# Patient Record
Sex: MALE | Race: WHITE | Employment: OTHER
[De-identification: names, ages, dates, MRNs, and addresses within clinical notes are randomized per-mention and may not be internally consistent; named-entity substitution may affect disease eponyms.]

---

## 2017-02-13 PROBLEM — M86.9 OSTEOMYELITIS (HCC): Status: ACTIVE | Noted: 2017-02-13

## 2017-03-15 ENCOUNTER — TELEPHONE (OUTPATIENT)
Dept: CASE MANAGEMENT | Age: 77
End: 2017-03-15

## 2017-10-11 PROBLEM — M86.9 OSTEOMYELITIS (HCC): Status: RESOLVED | Noted: 2017-02-13 | Resolved: 2017-10-11

## 2017-10-12 PROBLEM — R91.1 PULMONARY NODULE: Status: ACTIVE | Noted: 2017-10-12

## 2018-01-19 PROBLEM — R07.9 CHEST PAIN: Status: ACTIVE | Noted: 2018-01-19

## 2018-01-20 PROBLEM — I21.4 NSTEMI, INITIAL EPISODE OF CARE (HCC): Status: ACTIVE | Noted: 2018-01-01

## 2018-01-21 LAB
LEFT VENTRICULAR EJECTION FRACTION MODE: NORMAL
LV EF: 60 %

## 2018-01-22 PROBLEM — R07.9 CHEST PAIN: Status: RESOLVED | Noted: 2018-01-19 | Resolved: 2018-01-22

## 2018-01-22 PROBLEM — E78.5 HYPERLIPIDEMIA LDL GOAL <100: Chronic | Status: ACTIVE | Noted: 2018-01-22

## 2018-01-22 PROBLEM — E66.9 OBESITY (BMI 30-39.9): Chronic | Status: ACTIVE | Noted: 2018-01-22

## 2018-01-22 PROBLEM — I21.4 NSTEMI, INITIAL EPISODE OF CARE (HCC): Status: RESOLVED | Noted: 2018-01-01 | Resolved: 2018-01-22

## 2018-01-22 PROBLEM — E03.9 ACQUIRED HYPOTHYROIDISM: Chronic | Status: ACTIVE | Noted: 2018-01-22

## 2018-01-22 PROBLEM — I25.10 CAD (CORONARY ARTERY DISEASE), NATIVE CORONARY ARTERY: Chronic | Status: ACTIVE | Noted: 2018-01-22

## 2018-04-22 ENCOUNTER — HOSPITAL ENCOUNTER (EMERGENCY)
Age: 78
Discharge: HOME | End: 2018-04-22
Attending: EMERGENCY MEDICINE
Payer: MEDICARE

## 2018-04-22 ENCOUNTER — APPOINTMENT (OUTPATIENT)
Dept: CT IMAGING | Age: 78
End: 2018-04-22
Payer: MEDICARE

## 2018-04-22 VITALS
TEMPERATURE: 98.4 F | HEIGHT: 68 IN | SYSTOLIC BLOOD PRESSURE: 161 MMHG | WEIGHT: 265 LBS | HEART RATE: 88 BPM | DIASTOLIC BLOOD PRESSURE: 80 MMHG | BODY MASS INDEX: 40.16 KG/M2 | RESPIRATION RATE: 16 BRPM | OXYGEN SATURATION: 95 %

## 2018-04-22 DIAGNOSIS — R11.2 NAUSEA VOMITING AND DIARRHEA: Primary | ICD-10-CM

## 2018-04-22 DIAGNOSIS — R19.7 NAUSEA VOMITING AND DIARRHEA: Primary | ICD-10-CM

## 2018-04-22 LAB
ALBUMIN SERPL-MCNC: 3.9 G/DL (ref 3.5–5.2)
ALP BLD-CCNC: 57 U/L (ref 40–129)
ALT SERPL-CCNC: 11 U/L (ref 0–40)
ANION GAP SERPL CALCULATED.3IONS-SCNC: 16 MMOL/L (ref 7–16)
ANISOCYTOSIS: ABNORMAL
AST SERPL-CCNC: 21 U/L (ref 0–39)
BACTERIA: NORMAL /HPF
BASOPHILS ABSOLUTE: 0 E9/L (ref 0–0.2)
BASOPHILS RELATIVE PERCENT: 0 % (ref 0–2)
BILIRUB SERPL-MCNC: 0.5 MG/DL (ref 0–1.2)
BILIRUBIN URINE: NEGATIVE
BLOOD, URINE: NEGATIVE
BUN BLDV-MCNC: 19 MG/DL (ref 8–23)
CALCIUM SERPL-MCNC: 9.9 MG/DL (ref 8.6–10.2)
CHLORIDE BLD-SCNC: 98 MMOL/L (ref 98–107)
CLARITY: CLEAR
CO2: 23 MMOL/L (ref 22–29)
COLOR: YELLOW
CREAT SERPL-MCNC: 1.3 MG/DL (ref 0.7–1.2)
EOSINOPHILS ABSOLUTE: 0.63 E9/L (ref 0.05–0.5)
EOSINOPHILS RELATIVE PERCENT: 7.7 % (ref 0–6)
GFR AFRICAN AMERICAN: >60
GFR NON-AFRICAN AMERICAN: 54 ML/MIN/1.73
GLUCOSE BLD-MCNC: 151 MG/DL (ref 74–109)
GLUCOSE URINE: NEGATIVE MG/DL
HCT VFR BLD CALC: 39.2 % (ref 37–54)
HEMOGLOBIN: 13.1 G/DL (ref 12.5–16.5)
KETONES, URINE: ABNORMAL MG/DL
LACTIC ACID: 1.8 MMOL/L (ref 0.5–2.2)
LEUKOCYTE ESTERASE, URINE: NEGATIVE
LIPASE: 16 U/L (ref 13–60)
LYMPHOCYTES ABSOLUTE: 0.49 E9/L (ref 1.5–4)
LYMPHOCYTES RELATIVE PERCENT: 6 % (ref 20–42)
MAGNESIUM: 1.9 MG/DL (ref 1.6–2.6)
MCH RBC QN AUTO: 31.6 PG (ref 26–35)
MCHC RBC AUTO-ENTMCNC: 33.4 % (ref 32–34.5)
MCV RBC AUTO: 94.5 FL (ref 80–99.9)
MONOCYTES ABSOLUTE: 0.33 E9/L (ref 0.1–0.95)
MONOCYTES RELATIVE PERCENT: 4.3 % (ref 2–12)
NEUTROPHILS ABSOLUTE: 6.72 E9/L (ref 1.8–7.3)
NEUTROPHILS RELATIVE PERCENT: 82 % (ref 43–80)
NITRITE, URINE: NEGATIVE
NUCLEATED RED BLOOD CELLS: 0 /100 WBC
PDW BLD-RTO: 14.7 FL (ref 11.5–15)
PH UA: 6.5 (ref 5–9)
PLATELET # BLD: 154 E9/L (ref 130–450)
PMV BLD AUTO: 10.6 FL (ref 7–12)
POTASSIUM SERPL-SCNC: 4.6 MMOL/L (ref 3.5–5)
PROTEIN UA: ABNORMAL MG/DL
RBC # BLD: 4.15 E12/L (ref 3.8–5.8)
RBC UA: NORMAL /HPF (ref 0–2)
SODIUM BLD-SCNC: 137 MMOL/L (ref 132–146)
SPECIFIC GRAVITY UA: 1.01 (ref 1–1.03)
TOTAL PROTEIN: 7.7 G/DL (ref 6.4–8.3)
UROBILINOGEN, URINE: 0.2 E.U./DL
WBC # BLD: 8.2 E9/L (ref 4.5–11.5)
WBC UA: NORMAL /HPF (ref 0–5)

## 2018-04-22 PROCEDURE — 99284 EMERGENCY DEPT VISIT MOD MDM: CPT

## 2018-04-22 PROCEDURE — 81001 URINALYSIS AUTO W/SCOPE: CPT

## 2018-04-22 PROCEDURE — 74176 CT ABD & PELVIS W/O CONTRAST: CPT

## 2018-04-22 PROCEDURE — 96374 THER/PROPH/DIAG INJ IV PUSH: CPT

## 2018-04-22 PROCEDURE — 6370000000 HC RX 637 (ALT 250 FOR IP): Performed by: EMERGENCY MEDICINE

## 2018-04-22 PROCEDURE — 83690 ASSAY OF LIPASE: CPT

## 2018-04-22 PROCEDURE — 6360000002 HC RX W HCPCS: Performed by: EMERGENCY MEDICINE

## 2018-04-22 PROCEDURE — 96376 TX/PRO/DX INJ SAME DRUG ADON: CPT

## 2018-04-22 PROCEDURE — 85025 COMPLETE CBC W/AUTO DIFF WBC: CPT

## 2018-04-22 PROCEDURE — 80053 COMPREHEN METABOLIC PANEL: CPT

## 2018-04-22 PROCEDURE — 83605 ASSAY OF LACTIC ACID: CPT

## 2018-04-22 PROCEDURE — 83735 ASSAY OF MAGNESIUM: CPT

## 2018-04-22 PROCEDURE — 96375 TX/PRO/DX INJ NEW DRUG ADDON: CPT

## 2018-04-22 RX ORDER — MORPHINE SULFATE 4 MG/ML
4 INJECTION, SOLUTION INTRAMUSCULAR; INTRAVENOUS ONCE
Status: COMPLETED | OUTPATIENT
Start: 2018-04-22 | End: 2018-04-22

## 2018-04-22 RX ORDER — ONDANSETRON 2 MG/ML
4 INJECTION INTRAMUSCULAR; INTRAVENOUS ONCE
Status: COMPLETED | OUTPATIENT
Start: 2018-04-22 | End: 2018-04-22

## 2018-04-22 RX ORDER — PROMETHAZINE HYDROCHLORIDE 25 MG/1
25 SUPPOSITORY RECTAL EVERY 4 HOURS PRN
Qty: 10 SUPPOSITORY | Refills: 0 | Status: SHIPPED | OUTPATIENT
Start: 2018-04-22 | End: 2018-04-29

## 2018-04-22 RX ORDER — HYDROCODONE BITARTRATE AND ACETAMINOPHEN 5; 325 MG/1; MG/1
1 TABLET ORAL ONCE
Status: COMPLETED | OUTPATIENT
Start: 2018-04-22 | End: 2018-04-22

## 2018-04-22 RX ORDER — GABAPENTIN 100 MG/1
400 CAPSULE ORAL 3 TIMES DAILY
Status: DISCONTINUED | OUTPATIENT
Start: 2018-04-22 | End: 2018-04-22

## 2018-04-22 RX ORDER — GABAPENTIN 100 MG/1
400 CAPSULE ORAL ONCE
Status: COMPLETED | OUTPATIENT
Start: 2018-04-22 | End: 2018-04-22

## 2018-04-22 RX ORDER — ONDANSETRON 4 MG/1
4 TABLET, ORALLY DISINTEGRATING ORAL EVERY 8 HOURS PRN
Qty: 20 TABLET | Refills: 0 | Status: ON HOLD | OUTPATIENT
Start: 2018-04-22 | End: 2018-05-13 | Stop reason: HOSPADM

## 2018-04-22 RX ADMIN — HYDROCODONE BITARTRATE AND ACETAMINOPHEN 1 TABLET: 5; 325 TABLET ORAL at 11:54

## 2018-04-22 RX ADMIN — GABAPENTIN 400 MG: 100 CAPSULE ORAL at 11:54

## 2018-04-22 RX ADMIN — MORPHINE SULFATE 4 MG: 4 INJECTION INTRAVENOUS at 12:50

## 2018-04-22 RX ADMIN — ONDANSETRON 4 MG: 2 INJECTION INTRAMUSCULAR; INTRAVENOUS at 10:14

## 2018-04-22 RX ADMIN — MORPHINE SULFATE 4 MG: 4 INJECTION INTRAVENOUS at 10:14

## 2018-04-22 ASSESSMENT — PAIN DESCRIPTION - DESCRIPTORS
DESCRIPTORS: ACHING;SHARP

## 2018-04-22 ASSESSMENT — PAIN SCALES - GENERAL
PAINLEVEL_OUTOF10: 8
PAINLEVEL_OUTOF10: 7
PAINLEVEL_OUTOF10: 9
PAINLEVEL_OUTOF10: 8
PAINLEVEL_OUTOF10: 5
PAINLEVEL_OUTOF10: 9

## 2018-04-22 ASSESSMENT — PAIN DESCRIPTION - PAIN TYPE
TYPE: CHRONIC PAIN

## 2018-04-22 ASSESSMENT — PAIN DESCRIPTION - LOCATION
LOCATION: BACK

## 2018-04-22 ASSESSMENT — PAIN DESCRIPTION - ORIENTATION
ORIENTATION: RIGHT;LEFT;LOWER
ORIENTATION: RIGHT;LEFT;LOWER
ORIENTATION: RIGHT;LEFT

## 2018-04-22 ASSESSMENT — PAIN DESCRIPTION - FREQUENCY
FREQUENCY: CONTINUOUS

## 2018-04-23 ENCOUNTER — HOSPITAL ENCOUNTER (EMERGENCY)
Age: 78
Discharge: HOME | End: 2018-04-23
Attending: EMERGENCY MEDICINE
Payer: MEDICARE

## 2018-04-23 VITALS
BODY MASS INDEX: 40.16 KG/M2 | HEIGHT: 68 IN | HEART RATE: 60 BPM | DIASTOLIC BLOOD PRESSURE: 65 MMHG | WEIGHT: 265 LBS | RESPIRATION RATE: 18 BRPM | TEMPERATURE: 98.9 F | OXYGEN SATURATION: 95 % | SYSTOLIC BLOOD PRESSURE: 147 MMHG

## 2018-04-23 DIAGNOSIS — R11.2 NON-INTRACTABLE VOMITING WITH NAUSEA, UNSPECIFIED VOMITING TYPE: ICD-10-CM

## 2018-04-23 DIAGNOSIS — K52.9 COLITIS: Primary | ICD-10-CM

## 2018-04-23 LAB
ALBUMIN SERPL-MCNC: 3.3 G/DL (ref 3.5–5.2)
ALP BLD-CCNC: 44 U/L (ref 40–129)
ALT SERPL-CCNC: 9 U/L (ref 0–40)
ANION GAP SERPL CALCULATED.3IONS-SCNC: 16 MMOL/L (ref 7–16)
AST SERPL-CCNC: 15 U/L (ref 0–39)
BILIRUB SERPL-MCNC: 0.4 MG/DL (ref 0–1.2)
BUN BLDV-MCNC: 24 MG/DL (ref 8–23)
CALCIUM SERPL-MCNC: 8.8 MG/DL (ref 8.6–10.2)
CHLORIDE BLD-SCNC: 95 MMOL/L (ref 98–107)
CO2: 24 MMOL/L (ref 22–29)
CREAT SERPL-MCNC: 1.6 MG/DL (ref 0.7–1.2)
GFR AFRICAN AMERICAN: 51
GFR NON-AFRICAN AMERICAN: 42 ML/MIN/1.73
GLUCOSE BLD-MCNC: 102 MG/DL (ref 74–109)
HCT VFR BLD CALC: 35 % (ref 37–54)
HEMOGLOBIN: 12 G/DL (ref 12.5–16.5)
LACTIC ACID: 2 MMOL/L (ref 0.5–2.2)
MCH RBC QN AUTO: 32.2 PG (ref 26–35)
MCHC RBC AUTO-ENTMCNC: 34.3 % (ref 32–34.5)
MCV RBC AUTO: 93.8 FL (ref 80–99.9)
PDW BLD-RTO: 14.8 FL (ref 11.5–15)
PLATELET # BLD: 118 E9/L (ref 130–450)
PMV BLD AUTO: 10.5 FL (ref 7–12)
POTASSIUM SERPL-SCNC: 3.9 MMOL/L (ref 3.5–5)
RBC # BLD: 3.73 E12/L (ref 3.8–5.8)
SODIUM BLD-SCNC: 135 MMOL/L (ref 132–146)
TOTAL PROTEIN: 6.5 G/DL (ref 6.4–8.3)
WBC # BLD: 8.7 E9/L (ref 4.5–11.5)

## 2018-04-23 PROCEDURE — 83605 ASSAY OF LACTIC ACID: CPT

## 2018-04-23 PROCEDURE — 80053 COMPREHEN METABOLIC PANEL: CPT

## 2018-04-23 PROCEDURE — 85027 COMPLETE CBC AUTOMATED: CPT

## 2018-04-23 PROCEDURE — 2580000003 HC RX 258: Performed by: NURSE PRACTITIONER

## 2018-04-23 PROCEDURE — 99284 EMERGENCY DEPT VISIT MOD MDM: CPT

## 2018-04-23 PROCEDURE — 6360000002 HC RX W HCPCS: Performed by: NURSE PRACTITIONER

## 2018-04-23 PROCEDURE — 6370000000 HC RX 637 (ALT 250 FOR IP): Performed by: NURSE PRACTITIONER

## 2018-04-23 PROCEDURE — 96374 THER/PROPH/DIAG INJ IV PUSH: CPT

## 2018-04-23 RX ORDER — LACTOBACILLUS RHAMNOSUS GG 10B CELL
1 CAPSULE ORAL DAILY
Qty: 21 CAPSULE | Refills: 0 | Status: ON HOLD | OUTPATIENT
Start: 2018-04-23 | End: 2018-05-13 | Stop reason: HOSPADM

## 2018-04-23 RX ORDER — SODIUM CHLORIDE 9 MG/ML
1000 INJECTION, SOLUTION INTRAVENOUS ONCE
Status: COMPLETED | OUTPATIENT
Start: 2018-04-23 | End: 2018-04-23

## 2018-04-23 RX ORDER — METRONIDAZOLE 500 MG/1
500 TABLET ORAL 4 TIMES DAILY
Qty: 56 TABLET | Refills: 0 | Status: SHIPPED | OUTPATIENT
Start: 2018-04-23 | End: 2018-05-07

## 2018-04-23 RX ORDER — LOPERAMIDE HYDROCHLORIDE 2 MG/1
2 CAPSULE ORAL ONCE
Status: COMPLETED | OUTPATIENT
Start: 2018-04-23 | End: 2018-04-23

## 2018-04-23 RX ORDER — ONDANSETRON 2 MG/ML
4 INJECTION INTRAMUSCULAR; INTRAVENOUS ONCE
Status: COMPLETED | OUTPATIENT
Start: 2018-04-23 | End: 2018-04-23

## 2018-04-23 RX ADMIN — LOPERAMIDE HYDROCHLORIDE 2 MG: 2 CAPSULE ORAL at 13:08

## 2018-04-23 RX ADMIN — ONDANSETRON 4 MG: 2 INJECTION INTRAMUSCULAR; INTRAVENOUS at 13:17

## 2018-04-23 RX ADMIN — SODIUM CHLORIDE 1000 ML: 9 INJECTION, SOLUTION INTRAVENOUS at 13:24

## 2018-04-23 ASSESSMENT — PAIN DESCRIPTION - LOCATION: LOCATION: BACK

## 2018-04-23 ASSESSMENT — PAIN DESCRIPTION - PAIN TYPE: TYPE: CHRONIC PAIN

## 2018-04-23 ASSESSMENT — PAIN SCALES - GENERAL: PAINLEVEL_OUTOF10: 3

## 2018-04-26 ENCOUNTER — NURSE ONLY (OUTPATIENT)
Dept: NON INVASIVE DIAGNOSTICS | Age: 78
End: 2018-04-26
Payer: MEDICARE

## 2018-04-26 DIAGNOSIS — Z95.0 PACEMAKER: Primary | Chronic | ICD-10-CM

## 2018-04-26 PROCEDURE — 93296 REM INTERROG EVL PM/IDS: CPT | Performed by: INTERNAL MEDICINE

## 2018-04-26 PROCEDURE — 93294 REM INTERROG EVL PM/LDLS PM: CPT | Performed by: INTERNAL MEDICINE

## 2018-05-12 ENCOUNTER — HOSPITAL ENCOUNTER (OUTPATIENT)
Age: 78
Setting detail: OBSERVATION
Discharge: HOME | End: 2018-05-13
Attending: EMERGENCY MEDICINE | Admitting: INTERNAL MEDICINE
Payer: MEDICARE

## 2018-05-12 ENCOUNTER — APPOINTMENT (OUTPATIENT)
Dept: GENERAL RADIOLOGY | Age: 78
End: 2018-05-12
Payer: MEDICARE

## 2018-05-12 DIAGNOSIS — R06.00 DYSPNEA, UNSPECIFIED TYPE: Primary | ICD-10-CM

## 2018-05-12 DIAGNOSIS — R77.8 ELEVATED TROPONIN: ICD-10-CM

## 2018-05-12 DIAGNOSIS — I50.32 DIASTOLIC CHF, CHRONIC (HCC): Chronic | ICD-10-CM

## 2018-05-12 DIAGNOSIS — I25.10 CORONARY ARTERY DISEASE INVOLVING NATIVE CORONARY ARTERY OF NATIVE HEART WITHOUT ANGINA PECTORIS: Chronic | ICD-10-CM

## 2018-05-12 LAB
ANION GAP SERPL CALCULATED.3IONS-SCNC: 11 MMOL/L (ref 7–16)
BASOPHILS ABSOLUTE: 0.03 E9/L (ref 0–0.2)
BASOPHILS RELATIVE PERCENT: 0.5 % (ref 0–2)
BUN BLDV-MCNC: 16 MG/DL (ref 8–23)
CALCIUM SERPL-MCNC: 8.8 MG/DL (ref 8.6–10.2)
CHLORIDE BLD-SCNC: 98 MMOL/L (ref 98–107)
CO2: 25 MMOL/L (ref 22–29)
CREAT SERPL-MCNC: 1 MG/DL (ref 0.7–1.2)
EOSINOPHILS ABSOLUTE: 0.62 E9/L (ref 0.05–0.5)
EOSINOPHILS RELATIVE PERCENT: 9.9 % (ref 0–6)
GFR AFRICAN AMERICAN: >60
GFR NON-AFRICAN AMERICAN: >60 ML/MIN/1.73
GLUCOSE BLD-MCNC: 258 MG/DL (ref 74–109)
HCT VFR BLD CALC: 31.1 % (ref 37–54)
HEMOGLOBIN: 10.3 G/DL (ref 12.5–16.5)
IMMATURE GRANULOCYTES #: 0.01 E9/L
IMMATURE GRANULOCYTES %: 0.2 % (ref 0–5)
LYMPHOCYTES ABSOLUTE: 1.27 E9/L (ref 1.5–4)
LYMPHOCYTES RELATIVE PERCENT: 20.2 % (ref 20–42)
MCH RBC QN AUTO: 31.5 PG (ref 26–35)
MCHC RBC AUTO-ENTMCNC: 33.1 % (ref 32–34.5)
MCV RBC AUTO: 95.1 FL (ref 80–99.9)
MONOCYTES ABSOLUTE: 0.5 E9/L (ref 0.1–0.95)
MONOCYTES RELATIVE PERCENT: 8 % (ref 2–12)
NEUTROPHILS ABSOLUTE: 3.85 E9/L (ref 1.8–7.3)
NEUTROPHILS RELATIVE PERCENT: 61.2 % (ref 43–80)
PDW BLD-RTO: 15 FL (ref 11.5–15)
PLATELET # BLD: 162 E9/L (ref 130–450)
PMV BLD AUTO: 9.7 FL (ref 7–12)
POTASSIUM SERPL-SCNC: 4 MMOL/L (ref 3.5–5)
RBC # BLD: 3.27 E12/L (ref 3.8–5.8)
SODIUM BLD-SCNC: 134 MMOL/L (ref 132–146)
TROPONIN: 0.06 NG/ML (ref 0–0.03)
WBC # BLD: 6.3 E9/L (ref 4.5–11.5)

## 2018-05-12 PROCEDURE — 6370000000 HC RX 637 (ALT 250 FOR IP): Performed by: EMERGENCY MEDICINE

## 2018-05-12 PROCEDURE — 85025 COMPLETE CBC W/AUTO DIFF WBC: CPT

## 2018-05-12 PROCEDURE — 84484 ASSAY OF TROPONIN QUANT: CPT

## 2018-05-12 PROCEDURE — 80048 BASIC METABOLIC PNL TOTAL CA: CPT

## 2018-05-12 PROCEDURE — 99285 EMERGENCY DEPT VISIT HI MDM: CPT

## 2018-05-12 PROCEDURE — 71045 X-RAY EXAM CHEST 1 VIEW: CPT

## 2018-05-12 PROCEDURE — 94640 AIRWAY INHALATION TREATMENT: CPT

## 2018-05-12 PROCEDURE — 96374 THER/PROPH/DIAG INJ IV PUSH: CPT

## 2018-05-12 PROCEDURE — 94664 DEMO&/EVAL PT USE INHALER: CPT

## 2018-05-12 PROCEDURE — 6360000002 HC RX W HCPCS: Performed by: EMERGENCY MEDICINE

## 2018-05-12 RX ORDER — METHYLPREDNISOLONE SODIUM SUCCINATE 125 MG/2ML
125 INJECTION, POWDER, LYOPHILIZED, FOR SOLUTION INTRAMUSCULAR; INTRAVENOUS ONCE
Status: COMPLETED | OUTPATIENT
Start: 2018-05-12 | End: 2018-05-12

## 2018-05-12 RX ORDER — IPRATROPIUM BROMIDE AND ALBUTEROL SULFATE 2.5; .5 MG/3ML; MG/3ML
1 SOLUTION RESPIRATORY (INHALATION)
Status: COMPLETED | OUTPATIENT
Start: 2018-05-12 | End: 2018-05-12

## 2018-05-12 RX ORDER — IPRATROPIUM BROMIDE AND ALBUTEROL SULFATE 2.5; .5 MG/3ML; MG/3ML
1 SOLUTION RESPIRATORY (INHALATION) EVERY 4 HOURS
Qty: 360 ML | Refills: 0 | Status: SHIPPED | OUTPATIENT
Start: 2018-05-12 | End: 2018-05-13 | Stop reason: HOSPADM

## 2018-05-12 RX ORDER — ASPIRIN 81 MG/1
162 TABLET, CHEWABLE ORAL ONCE
Status: COMPLETED | OUTPATIENT
Start: 2018-05-13 | End: 2018-05-13

## 2018-05-12 RX ADMIN — IPRATROPIUM BROMIDE AND ALBUTEROL SULFATE 1 AMPULE: .5; 3 SOLUTION RESPIRATORY (INHALATION) at 22:15

## 2018-05-12 RX ADMIN — METHYLPREDNISOLONE SODIUM SUCCINATE 125 MG: 125 INJECTION, POWDER, FOR SOLUTION INTRAMUSCULAR; INTRAVENOUS at 22:22

## 2018-05-12 RX ADMIN — IPRATROPIUM BROMIDE AND ALBUTEROL SULFATE 1 AMPULE: .5; 3 SOLUTION RESPIRATORY (INHALATION) at 22:25

## 2018-05-12 RX ADMIN — IPRATROPIUM BROMIDE AND ALBUTEROL SULFATE 1 AMPULE: .5; 3 SOLUTION RESPIRATORY (INHALATION) at 22:05

## 2018-05-12 ASSESSMENT — PAIN DESCRIPTION - LOCATION: LOCATION: CHEST

## 2018-05-12 ASSESSMENT — PAIN DESCRIPTION - PAIN TYPE: TYPE: ACUTE PAIN

## 2018-05-12 ASSESSMENT — PAIN DESCRIPTION - DESCRIPTORS: DESCRIPTORS: ACHING

## 2018-05-12 ASSESSMENT — PAIN SCALES - GENERAL: PAINLEVEL_OUTOF10: 7

## 2018-05-13 VITALS
RESPIRATION RATE: 18 BRPM | DIASTOLIC BLOOD PRESSURE: 70 MMHG | SYSTOLIC BLOOD PRESSURE: 169 MMHG | HEART RATE: 83 BPM | WEIGHT: 256.2 LBS | BODY MASS INDEX: 38.83 KG/M2 | HEIGHT: 68 IN | TEMPERATURE: 98.4 F | OXYGEN SATURATION: 96 %

## 2018-05-13 PROBLEM — I67.9 CEREBROVASCULAR DISEASE: Status: ACTIVE | Noted: 2018-05-13

## 2018-05-13 PROBLEM — R07.9 CHEST PAIN: Status: ACTIVE | Noted: 2018-05-13

## 2018-05-13 LAB
METER GLUCOSE: 302 MG/DL (ref 70–110)
PRO-BNP: 4350 PG/ML (ref 0–450)
TROPONIN: 0.05 NG/ML (ref 0–0.03)
TROPONIN: 0.08 NG/ML (ref 0–0.03)
TSH SERPL DL<=0.05 MIU/L-ACNC: 1.05 UIU/ML (ref 0.27–4.2)

## 2018-05-13 PROCEDURE — 6360000002 HC RX W HCPCS: Performed by: INTERNAL MEDICINE

## 2018-05-13 PROCEDURE — G0378 HOSPITAL OBSERVATION PER HR: HCPCS

## 2018-05-13 PROCEDURE — 99215 OFFICE O/P EST HI 40 MIN: CPT | Performed by: INTERNAL MEDICINE

## 2018-05-13 PROCEDURE — 36415 COLL VENOUS BLD VENIPUNCTURE: CPT

## 2018-05-13 PROCEDURE — 82962 GLUCOSE BLOOD TEST: CPT

## 2018-05-13 PROCEDURE — 83880 ASSAY OF NATRIURETIC PEPTIDE: CPT

## 2018-05-13 PROCEDURE — 84443 ASSAY THYROID STIM HORMONE: CPT

## 2018-05-13 PROCEDURE — 96372 THER/PROPH/DIAG INJ SC/IM: CPT

## 2018-05-13 PROCEDURE — 6370000000 HC RX 637 (ALT 250 FOR IP): Performed by: EMERGENCY MEDICINE

## 2018-05-13 PROCEDURE — 84484 ASSAY OF TROPONIN QUANT: CPT

## 2018-05-13 PROCEDURE — 6370000000 HC RX 637 (ALT 250 FOR IP): Performed by: INTERNAL MEDICINE

## 2018-05-13 PROCEDURE — 2580000003 HC RX 258: Performed by: INTERNAL MEDICINE

## 2018-05-13 RX ORDER — INSULIN GLARGINE 100 [IU]/ML
25 INJECTION, SOLUTION SUBCUTANEOUS 2 TIMES DAILY
Status: DISCONTINUED | OUTPATIENT
Start: 2018-05-13 | End: 2018-05-13 | Stop reason: HOSPADM

## 2018-05-13 RX ORDER — ISOSORBIDE MONONITRATE 60 MG/1
120 TABLET, EXTENDED RELEASE ORAL DAILY
Status: DISCONTINUED | OUTPATIENT
Start: 2018-05-14 | End: 2018-05-13 | Stop reason: HOSPADM

## 2018-05-13 RX ORDER — ALBUTEROL SULFATE 2.5 MG/3ML
2.5 SOLUTION RESPIRATORY (INHALATION) EVERY 4 HOURS PRN
Status: DISCONTINUED | OUTPATIENT
Start: 2018-05-13 | End: 2018-05-13 | Stop reason: HOSPADM

## 2018-05-13 RX ORDER — LOSARTAN POTASSIUM 50 MG/1
100 TABLET ORAL DAILY
Status: DISCONTINUED | OUTPATIENT
Start: 2018-05-13 | End: 2018-05-13 | Stop reason: HOSPADM

## 2018-05-13 RX ORDER — LOVASTATIN 20 MG/1
20 TABLET ORAL NIGHTLY
Status: DISCONTINUED | OUTPATIENT
Start: 2018-05-13 | End: 2018-05-13 | Stop reason: HOSPADM

## 2018-05-13 RX ORDER — ALLOPURINOL 300 MG/1
300 TABLET ORAL DAILY
Status: DISCONTINUED | OUTPATIENT
Start: 2018-05-13 | End: 2018-05-13 | Stop reason: HOSPADM

## 2018-05-13 RX ORDER — GABAPENTIN 400 MG/1
400 CAPSULE ORAL EVERY 6 HOURS
Status: DISCONTINUED | OUTPATIENT
Start: 2018-05-13 | End: 2018-05-13 | Stop reason: HOSPADM

## 2018-05-13 RX ORDER — ACETAMINOPHEN 325 MG/1
650 TABLET ORAL EVERY 4 HOURS PRN
Status: DISCONTINUED | OUTPATIENT
Start: 2018-05-13 | End: 2018-05-13 | Stop reason: HOSPADM

## 2018-05-13 RX ORDER — HYDROCODONE BITARTRATE AND ACETAMINOPHEN 10; 325 MG/1; MG/1
1 TABLET ORAL EVERY 6 HOURS PRN
Status: DISCONTINUED | OUTPATIENT
Start: 2018-05-13 | End: 2018-05-13 | Stop reason: HOSPADM

## 2018-05-13 RX ORDER — ASPIRIN 81 MG/1
81 TABLET ORAL DAILY
Status: DISCONTINUED | OUTPATIENT
Start: 2018-05-13 | End: 2018-05-13 | Stop reason: HOSPADM

## 2018-05-13 RX ORDER — SODIUM CHLORIDE 0.9 % (FLUSH) 0.9 %
10 SYRINGE (ML) INJECTION PRN
Status: DISCONTINUED | OUTPATIENT
Start: 2018-05-13 | End: 2018-05-13 | Stop reason: SDUPTHER

## 2018-05-13 RX ORDER — SODIUM CHLORIDE 0.9 % (FLUSH) 0.9 %
10 SYRINGE (ML) INJECTION PRN
Status: DISCONTINUED | OUTPATIENT
Start: 2018-05-13 | End: 2018-05-13 | Stop reason: HOSPADM

## 2018-05-13 RX ORDER — SODIUM CHLORIDE 0.9 % (FLUSH) 0.9 %
10 SYRINGE (ML) INJECTION EVERY 12 HOURS SCHEDULED
Status: DISCONTINUED | OUTPATIENT
Start: 2018-05-13 | End: 2018-05-13 | Stop reason: SDUPTHER

## 2018-05-13 RX ORDER — NICOTINE POLACRILEX 4 MG
15 LOZENGE BUCCAL PRN
Status: DISCONTINUED | OUTPATIENT
Start: 2018-05-13 | End: 2018-05-13 | Stop reason: HOSPADM

## 2018-05-13 RX ORDER — SODIUM CHLORIDE 0.9 % (FLUSH) 0.9 %
10 SYRINGE (ML) INJECTION EVERY 12 HOURS SCHEDULED
Status: DISCONTINUED | OUTPATIENT
Start: 2018-05-13 | End: 2018-05-13 | Stop reason: HOSPADM

## 2018-05-13 RX ORDER — DEXTROSE MONOHYDRATE 50 MG/ML
100 INJECTION, SOLUTION INTRAVENOUS PRN
Status: DISCONTINUED | OUTPATIENT
Start: 2018-05-13 | End: 2018-05-13 | Stop reason: HOSPADM

## 2018-05-13 RX ORDER — TORSEMIDE 20 MG/1
20 TABLET ORAL DAILY
Status: DISCONTINUED | OUTPATIENT
Start: 2018-05-13 | End: 2018-05-13 | Stop reason: HOSPADM

## 2018-05-13 RX ORDER — MORPHINE SULFATE 15 MG/1
15 TABLET, FILM COATED, EXTENDED RELEASE ORAL 2 TIMES DAILY
Status: DISCONTINUED | OUTPATIENT
Start: 2018-05-13 | End: 2018-05-13 | Stop reason: HOSPADM

## 2018-05-13 RX ORDER — DEXTROSE MONOHYDRATE 25 G/50ML
12.5 INJECTION, SOLUTION INTRAVENOUS PRN
Status: DISCONTINUED | OUTPATIENT
Start: 2018-05-13 | End: 2018-05-13 | Stop reason: HOSPADM

## 2018-05-13 RX ORDER — ONDANSETRON 2 MG/ML
4 INJECTION INTRAMUSCULAR; INTRAVENOUS EVERY 6 HOURS PRN
Status: DISCONTINUED | OUTPATIENT
Start: 2018-05-13 | End: 2018-05-13 | Stop reason: HOSPADM

## 2018-05-13 RX ADMIN — MORPHINE SULFATE 15 MG: 15 TABLET, FILM COATED, EXTENDED RELEASE ORAL at 18:05

## 2018-05-13 RX ADMIN — TORSEMIDE 20 MG: 20 TABLET ORAL at 08:46

## 2018-05-13 RX ADMIN — HYDROCODONE BITARTRATE AND ACETAMINOPHEN 1 TABLET: 10; 325 TABLET ORAL at 09:04

## 2018-05-13 RX ADMIN — INSULIN LISPRO 8 UNITS: 100 INJECTION, SOLUTION INTRAVENOUS; SUBCUTANEOUS at 15:32

## 2018-05-13 RX ADMIN — ENOXAPARIN SODIUM 40 MG: 40 INJECTION, SOLUTION INTRAVENOUS; SUBCUTANEOUS at 08:47

## 2018-05-13 RX ADMIN — ALLOPURINOL 300 MG: 300 TABLET ORAL at 09:21

## 2018-05-13 RX ADMIN — LOSARTAN POTASSIUM 100 MG: 50 TABLET, FILM COATED ORAL at 08:47

## 2018-05-13 RX ADMIN — HYDROCODONE BITARTRATE AND ACETAMINOPHEN 1 TABLET: 10; 325 TABLET ORAL at 14:47

## 2018-05-13 RX ADMIN — MORPHINE SULFATE 15 MG: 15 TABLET, FILM COATED, EXTENDED RELEASE ORAL at 08:47

## 2018-05-13 RX ADMIN — ASPIRIN 81 MG: 81 TABLET, COATED ORAL at 08:47

## 2018-05-13 RX ADMIN — GABAPENTIN 400 MG: 400 CAPSULE ORAL at 14:47

## 2018-05-13 RX ADMIN — Medication 10 ML: at 09:05

## 2018-05-13 RX ADMIN — ASPIRIN 81 MG 162 MG: 81 TABLET ORAL at 00:04

## 2018-05-13 RX ADMIN — GABAPENTIN 400 MG: 400 CAPSULE ORAL at 09:21

## 2018-05-13 RX ADMIN — INSULIN GLARGINE 25 UNITS: 100 INJECTION, SOLUTION SUBCUTANEOUS at 09:10

## 2018-05-13 RX ADMIN — Medication 200 MG: at 08:47

## 2018-05-13 RX ADMIN — NITROGLYCERIN 1 INCH: 20 OINTMENT TOPICAL at 00:04

## 2018-05-13 ASSESSMENT — PAIN SCALES - GENERAL
PAINLEVEL_OUTOF10: 9
PAINLEVEL_OUTOF10: 0
PAINLEVEL_OUTOF10: 8
PAINLEVEL_OUTOF10: 0
PAINLEVEL_OUTOF10: 9
PAINLEVEL_OUTOF10: 8

## 2018-05-13 ASSESSMENT — PAIN DESCRIPTION - LOCATION
LOCATION: BACK;HAND
LOCATION: BACK;LEG

## 2018-05-13 ASSESSMENT — PAIN DESCRIPTION - PAIN TYPE
TYPE: CHRONIC PAIN
TYPE: CHRONIC PAIN

## 2018-05-14 LAB
METER GLUCOSE: 305 MG/DL (ref 70–110)
METER GLUCOSE: 318 MG/DL (ref 70–110)

## 2018-05-16 LAB
EKG ATRIAL RATE: 81 BPM
EKG Q-T INTERVAL: 406 MS
EKG QRS DURATION: 96 MS
EKG QTC CALCULATION (BAZETT): 471 MS
EKG R AXIS: 20 DEGREES
EKG T AXIS: 99 DEGREES
EKG VENTRICULAR RATE: 81 BPM

## 2018-05-23 ENCOUNTER — OFFICE VISIT (OUTPATIENT)
Dept: CARDIOLOGY CLINIC | Age: 78
End: 2018-05-23
Payer: MEDICARE

## 2018-05-23 VITALS
RESPIRATION RATE: 22 BRPM | WEIGHT: 260 LBS | HEIGHT: 68 IN | SYSTOLIC BLOOD PRESSURE: 104 MMHG | DIASTOLIC BLOOD PRESSURE: 64 MMHG | HEART RATE: 86 BPM | BODY MASS INDEX: 39.4 KG/M2

## 2018-05-23 DIAGNOSIS — I10 ESSENTIAL HYPERTENSION: Chronic | ICD-10-CM

## 2018-05-23 DIAGNOSIS — I25.10 CORONARY ARTERY DISEASE INVOLVING NATIVE CORONARY ARTERY WITHOUT ANGINA PECTORIS, UNSPECIFIED WHETHER NATIVE OR TRANSPLANTED HEART: Primary | Chronic | ICD-10-CM

## 2018-05-23 DIAGNOSIS — Z95.1 S/P CABG (CORONARY ARTERY BYPASS GRAFT): ICD-10-CM

## 2018-05-23 DIAGNOSIS — Z95.2 S/P AVR: Chronic | ICD-10-CM

## 2018-05-23 PROCEDURE — 99213 OFFICE O/P EST LOW 20 MIN: CPT | Performed by: INTERNAL MEDICINE

## 2018-05-23 PROCEDURE — 93000 ELECTROCARDIOGRAM COMPLETE: CPT | Performed by: INTERNAL MEDICINE

## 2018-06-03 ENCOUNTER — HOSPITAL ENCOUNTER (INPATIENT)
Age: 78
LOS: 5 days | Discharge: NURSING, REHAB OR LONG-TERM CARE | DRG: 191 | End: 2018-06-08
Attending: EMERGENCY MEDICINE | Admitting: INTERNAL MEDICINE
Payer: MEDICARE

## 2018-06-03 ENCOUNTER — APPOINTMENT (OUTPATIENT)
Dept: GENERAL RADIOLOGY | Age: 78
DRG: 191 | End: 2018-06-03
Payer: MEDICARE

## 2018-06-03 DIAGNOSIS — J44.1 COPD EXACERBATION (HCC): ICD-10-CM

## 2018-06-03 DIAGNOSIS — J96.01 ACUTE RESPIRATORY FAILURE WITH HYPOXIA (HCC): Primary | ICD-10-CM

## 2018-06-03 PROBLEM — J96.00 ACUTE RESPIRATORY FAILURE (HCC): Status: ACTIVE | Noted: 2018-06-03

## 2018-06-03 LAB
ALBUMIN SERPL-MCNC: 3.8 G/DL (ref 3.5–5.2)
ALP BLD-CCNC: 85 U/L (ref 40–129)
ALT SERPL-CCNC: 17 U/L (ref 0–40)
ANION GAP SERPL CALCULATED.3IONS-SCNC: 17 MMOL/L (ref 7–16)
AST SERPL-CCNC: 22 U/L (ref 0–39)
B.E.: -1.2 MMOL/L (ref -3–3)
BASOPHILS ABSOLUTE: 0.06 E9/L (ref 0–0.2)
BASOPHILS RELATIVE PERCENT: 0.8 % (ref 0–2)
BILIRUB SERPL-MCNC: 0.3 MG/DL (ref 0–1.2)
BUN BLDV-MCNC: 19 MG/DL (ref 8–23)
CALCIUM SERPL-MCNC: 9.5 MG/DL (ref 8.6–10.2)
CHLORIDE BLD-SCNC: 99 MMOL/L (ref 98–107)
CO2: 23 MMOL/L (ref 22–29)
COHB: 0.6 % (ref 0–1.5)
CREAT SERPL-MCNC: 1.3 MG/DL (ref 0.7–1.2)
CRITICAL: NORMAL
D DIMER: 685 NG/ML DDU
DATE ANALYZED: NORMAL
DATE OF COLLECTION: NORMAL
EOSINOPHILS ABSOLUTE: 1.69 E9/L (ref 0.05–0.5)
EOSINOPHILS RELATIVE PERCENT: 21.8 % (ref 0–6)
FILM ARRAY ADENOVIRUS: NORMAL
FILM ARRAY BORDETELLA PERTUSSIS: NORMAL
FILM ARRAY CHLAMYDOPHILIA PNEUMONIAE: NORMAL
FILM ARRAY CORONAVIRUS 229E: NORMAL
FILM ARRAY CORONAVIRUS HKU1: NORMAL
FILM ARRAY CORONAVIRUS NL63: NORMAL
FILM ARRAY CORONAVIRUS OC43: NORMAL
FILM ARRAY INFLUENZA A VIRUS 09H1: NORMAL
FILM ARRAY INFLUENZA A VIRUS H1: NORMAL
FILM ARRAY INFLUENZA A VIRUS H3: NORMAL
FILM ARRAY INFLUENZA A VIRUS: NORMAL
FILM ARRAY INFLUENZA B: NORMAL
FILM ARRAY METAPNEUMOVIRUS: NORMAL
FILM ARRAY MYCOPLASMA PNEUMONIAE: NORMAL
FILM ARRAY PARAINFLUENZA VIRUS 1: NORMAL
FILM ARRAY PARAINFLUENZA VIRUS 2: NORMAL
FILM ARRAY PARAINFLUENZA VIRUS 3: NORMAL
FILM ARRAY PARAINFLUENZA VIRUS 4: NORMAL
FILM ARRAY RESPIRATORY SYNCITIAL VIRUS: NORMAL
FILM ARRAY RHINOVIRUS/ENTEROVIRUS: NORMAL
GFR AFRICAN AMERICAN: >60
GFR NON-AFRICAN AMERICAN: 54 ML/MIN/1.73
GLUCOSE BLD-MCNC: 203 MG/DL (ref 74–109)
HCO3: 23.4 MMOL/L (ref 22–26)
HCT VFR BLD CALC: 37.8 % (ref 37–54)
HEMOGLOBIN: 12.6 G/DL (ref 12.5–16.5)
HHB: 3.3 % (ref 0–5)
IMMATURE GRANULOCYTES #: 0.03 E9/L
IMMATURE GRANULOCYTES %: 0.4 % (ref 0–5)
LAB: NORMAL
LYMPHOCYTES ABSOLUTE: 2.33 E9/L (ref 1.5–4)
LYMPHOCYTES RELATIVE PERCENT: 30 % (ref 20–42)
Lab: 508
MCH RBC QN AUTO: 31.9 PG (ref 26–35)
MCHC RBC AUTO-ENTMCNC: 33.3 % (ref 32–34.5)
MCV RBC AUTO: 95.7 FL (ref 80–99.9)
METER GLUCOSE: 258 MG/DL (ref 70–110)
METER GLUCOSE: 315 MG/DL (ref 70–110)
METHB: 0.1 % (ref 0–1.5)
MODE: NORMAL
MONOCYTES ABSOLUTE: 0.57 E9/L (ref 0.1–0.95)
MONOCYTES RELATIVE PERCENT: 7.3 % (ref 2–12)
NEUTROPHILS ABSOLUTE: 3.08 E9/L (ref 1.8–7.3)
NEUTROPHILS RELATIVE PERCENT: 39.7 % (ref 43–80)
O2 CONTENT: 17 ML/DL
O2 SATURATION: 96.7 % (ref 92–98.5)
O2HB: 96 % (ref 94–97)
OPERATOR ID: NORMAL
PATIENT TEMP: 37 C
PCO2: 39 MMHG (ref 35–45)
PDW BLD-RTO: 15.6 FL (ref 11.5–15)
PH BLOOD GAS: 7.4 (ref 7.35–7.45)
PLATELET # BLD: 185 E9/L (ref 130–450)
PMV BLD AUTO: 9.2 FL (ref 7–12)
PO2: 92.8 MMHG (ref 60–100)
POTASSIUM SERPL-SCNC: 4.4 MMOL/L (ref 3.5–5)
PRO-BNP: 2218 PG/ML (ref 0–450)
PROCALCITONIN: 0.32 NG/ML (ref 0–0.08)
RBC # BLD: 3.95 E12/L (ref 3.8–5.8)
SODIUM BLD-SCNC: 139 MMOL/L (ref 132–146)
SOURCE, BLOOD GAS: NORMAL
THB: 12.5 G/DL (ref 11.5–16.5)
TIME ANALYZED: 510
TOTAL PROTEIN: 6.9 G/DL (ref 6.4–8.3)
TROPONIN: 0.04 NG/ML (ref 0–0.03)
WBC # BLD: 7.8 E9/L (ref 4.5–11.5)

## 2018-06-03 PROCEDURE — 94640 AIRWAY INHALATION TREATMENT: CPT

## 2018-06-03 PROCEDURE — 87798 DETECT AGENT NOS DNA AMP: CPT

## 2018-06-03 PROCEDURE — 2060000000 HC ICU INTERMEDIATE R&B

## 2018-06-03 PROCEDURE — 6370000000 HC RX 637 (ALT 250 FOR IP): Performed by: INTERNAL MEDICINE

## 2018-06-03 PROCEDURE — 2580000003 HC RX 258: Performed by: EMERGENCY MEDICINE

## 2018-06-03 PROCEDURE — 6360000002 HC RX W HCPCS: Performed by: EMERGENCY MEDICINE

## 2018-06-03 PROCEDURE — 80053 COMPREHEN METABOLIC PANEL: CPT

## 2018-06-03 PROCEDURE — 36415 COLL VENOUS BLD VENIPUNCTURE: CPT

## 2018-06-03 PROCEDURE — 99285 EMERGENCY DEPT VISIT HI MDM: CPT

## 2018-06-03 PROCEDURE — 83880 ASSAY OF NATRIURETIC PEPTIDE: CPT

## 2018-06-03 PROCEDURE — 96374 THER/PROPH/DIAG INJ IV PUSH: CPT

## 2018-06-03 PROCEDURE — 84484 ASSAY OF TROPONIN QUANT: CPT

## 2018-06-03 PROCEDURE — 87503 INFLUENZA DNA AMP PROB ADDL: CPT

## 2018-06-03 PROCEDURE — 85378 FIBRIN DEGRADE SEMIQUANT: CPT

## 2018-06-03 PROCEDURE — 82805 BLOOD GASES W/O2 SATURATION: CPT

## 2018-06-03 PROCEDURE — 87040 BLOOD CULTURE FOR BACTERIA: CPT

## 2018-06-03 PROCEDURE — 82962 GLUCOSE BLOOD TEST: CPT

## 2018-06-03 PROCEDURE — 87502 INFLUENZA DNA AMP PROBE: CPT

## 2018-06-03 PROCEDURE — 94760 N-INVAS EAR/PLS OXIMETRY 1: CPT

## 2018-06-03 PROCEDURE — 85025 COMPLETE CBC W/AUTO DIFF WBC: CPT

## 2018-06-03 PROCEDURE — 71045 X-RAY EXAM CHEST 1 VIEW: CPT

## 2018-06-03 PROCEDURE — 6360000002 HC RX W HCPCS: Performed by: INTERNAL MEDICINE

## 2018-06-03 PROCEDURE — 84145 PROCALCITONIN (PCT): CPT

## 2018-06-03 PROCEDURE — 2580000003 HC RX 258: Performed by: INTERNAL MEDICINE

## 2018-06-03 PROCEDURE — 87486 CHLMYD PNEUM DNA AMP PROBE: CPT

## 2018-06-03 PROCEDURE — 87581 M.PNEUMON DNA AMP PROBE: CPT

## 2018-06-03 PROCEDURE — 6370000000 HC RX 637 (ALT 250 FOR IP): Performed by: EMERGENCY MEDICINE

## 2018-06-03 RX ORDER — IPRATROPIUM BROMIDE AND ALBUTEROL SULFATE 2.5; .5 MG/3ML; MG/3ML
1 SOLUTION RESPIRATORY (INHALATION) EVERY 4 HOURS PRN
COMMUNITY
End: 2018-08-03 | Stop reason: ALTCHOICE

## 2018-06-03 RX ORDER — SODIUM CHLORIDE 0.9 % (FLUSH) 0.9 %
10 SYRINGE (ML) INJECTION PRN
Status: DISCONTINUED | OUTPATIENT
Start: 2018-06-03 | End: 2018-06-03 | Stop reason: SDUPTHER

## 2018-06-03 RX ORDER — HYDROCODONE BITARTRATE AND ACETAMINOPHEN 10; 325 MG/1; MG/1
1 TABLET ORAL EVERY 6 HOURS PRN
Status: DISCONTINUED | OUTPATIENT
Start: 2018-06-03 | End: 2018-06-08 | Stop reason: HOSPADM

## 2018-06-03 RX ORDER — ASPIRIN 81 MG/1
81 TABLET ORAL DAILY
Status: DISCONTINUED | OUTPATIENT
Start: 2018-06-03 | End: 2018-06-08 | Stop reason: HOSPADM

## 2018-06-03 RX ORDER — LOSARTAN POTASSIUM 50 MG/1
100 TABLET ORAL DAILY
Status: DISCONTINUED | OUTPATIENT
Start: 2018-06-03 | End: 2018-06-08 | Stop reason: HOSPADM

## 2018-06-03 RX ORDER — FORMOTEROL FUMARATE 20 UG/2ML
20 SOLUTION RESPIRATORY (INHALATION) 2 TIMES DAILY
Status: DISCONTINUED | OUTPATIENT
Start: 2018-06-03 | End: 2018-06-08 | Stop reason: HOSPADM

## 2018-06-03 RX ORDER — DEXTROSE MONOHYDRATE 50 MG/ML
100 INJECTION, SOLUTION INTRAVENOUS PRN
Status: DISCONTINUED | OUTPATIENT
Start: 2018-06-03 | End: 2018-06-08 | Stop reason: HOSPADM

## 2018-06-03 RX ORDER — DEXTROSE MONOHYDRATE 25 G/50ML
12.5 INJECTION, SOLUTION INTRAVENOUS PRN
Status: DISCONTINUED | OUTPATIENT
Start: 2018-06-03 | End: 2018-06-08 | Stop reason: HOSPADM

## 2018-06-03 RX ORDER — GABAPENTIN 400 MG/1
400 CAPSULE ORAL EVERY 6 HOURS
Status: DISCONTINUED | OUTPATIENT
Start: 2018-06-03 | End: 2018-06-08 | Stop reason: HOSPADM

## 2018-06-03 RX ORDER — SODIUM CHLORIDE 0.9 % (FLUSH) 0.9 %
10 SYRINGE (ML) INJECTION PRN
Status: DISCONTINUED | OUTPATIENT
Start: 2018-06-03 | End: 2018-06-08 | Stop reason: HOSPADM

## 2018-06-03 RX ORDER — SODIUM CHLORIDE 0.9 % (FLUSH) 0.9 %
10 SYRINGE (ML) INJECTION EVERY 12 HOURS SCHEDULED
Status: DISCONTINUED | OUTPATIENT
Start: 2018-06-03 | End: 2018-06-08 | Stop reason: HOSPADM

## 2018-06-03 RX ORDER — METHYLPREDNISOLONE SODIUM SUCCINATE 125 MG/2ML
125 INJECTION, POWDER, LYOPHILIZED, FOR SOLUTION INTRAMUSCULAR; INTRAVENOUS ONCE
Status: COMPLETED | OUTPATIENT
Start: 2018-06-03 | End: 2018-06-03

## 2018-06-03 RX ORDER — NICOTINE POLACRILEX 4 MG
15 LOZENGE BUCCAL PRN
Status: DISCONTINUED | OUTPATIENT
Start: 2018-06-03 | End: 2018-06-08 | Stop reason: HOSPADM

## 2018-06-03 RX ORDER — ISOSORBIDE MONONITRATE 60 MG/1
240 TABLET, EXTENDED RELEASE ORAL DAILY
Status: DISCONTINUED | OUTPATIENT
Start: 2018-06-03 | End: 2018-06-08 | Stop reason: HOSPADM

## 2018-06-03 RX ORDER — MORPHINE SULFATE 15 MG/1
15 TABLET, FILM COATED, EXTENDED RELEASE ORAL 2 TIMES DAILY
Status: DISCONTINUED | OUTPATIENT
Start: 2018-06-03 | End: 2018-06-08 | Stop reason: HOSPADM

## 2018-06-03 RX ORDER — TORSEMIDE 20 MG/1
20 TABLET ORAL DAILY
Status: DISCONTINUED | OUTPATIENT
Start: 2018-06-03 | End: 2018-06-08 | Stop reason: HOSPADM

## 2018-06-03 RX ORDER — IPRATROPIUM BROMIDE AND ALBUTEROL SULFATE 2.5; .5 MG/3ML; MG/3ML
1 SOLUTION RESPIRATORY (INHALATION)
Status: DISCONTINUED | OUTPATIENT
Start: 2018-06-03 | End: 2018-06-08 | Stop reason: HOSPADM

## 2018-06-03 RX ORDER — INSULIN GLARGINE 100 [IU]/ML
25 INJECTION, SOLUTION SUBCUTANEOUS 2 TIMES DAILY
Status: DISCONTINUED | OUTPATIENT
Start: 2018-06-03 | End: 2018-06-08 | Stop reason: HOSPADM

## 2018-06-03 RX ORDER — PREDNISONE 20 MG/1
40 TABLET ORAL DAILY
Status: COMPLETED | OUTPATIENT
Start: 2018-06-03 | End: 2018-06-07

## 2018-06-03 RX ORDER — IPRATROPIUM BROMIDE AND ALBUTEROL SULFATE 2.5; .5 MG/3ML; MG/3ML
1 SOLUTION RESPIRATORY (INHALATION) ONCE
Status: COMPLETED | OUTPATIENT
Start: 2018-06-03 | End: 2018-06-03

## 2018-06-03 RX ORDER — ALLOPURINOL 300 MG/1
300 TABLET ORAL DAILY
Status: DISCONTINUED | OUTPATIENT
Start: 2018-06-03 | End: 2018-06-08 | Stop reason: HOSPADM

## 2018-06-03 RX ORDER — ONDANSETRON 2 MG/ML
4 INJECTION INTRAMUSCULAR; INTRAVENOUS EVERY 6 HOURS PRN
Status: DISCONTINUED | OUTPATIENT
Start: 2018-06-03 | End: 2018-06-08 | Stop reason: HOSPADM

## 2018-06-03 RX ORDER — LOVASTATIN 20 MG/1
20 TABLET ORAL NIGHTLY
Status: DISCONTINUED | OUTPATIENT
Start: 2018-06-03 | End: 2018-06-08 | Stop reason: HOSPADM

## 2018-06-03 RX ORDER — ACETAMINOPHEN 325 MG/1
650 TABLET ORAL EVERY 4 HOURS PRN
Status: DISCONTINUED | OUTPATIENT
Start: 2018-06-03 | End: 2018-06-08 | Stop reason: HOSPADM

## 2018-06-03 RX ORDER — BUDESONIDE 0.5 MG/2ML
0.5 INHALANT ORAL 2 TIMES DAILY
Status: DISCONTINUED | OUTPATIENT
Start: 2018-06-03 | End: 2018-06-08 | Stop reason: HOSPADM

## 2018-06-03 RX ADMIN — IPRATROPIUM BROMIDE AND ALBUTEROL SULFATE 3 ML: .5; 3 SOLUTION RESPIRATORY (INHALATION) at 13:16

## 2018-06-03 RX ADMIN — TORSEMIDE 20 MG: 20 TABLET ORAL at 10:10

## 2018-06-03 RX ADMIN — Medication 200 MG: at 10:12

## 2018-06-03 RX ADMIN — LOSARTAN POTASSIUM 100 MG: 50 TABLET, FILM COATED ORAL at 10:11

## 2018-06-03 RX ADMIN — INSULIN GLARGINE 25 UNITS: 100 INJECTION, SOLUTION SUBCUTANEOUS at 21:27

## 2018-06-03 RX ADMIN — HYDROCODONE BITARTRATE AND ACETAMINOPHEN 1 TABLET: 10; 325 TABLET ORAL at 10:23

## 2018-06-03 RX ADMIN — ISOSORBIDE MONONITRATE 240 MG: 60 TABLET ORAL at 10:11

## 2018-06-03 RX ADMIN — IPRATROPIUM BROMIDE AND ALBUTEROL SULFATE 3 ML: .5; 3 SOLUTION RESPIRATORY (INHALATION) at 09:07

## 2018-06-03 RX ADMIN — IPRATROPIUM BROMIDE AND ALBUTEROL SULFATE 3 ML: .5; 3 SOLUTION RESPIRATORY (INHALATION) at 17:33

## 2018-06-03 RX ADMIN — GABAPENTIN 400 MG: 400 CAPSULE ORAL at 15:39

## 2018-06-03 RX ADMIN — BUDESONIDE 500 MCG: 0.5 SUSPENSION RESPIRATORY (INHALATION) at 20:45

## 2018-06-03 RX ADMIN — HYDROCODONE BITARTRATE AND ACETAMINOPHEN 1 TABLET: 10; 325 TABLET ORAL at 17:48

## 2018-06-03 RX ADMIN — Medication 10 ML: at 10:12

## 2018-06-03 RX ADMIN — ASPIRIN 81 MG: 81 TABLET, COATED ORAL at 10:13

## 2018-06-03 RX ADMIN — METHYLPREDNISOLONE SODIUM SUCCINATE 125 MG: 125 INJECTION, POWDER, FOR SOLUTION INTRAMUSCULAR; INTRAVENOUS at 05:00

## 2018-06-03 RX ADMIN — Medication 10 ML: at 16:34

## 2018-06-03 RX ADMIN — FORMOTEROL FUMARATE DIHYDRATE 20 MCG: 20 SOLUTION RESPIRATORY (INHALATION) at 20:45

## 2018-06-03 RX ADMIN — GABAPENTIN 400 MG: 400 CAPSULE ORAL at 10:11

## 2018-06-03 RX ADMIN — INSULIN GLARGINE 25 UNITS: 100 INJECTION, SOLUTION SUBCUTANEOUS at 10:15

## 2018-06-03 RX ADMIN — MORPHINE SULFATE 15 MG: 15 TABLET, FILM COATED, EXTENDED RELEASE ORAL at 10:11

## 2018-06-03 RX ADMIN — GABAPENTIN 400 MG: 400 CAPSULE ORAL at 21:26

## 2018-06-03 RX ADMIN — ALLOPURINOL 300 MG: 300 TABLET ORAL at 10:10

## 2018-06-03 RX ADMIN — IPRATROPIUM BROMIDE AND ALBUTEROL SULFATE 1 AMPULE: .5; 3 SOLUTION RESPIRATORY (INHALATION) at 04:48

## 2018-06-03 RX ADMIN — Medication 10 ML: at 21:27

## 2018-06-03 RX ADMIN — IPRATROPIUM BROMIDE AND ALBUTEROL SULFATE 3 ML: .5; 3 SOLUTION RESPIRATORY (INHALATION) at 20:45

## 2018-06-03 RX ADMIN — PREDNISONE 40 MG: 20 TABLET ORAL at 15:39

## 2018-06-03 RX ADMIN — MORPHINE SULFATE 15 MG: 15 TABLET, FILM COATED, EXTENDED RELEASE ORAL at 21:27

## 2018-06-03 RX ADMIN — ENOXAPARIN SODIUM 40 MG: 40 INJECTION, SOLUTION INTRAVENOUS; SUBCUTANEOUS at 10:12

## 2018-06-03 ASSESSMENT — ENCOUNTER SYMPTOMS
WHEEZING: 1
BACK PAIN: 0
ABDOMINAL PAIN: 0
NAUSEA: 0
SORE THROAT: 0
EYE DISCHARGE: 0
DIARRHEA: 0
SINUS PRESSURE: 0
COUGH: 0
SPUTUM PRODUCTION: 1
SHORTNESS OF BREATH: 1
EYE REDNESS: 0
EYE PAIN: 0
VOMITING: 0

## 2018-06-03 ASSESSMENT — PAIN DESCRIPTION - LOCATION
LOCATION: BACK
LOCATION: BACK

## 2018-06-03 ASSESSMENT — PAIN DESCRIPTION - PROGRESSION: CLINICAL_PROGRESSION: NOT CHANGED

## 2018-06-03 ASSESSMENT — PAIN DESCRIPTION - ORIENTATION
ORIENTATION: LOWER
ORIENTATION: LOWER

## 2018-06-03 ASSESSMENT — PAIN DESCRIPTION - DESCRIPTORS
DESCRIPTORS: ACHING;DISCOMFORT
DESCRIPTORS: ACHING;CONSTANT;DISCOMFORT

## 2018-06-03 ASSESSMENT — PAIN SCALES - GENERAL
PAINLEVEL_OUTOF10: 7
PAINLEVEL_OUTOF10: 0
PAINLEVEL_OUTOF10: 8
PAINLEVEL_OUTOF10: 8
PAINLEVEL_OUTOF10: 0

## 2018-06-03 ASSESSMENT — PAIN DESCRIPTION - FREQUENCY: FREQUENCY: CONTINUOUS

## 2018-06-03 ASSESSMENT — PAIN DESCRIPTION - PAIN TYPE
TYPE: CHRONIC PAIN
TYPE: CHRONIC PAIN

## 2018-06-03 ASSESSMENT — PAIN DESCRIPTION - ONSET: ONSET: ON-GOING

## 2018-06-04 LAB
METER GLUCOSE: 237 MG/DL (ref 70–110)
METER GLUCOSE: 314 MG/DL (ref 70–110)
METER GLUCOSE: 320 MG/DL (ref 70–110)

## 2018-06-04 PROCEDURE — 2580000003 HC RX 258: Performed by: EMERGENCY MEDICINE

## 2018-06-04 PROCEDURE — 6360000002 HC RX W HCPCS: Performed by: INTERNAL MEDICINE

## 2018-06-04 PROCEDURE — 6370000000 HC RX 637 (ALT 250 FOR IP): Performed by: INTERNAL MEDICINE

## 2018-06-04 PROCEDURE — 94640 AIRWAY INHALATION TREATMENT: CPT

## 2018-06-04 PROCEDURE — 2060000000 HC ICU INTERMEDIATE R&B

## 2018-06-04 PROCEDURE — 82962 GLUCOSE BLOOD TEST: CPT

## 2018-06-04 PROCEDURE — 2580000003 HC RX 258: Performed by: INTERNAL MEDICINE

## 2018-06-04 PROCEDURE — 2700000000 HC OXYGEN THERAPY PER DAY

## 2018-06-04 RX ADMIN — BUDESONIDE 500 MCG: 0.5 SUSPENSION RESPIRATORY (INHALATION) at 21:07

## 2018-06-04 RX ADMIN — IPRATROPIUM BROMIDE AND ALBUTEROL SULFATE 3 ML: .5; 3 SOLUTION RESPIRATORY (INHALATION) at 17:17

## 2018-06-04 RX ADMIN — Medication 10 ML: at 21:00

## 2018-06-04 RX ADMIN — ENOXAPARIN SODIUM 40 MG: 40 INJECTION, SOLUTION INTRAVENOUS; SUBCUTANEOUS at 09:03

## 2018-06-04 RX ADMIN — IPRATROPIUM BROMIDE AND ALBUTEROL SULFATE 3 ML: .5; 3 SOLUTION RESPIRATORY (INHALATION) at 01:21

## 2018-06-04 RX ADMIN — Medication 10 ML: at 09:06

## 2018-06-04 RX ADMIN — Medication 10 ML: at 20:58

## 2018-06-04 RX ADMIN — FORMOTEROL FUMARATE DIHYDRATE 20 MCG: 20 SOLUTION RESPIRATORY (INHALATION) at 08:51

## 2018-06-04 RX ADMIN — HYDROCODONE BITARTRATE AND ACETAMINOPHEN 1 TABLET: 10; 325 TABLET ORAL at 00:52

## 2018-06-04 RX ADMIN — Medication 200 MG: at 09:05

## 2018-06-04 RX ADMIN — GABAPENTIN 400 MG: 400 CAPSULE ORAL at 14:00

## 2018-06-04 RX ADMIN — ISOSORBIDE MONONITRATE 240 MG: 60 TABLET ORAL at 09:01

## 2018-06-04 RX ADMIN — INSULIN GLARGINE 25 UNITS: 100 INJECTION, SOLUTION SUBCUTANEOUS at 20:58

## 2018-06-04 RX ADMIN — GABAPENTIN 400 MG: 400 CAPSULE ORAL at 08:00

## 2018-06-04 RX ADMIN — LOVASTATIN 20 MG: 20 TABLET ORAL at 20:58

## 2018-06-04 RX ADMIN — HYDROCODONE BITARTRATE AND ACETAMINOPHEN 1 TABLET: 10; 325 TABLET ORAL at 16:41

## 2018-06-04 RX ADMIN — HYDROCODONE BITARTRATE AND ACETAMINOPHEN 1 TABLET: 10; 325 TABLET ORAL at 09:14

## 2018-06-04 RX ADMIN — MORPHINE SULFATE 15 MG: 15 TABLET, FILM COATED, EXTENDED RELEASE ORAL at 09:05

## 2018-06-04 RX ADMIN — INSULIN GLARGINE 25 UNITS: 100 INJECTION, SOLUTION SUBCUTANEOUS at 08:55

## 2018-06-04 RX ADMIN — ASPIRIN 81 MG: 81 TABLET, COATED ORAL at 09:05

## 2018-06-04 RX ADMIN — BUDESONIDE 500 MCG: 0.5 SUSPENSION RESPIRATORY (INHALATION) at 08:51

## 2018-06-04 RX ADMIN — PREDNISONE 40 MG: 20 TABLET ORAL at 09:03

## 2018-06-04 RX ADMIN — HYDROCODONE BITARTRATE AND ACETAMINOPHEN 1 TABLET: 10; 325 TABLET ORAL at 23:10

## 2018-06-04 RX ADMIN — ALLOPURINOL 300 MG: 300 TABLET ORAL at 09:04

## 2018-06-04 RX ADMIN — MORPHINE SULFATE 15 MG: 15 TABLET, FILM COATED, EXTENDED RELEASE ORAL at 20:58

## 2018-06-04 RX ADMIN — IPRATROPIUM BROMIDE AND ALBUTEROL SULFATE 3 ML: .5; 3 SOLUTION RESPIRATORY (INHALATION) at 12:56

## 2018-06-04 RX ADMIN — LOSARTAN POTASSIUM 100 MG: 50 TABLET, FILM COATED ORAL at 09:02

## 2018-06-04 RX ADMIN — GABAPENTIN 400 MG: 400 CAPSULE ORAL at 01:34

## 2018-06-04 RX ADMIN — TORSEMIDE 20 MG: 20 TABLET ORAL at 09:02

## 2018-06-04 RX ADMIN — FORMOTEROL FUMARATE DIHYDRATE 20 MCG: 20 SOLUTION RESPIRATORY (INHALATION) at 21:07

## 2018-06-04 RX ADMIN — LOVASTATIN 20 MG: 20 TABLET ORAL at 00:43

## 2018-06-04 RX ADMIN — GABAPENTIN 400 MG: 400 CAPSULE ORAL at 20:58

## 2018-06-04 ASSESSMENT — PAIN SCALES - GENERAL
PAINLEVEL_OUTOF10: 8
PAINLEVEL_OUTOF10: 5
PAINLEVEL_OUTOF10: 8
PAINLEVEL_OUTOF10: 8
PAINLEVEL_OUTOF10: 5
PAINLEVEL_OUTOF10: 8
PAINLEVEL_OUTOF10: 0
PAINLEVEL_OUTOF10: 8
PAINLEVEL_OUTOF10: 0
PAINLEVEL_OUTOF10: 3
PAINLEVEL_OUTOF10: 5
PAINLEVEL_OUTOF10: 4
PAINLEVEL_OUTOF10: 0

## 2018-06-04 ASSESSMENT — PAIN DESCRIPTION - DESCRIPTORS
DESCRIPTORS: ACHING;DISCOMFORT
DESCRIPTORS: ACHING;CONSTANT;DISCOMFORT;DULL
DESCRIPTORS: ACHING;CONSTANT;DISCOMFORT
DESCRIPTORS: ACHING;CONSTANT;DISCOMFORT;DULL
DESCRIPTORS: ACHING;DISCOMFORT
DESCRIPTORS: ACHING;CONSTANT;DISCOMFORT;DULL

## 2018-06-04 ASSESSMENT — PAIN DESCRIPTION - PAIN TYPE
TYPE: CHRONIC PAIN

## 2018-06-04 ASSESSMENT — PAIN DESCRIPTION - ORIENTATION
ORIENTATION: LOWER;MID
ORIENTATION: LOWER
ORIENTATION: LOWER
ORIENTATION: LOWER;MID
ORIENTATION: LOWER
ORIENTATION: LOWER;MID

## 2018-06-04 ASSESSMENT — PAIN DESCRIPTION - ONSET
ONSET: ON-GOING

## 2018-06-04 ASSESSMENT — PAIN DESCRIPTION - LOCATION
LOCATION: BACK

## 2018-06-04 ASSESSMENT — PAIN DESCRIPTION - FREQUENCY
FREQUENCY: CONTINUOUS

## 2018-06-04 ASSESSMENT — PAIN DESCRIPTION - PROGRESSION
CLINICAL_PROGRESSION: NOT CHANGED

## 2018-06-05 LAB
METER GLUCOSE: 176 MG/DL (ref 70–110)
METER GLUCOSE: 293 MG/DL (ref 70–110)

## 2018-06-05 PROCEDURE — 97165 OT EVAL LOW COMPLEX 30 MIN: CPT

## 2018-06-05 PROCEDURE — 94762 N-INVAS EAR/PLS OXIMTRY CONT: CPT

## 2018-06-05 PROCEDURE — 6370000000 HC RX 637 (ALT 250 FOR IP): Performed by: INTERNAL MEDICINE

## 2018-06-05 PROCEDURE — 94760 N-INVAS EAR/PLS OXIMETRY 1: CPT

## 2018-06-05 PROCEDURE — G8988 SELF CARE GOAL STATUS: HCPCS

## 2018-06-05 PROCEDURE — 6360000002 HC RX W HCPCS: Performed by: INTERNAL MEDICINE

## 2018-06-05 PROCEDURE — 2060000000 HC ICU INTERMEDIATE R&B

## 2018-06-05 PROCEDURE — 2580000003 HC RX 258: Performed by: INTERNAL MEDICINE

## 2018-06-05 PROCEDURE — 82962 GLUCOSE BLOOD TEST: CPT

## 2018-06-05 PROCEDURE — 94640 AIRWAY INHALATION TREATMENT: CPT

## 2018-06-05 PROCEDURE — 97161 PT EVAL LOW COMPLEX 20 MIN: CPT

## 2018-06-05 PROCEDURE — 2700000000 HC OXYGEN THERAPY PER DAY

## 2018-06-05 PROCEDURE — G8987 SELF CARE CURRENT STATUS: HCPCS

## 2018-06-05 PROCEDURE — 6370000000 HC RX 637 (ALT 250 FOR IP): Performed by: CLINICAL NURSE SPECIALIST

## 2018-06-05 RX ORDER — FLUTICASONE PROPIONATE 50 MCG
1 SPRAY, SUSPENSION (ML) NASAL DAILY
Status: DISCONTINUED | OUTPATIENT
Start: 2018-06-05 | End: 2018-06-08 | Stop reason: HOSPADM

## 2018-06-05 RX ORDER — BENZONATATE 100 MG/1
100 CAPSULE ORAL 3 TIMES DAILY PRN
Status: DISCONTINUED | OUTPATIENT
Start: 2018-06-05 | End: 2018-06-08 | Stop reason: HOSPADM

## 2018-06-05 RX ORDER — CETIRIZINE HYDROCHLORIDE 10 MG/1
10 TABLET ORAL DAILY
Status: DISCONTINUED | OUTPATIENT
Start: 2018-06-05 | End: 2018-06-08 | Stop reason: HOSPADM

## 2018-06-05 RX ADMIN — INSULIN GLARGINE 25 UNITS: 100 INJECTION, SOLUTION SUBCUTANEOUS at 20:31

## 2018-06-05 RX ADMIN — Medication 10 ML: at 20:40

## 2018-06-05 RX ADMIN — ISOSORBIDE MONONITRATE 240 MG: 60 TABLET ORAL at 09:03

## 2018-06-05 RX ADMIN — GABAPENTIN 400 MG: 400 CAPSULE ORAL at 13:09

## 2018-06-05 RX ADMIN — HYDROCODONE BITARTRATE AND ACETAMINOPHEN 1 TABLET: 10; 325 TABLET ORAL at 06:37

## 2018-06-05 RX ADMIN — CETIRIZINE HYDROCHLORIDE 10 MG: 10 TABLET, FILM COATED ORAL at 18:05

## 2018-06-05 RX ADMIN — FORMOTEROL FUMARATE DIHYDRATE 20 MCG: 20 SOLUTION RESPIRATORY (INHALATION) at 08:09

## 2018-06-05 RX ADMIN — PREDNISONE 40 MG: 20 TABLET ORAL at 09:05

## 2018-06-05 RX ADMIN — GABAPENTIN 400 MG: 400 CAPSULE ORAL at 20:37

## 2018-06-05 RX ADMIN — LOSARTAN POTASSIUM 100 MG: 50 TABLET, FILM COATED ORAL at 09:03

## 2018-06-05 RX ADMIN — HYDROCODONE BITARTRATE AND ACETAMINOPHEN 1 TABLET: 10; 325 TABLET ORAL at 13:07

## 2018-06-05 RX ADMIN — TORSEMIDE 20 MG: 20 TABLET ORAL at 09:03

## 2018-06-05 RX ADMIN — Medication 10 ML: at 09:05

## 2018-06-05 RX ADMIN — ENOXAPARIN SODIUM 40 MG: 40 INJECTION, SOLUTION INTRAVENOUS; SUBCUTANEOUS at 09:06

## 2018-06-05 RX ADMIN — BUDESONIDE 500 MCG: 0.5 SUSPENSION RESPIRATORY (INHALATION) at 20:47

## 2018-06-05 RX ADMIN — MORPHINE SULFATE 15 MG: 15 TABLET, FILM COATED, EXTENDED RELEASE ORAL at 20:33

## 2018-06-05 RX ADMIN — MORPHINE SULFATE 15 MG: 15 TABLET, FILM COATED, EXTENDED RELEASE ORAL at 09:04

## 2018-06-05 RX ADMIN — ASPIRIN 81 MG: 81 TABLET, COATED ORAL at 09:04

## 2018-06-05 RX ADMIN — Medication 200 MG: at 09:04

## 2018-06-05 RX ADMIN — GABAPENTIN 400 MG: 400 CAPSULE ORAL at 08:00

## 2018-06-05 RX ADMIN — IPRATROPIUM BROMIDE AND ALBUTEROL SULFATE 3 ML: .5; 3 SOLUTION RESPIRATORY (INHALATION) at 17:02

## 2018-06-05 RX ADMIN — ALLOPURINOL 300 MG: 300 TABLET ORAL at 09:03

## 2018-06-05 RX ADMIN — FLUTICASONE PROPIONATE 1 SPRAY: 50 SPRAY, METERED NASAL at 18:08

## 2018-06-05 RX ADMIN — HYDROCODONE BITARTRATE AND ACETAMINOPHEN 1 TABLET: 10; 325 TABLET ORAL at 20:37

## 2018-06-05 RX ADMIN — GABAPENTIN 400 MG: 400 CAPSULE ORAL at 02:05

## 2018-06-05 RX ADMIN — IPRATROPIUM BROMIDE AND ALBUTEROL SULFATE 3 ML: .5; 3 SOLUTION RESPIRATORY (INHALATION) at 12:19

## 2018-06-05 RX ADMIN — BUDESONIDE 500 MCG: 0.5 SUSPENSION RESPIRATORY (INHALATION) at 08:09

## 2018-06-05 RX ADMIN — LOVASTATIN 20 MG: 20 TABLET ORAL at 20:34

## 2018-06-05 RX ADMIN — FORMOTEROL FUMARATE DIHYDRATE 20 MCG: 20 SOLUTION RESPIRATORY (INHALATION) at 20:47

## 2018-06-05 RX ADMIN — INSULIN GLARGINE 25 UNITS: 100 INJECTION, SOLUTION SUBCUTANEOUS at 09:07

## 2018-06-05 ASSESSMENT — PAIN DESCRIPTION - LOCATION
LOCATION: BACK

## 2018-06-05 ASSESSMENT — PAIN DESCRIPTION - PAIN TYPE
TYPE: CHRONIC PAIN

## 2018-06-05 ASSESSMENT — PAIN DESCRIPTION - ORIENTATION
ORIENTATION: LOWER
ORIENTATION: LOWER
ORIENTATION: LOWER;MID
ORIENTATION: LOWER;MID
ORIENTATION: LOWER

## 2018-06-05 ASSESSMENT — PAIN DESCRIPTION - DESCRIPTORS
DESCRIPTORS: ACHING;DISCOMFORT
DESCRIPTORS: ACHING;DISCOMFORT;DULL
DESCRIPTORS: ACHING;CONSTANT;DISCOMFORT;DULL
DESCRIPTORS: ACHING;CONSTANT;DISCOMFORT;DULL;NAGGING

## 2018-06-05 ASSESSMENT — PAIN SCALES - GENERAL
PAINLEVEL_OUTOF10: 3
PAINLEVEL_OUTOF10: 7
PAINLEVEL_OUTOF10: 9
PAINLEVEL_OUTOF10: 8
PAINLEVEL_OUTOF10: 8
PAINLEVEL_OUTOF10: 9
PAINLEVEL_OUTOF10: 8
PAINLEVEL_OUTOF10: 4

## 2018-06-05 ASSESSMENT — PAIN DESCRIPTION - ONSET
ONSET: ON-GOING

## 2018-06-05 ASSESSMENT — PAIN DESCRIPTION - FREQUENCY
FREQUENCY: CONTINUOUS

## 2018-06-05 ASSESSMENT — PAIN DESCRIPTION - PROGRESSION
CLINICAL_PROGRESSION: NOT CHANGED
CLINICAL_PROGRESSION: NOT CHANGED

## 2018-06-06 LAB
METER GLUCOSE: 234 MG/DL (ref 70–110)
METER GLUCOSE: 295 MG/DL (ref 70–110)
METER GLUCOSE: 355 MG/DL (ref 70–110)
METER GLUCOSE: 79 MG/DL (ref 70–110)

## 2018-06-06 PROCEDURE — 6370000000 HC RX 637 (ALT 250 FOR IP): Performed by: INTERNAL MEDICINE

## 2018-06-06 PROCEDURE — 82962 GLUCOSE BLOOD TEST: CPT

## 2018-06-06 PROCEDURE — 6360000002 HC RX W HCPCS: Performed by: INTERNAL MEDICINE

## 2018-06-06 PROCEDURE — 2700000000 HC OXYGEN THERAPY PER DAY

## 2018-06-06 PROCEDURE — 2580000003 HC RX 258: Performed by: INTERNAL MEDICINE

## 2018-06-06 PROCEDURE — 94640 AIRWAY INHALATION TREATMENT: CPT

## 2018-06-06 PROCEDURE — 6370000000 HC RX 637 (ALT 250 FOR IP): Performed by: CLINICAL NURSE SPECIALIST

## 2018-06-06 PROCEDURE — 2060000000 HC ICU INTERMEDIATE R&B

## 2018-06-06 RX ADMIN — FORMOTEROL FUMARATE DIHYDRATE 20 MCG: 20 SOLUTION RESPIRATORY (INHALATION) at 21:03

## 2018-06-06 RX ADMIN — MORPHINE SULFATE 15 MG: 15 TABLET, FILM COATED, EXTENDED RELEASE ORAL at 09:01

## 2018-06-06 RX ADMIN — Medication 10 ML: at 09:04

## 2018-06-06 RX ADMIN — FORMOTEROL FUMARATE DIHYDRATE 20 MCG: 20 SOLUTION RESPIRATORY (INHALATION) at 08:30

## 2018-06-06 RX ADMIN — INSULIN GLARGINE 25 UNITS: 100 INJECTION, SOLUTION SUBCUTANEOUS at 09:05

## 2018-06-06 RX ADMIN — MORPHINE SULFATE 15 MG: 15 TABLET, FILM COATED, EXTENDED RELEASE ORAL at 20:19

## 2018-06-06 RX ADMIN — HYDROCODONE BITARTRATE AND ACETAMINOPHEN 1 TABLET: 10; 325 TABLET ORAL at 09:01

## 2018-06-06 RX ADMIN — ALLOPURINOL 300 MG: 300 TABLET ORAL at 09:02

## 2018-06-06 RX ADMIN — FLUTICASONE PROPIONATE 1 SPRAY: 50 SPRAY, METERED NASAL at 09:03

## 2018-06-06 RX ADMIN — PREDNISONE 40 MG: 20 TABLET ORAL at 09:02

## 2018-06-06 RX ADMIN — IPRATROPIUM BROMIDE AND ALBUTEROL SULFATE 3 ML: .5; 3 SOLUTION RESPIRATORY (INHALATION) at 12:10

## 2018-06-06 RX ADMIN — BUDESONIDE 500 MCG: 0.5 SUSPENSION RESPIRATORY (INHALATION) at 21:03

## 2018-06-06 RX ADMIN — HYDROCODONE BITARTRATE AND ACETAMINOPHEN 1 TABLET: 10; 325 TABLET ORAL at 15:33

## 2018-06-06 RX ADMIN — LOSARTAN POTASSIUM 100 MG: 50 TABLET, FILM COATED ORAL at 09:02

## 2018-06-06 RX ADMIN — INSULIN GLARGINE 25 UNITS: 100 INJECTION, SOLUTION SUBCUTANEOUS at 20:20

## 2018-06-06 RX ADMIN — Medication 10 ML: at 20:24

## 2018-06-06 RX ADMIN — ISOSORBIDE MONONITRATE 240 MG: 60 TABLET ORAL at 09:01

## 2018-06-06 RX ADMIN — LOVASTATIN 20 MG: 20 TABLET ORAL at 20:20

## 2018-06-06 RX ADMIN — GABAPENTIN 400 MG: 400 CAPSULE ORAL at 09:02

## 2018-06-06 RX ADMIN — ASPIRIN 81 MG: 81 TABLET, COATED ORAL at 09:01

## 2018-06-06 RX ADMIN — CETIRIZINE HYDROCHLORIDE 10 MG: 10 TABLET, FILM COATED ORAL at 09:01

## 2018-06-06 RX ADMIN — TORSEMIDE 20 MG: 20 TABLET ORAL at 09:02

## 2018-06-06 RX ADMIN — GABAPENTIN 400 MG: 400 CAPSULE ORAL at 14:00

## 2018-06-06 RX ADMIN — GABAPENTIN 400 MG: 400 CAPSULE ORAL at 20:19

## 2018-06-06 RX ADMIN — ENOXAPARIN SODIUM 40 MG: 40 INJECTION, SOLUTION INTRAVENOUS; SUBCUTANEOUS at 09:04

## 2018-06-06 RX ADMIN — Medication 200 MG: at 09:01

## 2018-06-06 RX ADMIN — IPRATROPIUM BROMIDE AND ALBUTEROL SULFATE 3 ML: .5; 3 SOLUTION RESPIRATORY (INHALATION) at 16:34

## 2018-06-06 RX ADMIN — BUDESONIDE 500 MCG: 0.5 SUSPENSION RESPIRATORY (INHALATION) at 08:30

## 2018-06-06 ASSESSMENT — PAIN DESCRIPTION - ORIENTATION
ORIENTATION: MID;LOWER
ORIENTATION: MID;LOWER

## 2018-06-06 ASSESSMENT — PAIN DESCRIPTION - LOCATION
LOCATION: BACK
LOCATION: BACK

## 2018-06-06 ASSESSMENT — PAIN DESCRIPTION - DESCRIPTORS
DESCRIPTORS: ACHING;DISCOMFORT
DESCRIPTORS: ACHING;DISCOMFORT

## 2018-06-06 ASSESSMENT — PAIN SCALES - GENERAL
PAINLEVEL_OUTOF10: 8
PAINLEVEL_OUTOF10: 0
PAINLEVEL_OUTOF10: 8
PAINLEVEL_OUTOF10: 0
PAINLEVEL_OUTOF10: 0
PAINLEVEL_OUTOF10: 8
PAINLEVEL_OUTOF10: 5

## 2018-06-06 ASSESSMENT — PAIN DESCRIPTION - PROGRESSION
CLINICAL_PROGRESSION: NOT CHANGED

## 2018-06-06 ASSESSMENT — PAIN DESCRIPTION - PAIN TYPE
TYPE: CHRONIC PAIN
TYPE: CHRONIC PAIN

## 2018-06-06 ASSESSMENT — PAIN DESCRIPTION - FREQUENCY
FREQUENCY: CONTINUOUS
FREQUENCY: CONTINUOUS

## 2018-06-06 ASSESSMENT — PAIN DESCRIPTION - ONSET
ONSET: ON-GOING
ONSET: ON-GOING

## 2018-06-07 LAB
METER GLUCOSE: 114 MG/DL (ref 70–110)
METER GLUCOSE: 137 MG/DL (ref 70–110)
METER GLUCOSE: 324 MG/DL (ref 70–110)

## 2018-06-07 PROCEDURE — 82962 GLUCOSE BLOOD TEST: CPT

## 2018-06-07 PROCEDURE — 94640 AIRWAY INHALATION TREATMENT: CPT

## 2018-06-07 PROCEDURE — 6360000002 HC RX W HCPCS: Performed by: INTERNAL MEDICINE

## 2018-06-07 PROCEDURE — 97530 THERAPEUTIC ACTIVITIES: CPT

## 2018-06-07 PROCEDURE — 6370000000 HC RX 637 (ALT 250 FOR IP): Performed by: INTERNAL MEDICINE

## 2018-06-07 PROCEDURE — 2580000003 HC RX 258: Performed by: EMERGENCY MEDICINE

## 2018-06-07 PROCEDURE — 1200000000 HC SEMI PRIVATE

## 2018-06-07 PROCEDURE — 6370000000 HC RX 637 (ALT 250 FOR IP): Performed by: CLINICAL NURSE SPECIALIST

## 2018-06-07 PROCEDURE — 2580000003 HC RX 258: Performed by: INTERNAL MEDICINE

## 2018-06-07 RX ADMIN — GABAPENTIN 400 MG: 400 CAPSULE ORAL at 08:39

## 2018-06-07 RX ADMIN — IPRATROPIUM BROMIDE AND ALBUTEROL SULFATE 3 ML: .5; 3 SOLUTION RESPIRATORY (INHALATION) at 12:45

## 2018-06-07 RX ADMIN — Medication 10 ML: at 08:40

## 2018-06-07 RX ADMIN — MORPHINE SULFATE 15 MG: 15 TABLET, FILM COATED, EXTENDED RELEASE ORAL at 08:39

## 2018-06-07 RX ADMIN — INSULIN GLARGINE 25 UNITS: 100 INJECTION, SOLUTION SUBCUTANEOUS at 08:42

## 2018-06-07 RX ADMIN — HYDROCODONE BITARTRATE AND ACETAMINOPHEN 1 TABLET: 10; 325 TABLET ORAL at 08:39

## 2018-06-07 RX ADMIN — LOVASTATIN 20 MG: 20 TABLET ORAL at 21:25

## 2018-06-07 RX ADMIN — LOSARTAN POTASSIUM 100 MG: 50 TABLET, FILM COATED ORAL at 08:39

## 2018-06-07 RX ADMIN — BUDESONIDE 500 MCG: 0.5 SUSPENSION RESPIRATORY (INHALATION) at 08:28

## 2018-06-07 RX ADMIN — Medication 10 ML: at 21:25

## 2018-06-07 RX ADMIN — GABAPENTIN 400 MG: 400 CAPSULE ORAL at 01:36

## 2018-06-07 RX ADMIN — MORPHINE SULFATE 15 MG: 15 TABLET, FILM COATED, EXTENDED RELEASE ORAL at 21:25

## 2018-06-07 RX ADMIN — BUDESONIDE 500 MCG: 0.5 SUSPENSION RESPIRATORY (INHALATION) at 19:48

## 2018-06-07 RX ADMIN — FLUTICASONE PROPIONATE 1 SPRAY: 50 SPRAY, METERED NASAL at 08:39

## 2018-06-07 RX ADMIN — GABAPENTIN 400 MG: 400 CAPSULE ORAL at 21:34

## 2018-06-07 RX ADMIN — HYDROCODONE BITARTRATE AND ACETAMINOPHEN 1 TABLET: 10; 325 TABLET ORAL at 00:06

## 2018-06-07 RX ADMIN — CETIRIZINE HYDROCHLORIDE 10 MG: 10 TABLET, FILM COATED ORAL at 08:39

## 2018-06-07 RX ADMIN — FORMOTEROL FUMARATE DIHYDRATE 20 MCG: 20 SOLUTION RESPIRATORY (INHALATION) at 19:48

## 2018-06-07 RX ADMIN — INSULIN GLARGINE 25 UNITS: 100 INJECTION, SOLUTION SUBCUTANEOUS at 21:25

## 2018-06-07 RX ADMIN — TORSEMIDE 20 MG: 20 TABLET ORAL at 08:40

## 2018-06-07 RX ADMIN — ISOSORBIDE MONONITRATE 240 MG: 60 TABLET ORAL at 08:39

## 2018-06-07 RX ADMIN — ASPIRIN 81 MG: 81 TABLET, COATED ORAL at 08:39

## 2018-06-07 RX ADMIN — PREDNISONE 40 MG: 20 TABLET ORAL at 08:39

## 2018-06-07 RX ADMIN — HYDROCODONE BITARTRATE AND ACETAMINOPHEN 1 TABLET: 10; 325 TABLET ORAL at 15:06

## 2018-06-07 RX ADMIN — ENOXAPARIN SODIUM 40 MG: 40 INJECTION, SOLUTION INTRAVENOUS; SUBCUTANEOUS at 08:39

## 2018-06-07 RX ADMIN — ALLOPURINOL 300 MG: 300 TABLET ORAL at 08:39

## 2018-06-07 RX ADMIN — GABAPENTIN 400 MG: 400 CAPSULE ORAL at 13:50

## 2018-06-07 RX ADMIN — Medication 200 MG: at 08:39

## 2018-06-07 RX ADMIN — IPRATROPIUM BROMIDE AND ALBUTEROL SULFATE 3 ML: .5; 3 SOLUTION RESPIRATORY (INHALATION) at 17:16

## 2018-06-07 RX ADMIN — FORMOTEROL FUMARATE DIHYDRATE 20 MCG: 20 SOLUTION RESPIRATORY (INHALATION) at 08:28

## 2018-06-07 ASSESSMENT — PAIN DESCRIPTION - DESCRIPTORS
DESCRIPTORS: ACHING;CONSTANT;DISCOMFORT
DESCRIPTORS: DISCOMFORT
DESCRIPTORS: ACHING;CONSTANT;DISCOMFORT
DESCRIPTORS: ACHING;DISCOMFORT

## 2018-06-07 ASSESSMENT — PAIN SCALES - GENERAL
PAINLEVEL_OUTOF10: 8
PAINLEVEL_OUTOF10: 0
PAINLEVEL_OUTOF10: 0
PAINLEVEL_OUTOF10: 10
PAINLEVEL_OUTOF10: 9
PAINLEVEL_OUTOF10: 8
PAINLEVEL_OUTOF10: 0
PAINLEVEL_OUTOF10: 8
PAINLEVEL_OUTOF10: 5

## 2018-06-07 ASSESSMENT — PAIN DESCRIPTION - FREQUENCY
FREQUENCY: CONTINUOUS
FREQUENCY: CONTINUOUS
FREQUENCY: INTERMITTENT

## 2018-06-07 ASSESSMENT — PAIN DESCRIPTION - ORIENTATION
ORIENTATION: LOWER

## 2018-06-07 ASSESSMENT — PAIN DESCRIPTION - ONSET
ONSET: ON-GOING

## 2018-06-07 ASSESSMENT — PAIN DESCRIPTION - PAIN TYPE
TYPE: CHRONIC PAIN

## 2018-06-07 ASSESSMENT — PAIN DESCRIPTION - LOCATION
LOCATION: BACK

## 2018-06-07 ASSESSMENT — PAIN DESCRIPTION - PROGRESSION
CLINICAL_PROGRESSION: NOT CHANGED

## 2018-06-08 VITALS
TEMPERATURE: 97.6 F | BODY MASS INDEX: 38.78 KG/M2 | WEIGHT: 255.9 LBS | SYSTOLIC BLOOD PRESSURE: 160 MMHG | HEIGHT: 68 IN | OXYGEN SATURATION: 97 % | DIASTOLIC BLOOD PRESSURE: 72 MMHG | HEART RATE: 60 BPM | RESPIRATION RATE: 16 BRPM

## 2018-06-08 LAB
BLOOD CULTURE, ROUTINE: NORMAL
CULTURE, BLOOD 2: NORMAL
METER GLUCOSE: 150 MG/DL (ref 70–110)
METER GLUCOSE: 178 MG/DL (ref 70–110)

## 2018-06-08 PROCEDURE — 6370000000 HC RX 637 (ALT 250 FOR IP): Performed by: INTERNAL MEDICINE

## 2018-06-08 PROCEDURE — 6370000000 HC RX 637 (ALT 250 FOR IP): Performed by: CLINICAL NURSE SPECIALIST

## 2018-06-08 PROCEDURE — 6360000002 HC RX W HCPCS: Performed by: INTERNAL MEDICINE

## 2018-06-08 PROCEDURE — 2580000003 HC RX 258: Performed by: INTERNAL MEDICINE

## 2018-06-08 PROCEDURE — 82962 GLUCOSE BLOOD TEST: CPT

## 2018-06-08 PROCEDURE — 94640 AIRWAY INHALATION TREATMENT: CPT

## 2018-06-08 RX ORDER — BENZONATATE 100 MG/1
100 CAPSULE ORAL 3 TIMES DAILY PRN
DISCHARGE
Start: 2018-06-08 | End: 2018-06-15

## 2018-06-08 RX ORDER — FORMOTEROL FUMARATE 20 UG/2ML
20 SOLUTION RESPIRATORY (INHALATION) 2 TIMES DAILY
DISCHARGE
Start: 2018-06-08 | End: 2018-08-03 | Stop reason: ALTCHOICE

## 2018-06-08 RX ORDER — BUDESONIDE 0.5 MG/2ML
0.5 INHALANT ORAL 2 TIMES DAILY
Qty: 60 AMPULE | Refills: 3 | DISCHARGE
Start: 2018-06-08 | End: 2019-02-05

## 2018-06-08 RX ORDER — CETIRIZINE HYDROCHLORIDE 10 MG/1
10 TABLET ORAL DAILY
DISCHARGE
Start: 2018-06-09 | End: 2019-02-05

## 2018-06-08 RX ADMIN — ISOSORBIDE MONONITRATE 240 MG: 60 TABLET ORAL at 08:57

## 2018-06-08 RX ADMIN — MORPHINE SULFATE 15 MG: 15 TABLET, FILM COATED, EXTENDED RELEASE ORAL at 08:56

## 2018-06-08 RX ADMIN — INSULIN GLARGINE 25 UNITS: 100 INJECTION, SOLUTION SUBCUTANEOUS at 08:59

## 2018-06-08 RX ADMIN — HYDROCODONE BITARTRATE AND ACETAMINOPHEN 1 TABLET: 10; 325 TABLET ORAL at 00:18

## 2018-06-08 RX ADMIN — ENOXAPARIN SODIUM 40 MG: 40 INJECTION, SOLUTION INTRAVENOUS; SUBCUTANEOUS at 08:58

## 2018-06-08 RX ADMIN — GABAPENTIN 400 MG: 400 CAPSULE ORAL at 15:02

## 2018-06-08 RX ADMIN — TORSEMIDE 20 MG: 20 TABLET ORAL at 08:57

## 2018-06-08 RX ADMIN — GABAPENTIN 400 MG: 400 CAPSULE ORAL at 08:57

## 2018-06-08 RX ADMIN — FLUTICASONE PROPIONATE 1 SPRAY: 50 SPRAY, METERED NASAL at 12:05

## 2018-06-08 RX ADMIN — HYDROCODONE BITARTRATE AND ACETAMINOPHEN 1 TABLET: 10; 325 TABLET ORAL at 15:02

## 2018-06-08 RX ADMIN — Medication 200 MG: at 08:56

## 2018-06-08 RX ADMIN — BUDESONIDE 500 MCG: 0.5 SUSPENSION RESPIRATORY (INHALATION) at 07:45

## 2018-06-08 RX ADMIN — HYDROCODONE BITARTRATE AND ACETAMINOPHEN 1 TABLET: 10; 325 TABLET ORAL at 09:06

## 2018-06-08 RX ADMIN — LOSARTAN POTASSIUM 100 MG: 50 TABLET, FILM COATED ORAL at 08:57

## 2018-06-08 RX ADMIN — IPRATROPIUM BROMIDE AND ALBUTEROL SULFATE 3 ML: .5; 3 SOLUTION RESPIRATORY (INHALATION) at 11:54

## 2018-06-08 RX ADMIN — FORMOTEROL FUMARATE DIHYDRATE 20 MCG: 20 SOLUTION RESPIRATORY (INHALATION) at 07:45

## 2018-06-08 RX ADMIN — CETIRIZINE HYDROCHLORIDE 10 MG: 10 TABLET, FILM COATED ORAL at 08:57

## 2018-06-08 RX ADMIN — ASPIRIN 81 MG: 81 TABLET, COATED ORAL at 08:57

## 2018-06-08 RX ADMIN — ALLOPURINOL 300 MG: 300 TABLET ORAL at 08:57

## 2018-06-08 RX ADMIN — GABAPENTIN 400 MG: 400 CAPSULE ORAL at 02:12

## 2018-06-08 RX ADMIN — Medication 10 ML: at 08:58

## 2018-06-08 ASSESSMENT — PAIN DESCRIPTION - DESCRIPTORS
DESCRIPTORS: ACHING;DISCOMFORT;SORE
DESCRIPTORS: DISCOMFORT;DULL;CONSTANT

## 2018-06-08 ASSESSMENT — PAIN DESCRIPTION - PROGRESSION
CLINICAL_PROGRESSION: NOT CHANGED
CLINICAL_PROGRESSION: NOT CHANGED

## 2018-06-08 ASSESSMENT — PAIN DESCRIPTION - ORIENTATION
ORIENTATION: LOWER
ORIENTATION: LOWER

## 2018-06-08 ASSESSMENT — PAIN DESCRIPTION - ONSET
ONSET: ON-GOING
ONSET: AWAKENED FROM SLEEP

## 2018-06-08 ASSESSMENT — PAIN DESCRIPTION - FREQUENCY
FREQUENCY: CONTINUOUS
FREQUENCY: INTERMITTENT

## 2018-06-08 ASSESSMENT — PAIN SCALES - GENERAL
PAINLEVEL_OUTOF10: 8
PAINLEVEL_OUTOF10: 5
PAINLEVEL_OUTOF10: 9
PAINLEVEL_OUTOF10: 0

## 2018-06-08 ASSESSMENT — PAIN DESCRIPTION - PAIN TYPE
TYPE: CHRONIC PAIN
TYPE: CHRONIC PAIN

## 2018-06-08 ASSESSMENT — PAIN DESCRIPTION - LOCATION
LOCATION: BACK
LOCATION: BACK

## 2018-06-15 LAB
EKG ATRIAL RATE: 97 BPM
EKG P AXIS: 39 DEGREES
EKG P-R INTERVAL: 244 MS
EKG Q-T INTERVAL: 360 MS
EKG QRS DURATION: 98 MS
EKG QTC CALCULATION (BAZETT): 457 MS
EKG R AXIS: 58 DEGREES
EKG T AXIS: 135 DEGREES
EKG VENTRICULAR RATE: 97 BPM

## 2018-07-31 ENCOUNTER — OFFICE VISIT (OUTPATIENT)
Dept: CARDIOLOGY CLINIC | Age: 78
End: 2018-07-31
Payer: MEDICARE

## 2018-07-31 VITALS
BODY MASS INDEX: 39.25 KG/M2 | HEART RATE: 71 BPM | WEIGHT: 259 LBS | DIASTOLIC BLOOD PRESSURE: 58 MMHG | HEIGHT: 68 IN | SYSTOLIC BLOOD PRESSURE: 126 MMHG | RESPIRATION RATE: 16 BRPM

## 2018-07-31 DIAGNOSIS — I25.10 CORONARY ARTERY DISEASE INVOLVING NATIVE CORONARY ARTERY WITHOUT ANGINA PECTORIS, UNSPECIFIED WHETHER NATIVE OR TRANSPLANTED HEART: Primary | Chronic | ICD-10-CM

## 2018-07-31 PROCEDURE — 93000 ELECTROCARDIOGRAM COMPLETE: CPT | Performed by: INTERNAL MEDICINE

## 2018-07-31 PROCEDURE — 99213 OFFICE O/P EST LOW 20 MIN: CPT | Performed by: CLINICAL NURSE SPECIALIST

## 2018-07-31 NOTE — PATIENT INSTRUCTIONS
side effects to FDA at 2-382-FDA-3250. What other drugs will affect spironolactone? Taking this medicine with other drugs that make you dizzy or lower your blood pressure can worsen these effects. Ask your doctor before taking spironolactone with a narcotic pain medicine, muscle relaxer, or medicine for anxiety or seizures. Other drugs may interact with spironolactone, including prescription and over-the-counter medicines, vitamins, and herbal products. Tell each of your health care providers about all medicines you use now and any medicine you start or stop using. Where can I get more information? Your pharmacist can provide more information about spironolactone. Remember, keep this and all other medicines out of the reach of children, never share your medicines with others, and use this medication only for the indication prescribed. Every effort has been made to ensure that the information provided by [address] is accurate, up-to-date, and complete, but no guarantee is made to that effect. Drug information contained herein may be time sensitive. [facility] information has been compiled for use by healthcare practitioners and consumers in the [place] and therefore [employer] does not warrant that uses outside of the [place] are appropriate, unless specifically indicated otherwise. [facility]'s drug information does not endorse drugs, diagnose patients or recommend therapy. [facility]'s drug information is an informational resource designed to assist licensed healthcare practitioners in caring for their patients and/or to serve consumers viewing this service as a supplement to, and not a substitute for, the expertise, skill, knowledge and judgment of healthcare practitioners. The absence of a warning for a given drug or drug combination in no way should be construed to indicate that the drug or drug combination is safe, effective or appropriate for any given patient.  [employer] does not assume any responsibility for any aspect of healthcare administered with the aid of information [facility] provides. The information contained herein is not intended to cover all possible uses, directions, precautions, warnings, drug interactions, allergic reactions, or adverse effects. If you have questions about the drugs you are taking, check with your doctor, nurse or pharmacist.  Copyright 5041-4544 [address]: 9.03. Revision date: 12/2/2015. Care instructions adapted under license by [facility] ([facility]). If you have questions about a medical condition or this instruction, always ask your healthcare professional. [place] [address] any warranty or liability for your use of this information.

## 2018-07-31 NOTE — PROGRESS NOTES
[address] FOLLOW-UP    Name: Jonathon Loaiza    Age: 69 y.o. Primary Care Physician: Arline Martell MD    Date of Service: 7/31/18     Chief Complaint:   Chief Complaint   Patient presents with    Coronary Artery Disease     post hosp. 6/3/18 d/t resp. failure        Interim History: Mr. Sherrie Elena presents in follow up for his coronary artery disease and valvular heart disease. He is also questioning whether he should be prescribed Aldactone, as he received a call from his insurance company suggesting he may benefit from it. Since his last office visit, he was hospitalized for acute respiratory failure and discharged to subacute rehab. He has now returned home and is to begin pulmonary rehab in a few weeks. He becomes short of breath with walking short distances and must take frequent rest breaks. He also complains of lightheadedness with position change, especially if he gets up too quickly. He denies orthopnea, but has frequent PND. He admits to being very sedentary at home. Review of Systems:   Cardiovascular: Denies chest discomfort or palpitations. He has chronic lower extremity edema, but does not feel this has been any worse recently. Respiratory: Positive for dyspnea, PND and productive cough. He has a history of sleep apnea, but does not use Cpap or Bipap. Consitutional: Denies fatigue, fevers or chills. No excessive weight gain/ loss. HEENMT: Denies headaches, blurred vision, hearing loss, mouth sores, bleeding gums or epistaxis   Musculoskeletal: Denies myalgias or arthralgias. Neurological: Denies syncope, numbness or tingling. Positive for dizziness as described above. He has numbness and burning pain in his feet and lower legs due to neuropathy. Integumentary: Denies rash, hives or pruritis   Gastrointestinal: Denies nausea, vomiting, abdominal pain, constipation/diarrhea, or dark/bloody stools.     Genitourinary: Denies

## 2018-08-03 ENCOUNTER — OFFICE VISIT (OUTPATIENT)
Dept: NON INVASIVE DIAGNOSTICS | Age: 78
End: 2018-08-03
Payer: MEDICARE

## 2018-08-03 VITALS
HEIGHT: 67 IN | DIASTOLIC BLOOD PRESSURE: 78 MMHG | HEART RATE: 64 BPM | BODY MASS INDEX: 41.12 KG/M2 | RESPIRATION RATE: 14 BRPM | SYSTOLIC BLOOD PRESSURE: 138 MMHG | WEIGHT: 262 LBS

## 2018-08-03 DIAGNOSIS — Z95.0 PACEMAKER: Primary | Chronic | ICD-10-CM

## 2018-08-03 PROCEDURE — 93280 PM DEVICE PROGR EVAL DUAL: CPT | Performed by: NURSE PRACTITIONER

## 2018-08-03 PROCEDURE — 99213 OFFICE O/P EST LOW 20 MIN: CPT | Performed by: NURSE PRACTITIONER

## 2018-08-03 NOTE — PROGRESS NOTES
Cardiac Electrophysiology Office Progress Note    Aaron Avalos  1940  Date of Service: 8/3/18  PCP: Jessica Faust MD   Cardiologist: Lucy Ruiz MD  Electrophysiologist: Dolores Weber DO    SUBJECTIVE: Aaron Avalos is a 70 yo male who presents to the office for the management of: sinus arrest with pauses up to 4.2 seconds, and pacemaker in situ. From a device and rhythm perspective he offers no complaints. He followed with Cardiology this past week with no changes. There was discussion regarding initiation of spironolactone which was deferred for now. He follows his device yearly and wishes to keep the same follow-up schedule. He denies near syncope, dizziness, angina, or syncope. His main complaint today is arthritic pain. He denies CP, SOB, orthopnea or PND. Patient Active Problem List    Diagnosis Date Noted    Sinus pause 11/03/2014     Priority: High     Overview Note:     A). 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 H/O TREVOR  C). Cardionet 9/19/14 - 10/18/14: SR, SB, sinus arrest with up to 4.2 second pause during both awake and sleeping hours.  Diastolic CHF, chronic ([address] 07/31/2012     Priority: Medium     Overview Note:     1. Echocardiogram(3/20/14): Summary  Normal left ventricle size and systolic function  EF 39%  Moderate to severe LVH  The left atrium is moderately dilated. Stress test(9/5/14): IMPRESSION:   1. Fixed lateral wall perfusion defect associated with hypokinesis   on the wall motion study. Finding suggests myocardial scarring. 2. No reversible perfusion defects and therefore no evidence of   left ventricular myocardium at risk for stress-induced ischemia.            Hypertension, essential, benign 07/31/2012     Priority: Medium    Diabetes mellitus ([address] 11/04/2014    Obesity 11/04/2014    PVD (peripheral vascular disease) with claudication ([address] 03/27/2014    Carotid bruit 03/27/2013    S/P carotid Sig Dispense Refill    fluticasone (FLONASE) 50 MCG/ACT nasal spray       acetaminophen (TYLENOL) 500 MG tablet Take 500 mg by mouth every 6 hours as needed for Pain      salmeterol (SEREVENT DISKUS) 50 MCG/DOSE diskus inhaler Inhale 1 puff into the lungs 2 times daily      albuterol (PROVENTIL) (2.5 MG/3ML) 0.083% nebulizer solution Take 3 mLs by nebulization every 6 hours as needed for Wheezing 120 mL 6    budesonide (PULMICORT) 0.5 MG/2ML nebulizer suspension Take 2 mLs by nebulization 2 times daily 60 ampule 3    cetirizine (ZYRTEC) 10 MG tablet Take 1 tablet by mouth daily      glucagon, rDNA, 1 MG injection Inject 1 mg into the muscle as needed for Low blood sugar (Blood glucose less than 70 mg/dL and patient NOT ALERT or NPO and does not have IV access.)      aspirin EC 81 MG EC tablet Take 1 tablet by mouth daily 30 tablet 3    isosorbide mononitrate (IMDUR) 120 MG extended release tablet Take 1 tablet by mouth daily (Patient taking differently: Take 240 mg by mouth daily ) 30 tablet 0    losartan (COZAAR) 100 MG tablet Take 100 mg by mouth daily      magnesium 200 MG TABS tablet Take 200 mg by mouth daily      morphine (THIERRY) 10 MG extended release capsule Take 10 mg by mouth 2 times daily. Timothy Clements vitamin B-12 (CYANOCOBALAMIN) 1000 MCG tablet Take 1,000 mcg by mouth daily      torsemide (DEMADEX) 20 MG tablet Take 20 mg by mouth daily      insulin glargine (LANTUS) 100 UNIT/ML injection vial Inject 25 Units into the skin 2 times daily       HYDROcodone-acetaminophen (NORCO)  MG per tablet Take 1 tablet by mouth every 6 hours as needed for Pain 15 tablet 0    allopurinol (ZYLOPRIM) 300 MG tablet Take 300 mg by mouth daily       vitamin D (ERGOCALCIFEROL) 83158 UNITS CAPS capsule Take 50,000 Units by mouth once a week Fridays      gabapentin (NEURONTIN) 800 MG tablet Take 400 mg by mouth every 6 hours .  lovastatin (MEVACOR) 20 MG tablet Take 20 mg by mouth nightly.        No current facility-administered medications for this visit. No Known Allergies    Review of Systems   Musculoskeletal:        Arthritic pain   All other systems reviewed and are negative. PHYSICAL EXAM:  Vitals:    08/03/18 1034   BP: 138/78   Pulse: 64   Resp: 14   Weight: 262 lb (118.8 kg)   Height: 5' 7\" (1.702 m)     Constitutional: Oriented to person, place, and time. Well-developed and cooperative. Head: Normocephalic and atraumatic. Eyes: Conjunctivae are normal.   Neck: No hepatojugular reflux and no JVD present. Cardiovascular: S1 normal, S2 normal and intact distal pulses. Normal rate and rhythm. PMI is not displaced. Pulmonary/Chest: Effort normal and breath sounds diminished, No respiratory distress. Abdominal: Soft. Normal appearance and bowel sounds are normal. No tenderness. Musculoskeletal: Normal range of motion of all extremities, no muscle weakness. Neurological: Alert and oriented to person, place, and time. Gait - uses cane. Skin: Skin is warm and dry. No bruising, no ecchymosis and no rash noted. Extremity: No clubbing or cyanosis. No edema. Psychiatric: Normal mood and affect. Thought content normal.   Pacemaker site: stable, well healed, on evidence of erosion    Device Interrogation/Reprogramming  Make/Model: [facility] 2240, DOI: 11/12/2014  Mode DDD 60/120 ppm  P wave: 3.7 mV  Impedance: 400 ohms   Threshold: 0.5 V @ 0.5 ms  RV R wave: 12 mV  Impedance: 460 ohms   Threshold: 0.75 V @ 0.5 ms  Pacing: A: 27%  RV: 27%   Battery Voltage/Longevity: 10 years    Arrhythmias: None. Overall device function is normal  All device programmable settings were evaluated per above and in the scanned document, along with iterative adjustments (capture thresholds) to assess and select the most appropriate final programming to provide for consistent delivery of the appropriate therapy and to verify function of the device. Impression:    1.  Sinus pause/arrest  - up to 4.2 secs

## 2018-11-02 ENCOUNTER — NURSE ONLY (OUTPATIENT)
Dept: NON INVASIVE DIAGNOSTICS | Age: 78
End: 2018-11-02
Payer: MEDICARE

## 2018-11-02 DIAGNOSIS — Z95.0 PACEMAKER: Primary | Chronic | ICD-10-CM

## 2018-11-02 PROCEDURE — 93294 REM INTERROG EVL PM/LDLS PM: CPT | Performed by: INTERNAL MEDICINE

## 2018-11-02 PROCEDURE — 93296 REM INTERROG EVL PM/IDS: CPT | Performed by: INTERNAL MEDICINE

## 2019-01-11 ENCOUNTER — HOSPITAL ENCOUNTER (OUTPATIENT)
Age: 79
Discharge: HOME | End: 2019-01-13
Payer: MEDICARE

## 2019-01-11 ENCOUNTER — HOSPITAL ENCOUNTER (OUTPATIENT)
Dept: GENERAL RADIOLOGY | Age: 79
Discharge: HOME | End: 2019-01-13
Payer: MEDICARE

## 2019-01-11 DIAGNOSIS — R06.02 SHORTNESS OF BREATH: ICD-10-CM

## 2019-01-11 PROCEDURE — 71046 X-RAY EXAM CHEST 2 VIEWS: CPT

## 2019-02-01 ENCOUNTER — NURSE ONLY (OUTPATIENT)
Dept: NON INVASIVE DIAGNOSTICS | Age: 79
End: 2019-02-01
Payer: MEDICARE

## 2019-02-01 DIAGNOSIS — Z95.0 PACEMAKER: Primary | Chronic | ICD-10-CM

## 2019-02-01 PROCEDURE — 93294 REM INTERROG EVL PM/LDLS PM: CPT | Performed by: INTERNAL MEDICINE

## 2019-02-01 PROCEDURE — 93296 REM INTERROG EVL PM/IDS: CPT | Performed by: INTERNAL MEDICINE

## 2019-02-26 ENCOUNTER — OFFICE VISIT (OUTPATIENT)
Dept: CARDIOLOGY CLINIC | Age: 79
End: 2019-02-26
Payer: MEDICARE

## 2019-02-26 VITALS
DIASTOLIC BLOOD PRESSURE: 62 MMHG | SYSTOLIC BLOOD PRESSURE: 110 MMHG | HEIGHT: 68 IN | BODY MASS INDEX: 41.22 KG/M2 | WEIGHT: 272 LBS | RESPIRATION RATE: 20 BRPM | HEART RATE: 63 BPM

## 2019-02-26 DIAGNOSIS — I10 ESSENTIAL HYPERTENSION: Chronic | ICD-10-CM

## 2019-02-26 DIAGNOSIS — Z95.1 S/P CABG (CORONARY ARTERY BYPASS GRAFT): ICD-10-CM

## 2019-02-26 DIAGNOSIS — I25.10 CORONARY ARTERY DISEASE INVOLVING NATIVE CORONARY ARTERY WITHOUT ANGINA PECTORIS, UNSPECIFIED WHETHER NATIVE OR TRANSPLANTED HEART: Primary | Chronic | ICD-10-CM

## 2019-02-26 DIAGNOSIS — Z95.2 S/P AVR: Chronic | ICD-10-CM

## 2019-02-26 PROCEDURE — 93000 ELECTROCARDIOGRAM COMPLETE: CPT | Performed by: INTERNAL MEDICINE

## 2019-02-26 PROCEDURE — 99213 OFFICE O/P EST LOW 20 MIN: CPT | Performed by: INTERNAL MEDICINE

## 2019-05-03 ENCOUNTER — TELEPHONE (OUTPATIENT)
Dept: NON INVASIVE DIAGNOSTICS | Age: 79
End: 2019-05-03

## 2019-05-03 ENCOUNTER — NURSE ONLY (OUTPATIENT)
Dept: NON INVASIVE DIAGNOSTICS | Age: 79
End: 2019-05-03
Payer: MEDICARE

## 2019-05-03 DIAGNOSIS — Z95.0 PACEMAKER: Primary | Chronic | ICD-10-CM

## 2019-05-03 DIAGNOSIS — Z95.2 S/P AVR: Chronic | ICD-10-CM

## 2019-05-03 PROCEDURE — 93296 REM INTERROG EVL PM/IDS: CPT | Performed by: INTERNAL MEDICINE

## 2019-05-03 PROCEDURE — 93294 REM INTERROG EVL PM/LDLS PM: CPT | Performed by: INTERNAL MEDICINE

## 2019-05-03 NOTE — TELEPHONE ENCOUNTER
Received call from patient asking if we received his remote monitor transmission from today. Told him we did and it was normal.  Informed of next wireless remote on 8-5-19. He voiced understanding and states he is feeling fine.     Kalie Rodriguez RN  [facility] [address]

## 2019-05-03 NOTE — TELEPHONE ENCOUNTER
Called and spoke TRICIA and box is blinking yellow. I transferred him to tech support.   Nicole Harris

## 2019-08-05 ENCOUNTER — NURSE ONLY (OUTPATIENT)
Dept: NON INVASIVE DIAGNOSTICS | Age: 79
End: 2019-08-05
Payer: MEDICARE

## 2019-08-05 DIAGNOSIS — Z95.0 PACEMAKER: Primary | ICD-10-CM

## 2019-08-05 PROCEDURE — 93296 REM INTERROG EVL PM/IDS: CPT | Performed by: INTERNAL MEDICINE

## 2019-08-05 PROCEDURE — 93294 REM INTERROG EVL PM/LDLS PM: CPT | Performed by: INTERNAL MEDICINE

## 2019-08-09 NOTE — PROGRESS NOTES
Conjunctivae are normal.   Neck: No hepatojugular reflux and no JVD present. Cardiovascular: S1 normal, S2 normal and intact distal pulses. Normal rate and rhythm. PMI is not displaced. Pulmonary/Chest: Effort normal and breath sounds diminished, No respiratory distress. Abdominal: Soft. Normal appearance and bowel sounds are normal. No tenderness. Musculoskeletal: Normal range of motion of all extremities, no muscle weakness. Neurological: Alert and oriented to person, place, and time. Gait - uses cane. Skin: Skin is warm and dry. No bruising, no ecchymosis and no rash noted. Extremity: No clubbing or cyanosis. No edema. Psychiatric: Normal mood and affect. Thought content normal.   Pacemaker site: stable, well healed, no evidence of erosion/infection    Device Interrogation/Reprogramming  Make/Model: [facility] 2240, DOI: 11/12/2014  Mode DDD 60/120 ppm  P wave: 2.5 mV  Impedance: 360 ohms   Threshold: 0.75 V @ 0.5 ms  RV R wave: 11.5 mV  Impedance: 400 ohms   Threshold: 0.75 V @ 0.5 ms  Pacing: A: 60%  RV: 72%    Battery Voltage/Longevity:  7.9 years    Arrhythmias: None  Reprogramming included: None. Overall device function is normal  All device programmable settings were evaluated per above and in the scanned document, along with iterative adjustments (capture thresholds) to assess and select the most appropriate final programming to provide for consistent delivery of the appropriate therapy and to verify function of the device. I have independently reviewed all of the ECGs and rhythm strips per above. I have personally reviewed the laboratory, cardiac diagnostic and radiographic testing as outlined above. I have reviewed previous records noted in [address]. Impression:    1. Sinus pause/arrest  - up to 4.2 secs during waking hours. - no syncope or dizziness. - s/p pacemaker implantation    2.  HFpEF  - at baseline.  - normal LVEF, moderate-severe LVH; EF 60%--1/2018  - normal stress test    3. HTN    4. HLD  Lab Results   Component Value Date    CHOL 149 01/20/2018    CHOL 169 09/05/2014    CHOL 157 03/18/2014     Lab Results   Component Value Date    TRIG 103 01/20/2018    TRIG 172 (H) 09/05/2014    TRIG 237 (H) 03/18/2014     Lab Results   Component Value Date    HDL 40 01/20/2018    HDL 38 09/05/2014    HDL 28 (A) 03/18/2014     Lab Results   Component Value Date    LDLCALC 88 01/20/2018    LDLCALC 97 09/05/2014    LDLCALC 82 03/18/2014     Lab Results   Component Value Date    LABVLDL 21 01/20/2018    LABVLDL 34 09/05/2014     No results found for: CHOLHDLRATIO  - Mevacor    5. Carotid artery disease  - s/p CEA    6. Hypothyroidism  Lab Results   Component Value Date    TSH 1.050 05/13/2018    P6NGUKQ 8.7 03/17/2014    T4FREE 1.43 09/04/2014     7. DM-Type II  Lab Results   Component Value Date    LABA1C 7.5 (H) 01/20/2018     No results found for: EAG    8. Obesity  Body mass index is 41.05 kg/m². 9. Pacemaker in situ  - s/p SJM  - normal function. Plan:    1. Q 3 month remote transmissions. 2. One year in-office follow-up. 3. No changes were made in his medications. I have spent a total of 15 minutes with the patient reviewing the above stated recommendations. A total of >50% of that time involved face-to-face time providing counseling and or coordination of care with the other providers    Sierra Zavala DO  [facility] Cardiac Electrophysiology  [address] [facility]     NOTE: This report was transcribed using voice recognition software. Every effort was made to ensure accuracy; however, inadvertent computerized transcription errors may be present.

## 2019-08-13 ENCOUNTER — OFFICE VISIT (OUTPATIENT)
Dept: NON INVASIVE DIAGNOSTICS | Age: 79
End: 2019-08-13
Payer: MEDICARE

## 2019-08-13 VITALS
RESPIRATION RATE: 20 BRPM | BODY MASS INDEX: 40.92 KG/M2 | HEART RATE: 73 BPM | WEIGHT: 270 LBS | DIASTOLIC BLOOD PRESSURE: 62 MMHG | SYSTOLIC BLOOD PRESSURE: 84 MMHG | HEIGHT: 68 IN

## 2019-08-13 DIAGNOSIS — Z95.0 PACEMAKER: Primary | ICD-10-CM

## 2019-08-13 PROCEDURE — 99213 OFFICE O/P EST LOW 20 MIN: CPT | Performed by: INTERNAL MEDICINE

## 2019-08-13 PROCEDURE — 93280 PM DEVICE PROGR EVAL DUAL: CPT | Performed by: INTERNAL MEDICINE

## 2019-08-13 RX ORDER — GABAPENTIN 400 MG/1
400 CAPSULE ORAL 4 TIMES DAILY
Status: ON HOLD | COMMUNITY
Start: 2019-05-19 | End: 2021-01-01 | Stop reason: HOSPADM

## 2019-08-13 RX ORDER — LEVOTHYROXINE SODIUM 0.03 MG/1
25 TABLET ORAL DAILY
Refills: 1 | COMMUNITY
Start: 2019-07-29

## 2019-10-04 PROBLEM — R06.02 SOB (SHORTNESS OF BREATH): Status: ACTIVE | Noted: 2017-02-11

## 2019-11-01 ENCOUNTER — APPOINTMENT (OUTPATIENT)
Dept: GENERAL RADIOLOGY | Age: 79
End: 2019-11-01
Payer: MEDICARE

## 2019-11-01 ENCOUNTER — HOSPITAL ENCOUNTER (OUTPATIENT)
Age: 79
Setting detail: OBSERVATION
Discharge: HOME | End: 2019-11-05
Attending: EMERGENCY MEDICINE | Admitting: INTERNAL MEDICINE
Payer: MEDICARE

## 2019-11-01 ENCOUNTER — APPOINTMENT (OUTPATIENT)
Dept: CT IMAGING | Age: 79
End: 2019-11-01
Payer: MEDICARE

## 2019-11-01 DIAGNOSIS — R94.4 ABNORMAL CREATININE CLEARANCE GLOMERULAR FILTRATION: ICD-10-CM

## 2019-11-01 DIAGNOSIS — N17.9 AKI (ACUTE KIDNEY INJURY) (HCC): Primary | ICD-10-CM

## 2019-11-01 LAB
ALBUMIN SERPL-MCNC: 3.9 G/DL (ref 3.5–5.2)
ALP BLD-CCNC: 68 U/L (ref 40–129)
ALT SERPL-CCNC: 10 U/L (ref 0–40)
ANION GAP SERPL CALCULATED.3IONS-SCNC: 16 MMOL/L (ref 7–16)
AST SERPL-CCNC: 14 U/L (ref 0–39)
BACTERIA: ABNORMAL /HPF
BASOPHILS ABSOLUTE: 0.04 E9/L (ref 0–0.2)
BASOPHILS RELATIVE PERCENT: 0.3 % (ref 0–2)
BILIRUB SERPL-MCNC: 0.7 MG/DL (ref 0–1.2)
BILIRUBIN URINE: NEGATIVE
BLOOD, URINE: ABNORMAL
BUN BLDV-MCNC: 38 MG/DL (ref 8–23)
CALCIUM SERPL-MCNC: 9.6 MG/DL (ref 8.6–10.2)
CHLORIDE BLD-SCNC: 96 MMOL/L (ref 98–107)
CLARITY: CLEAR
CO2: 23 MMOL/L (ref 22–29)
COLOR: YELLOW
CREAT SERPL-MCNC: 1.9 MG/DL (ref 0.7–1.2)
CREATININE URINE: 108 MG/DL (ref 40–278)
EKG ATRIAL RATE: 68 BPM
EKG P AXIS: 100 DEGREES
EKG P-R INTERVAL: 376 MS
EKG Q-T INTERVAL: 482 MS
EKG QRS DURATION: 154 MS
EKG QTC CALCULATION (BAZETT): 512 MS
EKG R AXIS: -17 DEGREES
EKG T AXIS: 140 DEGREES
EKG VENTRICULAR RATE: 68 BPM
EOSINOPHILS ABSOLUTE: 0.02 E9/L (ref 0.05–0.5)
EOSINOPHILS RELATIVE PERCENT: 0.1 % (ref 0–6)
GFR AFRICAN AMERICAN: 42
GFR NON-AFRICAN AMERICAN: 34 ML/MIN/1.73
GLUCOSE BLD-MCNC: 171 MG/DL (ref 74–99)
GLUCOSE URINE: NEGATIVE MG/DL
HCT VFR BLD CALC: 36.2 % (ref 37–54)
HEMOGLOBIN: 12.1 G/DL (ref 12.5–16.5)
IMMATURE GRANULOCYTES #: 0.09 E9/L
IMMATURE GRANULOCYTES %: 0.6 % (ref 0–5)
KETONES, URINE: ABNORMAL MG/DL
LACTIC ACID: 1.5 MMOL/L (ref 0.5–2.2)
LEUKOCYTE ESTERASE, URINE: NEGATIVE
LYMPHOCYTES ABSOLUTE: 1.11 E9/L (ref 1.5–4)
LYMPHOCYTES RELATIVE PERCENT: 8 % (ref 20–42)
MCH RBC QN AUTO: 31.8 PG (ref 26–35)
MCHC RBC AUTO-ENTMCNC: 33.4 % (ref 32–34.5)
MCV RBC AUTO: 95.3 FL (ref 80–99.9)
METER GLUCOSE: 150 MG/DL (ref 74–99)
METER GLUCOSE: 158 MG/DL (ref 74–99)
MONOCYTES ABSOLUTE: 0.58 E9/L (ref 0.1–0.95)
MONOCYTES RELATIVE PERCENT: 4.2 % (ref 2–12)
NEUTROPHILS ABSOLUTE: 12.05 E9/L (ref 1.8–7.3)
NEUTROPHILS RELATIVE PERCENT: 86.8 % (ref 43–80)
NITRITE, URINE: NEGATIVE
PDW BLD-RTO: 14.5 FL (ref 11.5–15)
PH UA: 6 (ref 5–9)
PLATELET # BLD: 169 E9/L (ref 130–450)
PMV BLD AUTO: 10.3 FL (ref 7–12)
POTASSIUM REFLEX MAGNESIUM: 4.2 MMOL/L (ref 3.5–5)
PROTEIN UA: 30 MG/DL
RBC # BLD: 3.8 E12/L (ref 3.8–5.8)
RBC UA: ABNORMAL /HPF (ref 0–2)
SODIUM BLD-SCNC: 135 MMOL/L (ref 132–146)
SODIUM URINE: 54 MMOL/L
SPECIFIC GRAVITY UA: 1.01 (ref 1–1.03)
TOTAL CK: 245 U/L (ref 20–200)
TOTAL PROTEIN: 6.9 G/DL (ref 6.4–8.3)
TROPONIN: 0.06 NG/ML (ref 0–0.03)
UREA NITROGEN, UR: 921 MG/DL (ref 800–1666)
UROBILINOGEN, URINE: 0.2 E.U./DL
WBC # BLD: 13.9 E9/L (ref 4.5–11.5)
WBC UA: ABNORMAL /HPF (ref 0–5)

## 2019-11-01 PROCEDURE — 93005 ELECTROCARDIOGRAM TRACING: CPT | Performed by: STUDENT IN AN ORGANIZED HEALTH CARE EDUCATION/TRAINING PROGRAM

## 2019-11-01 PROCEDURE — 82550 ASSAY OF CK (CPK): CPT

## 2019-11-01 PROCEDURE — G0378 HOSPITAL OBSERVATION PER HR: HCPCS

## 2019-11-01 PROCEDURE — 81001 URINALYSIS AUTO W/SCOPE: CPT

## 2019-11-01 PROCEDURE — 84540 ASSAY OF URINE/UREA-N: CPT

## 2019-11-01 PROCEDURE — 84484 ASSAY OF TROPONIN QUANT: CPT

## 2019-11-01 PROCEDURE — 6370000000 HC RX 637 (ALT 250 FOR IP): Performed by: STUDENT IN AN ORGANIZED HEALTH CARE EDUCATION/TRAINING PROGRAM

## 2019-11-01 PROCEDURE — 85025 COMPLETE CBC W/AUTO DIFF WBC: CPT

## 2019-11-01 PROCEDURE — 73502 X-RAY EXAM HIP UNI 2-3 VIEWS: CPT

## 2019-11-01 PROCEDURE — 83605 ASSAY OF LACTIC ACID: CPT

## 2019-11-01 PROCEDURE — 93010 ELECTROCARDIOGRAM REPORT: CPT | Performed by: INTERNAL MEDICINE

## 2019-11-01 PROCEDURE — 73030 X-RAY EXAM OF SHOULDER: CPT

## 2019-11-01 PROCEDURE — 84300 ASSAY OF URINE SODIUM: CPT

## 2019-11-01 PROCEDURE — 6360000002 HC RX W HCPCS: Performed by: INTERNAL MEDICINE

## 2019-11-01 PROCEDURE — 96365 THER/PROPH/DIAG IV INF INIT: CPT

## 2019-11-01 PROCEDURE — 36415 COLL VENOUS BLD VENIPUNCTURE: CPT

## 2019-11-01 PROCEDURE — 70450 CT HEAD/BRAIN W/O DYE: CPT

## 2019-11-01 PROCEDURE — 6370000000 HC RX 637 (ALT 250 FOR IP): Performed by: INTERNAL MEDICINE

## 2019-11-01 PROCEDURE — 87186 SC STD MICRODIL/AGAR DIL: CPT

## 2019-11-01 PROCEDURE — 87040 BLOOD CULTURE FOR BACTERIA: CPT

## 2019-11-01 PROCEDURE — 82962 GLUCOSE BLOOD TEST: CPT

## 2019-11-01 PROCEDURE — 99285 EMERGENCY DEPT VISIT HI MDM: CPT

## 2019-11-01 PROCEDURE — 71045 X-RAY EXAM CHEST 1 VIEW: CPT

## 2019-11-01 PROCEDURE — 72220 X-RAY EXAM SACRUM TAILBONE: CPT

## 2019-11-01 PROCEDURE — 1200000000 HC SEMI PRIVATE

## 2019-11-01 PROCEDURE — 96375 TX/PRO/DX INJ NEW DRUG ADDON: CPT

## 2019-11-01 PROCEDURE — 87088 URINE BACTERIA CULTURE: CPT

## 2019-11-01 PROCEDURE — 2580000003 HC RX 258: Performed by: STUDENT IN AN ORGANIZED HEALTH CARE EDUCATION/TRAINING PROGRAM

## 2019-11-01 PROCEDURE — 96372 THER/PROPH/DIAG INJ SC/IM: CPT

## 2019-11-01 PROCEDURE — 2580000003 HC RX 258

## 2019-11-01 PROCEDURE — 6360000002 HC RX W HCPCS: Performed by: STUDENT IN AN ORGANIZED HEALTH CARE EDUCATION/TRAINING PROGRAM

## 2019-11-01 PROCEDURE — 80053 COMPREHEN METABOLIC PANEL: CPT

## 2019-11-01 PROCEDURE — 87077 CULTURE AEROBIC IDENTIFY: CPT

## 2019-11-01 PROCEDURE — 82570 ASSAY OF URINE CREATININE: CPT

## 2019-11-01 PROCEDURE — 2580000003 HC RX 258: Performed by: INTERNAL MEDICINE

## 2019-11-01 PROCEDURE — 87149 DNA/RNA DIRECT PROBE: CPT

## 2019-11-01 RX ORDER — MORPHINE SULFATE 15 MG/1
15 TABLET, FILM COATED, EXTENDED RELEASE ORAL EVERY 12 HOURS SCHEDULED
Status: DISCONTINUED | OUTPATIENT
Start: 2019-11-01 | End: 2019-11-05 | Stop reason: HOSPADM

## 2019-11-01 RX ORDER — DEXTROSE MONOHYDRATE 50 MG/ML
100 INJECTION, SOLUTION INTRAVENOUS PRN
Status: DISCONTINUED | OUTPATIENT
Start: 2019-11-01 | End: 2019-11-05 | Stop reason: HOSPADM

## 2019-11-01 RX ORDER — MAGNESIUM SULFATE 1 G/100ML
1 INJECTION INTRAVENOUS ONCE
Status: COMPLETED | OUTPATIENT
Start: 2019-11-01 | End: 2019-11-01

## 2019-11-01 RX ORDER — AZELASTINE 1 MG/ML
1 SPRAY, METERED NASAL 2 TIMES DAILY
Status: DISCONTINUED | OUTPATIENT
Start: 2019-11-01 | End: 2019-11-01 | Stop reason: CLARIF

## 2019-11-01 RX ORDER — HEPARIN SODIUM 10000 [USP'U]/ML
5000 INJECTION, SOLUTION INTRAVENOUS; SUBCUTANEOUS EVERY 8 HOURS SCHEDULED
Status: DISCONTINUED | OUTPATIENT
Start: 2019-11-01 | End: 2019-11-05 | Stop reason: HOSPADM

## 2019-11-01 RX ORDER — ASPIRIN 81 MG/1
81 TABLET ORAL DAILY
Status: DISCONTINUED | OUTPATIENT
Start: 2019-11-01 | End: 2019-11-05 | Stop reason: HOSPADM

## 2019-11-01 RX ORDER — METOPROLOL SUCCINATE 50 MG/1
50 TABLET, EXTENDED RELEASE ORAL DAILY
Status: DISCONTINUED | OUTPATIENT
Start: 2019-11-01 | End: 2019-11-05 | Stop reason: HOSPADM

## 2019-11-01 RX ORDER — SODIUM CHLORIDE 9 MG/ML
1000 INJECTION, SOLUTION INTRAVENOUS CONTINUOUS
Status: DISCONTINUED | OUTPATIENT
Start: 2019-11-01 | End: 2019-11-03

## 2019-11-01 RX ORDER — ONDANSETRON 2 MG/ML
4 INJECTION INTRAMUSCULAR; INTRAVENOUS EVERY 6 HOURS PRN
Status: DISCONTINUED | OUTPATIENT
Start: 2019-11-01 | End: 2019-11-01

## 2019-11-01 RX ORDER — SODIUM CHLORIDE 0.9 % (FLUSH) 0.9 %
SYRINGE (ML) INJECTION
Status: COMPLETED
Start: 2019-11-01 | End: 2019-11-01

## 2019-11-01 RX ORDER — DEXTROSE MONOHYDRATE 25 G/50ML
12.5 INJECTION, SOLUTION INTRAVENOUS PRN
Status: DISCONTINUED | OUTPATIENT
Start: 2019-11-01 | End: 2019-11-05 | Stop reason: HOSPADM

## 2019-11-01 RX ORDER — ALLOPURINOL 300 MG/1
300 TABLET ORAL DAILY
Status: DISCONTINUED | OUTPATIENT
Start: 2019-11-01 | End: 2019-11-05 | Stop reason: HOSPADM

## 2019-11-01 RX ORDER — NICOTINE POLACRILEX 4 MG
15 LOZENGE BUCCAL PRN
Status: DISCONTINUED | OUTPATIENT
Start: 2019-11-01 | End: 2019-11-05 | Stop reason: HOSPADM

## 2019-11-01 RX ORDER — ISOSORBIDE MONONITRATE 60 MG/1
120 TABLET, EXTENDED RELEASE ORAL DAILY
Status: DISCONTINUED | OUTPATIENT
Start: 2019-11-01 | End: 2019-11-05 | Stop reason: HOSPADM

## 2019-11-01 RX ORDER — FLUTICASONE PROPIONATE 50 MCG
1 SPRAY, SUSPENSION (ML) NASAL DAILY
Status: DISCONTINUED | OUTPATIENT
Start: 2019-11-01 | End: 2019-11-05 | Stop reason: HOSPADM

## 2019-11-01 RX ORDER — SODIUM CHLORIDE 9 MG/ML
INJECTION, SOLUTION INTRAVENOUS CONTINUOUS
Status: DISCONTINUED | OUTPATIENT
Start: 2019-11-01 | End: 2019-11-03

## 2019-11-01 RX ORDER — ALBUTEROL SULFATE 2.5 MG/3ML
2.5 SOLUTION RESPIRATORY (INHALATION) EVERY 6 HOURS PRN
Status: DISCONTINUED | OUTPATIENT
Start: 2019-11-01 | End: 2019-11-05 | Stop reason: HOSPADM

## 2019-11-01 RX ORDER — MORPHINE SULFATE 4 MG/ML
4 INJECTION, SOLUTION INTRAMUSCULAR; INTRAVENOUS ONCE
Status: COMPLETED | OUTPATIENT
Start: 2019-11-01 | End: 2019-11-01

## 2019-11-01 RX ORDER — HYDROCODONE BITARTRATE AND ACETAMINOPHEN 10; 325 MG/1; MG/1
1 TABLET ORAL EVERY 6 HOURS PRN
Status: DISCONTINUED | OUTPATIENT
Start: 2019-11-01 | End: 2019-11-04

## 2019-11-01 RX ORDER — ATORVASTATIN CALCIUM 20 MG/1
20 TABLET, FILM COATED ORAL DAILY
Status: DISCONTINUED | OUTPATIENT
Start: 2019-11-01 | End: 2019-11-05 | Stop reason: HOSPADM

## 2019-11-01 RX ORDER — SODIUM CHLORIDE 0.9 % (FLUSH) 0.9 %
10 SYRINGE (ML) INJECTION PRN
Status: DISCONTINUED | OUTPATIENT
Start: 2019-11-01 | End: 2019-11-05 | Stop reason: HOSPADM

## 2019-11-01 RX ORDER — INSULIN GLARGINE 100 [IU]/ML
25 INJECTION, SOLUTION SUBCUTANEOUS 2 TIMES DAILY
Status: DISCONTINUED | OUTPATIENT
Start: 2019-11-01 | End: 2019-11-05 | Stop reason: HOSPADM

## 2019-11-01 RX ORDER — LEVOTHYROXINE SODIUM 0.03 MG/1
25 TABLET ORAL DAILY
Status: DISCONTINUED | OUTPATIENT
Start: 2019-11-01 | End: 2019-11-05 | Stop reason: HOSPADM

## 2019-11-01 RX ORDER — ATORVASTATIN CALCIUM 20 MG/1
20 TABLET, FILM COATED ORAL DAILY
Status: DISCONTINUED | OUTPATIENT
Start: 2019-11-01 | End: 2019-11-01

## 2019-11-01 RX ORDER — CLONIDINE HYDROCHLORIDE 0.1 MG/1
0.1 TABLET ORAL ONCE
Status: COMPLETED | OUTPATIENT
Start: 2019-11-01 | End: 2019-11-01

## 2019-11-01 RX ORDER — ACETAMINOPHEN 325 MG/1
650 TABLET ORAL EVERY 4 HOURS PRN
Status: DISCONTINUED | OUTPATIENT
Start: 2019-11-01 | End: 2019-11-04

## 2019-11-01 RX ORDER — SODIUM CHLORIDE 0.9 % (FLUSH) 0.9 %
10 SYRINGE (ML) INJECTION EVERY 12 HOURS SCHEDULED
Status: DISCONTINUED | OUTPATIENT
Start: 2019-11-01 | End: 2019-11-05 | Stop reason: HOSPADM

## 2019-11-01 RX ORDER — MORPHINE SULFATE 10 MG/1
10 CAPSULE, EXTENDED RELEASE ORAL 2 TIMES DAILY
Status: DISCONTINUED | OUTPATIENT
Start: 2019-11-01 | End: 2019-11-01 | Stop reason: ALTCHOICE

## 2019-11-01 RX ORDER — GABAPENTIN 400 MG/1
400 CAPSULE ORAL 3 TIMES DAILY
Status: DISCONTINUED | OUTPATIENT
Start: 2019-11-01 | End: 2019-11-04

## 2019-11-01 RX ADMIN — SODIUM CHLORIDE, PRESERVATIVE FREE: 5 INJECTION INTRAVENOUS at 07:15

## 2019-11-01 RX ADMIN — INSULIN GLARGINE 25 UNITS: 100 INJECTION, SOLUTION SUBCUTANEOUS at 21:20

## 2019-11-01 RX ADMIN — ALLOPURINOL 300 MG: 300 TABLET ORAL at 18:25

## 2019-11-01 RX ADMIN — GABAPENTIN 400 MG: 400 CAPSULE ORAL at 21:17

## 2019-11-01 RX ADMIN — METOPROLOL SUCCINATE 50 MG: 50 TABLET, EXTENDED RELEASE ORAL at 18:25

## 2019-11-01 RX ADMIN — MORPHINE SULFATE 15 MG: 15 TABLET, FILM COATED, EXTENDED RELEASE ORAL at 21:17

## 2019-11-01 RX ADMIN — ATORVASTATIN CALCIUM 20 MG: 20 TABLET, FILM COATED ORAL at 21:17

## 2019-11-01 RX ADMIN — MORPHINE SULFATE 4 MG: 4 INJECTION, SOLUTION INTRAMUSCULAR; INTRAVENOUS at 15:54

## 2019-11-01 RX ADMIN — ASPIRIN 81 MG: 81 TABLET, COATED ORAL at 18:25

## 2019-11-01 RX ADMIN — ISOSORBIDE MONONITRATE 120 MG: 60 TABLET, EXTENDED RELEASE ORAL at 18:25

## 2019-11-01 RX ADMIN — HEPARIN SODIUM 5000 UNITS: 10000 INJECTION INTRAVENOUS; SUBCUTANEOUS at 21:18

## 2019-11-01 RX ADMIN — CLONIDINE HYDROCHLORIDE 0.1 MG: 0.1 TABLET ORAL at 14:59

## 2019-11-01 RX ADMIN — INSULIN LISPRO 1 UNITS: 100 INJECTION, SOLUTION INTRAVENOUS; SUBCUTANEOUS at 18:26

## 2019-11-01 RX ADMIN — SODIUM CHLORIDE: 9 INJECTION, SOLUTION INTRAVENOUS at 17:29

## 2019-11-01 RX ADMIN — FLUTICASONE PROPIONATE 1 SPRAY: 50 SPRAY, METERED NASAL at 18:25

## 2019-11-01 RX ADMIN — INSULIN LISPRO 1 UNITS: 100 INJECTION, SOLUTION INTRAVENOUS; SUBCUTANEOUS at 21:20

## 2019-11-01 RX ADMIN — SODIUM CHLORIDE: 9 INJECTION, SOLUTION INTRAVENOUS at 20:06

## 2019-11-01 RX ADMIN — MAGNESIUM SULFATE HEPTAHYDRATE 1 G: 1 INJECTION, SOLUTION INTRAVENOUS at 13:01

## 2019-11-01 RX ADMIN — LEVOTHYROXINE SODIUM 25 MCG: 25 TABLET ORAL at 18:25

## 2019-11-01 RX ADMIN — SODIUM CHLORIDE 1000 ML: 9 INJECTION, SOLUTION INTRAVENOUS at 10:04

## 2019-11-01 SDOH — HEALTH STABILITY: MENTAL HEALTH: HOW OFTEN DO YOU HAVE A DRINK CONTAINING ALCOHOL?: NEVER

## 2019-11-01 ASSESSMENT — ENCOUNTER SYMPTOMS
RHINORRHEA: 0
WHEEZING: 0
VOMITING: 0
PHOTOPHOBIA: 0
BACK PAIN: 1
ABDOMINAL PAIN: 0
NAUSEA: 0
DIARRHEA: 0
SHORTNESS OF BREATH: 0

## 2019-11-01 ASSESSMENT — PAIN DESCRIPTION - PAIN TYPE
TYPE: ACUTE PAIN
TYPE: ACUTE PAIN

## 2019-11-01 ASSESSMENT — PAIN DESCRIPTION - ORIENTATION
ORIENTATION: RIGHT
ORIENTATION: RIGHT

## 2019-11-01 ASSESSMENT — PAIN DESCRIPTION - DESCRIPTORS: DESCRIPTORS: ACHING

## 2019-11-01 ASSESSMENT — PAIN SCALES - GENERAL
PAINLEVEL_OUTOF10: 10
PAINLEVEL_OUTOF10: 8

## 2019-11-01 ASSESSMENT — PAIN DESCRIPTION - LOCATION
LOCATION: SHOULDER
LOCATION: SHOULDER

## 2019-11-02 ENCOUNTER — APPOINTMENT (OUTPATIENT)
Dept: ULTRASOUND IMAGING | Age: 79
End: 2019-11-02
Payer: MEDICARE

## 2019-11-02 PROBLEM — E66.01 OBESITY, MORBID (HCC): Status: ACTIVE | Noted: 2018-01-22

## 2019-11-02 PROBLEM — G89.29 CHRONIC PAIN: Status: ACTIVE | Noted: 2019-11-02
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
## 2019-11-02 PROBLEM — F11.90 CHRONIC, CONTINUOUS USE OF OPIOIDS: Status: ACTIVE | Noted: 2019-11-02

## 2019-11-02 PROBLEM — E66.9 OBESITY (BMI 30-39.9): Status: ACTIVE | Noted: 2018-01-22

## 2019-11-02 LAB
ALBUMIN SERPL-MCNC: 3.2 G/DL (ref 3.5–5.2)
ANION GAP SERPL CALCULATED.3IONS-SCNC: 13 MMOL/L (ref 7–16)
BUN BLDV-MCNC: 25 MG/DL (ref 8–23)
CALCIUM SERPL-MCNC: 8.9 MG/DL (ref 8.6–10.2)
CHLORIDE BLD-SCNC: 101 MMOL/L (ref 98–107)
CO2: 21 MMOL/L (ref 22–29)
CREAT SERPL-MCNC: 1.2 MG/DL (ref 0.7–1.2)
GFR AFRICAN AMERICAN: >60
GFR NON-AFRICAN AMERICAN: 59 ML/MIN/1.73
GLUCOSE BLD-MCNC: 198 MG/DL (ref 74–99)
HBA1C MFR BLD: 7.3 % (ref 4–5.6)
METER GLUCOSE: 138 MG/DL (ref 74–99)
METER GLUCOSE: 141 MG/DL (ref 74–99)
METER GLUCOSE: 152 MG/DL (ref 74–99)
METER GLUCOSE: 205 MG/DL (ref 74–99)
PHOSPHORUS: 1.9 MG/DL (ref 2.5–4.5)
POTASSIUM SERPL-SCNC: 3.8 MMOL/L (ref 3.5–5)
SODIUM BLD-SCNC: 135 MMOL/L (ref 132–146)
URINE CULTURE, ROUTINE: NORMAL

## 2019-11-02 PROCEDURE — 94640 AIRWAY INHALATION TREATMENT: CPT

## 2019-11-02 PROCEDURE — 6370000000 HC RX 637 (ALT 250 FOR IP): Performed by: INTERNAL MEDICINE

## 2019-11-02 PROCEDURE — 1200000000 HC SEMI PRIVATE

## 2019-11-02 PROCEDURE — G0378 HOSPITAL OBSERVATION PER HR: HCPCS

## 2019-11-02 PROCEDURE — 96372 THER/PROPH/DIAG INJ SC/IM: CPT

## 2019-11-02 PROCEDURE — 82962 GLUCOSE BLOOD TEST: CPT

## 2019-11-02 PROCEDURE — 76770 US EXAM ABDO BACK WALL COMP: CPT

## 2019-11-02 PROCEDURE — 36415 COLL VENOUS BLD VENIPUNCTURE: CPT

## 2019-11-02 PROCEDURE — 6360000002 HC RX W HCPCS: Performed by: INTERNAL MEDICINE

## 2019-11-02 PROCEDURE — 6370000000 HC RX 637 (ALT 250 FOR IP): Performed by: STUDENT IN AN ORGANIZED HEALTH CARE EDUCATION/TRAINING PROGRAM

## 2019-11-02 PROCEDURE — 2580000003 HC RX 258: Performed by: INTERNAL MEDICINE

## 2019-11-02 PROCEDURE — 83036 HEMOGLOBIN GLYCOSYLATED A1C: CPT

## 2019-11-02 PROCEDURE — 80069 RENAL FUNCTION PANEL: CPT

## 2019-11-02 PROCEDURE — 87040 BLOOD CULTURE FOR BACTERIA: CPT

## 2019-11-02 RX ADMIN — HYDROCODONE BITARTRATE AND ACETAMINOPHEN 1 TABLET: 10; 325 TABLET ORAL at 17:04

## 2019-11-02 RX ADMIN — ASPIRIN 81 MG: 81 TABLET, COATED ORAL at 09:01

## 2019-11-02 RX ADMIN — FLUTICASONE PROPIONATE 1 SPRAY: 50 SPRAY, METERED NASAL at 06:06

## 2019-11-02 RX ADMIN — HYDROCODONE BITARTRATE AND ACETAMINOPHEN 1 TABLET: 10; 325 TABLET ORAL at 10:39

## 2019-11-02 RX ADMIN — MORPHINE SULFATE 15 MG: 15 TABLET, FILM COATED, EXTENDED RELEASE ORAL at 09:00

## 2019-11-02 RX ADMIN — MOMETASONE FUROATE AND FORMOTEROL FUMARATE DIHYDRATE 2 PUFF: 100; 5 AEROSOL RESPIRATORY (INHALATION) at 20:55

## 2019-11-02 RX ADMIN — HEPARIN SODIUM 5000 UNITS: 10000 INJECTION INTRAVENOUS; SUBCUTANEOUS at 14:40

## 2019-11-02 RX ADMIN — ALLOPURINOL 300 MG: 300 TABLET ORAL at 09:00

## 2019-11-02 RX ADMIN — GABAPENTIN 400 MG: 400 CAPSULE ORAL at 14:39

## 2019-11-02 RX ADMIN — GABAPENTIN 400 MG: 400 CAPSULE ORAL at 21:08

## 2019-11-02 RX ADMIN — GABAPENTIN 400 MG: 400 CAPSULE ORAL at 09:01

## 2019-11-02 RX ADMIN — MORPHINE SULFATE 15 MG: 15 TABLET, FILM COATED, EXTENDED RELEASE ORAL at 21:08

## 2019-11-02 RX ADMIN — LEVOTHYROXINE SODIUM 25 MCG: 25 TABLET ORAL at 09:00

## 2019-11-02 RX ADMIN — HEPARIN SODIUM 5000 UNITS: 10000 INJECTION INTRAVENOUS; SUBCUTANEOUS at 06:06

## 2019-11-02 RX ADMIN — INSULIN LISPRO 1 UNITS: 100 INJECTION, SOLUTION INTRAVENOUS; SUBCUTANEOUS at 12:27

## 2019-11-02 RX ADMIN — INSULIN LISPRO 1 UNITS: 100 INJECTION, SOLUTION INTRAVENOUS; SUBCUTANEOUS at 17:07

## 2019-11-02 RX ADMIN — Medication 10 ML: at 15:36

## 2019-11-02 RX ADMIN — SODIUM CHLORIDE: 9 INJECTION, SOLUTION INTRAVENOUS at 04:10

## 2019-11-02 RX ADMIN — HEPARIN SODIUM 5000 UNITS: 10000 INJECTION INTRAVENOUS; SUBCUTANEOUS at 22:06

## 2019-11-02 RX ADMIN — INSULIN GLARGINE 25 UNITS: 100 INJECTION, SOLUTION SUBCUTANEOUS at 21:05

## 2019-11-02 RX ADMIN — Medication 10 ML: at 21:10

## 2019-11-02 RX ADMIN — TIOTROPIUM BROMIDE 18 MCG: 18 CAPSULE ORAL; RESPIRATORY (INHALATION) at 09:09

## 2019-11-02 RX ADMIN — SODIUM CHLORIDE: 9 INJECTION, SOLUTION INTRAVENOUS at 17:13

## 2019-11-02 RX ADMIN — METOPROLOL SUCCINATE 50 MG: 50 TABLET, EXTENDED RELEASE ORAL at 09:00

## 2019-11-02 RX ADMIN — INSULIN LISPRO 2 UNITS: 100 INJECTION, SOLUTION INTRAVENOUS; SUBCUTANEOUS at 09:01

## 2019-11-02 RX ADMIN — ATORVASTATIN CALCIUM 20 MG: 20 TABLET, FILM COATED ORAL at 21:08

## 2019-11-02 RX ADMIN — INSULIN GLARGINE 25 UNITS: 100 INJECTION, SOLUTION SUBCUTANEOUS at 09:01

## 2019-11-02 RX ADMIN — MOMETASONE FUROATE AND FORMOTEROL FUMARATE DIHYDRATE 2 PUFF: 100; 5 AEROSOL RESPIRATORY (INHALATION) at 09:08

## 2019-11-02 RX ADMIN — ISOSORBIDE MONONITRATE 120 MG: 60 TABLET, EXTENDED RELEASE ORAL at 09:00

## 2019-11-02 RX ADMIN — HYDROCODONE BITARTRATE AND ACETAMINOPHEN 1 TABLET: 10; 325 TABLET ORAL at 01:48

## 2019-11-02 ASSESSMENT — PAIN DESCRIPTION - DESCRIPTORS
DESCRIPTORS: ACHING;DISCOMFORT

## 2019-11-02 ASSESSMENT — PAIN SCALES - GENERAL
PAINLEVEL_OUTOF10: 7
PAINLEVEL_OUTOF10: 5
PAINLEVEL_OUTOF10: 7
PAINLEVEL_OUTOF10: 8
PAINLEVEL_OUTOF10: 7
PAINLEVEL_OUTOF10: 5

## 2019-11-02 ASSESSMENT — PAIN DESCRIPTION - PROGRESSION
CLINICAL_PROGRESSION: NOT CHANGED

## 2019-11-02 ASSESSMENT — PAIN DESCRIPTION - FREQUENCY
FREQUENCY: CONTINUOUS

## 2019-11-02 ASSESSMENT — PAIN DESCRIPTION - LOCATION
LOCATION: GENERALIZED
LOCATION: BACK
LOCATION: HIP
LOCATION: GENERALIZED
LOCATION: BACK
LOCATION: BACK

## 2019-11-02 ASSESSMENT — PAIN DESCRIPTION - ONSET
ONSET: ON-GOING

## 2019-11-02 ASSESSMENT — PAIN - FUNCTIONAL ASSESSMENT
PAIN_FUNCTIONAL_ASSESSMENT: ACTIVITIES ARE NOT PREVENTED

## 2019-11-02 ASSESSMENT — PAIN DESCRIPTION - PAIN TYPE
TYPE: CHRONIC PAIN
TYPE: CHRONIC PAIN
TYPE: ACUTE PAIN
TYPE: CHRONIC PAIN

## 2019-11-03 LAB
ANION GAP SERPL CALCULATED.3IONS-SCNC: 11 MMOL/L (ref 7–16)
BUN BLDV-MCNC: 24 MG/DL (ref 8–23)
CALCIUM SERPL-MCNC: 8.4 MG/DL (ref 8.6–10.2)
CHLORIDE BLD-SCNC: 105 MMOL/L (ref 98–107)
CO2: 23 MMOL/L (ref 22–29)
CREAT SERPL-MCNC: 1.2 MG/DL (ref 0.7–1.2)
GFR AFRICAN AMERICAN: >60
GFR NON-AFRICAN AMERICAN: 59 ML/MIN/1.73
GLUCOSE BLD-MCNC: 109 MG/DL (ref 74–99)
HCT VFR BLD CALC: 31.5 % (ref 37–54)
HEMOGLOBIN: 10.6 G/DL (ref 12.5–16.5)
MCH RBC QN AUTO: 32.3 PG (ref 26–35)
MCHC RBC AUTO-ENTMCNC: 33.7 % (ref 32–34.5)
MCV RBC AUTO: 96 FL (ref 80–99.9)
METER GLUCOSE: 138 MG/DL (ref 74–99)
METER GLUCOSE: 139 MG/DL (ref 74–99)
METER GLUCOSE: 170 MG/DL (ref 74–99)
METER GLUCOSE: 181 MG/DL (ref 74–99)
PDW BLD-RTO: 14.4 FL (ref 11.5–15)
PLATELET # BLD: 136 E9/L (ref 130–450)
PMV BLD AUTO: 9 FL (ref 7–12)
POTASSIUM SERPL-SCNC: 3.5 MMOL/L (ref 3.5–5)
RBC # BLD: 3.28 E12/L (ref 3.8–5.8)
SODIUM BLD-SCNC: 139 MMOL/L (ref 132–146)
WBC # BLD: 5.2 E9/L (ref 4.5–11.5)

## 2019-11-03 PROCEDURE — 6360000002 HC RX W HCPCS: Performed by: INTERNAL MEDICINE

## 2019-11-03 PROCEDURE — 36415 COLL VENOUS BLD VENIPUNCTURE: CPT

## 2019-11-03 PROCEDURE — 1200000000 HC SEMI PRIVATE

## 2019-11-03 PROCEDURE — G0378 HOSPITAL OBSERVATION PER HR: HCPCS

## 2019-11-03 PROCEDURE — 85027 COMPLETE CBC AUTOMATED: CPT

## 2019-11-03 PROCEDURE — 82962 GLUCOSE BLOOD TEST: CPT

## 2019-11-03 PROCEDURE — 94640 AIRWAY INHALATION TREATMENT: CPT

## 2019-11-03 PROCEDURE — 96372 THER/PROPH/DIAG INJ SC/IM: CPT

## 2019-11-03 PROCEDURE — 6370000000 HC RX 637 (ALT 250 FOR IP): Performed by: INTERNAL MEDICINE

## 2019-11-03 PROCEDURE — 2580000003 HC RX 258: Performed by: INTERNAL MEDICINE

## 2019-11-03 PROCEDURE — 6370000000 HC RX 637 (ALT 250 FOR IP): Performed by: STUDENT IN AN ORGANIZED HEALTH CARE EDUCATION/TRAINING PROGRAM

## 2019-11-03 PROCEDURE — 80048 BASIC METABOLIC PNL TOTAL CA: CPT

## 2019-11-03 RX ADMIN — INSULIN LISPRO 1 UNITS: 100 INJECTION, SOLUTION INTRAVENOUS; SUBCUTANEOUS at 12:26

## 2019-11-03 RX ADMIN — ATORVASTATIN CALCIUM 20 MG: 20 TABLET, FILM COATED ORAL at 21:20

## 2019-11-03 RX ADMIN — MORPHINE SULFATE 15 MG: 15 TABLET, FILM COATED, EXTENDED RELEASE ORAL at 21:20

## 2019-11-03 RX ADMIN — INSULIN LISPRO 1 UNITS: 100 INJECTION, SOLUTION INTRAVENOUS; SUBCUTANEOUS at 17:33

## 2019-11-03 RX ADMIN — HEPARIN SODIUM 5000 UNITS: 10000 INJECTION INTRAVENOUS; SUBCUTANEOUS at 13:54

## 2019-11-03 RX ADMIN — HEPARIN SODIUM 5000 UNITS: 10000 INJECTION INTRAVENOUS; SUBCUTANEOUS at 22:20

## 2019-11-03 RX ADMIN — TIOTROPIUM BROMIDE 18 MCG: 18 CAPSULE ORAL; RESPIRATORY (INHALATION) at 08:32

## 2019-11-03 RX ADMIN — ALBUTEROL SULFATE 2.5 MG: 2.5 SOLUTION RESPIRATORY (INHALATION) at 04:15

## 2019-11-03 RX ADMIN — MORPHINE SULFATE 15 MG: 15 TABLET, FILM COATED, EXTENDED RELEASE ORAL at 08:10

## 2019-11-03 RX ADMIN — MOMETASONE FUROATE AND FORMOTEROL FUMARATE DIHYDRATE 2 PUFF: 100; 5 AEROSOL RESPIRATORY (INHALATION) at 20:27

## 2019-11-03 RX ADMIN — METOPROLOL SUCCINATE 50 MG: 50 TABLET, EXTENDED RELEASE ORAL at 08:10

## 2019-11-03 RX ADMIN — GABAPENTIN 400 MG: 400 CAPSULE ORAL at 13:54

## 2019-11-03 RX ADMIN — ASPIRIN 81 MG: 81 TABLET, COATED ORAL at 08:10

## 2019-11-03 RX ADMIN — ALLOPURINOL 300 MG: 300 TABLET ORAL at 08:10

## 2019-11-03 RX ADMIN — GABAPENTIN 400 MG: 400 CAPSULE ORAL at 08:09

## 2019-11-03 RX ADMIN — HEPARIN SODIUM 5000 UNITS: 10000 INJECTION INTRAVENOUS; SUBCUTANEOUS at 06:05

## 2019-11-03 RX ADMIN — FLUTICASONE PROPIONATE 1 SPRAY: 50 SPRAY, METERED NASAL at 08:10

## 2019-11-03 RX ADMIN — SODIUM CHLORIDE: 9 INJECTION, SOLUTION INTRAVENOUS at 04:09

## 2019-11-03 RX ADMIN — HYDROCODONE BITARTRATE AND ACETAMINOPHEN 1 TABLET: 10; 325 TABLET ORAL at 13:58

## 2019-11-03 RX ADMIN — ISOSORBIDE MONONITRATE 120 MG: 60 TABLET, EXTENDED RELEASE ORAL at 08:09

## 2019-11-03 RX ADMIN — HYDROCODONE BITARTRATE AND ACETAMINOPHEN 1 TABLET: 10; 325 TABLET ORAL at 06:14

## 2019-11-03 RX ADMIN — INSULIN GLARGINE 25 UNITS: 100 INJECTION, SOLUTION SUBCUTANEOUS at 21:17

## 2019-11-03 RX ADMIN — INSULIN GLARGINE 25 UNITS: 100 INJECTION, SOLUTION SUBCUTANEOUS at 08:11

## 2019-11-03 RX ADMIN — LEVOTHYROXINE SODIUM 25 MCG: 25 TABLET ORAL at 08:10

## 2019-11-03 RX ADMIN — GABAPENTIN 400 MG: 400 CAPSULE ORAL at 21:20

## 2019-11-03 RX ADMIN — HYDROCODONE BITARTRATE AND ACETAMINOPHEN 1 TABLET: 10; 325 TABLET ORAL at 22:27

## 2019-11-03 RX ADMIN — Medication 10 ML: at 22:22

## 2019-11-03 RX ADMIN — MOMETASONE FUROATE AND FORMOTEROL FUMARATE DIHYDRATE 2 PUFF: 100; 5 AEROSOL RESPIRATORY (INHALATION) at 08:31

## 2019-11-03 ASSESSMENT — PAIN DESCRIPTION - PAIN TYPE
TYPE: CHRONIC PAIN

## 2019-11-03 ASSESSMENT — PAIN SCALES - GENERAL
PAINLEVEL_OUTOF10: 8
PAINLEVEL_OUTOF10: 5

## 2019-11-03 ASSESSMENT — PAIN DESCRIPTION - PROGRESSION
CLINICAL_PROGRESSION: NOT CHANGED

## 2019-11-03 ASSESSMENT — PAIN DESCRIPTION - ONSET
ONSET: ON-GOING

## 2019-11-03 ASSESSMENT — PAIN DESCRIPTION - DESCRIPTORS
DESCRIPTORS: ACHING;DISCOMFORT

## 2019-11-03 ASSESSMENT — PAIN DESCRIPTION - ORIENTATION
ORIENTATION: RIGHT
ORIENTATION: RIGHT
ORIENTATION: LEFT

## 2019-11-03 ASSESSMENT — PAIN - FUNCTIONAL ASSESSMENT
PAIN_FUNCTIONAL_ASSESSMENT: ACTIVITIES ARE NOT PREVENTED

## 2019-11-03 ASSESSMENT — PAIN DESCRIPTION - FREQUENCY
FREQUENCY: CONTINUOUS

## 2019-11-03 ASSESSMENT — PAIN DESCRIPTION - LOCATION
LOCATION: SHOULDER
LOCATION: BACK

## 2019-11-04 ENCOUNTER — APPOINTMENT (OUTPATIENT)
Dept: ULTRASOUND IMAGING | Age: 79
End: 2019-11-04
Payer: MEDICARE

## 2019-11-04 LAB
ANION GAP SERPL CALCULATED.3IONS-SCNC: 11 MMOL/L (ref 7–16)
BUN BLDV-MCNC: 17 MG/DL (ref 8–23)
CALCIUM SERPL-MCNC: 8.9 MG/DL (ref 8.6–10.2)
CHLORIDE BLD-SCNC: 104 MMOL/L (ref 98–107)
CO2: 22 MMOL/L (ref 22–29)
CREAT SERPL-MCNC: 1.1 MG/DL (ref 0.7–1.2)
CULTURE, BLOOD 2: ABNORMAL
GFR AFRICAN AMERICAN: >60
GFR NON-AFRICAN AMERICAN: >60 ML/MIN/1.73
GLUCOSE BLD-MCNC: 131 MG/DL (ref 74–99)
METER GLUCOSE: 115 MG/DL (ref 74–99)
METER GLUCOSE: 120 MG/DL (ref 74–99)
METER GLUCOSE: 133 MG/DL (ref 74–99)
METER GLUCOSE: 140 MG/DL (ref 74–99)
ORGANISM: ABNORMAL
POTASSIUM SERPL-SCNC: 4.2 MMOL/L (ref 3.5–5)
SODIUM BLD-SCNC: 137 MMOL/L (ref 132–146)

## 2019-11-04 PROCEDURE — 6370000000 HC RX 637 (ALT 250 FOR IP): Performed by: STUDENT IN AN ORGANIZED HEALTH CARE EDUCATION/TRAINING PROGRAM

## 2019-11-04 PROCEDURE — G0378 HOSPITAL OBSERVATION PER HR: HCPCS

## 2019-11-04 PROCEDURE — 97165 OT EVAL LOW COMPLEX 30 MIN: CPT

## 2019-11-04 PROCEDURE — 36415 COLL VENOUS BLD VENIPUNCTURE: CPT

## 2019-11-04 PROCEDURE — 94640 AIRWAY INHALATION TREATMENT: CPT

## 2019-11-04 PROCEDURE — 6360000002 HC RX W HCPCS: Performed by: INTERNAL MEDICINE

## 2019-11-04 PROCEDURE — 96372 THER/PROPH/DIAG INJ SC/IM: CPT

## 2019-11-04 PROCEDURE — 97530 THERAPEUTIC ACTIVITIES: CPT

## 2019-11-04 PROCEDURE — 80048 BASIC METABOLIC PNL TOTAL CA: CPT

## 2019-11-04 PROCEDURE — 6370000000 HC RX 637 (ALT 250 FOR IP): Performed by: INTERNAL MEDICINE

## 2019-11-04 PROCEDURE — 82962 GLUCOSE BLOOD TEST: CPT

## 2019-11-04 PROCEDURE — 2580000003 HC RX 258: Performed by: INTERNAL MEDICINE

## 2019-11-04 PROCEDURE — 97161 PT EVAL LOW COMPLEX 20 MIN: CPT

## 2019-11-04 RX ORDER — ALBUTEROL SULFATE 90 UG/1
2 AEROSOL, METERED RESPIRATORY (INHALATION) EVERY 6 HOURS PRN
Status: DISCONTINUED | OUTPATIENT
Start: 2019-11-04 | End: 2019-11-04 | Stop reason: ALTCHOICE

## 2019-11-04 RX ORDER — GABAPENTIN 400 MG/1
400 CAPSULE ORAL 4 TIMES DAILY
Status: DISCONTINUED | OUTPATIENT
Start: 2019-11-04 | End: 2019-11-05 | Stop reason: HOSPADM

## 2019-11-04 RX ORDER — HYDROCODONE BITARTRATE AND ACETAMINOPHEN 10; 325 MG/1; MG/1
1 TABLET ORAL EVERY 4 HOURS PRN
Status: DISCONTINUED | OUTPATIENT
Start: 2019-11-04 | End: 2019-11-05 | Stop reason: HOSPADM

## 2019-11-04 RX ORDER — ACETAMINOPHEN 325 MG/1
650 TABLET ORAL EVERY 4 HOURS PRN
Status: DISCONTINUED | OUTPATIENT
Start: 2019-11-04 | End: 2019-11-05 | Stop reason: HOSPADM

## 2019-11-04 RX ADMIN — GABAPENTIN 400 MG: 400 CAPSULE ORAL at 14:48

## 2019-11-04 RX ADMIN — HYDROCODONE BITARTRATE AND ACETAMINOPHEN 1 TABLET: 10; 325 TABLET ORAL at 06:25

## 2019-11-04 RX ADMIN — MORPHINE SULFATE 15 MG: 15 TABLET, FILM COATED, EXTENDED RELEASE ORAL at 20:47

## 2019-11-04 RX ADMIN — HYDROCODONE BITARTRATE AND ACETAMINOPHEN 1 TABLET: 10; 325 TABLET ORAL at 14:48

## 2019-11-04 RX ADMIN — MOMETASONE FUROATE AND FORMOTEROL FUMARATE DIHYDRATE 2 PUFF: 100; 5 AEROSOL RESPIRATORY (INHALATION) at 09:24

## 2019-11-04 RX ADMIN — ATORVASTATIN CALCIUM 20 MG: 20 TABLET, FILM COATED ORAL at 20:47

## 2019-11-04 RX ADMIN — ALLOPURINOL 300 MG: 300 TABLET ORAL at 08:22

## 2019-11-04 RX ADMIN — HEPARIN SODIUM 5000 UNITS: 10000 INJECTION INTRAVENOUS; SUBCUTANEOUS at 14:43

## 2019-11-04 RX ADMIN — Medication 10 ML: at 20:47

## 2019-11-04 RX ADMIN — FLUTICASONE PROPIONATE 1 SPRAY: 50 SPRAY, METERED NASAL at 09:11

## 2019-11-04 RX ADMIN — LEVOTHYROXINE SODIUM 25 MCG: 25 TABLET ORAL at 08:23

## 2019-11-04 RX ADMIN — ASPIRIN 81 MG: 81 TABLET, COATED ORAL at 08:22

## 2019-11-04 RX ADMIN — INSULIN GLARGINE 25 UNITS: 100 INJECTION, SOLUTION SUBCUTANEOUS at 08:29

## 2019-11-04 RX ADMIN — Medication 10 ML: at 09:25

## 2019-11-04 RX ADMIN — METOPROLOL SUCCINATE 50 MG: 50 TABLET, EXTENDED RELEASE ORAL at 08:22

## 2019-11-04 RX ADMIN — GABAPENTIN 400 MG: 400 CAPSULE ORAL at 20:47

## 2019-11-04 RX ADMIN — HEPARIN SODIUM 5000 UNITS: 10000 INJECTION INTRAVENOUS; SUBCUTANEOUS at 22:52

## 2019-11-04 RX ADMIN — GABAPENTIN 400 MG: 400 CAPSULE ORAL at 08:22

## 2019-11-04 RX ADMIN — HEPARIN SODIUM 5000 UNITS: 10000 INJECTION INTRAVENOUS; SUBCUTANEOUS at 06:06

## 2019-11-04 RX ADMIN — MOMETASONE FUROATE AND FORMOTEROL FUMARATE DIHYDRATE 2 PUFF: 100; 5 AEROSOL RESPIRATORY (INHALATION) at 19:42

## 2019-11-04 RX ADMIN — GABAPENTIN 400 MG: 400 CAPSULE ORAL at 18:40

## 2019-11-04 RX ADMIN — MORPHINE SULFATE 15 MG: 15 TABLET, FILM COATED, EXTENDED RELEASE ORAL at 08:22

## 2019-11-04 RX ADMIN — INSULIN GLARGINE 25 UNITS: 100 INJECTION, SOLUTION SUBCUTANEOUS at 20:45

## 2019-11-04 RX ADMIN — ISOSORBIDE MONONITRATE 120 MG: 60 TABLET, EXTENDED RELEASE ORAL at 08:23

## 2019-11-04 RX ADMIN — TIOTROPIUM BROMIDE 18 MCG: 18 CAPSULE ORAL; RESPIRATORY (INHALATION) at 09:24

## 2019-11-04 RX ADMIN — ALBUTEROL SULFATE 2.5 MG: 2.5 SOLUTION RESPIRATORY (INHALATION) at 21:50

## 2019-11-04 ASSESSMENT — PAIN DESCRIPTION - ONSET
ONSET: ON-GOING

## 2019-11-04 ASSESSMENT — PAIN DESCRIPTION - PROGRESSION
CLINICAL_PROGRESSION: NOT CHANGED
CLINICAL_PROGRESSION: NOT CHANGED
CLINICAL_PROGRESSION: GRADUALLY WORSENING

## 2019-11-04 ASSESSMENT — PAIN DESCRIPTION - DESCRIPTORS
DESCRIPTORS: ACHING;DISCOMFORT

## 2019-11-04 ASSESSMENT — PAIN SCALES - GENERAL
PAINLEVEL_OUTOF10: 8
PAINLEVEL_OUTOF10: 8
PAINLEVEL_OUTOF10: 6
PAINLEVEL_OUTOF10: 8
PAINLEVEL_OUTOF10: 8

## 2019-11-04 ASSESSMENT — PAIN DESCRIPTION - PAIN TYPE
TYPE: CHRONIC PAIN

## 2019-11-04 ASSESSMENT — PAIN DESCRIPTION - FREQUENCY
FREQUENCY: CONTINUOUS

## 2019-11-04 ASSESSMENT — PAIN DESCRIPTION - LOCATION
LOCATION: BACK

## 2019-11-04 ASSESSMENT — PAIN - FUNCTIONAL ASSESSMENT
PAIN_FUNCTIONAL_ASSESSMENT: PREVENTS OR INTERFERES SOME ACTIVE ACTIVITIES AND ADLS
PAIN_FUNCTIONAL_ASSESSMENT: ACTIVITIES ARE NOT PREVENTED
PAIN_FUNCTIONAL_ASSESSMENT: ACTIVITIES ARE NOT PREVENTED

## 2019-11-05 ENCOUNTER — NURSE ONLY (OUTPATIENT)
Dept: NON INVASIVE DIAGNOSTICS | Age: 79
End: 2019-11-05
Payer: MEDICARE

## 2019-11-05 VITALS
HEART RATE: 62 BPM | DIASTOLIC BLOOD PRESSURE: 80 MMHG | HEIGHT: 68 IN | RESPIRATION RATE: 18 BRPM | TEMPERATURE: 98.2 F | WEIGHT: 287 LBS | OXYGEN SATURATION: 96 % | SYSTOLIC BLOOD PRESSURE: 142 MMHG | BODY MASS INDEX: 43.5 KG/M2

## 2019-11-05 DIAGNOSIS — Z95.0 PACEMAKER: Primary | ICD-10-CM

## 2019-11-05 LAB
METER GLUCOSE: 133 MG/DL (ref 74–99)
METER GLUCOSE: 145 MG/DL (ref 74–99)

## 2019-11-05 PROCEDURE — 93296 REM INTERROG EVL PM/IDS: CPT | Performed by: INTERNAL MEDICINE

## 2019-11-05 PROCEDURE — 93294 REM INTERROG EVL PM/LDLS PM: CPT | Performed by: INTERNAL MEDICINE

## 2019-11-05 PROCEDURE — 94640 AIRWAY INHALATION TREATMENT: CPT

## 2019-11-05 PROCEDURE — 6370000000 HC RX 637 (ALT 250 FOR IP): Performed by: INTERNAL MEDICINE

## 2019-11-05 PROCEDURE — 82962 GLUCOSE BLOOD TEST: CPT

## 2019-11-05 PROCEDURE — G0378 HOSPITAL OBSERVATION PER HR: HCPCS

## 2019-11-05 PROCEDURE — 2580000003 HC RX 258: Performed by: INTERNAL MEDICINE

## 2019-11-05 PROCEDURE — 6360000002 HC RX W HCPCS: Performed by: INTERNAL MEDICINE

## 2019-11-05 PROCEDURE — 96372 THER/PROPH/DIAG INJ SC/IM: CPT

## 2019-11-05 PROCEDURE — 6370000000 HC RX 637 (ALT 250 FOR IP): Performed by: STUDENT IN AN ORGANIZED HEALTH CARE EDUCATION/TRAINING PROGRAM

## 2019-11-05 RX ORDER — LOSARTAN POTASSIUM 50 MG/1
50 TABLET ORAL DAILY
Qty: 30 TABLET | Refills: 0 | Status: ON HOLD | OUTPATIENT
Start: 2019-11-05 | End: 2021-01-01 | Stop reason: HOSPADM

## 2019-11-05 RX ADMIN — Medication 10 ML: at 08:14

## 2019-11-05 RX ADMIN — INSULIN GLARGINE 25 UNITS: 100 INJECTION, SOLUTION SUBCUTANEOUS at 08:14

## 2019-11-05 RX ADMIN — LEVOTHYROXINE SODIUM 25 MCG: 25 TABLET ORAL at 08:12

## 2019-11-05 RX ADMIN — HEPARIN SODIUM 5000 UNITS: 10000 INJECTION INTRAVENOUS; SUBCUTANEOUS at 05:43

## 2019-11-05 RX ADMIN — HYDROCODONE BITARTRATE AND ACETAMINOPHEN 1 TABLET: 10; 325 TABLET ORAL at 11:33

## 2019-11-05 RX ADMIN — FLUTICASONE PROPIONATE 1 SPRAY: 50 SPRAY, METERED NASAL at 08:12

## 2019-11-05 RX ADMIN — GABAPENTIN 400 MG: 400 CAPSULE ORAL at 12:05

## 2019-11-05 RX ADMIN — METOPROLOL SUCCINATE 50 MG: 50 TABLET, EXTENDED RELEASE ORAL at 08:12

## 2019-11-05 RX ADMIN — HYDROCODONE BITARTRATE AND ACETAMINOPHEN 1 TABLET: 10; 325 TABLET ORAL at 02:32

## 2019-11-05 RX ADMIN — TIOTROPIUM BROMIDE 18 MCG: 18 CAPSULE ORAL; RESPIRATORY (INHALATION) at 09:33

## 2019-11-05 RX ADMIN — GABAPENTIN 400 MG: 400 CAPSULE ORAL at 08:12

## 2019-11-05 RX ADMIN — MOMETASONE FUROATE AND FORMOTEROL FUMARATE DIHYDRATE 2 PUFF: 100; 5 AEROSOL RESPIRATORY (INHALATION) at 09:33

## 2019-11-05 RX ADMIN — ASPIRIN 81 MG: 81 TABLET, COATED ORAL at 08:12

## 2019-11-05 RX ADMIN — MORPHINE SULFATE 15 MG: 15 TABLET, FILM COATED, EXTENDED RELEASE ORAL at 08:12

## 2019-11-05 RX ADMIN — ALLOPURINOL 300 MG: 300 TABLET ORAL at 08:12

## 2019-11-05 RX ADMIN — INSULIN LISPRO 1 UNITS: 100 INJECTION, SOLUTION INTRAVENOUS; SUBCUTANEOUS at 12:06

## 2019-11-05 RX ADMIN — HEPARIN SODIUM 5000 UNITS: 10000 INJECTION INTRAVENOUS; SUBCUTANEOUS at 13:48

## 2019-11-05 RX ADMIN — ISOSORBIDE MONONITRATE 120 MG: 60 TABLET, EXTENDED RELEASE ORAL at 08:12

## 2019-11-05 ASSESSMENT — PAIN DESCRIPTION - PROGRESSION
CLINICAL_PROGRESSION: GRADUALLY WORSENING
CLINICAL_PROGRESSION: NOT CHANGED
CLINICAL_PROGRESSION: NOT CHANGED

## 2019-11-05 ASSESSMENT — PAIN SCALES - GENERAL
PAINLEVEL_OUTOF10: 4
PAINLEVEL_OUTOF10: 0
PAINLEVEL_OUTOF10: 9
PAINLEVEL_OUTOF10: 7
PAINLEVEL_OUTOF10: 8
PAINLEVEL_OUTOF10: 9
PAINLEVEL_OUTOF10: 5

## 2019-11-05 ASSESSMENT — PAIN DESCRIPTION - PAIN TYPE
TYPE: CHRONIC PAIN

## 2019-11-05 ASSESSMENT — PAIN DESCRIPTION - DESCRIPTORS
DESCRIPTORS: ACHING;SORE;DISCOMFORT
DESCRIPTORS: ACHING;DISCOMFORT
DESCRIPTORS: ACHING;SORE

## 2019-11-05 ASSESSMENT — PAIN DESCRIPTION - ORIENTATION
ORIENTATION: RIGHT

## 2019-11-05 ASSESSMENT — PAIN DESCRIPTION - ONSET
ONSET: ON-GOING
ONSET: ON-GOING

## 2019-11-05 ASSESSMENT — PAIN DESCRIPTION - FREQUENCY
FREQUENCY: CONTINUOUS
FREQUENCY: CONTINUOUS

## 2019-11-05 ASSESSMENT — PAIN - FUNCTIONAL ASSESSMENT
PAIN_FUNCTIONAL_ASSESSMENT: PREVENTS OR INTERFERES SOME ACTIVE ACTIVITIES AND ADLS

## 2019-11-05 ASSESSMENT — PAIN DESCRIPTION - LOCATION
LOCATION: BACK;ARM

## 2019-11-06 LAB — BLOOD CULTURE, ROUTINE: NORMAL

## 2019-11-07 LAB — BLOOD CULTURE, ROUTINE: NORMAL
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
## 2020-01-01 ENCOUNTER — HOSPITAL ENCOUNTER (OUTPATIENT)
Age: 80
Discharge: HOME | End: 2020-12-31
Payer: MEDICARE

## 2020-01-01 ENCOUNTER — HOSPITAL ENCOUNTER (OUTPATIENT)
Age: 80
Discharge: HOME | End: 2020-12-12
Payer: MEDICARE

## 2020-01-01 ENCOUNTER — OFFICE VISIT (OUTPATIENT)
Dept: VASCULAR SURGERY | Age: 80
End: 2020-01-01
Payer: MEDICARE

## 2020-01-01 ENCOUNTER — HOSPITAL ENCOUNTER (OUTPATIENT)
Dept: ULTRASOUND IMAGING | Age: 80
Discharge: HOME | End: 2020-12-12
Payer: MEDICARE

## 2020-01-01 ENCOUNTER — PREP FOR PROCEDURE (OUTPATIENT)
Dept: VASCULAR SURGERY | Age: 80
End: 2020-01-01

## 2020-01-01 ENCOUNTER — TELEPHONE (OUTPATIENT)
Dept: VASCULAR SURGERY | Age: 80
End: 2020-01-01

## 2020-01-01 VITALS — WEIGHT: 259 LBS | BODY MASS INDEX: 39.25 KG/M2 | RESPIRATION RATE: 16 BRPM | HEIGHT: 68 IN

## 2020-01-01 DIAGNOSIS — U07.1 COVID-19: ICD-10-CM

## 2020-01-01 LAB
SARS-COV-2: NOT DETECTED
SOURCE: NORMAL

## 2020-01-01 PROCEDURE — 99204 OFFICE O/P NEW MOD 45 MIN: CPT | Performed by: SURGERY

## 2020-01-01 PROCEDURE — 93880 EXTRACRANIAL BILAT STUDY: CPT

## 2020-01-01 PROCEDURE — U0003 INFECTIOUS AGENT DETECTION BY NUCLEIC ACID (DNA OR RNA); SEVERE ACUTE RESPIRATORY SYNDROME CORONAVIRUS 2 (SARS-COV-2) (CORONAVIRUS DISEASE [COVID-19]), AMPLIFIED PROBE TECHNIQUE, MAKING USE OF HIGH THROUGHPUT TECHNOLOGIES AS DESCRIBED BY CMS-2020-01-R: HCPCS

## 2020-01-01 RX ORDER — PREDNISONE 20 MG/1
TABLET ORAL
COMMUNITY
Start: 2020-01-01 | End: 2021-01-01

## 2020-01-01 RX ORDER — LINACLOTIDE 145 UG/1
1 CAPSULE, GELATIN COATED ORAL
COMMUNITY
Start: 2020-10-02 | End: 2020-01-01 | Stop reason: ALTCHOICE

## 2020-01-01 RX ORDER — SODIUM CHLORIDE 0.9 % (FLUSH) 0.9 %
10 SYRINGE (ML) INJECTION PRN
Status: CANCELLED | OUTPATIENT
Start: 2020-01-01

## 2020-01-01 RX ORDER — SODIUM CHLORIDE 9 MG/ML
INJECTION, SOLUTION INTRAVENOUS CONTINUOUS
Status: CANCELLED | OUTPATIENT
Start: 2020-01-01

## 2020-01-01 RX ORDER — SODIUM CHLORIDE 0.9 % (FLUSH) 0.9 %
10 SYRINGE (ML) INJECTION EVERY 12 HOURS SCHEDULED
Status: CANCELLED | OUTPATIENT
Start: 2020-01-01

## 2020-02-04 ENCOUNTER — NURSE ONLY (OUTPATIENT)
Dept: NON INVASIVE DIAGNOSTICS | Age: 80
End: 2020-02-04
Payer: MEDICARE

## 2020-02-04 PROCEDURE — 93296 REM INTERROG EVL PM/IDS: CPT | Performed by: INTERNAL MEDICINE

## 2020-02-04 PROCEDURE — 93294 REM INTERROG EVL PM/LDLS PM: CPT | Performed by: INTERNAL MEDICINE

## 2020-02-04 NOTE — PROGRESS NOTES
See [employer] [place] report. Remote monitoring reviewed over a 90 day period.   End of 90 day monitoring period date of service 02/04/2020

## 2020-02-27 ENCOUNTER — HOSPITAL ENCOUNTER (OUTPATIENT)
Dept: ULTRASOUND IMAGING | Age: 80
Discharge: HOME | End: 2020-02-29
Payer: MEDICARE

## 2020-02-27 PROCEDURE — 93975 VASCULAR STUDY: CPT

## 2020-02-27 PROCEDURE — 76770 US EXAM ABDO BACK WALL COMP: CPT

## 2020-05-05 ENCOUNTER — NURSE ONLY (OUTPATIENT)
Dept: NON INVASIVE DIAGNOSTICS | Age: 80
End: 2020-05-05
Payer: MEDICARE

## 2020-05-05 PROCEDURE — 93294 REM INTERROG EVL PM/LDLS PM: CPT | Performed by: INTERNAL MEDICINE

## 2020-05-05 PROCEDURE — 93296 REM INTERROG EVL PM/IDS: CPT | Performed by: INTERNAL MEDICINE

## 2020-05-06 ENCOUNTER — TELEPHONE (OUTPATIENT)
Dept: NON INVASIVE DIAGNOSTICS | Age: 80
End: 2020-05-06

## 2020-05-06 NOTE — TELEPHONE ENCOUNTER
Spoke with patient let him know Eliquis and Xarelto 30 day $45.00 and 90 day $45.00. I explained that the mail order would be better because he would be able to get more pills for the same price. Also let him know Eliquis is a BID medication and Xarelto is QD. Patient would like the Xarelto at QD. He verbalized understanding, also let him know to stop aspirin. Sent scripts to pharmacy.

## 2020-07-30 ENCOUNTER — APPOINTMENT (OUTPATIENT)
Dept: GENERAL RADIOLOGY | Age: 80
End: 2020-07-30
Payer: MEDICARE

## 2020-07-30 ENCOUNTER — HOSPITAL ENCOUNTER (OUTPATIENT)
Age: 80
Setting detail: OBSERVATION
Discharge: HOME | End: 2020-07-31
Attending: EMERGENCY MEDICINE | Admitting: INTERNAL MEDICINE
Payer: MEDICARE

## 2020-07-30 LAB
ALBUMIN SERPL-MCNC: 3.6 G/DL (ref 3.5–5.2)
ALP BLD-CCNC: 58 U/L (ref 40–129)
ALT SERPL-CCNC: <5 U/L (ref 0–40)
AMPHETAMINE SCREEN, URINE: NOT DETECTED
ANION GAP SERPL CALCULATED.3IONS-SCNC: 13 MMOL/L (ref 7–16)
APTT: 26.2 SEC (ref 24.5–35.1)
AST SERPL-CCNC: 10 U/L (ref 0–39)
BACTERIA: ABNORMAL /HPF
BARBITURATE SCREEN URINE: NOT DETECTED
BASOPHILS ABSOLUTE: 0.05 E9/L (ref 0–0.2)
BASOPHILS RELATIVE PERCENT: 0.6 % (ref 0–2)
BENZODIAZEPINE SCREEN, URINE: NOT DETECTED
BILIRUB SERPL-MCNC: 0.5 MG/DL (ref 0–1.2)
BILIRUBIN URINE: NEGATIVE
BLOOD, URINE: NEGATIVE
BUN BLDV-MCNC: 20 MG/DL (ref 8–23)
CALCIUM SERPL-MCNC: 9.4 MG/DL (ref 8.6–10.2)
CANNABINOID SCREEN URINE: POSITIVE
CHLORIDE BLD-SCNC: 100 MMOL/L (ref 98–107)
CLARITY: CLEAR
CO2: 24 MMOL/L (ref 22–29)
COARSE CASTS, UA: ABNORMAL /LPF (ref 0–2)
COCAINE METABOLITE SCREEN URINE: NOT DETECTED
COLOR: YELLOW
CREAT SERPL-MCNC: 1.5 MG/DL (ref 0.7–1.2)
EKG ATRIAL RATE: 60 BPM
EKG Q-T INTERVAL: 528 MS
EKG QRS DURATION: 170 MS
EKG QTC CALCULATION (BAZETT): 528 MS
EKG R AXIS: -58 DEGREES
EKG T AXIS: 132 DEGREES
EKG VENTRICULAR RATE: 60 BPM
EOSINOPHILS ABSOLUTE: 0.09 E9/L (ref 0.05–0.5)
EOSINOPHILS RELATIVE PERCENT: 1.1 % (ref 0–6)
EPITHELIAL CELLS, UA: ABNORMAL /HPF
FENTANYL SCREEN, URINE: NOT DETECTED
GFR AFRICAN AMERICAN: 55
GFR NON-AFRICAN AMERICAN: 45 ML/MIN/1.73
GLUCOSE BLD-MCNC: 215 MG/DL (ref 74–99)
GLUCOSE URINE: NEGATIVE MG/DL
HBA1C MFR BLD: 6.8 % (ref 4–5.6)
HCT VFR BLD CALC: 37.9 % (ref 37–54)
HEMOGLOBIN: 12.5 G/DL (ref 12.5–16.5)
HYALINE CASTS: ABNORMAL /LPF (ref 0–2)
IMMATURE GRANULOCYTES #: 0.05 E9/L
IMMATURE GRANULOCYTES %: 0.6 % (ref 0–5)
INR BLD: 1.1
KETONES, URINE: NEGATIVE MG/DL
LEUKOCYTE ESTERASE, URINE: NEGATIVE
LYMPHOCYTES ABSOLUTE: 1.17 E9/L (ref 1.5–4)
LYMPHOCYTES RELATIVE PERCENT: 13.9 % (ref 20–42)
Lab: ABNORMAL
MCH RBC QN AUTO: 32.1 PG (ref 26–35)
MCHC RBC AUTO-ENTMCNC: 33 % (ref 32–34.5)
MCV RBC AUTO: 97.2 FL (ref 80–99.9)
METER GLUCOSE: 120 MG/DL (ref 74–99)
METER GLUCOSE: 139 MG/DL (ref 74–99)
METER GLUCOSE: 149 MG/DL (ref 74–99)
METHADONE SCREEN, URINE: NOT DETECTED
MONOCYTES ABSOLUTE: 0.36 E9/L (ref 0.1–0.95)
MONOCYTES RELATIVE PERCENT: 4.3 % (ref 2–12)
NEUTROPHILS ABSOLUTE: 6.69 E9/L (ref 1.8–7.3)
NEUTROPHILS RELATIVE PERCENT: 79.5 % (ref 43–80)
NITRITE, URINE: NEGATIVE
OPIATE SCREEN URINE: POSITIVE
OXYCODONE URINE: NOT DETECTED
PDW BLD-RTO: 14.3 FL (ref 11.5–15)
PH UA: 6 (ref 5–9)
PHENCYCLIDINE SCREEN URINE: NOT DETECTED
PLATELET # BLD: 145 E9/L (ref 130–450)
PMV BLD AUTO: 9.5 FL (ref 7–12)
POTASSIUM REFLEX MAGNESIUM: 4.1 MMOL/L (ref 3.5–5)
PRO-BNP: 3353 PG/ML (ref 0–450)
PROTEIN UA: NORMAL MG/DL
PROTHROMBIN TIME: 12.4 SEC (ref 9.3–12.4)
RBC # BLD: 3.9 E12/L (ref 3.8–5.8)
RBC UA: ABNORMAL /HPF (ref 0–2)
SODIUM BLD-SCNC: 137 MMOL/L (ref 132–146)
SPECIFIC GRAVITY UA: 1.01 (ref 1–1.03)
TOTAL PROTEIN: 6.6 G/DL (ref 6.4–8.3)
TROPONIN: 0.02 NG/ML (ref 0–0.03)
TROPONIN: 0.03 NG/ML (ref 0–0.03)
TROPONIN: 0.05 NG/ML (ref 0–0.03)
UROBILINOGEN, URINE: 0.2 E.U./DL
WBC # BLD: 8.4 E9/L (ref 4.5–11.5)
WBC UA: ABNORMAL /HPF (ref 0–5)

## 2020-07-30 PROCEDURE — 96374 THER/PROPH/DIAG INJ IV PUSH: CPT

## 2020-07-30 PROCEDURE — 85730 THROMBOPLASTIN TIME PARTIAL: CPT

## 2020-07-30 PROCEDURE — 83880 ASSAY OF NATRIURETIC PEPTIDE: CPT

## 2020-07-30 PROCEDURE — 94640 AIRWAY INHALATION TREATMENT: CPT

## 2020-07-30 PROCEDURE — 71046 X-RAY EXAM CHEST 2 VIEWS: CPT

## 2020-07-30 PROCEDURE — 85610 PROTHROMBIN TIME: CPT

## 2020-07-30 PROCEDURE — 94664 DEMO&/EVAL PT USE INHALER: CPT

## 2020-07-30 PROCEDURE — 2580000003 HC RX 258: Performed by: INTERNAL MEDICINE

## 2020-07-30 PROCEDURE — G0378 HOSPITAL OBSERVATION PER HR: HCPCS

## 2020-07-30 PROCEDURE — 6370000000 HC RX 637 (ALT 250 FOR IP): Performed by: INTERNAL MEDICINE

## 2020-07-30 PROCEDURE — 84484 ASSAY OF TROPONIN QUANT: CPT

## 2020-07-30 PROCEDURE — 99285 EMERGENCY DEPT VISIT HI MDM: CPT

## 2020-07-30 PROCEDURE — 80307 DRUG TEST PRSMV CHEM ANLYZR: CPT

## 2020-07-30 PROCEDURE — 36415 COLL VENOUS BLD VENIPUNCTURE: CPT

## 2020-07-30 PROCEDURE — 71045 X-RAY EXAM CHEST 1 VIEW: CPT

## 2020-07-30 PROCEDURE — 6370000000 HC RX 637 (ALT 250 FOR IP): Performed by: EMERGENCY MEDICINE

## 2020-07-30 PROCEDURE — 83036 HEMOGLOBIN GLYCOSYLATED A1C: CPT

## 2020-07-30 PROCEDURE — 6360000002 HC RX W HCPCS: Performed by: INTERNAL MEDICINE

## 2020-07-30 PROCEDURE — 82962 GLUCOSE BLOOD TEST: CPT

## 2020-07-30 PROCEDURE — 85025 COMPLETE CBC W/AUTO DIFF WBC: CPT

## 2020-07-30 PROCEDURE — 93005 ELECTROCARDIOGRAM TRACING: CPT | Performed by: EMERGENCY MEDICINE

## 2020-07-30 PROCEDURE — 81001 URINALYSIS AUTO W/SCOPE: CPT

## 2020-07-30 PROCEDURE — 80053 COMPREHEN METABOLIC PANEL: CPT

## 2020-07-30 RX ORDER — ACETAMINOPHEN 325 MG/1
650 TABLET ORAL EVERY 6 HOURS PRN
Status: DISCONTINUED | OUTPATIENT
Start: 2020-07-30 | End: 2020-07-31 | Stop reason: HOSPADM

## 2020-07-30 RX ORDER — DEXTROSE MONOHYDRATE 25 G/50ML
12.5 INJECTION, SOLUTION INTRAVENOUS PRN
Status: DISCONTINUED | OUTPATIENT
Start: 2020-07-30 | End: 2020-07-31 | Stop reason: HOSPADM

## 2020-07-30 RX ORDER — ALBUTEROL SULFATE 2.5 MG/3ML
2.5 SOLUTION RESPIRATORY (INHALATION) EVERY 6 HOURS PRN
Status: DISCONTINUED | OUTPATIENT
Start: 2020-07-30 | End: 2020-07-30 | Stop reason: SDUPTHER

## 2020-07-30 RX ORDER — MORPHINE SULFATE 10 MG/1
10 CAPSULE, EXTENDED RELEASE ORAL 2 TIMES DAILY
Status: DISCONTINUED | OUTPATIENT
Start: 2020-07-30 | End: 2020-07-31 | Stop reason: HOSPADM

## 2020-07-30 RX ORDER — SODIUM CHLORIDE 0.9 % (FLUSH) 0.9 %
10 SYRINGE (ML) INJECTION PRN
Status: DISCONTINUED | OUTPATIENT
Start: 2020-07-30 | End: 2020-07-31 | Stop reason: HOSPADM

## 2020-07-30 RX ORDER — HYDROCODONE BITARTRATE AND ACETAMINOPHEN 10; 325 MG/1; MG/1
1 TABLET ORAL EVERY 6 HOURS PRN
Status: DISCONTINUED | OUTPATIENT
Start: 2020-07-30 | End: 2020-07-31 | Stop reason: HOSPADM

## 2020-07-30 RX ORDER — METOPROLOL SUCCINATE 50 MG/1
50 TABLET, EXTENDED RELEASE ORAL DAILY
Status: DISCONTINUED | OUTPATIENT
Start: 2020-07-30 | End: 2020-07-31 | Stop reason: HOSPADM

## 2020-07-30 RX ORDER — ASPIRIN 81 MG/1
324 TABLET, CHEWABLE ORAL ONCE
Status: COMPLETED | OUTPATIENT
Start: 2020-07-30 | End: 2020-07-30

## 2020-07-30 RX ORDER — ALLOPURINOL 300 MG/1
300 TABLET ORAL DAILY
Status: DISCONTINUED | OUTPATIENT
Start: 2020-07-30 | End: 2020-07-31 | Stop reason: HOSPADM

## 2020-07-30 RX ORDER — INSULIN GLARGINE 100 [IU]/ML
15 INJECTION, SOLUTION SUBCUTANEOUS 2 TIMES DAILY
Status: DISCONTINUED | OUTPATIENT
Start: 2020-07-30 | End: 2020-07-31 | Stop reason: HOSPADM

## 2020-07-30 RX ORDER — ALBUTEROL SULFATE 2.5 MG/3ML
2.5 SOLUTION RESPIRATORY (INHALATION) EVERY 6 HOURS PRN
Status: DISCONTINUED | OUTPATIENT
Start: 2020-07-30 | End: 2020-07-31 | Stop reason: HOSPADM

## 2020-07-30 RX ORDER — ARFORMOTEROL TARTRATE 15 UG/2ML
15 SOLUTION RESPIRATORY (INHALATION) 2 TIMES DAILY
Status: DISCONTINUED | OUTPATIENT
Start: 2020-07-30 | End: 2020-07-31 | Stop reason: HOSPADM

## 2020-07-30 RX ORDER — GABAPENTIN 400 MG/1
400 CAPSULE ORAL DAILY
Status: DISCONTINUED | OUTPATIENT
Start: 2020-07-30 | End: 2020-07-30

## 2020-07-30 RX ORDER — SODIUM CHLORIDE 0.9 % (FLUSH) 0.9 %
10 SYRINGE (ML) INJECTION EVERY 12 HOURS SCHEDULED
Status: DISCONTINUED | OUTPATIENT
Start: 2020-07-30 | End: 2020-07-31 | Stop reason: HOSPADM

## 2020-07-30 RX ORDER — FUROSEMIDE 10 MG/ML
40 INJECTION INTRAMUSCULAR; INTRAVENOUS ONCE
Status: COMPLETED | OUTPATIENT
Start: 2020-07-30 | End: 2020-07-30

## 2020-07-30 RX ORDER — GABAPENTIN 400 MG/1
400 CAPSULE ORAL 4 TIMES DAILY
Status: DISCONTINUED | OUTPATIENT
Start: 2020-07-30 | End: 2020-07-31 | Stop reason: HOSPADM

## 2020-07-30 RX ORDER — BUDESONIDE 0.25 MG/2ML
0.25 INHALANT ORAL 2 TIMES DAILY
Status: DISCONTINUED | OUTPATIENT
Start: 2020-07-30 | End: 2020-07-31 | Stop reason: HOSPADM

## 2020-07-30 RX ORDER — ACETAMINOPHEN 650 MG/1
650 SUPPOSITORY RECTAL EVERY 6 HOURS PRN
Status: DISCONTINUED | OUTPATIENT
Start: 2020-07-30 | End: 2020-07-31 | Stop reason: HOSPADM

## 2020-07-30 RX ORDER — DEXTROSE MONOHYDRATE 50 MG/ML
100 INJECTION, SOLUTION INTRAVENOUS PRN
Status: DISCONTINUED | OUTPATIENT
Start: 2020-07-30 | End: 2020-07-31 | Stop reason: HOSPADM

## 2020-07-30 RX ORDER — NICOTINE POLACRILEX 4 MG
15 LOZENGE BUCCAL PRN
Status: DISCONTINUED | OUTPATIENT
Start: 2020-07-30 | End: 2020-07-31 | Stop reason: HOSPADM

## 2020-07-30 RX ORDER — POLYETHYLENE GLYCOL 3350 17 G/17G
17 POWDER, FOR SOLUTION ORAL DAILY PRN
Status: DISCONTINUED | OUTPATIENT
Start: 2020-07-30 | End: 2020-07-31 | Stop reason: HOSPADM

## 2020-07-30 RX ORDER — ISOSORBIDE MONONITRATE 60 MG/1
120 TABLET, EXTENDED RELEASE ORAL DAILY
Status: DISCONTINUED | OUTPATIENT
Start: 2020-07-30 | End: 2020-07-31 | Stop reason: HOSPADM

## 2020-07-30 RX ORDER — LEVOTHYROXINE SODIUM 0.03 MG/1
25 TABLET ORAL DAILY
Status: DISCONTINUED | OUTPATIENT
Start: 2020-07-30 | End: 2020-07-31 | Stop reason: HOSPADM

## 2020-07-30 RX ADMIN — BUDESONIDE 250 MCG: 0.25 SUSPENSION RESPIRATORY (INHALATION) at 20:21

## 2020-07-30 RX ADMIN — RIVAROXABAN 20 MG: 20 TABLET, FILM COATED ORAL at 17:55

## 2020-07-30 RX ADMIN — ISOSORBIDE MONONITRATE 120 MG: 60 TABLET, EXTENDED RELEASE ORAL at 13:59

## 2020-07-30 RX ADMIN — IPRATROPIUM BROMIDE 0.5 MG: 0.5 SOLUTION RESPIRATORY (INHALATION) at 16:07

## 2020-07-30 RX ADMIN — INSULIN GLARGINE 15 UNITS: 100 INJECTION, SOLUTION SUBCUTANEOUS at 21:47

## 2020-07-30 RX ADMIN — ARFORMOTEROL TARTRATE 15 MCG: 15 SOLUTION RESPIRATORY (INHALATION) at 20:21

## 2020-07-30 RX ADMIN — MORPHINE SULFATE 10 MG: 10 CAPSULE, EXTENDED RELEASE ORAL at 21:38

## 2020-07-30 RX ADMIN — ALLOPURINOL 300 MG: 300 TABLET ORAL at 13:59

## 2020-07-30 RX ADMIN — METOPROLOL SUCCINATE 50 MG: 50 TABLET, EXTENDED RELEASE ORAL at 13:59

## 2020-07-30 RX ADMIN — HYDROCODONE BITARTRATE AND ACETAMINOPHEN 1 TABLET: 10; 325 TABLET ORAL at 17:57

## 2020-07-30 RX ADMIN — FUROSEMIDE 40 MG: 10 INJECTION, SOLUTION INTRAMUSCULAR; INTRAVENOUS at 11:56

## 2020-07-30 RX ADMIN — GABAPENTIN 400 MG: 400 CAPSULE ORAL at 21:37

## 2020-07-30 RX ADMIN — GABAPENTIN 400 MG: 400 CAPSULE ORAL at 16:21

## 2020-07-30 RX ADMIN — IPRATROPIUM BROMIDE 0.5 MG: 0.5 SOLUTION RESPIRATORY (INHALATION) at 20:22

## 2020-07-30 RX ADMIN — SODIUM CHLORIDE, PRESERVATIVE FREE 10 ML: 5 INJECTION INTRAVENOUS at 21:39

## 2020-07-30 RX ADMIN — ASPIRIN 324 MG: 81 TABLET, CHEWABLE ORAL at 09:52

## 2020-07-30 ASSESSMENT — PAIN SCALES - GENERAL
PAINLEVEL_OUTOF10: 6
PAINLEVEL_OUTOF10: 0
PAINLEVEL_OUTOF10: 6

## 2020-07-30 NOTE — PROGRESS NOTES
Spoke with Dr. Be Beltran to clarified patient's home gabapentin schedule.  Orders were received to resume home schedule

## 2020-07-30 NOTE — ED PROVIDER NOTES
HPI:  7/30/20, Time: 9:13 AM EDT         Mayra Arriaza is a 67 y.o. male presenting to the ED for chest pain beginning today while he was taking his medical marijuana. When asked to describe the pain, he states \"I do not know. \"  He is unsure of any exacerbating or relieving factors. He states he has never had pain like this before. He states the pain is nonradiating. He did not take any medication for the pain. He also reports feeling fatigued. He has had no documented fevers. No cough, shortness of breath, abdominal pain, nausea, or vomiting. He did have an episode of diarrhea earlier today. He currently resides at home. Patient is a poor historian, so it was difficult to obtain a complete history. I reviewed his chart. Patient was seen by [facility] in 2018. He has also been seen by Dr. Rodriguez Orr. He has a history of CABG and bioprosthetic AVR in 2008. He also has a history of  CHF with reduced EF. He has a pacemaker in place. He is currently on Xarelto, and he states he has been compliant. Review of Systems:   Pertinent positives and negatives are stated within HPI, all other systems reviewed and are negative.          --------------------------------------------- PAST HISTORY ---------------------------------------------  Past Medical History:  has a past medical history of Arthritis, Blood circulation, collateral, CAD (coronary artery disease), Carotid bruit, CHF (congestive heart failure) ([facility] [address] Chronic kidney disease, CKD (chronic kidney disease) stage 3, GFR 30-59 ml/min ([facility] [address] Diabetes mellitus ([facility] [address] Dyspnea on exertion, History of blood transfusion, Hyperlipidemia, Hypertension, Movement disorder, NSTEMI, initial episode of care ([facility] [address] Obesity, PVD (peripheral vascular disease) with claudication ([facility] [address] S/P carotid endarterectomy, Sleep apnea, SOB (shortness of breath), Thyroid disease, and Unspecified cerebral artery occlusion with cerebral infarction.     Past Surgical History:  has a past surgical history that includes Cardiac surgery; vascular surgery; joint replacement; Diagnostic Cardiac Cath Lab Procedure (10/14/08); Diagnostic Cardiac Cath Lab Procedure (10/21/10); knee surgery; Cardiac valuve replacement; Coronary artery bypass graft; Tonsillectomy; Pacemaker insertion (11/12/2014); and other surgical history (Right, 02/14/2017). Social History:  reports that he quit smoking about 39 years ago. His smoking use included cigarettes. He started smoking about 66 years ago. He has a 50.00 pack-year smoking history. He has never used smokeless tobacco. He reports current drug use. Drug: Marijuana. He reports that he does not drink alcohol. Family History: family history includes Cancer in his sister; Heart Disease in his father and mother; Heart Failure in his father and mother; Heart Surgery in his father; High Blood Pressure in his sister. The patients home medications have been reviewed. Allergies: Patient has no known allergies.     -------------------------------------------------- RESULTS -------------------------------------------------  All laboratory and radiology results have been personally reviewed by myself   LABS:  Results for orders placed or performed during the hospital encounter of 07/30/20   CBC Auto Differential   Result Value Ref Range    WBC 8.4 4.5 - 11.5 E9/L    RBC 3.90 3.80 - 5.80 E12/L    Hemoglobin 12.5 12.5 - 16.5 g/dL    Hematocrit 37.9 37.0 - 54.0 %    MCV 97.2 80.0 - 99.9 fL    MCH 32.1 26.0 - 35.0 pg    MCHC 33.0 32.0 - 34.5 %    RDW 14.3 11.5 - 15.0 fL    Platelets 042 882 - 968 E9/L    MPV 9.5 7.0 - 12.0 fL    Neutrophils % 79.5 43.0 - 80.0 %    Immature Granulocytes % 0.6 0.0 - 5.0 %    Lymphocytes % 13.9 (L) 20.0 - 42.0 %    Monocytes % 4.3 2.0 - 12.0 %    Eosinophils % 1.1 0.0 - 6.0 %    Basophils % 0.6 0.0 - 2.0 %    Neutrophils Absolute 6.69 1.80 - 7.30 E9/L    Immature Granulocytes # 0.05 E9/L    Lymphocytes Absolute 1.17 (L) 1.50 - 4.00 bilaterally, no wheezes, rales, or rhonchi. Not in respiratory distress  Cardiovascular:  Regular rate and rhythm, no murmurs, gallops, or rubs. 2+ distal pulses  Abdomen: Soft, non tender, non distended,   Extremities: Moves all extremities x 4. Warm and well perfused. Pedal edema. No calf tenderness.   Skin: warm and dry without rash  Neurologic: GCS 15, no focal motor or sensory deficits   Psych: Normal Affect      ------------------------------ ED COURSE/MEDICAL DECISION MAKING----------------------  Medications   allopurinol (ZYLOPRIM) tablet 300 mg (300 mg Oral Given 7/30/20 1359)   HYDROcodone-acetaminophen (NORCO)  MG per tablet 1 tablet (has no administration in time range)   isosorbide mononitrate (IMDUR) extended release tablet 120 mg (120 mg Oral Given 7/30/20 1359)   levothyroxine (SYNTHROID) tablet 25 mcg (25 mcg Oral Not Given 7/30/20 1348)   metoprolol succinate (TOPROL XL) extended release tablet 50 mg (50 mg Oral Given 7/30/20 1359)   morphine (THIERRY) extended release capsule 10 mg (10 mg Oral Not Given 7/30/20 1357)   rivaroxaban (XARELTO) tablet 20 mg (has no administration in time range)   sodium chloride flush 0.9 % injection 10 mL (has no administration in time range)   sodium chloride flush 0.9 % injection 10 mL (has no administration in time range)   acetaminophen (TYLENOL) tablet 650 mg (has no administration in time range)     Or   acetaminophen (TYLENOL) suppository 650 mg (has no administration in time range)   polyethylene glycol (GLYCOLAX) packet 17 g (has no administration in time range)   glucose (GLUTOSE) 40 % oral gel 15 g (has no administration in time range)   dextrose 50 % IV solution (has no administration in time range)   glucagon (rDNA) injection 1 mg (has no administration in time range)   dextrose 5 % solution (has no administration in time range)   insulin glargine (LANTUS) injection vial 15 Units (15 Units Subcutaneous Not Given 7/30/20 1347)   insulin lispro (HUMALOG) injection vial 0-12 Units (0 Units Subcutaneous Not Given 7/30/20 1348)   insulin lispro (HUMALOG) injection vial 0-6 Units (has no administration in time range)   albuterol (PROVENTIL) nebulizer solution 2.5 mg (has no administration in time range)   Arformoterol Tartrate (BROVANA) nebulizer solution 15 mcg (0 mcg Nebulization Held 7/30/20 1410)     And   ipratropium (ATROVENT) 0.02 % nebulizer solution 0.5 mg (0.5 mg Nebulization Given 7/30/20 1607)     And   budesonide (PULMICORT) nebulizer suspension 250 mcg (250 mcg Nebulization Not Given 7/30/20 1410)   gabapentin (NEURONTIN) capsule 400 mg (has no administration in time range)   aspirin chewable tablet 324 mg (324 mg Oral Given 7/30/20 0952)   furosemide (LASIX) injection 40 mg (40 mg Intravenous Given 7/30/20 1156)       Medical Decision Making/ED COURSE:   Patient is a 67-year-old male with history of CAD, and STEMI, valvular heart disease status post bioprosthetic aVR and CABG in 2008, CHF, diabetes, hypertension, and COPD presenting with chest pain and generalized weakness. He was hemodynamically stable and afebrile in ED. He was saturating well on room air. He appeared chronically ill but was nontoxic on examination. He was placed on the cardiac monitor. He was given aspirin. Labs and chest x-ray were obtained. I reviewed and interpreted labs. Patient's troponin is mildly elevated at 0.05, however it appears similar to prior labs. His kidney function has mildly worsened. BNP is also mildly worse when compared to prior labs. Chest x-ray shows left pleural effusion. On reevaluation, patient's chest pain had resolved spontaneously. Patient has extensive cardiac history, so will be admitted for observation. ED Course as of Jul 30 1620   Thu Jul 30, 2020   0952 REPEAT EKG: This EKG is signed and interpreted by ED Physician.     Rate: 60  Rhythm: AV dual paced rhythm  Interpretation: AV dual paced rhythm  Comparison: stable as compared to patient's most recent EKG      [JA]   1127 I spoke with Dr. Nilda Curtis. He accepted the patient for admission. [JA]      ED Course User Index  [JA] Gonzalo Reyes MD           Counseling: The emergency provider has spoken with the patient and discussed todays results, in addition to providing specific details for the plan of care and counseling regarding the diagnosis and prognosis. Questions are answered at this time and they are agreeable with the plan.      --------------------------------- IMPRESSION AND DISPOSITION ---------------------------------    IMPRESSION  1. Chest pain, unspecified type    2. Pleural effusion on left        DISPOSITION  Disposition: Admit to telemetry  Patient condition is stable      NOTE: This report was transcribed using voice recognition software.  Every effort was made to ensure accuracy; however, inadvertent computerized transcription errors may be present       Gonzalo Reyes MD  07/30/20 [id number]

## 2020-07-30 NOTE — PROGRESS NOTES
Database complete. Medications reconciled. Care plans and education initiated. Uses cane mostly and wheelchair when out in community. Has pacemaker, known to Dr. Rufino Harvey / Rivera Hahn. Pulmonologist is Dr. Tata Treviño. Last fall 3-4 months ago.  Patient states he has not taken his Xarelto for atleast 2 weeks, but I am unsure of his reason(stated d/t not being able to take asa?)

## 2020-07-31 VITALS
BODY MASS INDEX: 38.65 KG/M2 | DIASTOLIC BLOOD PRESSURE: 80 MMHG | HEIGHT: 68 IN | HEART RATE: 67 BPM | RESPIRATION RATE: 18 BRPM | OXYGEN SATURATION: 95 % | TEMPERATURE: 98.6 F | WEIGHT: 255 LBS | SYSTOLIC BLOOD PRESSURE: 170 MMHG

## 2020-07-31 PROBLEM — R07.9 CHEST PAIN: Status: RESOLVED | Noted: 2018-05-13 | Resolved: 2020-07-31

## 2020-07-31 PROBLEM — J96.00 ACUTE RESPIRATORY FAILURE (HCC): Status: RESOLVED | Noted: 2018-06-03 | Resolved: 2020-07-31

## 2020-07-31 PROBLEM — Z86.73 HISTORY OF CVA (CEREBROVASCULAR ACCIDENT): Chronic | Status: ACTIVE | Noted: 2020-07-31

## 2020-07-31 PROBLEM — R06.02 SOB (SHORTNESS OF BREATH): Status: RESOLVED | Noted: 2017-02-11 | Resolved: 2020-07-31

## 2020-07-31 PROBLEM — I67.9 CEREBROVASCULAR DISEASE: Status: RESOLVED | Noted: 2018-05-13 | Resolved: 2020-07-31

## 2020-07-31 PROBLEM — R07.9 CHEST PAIN: Status: ACTIVE | Noted: 2020-07-31

## 2020-07-31 PROBLEM — J96.01 ACUTE RESPIRATORY FAILURE WITH HYPOXIA (HCC): Status: RESOLVED | Noted: 2018-06-03 | Resolved: 2020-07-31

## 2020-07-31 PROBLEM — E66.01 OBESITY, MORBID (HCC): Status: RESOLVED | Noted: 2018-01-22 | Resolved: 2020-07-31

## 2020-07-31 PROBLEM — E66.9 OBESITY (BMI 30-39.9): Chronic | Status: ACTIVE | Noted: 2020-07-31

## 2020-07-31 PROBLEM — Z95.1 S/P CABG (CORONARY ARTERY BYPASS GRAFT): Status: RESOLVED | Noted: 2018-05-23 | Resolved: 2020-07-31

## 2020-07-31 LAB
ALBUMIN SERPL-MCNC: 3.6 G/DL (ref 3.5–5.2)
ALP BLD-CCNC: 61 U/L (ref 40–129)
ALT SERPL-CCNC: 8 U/L (ref 0–40)
ANION GAP SERPL CALCULATED.3IONS-SCNC: 14 MMOL/L (ref 7–16)
AST SERPL-CCNC: 13 U/L (ref 0–39)
BASOPHILS ABSOLUTE: 0.05 E9/L (ref 0–0.2)
BASOPHILS RELATIVE PERCENT: 0.6 % (ref 0–2)
BILIRUB SERPL-MCNC: 0.4 MG/DL (ref 0–1.2)
BUN BLDV-MCNC: 24 MG/DL (ref 8–23)
CALCIUM SERPL-MCNC: 9.2 MG/DL (ref 8.6–10.2)
CHLORIDE BLD-SCNC: 96 MMOL/L (ref 98–107)
CO2: 23 MMOL/L (ref 22–29)
CREAT SERPL-MCNC: 1.5 MG/DL (ref 0.7–1.2)
EOSINOPHILS ABSOLUTE: 0.2 E9/L (ref 0.05–0.5)
EOSINOPHILS RELATIVE PERCENT: 2.4 % (ref 0–6)
GFR AFRICAN AMERICAN: 55
GFR NON-AFRICAN AMERICAN: 45 ML/MIN/1.73
GLUCOSE BLD-MCNC: 139 MG/DL (ref 74–99)
HCT VFR BLD CALC: 34.1 % (ref 37–54)
HEMOGLOBIN: 11.6 G/DL (ref 12.5–16.5)
IMMATURE GRANULOCYTES #: 0.04 E9/L
IMMATURE GRANULOCYTES %: 0.5 % (ref 0–5)
LV EF: 48 %
LVEF MODALITY: NORMAL
LYMPHOCYTES ABSOLUTE: 1.59 E9/L (ref 1.5–4)
LYMPHOCYTES RELATIVE PERCENT: 19 % (ref 20–42)
MCH RBC QN AUTO: 32.5 PG (ref 26–35)
MCHC RBC AUTO-ENTMCNC: 34 % (ref 32–34.5)
MCV RBC AUTO: 95.5 FL (ref 80–99.9)
METER GLUCOSE: 178 MG/DL (ref 74–99)
MONOCYTES ABSOLUTE: 0.54 E9/L (ref 0.1–0.95)
MONOCYTES RELATIVE PERCENT: 6.5 % (ref 2–12)
NEUTROPHILS ABSOLUTE: 5.94 E9/L (ref 1.8–7.3)
NEUTROPHILS RELATIVE PERCENT: 71 % (ref 43–80)
PDW BLD-RTO: 14.1 FL (ref 11.5–15)
PLATELET # BLD: 147 E9/L (ref 130–450)
PMV BLD AUTO: 10 FL (ref 7–12)
POTASSIUM REFLEX MAGNESIUM: 3.7 MMOL/L (ref 3.5–5)
RBC # BLD: 3.57 E12/L (ref 3.8–5.8)
REASON FOR REJECTION: NORMAL
REJECTED TEST: NORMAL
SODIUM BLD-SCNC: 133 MMOL/L (ref 132–146)
T4 FREE: 1.32 NG/DL (ref 0.93–1.7)
TOTAL PROTEIN: 6.7 G/DL (ref 6.4–8.3)
TSH SERPL DL<=0.05 MIU/L-ACNC: 1.43 UIU/ML (ref 0.27–4.2)
WBC # BLD: 8.4 E9/L (ref 4.5–11.5)

## 2020-07-31 PROCEDURE — 36415 COLL VENOUS BLD VENIPUNCTURE: CPT

## 2020-07-31 PROCEDURE — 84443 ASSAY THYROID STIM HORMONE: CPT

## 2020-07-31 PROCEDURE — 6370000000 HC RX 637 (ALT 250 FOR IP): Performed by: INTERNAL MEDICINE

## 2020-07-31 PROCEDURE — 94640 AIRWAY INHALATION TREATMENT: CPT

## 2020-07-31 PROCEDURE — 82962 GLUCOSE BLOOD TEST: CPT

## 2020-07-31 PROCEDURE — 93306 TTE W/DOPPLER COMPLETE: CPT

## 2020-07-31 PROCEDURE — 85025 COMPLETE CBC W/AUTO DIFF WBC: CPT

## 2020-07-31 PROCEDURE — G0378 HOSPITAL OBSERVATION PER HR: HCPCS

## 2020-07-31 PROCEDURE — APPSS60 APP SPLIT SHARED TIME 46-60 MINUTES: Performed by: NURSE PRACTITIONER

## 2020-07-31 PROCEDURE — 2580000003 HC RX 258: Performed by: INTERNAL MEDICINE

## 2020-07-31 PROCEDURE — 6360000002 HC RX W HCPCS: Performed by: INTERNAL MEDICINE

## 2020-07-31 PROCEDURE — 6370000000 HC RX 637 (ALT 250 FOR IP): Performed by: NURSE PRACTITIONER

## 2020-07-31 PROCEDURE — 84439 ASSAY OF FREE THYROXINE: CPT

## 2020-07-31 PROCEDURE — 99214 OFFICE O/P EST MOD 30 MIN: CPT | Performed by: STUDENT IN AN ORGANIZED HEALTH CARE EDUCATION/TRAINING PROGRAM

## 2020-07-31 PROCEDURE — 80053 COMPREHEN METABOLIC PANEL: CPT

## 2020-07-31 RX ORDER — ASPIRIN 81 MG/1
81 TABLET ORAL DAILY
Status: DISCONTINUED | OUTPATIENT
Start: 2020-07-31 | End: 2020-07-31 | Stop reason: HOSPADM

## 2020-07-31 RX ADMIN — ISOSORBIDE MONONITRATE 120 MG: 60 TABLET, EXTENDED RELEASE ORAL at 05:11

## 2020-07-31 RX ADMIN — METOPROLOL SUCCINATE 50 MG: 50 TABLET, EXTENDED RELEASE ORAL at 10:12

## 2020-07-31 RX ADMIN — GABAPENTIN 400 MG: 400 CAPSULE ORAL at 13:03

## 2020-07-31 RX ADMIN — BUDESONIDE 250 MCG: 0.25 SUSPENSION RESPIRATORY (INHALATION) at 08:23

## 2020-07-31 RX ADMIN — INSULIN GLARGINE 15 UNITS: 100 INJECTION, SOLUTION SUBCUTANEOUS at 08:29

## 2020-07-31 RX ADMIN — SODIUM CHLORIDE, PRESERVATIVE FREE 10 ML: 5 INJECTION INTRAVENOUS at 10:19

## 2020-07-31 RX ADMIN — LEVOTHYROXINE SODIUM 25 MCG: 25 TABLET ORAL at 05:11

## 2020-07-31 RX ADMIN — GABAPENTIN 400 MG: 400 CAPSULE ORAL at 10:12

## 2020-07-31 RX ADMIN — ARFORMOTEROL TARTRATE 15 MCG: 15 SOLUTION RESPIRATORY (INHALATION) at 08:22

## 2020-07-31 RX ADMIN — INSULIN LISPRO 2 UNITS: 100 INJECTION, SOLUTION INTRAVENOUS; SUBCUTANEOUS at 13:07

## 2020-07-31 RX ADMIN — MORPHINE SULFATE 10 MG: 10 CAPSULE, EXTENDED RELEASE ORAL at 10:16

## 2020-07-31 RX ADMIN — IPRATROPIUM BROMIDE 0.5 MG: 0.5 SOLUTION RESPIRATORY (INHALATION) at 08:22

## 2020-07-31 RX ADMIN — ALLOPURINOL 300 MG: 300 TABLET ORAL at 10:12

## 2020-07-31 RX ADMIN — IPRATROPIUM BROMIDE 0.5 MG: 0.5 SOLUTION RESPIRATORY (INHALATION) at 12:13

## 2020-07-31 RX ADMIN — HYDROCODONE BITARTRATE AND ACETAMINOPHEN 1 TABLET: 10; 325 TABLET ORAL at 02:27

## 2020-07-31 ASSESSMENT — PAIN DESCRIPTION - LOCATION: LOCATION: GENERALIZED

## 2020-07-31 ASSESSMENT — PAIN DESCRIPTION - PAIN TYPE
TYPE: CHRONIC PAIN
TYPE: CHRONIC PAIN

## 2020-07-31 ASSESSMENT — PAIN DESCRIPTION - ORIENTATION: ORIENTATION: RIGHT;LEFT

## 2020-07-31 ASSESSMENT — PAIN DESCRIPTION - FREQUENCY
FREQUENCY: CONTINUOUS
FREQUENCY: CONTINUOUS

## 2020-07-31 ASSESSMENT — PAIN DESCRIPTION - ONSET
ONSET: ON-GOING
ONSET: ON-GOING

## 2020-07-31 ASSESSMENT — PAIN SCALES - GENERAL
PAINLEVEL_OUTOF10: 4
PAINLEVEL_OUTOF10: 8
PAINLEVEL_OUTOF10: 0

## 2020-07-31 ASSESSMENT — PAIN DESCRIPTION - DESCRIPTORS
DESCRIPTORS: SHARP;SHOOTING
DESCRIPTORS: CONSTANT;DISCOMFORT;SHOOTING

## 2020-07-31 ASSESSMENT — PAIN - FUNCTIONAL ASSESSMENT
PAIN_FUNCTIONAL_ASSESSMENT: PREVENTS OR INTERFERES SOME ACTIVE ACTIVITIES AND ADLS
PAIN_FUNCTIONAL_ASSESSMENT: PREVENTS OR INTERFERES SOME ACTIVE ACTIVITIES AND ADLS

## 2020-07-31 ASSESSMENT — PAIN DESCRIPTION - PROGRESSION
CLINICAL_PROGRESSION: NOT CHANGED
CLINICAL_PROGRESSION: GRADUALLY WORSENING

## 2020-07-31 NOTE — PROGRESS NOTES
Spoke with Dr. Junaid Chun re: DISCHARGE. Per Dr. Junaid Chun the patient does not need a PT/OT eval and can be discharged once his Echo is done. Echo does not need to be resulted prior to discharge.

## 2020-07-31 NOTE — PROGRESS NOTES
Patient educated on discharge instructions for medications and outpatient follow-up appointments. He verbalized understanding. The patient called a family member using the room phone to notify of discharge and need for ride home. Patient states his brother will be picking him up. Patient instructed to wait seated in bed for hospital transport staff to take him downstairs via wheelchair. Patient verbalized understanding.

## 2020-07-31 NOTE — PROGRESS NOTES
[facility] Quality Flow/Interdisciplinary Rounds Progress Note        Quality Flow Rounds held on July 31, 2020    Disciplines Attending:  Bedside Nurse, ,  and Nursing Unit Leadership    Alfred Espinoza was admitted on 7/30/2020  8:52 AM    Anticipated Discharge Date:  Expected Discharge Date: 08/02/20    Disposition:    Devante Score:  Devante Scale Score: 19    Readmission Risk              Risk of Unplanned Readmission:        0           Discussed patient goal for the day, patient clinical progression, and barriers to discharge. The following Goal(s) of the Day/Commitment(s) have been identified:  cardiology consult, PT/OT [place]  July 31, 2020

## 2020-07-31 NOTE — CONSULTS
Inpatient Cardiology Consultation      Reason for Consult:  Chest Pain    Consulting Physician: Dr Nicole Manzanares    Requesting Physician:  Dr Royer Grubbs    Date of Consultation: 7/31/2020    HISTORY OF PRESENT ILLNESS: 66 yo  male known to Dr Enrico Burciaga and Dr Regi Lowry. 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 [facility] 7/30/2020 at 0830 for CP while taking his medical marijuana. Was moving his bowels and developed some CP lasting approximately 5 minutes non radiating. Was unable to get off toilet after moving bowels due to BLE weakness. Used his Life Alert and EMS transported him to hospital.   BP upon arrival 116/44 HR 60 paced afebrile. K+ 4.1-->3.7, Bun/cr 20/1.5-->24/1.5, p-BNP 3353, troponin 0.02-->0.05-->0.03, LFT WNL, UDS + marijuana/opiates, Hgb 12.5-->11.6, INR 1.1, UA negative. CXR read as increasing L pleural effusion. LLL infiltrate. Received 40 mg IV Lasix. Please note: past medical records were reviewed per electronic medical record (EMR) - see detailed reports under Past Medical/ Surgical History. Past Medical History:    1. Obesity. BMI: 38.7 on 7/30/2020  2. No known drug allergies. 3. Former smoker. 40 pack year smoking history. Quit in [address]    4. T2DM insulin requiring with neuropathy, nephropathy   5. Hypertension. 6. Hyperlipidemia. 7. COPD  8. 2003 syncope with R sdied weakness--> resolved  9. 2004 R CEA  10. 11/2008 CABG x 1. SVG to RCA and AVR with bioprosthetic [id number] in 2008. NS  11. Cardiac catheterization, 10/2010, mild diffuse disease in the left system, CT of the RCA. Patent SVG to RCA. EF 60%. 12. Lexiscan MPS 09/05/2014, fixed lateral wall perfusion defect consistent with scarring. No reversible defects. EF 45%. 13. Echocardiogram, 06/2016, EF 58-66%, stage 1 diastolic dysfunction, moderate left atrial enlargement, abnormal aortic valve function with mean gradient of 30 and a V max of 4.3 m/sec, moderate mitral stenosis (mean gradient 7.6 mmHg, RVSP 35). 14. SCOOTER, 6/2016, bioprosthetic aortic valve, peak velocity 2.7 with a mean gradient of 16, mean transmitral gradient of 3.5 mmHg, aortic valve gradient thought secondary to hyperdynamic ventricle. 15. SCOOTER, [facility], 3/2017, EF 60-65%, mild mitral stenosis, 1-2+ MR. Normal bioprosthetic AVR with preserved leaflet motion with mean transgastric gradient of 28 mmHg. Overall findings support normal AVR with no prosthetic aortic stenosis. 16. Echocardiogram, 08/2017, EF 60%. Stage 1 diastolic dysfunction, severely dilated left atrium, S/P AVR (peak velocity 2.8, mean gradient 13.7). 17. Sinus node dysfunction, S/P dual chamber pacemaker [facility]), 11/2014. 18. Carotid stenosis, 60% HILARIO and 70% left ICA stenosis. 19. Obstructive sleep apnea, noncompliant with CPAP therapy. 20. 2/2017 diabetic foot ulcer  21. [place]  22. Bilteral TKA's  23. Spinal stenosis s/p injections  24. Hospitalized, 10/2017, with acute on chronic diastolic heart failure. Pro BNP 2840. 25. Admitted, 1/19/2018, with acute on chronic diastolic heart failure. H&H 11.3/33.2, creatinine 1.3, troponin peaked at .14, pro BNP 1657. Diuresed and discharged. 26. Lexiscan MPS, 1/21/2018, fixed lateral defect with no ischemia. EF 36%. 27. Echocardiogram, 1/21/2018, Dr. Davide Limon, EF 60%. Hypokinesis of the basal inferior wall, reduced RV function, moderate to severe left atrial enlargement, mild MR, RVSP 43 mmHg, bioprosthetic AVR with a mean gradient of 12 mmHg. 28. CKD, stage 3. Baseline creatinine between 1.3 and 1.5.    29. Admission 05/12/2018 with chest pain and shortness of breath. EKG showed no acute changes. Troponin elevation not consistent with NSTEMI (0.05, 0.06, 0.08). Thought secondary to COPD exacerbation. No cardiac workup performed. 30. Admission 11/1/2019-11/5/2019 for fall from wheelchair. Dehydrated with HARVEY. Demadex stopped  31. 5/2020 PAFL noted on PPM interrogation placed on Xarelto 20 mg QD           Medications Prior to admit:  Prior to Admission medications    Medication Sig Start Date End Date Taking? Authorizing Provider   rivaroxaban (XARELTO) 20 MG TABS tablet Take 1 tablet by mouth Daily with supper  Patient taking differently: Take 20 mg by mouth Daily with supper Pt states he has not taken this for past 2 weeks. Reason unclear to me at this time as to why. 5/6/20  Yes Gamal Chapin,    albuterol (PROVENTIL) (2.5 MG/3ML) 0.083% nebulizer solution Take 3 mLs by nebulization every 6 hours as needed for Wheezing or Shortness of Breath DX: COPD J44.9 Please bill Medicare Part B 1/6/20  Yes Milton Renee MD   losartan (COZAAR) 50 MG tablet Take 1 tablet by mouth daily 11/5/19  Yes Katie Chakraborty DO   levothyroxine (SYNTHROID) 25 MCG tablet TAKE 1 TABLET BY MOUTH EVERY DAY 7/29/19  Yes Historical Provider, MD   gabapentin (NEURONTIN) 400 MG capsule Take 400 mg by mouth 4 times daily. 5/19/19  Yes Historical Provider, MD   metoprolol succinate (TOPROL XL) 50 MG extended release tablet Take 50 mg by mouth daily   Yes Historical Provider, MD   fluticasone (FLONASE) 50 MCG/ACT nasal spray 1 spray by Nasal route daily  5/3/18  Yes Historical Provider, MD   isosorbide mononitrate (IMDUR) 120 MG extended release tablet Take 1 tablet by mouth daily 1/23/18  Yes Andrés Salas MD   morphine (THIERRY) 10 MG extended release capsule Take 10 mg by mouth 2 times daily.  .   Yes Historical Provider, MD   insulin glargine (LANTUS) 100 UNIT/ML injection vial Inject 25 Units into the skin 2 times daily    Yes Historical Provider, MD   allopurinol (ZYLOPRIM) 300 MG tablet Take 300 mg by mouth daily  2/27/15  Yes Historical Provider, MD   lovastatin (MEVACOR) 20 MG tablet Take 20 mg by mouth nightly.  11/11/13  Yes Historical Provider, MD   azelastine (ASTELIN) 0.1 % nasal spray 1 SPRAY BY NASAL ROUTE 2 TIMES DAILY USE IN EACH NOSTRIL AS DIRECTED 5/19/20   Jesus Briscoe MD   HYDROcodone-acetaminophen [facility])  MG per tablet Take 1 tablet by mouth every 6 hours as needed for Pain 9/6/16   Alicia Whitaker MD       Current Medications:    Current Facility-Administered Medications: allopurinol (ZYLOPRIM) tablet 300 mg, 300 mg, Oral, Daily  HYDROcodone-acetaminophen (NORCO)  MG per tablet 1 tablet, 1 tablet, Oral, Q6H PRN  isosorbide mononitrate (IMDUR) extended release tablet 120 mg, 120 mg, Oral, Daily  levothyroxine (SYNTHROID) tablet 25 mcg, 25 mcg, Oral, Daily  metoprolol succinate (TOPROL XL) extended release tablet 50 mg, 50 mg, Oral, Daily  morphine (THIERRY) extended release capsule 10 mg, 10 mg, Oral, BID  rivaroxaban (XARELTO) tablet 20 mg, 20 mg, Oral, Dinner  sodium chloride flush 0.9 % injection 10 mL, 10 mL, Intravenous, 2 times per day  sodium chloride flush 0.9 % injection 10 mL, 10 mL, Intravenous, PRN  acetaminophen (TYLENOL) tablet 650 mg, 650 mg, Oral, Q6H PRN **OR** acetaminophen (TYLENOL) suppository 650 mg, 650 mg, Rectal, Q6H PRN  polyethylene glycol (GLYCOLAX) packet 17 g, 17 g, Oral, Daily PRN  glucose (GLUTOSE) 40 % oral gel 15 g, 15 g, Oral, PRN  dextrose 50 % IV solution, 12.5 g, Intravenous, PRN  glucagon (rDNA) injection 1 mg, 1 mg, Intramuscular, PRN  dextrose 5 % solution, 100 mL/hr, Intravenous, PRN  insulin glargine (LANTUS) injection vial 15 Units, 15 Units, Subcutaneous, BID  insulin lispro (HUMALOG) injection vial 0-12 Units, 0-12 Units, Subcutaneous, TID WC  insulin lispro (HUMALOG) injection vial 0-6 Units, 0-6 Units, Subcutaneous, Nightly  albuterol (PROVENTIL) nebulizer solution 2.5 mg, 2.5 mg, Nebulization, Q6H PRN  Arformoterol Tartrate (BROVANA) nebulizer solution 15 mcg, 15 mcg, Nebulization, BID **AND** ipratropium (ATROVENT) 0.02 % BMI 38.77 kg/m²   CONST:  Well developed, morbidly obese  male who appears of stated age. Awake, alert and cooperative. No apparent distress. HEENT:   Head- Normocephalic, atraumatic   Eyes- Conjunctivae pink, anicteric  Throat- Oral mucosa pink and moist  Neck-  No stridor, trachea midline, no jugular venous distention. + R carotid bruit. CHEST: Chest symmetrical and non-tender to palpation. No accessory muscle use or intercostal retractions. PPM in L chest   RESPIRATORY: Lung sounds - diminished in the bases, on RA   CARDIOVASCULAR:     Heart Inspection- shows no noted pulsations  Heart Palpation- no heaves or thrills; PMI is non-displaced   Heart Ausculation- Regular rate and rhythm, no murmur. PV: No lower extremity edema. No varicosities. Pedal pulses palpable, no clubbing or cyanosis. Venous stasis of BLE. ABDOMEN: Soft, obese, generalized tenderness to light palpation. Bowel sounds present. No palpable masses; no abdominal bruit  MS: Good muscle strength and tone. No atrophy or abnormal movements. : Deferred  SKIN: Pale, arm and dry no statis dermatitis or ulcers   NEURO / PSYCH: Oriented to person, place and time. Speech clear and appropriate. Follows all commands. Pleasant affect     DATA:    ECG as per Dr Johnson Pair interpretation  Tele strips: Paced     Diagnostic:    CXR 7/30/2020: Increasing L pleural effusion.  LLL infiltrate    Intake/Output Summary (Last 24 hours) at 7/31/2020 0928  Last data filed at 7/31/2020 0814  Gross per 24 hour   Intake --   Output 400 ml   Net -400 ml       Labs:   CBC:   Recent Labs     07/30/20  0935 07/31/20  0319   WBC 8.4 8.4   HGB 12.5 11.6*   HCT 37.9 34.1*    147     BMP:   Recent Labs     07/30/20  0935 07/31/20  0424    133   K 4.1 3.7   CO2 24 23   BUN 20 24*   CREATININE 1.5* 1.5*   LABGLOM 45 45   CALCIUM 9.4 9.2     HgA1c:   Lab Results   Component Value Date    LABA1C 6.8 (H) 07/30/2020     No results found for: EAG  proBNP: Recent Labs     07/30/20  0935   PROBNP 3,353*     PT/INR:   Recent Labs     07/30/20  0935   PROTIME 12.4   INR 1.1     APTT:  Recent Labs     07/30/20  0935   APTT 26.2     CARDIAC ENZYMES:  Recent Labs     07/30/20  0935 07/30/20  1333 07/30/20  1842   TROPONINI 0.05* 0.02 0.03     FASTING LIPID PANEL:  Lab Results   Component Value Date    CHOL 149 01/20/2018    HDL 40 01/20/2018    LDLCALC 88 01/20/2018    TRIG 103 01/20/2018     LIVER PROFILE:  Recent Labs     07/30/20  0935 07/31/20  0424   AST 10 13   ALT <5 8   LABALBU 3.6 3.6     ASSESSMENT:  1. CP atypical, troponin pattern not consistent with NSTEMI  2. Elevated p-BNP with no clinical s/s of CHF  3. BLE weakness after ingesting marijuana edible  4. Chronic back pain/spinal stenosis. On chronic morphine, recently (last week) started edible marijuana to help get off Morphine  5. Morbid obesity  6. Unsteady gait ambulates with cane. Sedentary  7. CAD s/p CABG x 2, non ischemic stress in 2018  8. SND s/p PPM in 2014  9. VHD s/p AVR  10. PAD s/p R CEA  11. 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 Check TTE  2. Resume ASA 81 mg QD  3. May be discharged pending results of TTE    Will discuss with Dr Nicole Manzanares    Electronically signed by Lolis Aldrich. BOZENA Cast on 7/31/2020 at 9:28 AM    ______________________________________________________________________  I independently interviewed and examined the patient. I have reviewed the above documentation completed by the LAURY. Please see my additional contributions to the HPI, physical exam, and assessment / medical decision making.     HPI, ROS, PMH, PSH, [address], [place], and medications independently reviewed (agree; see above documentation)    Review of Systems:  Cardiac: As per HPI  General: No fever, chills  Pulmonary: As per HPI  GI: No nausea, vomiting  Musculoskeletal: AGUILLON x 4, no focal motor deficits  Skin: Intact, no rashes  Neuro/Psych: No headache or seizures    Physical Exam:  BP (!) 170/80 Comment: serafin  Pulse 67   Temp 98.6 °F (37 °C) (Oral)   Resp 18   Ht 5' 8\" (1.727 m)   Wt 255 lb (115.7 kg)   SpO2 95%   BMI 38.77 kg/m²   Appearance: Awake, alert, no acute respiratory distress  Skin: Intact, no rash  Head: Normocephalic, atraumatic  Neck: Supple, no carotid bruits  Lungs: Clear to auscultation bilaterally. No wheezes, rales, or rhonchi. Cardiac: Regular rate and rhythm, +S1S2, no murmurs apparent  Abdomen: Soft, +bowel sounds  Extremities: Moves all extremities x 4, no lower extremity edema  Neurologic: No focal motor deficits apparent, normal mood and affect    Assessment/Plan:  81-year-old male with past medical history of CABG and bioprosthetic aortic valve replacement in 2008, heart failure with preserved ejection fraction, morbid obesity, spinal stenosis on chronic narcotics, dual-chamber pacemaker, paroxysmal atrial flutter on anticoagulation, chronic kidney disease with baseline creatinine of 1.3-1.5 who is admitted to the hospital after having an episode of presyncope associated with chest discomfort. Patient has poor functional capacity at baseline. He mentions that he started taking marijuana for his back pain. He has been having nausea and vomiting with taking marijuana. He had lightheadedness and presyncope after having a bowel movement and in the setting of nausea. After that, the patient felt chest discomfort that lasted for a few minutes and not associated with any cardiac symptoms. On presentation, patient had slight elevation of troponin of 0.05 and ECG showed no ischemic changes. Most likely the patient had vasovagal episode with hypotension and type II myocardial ischemic demand. Recommendations  Unlikely that the patient had ACS.   Given that he had history of aortic valve replacement, we will obtain a transthoracic echocardiogram.  Please add aspirin to his anticoagulation since the patient has history of coronary artery disease      Angela Rivero MD  [facility]) Cardiology

## 2020-07-31 NOTE — H&P
SURGERY      carotids       Medications Prior to Admission:    Medications Prior to Admission: rivaroxaban (XARELTO) 20 MG TABS tablet, Take 1 tablet by mouth Daily with supper (Patient taking differently: Take 20 mg by mouth Daily with supper Pt states he has not taken this for past 2 weeks. Reason unclear to me at this time as to why.)  albuterol (PROVENTIL) (2.5 MG/3ML) 0.083% nebulizer solution, Take 3 mLs by nebulization every 6 hours as needed for Wheezing or Shortness of Breath DX: COPD J44.9 Please bill Medicare Part B  losartan (COZAAR) 50 MG tablet, Take 1 tablet by mouth daily  levothyroxine (SYNTHROID) 25 MCG tablet, TAKE 1 TABLET BY MOUTH EVERY DAY  gabapentin (NEURONTIN) 400 MG capsule, Take 400 mg by mouth 4 times daily. metoprolol succinate (TOPROL XL) 50 MG extended release tablet, Take 50 mg by mouth daily  fluticasone (FLONASE) 50 MCG/ACT nasal spray, 1 spray by Nasal route daily   isosorbide mononitrate (IMDUR) 120 MG extended release tablet, Take 1 tablet by mouth daily  morphine (THIERRY) 10 MG extended release capsule, Take 10 mg by mouth 2 times daily. .  insulin glargine (LANTUS) 100 UNIT/ML injection vial, Inject 25 Units into the skin 2 times daily   allopurinol (ZYLOPRIM) 300 MG tablet, Take 300 mg by mouth daily   lovastatin (MEVACOR) 20 MG tablet, Take 20 mg by mouth nightly. azelastine (ASTELIN) 0.1 % nasal spray, 1 SPRAY BY NASAL ROUTE 2 TIMES DAILY USE IN EACH NOSTRIL AS DIRECTED  HYDROcodone-acetaminophen (NORCO)  MG per tablet, Take 1 tablet by mouth every 6 hours as needed for Pain    Allergies:    Patient has no known allergies. Social History:    reports that he quit smoking about 39 years ago. His smoking use included cigarettes. He started smoking about 66 years ago. He has a 50.00 pack-year smoking history. He has never used smokeless tobacco. He reports current drug use. Drug: Marijuana. He reports that he does not drink alcohol.     Family History:   family history includes Cancer in his sister; Heart Disease in his father and mother; Heart Failure in his father and mother; Heart Surgery in his father; High Blood Pressure in his sister. REVIEW OF SYSTEMS:  As above in the HPI, otherwise negative    PHYSICAL EXAM:    Vitals:  BP (!) 153/74   Pulse 66   Temp 98.6 °F (37 °C) (Oral)   Resp 18   Ht 5' 8\" (1.727 m)   Wt 255 lb (115.7 kg)   SpO2 95%   BMI 38.77 kg/m²     General:  Awake, alert, oriented X 3. Frail appearing. HEENT:  Normocephalic, atraumatic. Pupils equal, round, reactive to light. No scleral icterus. No conjunctival injection. Normal lips, teeth, and gums. No nasal discharge. Neck:  Supple  Heart:  RRR, no murmurs, gallops, rubs  Lungs:  CTA bilaterally, bilat symmetrical expansion, no wheeze, rales, or rhonchi  Abdomen:  Obese. Bowel sounds present, soft, nontender, no masses, no organomegaly, no peritoneal signs  Extremities:  No clubbing, cyanosis, or edema  Skin:  Warm and dry, no open lesions or rash  Neuro:  Cranial nerves 2-12 intact, no focal deficits. General physical deconditioning.     Breast: deferred  Rectal: deferred  Genitalia:  deferred    LABS:    CBC with Differential:    Lab Results   Component Value Date    WBC 8.4 07/31/2020    RBC 3.57 07/31/2020    HGB 11.6 07/31/2020    HCT 34.1 07/31/2020     07/31/2020    MCV 95.5 07/31/2020    MCH 32.5 07/31/2020    MCHC 34.0 07/31/2020    RDW 14.1 07/31/2020    NRBC 0.0 04/22/2018    SEGSPCT 76 03/16/2014    BANDSPCT 1 09/02/2016    LYMPHOPCT 19.0 07/31/2020    MONOPCT 6.5 07/31/2020    BASOPCT 0.6 07/31/2020    MONOSABS 0.54 07/31/2020    LYMPHSABS 1.59 07/31/2020    EOSABS 0.20 07/31/2020    BASOSABS 0.05 07/31/2020     CMP:    Lab Results   Component Value Date     07/31/2020    K 3.7 07/31/2020    CL 96 07/31/2020    CO2 23 07/31/2020    BUN 24 07/31/2020    CREATININE 1.5 07/31/2020    GFRAA 55 07/31/2020    LABGLOM 45 07/31/2020    GLUCOSE 139 07/31/2020 GLUCOSE 111 07/16/2011    PROT 6.7 07/31/2020    LABALBU 3.6 07/31/2020    CALCIUM 9.2 07/31/2020    BILITOT 0.4 07/31/2020    ALKPHOS 61 07/31/2020    AST 13 07/31/2020    ALT 8 07/31/2020     BMP:    Lab Results   Component Value Date     07/31/2020    K 3.7 07/31/2020    CL 96 07/31/2020    CO2 23 07/31/2020    BUN 24 07/31/2020    LABALBU 3.6 07/31/2020    CREATININE 1.5 07/31/2020    CALCIUM 9.2 07/31/2020    GFRAA 55 07/31/2020    LABGLOM 45 07/31/2020    GLUCOSE 139 07/31/2020    GLUCOSE 111 07/16/2011     Magnesium:    Lab Results   Component Value Date    MG 1.9 04/22/2018     Phosphorus:    Lab Results   Component Value Date    PHOS 1.9 11/02/2019     PT/INR:    Lab Results   Component Value Date    PROTIME 12.4 07/30/2020    PROTIME 11.3 06/21/2011    INR 1.1 07/30/2020     PTT:    Lab Results   Component Value Date    APTT 26.2 07/30/2020   [APTT}  Troponin:    Lab Results   Component Value Date    TROPONINI 0.03 07/30/2020     Last 3 Troponin:    Lab Results   Component Value Date    TROPONINI 0.03 07/30/2020    TROPONINI 0.02 07/30/2020    TROPONINI 0.05 07/30/2020     U/A:    Lab Results   Component Value Date    COLORU Yellow 07/30/2020    PROTEINU TRACE 07/30/2020    PHUR 6.0 07/30/2020    LABCAST RARE 10/11/2017    WBCUA NONE 07/30/2020    WBCUA NONE 07/12/2011    RBCUA NONE 07/30/2020    RBCUA 1-3 03/17/2014    BACTERIA FEW 07/30/2020    CLARITYU Clear 07/30/2020    SPECGRAV 1.010 07/30/2020    LEUKOCYTESUR Negative 07/30/2020    UROBILINOGEN 0.2 07/30/2020    BILIRUBINUR Negative 07/30/2020    BILIRUBINUR NEGATIVE 07/12/2011    BLOODU Negative 07/30/2020    GLUCOSEU Negative 07/30/2020    GLUCOSEU NEGATIVE 07/12/2011     HgBA1c:    Lab Results   Component Value Date    LABA1C 6.8 07/30/2020     FLP:    Lab Results   Component Value Date    TRIG 103 01/20/2018    HDL 40 01/20/2018    LDLCALC 88 01/20/2018    LABVLDL 21 01/20/2018     TSH:    Lab Results   Component Value Date    TSH 1.050 05/13/2018       ASSESSMENT:      Patient Active Problem List   Diagnosis    Essential hypertension    Pacemaker    S/P AVR    CKD (chronic kidney disease) stage 3, GFR 30-59 ml/min ([facility])    Pulmonary nodule    Diabetes mellitus type 2, uncontrolled ([facility] [address]    Hyperlipidemia LDL goal <100    Acquired hypothyroidism    CAD (coronary artery disease), native coronary artery    Chronic pain    History of CVA (cerebrovascular accident)    Chest pain    Obesity (BMI 30-39. 9)         PLAN:    Blood pressure ok, continue current medications  Stable. Stable. Renal function at baseline. Continue outpatient monitoring. Blood glucose ok, continue to adjust basal/bolus insulin therapy  Continue aggressive lipid therapy  Continue synthroid. Continue medical management of ASCAD. Continue Pt/Ot. Continue Pt/Ot and risk factor modifications. Rule out ACS. EKG and enzymes negative. Highly doubt cardiac in nature. Will ask cardiology input. Continue to encourage weight loss. Discharge after cardiac ischemic evaluation complete.     Mary Mckenzie MD  9:03 AM  7/31/2020

## 2020-08-01 ENCOUNTER — CARE COORDINATION (OUTPATIENT)
Dept: CASE MANAGEMENT | Age: 80
End: 2020-08-01

## 2020-08-01 NOTE — CARE COORDINATION
[facility] Transitions Follow Up Call    2020    Patient: Jhonathan Hrading  Patient : 1940   MRN: <[id number]>  Reason for Admission: Chest Pain  Discharge Date: 20 RARS: Readmission Risk Score: 15    Attempted to contact patient regarding COVID-19. Unable to LVM d/t no vm set up. Will attempt again.                 Follow Up  Future Appointments   Date Time Provider Jonathon Sierra   2020  9:20 AM SCHEDULE, REMOTE CHECK [facility]   2020  1:00 PM Kelly Zafar MD [address] Ambrose Puentes RN

## 2020-08-03 ENCOUNTER — CARE COORDINATION (OUTPATIENT)
Dept: CASE MANAGEMENT | Age: 80
End: 2020-08-03

## 2020-08-05 ENCOUNTER — NURSE ONLY (OUTPATIENT)
Dept: NON INVASIVE DIAGNOSTICS | Age: 80
End: 2020-08-05
Payer: MEDICARE

## 2020-08-05 PROCEDURE — 93294 REM INTERROG EVL PM/LDLS PM: CPT | Performed by: INTERNAL MEDICINE

## 2020-08-05 PROCEDURE — 93296 REM INTERROG EVL PM/IDS: CPT | Performed by: INTERNAL MEDICINE

## 2020-08-05 NOTE — PROGRESS NOTES
See [facility] [place] report. Remote monitoring reviewed over a 90 day period.   End of 90 day monitoring period date of service 08/05/2020

## 2020-08-07 ENCOUNTER — OFFICE VISIT (OUTPATIENT)
Dept: CARDIOLOGY CLINIC | Age: 80
End: 2020-08-07
Payer: MEDICARE

## 2020-08-07 VITALS
DIASTOLIC BLOOD PRESSURE: 80 MMHG | WEIGHT: 250 LBS | RESPIRATION RATE: 16 BRPM | BODY MASS INDEX: 37.89 KG/M2 | HEART RATE: 60 BPM | SYSTOLIC BLOOD PRESSURE: 122 MMHG | HEIGHT: 68 IN

## 2020-08-07 PROCEDURE — 99213 OFFICE O/P EST LOW 20 MIN: CPT | Performed by: STUDENT IN AN ORGANIZED HEALTH CARE EDUCATION/TRAINING PROGRAM

## 2020-08-07 PROCEDURE — 1111F DSCHRG MED/CURRENT MED MERGE: CPT | Performed by: STUDENT IN AN ORGANIZED HEALTH CARE EDUCATION/TRAINING PROGRAM

## 2020-08-07 PROCEDURE — 93000 ELECTROCARDIOGRAM COMPLETE: CPT | Performed by: STUDENT IN AN ORGANIZED HEALTH CARE EDUCATION/TRAINING PROGRAM

## 2020-08-07 RX ORDER — ASPIRIN 81 MG/1
81 TABLET ORAL DAILY
Qty: 90 TABLET | Refills: 1 | COMMUNITY
Start: 2020-08-07

## 2020-08-07 NOTE — PROGRESS NOTES
OUTPATIENT CARDIOLOGY FOLLOW-UP    Name: Alfred Espinoza    Age: 67 y.o. Primary Care Physician: Tucker Vo MD    Date of Service: 8/7/2020    Interim History: 76-year-old male with past medical history of CABGx 2 and bioprosthetic aortic valve replacement in 2008, heart failure with preserved ejection fraction, morbid obesity, spinal stenosis on chronic narcotics, dual-chamber pacemaker, paroxysmal atrial flutter on anticoagulation, chronic kidney disease with baseline creatinine of 1.3-1.5 who was recently admitted to the hospital after having a vasovagal episode with hypotension and type II myocardial ischemic demand troponin 0 0.05. For further evaluation and management. Transthoracic echocardiogram showed no significant valvular abnormalities with stable gradients across the bioprosthetic aortic valve. He is here for follow-up. Patient had no new symptoms. He has no chest pain. He continues to have low energy and uses a cane to walk inside the house. He does not leave the house. His brother is with him during clinical encounter and he takes care of buying groceries for him.        Review of Systems:   Cardiac: As per HPI  General: No fever, chills  Pulmonary: As per HPI  HEENT: No visual disturbances, difficult swallowing  GI: No nausea, vomiting  Endocrine: No thyroid disease or DM  Musculoskeletal: AGUILLON x 4, no focal motor deficits  Skin: Intact, no rashes  Neuro/Psych: No headache or seizures    Past Medical History:  Past Medical History:   Diagnosis Date    Arthritis     Blood circulation, collateral     CAD (coronary artery disease)     Carotid bruit 3/27/2013    CHF (congestive heart failure) (HCC)     Chronic kidney disease     CKD (chronic kidney disease) stage 3, GFR 30-59 ml/min ([facility] [address] 3/20/2016    Diabetes mellitus (HCC)     Dyspnea on exertion     History of blood transfusion     Hyperlipidemia     Hypertension     Movement disorder     NSTEMI, initial episode of care ([facility] 2018    Obesity     PVD (peripheral vascular disease) with claudication ([facility] 3/27/2014    S/P carotid endarterectomy 3/27/2013    Sleep apnea     SOB (shortness of breath)     Thyroid disease     Unspecified cerebral artery occlusion with cerebral infarction        Past Surgical History:  Past Surgical History:   Procedure Laterality Date    CARDIAC SURGERY      5 years ago    CARDIAC VALVE SURGERY      CORONARY ARTERY BYPASS GRAFT      DIAGNOSTIC CARDIAC CATH LAB PROCEDURE  10/14/08    DIAGNOSTIC CARDIAC CATH LAB PROCEDURE  10/21/10    JOINT REPLACEMENT      R knee replacement    KNEE SURGERY      OTHER SURGICAL HISTORY Right 2017    debridement,bone bx foot    PACEMAKER INSERTION  2014    D-PPM   ()    Dr. Yaw johnson       Family History:  Family History   Problem Relation Age of Onset    Heart Disease Mother     Heart Failure Mother     Heart Surgery Father     Heart Disease Father     Heart Failure Father     High Blood Pressure Sister     Cancer Sister        Social History:  Social History     Socioeconomic History    Marital status:      Spouse name: Not on file    Number of children: Not on file    Years of education: Not on file    Highest education level: Not on file   Occupational History    Occupation: retired- [employer]   Social Needs    Financial resource strain: Not on file    Food insecurity     Worry: Not on file     Inability: Not on file   [employer] needs     Medical: Not on file     Non-medical: Not on file   Tobacco Use    Smoking status: Former Smoker     Packs/day: 2.00     Years: 25.00     Pack years: 50.00     Types: Cigarettes     Start date: 1953     Last attempt to quit: 1981     Years since quittin.1    Smokeless tobacco: Never Used   Substance and Sexual Activity    Alcohol use: Never     Frequency: Never     Comment:      Drug use: Yes     Types: times daily. Tristanpete Rodríguez insulin glargine (LANTUS) 100 UNIT/ML injection vial Inject 25 Units into the skin 2 times daily       HYDROcodone-acetaminophen (NORCO)  MG per tablet Take 1 tablet by mouth every 6 hours as needed for Pain 15 tablet 0    allopurinol (ZYLOPRIM) 300 MG tablet Take 300 mg by mouth daily       lovastatin (MEVACOR) 20 MG tablet Take 20 mg by mouth nightly.  azelastine (ASTELIN) 0.1 % nasal spray 1 SPRAY BY NASAL ROUTE 2 TIMES DAILY USE IN EACH NOSTRIL AS DIRECTED (Patient not taking: Reported on 8/7/2020) 1 Bottle 3    fluticasone (FLONASE) 50 MCG/ACT nasal spray 1 spray by Nasal route daily        No current facility-administered medications for this visit. Physical Exam:  /80   Pulse 60   Resp 16   Ht 5' 8\" (1.727 m)   Wt 250 lb (113.4 kg)   BMI 38.01 kg/m²   Wt Readings from Last 3 Encounters:   08/07/20 250 lb (113.4 kg)   07/30/20 255 lb (115.7 kg)   05/11/20 260 lb (117.9 kg)     Appearance: Awake, alert and oriented x 3, no acute respiratory distress  Skin: Intact, no rash  Head: Normocephalic, atraumatic  Neck: Supple, no elevated JVP, no carotid bruits  Lungs: Clear to auscultation bilaterally. No wheezes, rales, or rhonchi.   Cardiac: Regular rate and rhythm, +S1S2, no murmurs apparent  Abdomen: Soft, nontender, +bowel sounds  Extremities: Moves all extremities x 4, no lower extremity edema  Neurologic: No focal motor deficits apparent, normal mood and affect, alert and oriented x 3  Peripheral Pulses: Intact posterior tibial pulses bilaterally    Laboratory Tests:  Lab Results   Component Value Date    CREATININE 1.5 (H) 07/31/2020    BUN 24 (H) 07/31/2020     07/31/2020    K 3.7 07/31/2020    CL 96 (L) 07/31/2020    CO2 23 07/31/2020     Lab Results   Component Value Date    MG 1.9 04/22/2018     Lab Results   Component Value Date    WBC 8.4 07/31/2020    HGB 11.6 (L) 07/31/2020    HCT 34.1 (L) 07/31/2020    MCV 95.5 07/31/2020     07/31/2020 Lab Results   Component Value Date    ALT 8 07/31/2020    AST 13 07/31/2020    ALKPHOS 61 07/31/2020    BILITOT 0.4 07/31/2020     Lab Results   Component Value Date    CKTOTAL 245 (H) 11/01/2019    CKMB 8.2 (H) 01/20/2018    TROPONINI 0.03 07/30/2020    TROPONINI 0.02 07/30/2020    TROPONINI 0.05 (H) 07/30/2020     Lab Results   Component Value Date    INR 1.1 07/30/2020    INR 1.1 10/11/2017    INR 1.1 09/02/2016    PROTIME 12.4 07/30/2020    PROTIME 12.3 10/11/2017    PROTIME 11.5 09/02/2016     Lab Results   Component Value Date    TSH 1.430 07/31/2020     Lab Results   Component Value Date    LABA1C 6.8 (H) 07/30/2020     No results found for: EAG  Lab Results   Component Value Date    CHOL 149 01/20/2018    CHOL 169 09/05/2014    CHOL 157 03/18/2014     Lab Results   Component Value Date    TRIG 103 01/20/2018    TRIG 172 (H) 09/05/2014    TRIG 237 (H) 03/18/2014     Lab Results   Component Value Date    HDL 40 01/20/2018    HDL 38 09/05/2014    HDL 28 (A) 03/18/2014     Lab Results   Component Value Date    LDLCALC 88 01/20/2018    LDLCALC 97 09/05/2014    LDLCALC 82 03/18/2014     Lab Results   Component Value Date    LABVLDL 21 01/20/2018    LABVLDL 34 09/05/2014     No results found for: CHOLHDLRATIO  No results for input(s): PROBNP in the last 72 hours. Cardiac Tests:  ECG: Atrial fibrillation with ventricular pacing. ASSESSMENT / PLAN:  7year-old male with past medical history of CABGx 2 and bioprosthetic aortic valve replacement in 2008, heart failure with preserved ejection fraction, morbid obesity, spinal stenosis on chronic narcotics, dual-chamber pacemaker, paroxysmal atrial flutter on anticoagulation, chronic kidney disease with baseline creatinine of 1.3-1.5 who was recently admitted for vasovagal syncope. He comes here for follow-up. Recommendations  Since 2018, his need for ventricular pacing has increased slowly from 25% to 95% on the last interrogation.   Also, his echocardiogram showed worsening ejection fraction from 60% to 45% with dyssynchrony between the lateral wall and the septal wall on the echocardiogram.  According to the brother, patient has been worsening over the last 2 years with decreasing energy. We will refer to electrophysiology for possible upgrade to biventricular pacer. I made this clear the importance of being on aspirin in addition to Xarelto due to history of coronary artery disease. The patient's current medication list, allergies, problem list and results of all previously ordered testing were reviewed at today's visit.     Sahra Jones MD

## 2020-08-12 ENCOUNTER — TELEPHONE (OUTPATIENT)
Dept: CARDIOLOGY CLINIC | Age: 80
End: 2020-08-12

## 2020-08-25 ENCOUNTER — TELEPHONE (OUTPATIENT)
Dept: NON INVASIVE DIAGNOSTICS | Age: 80
End: 2020-08-25

## 2020-08-25 NOTE — TELEPHONE ENCOUNTER
Spoke with patient offered an appointment with [facility] next week to discuss possible upgrade. Patient declined appointment stating he will wait for [facility] to return, let patient know [facility] is currently on medication leave and unsure when he will be returning. Patient verbalized understanding and states is willing to wait.

## 2020-08-25 NOTE — TELEPHONE ENCOUNTER
----- Message from Rock Sita MD sent at 8/17/2020  1:08 PM EDT -----  Depend on his clinical and patient's preference, can ask him to come in for office visit to further discussion. Thanks.  ----- Message -----  From: Valerie Pierce  Sent: 8/17/2020   9:19 AM EDT  To: Rock Sita MD    Please advise for possible upgrade of device.  Thanks

## 2020-09-09 ENCOUNTER — TELEPHONE (OUTPATIENT)
Dept: ADMINISTRATIVE | Age: 80
End: 2020-09-09

## 2020-09-09 NOTE — TELEPHONE ENCOUNTER
Pt was scheduled today with Dr. Bj Hsu but cancelled his appt d/t sores in his mouth and sore throat. He asked if we could call him back in a week or so to reschedule.

## 2020-10-19 ENCOUNTER — HOSPITAL ENCOUNTER (OUTPATIENT)
Age: 80
Setting detail: OBSERVATION
Discharge: HOME | End: 2020-10-20
Attending: EMERGENCY MEDICINE | Admitting: INTERNAL MEDICINE
Payer: MEDICARE

## 2020-10-19 PROBLEM — R04.0 EPISTAXIS: Status: ACTIVE | Noted: 2020-10-19

## 2020-10-19 PROBLEM — D64.9 ANEMIA: Status: ACTIVE | Noted: 2020-10-19

## 2020-10-19 LAB
ALBUMIN SERPL-MCNC: 3.8 G/DL (ref 3.5–5.2)
ALP BLD-CCNC: 67 U/L (ref 40–129)
ALT SERPL-CCNC: 7 U/L (ref 0–40)
ANION GAP SERPL CALCULATED.3IONS-SCNC: 13 MMOL/L (ref 7–16)
APTT: 39.3 SEC (ref 24.5–35.1)
AST SERPL-CCNC: 12 U/L (ref 0–39)
BASOPHILS ABSOLUTE: 0.05 E9/L (ref 0–0.2)
BASOPHILS RELATIVE PERCENT: 0.7 % (ref 0–2)
BILIRUB SERPL-MCNC: <0.2 MG/DL (ref 0–1.2)
BUN BLDV-MCNC: 37 MG/DL (ref 8–23)
CALCIUM SERPL-MCNC: 9.4 MG/DL (ref 8.6–10.2)
CHLORIDE BLD-SCNC: 100 MMOL/L (ref 98–107)
CO2: 25 MMOL/L (ref 22–29)
CREAT SERPL-MCNC: 1.9 MG/DL (ref 0.7–1.2)
EOSINOPHILS ABSOLUTE: 0.37 E9/L (ref 0.05–0.5)
EOSINOPHILS RELATIVE PERCENT: 5 % (ref 0–6)
GFR AFRICAN AMERICAN: 42
GFR NON-AFRICAN AMERICAN: 34 ML/MIN/1.73
GLUCOSE BLD-MCNC: 154 MG/DL (ref 74–99)
HCT VFR BLD CALC: 25.3 % (ref 37–54)
HEMOGLOBIN: 7.6 G/DL (ref 12.5–16.5)
IMMATURE GRANULOCYTES #: 0.02 E9/L
IMMATURE GRANULOCYTES %: 0.3 % (ref 0–5)
INR BLD: 1.8
LYMPHOCYTES ABSOLUTE: 1.55 E9/L (ref 1.5–4)
LYMPHOCYTES RELATIVE PERCENT: 21 % (ref 20–42)
MCH RBC QN AUTO: 31.1 PG (ref 26–35)
MCHC RBC AUTO-ENTMCNC: 30 % (ref 32–34.5)
MCV RBC AUTO: 103.7 FL (ref 80–99.9)
METER GLUCOSE: 146 MG/DL (ref 74–99)
MONOCYTES ABSOLUTE: 0.49 E9/L (ref 0.1–0.95)
MONOCYTES RELATIVE PERCENT: 6.6 % (ref 2–12)
NEUTROPHILS ABSOLUTE: 4.9 E9/L (ref 1.8–7.3)
NEUTROPHILS RELATIVE PERCENT: 66.4 % (ref 43–80)
PDW BLD-RTO: 15 FL (ref 11.5–15)
PLATELET # BLD: 185 E9/L (ref 130–450)
PMV BLD AUTO: 9.2 FL (ref 7–12)
POTASSIUM REFLEX MAGNESIUM: 4.6 MMOL/L (ref 3.5–5)
PROTHROMBIN TIME: 21.8 SEC (ref 9.3–12.4)
RBC # BLD: 2.44 E12/L (ref 3.8–5.8)
SODIUM BLD-SCNC: 138 MMOL/L (ref 132–146)
TOTAL PROTEIN: 6.7 G/DL (ref 6.4–8.3)
WBC # BLD: 7.4 E9/L (ref 4.5–11.5)

## 2020-10-19 PROCEDURE — 2060000000 HC ICU INTERMEDIATE R&B

## 2020-10-19 PROCEDURE — 99284 EMERGENCY DEPT VISIT MOD MDM: CPT

## 2020-10-19 PROCEDURE — 96374 THER/PROPH/DIAG INJ IV PUSH: CPT

## 2020-10-19 PROCEDURE — 80053 COMPREHEN METABOLIC PANEL: CPT

## 2020-10-19 PROCEDURE — 85025 COMPLETE CBC W/AUTO DIFF WBC: CPT

## 2020-10-19 PROCEDURE — G0378 HOSPITAL OBSERVATION PER HR: HCPCS

## 2020-10-19 PROCEDURE — 82962 GLUCOSE BLOOD TEST: CPT

## 2020-10-19 PROCEDURE — 85610 PROTHROMBIN TIME: CPT

## 2020-10-19 PROCEDURE — 6370000000 HC RX 637 (ALT 250 FOR IP): Performed by: PHYSICIAN ASSISTANT

## 2020-10-19 PROCEDURE — 6370000000 HC RX 637 (ALT 250 FOR IP): Performed by: EMERGENCY MEDICINE

## 2020-10-19 PROCEDURE — 6360000002 HC RX W HCPCS: Performed by: PHYSICIAN ASSISTANT

## 2020-10-19 PROCEDURE — 85730 THROMBOPLASTIN TIME PARTIAL: CPT

## 2020-10-19 RX ORDER — ALLOPURINOL 300 MG/1
300 TABLET ORAL DAILY
Status: DISCONTINUED | OUTPATIENT
Start: 2020-10-20 | End: 2020-10-20 | Stop reason: HOSPADM

## 2020-10-19 RX ORDER — ISOSORBIDE MONONITRATE 60 MG/1
120 TABLET, EXTENDED RELEASE ORAL DAILY
Status: DISCONTINUED | OUTPATIENT
Start: 2020-10-20 | End: 2020-10-20 | Stop reason: HOSPADM

## 2020-10-19 RX ORDER — SODIUM CHLORIDE 0.9 % (FLUSH) 0.9 %
10 SYRINGE (ML) INJECTION PRN
Status: DISCONTINUED | OUTPATIENT
Start: 2020-10-19 | End: 2020-10-20 | Stop reason: HOSPADM

## 2020-10-19 RX ORDER — ACETAMINOPHEN 325 MG/1
650 TABLET ORAL EVERY 6 HOURS PRN
Status: DISCONTINUED | OUTPATIENT
Start: 2020-10-19 | End: 2020-10-20 | Stop reason: HOSPADM

## 2020-10-19 RX ORDER — POTASSIUM CHLORIDE 20 MEQ/1
40 TABLET, EXTENDED RELEASE ORAL PRN
Status: DISCONTINUED | OUTPATIENT
Start: 2020-10-19 | End: 2020-10-20 | Stop reason: HOSPADM

## 2020-10-19 RX ORDER — ALBUTEROL SULFATE 2.5 MG/3ML
2.5 SOLUTION RESPIRATORY (INHALATION) EVERY 6 HOURS PRN
Status: DISCONTINUED | OUTPATIENT
Start: 2020-10-19 | End: 2020-10-20 | Stop reason: HOSPADM

## 2020-10-19 RX ORDER — HYDRALAZINE HYDROCHLORIDE 20 MG/ML
10 INJECTION INTRAMUSCULAR; INTRAVENOUS EVERY 6 HOURS PRN
Status: DISCONTINUED | OUTPATIENT
Start: 2020-10-19 | End: 2020-10-20 | Stop reason: HOSPADM

## 2020-10-19 RX ORDER — LOVASTATIN 20 MG/1
20 TABLET ORAL NIGHTLY
Status: DISCONTINUED | OUTPATIENT
Start: 2020-10-19 | End: 2020-10-19 | Stop reason: CLARIF

## 2020-10-19 RX ORDER — ASPIRIN 81 MG/1
81 TABLET ORAL DAILY
Status: DISCONTINUED | OUTPATIENT
Start: 2020-10-20 | End: 2020-10-20 | Stop reason: HOSPADM

## 2020-10-19 RX ORDER — DEXTROSE MONOHYDRATE 25 G/50ML
12.5 INJECTION, SOLUTION INTRAVENOUS PRN
Status: DISCONTINUED | OUTPATIENT
Start: 2020-10-19 | End: 2020-10-20 | Stop reason: HOSPADM

## 2020-10-19 RX ORDER — MORPHINE SULFATE 10 MG/1
10 CAPSULE, EXTENDED RELEASE ORAL 2 TIMES DAILY
Status: DISCONTINUED | OUTPATIENT
Start: 2020-10-19 | End: 2020-10-20 | Stop reason: HOSPADM

## 2020-10-19 RX ORDER — DEXTROSE MONOHYDRATE 50 MG/ML
100 INJECTION, SOLUTION INTRAVENOUS PRN
Status: DISCONTINUED | OUTPATIENT
Start: 2020-10-19 | End: 2020-10-20 | Stop reason: HOSPADM

## 2020-10-19 RX ORDER — SODIUM CHLORIDE 0.9 % (FLUSH) 0.9 %
10 SYRINGE (ML) INJECTION EVERY 12 HOURS SCHEDULED
Status: DISCONTINUED | OUTPATIENT
Start: 2020-10-19 | End: 2020-10-20 | Stop reason: HOSPADM

## 2020-10-19 RX ORDER — LOSARTAN POTASSIUM 50 MG/1
50 TABLET ORAL DAILY
Status: DISCONTINUED | OUTPATIENT
Start: 2020-10-20 | End: 2020-10-20 | Stop reason: HOSPADM

## 2020-10-19 RX ORDER — MORPHINE SULFATE 15 MG/1
15 TABLET, FILM COATED, EXTENDED RELEASE ORAL ONCE
Status: COMPLETED | OUTPATIENT
Start: 2020-10-19 | End: 2020-10-19

## 2020-10-19 RX ORDER — NICOTINE POLACRILEX 4 MG
15 LOZENGE BUCCAL PRN
Status: DISCONTINUED | OUTPATIENT
Start: 2020-10-19 | End: 2020-10-20 | Stop reason: HOSPADM

## 2020-10-19 RX ORDER — FLUTICASONE PROPIONATE 50 MCG
1 SPRAY, SUSPENSION (ML) NASAL DAILY
Status: DISCONTINUED | OUTPATIENT
Start: 2020-10-20 | End: 2020-10-20 | Stop reason: HOSPADM

## 2020-10-19 RX ORDER — METOPROLOL SUCCINATE 25 MG/1
50 TABLET, EXTENDED RELEASE ORAL DAILY
Status: DISCONTINUED | OUTPATIENT
Start: 2020-10-20 | End: 2020-10-20 | Stop reason: HOSPADM

## 2020-10-19 RX ORDER — AZELASTINE 1 MG/ML
1 SPRAY, METERED NASAL 2 TIMES DAILY
Status: DISCONTINUED | OUTPATIENT
Start: 2020-10-19 | End: 2020-10-19 | Stop reason: CLARIF

## 2020-10-19 RX ORDER — POTASSIUM CHLORIDE 7.45 MG/ML
10 INJECTION INTRAVENOUS PRN
Status: DISCONTINUED | OUTPATIENT
Start: 2020-10-19 | End: 2020-10-20 | Stop reason: HOSPADM

## 2020-10-19 RX ORDER — HYDROCODONE BITARTRATE AND ACETAMINOPHEN 10; 325 MG/1; MG/1
1 TABLET ORAL EVERY 6 HOURS PRN
Status: DISCONTINUED | OUTPATIENT
Start: 2020-10-19 | End: 2020-10-20 | Stop reason: HOSPADM

## 2020-10-19 RX ORDER — INSULIN GLARGINE 100 [IU]/ML
25 INJECTION, SOLUTION SUBCUTANEOUS 2 TIMES DAILY
Status: DISCONTINUED | OUTPATIENT
Start: 2020-10-19 | End: 2020-10-20 | Stop reason: HOSPADM

## 2020-10-19 RX ORDER — LEVOTHYROXINE SODIUM 0.03 MG/1
75 TABLET ORAL DAILY
Status: DISCONTINUED | OUTPATIENT
Start: 2020-10-20 | End: 2020-10-20 | Stop reason: HOSPADM

## 2020-10-19 RX ORDER — ACETAMINOPHEN 650 MG/1
650 SUPPOSITORY RECTAL EVERY 6 HOURS PRN
Status: DISCONTINUED | OUTPATIENT
Start: 2020-10-19 | End: 2020-10-20 | Stop reason: HOSPADM

## 2020-10-19 RX ORDER — ATORVASTATIN CALCIUM 10 MG/1
10 TABLET, FILM COATED ORAL NIGHTLY
Status: DISCONTINUED | OUTPATIENT
Start: 2020-10-19 | End: 2020-10-20 | Stop reason: HOSPADM

## 2020-10-19 RX ADMIN — ATORVASTATIN CALCIUM 10 MG: 10 TABLET, FILM COATED ORAL at 22:59

## 2020-10-19 RX ADMIN — INSULIN GLARGINE 25 UNITS: 100 INJECTION, SOLUTION SUBCUTANEOUS at 23:01

## 2020-10-19 RX ADMIN — INSULIN LISPRO 1 UNITS: 100 INJECTION, SOLUTION INTRAVENOUS; SUBCUTANEOUS at 23:01

## 2020-10-19 RX ADMIN — HYDRALAZINE HYDROCHLORIDE 10 MG: 20 INJECTION, SOLUTION INTRAMUSCULAR; INTRAVENOUS at 23:30

## 2020-10-19 RX ADMIN — GABAPENTIN 400 MG: 100 CAPSULE ORAL at 22:59

## 2020-10-19 RX ADMIN — MORPHINE SULFATE 15 MG: 15 TABLET, FILM COATED, EXTENDED RELEASE ORAL at 21:37

## 2020-10-19 ASSESSMENT — ENCOUNTER SYMPTOMS
COLOR CHANGE: 0
BLOOD IN STOOL: 0
COUGH: 0
TROUBLE SWALLOWING: 0
VOICE CHANGE: 0
VOMITING: 0
NAUSEA: 0
RHINORRHEA: 0
DIARRHEA: 0
SHORTNESS OF BREATH: 0
ABDOMINAL PAIN: 0

## 2020-10-19 ASSESSMENT — PAIN SCALES - GENERAL
PAINLEVEL_OUTOF10: 8
PAINLEVEL_OUTOF10: 0

## 2020-10-19 NOTE — ED PROVIDER NOTES
ED PROVIDER NOTE    Chief Complaint   Patient presents with    Epistaxis     uncontrolled out of right nare since 1000 this AM, on xarelto, denies injury       HPI:  10/19/20,   Time: 6:02 PM EDT       Berlin Alvarado is a 79 y.o. male presenting to the ED for epistaxis. Sudden onset at 1000 this morning, constant, moderate in severity. Since arriving in waiting room it stopped. Has been stopped spontaneously since about 1630. On xarelto. No recent trauma. Currently no bleeding and no postnasal drip. No dizziness, lightheadedness, shortness of breath, chest pain, syncope. No hematuria, hemoptysis, hematemesis. Currently asymptomatic. Chart review: hx of CAD, CVA, DM, HLD, HTN, AVR    Review of Systems:     Review of Systems   Constitutional: Negative for appetite change, chills and fever. HENT: Positive for nosebleeds. Negative for congestion, rhinorrhea, trouble swallowing and voice change. Eyes: Negative for visual disturbance. Respiratory: Negative for cough and shortness of breath. Cardiovascular: Negative for chest pain and leg swelling. Gastrointestinal: Negative for abdominal pain, blood in stool, diarrhea, nausea and vomiting. Genitourinary: Negative for decreased urine volume, difficulty urinating, dysuria, frequency, hematuria and urgency. Musculoskeletal: Negative for myalgias, neck pain and neck stiffness. Skin: Negative for color change.    Neurological: Negative for dizziness, syncope, weakness, light-headedness, numbness and headaches.         --------------------------------------------- PAST HISTORY ---------------------------------------------  Past Medical History:   Past Medical History:   Diagnosis Date    Arthritis     Blood circulation, collateral     CAD (coronary artery disease)     Carotid bruit 3/27/2013    CHF (congestive heart failure) (HCC)     Chronic kidney disease     CKD (chronic kidney disease) stage 3, GFR 30-59 ml/min 3/20/2016    Diabetes mellitus None    Stress: None   Relationships    Social connections     Talks on phone: None     Gets together: None     Attends Voodoo service: None     Active member of club or organization: None     Attends meetings of clubs or organizations: None     Relationship status: None    Intimate partner violence     Fear of current or ex partner: None     Emotionally abused: None     Physically abused: None     Forced sexual activity: None   Other Topics Concern    None   Social History Narrative    1 cup coffee daily       Family History:   Family History   Problem Relation Age of Onset    Heart Disease Mother     Heart Failure Mother     Heart Surgery Father     Heart Disease Father     Heart Failure Father     High Blood Pressure Sister     Cancer Sister        The patients home medications have been reviewed. Allergies:   No Known Allergies        ---------------------------------------------------PHYSICAL EXAM--------------------------------------    /64   Pulse 78   Temp 98.6 °F (37 °C)   Resp 16   Ht 5' 8\" (1.727 m)   Wt 260 lb (117.9 kg)   SpO2 98%   BMI 39.53 kg/m²     Physical Exam  Vitals signs and nursing note reviewed. Constitutional:       General: He is not in acute distress. Appearance: He is not toxic-appearing. HENT:      Nose:      Comments: Dried blood in R nare, no active bleeding. No postnasal drip     Mouth/Throat:      Mouth: Mucous membranes are moist.   Eyes:      General: No scleral icterus. Extraocular Movements: Extraocular movements intact. Pupils: Pupils are equal, round, and reactive to light. Neck:      Musculoskeletal: Normal range of motion and neck supple. No neck rigidity or muscular tenderness. Cardiovascular:      Rate and Rhythm: Normal rate and regular rhythm. Pulses: Normal pulses. Heart sounds: Normal heart sounds. No murmur. Pulmonary:      Effort: Pulmonary effort is normal. No respiratory distress.       Breath sounds: Normal breath sounds. No wheezing or rales. Abdominal:      General: There is no distension. Palpations: Abdomen is soft. Tenderness: There is no abdominal tenderness. There is no guarding or rebound. Genitourinary:     Rectum: Guaiac result negative (brown stool). Musculoskeletal: Normal range of motion. General: No swelling or tenderness. Comments: Radial, DP, and PT pulses intact bilaterally. Skin:     General: Skin is warm and dry. Neurological:      Mental Status: He is alert and oriented to person, place, and time. Comments: Moves all extremities with appropriate strength. Sensation grossly intact in all extremities. -------------------------------------------------- RESULTS -------------------------------------------------  I have personally reviewed all laboratory and imaging results for this patient. Results are listed below.      LABS:  Labs Reviewed   CBC WITH AUTO DIFFERENTIAL - Abnormal; Notable for the following components:       Result Value    RBC 2.44 (*)     Hemoglobin 7.6 (*)     Hematocrit 25.3 (*)     .7 (*)     MCHC 30.0 (*)     All other components within normal limits   COMPREHENSIVE METABOLIC PANEL W/ REFLEX TO MG FOR LOW K - Abnormal; Notable for the following components:    Glucose 154 (*)     BUN 37 (*)     CREATININE 1.9 (*)     All other components within normal limits   PROTIME-INR - Abnormal; Notable for the following components:    Protime 21.8 (*)     All other components within normal limits   APTT - Abnormal; Notable for the following components:    aPTT 39.3 (*)     All other components within normal limits   POCT GLUCOSE - Abnormal; Notable for the following components:    Meter Glucose 146 (*)     All other components within normal limits   BASIC METABOLIC PANEL W/ REFLEX TO MG FOR LOW K   CBC WITH AUTO DIFFERENTIAL   HEMOGLOBIN AND HEMATOCRIT, BLOOD   HEMOGLOBIN AND HEMATOCRIT, BLOOD   POCT GLUCOSE   POCT GLUCOSE ------------------------- NURSING NOTES AND VITALS REVIEWED ---------------------------   The nursing notes within the ED encounter and vital signs as below have been reviewed by myself. /64   Pulse 78   Temp 98.6 °F (37 °C)   Resp 16   Ht 5' 8\" (1.727 m)   Wt 260 lb (117.9 kg)   SpO2 98%   BMI 39.53 kg/m²   Oxygen Saturation Interpretation: Normal    The patients available past medical records and past encounters were reviewed.         ------------------------------ ED COURSE/MEDICAL DECISION MAKING----------------------  Medications   albuterol (PROVENTIL) nebulizer solution 2.5 mg (has no administration in time range)   allopurinol (ZYLOPRIM) tablet 300 mg (has no administration in time range)   aspirin EC tablet 81 mg (has no administration in time range)   fluticasone (FLONASE) 50 MCG/ACT nasal spray 1 spray (has no administration in time range)   gabapentin (NEURONTIN) capsule 400 mg (400 mg Oral Given 10/19/20 2259)   HYDROcodone-acetaminophen (NORCO)  MG per tablet 1 tablet (1 tablet Oral Given 10/20/20 0112)   insulin glargine (LANTUS) injection vial 25 Units (25 Units Subcutaneous Given 10/19/20 2301)   isosorbide mononitrate (IMDUR) extended release tablet 120 mg (has no administration in time range)   levothyroxine (SYNTHROID) tablet 75 mcg (has no administration in time range)   losartan (COZAAR) tablet 50 mg (has no administration in time range)   metoprolol succinate (TOPROL XL) extended release tablet 50 mg (has no administration in time range)   morphine (THIERRY) extended release capsule 10 mg (10 mg Oral Not Given 10/19/20 2231)   rivaroxaban (XARELTO) tablet 20 mg (has no administration in time range)   insulin lispro (HUMALOG) injection vial 0-6 Units (has no administration in time range)   insulin lispro (HUMALOG) injection vial 0-3 Units (1 Units Subcutaneous Given 10/19/20 230)   glucose (GLUTOSE) 40 % oral gel 15 g (has no administration in time range) value. Admitted to medicine for further management.       --------------------------------- IMPRESSION AND DISPOSITION ---------------------------------    IMPRESSION  1. Anemia, unspecified type    2. Epistaxis        DISPOSITION  Disposition: Admit to telemetry  Patient condition is stable    NOTE: This report was transcribed using voice recognition software.  Every effort was made to ensure accuracy; however, inadvertent computerized transcription errors may be present    Rosi Causey MD  Attending Emergency Physician          Rosi Causey MD  10/20/20 7256

## 2020-10-20 VITALS
SYSTOLIC BLOOD PRESSURE: 135 MMHG | OXYGEN SATURATION: 97 % | RESPIRATION RATE: 18 BRPM | DIASTOLIC BLOOD PRESSURE: 65 MMHG | WEIGHT: 260 LBS | HEART RATE: 60 BPM | TEMPERATURE: 98.3 F | BODY MASS INDEX: 39.4 KG/M2 | HEIGHT: 68 IN

## 2020-10-20 PROBLEM — R04.0 EPISTAXIS: Status: RESOLVED | Noted: 2020-10-19 | Resolved: 2020-10-20

## 2020-10-20 LAB
ANION GAP SERPL CALCULATED.3IONS-SCNC: 10 MMOL/L (ref 7–16)
BASOPHILS ABSOLUTE: 0.04 E9/L (ref 0–0.2)
BASOPHILS ABSOLUTE: 0.05 E9/L (ref 0–0.2)
BASOPHILS RELATIVE PERCENT: 0.5 % (ref 0–2)
BASOPHILS RELATIVE PERCENT: 0.7 % (ref 0–2)
BUN BLDV-MCNC: 33 MG/DL (ref 8–23)
CALCIUM SERPL-MCNC: 9.6 MG/DL (ref 8.6–10.2)
CHLORIDE BLD-SCNC: 102 MMOL/L (ref 98–107)
CO2: 27 MMOL/L (ref 22–29)
CREAT SERPL-MCNC: 1.7 MG/DL (ref 0.7–1.2)
EOSINOPHILS ABSOLUTE: 0.29 E9/L (ref 0.05–0.5)
EOSINOPHILS ABSOLUTE: 0.39 E9/L (ref 0.05–0.5)
EOSINOPHILS RELATIVE PERCENT: 3.7 % (ref 0–6)
EOSINOPHILS RELATIVE PERCENT: 5.5 % (ref 0–6)
GFR AFRICAN AMERICAN: 47
GFR NON-AFRICAN AMERICAN: 39 ML/MIN/1.73
GLUCOSE BLD-MCNC: 164 MG/DL (ref 74–99)
HCT VFR BLD CALC: 25 % (ref 37–54)
HCT VFR BLD CALC: 25.6 % (ref 37–54)
HEMOGLOBIN: 7.8 G/DL (ref 12.5–16.5)
HEMOGLOBIN: 7.8 G/DL (ref 12.5–16.5)
IMMATURE GRANULOCYTES #: 0.03 E9/L
IMMATURE GRANULOCYTES #: 0.05 E9/L
IMMATURE GRANULOCYTES %: 0.4 % (ref 0–5)
IMMATURE GRANULOCYTES %: 0.6 % (ref 0–5)
LYMPHOCYTES ABSOLUTE: 1.24 E9/L (ref 1.5–4)
LYMPHOCYTES ABSOLUTE: 1.45 E9/L (ref 1.5–4)
LYMPHOCYTES RELATIVE PERCENT: 17.6 % (ref 20–42)
LYMPHOCYTES RELATIVE PERCENT: 18.4 % (ref 20–42)
MCH RBC QN AUTO: 31.8 PG (ref 26–35)
MCH RBC QN AUTO: 32 PG (ref 26–35)
MCHC RBC AUTO-ENTMCNC: 30.5 % (ref 32–34.5)
MCHC RBC AUTO-ENTMCNC: 31.2 % (ref 32–34.5)
MCV RBC AUTO: 102.5 FL (ref 80–99.9)
MCV RBC AUTO: 104.5 FL (ref 80–99.9)
METER GLUCOSE: 138 MG/DL (ref 74–99)
METER GLUCOSE: 160 MG/DL (ref 74–99)
METER GLUCOSE: 164 MG/DL (ref 74–99)
MONOCYTES ABSOLUTE: 0.46 E9/L (ref 0.1–0.95)
MONOCYTES ABSOLUTE: 0.48 E9/L (ref 0.1–0.95)
MONOCYTES RELATIVE PERCENT: 5.9 % (ref 2–12)
MONOCYTES RELATIVE PERCENT: 6.8 % (ref 2–12)
NEUTROPHILS ABSOLUTE: 4.87 E9/L (ref 1.8–7.3)
NEUTROPHILS ABSOLUTE: 5.57 E9/L (ref 1.8–7.3)
NEUTROPHILS RELATIVE PERCENT: 69 % (ref 43–80)
NEUTROPHILS RELATIVE PERCENT: 70.9 % (ref 43–80)
PDW BLD-RTO: 14.9 FL (ref 11.5–15)
PDW BLD-RTO: 15 FL (ref 11.5–15)
PLATELET # BLD: 199 E9/L (ref 130–450)
PLATELET # BLD: 204 E9/L (ref 130–450)
PMV BLD AUTO: 9.7 FL (ref 7–12)
PMV BLD AUTO: 9.8 FL (ref 7–12)
POTASSIUM REFLEX MAGNESIUM: 4.3 MMOL/L (ref 3.5–5)
RBC # BLD: 2.44 E12/L (ref 3.8–5.8)
RBC # BLD: 2.45 E12/L (ref 3.8–5.8)
SODIUM BLD-SCNC: 139 MMOL/L (ref 132–146)
WBC # BLD: 7.1 E9/L (ref 4.5–11.5)
WBC # BLD: 7.9 E9/L (ref 4.5–11.5)

## 2020-10-20 PROCEDURE — 6360000002 HC RX W HCPCS: Performed by: PHYSICIAN ASSISTANT

## 2020-10-20 PROCEDURE — 82962 GLUCOSE BLOOD TEST: CPT

## 2020-10-20 PROCEDURE — 97165 OT EVAL LOW COMPLEX 30 MIN: CPT

## 2020-10-20 PROCEDURE — 80048 BASIC METABOLIC PNL TOTAL CA: CPT

## 2020-10-20 PROCEDURE — 85025 COMPLETE CBC W/AUTO DIFF WBC: CPT

## 2020-10-20 PROCEDURE — G0378 HOSPITAL OBSERVATION PER HR: HCPCS

## 2020-10-20 PROCEDURE — 36415 COLL VENOUS BLD VENIPUNCTURE: CPT

## 2020-10-20 PROCEDURE — 2580000003 HC RX 258: Performed by: PHYSICIAN ASSISTANT

## 2020-10-20 PROCEDURE — 6370000000 HC RX 637 (ALT 250 FOR IP): Performed by: PHYSICIAN ASSISTANT

## 2020-10-20 PROCEDURE — 96376 TX/PRO/DX INJ SAME DRUG ADON: CPT

## 2020-10-20 PROCEDURE — 97161 PT EVAL LOW COMPLEX 20 MIN: CPT

## 2020-10-20 RX ADMIN — ALLOPURINOL 300 MG: 300 TABLET ORAL at 09:36

## 2020-10-20 RX ADMIN — FLUTICASONE PROPIONATE 1 SPRAY: 50 SPRAY, METERED NASAL at 09:37

## 2020-10-20 RX ADMIN — HYDROCODONE BITARTRATE AND ACETAMINOPHEN 1 TABLET: 10; 325 TABLET ORAL at 09:36

## 2020-10-20 RX ADMIN — GABAPENTIN 400 MG: 100 CAPSULE ORAL at 09:36

## 2020-10-20 RX ADMIN — Medication 10 ML: at 01:13

## 2020-10-20 RX ADMIN — HYDRALAZINE HYDROCHLORIDE 10 MG: 20 INJECTION, SOLUTION INTRAMUSCULAR; INTRAVENOUS at 09:49

## 2020-10-20 RX ADMIN — INSULIN LISPRO 1 UNITS: 100 INJECTION, SOLUTION INTRAVENOUS; SUBCUTANEOUS at 17:20

## 2020-10-20 RX ADMIN — INSULIN LISPRO 1 UNITS: 100 INJECTION, SOLUTION INTRAVENOUS; SUBCUTANEOUS at 11:44

## 2020-10-20 RX ADMIN — LEVOTHYROXINE SODIUM 75 MCG: 0.03 TABLET ORAL at 05:19

## 2020-10-20 RX ADMIN — GABAPENTIN 400 MG: 100 CAPSULE ORAL at 13:56

## 2020-10-20 RX ADMIN — Medication 10 ML: at 09:37

## 2020-10-20 RX ADMIN — ASPIRIN 81 MG: 81 TABLET, COATED ORAL at 09:35

## 2020-10-20 RX ADMIN — GABAPENTIN 400 MG: 100 CAPSULE ORAL at 17:20

## 2020-10-20 RX ADMIN — LOSARTAN POTASSIUM 50 MG: 50 TABLET, FILM COATED ORAL at 09:36

## 2020-10-20 RX ADMIN — METOPROLOL SUCCINATE 50 MG: 25 TABLET, EXTENDED RELEASE ORAL at 09:35

## 2020-10-20 RX ADMIN — ACETAMINOPHEN 650 MG: 325 TABLET ORAL at 05:17

## 2020-10-20 RX ADMIN — ISOSORBIDE MONONITRATE 120 MG: 60 TABLET, EXTENDED RELEASE ORAL at 09:35

## 2020-10-20 RX ADMIN — HYDROCODONE BITARTRATE AND ACETAMINOPHEN 1 TABLET: 10; 325 TABLET ORAL at 01:12

## 2020-10-20 RX ADMIN — INSULIN GLARGINE 25 UNITS: 100 INJECTION, SOLUTION SUBCUTANEOUS at 09:37

## 2020-10-20 ASSESSMENT — PAIN DESCRIPTION - ORIENTATION
ORIENTATION: MID

## 2020-10-20 ASSESSMENT — PAIN DESCRIPTION - LOCATION
LOCATION: BACK

## 2020-10-20 ASSESSMENT — PAIN SCALES - GENERAL
PAINLEVEL_OUTOF10: 9
PAINLEVEL_OUTOF10: 9
PAINLEVEL_OUTOF10: 6
PAINLEVEL_OUTOF10: 9
PAINLEVEL_OUTOF10: 9

## 2020-10-20 ASSESSMENT — PAIN DESCRIPTION - PAIN TYPE
TYPE: CHRONIC PAIN

## 2020-10-20 ASSESSMENT — PAIN DESCRIPTION - FREQUENCY
FREQUENCY: CONTINUOUS

## 2020-10-20 ASSESSMENT — PAIN DESCRIPTION - DESCRIPTORS
DESCRIPTORS: ACHING
DESCRIPTORS: ACHING
DESCRIPTORS: DULL

## 2020-10-20 NOTE — PROGRESS NOTES
SBA   Independent   Ambulation    7 feet  X 1 with cane SBA    100 feet with AAD Independent   AM-PAC Raw Score  15/24          Pt is alert and Oriented x3     BLE ROM is WFL. BLE strength is grossly 4/5.     Balance: SBA, no LOB      Sensation: neuropathy B feet.     Endurance: decreased      ASSESSMENT     Pt displays functional ability as noted in the objective portion of this evaluation.       Comments/Treatment:  Limited mobility due to back pain      Pt was left sitting up in chair with call light in reach.      Rehab potential is Good for reaching above PT goals.        Pts/ family goals      1. None stated      Patient and or family understand(s) diagnosis, prognosis, and plan of care. - yes     PLAN     PT care will be provided in accordance with the objectives noted above. Alla Parks appropriate, clear delegation orders will be provided for nursing staff.  Exercises and functional mobility practice will be used as well as appropriate assistive devices or modalities to obtain goals.  Patient and family education will also be administered as needed.     Frequency of treatments will be 2-5x/week x 5 days.     Time in: 0812   Time WMD: 7169      Theodora Arizmendi   PT [id number]

## 2020-10-20 NOTE — PROGRESS NOTES
Pharmacy Note    Katerina Millard was ordered Astelin Nasal spray. Per the [address], this medication is non-formulary and not stocked by pharmacy for the reason indicated below. The medication can be reordered at discharge.      Medications in which risks outweigh benefits during hospitalization:         -  oral bisphosphonates         -  raloxifene (Evista)      Medications that lack necessity during an acute hospital stay:      -  nasal antihistamines        -  nasal ipratropium 0.03% and 0.06%        -  nasal miacalcin        -  acyclovir topical cream/ointment orders for herpes labialis (cold sores)    Thank Braden Ch [facility] 10/19/2020 10:13 PM

## 2020-10-20 NOTE — PROGRESS NOTES
Occupational Therapy  OCCUPATIONAL THERAPY INITIAL EVALUATION      Date:10/20/2020  Patient Name: Karl Solorzano  MRN: [id number]  : 1940  Room: [address] [place] [address]    Referring Provider: LILLIANA Humphries     Evaluating OT: Keri Viramontes OTR/L [id number]    AM-PAC Daily Activity Raw Score: 1824    Recommended Adaptive Equipment: TBD    Diagnosis: epistaxis. Pt presents to ED from home with nose bleed. Pertinent Medical History: CVA, HTN, CHF, DM, CAD, PVD, CKD   Precautions:  Falls     Home Living: Pt lives alone in a single story set up   Bathroom setup: tub/shower with extended tub bench, standard commode     Prior Level of Function: Mod I with ADLs, pd caregiver 3x/wk for assist with IADLs; completed functional mobility with ww PRN, SPC    Pain Level: pt reports chronic pain \"everywhere\", currently working with nursing to resolve pain medication issues per pt report    Cognition: A&O: 4/4. Pt is alert and oriented.  Highly distracted d/t pain medication issues, cues for safety and redirection to tasks throughout session   Problem solving:  fair   Judgement/safety:  fair     Functional Assessment:   Initial Eval Status  Date: 10/20/20 Treatment session:  Short Term Goals  Treatment frequency: 2-5x/wk PRN x1-3 wks     Feeding Independent     Grooming SBA  Standing sink level for hand hygiene  Independent   UB Dressing Set up  Independent   LB Dressing Mod A  Donning/doffing B socks  Mod I    Bathing Min A  Mod I   Toileting Min A  Use of grab bar for support in transfer and to maintain standing balance during leeanna care  Requires assist for brief management  Mod I   Bed Mobility  Supine <> sit: SBA     Functional Transfers STS: SBA  Mod I   Functional Mobility SBA with SPC  Household distance  Mod I during ADLs   Balance Sitting: fair plus    Standing: fair at [facility]     Activity Tolerance Fair   standing sully x6-7 min with fair plus balance during self care tasks             Treatment: Patient educated on techniques for completion of ADL, safe functional transfers and functional mobility. Patient required cues for follow through with proper hand/foot placement, pacing, safety, attention, sequencing and technique in bed mobility, functional transfers, functional mobility, toileting, grooming and LB dressing in preparation for maximum independence in all self care tasks. Pt requires safety cues throughout bathroom task for fall prevention with verbalized understanding. Hand Dominance: Right []  Left []   Strength ROM Additional Info:    RUE  Proximally: 3-/5  Distally: 3+/5 WFL elbow to digits. R shoulder flexion approx 90 degrees good  and FMC/dexterity noted during ADL tasks     LUE Proximally: 3-/5  Distally: 3+/5 WFL elbow to digits.  L shoulder flexion approx 90 degrees good  and FMC/dexterity noted during ADL tasks         Hearing: WFL   Vision: WFL   Sensation: pt reports chronic numbness B feet and occasional tingling L UE  Tone: WFL                             Long Term Goal (1-3 wks): Pt will maximize functional performance in all self care tasks/functional transfers with good follow through of all trained techniques for safe transition to next level of care    Eval Complexity: Low    Assessment of current deficits   Functional mobility [x]  ADLs [x] Strength [x]  Cognition []  Functional transfers  [x] IADLs [x] Safety Awareness [x]  Endurance [x]  Fine Motor Coordination [] Balance [x] Vision/perception [] Sensation []   Gross Motor Coordination [] ROM [] Delirium []                  Motor Control []    Plan of Care:   [x] ADL retraining/AE recommendations specific to weakness, fatigue, decreased trunk flexion, decreased activity tolerance, chronic pain  [x] Energy Conservation Techniques/Strategies      [] Neuromuscular Re-Education      [x] Functional Transfer Training         [x] Functional Mobility Training          [] Cognitive Re-Training         [] Splinting/Positioning Needs

## 2020-10-20 NOTE — H&P
[address] [facility]  Attending History and Physical      CHIEF COMPLAINT:  Epistaxis 5 hours      HISTORY OF PRESENT ILLNESS:      The patient is a 79 y.o. male patient of Dr. Demetrius Mera who presents with complaints of expistaxis starting at 10 AM.  Patient presented to the ED at 3 PM.  Epistaxis stopped suddenly at 430. Denies trauma. No postnasal drip. Overnight patient had a few blood clots from his nose though no marcelle bleeding. Patient put on Xarelto in May of this year.        Past Medical History:    Past Medical History:   Diagnosis Date    Arthritis     Blood circulation, collateral     CAD (coronary artery disease)     Carotid bruit 3/27/2013    [facility]congest[facility] failure[facility])     Chronic kidney disease     CKD (chronic kidney disease) stage 3, GFR 30-59 ml/min 3/20/2016    Diabetes mellitus (HCC)     Dyspnea on exertion     History of blood transfusion     Hyperlipidemia     Hypertension     Movement disorder     NSTEMI, initial episode of care ([facility] [address] 01/2018    Obesity     PVD (peripheral vascular disease) with claudication ([facility] 3/27/2014    S/P carotid endarterectomy 3/27/2013    Sleep apnea     SOB (shortness of breath)     Thyroid disease     Unspecified cerebral artery occlusion with cerebral infarction        Past Surgical History:    Past Surgical History:   Procedure Laterality Date    CARDIAC SURGERY      5 years ago    CARDIAC VALVE SURGERY      CORONARY ARTERY BYPASS GRAFT      DIAGNOSTIC CARDIAC CATH LAB PROCEDURE  10/14/08    DIAGNOSTIC CARDIAC CATH LAB PROCEDURE  10/21/10    JOINT REPLACEMENT      R knee replacement    KNEE SURGERY      OTHER SURGICAL HISTORY Right 02/14/2017    debridement,bone bx foot    PACEMAKER INSERTION  11/12/2014    D-PPM   ()    Dr. Walton Daily      carotids       Medications Prior to Admission:    Medications Prior to Admission: aspirin EC 81 MG EC tablet, Take 1 tablet by mouth and mother; Heart Surgery in his father; High Blood Pressure in his sister. REVIEW OF SYSTEMS:  As above in the HPI, otherwise negative    PHYSICAL EXAM:    Vitals:  BP (!) 198/96   Pulse 72   Temp 98.1 °F (36.7 °C)   Resp 16   Ht 5' 8\" (1.727 m)   Wt 260 lb (117.9 kg)   SpO2 98%   BMI 39.53 kg/m²     General:  Awake, alert, oriented X 3. Well developed, well nourished, well groomed. No apparent distress. HEENT:  Normocephalic, atraumatic. Pupils equal, round, reactive to light. No scleral icterus. No conjunctival injection. Normal lips, teeth, and gums. No nasal discharge. Neck:  Supple  Heart:  RRR, no murmurs, gallops, rubs  Lungs:  CTA bilaterally, bilat symmetrical expansion, no wheeze, rales, or rhonchi  Abdomen:   Bowel sounds present, soft, nontender, no masses, no organomegaly, no peritoneal signs  Extremities:  No clubbing, cyanosis, or edema  Skin:  Warm and dry, no open lesions or rash  Neuro:  Cranial nerves 2-12 intact, no focal deficits  Breast: deferred  Rectal: deferred  Genitalia:  deferred    LABS:    CBC:   Lab Results   Component Value Date    WBC 7.9 10/20/2020    RBC 2.45 10/20/2020    HGB 7.8 10/20/2020    HCT 25.6 10/20/2020    .5 10/20/2020    MCH 31.8 10/20/2020    MCHC 30.5 10/20/2020    RDW 15.0 10/20/2020     10/20/2020    MPV 9.7 10/20/2020     BMP:    Lab Results   Component Value Date     10/20/2020    K 4.3 10/20/2020     10/20/2020    CO2 27 10/20/2020    BUN 33 10/20/2020    LABALBU 3.8 10/19/2020    CREATININE 1.7 10/20/2020    CALCIUM 9.6 10/20/2020    GFRAA 47 10/20/2020    LABGLOM 39 10/20/2020    GLUCOSE 164 10/20/2020    GLUCOSE 111 07/16/2011       ASSESSMENT:      Patient Active Problem List   Diagnosis    Essential hypertension    Pacemaker    S/P AVR    CKD (chronic kidney disease) stage 3, GFR 30-59 ml/min    Pulmonary nodule    Diabetes mellitus type 2, uncontrolled ([address]    Hyperlipidemia LDL goal <100    Acquired hypothyroidism    CAD (coronary artery disease), native coronary artery    Chronic pain    History of CVA (cerebrovascular accident)    Chest pain    Obesity (BMI 30-39. 9)    Epistaxis    Anemia         PLAN:    1. Acute blood loss anemia   Secondary to epistaxis. Patient baseline hemoglobin 11.6 in July   Repeat hemoglobin stable   Recommend outpatient ENT follow up due to epistaxis and Hb trending down from 11.6 to 7.6 this admission (3 months). May need GI workup if ENT unremarkable prior to resuming NOAC. 2.  Paroxysmal a flutter from PPM interrogation 5/20   Hold anticoagulation. Continue beta-blocker  3. Hypertension-continue losartan  4. DM2-continue insulin sliding scale  5.   Hyperlipidemia-statin    Prosper Hernandez MD  10:09 AM  10/20/2020

## 2020-11-04 ENCOUNTER — NURSE ONLY (OUTPATIENT)
Dept: NON INVASIVE DIAGNOSTICS | Age: 80
End: 2020-11-04
Payer: MEDICARE

## 2020-11-04 PROCEDURE — 93296 REM INTERROG EVL PM/IDS: CPT | Performed by: INTERNAL MEDICINE

## 2020-11-04 PROCEDURE — 93294 REM INTERROG EVL PM/LDLS PM: CPT | Performed by: INTERNAL MEDICINE

## 2020-11-05 NOTE — PROGRESS NOTES
See [facility] report. Remote monitoring reviewed over a 90 day period.   End of 90 day monitoring period date of service 11/04/2020

## 2020-12-28 NOTE — TELEPHONE ENCOUNTER
Received referral from Dr. Robyn Prather for temporal artery biopsy. Spoke with patient, scheduled appt with Dr. Brody Amaro on 12-31-20 at 10:30 am. Also, spoke with his brother, Geovanna Torres. Gave him information regarding appt and also scheduled temporal artery biopsy on 1-5-21. He will take patient to have Covid test on Wednesday, 12-30-20 ([address]).

## 2020-12-30 NOTE — PROGRESS NOTES
[address] PRE-ADMISSION TESTING GENERAL INSTRUCTIONS- [facility]-phone number:[phone]    GENERAL INSTRUCTIONS  [x] Antibacterial Soap shower Night before and/or AM of Surgery  [] Ready Prep CHG wipe instruction sheet and wipes given. [x] Nothing by mouth after midnight, including gum, candy, mints, or water. [x] You may brush your teeth, gargle, but do NOT swallow water. []Hibiclens shower  the night before and the morning of surgery. Do not use             Hibiclens on your face or head. [x]No smoking, chewing tobacco, illegal drugs, or alcohol within 24 hours of your surgery. [x] Jewelry, valuables or body piercing's should not be brought to the hospital. All body and/or tongue piercing's must be removed prior to arriving to hospital.  ALL hair pins must be removed. [x] Do not wear makeup, lotions, powders, deodorant. Nail polish as directed by the nurse. [x] Arrange transportation with a responsible adult  to and from the hospital. If you do not have a responsible adult  to transport you, you will need to make arrangements with a medical transportation company (i.e. [employer]. A Uber/taxi/bus is not appropriate unless you are accompanied by a responsible adult ). Arrange for someone to be with you for the remainder of the day and for 24 hours after your procedure due to having had anesthesia. Who will be your  for transportation? Celsa Mahajan, brother   Who will be staying with you for 24 hrs after your procedure? Celsa Mahajan  [x] Bring insurance card and photo ID.  [] Transfusion Bracelet: Please bring with you to hospital, day of surgery  [] Bring urine specimen day of surgery. Any small container is acceptable. [] Use inhalers the morning of surgery and bring with you to hospital.  [] Bring copy of living will or healthcare power of  papers to be placed in your electronic record.   [] CPAP/BI-PAP: Please bring your machine if you are to spend the night in the hospital.     PARKING INSTRUCTIONS:   [x] Arrival Time: 8 am Park on [facility], enter the main entrance. One person may accompany you. Wear a mask. [x] To reach the [facility] from [facility], upon entering the hospital, take elevator B to the 3rd floor. Check in with the . A parking token will be provided if needed. EDUCATION INSTRUCTIONS:      [] Knee or hip replacement booklet & exercise pamphlets given. [] [address]ie [address] placed in chart. [] Pre-admission Testing educational folder given  [] Incentive Spirometry,coughing & deep breathing exercises reviewed. []Medication information sheet(s)   []Fluoroscopy-Xray used in surgery reviewed with patient. Educational pamphlet placed in chart. [x]Pain: Post-op pain is normal and to be expected. You will be asked to rate your pain from 0-10 (a zero is not acceptable-education is needed). Your post-op pain goal is:    [] Ask your nurse for your pain medication. [] Joint camp offered. [] Joint replacement booklets given. [x] Other: Wear loose comfortable clothing    MEDICATION INSTRUCTIONS:  [x]Bring a complete list of your medications, please write the last time you took the medicine, give this list to the nurse. [x] Take the following medications the morning of surgery with 1-2 ounces of water: isosorbide mononitrate, gabapentin, metoprolol succinate. Take if needed take hydrocodone-acetaminophen with sip of water. Take half dose of insulin glargine night before surgery. [] Stop herbal supplements and vitamins 5 days before your surgery. [x] DO NOT take any diabetic medicine the morning of surgery. Follow instructions for insulin the day before surgery. [x] If you are diabetic and your blood sugar is low or you feel symptomatic, you may drink 1-2 ounces of apple juice or take a glucose tablet.   The morning of your procedure, you may call the pre-op area if you have concerns about your blood sugar [phone]. [] Use your inhalers the morning of surgery. Bring your emergency inhaler with you day of surgery. [x] Follow physician instructions regarding any blood thinners you may be taking. WHAT TO EXPECT:  [x] The day of surgery you will be greeted and checked in by the [employer]. Please bring your photo ID and insurance card. A nurse will greet you in accordance to the time you are needed in the pre-op area to prepare you for surgery. Please do not be discouraged if you are not greeted in the order you arrive as there are many variables that are involved in patient preparation. Your patience is greatly appreciated as you wait for your nurse. Please bring in items such as: books, magazines, newspapers, electronics, or any other items  to occupy your time in the waiting area. [x]  Delays may occur with surgery and staff will make a sincere effort to keep you informed of delays. If any delays occur with your procedure, we apologize ahead of time for your inconvenience as we recognize the value of your time.

## 2020-12-31 PROBLEM — R70.0 ELEVATED SED RATE: Status: ACTIVE | Noted: 2020-01-01

## 2020-12-31 PROBLEM — H54.7 VISION DECREASED: Status: ACTIVE | Noted: 2020-01-01

## 2020-12-31 PROBLEM — R09.89 BILATERAL CAROTID BRUITS: Status: ACTIVE | Noted: 2020-01-01

## 2020-12-31 PROBLEM — I65.23 BILATERAL CAROTID ARTERY STENOSIS: Status: ACTIVE | Noted: 2020-01-01

## 2020-12-31 NOTE — PROGRESS NOTES
Chief Complaint:   Chief Complaint   Patient presents with    Consultation     new pt. TAB         HPI: Patient, came to the office in a wheelchair, with his brother Giselle Parks, for the evaluation of multiple vascular issues, including history of carotid artery disease post right carotid enterectomy, last ultrasound done in the office was 5 years ago, did not come back for follow-up, recent ultrasound revealed approximately 50 to 60% stenosis bilaterally    Patient also has some vision loss in the right eye, underwent evaluation by his ophthalmologist, sed rate was elevated to 96 mm/h, and patient was referred by his ophthalmologist for temporal artery biopsy    Patient came to the office in a wheelchair, has increasing difficulty walking due to combination of musculoskeletal issues including spinal stenosis, arthritis, knee joint problems after knee operations, denies symptoms of rest pain, can walk short distances slowly, also has diabetic neuropathy      Patient denies any focal lateralizing neurological symptoms like loss of speech, vision or loss of function of extremity    P    No Known Allergies    Current Outpatient Medications   Medication Sig Dispense Refill    predniSONE (DELTASONE) 20 MG tablet TAKE 2 TABLETS BY MOUTH EVERY DAY WITH FOOD FOR 5 DAYS      azelastine (ASTELIN) 0.1 % nasal spray PLACE 1 SPRAY IN EACH NOSTRIL TWICE A DAY AS DIRECTED 1 Bottle 1    aspirin EC 81 MG EC tablet Take 1 tablet by mouth daily 90 tablet 1    albuterol (PROVENTIL) (2.5 MG/3ML) 0.083% nebulizer solution Take 3 mLs by nebulization every 6 hours as needed for Wheezing or Shortness of Breath DX: COPD J44.9 Please bill Medicare Part B 120 mL 6    losartan (COZAAR) 50 MG tablet Take 1 tablet by mouth daily 30 tablet 0    levothyroxine (SYNTHROID) 25 MCG tablet TAKE 1 TABLET BY MOUTH EVERY DAY  1    gabapentin (NEURONTIN) 400 MG capsule Take 400 mg by mouth 4 times daily.  metoprolol succinate (TOPROL XL) 50 MG extended release tablet Take 50 mg by mouth daily      fluticasone (FLONASE) 50 MCG/ACT nasal spray 1 spray by Nasal route daily       isosorbide mononitrate (IMDUR) 120 MG extended release tablet Take 1 tablet by mouth daily 30 tablet 0    morphine (THIERRY) 10 MG extended release capsule Take 10 mg by mouth 2 times daily. Pedro Pablo Goss insulin glargine (LANTUS) 100 UNIT/ML injection vial Inject 25 Units into the skin 2 times daily       HYDROcodone-acetaminophen (NORCO)  MG per tablet Take 1 tablet by mouth every 6 hours as needed for Pain 15 tablet 0    allopurinol (ZYLOPRIM) 300 MG tablet Take 300 mg by mouth daily       lovastatin (MEVACOR) 20 MG tablet Take 20 mg by mouth nightly. No current facility-administered medications for this visit.         Past Medical History:   Diagnosis Date    Arthritis     Bilateral carotid artery stenosis 12/31/2020    Bilateral carotid bruits 12/31/2020    Blood circulation, collateral     CAD (coronary artery disease)     Carotid bruit 3/27/2013    CHF (congestive heart failure) (HCC)     Chronic kidney disease     CKD (chronic kidney disease) stage 3, GFR 30-59 ml/min 3/20/2016    Diabetes mellitus (HCC)     Dyspnea on exertion     History of blood transfusion     Hyperlipidemia     Hypertension     Movement disorder     [facility]    Obesity     PVD (peripheral vascular disease) with claudication [facility] 3/27/2014    S/P carotid endarterectomy 3/27/2013    Sleep apnea     SOB (shortness of breath)     Thyroid disease     Unspecified cerebral artery occlusion with cerebral infarction     Vision decreased 12/31/2020       Past Surgical History:   Procedure Laterality Date    CARDIAC SURGERY      5 years ago    CARDIAC VALVE SURGERY      CORONARY ARTERY BYPASS GRAFT      DIAGNOSTIC CARDIAC CATH LAB PROCEDURE  10/14/08  DIAGNOSTIC CARDIAC CATH LAB PROCEDURE  10/21/10    JOINT REPLACEMENT      R knee replacement    KNEE SURGERY      OTHER SURGICAL HISTORY Right 2017    debridement,bone bx foot    PACEMAKER INSERTION  2014    D-PPM   ()    Dr. Yosvany Palafox      carotids       Family History   Problem Relation Age of Onset    Heart Disease Mother     Heart Failure Mother     Heart Surgery Father     Heart Disease Father     Heart Failure Father     High Blood Pressure Sister     Cancer Sister        Social History     Socioeconomic History    Marital status:      Spouse name: Not on file    Number of children: Not on file    Years of education: Not on file    Highest education level: Not on file   Occupational History    Occupation: retired- [employer]   Social Needs    Financial resource strain: Not on file    Food insecurity     Worry: Not on file     Inability: Not on file   [employer] needs     Medical: Not on file     Non-medical: Not on file   Tobacco Use    Smoking status: Former Smoker     Packs/day: 2.00     Years: 25.00     Pack years: 50.00     Types: Cigarettes     Start date: 1953     Quit date: 1981     Years since quittin.5    Smokeless tobacco: Never Used   Substance and Sexual Activity    Alcohol use: Never     Frequency: Never     Comment:      Drug use: Not Currently     Types: Marijuana     Comment: eating THC edibles  states stopped 2020    Sexual activity: Never   Lifestyle    Physical activity     Days per week: Not on file     Minutes per session: Not on file    Stress: Not on file   Relationships    Social connections     Talks on phone: Not on file     Gets together: Not on file     Attends [employer] service: Not on file     Active member of club or organization: Not on file     Attends meetings of clubs or organizations: Not on file     Relationship status: Not on file    Intimate partner violence Fear of current or ex partner: Not on file     Emotionally abused: Not on file     Physically abused: Not on file     Forced sexual activity: Not on file   Other Topics Concern    Not on file   Social History Narrative    1 cup coffee daily       Review of Systems:  Skin:  No abnormal pigmentation or rash  Eyes:  No blurring, diplopia or vision loss  Ears/Nose/Throat:  No hearing loss or vertigo  Respiratory:  No cough, pleuritic chest pain, dyspnea, or wheezing. Cardiovascular: No angina, palpitations . Hypertension, hyperlipidemia, status post aortic valve replacement, coronary artery disease  Gastrointestinal:  No nausea or vomiting; no abdominal pain or rectal bleeding  Musculoskeletal:  No arthritis or weakness. Spinal stenosis, osteoarthritis of the knee joints  Neurologic:  No paralysis, paresis, paresthesia, seizures or headaches  Hematologic/Lymphatic/Immunologic:  No anemia, abnormal bleeding/bruising, fever, chills or night sweats. Endocrine:  No heat or cold intolerance. No polyphagia, polydipsia or polyuria. Diabetes mellitus diabetic neuropathy      Physical Exam:  General appearance:  Alert, awake, oriented x 3. No distress. Skin:  Warm and dry  Head:  Normocephalic. No masses, lesions or tenderness  Eyes:  Conjunctivae appear normal; PERRL  Ears:  External ears normal  Nose/Sinuses:  Septum midline, mucosa normal; no drainage  Oropharynx:  Clear, no exudate noted  Neck:  No jugular venous distention, lymphadenopathy or thyromegaly. Carotid bruit noted  Lungs:  Clear to ausculation bilaterally. No rhonchi, crackles, wheezes  Heart:  Regular rate and rhythm. No rub or murmur  Abdomen:  Soft, non-tender. No masses, organomegaly. Musculoskeletal : No joint effusions, tenderness swelling    Neuro: Speech is intact. Moving all extremities. No focal motor or sensory deficits      Extremities:  Both feet are warm to touch.  The color of both feet is normal.        Pulses Right  Left Brachial 3 3    Radial    3=normal   Femoral 2 2  2=diminished   Popliteal    1=barely palpable   Dorsalis pedis    0=absent   Posterior tibial 1 1  4=aneurysmal             Other pertinent information:1. The past medical records were reviewed. 2.  Carotid ultrasound was personally reviewed by me, approximately 60% stenosis bilaterally    3. Lab work reviewed, sed rate, 96 mm/h, CRP 2    4. The ophthalmology notes were reviewed      Assessment:    1. Elevated sed rate    2. Vision decreased    3. Bilateral carotid bruits    4. Bilateral carotid artery stenosis              Plan:       Discussed with the patient, options risks benefits ulcers explained to him and his brother Lila Son, telephone #[phone], all options, risks benefits and options explained, for now follow the carotid disease conservatively with instruction to call me if any symptoms, explained, follow-up evaluation in 6 months, the importance of regular follow-up was discussed    Because of marked elevated sed rate, patient recommended temporal artery biopsy     All the risks including bleeding, clotting, infection, artery, venous, nerve, cardiopulmonary complications etc, chances of major complications were explained to the patient who understands and consents for surgery          Patient was instructed to continue walking program and to call if any worsening of symptoms and to call if any focal lateralizing neurological symptoms like loss of speech, vision or loss of function of extremity. All the questions were answered. Indicated follow-up: Return if symptoms worsen or fail to improve.         CC to Dr. Zo Kelly

## 2020-12-31 NOTE — H&P
Chief Complaint:        Chief Complaint   Patient presents with    Consultation     new pt. TAB     HPI: Patient, came to the office in a wheelchair, with his brother Lora, for the evaluation of multiple vascular issues, including history of carotid artery disease post right carotid enterectomy, last ultrasound done in the office was 5 years ago, did not come back for follow-up, recent ultrasound revealed approximately 50 to 60% stenosis bilaterally   Patient also has some vision loss in the right eye, underwent evaluation by his ophthalmologist, sed rate was elevated to 96 mm/h, and patient was referred by his ophthalmologist for temporal artery biopsy   Patient came to the office in a wheelchair, has increasing difficulty walking due to combination of musculoskeletal issues including spinal stenosis, arthritis, knee joint problems after knee operations, denies symptoms of rest pain, can walk short distances slowly, also has diabetic neuropathy   Patient denies any focal lateralizing neurological symptoms like loss of speech, vision or loss of function of extremity   P   No Known Allergies   Current Facility-Administered Medications          Current Outpatient Medications   Medication Sig Dispense Refill    predniSONE (DELTASONE) 20 MG tablet TAKE 2 TABLETS BY MOUTH EVERY DAY WITH FOOD FOR 5 DAYS      azelastine (ASTELIN) 0.1 % nasal spray PLACE 1 SPRAY IN EACH NOSTRIL TWICE A DAY AS DIRECTED 1 Bottle 1    aspirin EC 81 MG EC tablet Take 1 tablet by mouth daily 90 tablet 1    albuterol (PROVENTIL) (2.5 MG/3ML) 0.083% nebulizer solution Take 3 mLs by nebulization every 6 hours as needed for Wheezing or Shortness of Breath DX: COPD J44.9 Please bill Medicare Part B 120 mL 6    losartan (COZAAR) 50 MG tablet Take 1 tablet by mouth daily 30 tablet 0    levothyroxine (SYNTHROID) 25 MCG tablet TAKE 1 TABLET BY MOUTH EVERY DAY  1    gabapentin (NEURONTIN) 400 MG capsule Take 400 mg by mouth 4 times daily.        metoprolol succinate (TOPROL XL) 50 MG extended release tablet Take 50 mg by mouth daily      fluticasone (FLONASE) 50 MCG/ACT nasal spray 1 spray by Nasal route daily       isosorbide mononitrate (IMDUR) 120 MG extended release tablet Take 1 tablet by mouth daily 30 tablet 0    morphine (THIERRY) 10 MG extended release capsule Take 10 mg by mouth 2 times daily. Renaye Deacon insulin glargine (LANTUS) 100 UNIT/ML injection vial Inject 25 Units into the skin 2 times daily       HYDROcodone-acetaminophen (NORCO)  MG per tablet Take 1 tablet by mouth every 6 hours as needed for Pain 15 tablet 0    allopurinol (ZYLOPRIM) 300 MG tablet Take 300 mg by mouth daily       lovastatin (MEVACOR) 20 MG tablet Take 20 mg by mouth nightly. No current facility-administered medications for this visit.        Past Medical History        Past Medical History:   Diagnosis Date    Arthritis     Bilateral carotid artery stenosis 12/31/2020    Bilateral carotid bruits 12/31/2020    Blood circulation, collateral     CAD (coronary artery disease)     Carotid bruit 3/27/2013    CHF (congestive heart failure) (HCC)     Chronic kidney disease     CKD (chronic kidney disease) stage 3, GFR 30-59 ml/min 3/20/2016    Diabetes mellitus (HCC)     Dyspnea on exertion     History of blood transfusion     Hyperlipidemia     Hypertension     Movement disorder     [facility]    Obesity     PVD (peripheral vascular disease) with claudication ([facility] 3/27/2014    S/P carotid endarterectomy 3/27/2013    Sleep apnea     SOB (shortness of breath)     Thyroid disease     Unspecified cerebral artery occlusion with cerebral infarction     Vision decreased 12/31/2020     Past Surgical History         Past Surgical History:   Procedure Laterality Date    CARDIAC SURGERY      5 years ago    CARDIAC VALVE SURGERY      CORONARY ARTERY BYPASS GRAFT      DIAGNOSTIC CARDIAC CATH LAB PROCEDURE  10/14/08  DIAGNOSTIC CARDIAC CATH LAB PROCEDURE  10/21/10    JOINT REPLACEMENT      R knee replacement    KNEE SURGERY      OTHER SURGICAL HISTORY Right 2017    debridement,bone bx foot    PACEMAKER INSERTION  2014    D-PPM ([facility]) Dr. Siddharth Tran      carotids     Family History         Family History   Problem Relation Age of Onset    Heart Disease Mother     Heart Failure Mother     Heart Surgery Father     Heart Disease Father     Heart Failure Father     High Blood Pressure Sister     Cancer Sister      Social History         Socioeconomic History    Marital status:      Spouse name: Not on file    Number of children: Not on file    Years of education: Not on file    Highest education level: Not on file   Occupational History    Occupation: retired- [employer]   Social Needs    Financial resource strain: Not on file    Food insecurity     Worry: Not on file     Inability: Not on file   [employer] needs     Medical: Not on file     Non-medical: Not on file   Tobacco Use    Smoking status: Former Smoker     Packs/day: 2.00     Years: 25.00     Pack years: 50.00     Types: Cigarettes     Start date: 1953     Quit date: 1981     Years since quittin.5    Smokeless tobacco: Never Used   Substance and Sexual Activity    Alcohol use: Never     Frequency: Never     Comment:     Drug use: Not Currently     Types: Marijuana     Comment: eating THC edibles states stopped 2020    Sexual activity: Never   Lifestyle    Physical activity     Days per week: Not on file     Minutes per session: Not on file    Stress: Not on file   Relationships    Social connections     Talks on phone: Not on file     Gets together: Not on file     Attends Jew service: Not on file     Active member of club or organization: Not on file     Attends meetings of clubs or organizations: Not on file     Relationship status: Not on file    Intimate partner violence     Fear of current or ex partner: Not on file     Emotionally abused: Not on file     Physically abused: Not on file     Forced sexual activity: Not on file   Other Topics Concern    Not on file   Social History Narrative    1 cup coffee daily   Review of Systems:   Skin: No abnormal pigmentation or rash   Eyes: No blurring, diplopia or vision loss   Ears/Nose/Throat: No hearing loss or vertigo   Respiratory: No cough, pleuritic chest pain, dyspnea, or wheezing. Cardiovascular: No angina, palpitations . Hypertension, hyperlipidemia, status post aortic valve replacement, coronary artery disease   Gastrointestinal: No nausea or vomiting; no abdominal pain or rectal bleeding   Musculoskeletal: No arthritis or weakness. Spinal stenosis, osteoarthritis of the knee joints   Neurologic: No paralysis, paresis, paresthesia, seizures or headaches   Hematologic/Lymphatic/Immunologic: No anemia, abnormal bleeding/bruising, fever, chills or night sweats. Endocrine: No heat or cold intolerance. No polyphagia, polydipsia or polyuria. Diabetes mellitus diabetic neuropathy   Physical Exam:   General appearance: Alert, awake, oriented x 3. No distress. Skin: Warm and dry   Head: Normocephalic. No masses, lesions or tenderness   Eyes: Conjunctivae appear normal; PERRL   Ears: External ears normal   Nose/Sinuses: Septum midline, mucosa normal; no drainage   Oropharynx: Clear, no exudate noted   Neck: No jugular venous distention, lymphadenopathy or thyromegaly. Carotid bruit noted   Lungs: Clear to ausculation bilaterally. No rhonchi, crackles, wheezes   Heart: Regular rate and rhythm. No rub or murmur   Abdomen: Soft, non-tender. No masses, organomegaly. Musculoskeletal : No joint effusions, tenderness swelling   Neuro: Speech is intact. Moving all extremities. No focal motor or sensory deficits   Extremities: Both feet are warm to touch.  The color of both feet is normal.   Pulses Right  Left    Brachial 3 3 Radial   3=normal   Femoral 2 2 2=diminished   Popliteal   1=barely palpable   Dorsalis pedis   0=absent   Posterior tibial 1 1 4=aneurysmal   Other pertinent information:1. The past medical records were reviewed. 2. Carotid ultrasound was personally reviewed by me, approximately 60% stenosis bilaterally   3. Lab work reviewed, sed rate, 96 mm/h, CRP 2   4. The ophthalmology notes were reviewed   Assessment:   1. Elevated sed rate    2. Vision decreased    3. Bilateral carotid bruits    4. Bilateral carotid artery stenosis      Plan:   Discussed with the patient, options risks benefits ulcers explained to him and his brother Nisreen Sommers, telephone #[phone], all options, risks benefits and options explained, for now follow the carotid disease conservatively with instruction to call me if any symptoms, explained, follow-up evaluation in 6 months, the importance of regular follow-up was discussed   Because of marked elevated sed rate, patient recommended temporal artery biopsy   All the risks including bleeding, clotting, infection, artery, venous, nerve, cardiopulmonary complications etc, chances of major complications were explained to the patient who understands and consents for surgery   Patient was instructed to continue walking program and to call if any worsening of symptoms and to call if any focal lateralizing neurological symptoms like loss of speech, vision or loss of function of extremity. All the questions were answered.        Addendum:    No changes noted in the history and physical examination the patient since my last last evaluation of the patient    Raymond Huddleston

## 2021-01-01 ENCOUNTER — APPOINTMENT (OUTPATIENT)
Dept: CT IMAGING | Age: 81
DRG: 271 | End: 2021-01-01
Attending: INTERNAL MEDICINE
Payer: MEDICARE

## 2021-01-01 ENCOUNTER — APPOINTMENT (OUTPATIENT)
Dept: NEUROLOGY | Age: 81
DRG: 871 | End: 2021-01-01
Payer: MEDICARE

## 2021-01-01 ENCOUNTER — HOSPITAL ENCOUNTER (INPATIENT)
Age: 81
LOS: 3 days | DRG: 871 | End: 2021-12-02
Attending: EMERGENCY MEDICINE | Admitting: INTERNAL MEDICINE
Payer: MEDICARE

## 2021-01-01 ENCOUNTER — TELEPHONE (OUTPATIENT)
Dept: NON INVASIVE DIAGNOSTICS | Age: 81
End: 2021-01-01

## 2021-01-01 ENCOUNTER — HOSPITAL ENCOUNTER (OUTPATIENT)
Dept: WOUND CARE | Age: 81
Discharge: HOME | End: 2021-11-22
Payer: MEDICARE

## 2021-01-01 ENCOUNTER — HOSPITAL ENCOUNTER (INPATIENT)
Dept: CARDIAC CATH/INVASIVE PROCEDURES | Age: 81
LOS: 2 days | Discharge: NURSING, REHAB OR LONG-TERM CARE | DRG: 271 | End: 2021-10-03
Attending: INTERNAL MEDICINE | Admitting: INTERNAL MEDICINE
Payer: MEDICARE

## 2021-01-01 ENCOUNTER — APPOINTMENT (OUTPATIENT)
Dept: GENERAL RADIOLOGY | Age: 81
DRG: 193 | End: 2021-01-01
Payer: MEDICARE

## 2021-01-01 ENCOUNTER — HOSPITAL ENCOUNTER (INPATIENT)
Age: 81
LOS: 3 days | Discharge: NURSING, REHAB OR LONG-TERM CARE | DRG: 193 | End: 2021-11-06
Attending: EMERGENCY MEDICINE | Admitting: INTERNAL MEDICINE
Payer: MEDICARE

## 2021-01-01 ENCOUNTER — APPOINTMENT (OUTPATIENT)
Dept: CT IMAGING | Age: 81
DRG: 291 | End: 2021-01-01
Payer: MEDICARE

## 2021-01-01 ENCOUNTER — APPOINTMENT (OUTPATIENT)
Dept: GENERAL RADIOLOGY | Age: 81
DRG: 871 | End: 2021-01-01
Payer: MEDICARE

## 2021-01-01 ENCOUNTER — HOSPITAL ENCOUNTER (OUTPATIENT)
Dept: OTHER | Age: 81
Setting detail: THERAPIES SERIES
Discharge: HOME | End: 2021-08-27
Payer: MEDICARE

## 2021-01-01 ENCOUNTER — HOSPITAL ENCOUNTER (OUTPATIENT)
Dept: OTHER | Age: 81
Setting detail: THERAPIES SERIES
Discharge: HOME | End: 2021-04-07
Payer: MEDICARE

## 2021-01-01 ENCOUNTER — TELEPHONE (OUTPATIENT)
Dept: VASCULAR SURGERY | Age: 81
End: 2021-01-01

## 2021-01-01 ENCOUNTER — NURSE ONLY (OUTPATIENT)
Dept: NON INVASIVE DIAGNOSTICS | Age: 81
End: 2021-01-01
Payer: MEDICARE

## 2021-01-01 ENCOUNTER — ANESTHESIA EVENT (OUTPATIENT)
Dept: OPERATING ROOM | Age: 81
End: 2021-01-01
Payer: MEDICARE

## 2021-01-01 ENCOUNTER — APPOINTMENT (OUTPATIENT)
Dept: INTERVENTIONAL RADIOLOGY/VASCULAR | Age: 81
DRG: 300 | End: 2021-01-01
Payer: MEDICARE

## 2021-01-01 ENCOUNTER — APPOINTMENT (OUTPATIENT)
Dept: GENERAL RADIOLOGY | Age: 81
DRG: 291 | End: 2021-01-01
Payer: MEDICARE

## 2021-01-01 ENCOUNTER — OFFICE VISIT (OUTPATIENT)
Dept: VASCULAR SURGERY | Age: 81
End: 2021-01-01

## 2021-01-01 ENCOUNTER — ANESTHESIA (OUTPATIENT)
Dept: ENDOSCOPY | Age: 81
DRG: 193 | End: 2021-01-01
Payer: MEDICARE

## 2021-01-01 ENCOUNTER — APPOINTMENT (OUTPATIENT)
Dept: GENERAL RADIOLOGY | Age: 81
DRG: 503 | End: 2021-01-01
Payer: MEDICARE

## 2021-01-01 ENCOUNTER — APPOINTMENT (OUTPATIENT)
Dept: CT IMAGING | Age: 81
DRG: 683 | End: 2021-01-01
Payer: MEDICARE

## 2021-01-01 ENCOUNTER — ANESTHESIA (OUTPATIENT)
Dept: OPERATING ROOM | Age: 81
End: 2021-01-01
Payer: MEDICARE

## 2021-01-01 ENCOUNTER — HOSPITAL ENCOUNTER (OUTPATIENT)
Dept: INTERVENTIONAL RADIOLOGY/VASCULAR | Age: 81
Discharge: HOME | End: 2021-05-08
Payer: MEDICARE

## 2021-01-01 ENCOUNTER — OFFICE VISIT (OUTPATIENT)
Dept: VASCULAR SURGERY | Age: 81
End: 2021-01-01
Payer: MEDICARE

## 2021-01-01 ENCOUNTER — ANESTHESIA (OUTPATIENT)
Dept: OPERATING ROOM | Age: 81
DRG: 271 | End: 2021-01-01
Payer: MEDICARE

## 2021-01-01 ENCOUNTER — APPOINTMENT (OUTPATIENT)
Dept: GENERAL RADIOLOGY | Age: 81
DRG: 683 | End: 2021-01-01
Payer: MEDICARE

## 2021-01-01 ENCOUNTER — HOSPITAL ENCOUNTER (OUTPATIENT)
Dept: OTHER | Age: 81
Setting detail: THERAPIES SERIES
Discharge: HOME | End: 2021-06-23
Payer: MEDICARE

## 2021-01-01 ENCOUNTER — APPOINTMENT (OUTPATIENT)
Dept: CT IMAGING | Age: 81
DRG: 503 | End: 2021-01-01
Payer: MEDICARE

## 2021-01-01 ENCOUNTER — ANESTHESIA EVENT (OUTPATIENT)
Dept: ENDOSCOPY | Age: 81
DRG: 193 | End: 2021-01-01
Payer: MEDICARE

## 2021-01-01 ENCOUNTER — APPOINTMENT (OUTPATIENT)
Dept: GENERAL RADIOLOGY | Age: 81
DRG: 300 | End: 2021-01-01
Payer: MEDICARE

## 2021-01-01 ENCOUNTER — ANESTHESIA EVENT (OUTPATIENT)
Dept: OPERATING ROOM | Age: 81
DRG: 271 | End: 2021-01-01
Payer: MEDICARE

## 2021-01-01 ENCOUNTER — HOSPITAL ENCOUNTER (INPATIENT)
Age: 81
LOS: 6 days | Discharge: OTHER INSTITUTION | DRG: 683 | End: 2021-01-25
Attending: EMERGENCY MEDICINE | Admitting: INTERNAL MEDICINE
Payer: MEDICARE

## 2021-01-01 ENCOUNTER — HOSPITAL ENCOUNTER (OUTPATIENT)
Dept: OTHER | Age: 81
Setting detail: THERAPIES SERIES
Discharge: HOME | End: 2021-04-27
Payer: MEDICARE

## 2021-01-01 ENCOUNTER — TELEPHONE (OUTPATIENT)
Dept: OTHER | Facility: CLINIC | Age: 81
End: 2021-01-01

## 2021-01-01 ENCOUNTER — HOSPITAL ENCOUNTER (OUTPATIENT)
Dept: WOUND CARE | Age: 81
Discharge: HOME | DRG: 271 | End: 2021-09-27
Payer: MEDICARE

## 2021-01-01 ENCOUNTER — APPOINTMENT (OUTPATIENT)
Dept: ULTRASOUND IMAGING | Age: 81
DRG: 503 | End: 2021-01-01
Payer: MEDICARE

## 2021-01-01 ENCOUNTER — APPOINTMENT (OUTPATIENT)
Dept: CT IMAGING | Age: 81
DRG: 193 | End: 2021-01-01
Payer: MEDICARE

## 2021-01-01 ENCOUNTER — HOSPITAL ENCOUNTER (INPATIENT)
Age: 81
LOS: 4 days | Discharge: NURSING, REHAB OR LONG-TERM CARE | DRG: 300 | End: 2021-09-27
Attending: EMERGENCY MEDICINE | Admitting: INTERNAL MEDICINE
Payer: MEDICARE

## 2021-01-01 ENCOUNTER — OFFICE VISIT (OUTPATIENT)
Dept: CARDIOLOGY CLINIC | Age: 81
End: 2021-01-01
Payer: MEDICARE

## 2021-01-01 ENCOUNTER — HOSPITAL ENCOUNTER (OUTPATIENT)
Dept: OTHER | Age: 81
Setting detail: THERAPIES SERIES
Discharge: HOME | End: 2021-03-24

## 2021-01-01 ENCOUNTER — TELEPHONE (OUTPATIENT)
Dept: OTHER | Age: 81
End: 2021-01-01

## 2021-01-01 ENCOUNTER — HOSPITAL ENCOUNTER (OUTPATIENT)
Dept: ULTRASOUND IMAGING | Age: 81
Discharge: HOME | End: 2021-07-29
Payer: MEDICARE

## 2021-01-01 ENCOUNTER — HOSPITAL ENCOUNTER (OUTPATIENT)
Dept: OTHER | Age: 81
Setting detail: THERAPIES SERIES
Discharge: HOME | End: 2021-05-25
Payer: MEDICARE

## 2021-01-01 ENCOUNTER — ANESTHESIA (OUTPATIENT)
Dept: OPERATING ROOM | Age: 81
DRG: 503 | End: 2021-01-01
Payer: MEDICARE

## 2021-01-01 ENCOUNTER — HOSPITAL ENCOUNTER (OUTPATIENT)
Dept: OTHER | Age: 81
Setting detail: THERAPIES SERIES
Discharge: HOME | End: 2021-07-20
Payer: MEDICARE

## 2021-01-01 ENCOUNTER — APPOINTMENT (OUTPATIENT)
Dept: ULTRASOUND IMAGING | Age: 81
DRG: 193 | End: 2021-01-01
Payer: MEDICARE

## 2021-01-01 ENCOUNTER — APPOINTMENT (OUTPATIENT)
Dept: NUCLEAR MEDICINE | Age: 81
DRG: 271 | End: 2021-01-01
Attending: INTERNAL MEDICINE
Payer: MEDICARE

## 2021-01-01 ENCOUNTER — APPOINTMENT (OUTPATIENT)
Dept: CT IMAGING | Age: 81
DRG: 871 | End: 2021-01-01
Payer: MEDICARE

## 2021-01-01 ENCOUNTER — HOSPITAL ENCOUNTER (OUTPATIENT)
Age: 81
Setting detail: OUTPATIENT SURGERY
Discharge: HOME | End: 2021-01-05
Attending: SURGERY | Admitting: SURGERY
Payer: MEDICARE

## 2021-01-01 ENCOUNTER — APPOINTMENT (OUTPATIENT)
Dept: GENERAL RADIOLOGY | Age: 81
DRG: 271 | End: 2021-01-01
Attending: INTERNAL MEDICINE
Payer: MEDICARE

## 2021-01-01 ENCOUNTER — ANESTHESIA EVENT (OUTPATIENT)
Dept: OPERATING ROOM | Age: 81
DRG: 503 | End: 2021-01-01
Payer: MEDICARE

## 2021-01-01 ENCOUNTER — APPOINTMENT (OUTPATIENT)
Dept: ULTRASOUND IMAGING | Age: 81
DRG: 300 | End: 2021-01-01
Payer: MEDICARE

## 2021-01-01 ENCOUNTER — HOSPITAL ENCOUNTER (OUTPATIENT)
Dept: OTHER | Age: 81
Setting detail: THERAPIES SERIES
Discharge: HOME | End: 2021-03-10

## 2021-01-01 ENCOUNTER — HOSPITAL ENCOUNTER (INPATIENT)
Age: 81
LOS: 12 days | Discharge: NURSING, REHAB OR LONG-TERM CARE | DRG: 503 | End: 2021-10-27
Attending: EMERGENCY MEDICINE | Admitting: INTERNAL MEDICINE
Payer: MEDICARE

## 2021-01-01 ENCOUNTER — HOSPITAL ENCOUNTER (INPATIENT)
Age: 81
LOS: 3 days | Discharge: NURSING, REHAB OR LONG-TERM CARE | DRG: 291 | End: 2021-08-22
Attending: EMERGENCY MEDICINE | Admitting: INTERNAL MEDICINE
Payer: MEDICARE

## 2021-01-01 VITALS
BODY MASS INDEX: 35.58 KG/M2 | HEIGHT: 67 IN | WEIGHT: 226.7 LBS | OXYGEN SATURATION: 95 % | DIASTOLIC BLOOD PRESSURE: 66 MMHG | TEMPERATURE: 98.5 F | HEART RATE: 93 BPM | RESPIRATION RATE: 16 BRPM | SYSTOLIC BLOOD PRESSURE: 140 MMHG

## 2021-01-01 VITALS
DIASTOLIC BLOOD PRESSURE: 78 MMHG | WEIGHT: 259 LBS | TEMPERATURE: 98.2 F | RESPIRATION RATE: 19 BRPM | BODY MASS INDEX: 39.25 KG/M2 | OXYGEN SATURATION: 94 % | HEART RATE: 67 BPM | SYSTOLIC BLOOD PRESSURE: 170 MMHG | HEIGHT: 68 IN

## 2021-01-01 VITALS
SYSTOLIC BLOOD PRESSURE: 157 MMHG | HEART RATE: 68 BPM | RESPIRATION RATE: 18 BRPM | WEIGHT: 249 LBS | DIASTOLIC BLOOD PRESSURE: 65 MMHG | BODY MASS INDEX: 37.86 KG/M2 | OXYGEN SATURATION: 94 %

## 2021-01-01 VITALS
BODY MASS INDEX: 38.47 KG/M2 | DIASTOLIC BLOOD PRESSURE: 67 MMHG | WEIGHT: 253 LBS | HEART RATE: 76 BPM | SYSTOLIC BLOOD PRESSURE: 152 MMHG | RESPIRATION RATE: 18 BRPM

## 2021-01-01 VITALS
OXYGEN SATURATION: 95 % | BODY MASS INDEX: 37.02 KG/M2 | WEIGHT: 244.3 LBS | HEART RATE: 60 BPM | SYSTOLIC BLOOD PRESSURE: 147 MMHG | RESPIRATION RATE: 18 BRPM | TEMPERATURE: 97.9 F | DIASTOLIC BLOOD PRESSURE: 67 MMHG | HEIGHT: 68 IN

## 2021-01-01 VITALS — OXYGEN SATURATION: 97 % | SYSTOLIC BLOOD PRESSURE: 157 MMHG | DIASTOLIC BLOOD PRESSURE: 73 MMHG

## 2021-01-01 VITALS
DIASTOLIC BLOOD PRESSURE: 60 MMHG | WEIGHT: 255 LBS | RESPIRATION RATE: 18 BRPM | BODY MASS INDEX: 38.77 KG/M2 | HEART RATE: 61 BPM | SYSTOLIC BLOOD PRESSURE: 130 MMHG

## 2021-01-01 VITALS
RESPIRATION RATE: 20 BRPM | HEART RATE: 60 BPM | HEIGHT: 68 IN | SYSTOLIC BLOOD PRESSURE: 178 MMHG | WEIGHT: 259 LBS | TEMPERATURE: 97.4 F | DIASTOLIC BLOOD PRESSURE: 85 MMHG | BODY MASS INDEX: 39.25 KG/M2 | OXYGEN SATURATION: 98 %

## 2021-01-01 VITALS
WEIGHT: 257 LBS | HEART RATE: 61 BPM | BODY MASS INDEX: 39.08 KG/M2 | DIASTOLIC BLOOD PRESSURE: 63 MMHG | RESPIRATION RATE: 18 BRPM | SYSTOLIC BLOOD PRESSURE: 142 MMHG

## 2021-01-01 VITALS
WEIGHT: 251 LBS | RESPIRATION RATE: 18 BRPM | HEART RATE: 74 BPM | SYSTOLIC BLOOD PRESSURE: 145 MMHG | BODY MASS INDEX: 38.16 KG/M2 | DIASTOLIC BLOOD PRESSURE: 66 MMHG

## 2021-01-01 VITALS — TEMPERATURE: 98.4 F | OXYGEN SATURATION: 98 %

## 2021-01-01 VITALS
RESPIRATION RATE: 16 BRPM | OXYGEN SATURATION: 94 % | SYSTOLIC BLOOD PRESSURE: 102 MMHG | DIASTOLIC BLOOD PRESSURE: 54 MMHG | HEART RATE: 81 BPM | WEIGHT: 251.2 LBS | BODY MASS INDEX: 38.07 KG/M2 | HEIGHT: 68 IN

## 2021-01-01 VITALS
RESPIRATION RATE: 18 BRPM | WEIGHT: 248 LBS | DIASTOLIC BLOOD PRESSURE: 64 MMHG | BODY MASS INDEX: 37.71 KG/M2 | HEART RATE: 60 BPM | SYSTOLIC BLOOD PRESSURE: 139 MMHG

## 2021-01-01 VITALS
WEIGHT: 257 LBS | DIASTOLIC BLOOD PRESSURE: 70 MMHG | BODY MASS INDEX: 39.08 KG/M2 | HEART RATE: 67 BPM | SYSTOLIC BLOOD PRESSURE: 156 MMHG | RESPIRATION RATE: 18 BRPM

## 2021-01-01 VITALS
HEART RATE: 69 BPM | OXYGEN SATURATION: 96 % | TEMPERATURE: 97.5 F | SYSTOLIC BLOOD PRESSURE: 193 MMHG | WEIGHT: 239 LBS | DIASTOLIC BLOOD PRESSURE: 90 MMHG | RESPIRATION RATE: 19 BRPM | HEIGHT: 68 IN | BODY MASS INDEX: 36.22 KG/M2

## 2021-01-01 VITALS — SYSTOLIC BLOOD PRESSURE: 158 MMHG | OXYGEN SATURATION: 94 % | DIASTOLIC BLOOD PRESSURE: 75 MMHG

## 2021-01-01 VITALS
OXYGEN SATURATION: 97 % | TEMPERATURE: 98.4 F | RESPIRATION RATE: 18 BRPM | HEART RATE: 93 BPM | SYSTOLIC BLOOD PRESSURE: 130 MMHG | DIASTOLIC BLOOD PRESSURE: 88 MMHG | BODY MASS INDEX: 36.54 KG/M2 | WEIGHT: 240.3 LBS

## 2021-01-01 VITALS
WEIGHT: 240 LBS | RESPIRATION RATE: 18 BRPM | HEART RATE: 90 BPM | HEIGHT: 68 IN | DIASTOLIC BLOOD PRESSURE: 70 MMHG | TEMPERATURE: 96.2 F | SYSTOLIC BLOOD PRESSURE: 118 MMHG | BODY MASS INDEX: 36.37 KG/M2

## 2021-01-01 VITALS — SYSTOLIC BLOOD PRESSURE: 103 MMHG | OXYGEN SATURATION: 97 % | DIASTOLIC BLOOD PRESSURE: 45 MMHG

## 2021-01-01 VITALS
HEART RATE: 66 BPM | DIASTOLIC BLOOD PRESSURE: 69 MMHG | BODY MASS INDEX: 39.68 KG/M2 | SYSTOLIC BLOOD PRESSURE: 155 MMHG | RESPIRATION RATE: 18 BRPM | WEIGHT: 261 LBS

## 2021-01-01 VITALS
DIASTOLIC BLOOD PRESSURE: 80 MMHG | BODY MASS INDEX: 39.17 KG/M2 | SYSTOLIC BLOOD PRESSURE: 130 MMHG | HEART RATE: 83 BPM | WEIGHT: 249.56 LBS | TEMPERATURE: 98 F | RESPIRATION RATE: 20 BRPM | OXYGEN SATURATION: 97 % | HEIGHT: 67 IN

## 2021-01-01 VITALS
OXYGEN SATURATION: 57 % | DIASTOLIC BLOOD PRESSURE: 53 MMHG | RESPIRATION RATE: 19 BRPM | SYSTOLIC BLOOD PRESSURE: 96 MMHG

## 2021-01-01 VITALS — WEIGHT: 259 LBS | BODY MASS INDEX: 39.25 KG/M2 | HEIGHT: 68 IN
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
## 2021-01-01 DIAGNOSIS — N18.9 ACUTE KIDNEY INJURY SUPERIMPOSED ON CKD (HCC): ICD-10-CM

## 2021-01-01 DIAGNOSIS — I73.9 PVD (PERIPHERAL VASCULAR DISEASE) WITH CLAUDICATION (HCC): Primary | ICD-10-CM

## 2021-01-01 DIAGNOSIS — R77.8 ELEVATED TROPONIN: ICD-10-CM

## 2021-01-01 DIAGNOSIS — L03.119 CELLULITIS OF LOWER EXTREMITY, UNSPECIFIED LATERALITY: Primary | ICD-10-CM

## 2021-01-01 DIAGNOSIS — I49.02 VENTRICULAR FIBRILLATION AND FLUTTER (HCC): ICD-10-CM

## 2021-01-01 DIAGNOSIS — N17.9 ACUTE RENAL FAILURE, UNSPECIFIED ACUTE RENAL FAILURE TYPE (HCC): ICD-10-CM

## 2021-01-01 DIAGNOSIS — N28.9 ACUTE RENAL INSUFFICIENCY: Primary | ICD-10-CM

## 2021-01-01 DIAGNOSIS — R62.7 ADULT FAILURE TO THRIVE: ICD-10-CM

## 2021-01-01 DIAGNOSIS — Z98.890 POST-OPERATIVE STATE: Primary | ICD-10-CM

## 2021-01-01 DIAGNOSIS — I50.9 CONGESTIVE HEART FAILURE WITH CARDIOMYOPATHY AND CARDIOMEGALY (HCC): ICD-10-CM

## 2021-01-01 DIAGNOSIS — U07.1 COVID-19: Primary | ICD-10-CM

## 2021-01-01 DIAGNOSIS — I65.23 BILATERAL CAROTID ARTERY STENOSIS: ICD-10-CM

## 2021-01-01 DIAGNOSIS — Z95.0 PACEMAKER: Primary | ICD-10-CM

## 2021-01-01 DIAGNOSIS — I73.9 PERIPHERAL ARTERY DISEASE (HCC): ICD-10-CM

## 2021-01-01 DIAGNOSIS — L97.509 ISCHEMIC FOOT ULCER DUE TO ATHEROSCLEROSIS OF NATIVE ARTERY OF LIMB (HCC): Primary | ICD-10-CM

## 2021-01-01 DIAGNOSIS — I73.9 PVD (PERIPHERAL VASCULAR DISEASE) WITH CLAUDICATION (HCC): ICD-10-CM

## 2021-01-01 DIAGNOSIS — T14.8XXA WOUND INFECTION: ICD-10-CM

## 2021-01-01 DIAGNOSIS — J96.21 ACUTE ON CHRONIC RESPIRATORY FAILURE WITH HYPOXIA (HCC): ICD-10-CM

## 2021-01-01 DIAGNOSIS — N17.9 AKI (ACUTE KIDNEY INJURY) (HCC): ICD-10-CM

## 2021-01-01 DIAGNOSIS — I50.9 ACUTE ON CHRONIC CONGESTIVE HEART FAILURE, UNSPECIFIED HEART FAILURE TYPE (HCC): Primary | ICD-10-CM

## 2021-01-01 DIAGNOSIS — R19.5 OCCULT BLOOD POSITIVE STOOL: ICD-10-CM

## 2021-01-01 DIAGNOSIS — L08.9 WOUND INFECTION: ICD-10-CM

## 2021-01-01 DIAGNOSIS — R57.9 SHOCK (HCC): Primary | ICD-10-CM

## 2021-01-01 DIAGNOSIS — N17.9 ACUTE KIDNEY INJURY SUPERIMPOSED ON CKD (HCC): ICD-10-CM

## 2021-01-01 DIAGNOSIS — Z01.818 PRE-OP TESTING: ICD-10-CM

## 2021-01-01 DIAGNOSIS — I51.7 CONGESTIVE HEART FAILURE WITH CARDIOMYOPATHY AND CARDIOMEGALY (HCC): ICD-10-CM

## 2021-01-01 DIAGNOSIS — I46.9 CARDIAC ARREST (HCC): ICD-10-CM

## 2021-01-01 DIAGNOSIS — I96 GANGRENE OF RIGHT FOOT (HCC): Primary | ICD-10-CM

## 2021-01-01 DIAGNOSIS — R41.0 ACUTE DELIRIUM: Primary | ICD-10-CM

## 2021-01-01 DIAGNOSIS — I49.01 VENTRICULAR FIBRILLATION AND FLUTTER (HCC): ICD-10-CM

## 2021-01-01 DIAGNOSIS — E87.20 LACTIC ACID ACIDOSIS: ICD-10-CM

## 2021-01-01 DIAGNOSIS — I70.25 ISCHEMIC FOOT ULCER DUE TO ATHEROSCLEROSIS OF NATIVE ARTERY OF LIMB (HCC): ICD-10-CM

## 2021-01-01 DIAGNOSIS — I65.23 BILATERAL CAROTID ARTERY STENOSIS: Primary | ICD-10-CM

## 2021-01-01 DIAGNOSIS — I70.25 ISCHEMIC FOOT ULCER DUE TO ATHEROSCLEROSIS OF NATIVE ARTERY OF LIMB (HCC): Primary | ICD-10-CM

## 2021-01-01 DIAGNOSIS — N18.32 CHRONIC RENAL FAILURE, STAGE 3B (HCC): Primary | ICD-10-CM

## 2021-01-01 DIAGNOSIS — I42.9 CONGESTIVE HEART FAILURE WITH CARDIOMYOPATHY AND CARDIOMEGALY (HCC): ICD-10-CM

## 2021-01-01 DIAGNOSIS — R09.89 BILATERAL CAROTID BRUITS: ICD-10-CM

## 2021-01-01 DIAGNOSIS — Z20.822 2019-NCOV NOT DETECTED: ICD-10-CM

## 2021-01-01 DIAGNOSIS — R06.03 RESPIRATORY DISTRESS: ICD-10-CM

## 2021-01-01 DIAGNOSIS — E11.49 OTHER DIABETIC NEUROLOGICAL COMPLICATION ASSOCIATED WITH TYPE 2 DIABETES MELLITUS (HCC): ICD-10-CM

## 2021-01-01 DIAGNOSIS — I50.20 HEART FAILURE WITH REDUCED EJECTION FRACTION (HCC): ICD-10-CM

## 2021-01-01 DIAGNOSIS — L97.509 ISCHEMIC FOOT ULCER DUE TO ATHEROSCLEROSIS OF NATIVE ARTERY OF LIMB (HCC): ICD-10-CM

## 2021-01-01 DIAGNOSIS — I25.810 CORONARY ARTERY DISEASE INVOLVING CORONARY BYPASS GRAFT OF NATIVE HEART WITHOUT ANGINA PECTORIS: Primary | ICD-10-CM

## 2021-01-01 DIAGNOSIS — M79.89 LEG SWELLING: ICD-10-CM

## 2021-01-01 DIAGNOSIS — I73.9 PERIPHERAL VASCULAR DISEASE (HCC): ICD-10-CM

## 2021-01-01 LAB
AADO2: 152.7 MMHG
AADO2: 172.4 MMHG
AADO2: 216.9 MMHG
AADO2: 282.9 MMHG
AADO2: 382.7 MMHG
ABO/RH: NORMAL
ACETAMINOPHEN LEVEL: <5 MCG/ML (ref 10–30)
ACINETOBACTER BAUMANNII BY PCR: NOT DETECTED
ADDENDUM ELECTROPHORESIS URINE RANDOM: NORMAL
ADENOVIRUS BY PCR: NOT DETECTED
ALBUMIN SERPL-MCNC: 1.6 G/DL (ref 3.5–5.2)
ALBUMIN SERPL-MCNC: 1.6 G/DL (ref 3.5–5.2)
ALBUMIN SERPL-MCNC: 1.8 G/DL (ref 3.5–5.2)
ALBUMIN SERPL-MCNC: 1.9 G/DL (ref 3.5–4.7)
ALBUMIN SERPL-MCNC: 1.9 G/DL (ref 3.5–5.2)
ALBUMIN SERPL-MCNC: 2.3 G/DL (ref 3.5–5.2)
ALBUMIN SERPL-MCNC: 2.5 G/DL (ref 3.5–5.2)
ALBUMIN SERPL-MCNC: 2.7 G/DL (ref 3.5–5.2)
ALBUMIN SERPL-MCNC: 2.8 G/DL (ref 3.5–5.2)
ALBUMIN SERPL-MCNC: 2.9 G/DL (ref 3.5–5.2)
ALBUMIN SERPL-MCNC: 3 G/DL (ref 3.5–5.2)
ALBUMIN SERPL-MCNC: 3.1 G/DL (ref 3.5–5.2)
ALBUMIN SERPL-MCNC: 3.2 G/DL (ref 3.5–5.2)
ALBUMIN SERPL-MCNC: 3.2 G/DL (ref 3.5–5.2)
ALBUMIN SERPL-MCNC: 3.3 G/DL (ref 3.5–5.2)
ALBUMIN SERPL-MCNC: 3.4 G/DL (ref 3.5–5.2)
ALBUMIN SERPL-MCNC: 3.8 G/DL (ref 3.5–5.2)
ALP BLD-CCNC: 101 U/L (ref 40–129)
ALP BLD-CCNC: 105 U/L (ref 40–129)
ALP BLD-CCNC: 108 U/L (ref 40–129)
ALP BLD-CCNC: 108 U/L (ref 40–129)
ALP BLD-CCNC: 113 U/L (ref 40–129)
ALP BLD-CCNC: 116 U/L (ref 40–129)
ALP BLD-CCNC: 118 U/L (ref 40–129)
ALP BLD-CCNC: 119 U/L (ref 40–129)
ALP BLD-CCNC: 128 U/L (ref 40–129)
ALP BLD-CCNC: 130 U/L (ref 40–129)
ALP BLD-CCNC: 140 U/L (ref 40–129)
ALP BLD-CCNC: 217 U/L (ref 40–129)
ALP BLD-CCNC: 52 U/L (ref 40–129)
ALP BLD-CCNC: 53 U/L (ref 40–129)
ALP BLD-CCNC: 56 U/L (ref 40–129)
ALP BLD-CCNC: 57 U/L (ref 40–129)
ALP BLD-CCNC: 57 U/L (ref 40–129)
ALP BLD-CCNC: 58 U/L (ref 40–129)
ALP BLD-CCNC: 68 U/L (ref 40–129)
ALP BLD-CCNC: 69 U/L (ref 40–129)
ALP BLD-CCNC: 70 U/L (ref 40–129)
ALP BLD-CCNC: 88 U/L (ref 40–129)
ALP BLD-CCNC: 97 U/L (ref 40–129)
ALPHA-1-GLOBULIN: 0.5 G/DL (ref 0.2–0.4)
ALPHA-2-GLOBULIN: 1 G/FL (ref 0.5–1)
ALT SERPL-CCNC: 11 U/L (ref 0–40)
ALT SERPL-CCNC: 13 U/L (ref 0–40)
ALT SERPL-CCNC: 13 U/L (ref 0–40)
ALT SERPL-CCNC: 136 U/L (ref 0–40)
ALT SERPL-CCNC: 14 U/L (ref 0–40)
ALT SERPL-CCNC: 15 U/L (ref 0–40)
ALT SERPL-CCNC: 18 U/L (ref 0–40)
ALT SERPL-CCNC: 19 U/L (ref 0–40)
ALT SERPL-CCNC: 192 U/L (ref 0–40)
ALT SERPL-CCNC: 206 U/L (ref 0–40)
ALT SERPL-CCNC: 21 U/L (ref 0–40)
ALT SERPL-CCNC: 25 U/L (ref 0–40)
ALT SERPL-CCNC: 26 U/L (ref 0–40)
ALT SERPL-CCNC: 30 U/L (ref 0–40)
ALT SERPL-CCNC: 48 U/L (ref 0–40)
ALT SERPL-CCNC: 6 U/L (ref 0–40)
ALT SERPL-CCNC: 7 U/L (ref 0–40)
ALT SERPL-CCNC: 7 U/L (ref 0–40)
ALT SERPL-CCNC: 8 U/L (ref 0–40)
ALT SERPL-CCNC: 9 U/L (ref 0–40)
AMMONIA: 14 UMOL/L (ref 16–60)
AMMONIA: 44 UMOL/L (ref 16–60)
AMORPHOUS: ABNORMAL
AMPHETAMINE SCREEN, URINE: NOT DETECTED
ANAEROBIC CULTURE: NORMAL
ANAEROBIC CULTURE: NORMAL
ANION GAP SERPL CALCULATED.3IONS-SCNC: 10 MMOL/L (ref 7–16)
ANION GAP SERPL CALCULATED.3IONS-SCNC: 11 MMOL/L (ref 7–16)
ANION GAP SERPL CALCULATED.3IONS-SCNC: 12 MMOL/L (ref 7–16)
ANION GAP SERPL CALCULATED.3IONS-SCNC: 13 MMOL/L (ref 7–16)
ANION GAP SERPL CALCULATED.3IONS-SCNC: 14 MMOL/L (ref 7–16)
ANION GAP SERPL CALCULATED.3IONS-SCNC: 15 MMOL/L (ref 7–16)
ANION GAP SERPL CALCULATED.3IONS-SCNC: 17 MMOL/L (ref 7–16)
ANION GAP SERPL CALCULATED.3IONS-SCNC: 18 MMOL/L (ref 7–16)
ANION GAP SERPL CALCULATED.3IONS-SCNC: 20 MMOL/L (ref 7–16)
ANION GAP SERPL CALCULATED.3IONS-SCNC: 23 MMOL/L (ref 7–16)
ANION GAP SERPL CALCULATED.3IONS-SCNC: 23 MMOL/L (ref 7–16)
ANION GAP SERPL CALCULATED.3IONS-SCNC: 25 MMOL/L (ref 7–16)
ANION GAP SERPL CALCULATED.3IONS-SCNC: 26 MMOL/L (ref 7–16)
ANION GAP SERPL CALCULATED.3IONS-SCNC: 29 MMOL/L (ref 7–16)
ANION GAP SERPL CALCULATED.3IONS-SCNC: 4 MMOL/L (ref 7–16)
ANION GAP SERPL CALCULATED.3IONS-SCNC: 5 MMOL/L (ref 7–16)
ANION GAP SERPL CALCULATED.3IONS-SCNC: 7 MMOL/L (ref 7–16)
ANION GAP SERPL CALCULATED.3IONS-SCNC: 7 MMOL/L (ref 7–16)
ANION GAP SERPL CALCULATED.3IONS-SCNC: 8 MMOL/L (ref 7–16)
ANION GAP SERPL CALCULATED.3IONS-SCNC: 9 MMOL/L (ref 7–16)
ANION GAP: 10 MMOL/L (ref 7–16)
ANION GAP: 9 MMOL/L (ref 7–16)
ANISOCYTOSIS: ABNORMAL
ANTIBODY SCREEN: NORMAL
APPEARANCE FLUID: CLEAR
APPEARANCE FLUID: CLEAR
APTT: 25.5 SEC (ref 24.5–35.1)
APTT: 40.1 SEC (ref 24.5–35.1)
APTT: 43.2 SEC (ref 24.5–35.1)
AST SERPL-CCNC: 13 U/L (ref 0–39)
AST SERPL-CCNC: 14 U/L (ref 0–39)
AST SERPL-CCNC: 15 U/L (ref 0–39)
AST SERPL-CCNC: 16 U/L (ref 0–39)
AST SERPL-CCNC: 19 U/L (ref 0–39)
AST SERPL-CCNC: 20 U/L (ref 0–39)
AST SERPL-CCNC: 22 U/L (ref 0–39)
AST SERPL-CCNC: 24 U/L (ref 0–39)
AST SERPL-CCNC: 36 U/L (ref 0–39)
AST SERPL-CCNC: 41 U/L (ref 0–39)
AST SERPL-CCNC: 46 U/L (ref 0–39)
AST SERPL-CCNC: 47 U/L (ref 0–39)
AST SERPL-CCNC: 53 U/L (ref 0–39)
AST SERPL-CCNC: 548 U/L (ref 0–39)
AST SERPL-CCNC: 555 U/L (ref 0–39)
AST SERPL-CCNC: 62 U/L (ref 0–39)
AST SERPL-CCNC: 64 U/L (ref 0–39)
AST SERPL-CCNC: 655 U/L (ref 0–39)
AST SERPL-CCNC: 66 U/L (ref 0–39)
AST SERPL-CCNC: 70 U/L (ref 0–39)
B.E.: -0.5 MMOL/L (ref -3–3)
B.E.: -1.3 MMOL/L (ref -3–0)
B.E.: -10.6 MMOL/L (ref -3–3)
B.E.: -18 MMOL/L (ref -3–3)
B.E.: -4.1 MMOL/L (ref -3–0)
B.E.: -5.7 MMOL/L (ref -3–3)
B.E.: -7.1 MMOL/L (ref -3–3)
B.E.: 2.3 MMOL/L (ref -3–3)
B.E.: 3.8 MMOL/L (ref -3–3)
BACTERIA: ABNORMAL /HPF
BACTERIA: ABNORMAL /HPF
BACTERIA: NORMAL /HPF
BACTERIA: NORMAL /HPF
BARBITURATE SCREEN URINE: NOT DETECTED
BASO FLUID: 0 %
BASO FLUID: 0 %
BASOPHILS ABSOLUTE: 0 E9/L (ref 0–0.2)
BASOPHILS ABSOLUTE: 0.01 E9/L (ref 0–0.2)
BASOPHILS ABSOLUTE: 0.02 E9/L (ref 0–0.2)
BASOPHILS ABSOLUTE: 0.02 E9/L (ref 0–0.2)
BASOPHILS ABSOLUTE: 0.03 E9/L (ref 0–0.2)
BASOPHILS ABSOLUTE: 0.04 E9/L (ref 0–0.2)
BASOPHILS ABSOLUTE: 0.05 E9/L (ref 0–0.2)
BASOPHILS ABSOLUTE: 0.06 E9/L (ref 0–0.2)
BASOPHILS ABSOLUTE: 0.06 E9/L (ref 0–0.2)
BASOPHILS RELATIVE PERCENT: 0.1 % (ref 0–2)
BASOPHILS RELATIVE PERCENT: 0.1 % (ref 0–2)
BASOPHILS RELATIVE PERCENT: 0.2 % (ref 0–2)
BASOPHILS RELATIVE PERCENT: 0.2 % (ref 0–2)
BASOPHILS RELATIVE PERCENT: 0.3 % (ref 0–2)
BASOPHILS RELATIVE PERCENT: 0.4 % (ref 0–2)
BASOPHILS RELATIVE PERCENT: 0.4 % (ref 0–2)
BASOPHILS RELATIVE PERCENT: 0.5 % (ref 0–2)
BASOPHILS RELATIVE PERCENT: 0.6 % (ref 0–2)
BASOPHILS RELATIVE PERCENT: 0.8 % (ref 0–2)
BENZODIAZEPINE SCREEN, URINE: NOT DETECTED
BETA GLOBULIN: 0.9 G/DL (ref 0.8–1.3)
BETA-HYDROXYBUTYRATE: 0.46 MMOL/L (ref 0.02–0.27)
BILIRUB SERPL-MCNC: 0.3 MG/DL (ref 0–1.2)
BILIRUB SERPL-MCNC: 0.4 MG/DL (ref 0–1.2)
BILIRUB SERPL-MCNC: 0.5 MG/DL (ref 0–1.2)
BILIRUB SERPL-MCNC: 0.6 MG/DL (ref 0–1.2)
BILIRUB SERPL-MCNC: <0.2 MG/DL (ref 0–1.2)
BILIRUBIN DIRECT: 0.3 MG/DL (ref 0–0.3)
BILIRUBIN DIRECT: 0.4 MG/DL (ref 0–0.3)
BILIRUBIN DIRECT: 0.4 MG/DL (ref 0–0.3)
BILIRUBIN DIRECT: <0.2 MG/DL (ref 0–0.3)
BILIRUBIN URINE: ABNORMAL
BILIRUBIN URINE: NEGATIVE
BILIRUBIN, INDIRECT: 0.2 MG/DL (ref 0–1)
BILIRUBIN, INDIRECT: ABNORMAL MG/DL (ref 0–1)
BLOOD BANK DISPENSE STATUS: NORMAL
BLOOD BANK PRODUCT CODE: NORMAL
BLOOD CULTURE, ROUTINE: NORMAL
BLOOD, URINE: ABNORMAL
BLOOD, URINE: NEGATIVE
BLOOD, URINE: NEGATIVE
BORDETELLA PARAPERTUSSIS BY PCR: NOT DETECTED
BORDETELLA PERTUSSIS BY PCR: NOT DETECTED
BOTTLE TYPE: ABNORMAL
BPU ID: NORMAL
BUN BLDV-MCNC: 106 MG/DL (ref 6–23)
BUN BLDV-MCNC: 112 MG/DL (ref 6–23)
BUN BLDV-MCNC: 113 MG/DL (ref 6–23)
BUN BLDV-MCNC: 113 MG/DL (ref 6–23)
BUN BLDV-MCNC: 114 MG/DL (ref 6–23)
BUN BLDV-MCNC: 115 MG/DL (ref 6–23)
BUN BLDV-MCNC: 117 MG/DL (ref 6–23)
BUN BLDV-MCNC: 117 MG/DL (ref 6–23)
BUN BLDV-MCNC: 118 MG/DL (ref 6–23)
BUN BLDV-MCNC: 119 MG/DL (ref 6–23)
BUN BLDV-MCNC: 120 MG/DL (ref 6–23)
BUN BLDV-MCNC: 128 MG/DL (ref 6–23)
BUN BLDV-MCNC: 17 MG/DL (ref 8–23)
BUN BLDV-MCNC: 18 MG/DL (ref 8–23)
BUN BLDV-MCNC: 19 MG/DL (ref 8–23)
BUN BLDV-MCNC: 20 MG/DL (ref 8–23)
BUN BLDV-MCNC: 20 MG/DL (ref 8–23)
BUN BLDV-MCNC: 21 MG/DL (ref 6–23)
BUN BLDV-MCNC: 21 MG/DL (ref 8–23)
BUN BLDV-MCNC: 22 MG/DL (ref 6–23)
BUN BLDV-MCNC: 26 MG/DL (ref 8–23)
BUN BLDV-MCNC: 29 MG/DL (ref 6–23)
BUN BLDV-MCNC: 30 MG/DL (ref 6–23)
BUN BLDV-MCNC: 30 MG/DL (ref 8–23)
BUN BLDV-MCNC: 31 MG/DL (ref 6–23)
BUN BLDV-MCNC: 33 MG/DL (ref 6–23)
BUN BLDV-MCNC: 33 MG/DL (ref 6–23)
BUN BLDV-MCNC: 34 MG/DL (ref 6–23)
BUN BLDV-MCNC: 34 MG/DL (ref 8–23)
BUN BLDV-MCNC: 37 MG/DL (ref 6–23)
BUN BLDV-MCNC: 38 MG/DL (ref 6–23)
BUN BLDV-MCNC: 39 MG/DL (ref 8–23)
BUN BLDV-MCNC: 40 MG/DL (ref 8–23)
BUN BLDV-MCNC: 42 MG/DL (ref 6–23)
BUN BLDV-MCNC: 43 MG/DL (ref 6–23)
BUN BLDV-MCNC: 48 MG/DL (ref 6–23)
BUN BLDV-MCNC: 50 MG/DL (ref 6–23)
BUN BLDV-MCNC: 50 MG/DL (ref 6–23)
BUN BLDV-MCNC: 51 MG/DL (ref 6–23)
BUN BLDV-MCNC: 52 MG/DL (ref 6–23)
BUN BLDV-MCNC: 53 MG/DL (ref 6–23)
BUN BLDV-MCNC: 54 MG/DL (ref 6–23)
BUN BLDV-MCNC: 55 MG/DL (ref 6–23)
BUN BLDV-MCNC: 55 MG/DL (ref 6–23)
BUN BLDV-MCNC: 57 MG/DL (ref 6–23)
BUN BLDV-MCNC: 57 MG/DL (ref 6–23)
BUN BLDV-MCNC: 60 MG/DL (ref 6–23)
BUN BLDV-MCNC: 62 MG/DL (ref 6–23)
BUN BLDV-MCNC: 70 MG/DL (ref 6–23)
BUN BLDV-MCNC: 71 MG/DL (ref 6–23)
BUN BLDV-MCNC: 71 MG/DL (ref 6–23)
BUN BLDV-MCNC: 77 MG/DL (ref 6–23)
BUN BLDV-MCNC: 81 MG/DL (ref 6–23)
BUN BLDV-MCNC: 82 MG/DL (ref 6–23)
BURR CELLS: ABNORMAL
C-REACTIVE PROTEIN: 1.9 MG/DL (ref 0–0.4)
C-REACTIVE PROTEIN: 16 MG/DL (ref 0–0.4)
C-REACTIVE PROTEIN: 17.1 MG/DL (ref 0–0.4)
C-REACTIVE PROTEIN: 2 MG/DL (ref 0–0.4)
C-REACTIVE PROTEIN: 2.1 MG/DL (ref 0–0.4)
C-REACTIVE PROTEIN: 21.4 MG/DL (ref 0–0.4)
C-REACTIVE PROTEIN: 3.6 MG/DL (ref 0–0.4)
C-REACTIVE PROTEIN: 5.4 MG/DL (ref 0–0.4)
CALCIUM IONIZED: 1.16 MMOL/L (ref 1.15–1.33)
CALCIUM IONIZED: 1.3 MMOL/L (ref 1.15–1.33)
CALCIUM IONIZED: 1.38 MMOL/L (ref 1.15–1.33)
CALCIUM IONIZED: 1.41 MMOL/L (ref 1.15–1.33)
CALCIUM IONIZED: 1.54 MMOL/L (ref 1.15–1.33)
CALCIUM SERPL-MCNC: 10 MG/DL (ref 8.6–10.2)
CALCIUM SERPL-MCNC: 10.1 MG/DL (ref 8.6–10.2)
CALCIUM SERPL-MCNC: 10.1 MG/DL (ref 8.6–10.2)
CALCIUM SERPL-MCNC: 10.3 MG/DL (ref 8.6–10.2)
CALCIUM SERPL-MCNC: 10.5 MG/DL (ref 8.6–10.2)
CALCIUM SERPL-MCNC: 10.6 MG/DL (ref 8.6–10.2)
CALCIUM SERPL-MCNC: 11.4 MG/DL (ref 8.6–10.2)
CALCIUM SERPL-MCNC: 11.5 MG/DL (ref 8.6–10.2)
CALCIUM SERPL-MCNC: 8 MG/DL (ref 8.6–10.2)
CALCIUM SERPL-MCNC: 8.1 MG/DL (ref 8.6–10.2)
CALCIUM SERPL-MCNC: 8.1 MG/DL (ref 8.6–10.2)
CALCIUM SERPL-MCNC: 8.3 MG/DL (ref 8.6–10.2)
CALCIUM SERPL-MCNC: 8.4 MG/DL (ref 8.6–10.2)
CALCIUM SERPL-MCNC: 8.5 MG/DL (ref 8.6–10.2)
CALCIUM SERPL-MCNC: 8.6 MG/DL (ref 8.6–10.2)
CALCIUM SERPL-MCNC: 8.7 MG/DL (ref 8.6–10.2)
CALCIUM SERPL-MCNC: 8.8 MG/DL (ref 8.6–10.2)
CALCIUM SERPL-MCNC: 8.8 MG/DL (ref 8.6–10.2)
CALCIUM SERPL-MCNC: 8.9 MG/DL (ref 8.6–10.2)
CALCIUM SERPL-MCNC: 9 MG/DL (ref 8.6–10.2)
CALCIUM SERPL-MCNC: 9.1 MG/DL (ref 8.6–10.2)
CALCIUM SERPL-MCNC: 9.2 MG/DL (ref 8.6–10.2)
CALCIUM SERPL-MCNC: 9.3 MG/DL (ref 8.6–10.2)
CALCIUM SERPL-MCNC: 9.4 MG/DL (ref 8.6–10.2)
CALCIUM SERPL-MCNC: 9.5 MG/DL (ref 8.6–10.2)
CALCIUM SERPL-MCNC: 9.6 MG/DL (ref 8.6–10.2)
CALCIUM SERPL-MCNC: 9.7 MG/DL (ref 8.6–10.2)
CALCIUM SERPL-MCNC: 9.7 MG/DL (ref 8.6–10.2)
CALCIUM SERPL-MCNC: 9.8 MG/DL (ref 8.6–10.2)
CANDIDA ALBICANS BY PCR: NOT DETECTED
CANDIDA GLABRATA BY PCR: NOT DETECTED
CANDIDA KRUSEI BY PCR: NOT DETECTED
CANDIDA PARAPSILOSIS BY PCR: NOT DETECTED
CANDIDA TROPICALIS BY PCR: NOT DETECTED
CANNABINOID SCREEN URINE: NOT DETECTED
CARDIOPULMONARY BYPASS: NO
CARDIOPULMONARY BYPASS: NO
CELL COUNT FLUID TYPE: NORMAL
CELL COUNT FLUID TYPE: NORMAL
CHLAMYDOPHILIA PNEUMONIAE BY PCR: NOT DETECTED
CHLORIDE BLD-SCNC: 100 MMOL/L (ref 98–107)
CHLORIDE BLD-SCNC: 101 MMOL/L (ref 98–107)
CHLORIDE BLD-SCNC: 102 MMOL/L (ref 98–107)
CHLORIDE BLD-SCNC: 103 MMOL/L (ref 98–107)
CHLORIDE BLD-SCNC: 104 MMOL/L (ref 98–107)
CHLORIDE BLD-SCNC: 105 MMOL/L (ref 98–107)
CHLORIDE BLD-SCNC: 106 MMOL/L (ref 98–107)
CHLORIDE BLD-SCNC: 106 MMOL/L (ref 98–107)
CHLORIDE BLD-SCNC: 107 MMOL/L (ref 98–107)
CHLORIDE BLD-SCNC: 95 MMOL/L (ref 98–107)
CHLORIDE BLD-SCNC: 95 MMOL/L (ref 98–107)
CHLORIDE BLD-SCNC: 96 MMOL/L (ref 98–107)
CHLORIDE BLD-SCNC: 97 MMOL/L (ref 98–107)
CHLORIDE BLD-SCNC: 98 MMOL/L (ref 98–107)
CHLORIDE BLD-SCNC: 99 MMOL/L (ref 98–107)
CHOLESTEROL, TOTAL: 100 MG/DL (ref 0–199)
CHOLESTEROL, TOTAL: 112 MG/DL (ref 0–199)
CHOLESTEROL, TOTAL: 99 MG/DL (ref 0–199)
CHP ED QC CHECK: YES
CLARITY: ABNORMAL
CLARITY: CLEAR
CO2: 15 MMOL/L (ref 22–29)
CO2: 15 MMOL/L (ref 22–29)
CO2: 17 MMOL/L (ref 22–29)
CO2: 18 MMOL/L (ref 22–29)
CO2: 18 MMOL/L (ref 22–29)
CO2: 19 MMOL/L (ref 22–29)
CO2: 19 MMOL/L (ref 22–29)
CO2: 20 MMOL/L (ref 22–29)
CO2: 20 MMOL/L (ref 22–29)
CO2: 21 MMOL/L (ref 22–29)
CO2: 22 MMOL/L (ref 22–29)
CO2: 23 MMOL/L (ref 22–29)
CO2: 24 MMOL/L (ref 22–29)
CO2: 25 MMOL/L (ref 22–29)
CO2: 26 MMOL/L (ref 22–29)
CO2: 27 MMOL/L (ref 22–29)
CO2: 28 MMOL/L (ref 22–29)
CO2: 29 MMOL/L (ref 22–29)
CO2: 30 MMOL/L (ref 22–29)
CO2: 31 MMOL/L (ref 22–29)
CO2: 31 MMOL/L (ref 22–29)
CO2: 32 MMOL/L (ref 22–29)
COCAINE METABOLITE SCREEN URINE: NOT DETECTED
COHB: 0 % (ref 0–1.5)
COHB: 0.2 % (ref 0–1.5)
COHB: 0.3 % (ref 0–1.5)
COHB: 0.7 % (ref 0–1.5)
COHB: 0.8 % (ref 0–1.5)
COHB: 1.3 % (ref 0–1.5)
COHB: 1.4 % (ref 0–1.5)
COLOR FLUID: COLORLESS
COLOR FLUID: COLORLESS
COLOR: YELLOW
CORONAVIRUS 229E BY PCR: NOT DETECTED
CORONAVIRUS HKU1 BY PCR: NOT DETECTED
CORONAVIRUS NL63 BY PCR: NOT DETECTED
CORONAVIRUS OC43 BY PCR: NOT DETECTED
CORTISOL TOTAL: 52.69 MCG/DL (ref 2.68–18.4)
CREAT SERPL-MCNC: 1.1 MG/DL (ref 0.7–1.2)
CREAT SERPL-MCNC: 1.2 MG/DL (ref 0.7–1.2)
CREAT SERPL-MCNC: 1.2 MG/DL (ref 0.7–1.2)
CREAT SERPL-MCNC: 1.3 MG/DL (ref 0.7–1.2)
CREAT SERPL-MCNC: 1.4 MG/DL (ref 0.7–1.2)
CREAT SERPL-MCNC: 1.5 MG/DL (ref 0.7–1.2)
CREAT SERPL-MCNC: 1.5 MG/DL (ref 0.7–1.2)
CREAT SERPL-MCNC: 1.6 MG/DL (ref 0.7–1.2)
CREAT SERPL-MCNC: 1.7 MG/DL (ref 0.7–1.2)
CREAT SERPL-MCNC: 1.8 MG/DL (ref 0.7–1.2)
CREAT SERPL-MCNC: 1.8 MG/DL (ref 0.7–1.2)
CREAT SERPL-MCNC: 1.9 MG/DL (ref 0.7–1.2)
CREAT SERPL-MCNC: 2 MG/DL (ref 0.7–1.2)
CREAT SERPL-MCNC: 2.1 MG/DL (ref 0.7–1.2)
CREAT SERPL-MCNC: 2.2 MG/DL (ref 0.7–1.2)
CREAT SERPL-MCNC: 2.4 MG/DL (ref 0.7–1.2)
CREAT SERPL-MCNC: 2.5 MG/DL (ref 0.7–1.2)
CREAT SERPL-MCNC: 2.5 MG/DL (ref 0.7–1.2)
CREAT SERPL-MCNC: 2.6 MG/DL (ref 0.7–1.2)
CREAT SERPL-MCNC: 3 MG/DL (ref 0.7–1.2)
CREAT SERPL-MCNC: 3 MG/DL (ref 0.7–1.2)
CREAT SERPL-MCNC: 3.1 MG/DL (ref 0.7–1.2)
CREAT SERPL-MCNC: 3.2 MG/DL (ref 0.7–1.2)
CREAT SERPL-MCNC: 3.3 MG/DL (ref 0.7–1.2)
CREAT SERPL-MCNC: 3.4 MG/DL (ref 0.7–1.2)
CREAT SERPL-MCNC: 3.4 MG/DL (ref 0.7–1.2)
CREAT SERPL-MCNC: 3.5 MG/DL (ref 0.7–1.2)
CREAT SERPL-MCNC: 3.5 MG/DL (ref 0.7–1.2)
CREAT SERPL-MCNC: 3.6 MG/DL (ref 0.7–1.2)
CREAT SERPL-MCNC: 3.6 MG/DL (ref 0.7–1.2)
CREAT SERPL-MCNC: <0.1 MG/DL (ref 0.7–1.2)
CREATININE URINE: 109 MG/DL (ref 40–278)
CREATININE URINE: 91 MG/DL (ref 40–278)
CRITICAL: ABNORMAL
CULTURE SURGICAL: ABNORMAL
CULTURE, BLOOD 2: ABNORMAL
CULTURE, BLOOD 2: NORMAL
CULTURE, RESPIRATORY: ABNORMAL
CULTURE, RESPIRATORY: NORMAL
CULTURE, RESPIRATORY: NORMAL
D DIMER: 1441 NG/ML DDU
DATE ANALYZED: ABNORMAL
DATE OF COLLECTION: ABNORMAL
DESCRIPTION BLOOD BANK: NORMAL
DEVICE: ABNORMAL
DEVICE: ABNORMAL
EKG ATRIAL RATE: 0 BPM
EKG ATRIAL RATE: 141 BPM
EKG ATRIAL RATE: 47 BPM
EKG ATRIAL RATE: 70 BPM
EKG ATRIAL RATE: 73 BPM
EKG ATRIAL RATE: 77 BPM
EKG ATRIAL RATE: 93 BPM
EKG P AXIS: -89 DEGREES
EKG P AXIS: 56 DEGREES
EKG P AXIS: 84 DEGREES
EKG P-R INTERVAL: 184 MS
EKG P-R INTERVAL: 310 MS
EKG Q-T INTERVAL: 126 MS
EKG Q-T INTERVAL: 306 MS
EKG Q-T INTERVAL: 444 MS
EKG Q-T INTERVAL: 472 MS
EKG Q-T INTERVAL: 490 MS
EKG Q-T INTERVAL: 490 MS
EKG Q-T INTERVAL: 526 MS
EKG QRS DURATION: 114 MS
EKG QRS DURATION: 130 MS
EKG QRS DURATION: 160 MS
EKG QRS DURATION: 164 MS
EKG QRS DURATION: 168 MS
EKG QRS DURATION: 182 MS
EKG QRS DURATION: 190 MS
EKG QTC CALCULATION (BAZETT): 170 MS
EKG QTC CALCULATION (BAZETT): 439 MS
EKG QTC CALCULATION (BAZETT): 501 MS
EKG QTC CALCULATION (BAZETT): 527 MS
EKG QTC CALCULATION (BAZETT): 536 MS
EKG QTC CALCULATION (BAZETT): 552 MS
EKG QTC CALCULATION (BAZETT): 568 MS
EKG R AXIS: -127 DEGREES
EKG R AXIS: -133 DEGREES
EKG R AXIS: -15 DEGREES
EKG R AXIS: -8 DEGREES
EKG R AXIS: 117 DEGREES
EKG R AXIS: 67 DEGREES
EKG R AXIS: 82 DEGREES
EKG T AXIS: -122 DEGREES
EKG T AXIS: 0 DEGREES
EKG T AXIS: 100 DEGREES
EKG T AXIS: 122 DEGREES
EKG T AXIS: 22 DEGREES
EKG T AXIS: 39 DEGREES
EKG T AXIS: 72 DEGREES
EKG VENTRICULAR RATE: 110 BPM
EKG VENTRICULAR RATE: 124 BPM
EKG VENTRICULAR RATE: 63 BPM
EKG VENTRICULAR RATE: 70 BPM
EKG VENTRICULAR RATE: 72 BPM
EKG VENTRICULAR RATE: 75 BPM
EKG VENTRICULAR RATE: 93 BPM
ELECTROPHORESIS: ABNORMAL
ENTEROBACTER CLOACAE COMPLEX BY PCR: NOT DETECTED
ENTEROBACTERALES BY PCR: NOT DETECTED
ENTEROCOCCUS BY PCR: NOT DETECTED
EOSINOPHIL FLUID: 0 %
EOSINOPHIL FLUID: 0 %
EOSINOPHILS ABSOLUTE: 0 E9/L (ref 0.05–0.5)
EOSINOPHILS ABSOLUTE: 0.04 E9/L (ref 0.05–0.5)
EOSINOPHILS ABSOLUTE: 0.06 E9/L (ref 0.05–0.5)
EOSINOPHILS ABSOLUTE: 0.15 E9/L (ref 0.05–0.5)
EOSINOPHILS ABSOLUTE: 0.17 E9/L (ref 0.05–0.5)
EOSINOPHILS ABSOLUTE: 0.19 E9/L (ref 0.05–0.5)
EOSINOPHILS ABSOLUTE: 0.24 E9/L (ref 0.05–0.5)
EOSINOPHILS ABSOLUTE: 0.25 E9/L (ref 0.05–0.5)
EOSINOPHILS ABSOLUTE: 0.26 E9/L (ref 0.05–0.5)
EOSINOPHILS ABSOLUTE: 0.29 E9/L (ref 0.05–0.5)
EOSINOPHILS ABSOLUTE: 0.3 E9/L (ref 0.05–0.5)
EOSINOPHILS ABSOLUTE: 0.33 E9/L (ref 0.05–0.5)
EOSINOPHILS ABSOLUTE: 0.34 E9/L (ref 0.05–0.5)
EOSINOPHILS ABSOLUTE: 0.34 E9/L (ref 0.05–0.5)
EOSINOPHILS ABSOLUTE: 0.36 E9/L (ref 0.05–0.5)
EOSINOPHILS ABSOLUTE: 0.38 E9/L (ref 0.05–0.5)
EOSINOPHILS ABSOLUTE: 0.58 E9/L (ref 0.05–0.5)
EOSINOPHILS RELATIVE PERCENT: 0 % (ref 0–6)
EOSINOPHILS RELATIVE PERCENT: 0.2 % (ref 0–6)
EOSINOPHILS RELATIVE PERCENT: 0.4 % (ref 0–6)
EOSINOPHILS RELATIVE PERCENT: 0.6 % (ref 0–6)
EOSINOPHILS RELATIVE PERCENT: 1.3 % (ref 0–6)
EOSINOPHILS RELATIVE PERCENT: 1.5 % (ref 0–6)
EOSINOPHILS RELATIVE PERCENT: 1.6 % (ref 0–6)
EOSINOPHILS RELATIVE PERCENT: 1.9 % (ref 0–6)
EOSINOPHILS RELATIVE PERCENT: 2.2 % (ref 0–6)
EOSINOPHILS RELATIVE PERCENT: 2.5 % (ref 0–6)
EOSINOPHILS RELATIVE PERCENT: 2.6 % (ref 0–6)
EOSINOPHILS RELATIVE PERCENT: 2.7 % (ref 0–6)
EOSINOPHILS RELATIVE PERCENT: 3 % (ref 0–6)
EOSINOPHILS RELATIVE PERCENT: 3.1 % (ref 0–6)
EOSINOPHILS RELATIVE PERCENT: 3.4 % (ref 0–6)
EOSINOPHILS RELATIVE PERCENT: 4.5 % (ref 0–6)
EOSINOPHILS RELATIVE PERCENT: 4.6 % (ref 0–6)
EOSINOPHILS RELATIVE PERCENT: 5.4 % (ref 0–6)
EOSINOPHILS RELATIVE PERCENT: 5.8 % (ref 0–6)
EOSINOPHILS RELATIVE PERCENT: 6.8 % (ref 0–6)
EOSINOPHILS RELATIVE PERCENT: 6.9 % (ref 0–6)
EPITHELIAL CELLS, UA: NORMAL /HPF
ESCHERICHIA COLI BY PCR: NOT DETECTED
ETHANOL: <10 MG/DL (ref 0–0.08)
FENTANYL SCREEN, URINE: NOT DETECTED
FERRITIN: 4697 NG/ML
FERRITIN: 534 NG/ML
FERRITIN: 68 NG/ML
FIO2: 100 %
FIO2: 40 %
FIO2: 40 %
FIO2: 50 %
FIO2: 60 %
FOLATE: 11.5 NG/ML (ref 4.8–24.2)
FOLATE: 16.1 NG/ML (ref 4.8–24.2)
FOLATE: 4.6 NG/ML (ref 4.8–24.2)
FOLATE: >20 NG/ML (ref 4.8–24.2)
FUNGUS (MYCOLOGY) CULTURE: NORMAL
FUNGUS (MYCOLOGY) CULTURE: NORMAL
FUNGUS STAIN: NORMAL
FUNGUS STAIN: NORMAL
GAMMA GLOBULIN: 1.3 G/DL (ref 0.7–1.6)
GFR AFRICAN AMERICAN: 20
GFR AFRICAN AMERICAN: 21
GFR AFRICAN AMERICAN: 21
GFR AFRICAN AMERICAN: 22
GFR AFRICAN AMERICAN: 23
GFR AFRICAN AMERICAN: 24
GFR AFRICAN AMERICAN: 29
GFR AFRICAN AMERICAN: 30
GFR AFRICAN AMERICAN: 30
GFR AFRICAN AMERICAN: 32
GFR AFRICAN AMERICAN: 35
GFR AFRICAN AMERICAN: 37
GFR AFRICAN AMERICAN: 39
GFR AFRICAN AMERICAN: 41
GFR AFRICAN AMERICAN: 44
GFR AFRICAN AMERICAN: 44
GFR AFRICAN AMERICAN: 47
GFR AFRICAN AMERICAN: 51
GFR AFRICAN AMERICAN: 54
GFR AFRICAN AMERICAN: 55
GFR AFRICAN AMERICAN: 59
GFR AFRICAN AMERICAN: >60
GFR NON-AFRICAN AMERICAN: 16 ML/MIN/1.73
GFR NON-AFRICAN AMERICAN: 16 ML/MIN/1.73
GFR NON-AFRICAN AMERICAN: 17 ML/MIN/1.73
GFR NON-AFRICAN AMERICAN: 17 ML/MIN/1.73
GFR NON-AFRICAN AMERICAN: 18 ML/MIN/1.73
GFR NON-AFRICAN AMERICAN: 19 ML/MIN/1.73
GFR NON-AFRICAN AMERICAN: 20 ML/MIN/1.73
GFR NON-AFRICAN AMERICAN: 20 ML/MIN/1.73
GFR NON-AFRICAN AMERICAN: 24 ML/MIN/1.73
GFR NON-AFRICAN AMERICAN: 25 ML/MIN/1.73
GFR NON-AFRICAN AMERICAN: 25 ML/MIN/1.73
GFR NON-AFRICAN AMERICAN: 26 ML/MIN/1.73
GFR NON-AFRICAN AMERICAN: 29 ML/MIN/1.73
GFR NON-AFRICAN AMERICAN: 31 ML/MIN/1.73
GFR NON-AFRICAN AMERICAN: 32 ML/MIN/1.73
GFR NON-AFRICAN AMERICAN: 34 ML/MIN/1.73
GFR NON-AFRICAN AMERICAN: 37 ML/MIN/1.73
GFR NON-AFRICAN AMERICAN: 37 ML/MIN/1.73
GFR NON-AFRICAN AMERICAN: 39 ML/MIN/1.73
GFR NON-AFRICAN AMERICAN: 42 ML/MIN/1.73
GFR NON-AFRICAN AMERICAN: 45 ML/MIN/1.73
GFR NON-AFRICAN AMERICAN: 45 ML/MIN/1.73
GFR NON-AFRICAN AMERICAN: 49 ML/MIN/1.73
GFR NON-AFRICAN AMERICAN: 53 ML/MIN/1.73
GFR NON-AFRICAN AMERICAN: 58 ML/MIN/1.73
GFR NON-AFRICAN AMERICAN: 58 ML/MIN/1.73
GFR NON-AFRICAN AMERICAN: >60 ML/MIN/1.73
GFR NON-AFRICAN AMERICAN: >60 ML/MIN/1.73
GFR, ESTIMATED: 45 ML/MIN/1.73
GFR, ESTIMATED: 45 ML/MIN/1.73
GLUCOSE BLD-MCNC: 100 MG/DL (ref 74–99)
GLUCOSE BLD-MCNC: 104 MG/DL (ref 74–99)
GLUCOSE BLD-MCNC: 107 MG/DL (ref 74–99)
GLUCOSE BLD-MCNC: 111 MG/DL (ref 74–99)
GLUCOSE BLD-MCNC: 115 MG/DL (ref 74–99)
GLUCOSE BLD-MCNC: 116 MG/DL (ref 74–99)
GLUCOSE BLD-MCNC: 120 MG/DL (ref 74–99)
GLUCOSE BLD-MCNC: 121 MG/DL (ref 74–99)
GLUCOSE BLD-MCNC: 123 MG/DL (ref 74–99)
GLUCOSE BLD-MCNC: 130 MG/DL (ref 74–99)
GLUCOSE BLD-MCNC: 132 MG/DL (ref 74–99)
GLUCOSE BLD-MCNC: 133 MG/DL (ref 74–99)
GLUCOSE BLD-MCNC: 134 MG/DL (ref 74–99)
GLUCOSE BLD-MCNC: 135 MG/DL (ref 74–99)
GLUCOSE BLD-MCNC: 135 MG/DL (ref 74–99)
GLUCOSE BLD-MCNC: 136 MG/DL (ref 74–99)
GLUCOSE BLD-MCNC: 137 MG/DL (ref 74–99)
GLUCOSE BLD-MCNC: 137 MG/DL (ref 74–99)
GLUCOSE BLD-MCNC: 139 MG/DL (ref 74–99)
GLUCOSE BLD-MCNC: 141 MG/DL (ref 74–99)
GLUCOSE BLD-MCNC: 141 MG/DL (ref 74–99)
GLUCOSE BLD-MCNC: 143 MG/DL (ref 74–99)
GLUCOSE BLD-MCNC: 144 MG/DL (ref 74–99)
GLUCOSE BLD-MCNC: 144 MG/DL (ref 74–99)
GLUCOSE BLD-MCNC: 145 MG/DL (ref 74–99)
GLUCOSE BLD-MCNC: 146 MG/DL (ref 74–99)
GLUCOSE BLD-MCNC: 148 MG/DL
GLUCOSE BLD-MCNC: 151 MG/DL (ref 74–99)
GLUCOSE BLD-MCNC: 151 MG/DL (ref 74–99)
GLUCOSE BLD-MCNC: 153 MG/DL (ref 74–99)
GLUCOSE BLD-MCNC: 153 MG/DL (ref 74–99)
GLUCOSE BLD-MCNC: 155 MG/DL (ref 74–99)
GLUCOSE BLD-MCNC: 156 MG/DL (ref 74–99)
GLUCOSE BLD-MCNC: 157 MG/DL (ref 74–99)
GLUCOSE BLD-MCNC: 161 MG/DL (ref 74–99)
GLUCOSE BLD-MCNC: 164 MG/DL (ref 74–99)
GLUCOSE BLD-MCNC: 166 MG/DL (ref 74–99)
GLUCOSE BLD-MCNC: 166 MG/DL (ref 74–99)
GLUCOSE BLD-MCNC: 168 MG/DL (ref 74–99)
GLUCOSE BLD-MCNC: 168 MG/DL (ref 74–99)
GLUCOSE BLD-MCNC: 169 MG/DL (ref 74–99)
GLUCOSE BLD-MCNC: 170 MG/DL (ref 74–99)
GLUCOSE BLD-MCNC: 171 MG/DL (ref 74–99)
GLUCOSE BLD-MCNC: 173 MG/DL (ref 74–99)
GLUCOSE BLD-MCNC: 177 MG/DL (ref 74–99)
GLUCOSE BLD-MCNC: 182 MG/DL (ref 74–99)
GLUCOSE BLD-MCNC: 184 MG/DL (ref 74–99)
GLUCOSE BLD-MCNC: 187 MG/DL (ref 74–99)
GLUCOSE BLD-MCNC: 194 MG/DL (ref 74–99)
GLUCOSE BLD-MCNC: 196 MG/DL (ref 74–99)
GLUCOSE BLD-MCNC: 201 MG/DL (ref 74–99)
GLUCOSE BLD-MCNC: 210 MG/DL (ref 74–99)
GLUCOSE BLD-MCNC: 217 MG/DL (ref 74–99)
GLUCOSE BLD-MCNC: 234 MG/DL (ref 74–99)
GLUCOSE BLD-MCNC: 422 MG/DL (ref 74–99)
GLUCOSE BLD-MCNC: 438 MG/DL (ref 74–99)
GLUCOSE BLD-MCNC: 62 MG/DL (ref 74–99)
GLUCOSE BLD-MCNC: 78 MG/DL (ref 74–99)
GLUCOSE BLD-MCNC: 79 MG/DL (ref 74–99)
GLUCOSE BLD-MCNC: 86 MG/DL (ref 74–99)
GLUCOSE URINE: NEGATIVE MG/DL
GRAM STAIN ORDERABLE: NORMAL
HAEMOPHILUS INFLUENZAE BY PCR: NOT DETECTED
HAV IGM SER IA-ACNC: NORMAL
HBA1C MFR BLD: 5.5 % (ref 4–5.6)
HBA1C MFR BLD: 6.5 % (ref 4–5.6)
HBA1C MFR BLD: 7.1 % (ref 4–5.6)
HBA1C MFR BLD: 7.2 % (ref 4–5.6)
HCO3 ARTERIAL: 21.8 MMOL/L (ref 22–26)
HCO3 ARTERIAL: 23.2 MMOL/L (ref 22–26)
HCO3: 15.8 MMOL/L (ref 22–26)
HCO3: 18.5 MMOL/L (ref 22–26)
HCO3: 18.7 MMOL/L (ref 22–26)
HCO3: 23.4 MMOL/L (ref 22–26)
HCO3: 26.8 MMOL/L (ref 22–26)
HCO3: 29 MMOL/L (ref 22–26)
HCO3: 9.8 MMOL/L (ref 22–26)
HCT (EST): 24 % (ref 37–54)
HCT (EST): 27 % (ref 37–54)
HCT VFR BLD CALC: 16.4 % (ref 37–54)
HCT VFR BLD CALC: 18.8 % (ref 37–54)
HCT VFR BLD CALC: 20.1 % (ref 37–54)
HCT VFR BLD CALC: 20.9 % (ref 37–54)
HCT VFR BLD CALC: 21 % (ref 37–54)
HCT VFR BLD CALC: 21 % (ref 37–54)
HCT VFR BLD CALC: 21.3 % (ref 37–54)
HCT VFR BLD CALC: 21.4 % (ref 37–54)
HCT VFR BLD CALC: 21.5 % (ref 37–54)
HCT VFR BLD CALC: 21.8 % (ref 37–54)
HCT VFR BLD CALC: 22 % (ref 37–54)
HCT VFR BLD CALC: 22 % (ref 37–54)
HCT VFR BLD CALC: 22.3 % (ref 37–54)
HCT VFR BLD CALC: 22.4 % (ref 37–54)
HCT VFR BLD CALC: 22.4 % (ref 37–54)
HCT VFR BLD CALC: 22.6 % (ref 37–54)
HCT VFR BLD CALC: 22.7 % (ref 37–54)
HCT VFR BLD CALC: 22.7 % (ref 37–54)
HCT VFR BLD CALC: 22.9 % (ref 37–54)
HCT VFR BLD CALC: 22.9 % (ref 37–54)
HCT VFR BLD CALC: 23 % (ref 37–54)
HCT VFR BLD CALC: 23 % (ref 37–54)
HCT VFR BLD CALC: 23.4 % (ref 37–54)
HCT VFR BLD CALC: 23.8 % (ref 37–54)
HCT VFR BLD CALC: 23.9 % (ref 37–54)
HCT VFR BLD CALC: 24 % (ref 37–54)
HCT VFR BLD CALC: 24.4 % (ref 37–54)
HCT VFR BLD CALC: 24.4 % (ref 37–54)
HCT VFR BLD CALC: 24.8 % (ref 37–54)
HCT VFR BLD CALC: 25 % (ref 37–54)
HCT VFR BLD CALC: 25.5 % (ref 37–54)
HCT VFR BLD CALC: 25.6 % (ref 37–54)
HCT VFR BLD CALC: 26 % (ref 37–54)
HCT VFR BLD CALC: 26.5 % (ref 37–54)
HCT VFR BLD CALC: 27.5 % (ref 37–54)
HCT VFR BLD CALC: 27.6 % (ref 37–54)
HCT VFR BLD CALC: 28.4 % (ref 37–54)
HCT VFR BLD CALC: 28.5 % (ref 37–54)
HCT VFR BLD CALC: 28.9 % (ref 37–54)
HCT VFR BLD CALC: 29.4 % (ref 37–54)
HCT VFR BLD CALC: 30.2 % (ref 37–54)
HCT VFR BLD CALC: 30.3 % (ref 37–54)
HCT VFR BLD CALC: 30.6 % (ref 37–54)
HCT VFR BLD CALC: 30.7 % (ref 37–54)
HCT VFR BLD CALC: 31.1 % (ref 37–54)
HCT VFR BLD CALC: 31.8 % (ref 37–54)
HCT VFR BLD CALC: 32.7 % (ref 37–54)
HCT VFR BLD CALC: 33.1 % (ref 37–54)
HCT VFR BLD CALC: 33.4 % (ref 37–54)
HCT VFR BLD CALC: 34.1 % (ref 37–54)
HCT VFR BLD CALC: 36.2 % (ref 37–54)
HDLC SERPL-MCNC: 30 MG/DL
HDLC SERPL-MCNC: 33 MG/DL
HDLC SERPL-MCNC: 38 MG/DL
HEMOGLOBIN: 10.2 G/DL (ref 12.5–16.5)
HEMOGLOBIN: 10.3 G/DL (ref 12.5–16.5)
HEMOGLOBIN: 10.8 G/DL (ref 12.5–16.5)
HEMOGLOBIN: 11 G/DL (ref 12.5–16.5)
HEMOGLOBIN: 11 G/DL (ref 12.5–16.5)
HEMOGLOBIN: 11.4 G/DL (ref 12.5–16.5)
HEMOGLOBIN: 11.9 G/DL (ref 12.5–16.5)
HEMOGLOBIN: 5.2 G/DL (ref 12.5–16.5)
HEMOGLOBIN: 6.1 G/DL (ref 12.5–16.5)
HEMOGLOBIN: 6.6 G/DL (ref 12.5–16.5)
HEMOGLOBIN: 6.8 G/DL (ref 12.5–16.5)
HEMOGLOBIN: 6.9 G/DL (ref 12.5–16.5)
HEMOGLOBIN: 7 G/DL (ref 12.5–16.5)
HEMOGLOBIN: 7 G/DL (ref 12.5–16.5)
HEMOGLOBIN: 7.1 G/DL (ref 12.5–16.5)
HEMOGLOBIN: 7.2 G/DL (ref 12.5–16.5)
HEMOGLOBIN: 7.2 G/DL (ref 12.5–16.5)
HEMOGLOBIN: 7.3 G/DL (ref 12.5–16.5)
HEMOGLOBIN: 7.4 G/DL (ref 12.5–16.5)
HEMOGLOBIN: 7.4 G/DL (ref 12.5–16.5)
HEMOGLOBIN: 7.5 G/DL (ref 12.5–16.5)
HEMOGLOBIN: 7.6 G/DL (ref 12.5–16.5)
HEMOGLOBIN: 7.6 G/DL (ref 12.5–16.5)
HEMOGLOBIN: 7.7 G/DL (ref 12.5–16.5)
HEMOGLOBIN: 7.8 G/DL (ref 12.5–16.5)
HEMOGLOBIN: 7.8 G/DL (ref 12.5–16.5)
HEMOGLOBIN: 7.9 G/DL (ref 12.5–16.5)
HEMOGLOBIN: 8 G/DL (ref 12.5–16.5)
HEMOGLOBIN: 8.1 G/DL (ref 12.5–16.5)
HEMOGLOBIN: 8.1 G/DL (ref 12.5–16.5)
HEMOGLOBIN: 8.2 G/DL (ref 12.5–16.5)
HEMOGLOBIN: 8.2 G/DL (ref 12.5–16.5)
HEMOGLOBIN: 8.4 G/DL (ref 12.5–16.5)
HEMOGLOBIN: 8.7 G/DL (ref 12.5–16.5)
HEMOGLOBIN: 8.8 G/DL (ref 12.5–16.5)
HEMOGLOBIN: 8.8 G/DL (ref 12.5–16.5)
HEMOGLOBIN: 9.1 G/DL (ref 12.5–16.5)
HEMOGLOBIN: 9.2 G/DL (ref 12.5–16.5)
HEMOGLOBIN: 9.8 G/DL (ref 12.5–16.5)
HEMOGLOBIN: 9.8 G/DL (ref 12.5–16.5)
HEMOGLOBIN: 9.9 G/DL (ref 12.5–16.5)
HEMOGLOBIN: 9.9 G/DL (ref 12.5–16.5)
HEPATITIS B CORE IGM ANTIBODY: NORMAL
HEPATITIS B SURFACE ANTIGEN INTERPRETATION: NORMAL
HEPATITIS C ANTIBODY INTERPRETATION: NORMAL
HGB, (EST): 8.2 G/DL (ref 12.5–15.5)
HGB, (EST): 9.1 G/DL (ref 12.5–15.5)
HHB: 0.5 % (ref 0–5)
HHB: 11 % (ref 0–5)
HHB: 15.2 % (ref 0–5)
HHB: 3.2 % (ref 0–5)
HHB: 4.2 % (ref 0–5)
HHB: 4.7 % (ref 0–5)
HHB: 4.7 % (ref 0–5)
HUMAN METAPNEUMOVIRUS BY PCR: NOT DETECTED
HUMAN RHINOVIRUS/ENTEROVIRUS BY PCR: NOT DETECTED
HYALINE CASTS: NORMAL /LPF (ref 0–2)
HYPOCHROMIA: ABNORMAL
HYPOCHROMIA: ABNORMAL
IMMATURE GRANULOCYTES #: 0.01 E9/L
IMMATURE GRANULOCYTES #: 0.02 E9/L
IMMATURE GRANULOCYTES #: 0.03 E9/L
IMMATURE GRANULOCYTES #: 0.04 E9/L
IMMATURE GRANULOCYTES #: 0.05 E9/L
IMMATURE GRANULOCYTES #: 0.06 E9/L
IMMATURE GRANULOCYTES #: 0.07 E9/L
IMMATURE GRANULOCYTES #: 0.07 E9/L
IMMATURE GRANULOCYTES #: 0.08 E9/L
IMMATURE GRANULOCYTES #: 0.08 E9/L
IMMATURE GRANULOCYTES #: 0.09 E9/L
IMMATURE GRANULOCYTES #: 0.09 E9/L
IMMATURE GRANULOCYTES #: 0.1 E9/L
IMMATURE GRANULOCYTES #: 0.1 E9/L
IMMATURE GRANULOCYTES #: 0.11 E9/L
IMMATURE GRANULOCYTES #: 0.13 E9/L
IMMATURE GRANULOCYTES %: 0.2 % (ref 0–5)
IMMATURE GRANULOCYTES %: 0.3 % (ref 0–5)
IMMATURE GRANULOCYTES %: 0.4 % (ref 0–5)
IMMATURE GRANULOCYTES %: 0.4 % (ref 0–5)
IMMATURE GRANULOCYTES %: 0.5 % (ref 0–5)
IMMATURE GRANULOCYTES %: 0.6 % (ref 0–5)
IMMATURE GRANULOCYTES %: 0.7 % (ref 0–5)
IMMATURE GRANULOCYTES %: 0.7 % (ref 0–5)
IMMATURE GRANULOCYTES %: 0.8 % (ref 0–5)
IMMATURE GRANULOCYTES %: 1 % (ref 0–5)
IMMATURE GRANULOCYTES %: 1.1 % (ref 0–5)
IMMATURE RETIC FRACT: 25.4 % (ref 2.3–13.4)
IMMUNOFIXATION URINE: NORMAL
INFLUENZA A BY PCR: NOT DETECTED
INFLUENZA B BY PCR: NOT DETECTED
INR BLD: 1
INR BLD: 1.4
INR BLD: 1.6
INR BLD: 2.1
INR BLD: ABNORMAL
IRON SATURATION: 16 % (ref 20–55)
IRON SATURATION: 18 % (ref 20–55)
IRON SATURATION: 22 % (ref 20–55)
IRON SATURATION: 36 % (ref 20–55)
IRON: 30 MCG/DL (ref 59–158)
IRON: 31 MCG/DL (ref 59–158)
IRON: 34 MCG/DL (ref 59–158)
IRON: 45 MCG/DL (ref 59–158)
KETONES, URINE: ABNORMAL MG/DL
KETONES, URINE: ABNORMAL MG/DL
KETONES, URINE: NEGATIVE MG/DL
KLEBSIELLA OXYTOCA BY PCR: NOT DETECTED
KLEBSIELLA PNEUMONIAE GROUP BY PCR: NOT DETECTED
L. PNEUMOPHILA SEROGP 1 UR AG: NORMAL
L. PNEUMOPHILA SEROGP 1 UR AG: NORMAL
LAB: ABNORMAL
LACTIC ACID, SEPSIS: 0.9 MMOL/L (ref 0.5–1.9)
LACTIC ACID, SEPSIS: 1.5 MMOL/L (ref 0.5–1.9)
LACTIC ACID, SEPSIS: 13.1 MMOL/L (ref 0.5–1.9)
LACTIC ACID, SEPSIS: 2.1 MMOL/L (ref 0.5–1.9)
LACTIC ACID, SEPSIS: 2.4 MMOL/L (ref 0.5–1.9)
LACTIC ACID, SEPSIS: 9.9 MMOL/L (ref 0.5–1.9)
LACTIC ACID: 0.9 MMOL/L (ref 0.5–2.2)
LACTIC ACID: 1 MMOL/L (ref 0.5–2.2)
LACTIC ACID: 1 MMOL/L (ref 0.5–2.2)
LACTIC ACID: 12.4 MMOL/L (ref 0.5–2.2)
LACTIC ACID: 12.9 MMOL/L (ref 0.5–2.2)
LACTIC ACID: 13.2 MMOL/L (ref 0.5–2.2)
LACTIC ACID: 3.7 MMOL/L (ref 0.5–2.2)
LACTIC ACID: 3.8 MMOL/L (ref 0.5–2.2)
LACTIC ACID: 4.1 MMOL/L (ref 0.5–2.2)
LACTIC ACID: 5.4 MMOL/L (ref 0.5–2.2)
LACTIC ACID: 5.7 MMOL/L (ref 0.5–2.2)
LACTIC ACID: 9 MMOL/L (ref 0.5–2.2)
LDL CHOLESTEROL CALCULATED: 49 MG/DL (ref 0–99)
LDL CHOLESTEROL CALCULATED: 52 MG/DL (ref 0–99)
LDL CHOLESTEROL CALCULATED: 57 MG/DL (ref 0–99)
LEUKOCYTE ESTERASE, URINE: ABNORMAL
LEUKOCYTE ESTERASE, URINE: ABNORMAL
LEUKOCYTE ESTERASE, URINE: NEGATIVE
LIPASE: 13 U/L (ref 13–60)
LIPASE: 14 U/L (ref 13–60)
LISTERIA MONOCYTOGENES BY PCR: NOT DETECTED
LV EF: 49 %
LVEF MODALITY: NORMAL
LYMPHOCYTES ABSOLUTE: 0.36 E9/L (ref 1.5–4)
LYMPHOCYTES ABSOLUTE: 0.54 E9/L (ref 1.5–4)
LYMPHOCYTES ABSOLUTE: 0.67 E9/L (ref 1.5–4)
LYMPHOCYTES ABSOLUTE: 0.74 E9/L (ref 1.5–4)
LYMPHOCYTES ABSOLUTE: 0.82 E9/L (ref 1.5–4)
LYMPHOCYTES ABSOLUTE: 0.86 E9/L (ref 1.5–4)
LYMPHOCYTES ABSOLUTE: 0.87 E9/L (ref 1.5–4)
LYMPHOCYTES ABSOLUTE: 0.87 E9/L (ref 1.5–4)
LYMPHOCYTES ABSOLUTE: 0.91 E9/L (ref 1.5–4)
LYMPHOCYTES ABSOLUTE: 0.92 E9/L (ref 1.5–4)
LYMPHOCYTES ABSOLUTE: 0.96 E9/L (ref 1.5–4)
LYMPHOCYTES ABSOLUTE: 0.98 E9/L (ref 1.5–4)
LYMPHOCYTES ABSOLUTE: 1.01 E9/L (ref 1.5–4)
LYMPHOCYTES ABSOLUTE: 1.03 E9/L (ref 1.5–4)
LYMPHOCYTES ABSOLUTE: 1.07 E9/L (ref 1.5–4)
LYMPHOCYTES ABSOLUTE: 1.07 E9/L (ref 1.5–4)
LYMPHOCYTES ABSOLUTE: 1.12 E9/L (ref 1.5–4)
LYMPHOCYTES ABSOLUTE: 1.14 E9/L (ref 1.5–4)
LYMPHOCYTES ABSOLUTE: 1.17 E9/L (ref 1.5–4)
LYMPHOCYTES ABSOLUTE: 1.17 E9/L (ref 1.5–4)
LYMPHOCYTES ABSOLUTE: 1.26 E9/L (ref 1.5–4)
LYMPHOCYTES ABSOLUTE: 1.28 E9/L (ref 1.5–4)
LYMPHOCYTES ABSOLUTE: 1.29 E9/L (ref 1.5–4)
LYMPHOCYTES ABSOLUTE: 1.3 E9/L (ref 1.5–4)
LYMPHOCYTES ABSOLUTE: 1.36 E9/L (ref 1.5–4)
LYMPHOCYTES RELATIVE PERCENT: 10.5 % (ref 20–42)
LYMPHOCYTES RELATIVE PERCENT: 10.8 % (ref 20–42)
LYMPHOCYTES RELATIVE PERCENT: 11.1 % (ref 20–42)
LYMPHOCYTES RELATIVE PERCENT: 11.5 % (ref 20–42)
LYMPHOCYTES RELATIVE PERCENT: 11.6 % (ref 20–42)
LYMPHOCYTES RELATIVE PERCENT: 13.7 % (ref 20–42)
LYMPHOCYTES RELATIVE PERCENT: 14 % (ref 20–42)
LYMPHOCYTES RELATIVE PERCENT: 15.5 % (ref 20–42)
LYMPHOCYTES RELATIVE PERCENT: 18.6 % (ref 20–42)
LYMPHOCYTES RELATIVE PERCENT: 18.8 % (ref 20–42)
LYMPHOCYTES RELATIVE PERCENT: 2.6 % (ref 20–42)
LYMPHOCYTES RELATIVE PERCENT: 2.6 % (ref 20–42)
LYMPHOCYTES RELATIVE PERCENT: 22.5 % (ref 20–42)
LYMPHOCYTES RELATIVE PERCENT: 22.6 % (ref 20–42)
LYMPHOCYTES RELATIVE PERCENT: 25.8 % (ref 20–42)
LYMPHOCYTES RELATIVE PERCENT: 26.3 % (ref 20–42)
LYMPHOCYTES RELATIVE PERCENT: 4 % (ref 20–42)
LYMPHOCYTES RELATIVE PERCENT: 5.2 % (ref 20–42)
LYMPHOCYTES RELATIVE PERCENT: 5.7 % (ref 20–42)
LYMPHOCYTES RELATIVE PERCENT: 7.3 % (ref 20–42)
LYMPHOCYTES RELATIVE PERCENT: 7.9 % (ref 20–42)
LYMPHOCYTES RELATIVE PERCENT: 8.2 % (ref 20–42)
LYMPHOCYTES RELATIVE PERCENT: 8.4 % (ref 20–42)
LYMPHOCYTES RELATIVE PERCENT: 9.7 % (ref 20–42)
LYMPHOCYTES RELATIVE PERCENT: 9.9 % (ref 20–42)
LYMPHOCYTES, BODY FLUID: 0 %
LYMPHOCYTES, BODY FLUID: 0 %
Lab: ABNORMAL
MAGNESIUM: 1.5 MG/DL (ref 1.6–2.6)
MAGNESIUM: 1.6 MG/DL (ref 1.6–2.6)
MAGNESIUM: 1.6 MG/DL (ref 1.6–2.6)
MAGNESIUM: 1.7 MG/DL (ref 1.6–2.6)
MAGNESIUM: 1.8 MG/DL (ref 1.6–2.6)
MAGNESIUM: 1.8 MG/DL (ref 1.6–2.6)
MAGNESIUM: 1.9 MG/DL (ref 1.6–2.6)
MAGNESIUM: 2 MG/DL (ref 1.6–2.6)
MAGNESIUM: 2.1 MG/DL (ref 1.6–2.6)
MAGNESIUM: 2.1 MG/DL (ref 1.6–2.6)
MAGNESIUM: 2.2 MG/DL (ref 1.6–2.6)
MAGNESIUM: 2.5 MG/DL (ref 1.6–2.6)
MCH RBC QN AUTO: 27.2 PG (ref 26–35)
MCH RBC QN AUTO: 27.4 PG (ref 26–35)
MCH RBC QN AUTO: 27.6 PG (ref 26–35)
MCH RBC QN AUTO: 27.7 PG (ref 26–35)
MCH RBC QN AUTO: 28.1 PG (ref 26–35)
MCH RBC QN AUTO: 28.3 PG (ref 26–35)
MCH RBC QN AUTO: 28.4 PG (ref 26–35)
MCH RBC QN AUTO: 28.5 PG (ref 26–35)
MCH RBC QN AUTO: 29.3 PG (ref 26–35)
MCH RBC QN AUTO: 30.1 PG (ref 26–35)
MCH RBC QN AUTO: 30.3 PG (ref 26–35)
MCH RBC QN AUTO: 30.3 PG (ref 26–35)
MCH RBC QN AUTO: 30.4 PG (ref 26–35)
MCH RBC QN AUTO: 30.4 PG (ref 26–35)
MCH RBC QN AUTO: 30.5 PG (ref 26–35)
MCH RBC QN AUTO: 30.7 PG (ref 26–35)
MCH RBC QN AUTO: 30.9 PG (ref 26–35)
MCH RBC QN AUTO: 30.9 PG (ref 26–35)
MCH RBC QN AUTO: 31 PG (ref 26–35)
MCH RBC QN AUTO: 31 PG (ref 26–35)
MCH RBC QN AUTO: 31.1 PG (ref 26–35)
MCH RBC QN AUTO: 31.2 PG (ref 26–35)
MCH RBC QN AUTO: 31.2 PG (ref 26–35)
MCH RBC QN AUTO: 31.3 PG (ref 26–35)
MCH RBC QN AUTO: 31.3 PG (ref 26–35)
MCH RBC QN AUTO: 31.4 PG (ref 26–35)
MCH RBC QN AUTO: 31.4 PG (ref 26–35)
MCH RBC QN AUTO: 31.5 PG (ref 26–35)
MCH RBC QN AUTO: 31.6 PG (ref 26–35)
MCH RBC QN AUTO: 31.8 PG (ref 26–35)
MCHC RBC AUTO-ENTMCNC: 29.8 % (ref 32–34.5)
MCHC RBC AUTO-ENTMCNC: 30.1 % (ref 32–34.5)
MCHC RBC AUTO-ENTMCNC: 30.4 % (ref 32–34.5)
MCHC RBC AUTO-ENTMCNC: 30.9 % (ref 32–34.5)
MCHC RBC AUTO-ENTMCNC: 31.1 % (ref 32–34.5)
MCHC RBC AUTO-ENTMCNC: 31.3 % (ref 32–34.5)
MCHC RBC AUTO-ENTMCNC: 31.3 % (ref 32–34.5)
MCHC RBC AUTO-ENTMCNC: 31.5 % (ref 32–34.5)
MCHC RBC AUTO-ENTMCNC: 31.5 % (ref 32–34.5)
MCHC RBC AUTO-ENTMCNC: 31.6 % (ref 32–34.5)
MCHC RBC AUTO-ENTMCNC: 31.7 % (ref 32–34.5)
MCHC RBC AUTO-ENTMCNC: 31.7 % (ref 32–34.5)
MCHC RBC AUTO-ENTMCNC: 31.8 % (ref 32–34.5)
MCHC RBC AUTO-ENTMCNC: 31.8 % (ref 32–34.5)
MCHC RBC AUTO-ENTMCNC: 31.9 % (ref 32–34.5)
MCHC RBC AUTO-ENTMCNC: 32.2 % (ref 32–34.5)
MCHC RBC AUTO-ENTMCNC: 32.3 % (ref 32–34.5)
MCHC RBC AUTO-ENTMCNC: 32.3 % (ref 32–34.5)
MCHC RBC AUTO-ENTMCNC: 32.4 % (ref 32–34.5)
MCHC RBC AUTO-ENTMCNC: 32.6 % (ref 32–34.5)
MCHC RBC AUTO-ENTMCNC: 32.7 % (ref 32–34.5)
MCHC RBC AUTO-ENTMCNC: 32.8 % (ref 32–34.5)
MCHC RBC AUTO-ENTMCNC: 32.9 % (ref 32–34.5)
MCHC RBC AUTO-ENTMCNC: 33.1 % (ref 32–34.5)
MCHC RBC AUTO-ENTMCNC: 33.3 % (ref 32–34.5)
MCHC RBC AUTO-ENTMCNC: 33.4 % (ref 32–34.5)
MCHC RBC AUTO-ENTMCNC: 33.6 % (ref 32–34.5)
MCHC RBC AUTO-ENTMCNC: 33.6 % (ref 32–34.5)
MCHC RBC AUTO-ENTMCNC: 33.9 % (ref 32–34.5)
MCHC RBC AUTO-ENTMCNC: 34 % (ref 32–34.5)
MCV RBC AUTO: 88.6 FL (ref 80–99.9)
MCV RBC AUTO: 89.6 FL (ref 80–99.9)
MCV RBC AUTO: 89.8 FL (ref 80–99.9)
MCV RBC AUTO: 89.8 FL (ref 80–99.9)
MCV RBC AUTO: 90.1 FL (ref 80–99.9)
MCV RBC AUTO: 90.8 FL (ref 80–99.9)
MCV RBC AUTO: 90.9 FL (ref 80–99.9)
MCV RBC AUTO: 91.1 FL (ref 80–99.9)
MCV RBC AUTO: 91.1 FL (ref 80–99.9)
MCV RBC AUTO: 91.4 FL (ref 80–99.9)
MCV RBC AUTO: 92 FL (ref 80–99.9)
MCV RBC AUTO: 92.4 FL (ref 80–99.9)
MCV RBC AUTO: 93 FL (ref 80–99.9)
MCV RBC AUTO: 93.4 FL (ref 80–99.9)
MCV RBC AUTO: 93.7 FL (ref 80–99.9)
MCV RBC AUTO: 93.7 FL (ref 80–99.9)
MCV RBC AUTO: 94 FL (ref 80–99.9)
MCV RBC AUTO: 94.1 FL (ref 80–99.9)
MCV RBC AUTO: 94.2 FL (ref 80–99.9)
MCV RBC AUTO: 94.3 FL (ref 80–99.9)
MCV RBC AUTO: 94.4 FL (ref 80–99.9)
MCV RBC AUTO: 94.6 FL (ref 80–99.9)
MCV RBC AUTO: 94.9 FL (ref 80–99.9)
MCV RBC AUTO: 95 FL (ref 80–99.9)
MCV RBC AUTO: 95.3 FL (ref 80–99.9)
MCV RBC AUTO: 95.9 FL (ref 80–99.9)
MCV RBC AUTO: 96.1 FL (ref 80–99.9)
MCV RBC AUTO: 96.5 FL (ref 80–99.9)
MCV RBC AUTO: 96.8 FL (ref 80–99.9)
MCV RBC AUTO: 96.8 FL (ref 80–99.9)
MCV RBC AUTO: 97 FL (ref 80–99.9)
MCV RBC AUTO: 97 FL (ref 80–99.9)
MCV RBC AUTO: 97.5 FL (ref 80–99.9)
MCV RBC AUTO: 97.8 FL (ref 80–99.9)
MCV RBC AUTO: 98.1 FL (ref 80–99.9)
MCV RBC AUTO: 99.4 FL (ref 80–99.9)
METAMYELOCYTES RELATIVE PERCENT: 0.9 % (ref 0–1)
METER GLUCOSE: 103 MG/DL (ref 74–99)
METER GLUCOSE: 104 MG/DL (ref 74–99)
METER GLUCOSE: 105 MG/DL (ref 74–99)
METER GLUCOSE: 107 MG/DL (ref 74–99)
METER GLUCOSE: 109 MG/DL (ref 74–99)
METER GLUCOSE: 110 MG/DL (ref 74–99)
METER GLUCOSE: 111 MG/DL (ref 74–99)
METER GLUCOSE: 112 MG/DL (ref 74–99)
METER GLUCOSE: 112 MG/DL (ref 74–99)
METER GLUCOSE: 114 MG/DL (ref 74–99)
METER GLUCOSE: 114 MG/DL (ref 74–99)
METER GLUCOSE: 115 MG/DL (ref 74–99)
METER GLUCOSE: 115 MG/DL (ref 74–99)
METER GLUCOSE: 116 MG/DL (ref 74–99)
METER GLUCOSE: 117 MG/DL (ref 74–99)
METER GLUCOSE: 117 MG/DL (ref 74–99)
METER GLUCOSE: 121 MG/DL (ref 74–99)
METER GLUCOSE: 121 MG/DL (ref 74–99)
METER GLUCOSE: 123 MG/DL (ref 74–99)
METER GLUCOSE: 126 MG/DL (ref 74–99)
METER GLUCOSE: 127 MG/DL (ref 74–99)
METER GLUCOSE: 127 MG/DL (ref 74–99)
METER GLUCOSE: 129 MG/DL (ref 74–99)
METER GLUCOSE: 130 MG/DL (ref 74–99)
METER GLUCOSE: 131 MG/DL (ref 74–99)
METER GLUCOSE: 132 MG/DL (ref 74–99)
METER GLUCOSE: 132 MG/DL (ref 74–99)
METER GLUCOSE: 133 MG/DL (ref 74–99)
METER GLUCOSE: 134 MG/DL (ref 74–99)
METER GLUCOSE: 135 MG/DL (ref 74–99)
METER GLUCOSE: 136 MG/DL (ref 74–99)
METER GLUCOSE: 137 MG/DL (ref 74–99)
METER GLUCOSE: 138 MG/DL (ref 74–99)
METER GLUCOSE: 139 MG/DL (ref 74–99)
METER GLUCOSE: 139 MG/DL (ref 74–99)
METER GLUCOSE: 140 MG/DL (ref 74–99)
METER GLUCOSE: 141 MG/DL (ref 74–99)
METER GLUCOSE: 142 MG/DL (ref 74–99)
METER GLUCOSE: 142 MG/DL (ref 74–99)
METER GLUCOSE: 143 MG/DL (ref 74–99)
METER GLUCOSE: 144 MG/DL (ref 74–99)
METER GLUCOSE: 144 MG/DL (ref 74–99)
METER GLUCOSE: 145 MG/DL (ref 74–99)
METER GLUCOSE: 145 MG/DL (ref 74–99)
METER GLUCOSE: 146 MG/DL (ref 74–99)
METER GLUCOSE: 148 MG/DL (ref 74–99)
METER GLUCOSE: 149 MG/DL (ref 74–99)
METER GLUCOSE: 150 MG/DL (ref 74–99)
METER GLUCOSE: 152 MG/DL (ref 74–99)
METER GLUCOSE: 154 MG/DL (ref 74–99)
METER GLUCOSE: 154 MG/DL (ref 74–99)
METER GLUCOSE: 155 MG/DL (ref 74–99)
METER GLUCOSE: 156 MG/DL (ref 74–99)
METER GLUCOSE: 156 MG/DL (ref 74–99)
METER GLUCOSE: 157 MG/DL (ref 74–99)
METER GLUCOSE: 158 MG/DL (ref 74–99)
METER GLUCOSE: 159 MG/DL (ref 74–99)
METER GLUCOSE: 160 MG/DL (ref 74–99)
METER GLUCOSE: 161 MG/DL (ref 74–99)
METER GLUCOSE: 162 MG/DL (ref 74–99)
METER GLUCOSE: 163 MG/DL (ref 74–99)
METER GLUCOSE: 164 MG/DL (ref 74–99)
METER GLUCOSE: 164 MG/DL (ref 74–99)
METER GLUCOSE: 165 MG/DL (ref 74–99)
METER GLUCOSE: 166 MG/DL (ref 74–99)
METER GLUCOSE: 167 MG/DL (ref 74–99)
METER GLUCOSE: 168 MG/DL (ref 74–99)
METER GLUCOSE: 168 MG/DL (ref 74–99)
METER GLUCOSE: 169 MG/DL (ref 74–99)
METER GLUCOSE: 170 MG/DL (ref 74–99)
METER GLUCOSE: 170 MG/DL (ref 74–99)
METER GLUCOSE: 172 MG/DL (ref 74–99)
METER GLUCOSE: 173 MG/DL (ref 74–99)
METER GLUCOSE: 174 MG/DL (ref 74–99)
METER GLUCOSE: 176 MG/DL (ref 74–99)
METER GLUCOSE: 177 MG/DL (ref 74–99)
METER GLUCOSE: 178 MG/DL (ref 74–99)
METER GLUCOSE: 179 MG/DL (ref 74–99)
METER GLUCOSE: 180 MG/DL (ref 74–99)
METER GLUCOSE: 181 MG/DL (ref 74–99)
METER GLUCOSE: 181 MG/DL (ref 74–99)
METER GLUCOSE: 182 MG/DL (ref 74–99)
METER GLUCOSE: 182 MG/DL (ref 74–99)
METER GLUCOSE: 183 MG/DL (ref 74–99)
METER GLUCOSE: 184 MG/DL (ref 74–99)
METER GLUCOSE: 186 MG/DL (ref 74–99)
METER GLUCOSE: 187 MG/DL (ref 74–99)
METER GLUCOSE: 188 MG/DL (ref 74–99)
METER GLUCOSE: 189 MG/DL (ref 74–99)
METER GLUCOSE: 196 MG/DL (ref 74–99)
METER GLUCOSE: 196 MG/DL (ref 74–99)
METER GLUCOSE: 197 MG/DL (ref 74–99)
METER GLUCOSE: 197 MG/DL (ref 74–99)
METER GLUCOSE: 198 MG/DL (ref 74–99)
METER GLUCOSE: 199 MG/DL (ref 74–99)
METER GLUCOSE: 203 MG/DL (ref 74–99)
METER GLUCOSE: 207 MG/DL (ref 74–99)
METER GLUCOSE: 208 MG/DL (ref 74–99)
METER GLUCOSE: 210 MG/DL (ref 74–99)
METER GLUCOSE: 210 MG/DL (ref 74–99)
METER GLUCOSE: 211 MG/DL (ref 74–99)
METER GLUCOSE: 214 MG/DL (ref 74–99)
METER GLUCOSE: 216 MG/DL (ref 74–99)
METER GLUCOSE: 225 MG/DL (ref 74–99)
METER GLUCOSE: 227 MG/DL (ref 74–99)
METER GLUCOSE: 231 MG/DL (ref 74–99)
METER GLUCOSE: 232 MG/DL (ref 74–99)
METER GLUCOSE: 234 MG/DL (ref 74–99)
METER GLUCOSE: 244 MG/DL (ref 74–99)
METER GLUCOSE: 245 MG/DL (ref 74–99)
METER GLUCOSE: 247 MG/DL (ref 74–99)
METER GLUCOSE: 250 MG/DL (ref 74–99)
METER GLUCOSE: 260 MG/DL (ref 74–99)
METER GLUCOSE: 291 MG/DL (ref 74–99)
METER GLUCOSE: 315 MG/DL (ref 74–99)
METER GLUCOSE: 352 MG/DL (ref 74–99)
METER GLUCOSE: 377 MG/DL (ref 74–99)
METER GLUCOSE: 380 MG/DL (ref 74–99)
METER GLUCOSE: 409 MG/DL (ref 74–99)
METER GLUCOSE: 425 MG/DL (ref 74–99)
METER GLUCOSE: 55 MG/DL (ref 74–99)
METER GLUCOSE: 57 MG/DL (ref 74–99)
METER GLUCOSE: 66 MG/DL (ref 74–99)
METER GLUCOSE: 69 MG/DL (ref 74–99)
METER GLUCOSE: 77 MG/DL (ref 74–99)
METER GLUCOSE: 82 MG/DL (ref 74–99)
METER GLUCOSE: 84 MG/DL (ref 74–99)
METER GLUCOSE: 92 MG/DL (ref 74–99)
METER GLUCOSE: 93 MG/DL (ref 74–99)
METER GLUCOSE: 96 MG/DL (ref 74–99)
METER GLUCOSE: 98 MG/DL (ref 74–99)
METER GLUCOSE: 99 MG/DL (ref 74–99)
METER GLUCOSE: 99 MG/DL (ref 74–99)
METHADONE SCREEN, URINE: NOT DETECTED
METHB: 0.2 % (ref 0–1.5)
METHB: 0.2 % (ref 0–1.5)
METHB: 0.3 % (ref 0–1.5)
METHB: 0.4 % (ref 0–1.5)
METHB: 0.5 % (ref 0–1.5)
METHB: 0.5 % (ref 0–1.5)
METHB: 0.6 % (ref 0–1.5)
METHICILLIN RESISTANCE MECA/C  BY PCR: DETECTED
METHICILLIN RESISTANCE MECA/C  BY PCR: DETECTED
METHICILLIN RESISTANCE MECA/C  BY PCR: NOT DETECTED
MICROALBUMIN UR-MCNC: 549.5 MG/L
MICROALBUMIN/CREAT UR-RTO: 504.1 (ref 0–30)
MODE: ABNORMAL
MODE: ABNORMAL
MODE: AC
MONOCYTE, FLUID: 0 %
MONOCYTE, FLUID: 0 %
MONOCYTES ABSOLUTE: 0.12 E9/L (ref 0.1–0.95)
MONOCYTES ABSOLUTE: 0.22 E9/L (ref 0.1–0.95)
MONOCYTES ABSOLUTE: 0.29 E9/L (ref 0.1–0.95)
MONOCYTES ABSOLUTE: 0.34 E9/L (ref 0.1–0.95)
MONOCYTES ABSOLUTE: 0.35 E9/L (ref 0.1–0.95)
MONOCYTES ABSOLUTE: 0.39 E9/L (ref 0.1–0.95)
MONOCYTES ABSOLUTE: 0.41 E9/L (ref 0.1–0.95)
MONOCYTES ABSOLUTE: 0.44 E9/L (ref 0.1–0.95)
MONOCYTES ABSOLUTE: 0.47 E9/L (ref 0.1–0.95)
MONOCYTES ABSOLUTE: 0.48 E9/L (ref 0.1–0.95)
MONOCYTES ABSOLUTE: 0.49 E9/L (ref 0.1–0.95)
MONOCYTES ABSOLUTE: 0.5 E9/L (ref 0.1–0.95)
MONOCYTES ABSOLUTE: 0.52 E9/L (ref 0.1–0.95)
MONOCYTES ABSOLUTE: 0.53 E9/L (ref 0.1–0.95)
MONOCYTES ABSOLUTE: 0.53 E9/L (ref 0.1–0.95)
MONOCYTES ABSOLUTE: 0.61 E9/L (ref 0.1–0.95)
MONOCYTES ABSOLUTE: 0.61 E9/L (ref 0.1–0.95)
MONOCYTES ABSOLUTE: 0.7 E9/L (ref 0.1–0.95)
MONOCYTES ABSOLUTE: 0.74 E9/L (ref 0.1–0.95)
MONOCYTES ABSOLUTE: 0.76 E9/L (ref 0.1–0.95)
MONOCYTES ABSOLUTE: 0.76 E9/L (ref 0.1–0.95)
MONOCYTES ABSOLUTE: 0.78 E9/L (ref 0.1–0.95)
MONOCYTES ABSOLUTE: 0.83 E9/L (ref 0.1–0.95)
MONOCYTES ABSOLUTE: 0.85 E9/L (ref 0.1–0.95)
MONOCYTES ABSOLUTE: 0.9 E9/L (ref 0.1–0.95)
MONOCYTES RELATIVE PERCENT: 0.9 % (ref 2–12)
MONOCYTES RELATIVE PERCENT: 0.9 % (ref 2–12)
MONOCYTES RELATIVE PERCENT: 1.7 % (ref 2–12)
MONOCYTES RELATIVE PERCENT: 10.1 % (ref 2–12)
MONOCYTES RELATIVE PERCENT: 12.1 % (ref 2–12)
MONOCYTES RELATIVE PERCENT: 3.9 % (ref 2–12)
MONOCYTES RELATIVE PERCENT: 4 % (ref 2–12)
MONOCYTES RELATIVE PERCENT: 4.2 % (ref 2–12)
MONOCYTES RELATIVE PERCENT: 4.6 % (ref 2–12)
MONOCYTES RELATIVE PERCENT: 5 % (ref 2–12)
MONOCYTES RELATIVE PERCENT: 5.1 % (ref 2–12)
MONOCYTES RELATIVE PERCENT: 5.6 % (ref 2–12)
MONOCYTES RELATIVE PERCENT: 5.6 % (ref 2–12)
MONOCYTES RELATIVE PERCENT: 5.9 % (ref 2–12)
MONOCYTES RELATIVE PERCENT: 5.9 % (ref 2–12)
MONOCYTES RELATIVE PERCENT: 6.5 % (ref 2–12)
MONOCYTES RELATIVE PERCENT: 7 % (ref 2–12)
MONOCYTES RELATIVE PERCENT: 7 % (ref 2–12)
MONOCYTES RELATIVE PERCENT: 8 % (ref 2–12)
MONOCYTES RELATIVE PERCENT: 8.1 % (ref 2–12)
MONOCYTES RELATIVE PERCENT: 8.2 % (ref 2–12)
MONOCYTES RELATIVE PERCENT: 8.5 % (ref 2–12)
MONOCYTES RELATIVE PERCENT: 8.6 % (ref 2–12)
MONOCYTES RELATIVE PERCENT: 8.9 % (ref 2–12)
MONOCYTES RELATIVE PERCENT: 9.3 % (ref 2–12)
MRSA CULTURE ONLY: NORMAL
MYCOPLASMA PNEUMONIAE BY PCR: NOT DETECTED
NEISSERIA MENINGITIDIS BY PCR: NOT DETECTED
NEUTROPHIL, FLUID: 0 %
NEUTROPHIL, FLUID: 0 %
NEUTROPHILS ABSOLUTE: 10.04 E9/L (ref 1.8–7.3)
NEUTROPHILS ABSOLUTE: 10.15 E9/L (ref 1.8–7.3)
NEUTROPHILS ABSOLUTE: 10.18 E9/L (ref 1.8–7.3)
NEUTROPHILS ABSOLUTE: 11.54 E9/L (ref 1.8–7.3)
NEUTROPHILS ABSOLUTE: 11.74 E9/L (ref 1.8–7.3)
NEUTROPHILS ABSOLUTE: 16.74 E9/L (ref 1.8–7.3)
NEUTROPHILS ABSOLUTE: 2.73 E9/L (ref 1.8–7.3)
NEUTROPHILS ABSOLUTE: 2.76 E9/L (ref 1.8–7.3)
NEUTROPHILS ABSOLUTE: 2.85 E9/L (ref 1.8–7.3)
NEUTROPHILS ABSOLUTE: 28.13 E9/L (ref 1.8–7.3)
NEUTROPHILS ABSOLUTE: 3.57 E9/L (ref 1.8–7.3)
NEUTROPHILS ABSOLUTE: 3.64 E9/L (ref 1.8–7.3)
NEUTROPHILS ABSOLUTE: 4.03 E9/L (ref 1.8–7.3)
NEUTROPHILS ABSOLUTE: 5.66 E9/L (ref 1.8–7.3)
NEUTROPHILS ABSOLUTE: 6.2 E9/L (ref 1.8–7.3)
NEUTROPHILS ABSOLUTE: 6.56 E9/L (ref 1.8–7.3)
NEUTROPHILS ABSOLUTE: 6.85 E9/L (ref 1.8–7.3)
NEUTROPHILS ABSOLUTE: 6.98 E9/L (ref 1.8–7.3)
NEUTROPHILS ABSOLUTE: 7.12 E9/L (ref 1.8–7.3)
NEUTROPHILS ABSOLUTE: 7.62 E9/L (ref 1.8–7.3)
NEUTROPHILS ABSOLUTE: 7.83 E9/L (ref 1.8–7.3)
NEUTROPHILS ABSOLUTE: 7.86 E9/L (ref 1.8–7.3)
NEUTROPHILS ABSOLUTE: 8.43 E9/L (ref 1.8–7.3)
NEUTROPHILS ABSOLUTE: 8.79 E9/L (ref 1.8–7.3)
NEUTROPHILS ABSOLUTE: 9.86 E9/L (ref 1.8–7.3)
NEUTROPHILS RELATIVE PERCENT: 54.9 % (ref 43–80)
NEUTROPHILS RELATIVE PERCENT: 56 % (ref 43–80)
NEUTROPHILS RELATIVE PERCENT: 62.5 % (ref 43–80)
NEUTROPHILS RELATIVE PERCENT: 65.1 % (ref 43–80)
NEUTROPHILS RELATIVE PERCENT: 65.3 % (ref 43–80)
NEUTROPHILS RELATIVE PERCENT: 70.2 % (ref 43–80)
NEUTROPHILS RELATIVE PERCENT: 72.8 % (ref 43–80)
NEUTROPHILS RELATIVE PERCENT: 74.5 % (ref 43–80)
NEUTROPHILS RELATIVE PERCENT: 75.2 % (ref 43–80)
NEUTROPHILS RELATIVE PERCENT: 76.6 % (ref 43–80)
NEUTROPHILS RELATIVE PERCENT: 76.8 % (ref 43–80)
NEUTROPHILS RELATIVE PERCENT: 78.1 % (ref 43–80)
NEUTROPHILS RELATIVE PERCENT: 78.2 % (ref 43–80)
NEUTROPHILS RELATIVE PERCENT: 78.9 % (ref 43–80)
NEUTROPHILS RELATIVE PERCENT: 79.9 % (ref 43–80)
NEUTROPHILS RELATIVE PERCENT: 81.2 % (ref 43–80)
NEUTROPHILS RELATIVE PERCENT: 83.3 % (ref 43–80)
NEUTROPHILS RELATIVE PERCENT: 84.1 % (ref 43–80)
NEUTROPHILS RELATIVE PERCENT: 84.9 % (ref 43–80)
NEUTROPHILS RELATIVE PERCENT: 86.2 % (ref 43–80)
NEUTROPHILS RELATIVE PERCENT: 86.3 % (ref 43–80)
NEUTROPHILS RELATIVE PERCENT: 90.7 % (ref 43–80)
NEUTROPHILS RELATIVE PERCENT: 93 % (ref 43–80)
NEUTROPHILS RELATIVE PERCENT: 95.7 % (ref 43–80)
NEUTROPHILS RELATIVE PERCENT: 96.5 % (ref 43–80)
NITRITE, URINE: NEGATIVE
NUCLEATED CELLS FLUID: <3 /UL
NUCLEATED CELLS FLUID: <3 /UL
NUCLEATED RED BLOOD CELLS: 0.9 /100 WBC
NUCLEATED RED BLOOD CELLS: 0.9 /100 WBC
O2 CONTENT: 11 ML/DL
O2 CONTENT: 12.3 ML/DL
O2 CONTENT: 12.6 ML/DL
O2 CONTENT: 13 ML/DL
O2 CONTENT: 13.2 ML/DL
O2 CONTENT: 13.3 ML/DL
O2 CONTENT: 7.9 ML/DL
O2 SATURATION: 84.5 % (ref 92–98.5)
O2 SATURATION: 89 % (ref 92–98.5)
O2 SATURATION: 95.2 % (ref 92–98.5)
O2 SATURATION: 95.3 % (ref 92–98.5)
O2 SATURATION: 95.8 % (ref 92–98.5)
O2 SATURATION: 96.8 % (ref 92–98.5)
O2 SATURATION: 99.1 % (ref 92–98.5)
O2 SATURATION: 99.2 % (ref 92–98.5)
O2 SATURATION: 99.5 % (ref 92–98.5)
O2HB: 82.9 % (ref 94–97)
O2HB: 88.8 % (ref 94–97)
O2HB: 94.1 % (ref 94–97)
O2HB: 94.5 % (ref 94–97)
O2HB: 94.8 % (ref 94–97)
O2HB: 95.3 % (ref 94–97)
O2HB: 98.7 % (ref 94–97)
OCCULT BLOOD SCREENING: NORMAL
OPERATOR ID: 2962
OPERATOR ID: 421
OPERATOR ID: 421
OPERATOR ID: 7490
OPERATOR ID: ABNORMAL
OPIATE SCREEN URINE: POSITIVE
ORDER NUMBER: ABNORMAL
ORGANISM: ABNORMAL
OVALOCYTES: ABNORMAL
OXYCODONE URINE: NOT DETECTED
PARAINFLUENZA VIRUS 1 BY PCR: NOT DETECTED
PARAINFLUENZA VIRUS 2 BY PCR: NOT DETECTED
PARAINFLUENZA VIRUS 3 BY PCR: NOT DETECTED
PARAINFLUENZA VIRUS 4 BY PCR: NOT DETECTED
PARATHYROID HORMONE INTACT: 30 PG/ML (ref 15–65)
PATIENT TEMP: 37 C
PCO2 ARTERIAL: 36.9 MMHG (ref 35–45)
PCO2 ARTERIAL: 42.9 MMHG (ref 35–45)
PCO2: 31.2 MMHG (ref 35–45)
PCO2: 31.9 MMHG (ref 35–45)
PCO2: 35.4 MMHG (ref 35–45)
PCO2: 37.1 MMHG (ref 35–45)
PCO2: 38.9 MMHG (ref 35–45)
PCO2: 41.3 MMHG (ref 35–45)
PCO2: 47.4 MMHG (ref 35–45)
PDW BLD-RTO: 14.8 FL (ref 11.5–15)
PDW BLD-RTO: 14.9 FL (ref 11.5–15)
PDW BLD-RTO: 15 FL (ref 11.5–15)
PDW BLD-RTO: 15.2 FL (ref 11.5–15)
PDW BLD-RTO: 15.2 FL (ref 11.5–15)
PDW BLD-RTO: 15.4 FL (ref 11.5–15)
PDW BLD-RTO: 15.4 FL (ref 11.5–15)
PDW BLD-RTO: 15.5 FL (ref 11.5–15)
PDW BLD-RTO: 15.7 FL (ref 11.5–15)
PDW BLD-RTO: 16 FL (ref 11.5–15)
PDW BLD-RTO: 16.1 FL (ref 11.5–15)
PDW BLD-RTO: 16.1 FL (ref 11.5–15)
PDW BLD-RTO: 16.2 FL (ref 11.5–15)
PDW BLD-RTO: 16.2 FL (ref 11.5–15)
PDW BLD-RTO: 16.4 FL (ref 11.5–15)
PDW BLD-RTO: 16.5 FL (ref 11.5–15)
PDW BLD-RTO: 16.6 FL (ref 11.5–15)
PDW BLD-RTO: 16.6 FL (ref 11.5–15)
PDW BLD-RTO: 16.8 FL (ref 11.5–15)
PDW BLD-RTO: 16.9 FL (ref 11.5–15)
PDW BLD-RTO: 16.9 FL (ref 11.5–15)
PDW BLD-RTO: 17 FL (ref 11.5–15)
PDW BLD-RTO: 17.2 FL (ref 11.5–15)
PDW BLD-RTO: 17.4 FL (ref 11.5–15)
PDW BLD-RTO: 18.2 FL (ref 11.5–15)
PDW BLD-RTO: 18.3 FL (ref 11.5–15)
PDW BLD-RTO: 18.4 FL (ref 11.5–15)
PDW BLD-RTO: 18.4 FL (ref 11.5–15)
PDW BLD-RTO: 18.5 FL (ref 11.5–15)
PDW BLD-RTO: 18.6 FL (ref 11.5–15)
PDW BLD-RTO: 18.8 FL (ref 11.5–15)
PEEP/CPAP: 8 CMH2O
PFO2: 1.45 MMHG/%
PFO2: 1.61 MMHG/%
PFO2: 1.67 MMHG/%
PFO2: 2.05 MMHG/%
PFO2: 2.68 MMHG/%
PH BLOOD GAS: 7.12 (ref 7.35–7.45)
PH BLOOD GAS: 7.25 (ref 7.35–7.45)
PH BLOOD GAS: 7.3 (ref 7.35–7.45)
PH BLOOD GAS: 7.32 (ref 7.35–7.45)
PH BLOOD GAS: 7.38 (ref 7.35–7.45)
PH BLOOD GAS: 7.41 (ref 7.35–7.45)
PH BLOOD GAS: 7.41 (ref 7.35–7.45)
PH BLOOD GAS: 7.43 (ref 7.35–7.45)
PH BLOOD GAS: 7.44 (ref 7.35–7.45)
PH UA: 5 (ref 5–9)
PH UA: 5.5 (ref 5–9)
PH UA: 5.5 (ref 5–9)
PH UA: 6 (ref 5–9)
PH UA: 7.5 (ref 5–9)
PHENCYCLIDINE SCREEN URINE: NOT DETECTED
PHOSPHORUS: 1.8 MG/DL (ref 2.5–4.5)
PHOSPHORUS: 1.8 MG/DL (ref 2.5–4.5)
PHOSPHORUS: 2.4 MG/DL (ref 2.5–4.5)
PHOSPHORUS: 2.4 MG/DL (ref 2.5–4.5)
PHOSPHORUS: 2.5 MG/DL (ref 2.5–4.5)
PHOSPHORUS: 2.6 MG/DL (ref 2.5–4.5)
PHOSPHORUS: 2.7 MG/DL (ref 2.5–4.5)
PHOSPHORUS: 2.8 MG/DL (ref 2.5–4.5)
PHOSPHORUS: 2.9 MG/DL (ref 2.5–4.5)
PHOSPHORUS: 3.1 MG/DL (ref 2.5–4.5)
PHOSPHORUS: 3.2 MG/DL (ref 2.5–4.5)
PHOSPHORUS: 3.2 MG/DL (ref 2.5–4.5)
PHOSPHORUS: 3.3 MG/DL (ref 2.5–4.5)
PHOSPHORUS: 3.5 MG/DL (ref 2.5–4.5)
PHOSPHORUS: 3.5 MG/DL (ref 2.5–4.5)
PHOSPHORUS: 3.7 MG/DL (ref 2.5–4.5)
PHOSPHORUS: 3.9 MG/DL (ref 2.5–4.5)
PHOSPHORUS: 5.3 MG/DL (ref 2.5–4.5)
PHOSPHORUS: 5.5 MG/DL (ref 2.5–4.5)
PHOSPHORUS: 6.4 MG/DL (ref 2.5–4.5)
PLATELET # BLD: 128 E9/L (ref 130–450)
PLATELET # BLD: 131 E9/L (ref 130–450)
PLATELET # BLD: 149 E9/L (ref 130–450)
PLATELET # BLD: 160 E9/L (ref 130–450)
PLATELET # BLD: 175 E9/L (ref 130–450)
PLATELET # BLD: 177 E9/L (ref 130–450)
PLATELET # BLD: 180 E9/L (ref 130–450)
PLATELET # BLD: 181 E9/L (ref 130–450)
PLATELET # BLD: 184 E9/L (ref 130–450)
PLATELET # BLD: 194 E9/L (ref 130–450)
PLATELET # BLD: 201 E9/L (ref 130–450)
PLATELET # BLD: 210 E9/L (ref 130–450)
PLATELET # BLD: 210 E9/L (ref 130–450)
PLATELET # BLD: 214 E9/L (ref 130–450)
PLATELET # BLD: 216 E9/L (ref 130–450)
PLATELET # BLD: 218 E9/L (ref 130–450)
PLATELET # BLD: 219 E9/L (ref 130–450)
PLATELET # BLD: 221 E9/L (ref 130–450)
PLATELET # BLD: 224 E9/L (ref 130–450)
PLATELET # BLD: 225 E9/L (ref 130–450)
PLATELET # BLD: 228 E9/L (ref 130–450)
PLATELET # BLD: 233 E9/L (ref 130–450)
PLATELET # BLD: 241 E9/L (ref 130–450)
PLATELET # BLD: 245 E9/L (ref 130–450)
PLATELET # BLD: 247 E9/L (ref 130–450)
PLATELET # BLD: 250 E9/L (ref 130–450)
PLATELET # BLD: 253 E9/L (ref 130–450)
PLATELET # BLD: 258 E9/L (ref 130–450)
PLATELET # BLD: 259 E9/L (ref 130–450)
PLATELET # BLD: 269 E9/L (ref 130–450)
PLATELET # BLD: 269 E9/L (ref 130–450)
PLATELET # BLD: 271 E9/L (ref 130–450)
PLATELET # BLD: 276 E9/L (ref 130–450)
PLATELET # BLD: 277 E9/L (ref 130–450)
PLATELET # BLD: 278 E9/L (ref 130–450)
PLATELET # BLD: 291 E9/L (ref 130–450)
PLATELET # BLD: 293 E9/L (ref 130–450)
PLATELET # BLD: 394 E9/L (ref 130–450)
PMV BLD AUTO: 10 FL (ref 7–12)
PMV BLD AUTO: 10.1 FL (ref 7–12)
PMV BLD AUTO: 10.2 FL (ref 7–12)
PMV BLD AUTO: 10.3 FL (ref 7–12)
PMV BLD AUTO: 10.4 FL (ref 7–12)
PMV BLD AUTO: 10.5 FL (ref 7–12)
PMV BLD AUTO: 11.7 FL (ref 7–12)
PMV BLD AUTO: 8.8 FL (ref 7–12)
PMV BLD AUTO: 8.9 FL (ref 7–12)
PMV BLD AUTO: 9.1 FL (ref 7–12)
PMV BLD AUTO: 9.2 FL (ref 7–12)
PMV BLD AUTO: 9.4 FL (ref 7–12)
PMV BLD AUTO: 9.5 FL (ref 7–12)
PMV BLD AUTO: 9.6 FL (ref 7–12)
PMV BLD AUTO: 9.6 FL (ref 7–12)
PMV BLD AUTO: 9.7 FL (ref 7–12)
PMV BLD AUTO: 9.8 FL (ref 7–12)
PMV BLD AUTO: 9.8 FL (ref 7–12)
PMV BLD AUTO: 9.9 FL (ref 7–12)
PO2 ARTERIAL: 135.7 MMHG (ref 60–80)
PO2 ARTERIAL: 157.4 MMHG (ref 60–80)
PO2: 268.2 MMHG (ref 75–100)
PO2: 62.7 MMHG (ref 75–100)
PO2: 66.9 MMHG (ref 75–100)
PO2: 80.6 MMHG (ref 75–100)
PO2: 81.8 MMHG (ref 75–100)
PO2: 87.1 MMHG (ref 75–100)
PO2: 92.2 MMHG (ref 75–100)
POC ACT LR: 140 SECONDS
POC ACT LR: 149 SECONDS
POC ACT LR: 240 SECONDS
POC ACT LR: 289 SECONDS
POC ACT LR: 296 SECONDS
POC ACT LR: 319 SECONDS
POC BUN: 33 MG/DL (ref 8–23)
POC BUN: 35 MG/DL (ref 8–23)
POC CHLORIDE: 101 MMOL/L (ref 100–108)
POC CHLORIDE: 104 MMOL/L (ref 100–108)
POC CO2: 22.6 MMOL/L (ref 22–29)
POC CO2: 23.7 MMOL/L (ref 22–29)
POC CREATININE: 1.5 MG/DL (ref 0.7–1.2)
POC CREATININE: 1.5 MG/DL (ref 0.7–1.2)
POC IONIZED CALCIUM: 1.1
POC IONIZED CALCIUM: 1.1
POC LACTIC ACID: 0.5
POC LACTIC ACID: 0.7
POC SODIUM: 135 MMOL/L (ref 132–146)
POC SODIUM: 136 MMOL/L (ref 132–146)
POIKILOCYTES: ABNORMAL
POLYCHROMASIA: ABNORMAL
POTASSIUM REFLEX MAGNESIUM: 3.7 MMOL/L (ref 3.5–5)
POTASSIUM REFLEX MAGNESIUM: 3.9 MMOL/L (ref 3.5–5)
POTASSIUM REFLEX MAGNESIUM: 4.3 MMOL/L (ref 3.5–5)
POTASSIUM REFLEX MAGNESIUM: 4.3 MMOL/L (ref 3.5–5)
POTASSIUM REFLEX MAGNESIUM: 4.5 MMOL/L (ref 3.5–5)
POTASSIUM REFLEX MAGNESIUM: 4.5 MMOL/L (ref 3.5–5)
POTASSIUM REFLEX MAGNESIUM: 4.7 MMOL/L (ref 3.5–5)
POTASSIUM REFLEX MAGNESIUM: 4.8 MMOL/L (ref 3.5–5)
POTASSIUM REFLEX MAGNESIUM: 4.9 MMOL/L (ref 3.5–5)
POTASSIUM REFLEX MAGNESIUM: 5.2 MMOL/L (ref 3.5–5)
POTASSIUM REFLEX MAGNESIUM: 5.5 MMOL/L (ref 3.5–5)
POTASSIUM SERPL-SCNC: 3.2 MMOL/L (ref 3.5–5)
POTASSIUM SERPL-SCNC: 3.2 MMOL/L (ref 3.5–5.5)
POTASSIUM SERPL-SCNC: 3.4 MMOL/L (ref 3.5–5)
POTASSIUM SERPL-SCNC: 3.5 MMOL/L (ref 3.5–5)
POTASSIUM SERPL-SCNC: 3.5 MMOL/L (ref 3.5–5.5)
POTASSIUM SERPL-SCNC: 3.6 MMOL/L (ref 3.5–5)
POTASSIUM SERPL-SCNC: 3.8 MMOL/L (ref 3.5–5)
POTASSIUM SERPL-SCNC: 3.8 MMOL/L (ref 3.5–5)
POTASSIUM SERPL-SCNC: 3.9 MMOL/L (ref 3.5–5)
POTASSIUM SERPL-SCNC: 4 MMOL/L (ref 3.5–5)
POTASSIUM SERPL-SCNC: 4.1 MMOL/L (ref 3.5–5)
POTASSIUM SERPL-SCNC: 4.1 MMOL/L (ref 3.5–5)
POTASSIUM SERPL-SCNC: 4.2 MMOL/L (ref 3.5–5)
POTASSIUM SERPL-SCNC: 4.3 MMOL/L (ref 3.5–5)
POTASSIUM SERPL-SCNC: 4.4 MMOL/L (ref 3.5–5)
POTASSIUM SERPL-SCNC: 4.5 MMOL/L (ref 3.5–5)
POTASSIUM SERPL-SCNC: 4.5 MMOL/L (ref 3.5–5)
POTASSIUM SERPL-SCNC: 4.6 MMOL/L (ref 3.5–5)
POTASSIUM SERPL-SCNC: 4.6 MMOL/L (ref 3.5–5)
POTASSIUM SERPL-SCNC: 4.7 MMOL/L (ref 3.5–5)
POTASSIUM SERPL-SCNC: 4.7 MMOL/L (ref 3.5–5)
POTASSIUM SERPL-SCNC: 4.8 MMOL/L (ref 3.5–5)
POTASSIUM SERPL-SCNC: 4.8 MMOL/L (ref 3.5–5)
POTASSIUM SERPL-SCNC: 4.9 MMOL/L (ref 3.5–5)
POTASSIUM SERPL-SCNC: 5.1 MMOL/L (ref 3.5–5)
POTASSIUM SERPL-SCNC: 5.2 MMOL/L (ref 3.5–5)
POTASSIUM SERPL-SCNC: 5.3 MMOL/L (ref 3.5–5)
POTASSIUM SERPL-SCNC: 5.3 MMOL/L (ref 3.5–5)
POTASSIUM SERPL-SCNC: 5.4 MMOL/L (ref 3.5–5)
POTASSIUM SERPL-SCNC: 5.4 MMOL/L (ref 3.5–5)
POTASSIUM SERPL-SCNC: 5.5 MMOL/L (ref 3.5–5)
POTASSIUM SERPL-SCNC: 5.7 MMOL/L (ref 3.5–5)
POTASSIUM SERPL-SCNC: 5.7 MMOL/L (ref 3.5–5)
POTASSIUM SERPL-SCNC: 6.6 MMOL/L (ref 3.5–5)
PREALBUMIN: 8 MG/DL (ref 20–40)
PRO-BNP: 1037 PG/ML (ref 0–450)
PRO-BNP: 1405 PG/ML (ref 0–450)
PRO-BNP: 1628 PG/ML (ref 0–450)
PRO-BNP: 2070 PG/ML (ref 0–450)
PRO-BNP: 2364 PG/ML (ref 0–450)
PRO-BNP: 2438 PG/ML (ref 0–450)
PRO-BNP: 2952 PG/ML (ref 0–450)
PRO-BNP: 3357 PG/ML (ref 0–450)
PRO-BNP: 3534 PG/ML (ref 0–450)
PRO-BNP: 3571 PG/ML (ref 0–450)
PRO-BNP: 3999 PG/ML (ref 0–450)
PRO-BNP: 4105 PG/ML (ref 0–450)
PRO-BNP: 8214 PG/ML (ref 0–450)
PRO-BNP: ABNORMAL PG/ML (ref 0–450)
PROCALCITONIN: 0.21 NG/ML (ref 0–0.08)
PROCALCITONIN: 0.36 NG/ML (ref 0–0.08)
PROCALCITONIN: 0.45 NG/ML (ref 0–0.08)
PROCALCITONIN: 0.46 NG/ML (ref 0–0.08)
PROCALCITONIN: 0.55 NG/ML (ref 0–0.08)
PROCALCITONIN: 33.49 NG/ML (ref 0–0.08)
PROCALCITONIN: 9.7 NG/ML (ref 0–0.08)
PROTEIN UA: 100 MG/DL
PROTEIN UA: 100 MG/DL
PROTEIN UA: ABNORMAL MG/DL
PROTEIN UA: NEGATIVE MG/DL
PROTEIN UA: NORMAL MG/DL
PROTEUS SPECIES BY PCR: NOT DETECTED
PROTHROMBIN TIME: 11.7 SEC (ref 9.3–12.4)
PROTHROMBIN TIME: 15.9 SEC (ref 9.3–12.4)
PROTHROMBIN TIME: 17.2 SEC (ref 9.3–12.4)
PROTHROMBIN TIME: 23 SEC (ref 9.3–12.4)
PROTHROMBIN TIME: ABNORMAL SEC (ref 9.3–12.4)
PSEUDOMONAS AERUGINOSA BY PCR: NOT DETECTED
RBC # BLD: 1.65 E12/L (ref 3.8–5.8)
RBC # BLD: 2.19 E12/L (ref 3.8–5.8)
RBC # BLD: 2.21 E12/L (ref 3.8–5.8)
RBC # BLD: 2.25 E12/L (ref 3.8–5.8)
RBC # BLD: 2.26 E12/L (ref 3.8–5.8)
RBC # BLD: 2.31 E12/L (ref 3.8–5.8)
RBC # BLD: 2.35 E12/L (ref 3.8–5.8)
RBC # BLD: 2.37 E12/L (ref 3.8–5.8)
RBC # BLD: 2.38 E12/L (ref 3.8–5.8)
RBC # BLD: 2.39 E12/L (ref 3.8–5.8)
RBC # BLD: 2.43 E12/L (ref 3.8–5.8)
RBC # BLD: 2.49 E12/L (ref 3.8–5.8)
RBC # BLD: 2.49 E12/L (ref 3.8–5.8)
RBC # BLD: 2.5 E12/L (ref 3.8–5.8)
RBC # BLD: 2.54 E12/L (ref 3.8–5.8)
RBC # BLD: 2.55 E12/L (ref 3.8–5.8)
RBC # BLD: 2.56 E12/L (ref 3.8–5.8)
RBC # BLD: 2.64 E12/L (ref 3.8–5.8)
RBC # BLD: 2.81 E12/L (ref 3.8–5.8)
RBC # BLD: 2.83 E12/L (ref 3.8–5.8)
RBC # BLD: 2.84 E12/L (ref 3.8–5.8)
RBC # BLD: 2.86 E12/L (ref 3.8–5.8)
RBC # BLD: 2.99 E12/L (ref 3.8–5.8)
RBC # BLD: 3.01 E12/L (ref 3.8–5.8)
RBC # BLD: 3.03 E12/L (ref 3.8–5.8)
RBC # BLD: 3.13 E12/L (ref 3.8–5.8)
RBC # BLD: 3.13 E12/L (ref 3.8–5.8)
RBC # BLD: 3.18 E12/L (ref 3.8–5.8)
RBC # BLD: 3.18 E12/L (ref 3.8–5.8)
RBC # BLD: 3.22 E12/L (ref 3.8–5.8)
RBC # BLD: 3.25 E12/L (ref 3.8–5.8)
RBC # BLD: 3.45 E12/L (ref 3.8–5.8)
RBC # BLD: 3.49 E12/L (ref 3.8–5.8)
RBC # BLD: 3.5 E12/L (ref 3.8–5.8)
RBC # BLD: 3.51 E12/L (ref 3.8–5.8)
RBC # BLD: 3.55 E12/L (ref 3.8–5.8)
RBC # BLD: 3.64 E12/L (ref 3.8–5.8)
RBC # BLD: 3.74 E12/L (ref 3.8–5.8)
RBC FLUID: <2000 /UL
RBC FLUID: <2000 /UL
RBC UA: ABNORMAL /HPF (ref 0–2)
RBC UA: ABNORMAL /HPF (ref 0–2)
RBC UA: NORMAL /HPF (ref 0–2)
RBC UA: NORMAL /HPF (ref 0–2)
REASON FOR REJECTION: NORMAL
REASON FOR REJECTION: NORMAL
REJECTED TEST: NORMAL
REJECTED TEST: NORMAL
RESPIRATORY SYNCYTIAL VIRUS BY PCR: NOT DETECTED
RETIC HGB EQUIVALENT: 34.1 PG (ref 28.2–36.6)
RETICULOCYTE ABSOLUTE COUNT: 0.06 E12/L
RETICULOCYTE COUNT PCT: 2 % (ref 0.4–1.9)
RI(T): 1.43
RI(T): 1.87
RI(T): 2.58
RI(T): 2.69
RI(T): 3.25
RR MECHANICAL: 18 B/MIN
SALICYLATE, SERUM: <0.3 MG/DL (ref 0–30)
SARS-COV-2, NAAT: NOT DETECTED
SARS-COV-2, PCR: NOT DETECTED
SEDIMENTATION RATE, ERYTHROCYTE: 115 MM/HR (ref 0–15)
SEDIMENTATION RATE, ERYTHROCYTE: 122 MM/HR (ref 0–15)
SEDIMENTATION RATE, ERYTHROCYTE: 128 MM/HR (ref 0–15)
SEDIMENTATION RATE, ERYTHROCYTE: 65 MM/HR (ref 0–15)
SEDIMENTATION RATE, ERYTHROCYTE: 66 MM/HR (ref 0–15)
SEDIMENTATION RATE, ERYTHROCYTE: 68 MM/HR (ref 0–15)
SEDIMENTATION RATE, ERYTHROCYTE: 70 MM/HR (ref 0–15)
SEDIMENTATION RATE, ERYTHROCYTE: 72 MM/HR (ref 0–15)
SEDIMENTATION RATE, ERYTHROCYTE: 72 MM/HR (ref 0–15)
SEDIMENTATION RATE, ERYTHROCYTE: >150 MM/HR (ref 0–15)
SERRATIA MARCESCENS BY PCR: NOT DETECTED
SMEAR, RESPIRATORY: ABNORMAL
SMEAR, RESPIRATORY: ABNORMAL
SMEAR, RESPIRATORY: NORMAL
SMEAR, RESPIRATORY: NORMAL
SODIUM BLD-SCNC: 131 MMOL/L (ref 132–146)
SODIUM BLD-SCNC: 132 MMOL/L (ref 132–146)
SODIUM BLD-SCNC: 133 MMOL/L (ref 132–146)
SODIUM BLD-SCNC: 133 MMOL/L (ref 132–146)
SODIUM BLD-SCNC: 134 MMOL/L (ref 132–146)
SODIUM BLD-SCNC: 135 MMOL/L (ref 132–146)
SODIUM BLD-SCNC: 136 MMOL/L (ref 132–146)
SODIUM BLD-SCNC: 137 MMOL/L (ref 132–146)
SODIUM BLD-SCNC: 138 MMOL/L (ref 132–146)
SODIUM BLD-SCNC: 139 MMOL/L (ref 132–146)
SODIUM BLD-SCNC: 140 MMOL/L (ref 132–146)
SODIUM BLD-SCNC: 141 MMOL/L (ref 132–146)
SODIUM BLD-SCNC: 143 MMOL/L (ref 132–146)
SODIUM BLD-SCNC: 144 MMOL/L (ref 132–146)
SODIUM BLD-SCNC: 145 MMOL/L (ref 132–146)
SODIUM BLD-SCNC: 148 MMOL/L (ref 132–146)
SODIUM URINE: 79 MMOL/L
SOURCE OF BLOOD CULTURE: ABNORMAL
SOURCE, BLOOD GAS: ABNORMAL
SPECIFIC GRAVITY UA: 1.01 (ref 1–1.03)
SPECIFIC GRAVITY UA: 1.02 (ref 1–1.03)
SPECIFIC GRAVITY UA: >=1.03 (ref 1–1.03)
STAPHYLOCOCCUS AUREUS BY PCR: DETECTED
STAPHYLOCOCCUS AUREUS BY PCR: NOT DETECTED
STAPHYLOCOCCUS AUREUS BY PCR: NOT DETECTED
STAPHYLOCOCCUS SPECIES BY PCR: DETECTED
STREP PNEUMONIAE ANTIGEN, URINE: NORMAL
STREP PNEUMONIAE ANTIGEN, URINE: NORMAL
STREPTOCOCCUS AGALACTIAE BY PCR: NOT DETECTED
STREPTOCOCCUS PNEUMONIAE BY PCR: NOT DETECTED
STREPTOCOCCUS PYOGENES  BY PCR: NOT DETECTED
STREPTOCOCCUS SPECIES BY PCR: NOT DETECTED
T4 FREE: 1.24 NG/DL (ref 0.93–1.7)
T4 TOTAL: 6 MCG/DL (ref 4.5–11.7)
TARGET CELLS: ABNORMAL
TARGET CELLS: ABNORMAL
THB: 10.6 G/DL (ref 11.5–16.5)
THB: 6.7 G/DL (ref 11.5–16.5)
THB: 8.1 G/DL (ref 11.5–16.5)
THB: 9 G/DL (ref 11.5–16.5)
THB: 9.2 G/DL (ref 11.5–16.5)
THB: 9.4 G/DL (ref 11.5–16.5)
THB: 9.7 G/DL (ref 11.5–16.5)
TIME ANALYZED: 15
TIME ANALYZED: 1526
TIME ANALYZED: 1612
TIME ANALYZED: 2008
TIME ANALYZED: 413
TIME ANALYZED: 426
TIME ANALYZED: 458
TOTAL CK: 96 U/L (ref 20–200)
TOTAL IRON BINDING CAPACITY: 157 MCG/DL (ref 250–450)
TOTAL IRON BINDING CAPACITY: 187 MCG/DL (ref 250–450)
TOTAL IRON BINDING CAPACITY: 245 MCG/DL (ref 250–450)
TOTAL IRON BINDING CAPACITY: 85 MCG/DL (ref 250–450)
TOTAL PROTEIN: 5.3 G/DL (ref 6.4–8.3)
TOTAL PROTEIN: 5.4 G/DL (ref 6.4–8.3)
TOTAL PROTEIN: 5.6 G/DL (ref 6.4–8.3)
TOTAL PROTEIN: 5.7 G/DL (ref 6.4–8.3)
TOTAL PROTEIN: 5.9 G/DL (ref 6.4–8.3)
TOTAL PROTEIN: 5.9 G/DL (ref 6.4–8.3)
TOTAL PROTEIN: 6 G/DL (ref 6.4–8.3)
TOTAL PROTEIN: 6.1 G/DL (ref 6.4–8.3)
TOTAL PROTEIN: 6.2 G/DL (ref 6.4–8.3)
TOTAL PROTEIN: 6.4 G/DL (ref 6.4–8.3)
TOTAL PROTEIN: 6.4 G/DL (ref 6.4–8.3)
TOTAL PROTEIN: 6.5 G/DL (ref 6.4–8.3)
TOTAL PROTEIN: 6.6 G/DL (ref 6.4–8.3)
TOTAL PROTEIN: 7.1 G/DL (ref 6.4–8.3)
TOTAL PROTEIN: 7.2 G/DL (ref 6.4–8.3)
TOTAL PROTEIN: 7.2 G/DL (ref 6.4–8.3)
TOTAL PROTEIN: 7.6 G/DL (ref 6.4–8.3)
TOTAL PROTEIN: 8.1 G/DL (ref 6.4–8.3)
TOTAL PROTEIN: 8.3 G/DL (ref 6.4–8.3)
TRICYCLIC ANTIDEPRESSANTS SCREEN SERUM: NEGATIVE NG/ML
TRIGL SERPL-MCNC: 84 MG/DL (ref 0–149)
TRIGL SERPL-MCNC: 87 MG/DL (ref 0–149)
TRIGL SERPL-MCNC: 89 MG/DL (ref 0–149)
TROPONIN, HIGH SENSITIVITY: 114 NG/L (ref 0–11)
TROPONIN, HIGH SENSITIVITY: 166 NG/L (ref 0–11)
TROPONIN, HIGH SENSITIVITY: 172 NG/L (ref 0–11)
TROPONIN, HIGH SENSITIVITY: 177 NG/L (ref 0–11)
TROPONIN, HIGH SENSITIVITY: 182 NG/L (ref 0–11)
TROPONIN, HIGH SENSITIVITY: 221 NG/L (ref 0–11)
TROPONIN, HIGH SENSITIVITY: 251 NG/L (ref 0–11)
TROPONIN, HIGH SENSITIVITY: 274 NG/L (ref 0–11)
TROPONIN, HIGH SENSITIVITY: 369 NG/L (ref 0–11)
TROPONIN, HIGH SENSITIVITY: 414 NG/L (ref 0–11)
TROPONIN, HIGH SENSITIVITY: 499 NG/L (ref 0–11)
TROPONIN, HIGH SENSITIVITY: 84 NG/L (ref 0–11)
TROPONIN: 0.05 NG/ML (ref 0–0.03)
TROPONIN: 0.06 NG/ML (ref 0–0.03)
TROPONIN: 0.09 NG/ML (ref 0–0.03)
TSH SERPL DL<=0.05 MIU/L-ACNC: 1.34 UIU/ML (ref 0.27–4.2)
TSH SERPL DL<=0.05 MIU/L-ACNC: 4.27 UIU/ML (ref 0.27–4.2)
URIC ACID, SERUM: 3.5 MG/DL (ref 3.4–7)
URINE CULTURE, ROUTINE: ABNORMAL
URINE CULTURE, ROUTINE: NORMAL
UROBILINOGEN, URINE: 0.2 E.U./DL
VANCOMYCIN RANDOM: 10 MCG/ML (ref 5–40)
VANCOMYCIN RANDOM: 14 MCG/ML (ref 5–40)
VANCOMYCIN RANDOM: 18.1 MCG/ML (ref 5–40)
VANCOMYCIN RANDOM: 23.6 MCG/ML (ref 5–40)
VANCOMYCIN TROUGH: 16.6 MCG/ML (ref 5–16)
VITAMIN B-12: 1146 PG/ML (ref 211–946)
VITAMIN B-12: 333 PG/ML (ref 211–946)
VITAMIN B-12: >2000 PG/ML (ref 211–946)
VITAMIN B-12: >2000 PG/ML (ref 211–946)
VITAMIN D 25-HYDROXY: 27 NG/ML (ref 30–100)
VLDLC SERPL CALC-MCNC: 17 MG/DL
VLDLC SERPL CALC-MCNC: 17 MG/DL
VLDLC SERPL CALC-MCNC: 18 MG/DL
VT MECHANICAL: 450 ML
WBC # BLD: 10 E9/L (ref 4.5–11.5)
WBC # BLD: 10.1 E9/L (ref 4.5–11.5)
WBC # BLD: 10.2 E9/L (ref 4.5–11.5)
WBC # BLD: 10.8 E9/L (ref 4.5–11.5)
WBC # BLD: 10.9 E9/L (ref 4.5–11.5)
WBC # BLD: 11.2 E9/L (ref 4.5–11.5)
WBC # BLD: 11.7 E9/L (ref 4.5–11.5)
WBC # BLD: 11.8 E9/L (ref 4.5–11.5)
WBC # BLD: 11.9 E9/L (ref 4.5–11.5)
WBC # BLD: 12.1 E9/L (ref 4.5–11.5)
WBC # BLD: 12.2 E9/L (ref 4.5–11.5)
WBC # BLD: 13.6 E9/L (ref 4.5–11.5)
WBC # BLD: 14 E9/L (ref 4.5–11.5)
WBC # BLD: 15 E9/L (ref 4.5–11.5)
WBC # BLD: 18.5 E9/L (ref 4.5–11.5)
WBC # BLD: 21.5 E9/L (ref 4.5–11.5)
WBC # BLD: 23.4 E9/L (ref 4.5–11.5)
WBC # BLD: 29 E9/L (ref 4.5–11.5)
WBC # BLD: 4.4 E9/L (ref 4.5–11.5)
WBC # BLD: 4.9 E9/L (ref 4.5–11.5)
WBC # BLD: 5 E9/L (ref 4.5–11.5)
WBC # BLD: 5.6 E9/L (ref 4.5–11.5)
WBC # BLD: 5.7 E9/L (ref 4.5–11.5)
WBC # BLD: 5.8 E9/L (ref 4.5–11.5)
WBC # BLD: 7.4 E9/L (ref 4.5–11.5)
WBC # BLD: 8.5 E9/L (ref 4.5–11.5)
WBC # BLD: 8.7 E9/L (ref 4.5–11.5)
WBC # BLD: 8.8 E9/L (ref 4.5–11.5)
WBC # BLD: 8.8 E9/L (ref 4.5–11.5)
WBC # BLD: 9 E9/L (ref 4.5–11.5)
WBC # BLD: 9.3 E9/L (ref 4.5–11.5)
WBC # BLD: 9.4 E9/L (ref 4.5–11.5)
WBC # BLD: 9.6 E9/L (ref 4.5–11.5)
WBC UA: >20 /HPF (ref 0–5)
WBC UA: ABNORMAL /HPF (ref 0–5)
WBC UA: NORMAL /HPF (ref 0–5)
WBC UA: NORMAL /HPF (ref 0–5)

## 2021-01-01 PROCEDURE — 96366 THER/PROPH/DIAG IV INF ADDON: CPT

## 2021-01-01 PROCEDURE — 85651 RBC SED RATE NONAUTOMATED: CPT

## 2021-01-01 PROCEDURE — 83880 ASSAY OF NATRIURETIC PEPTIDE: CPT

## 2021-01-01 PROCEDURE — 2580000003 HC RX 258: Performed by: NURSE PRACTITIONER

## 2021-01-01 PROCEDURE — 6370000000 HC RX 637 (ALT 250 FOR IP): Performed by: NURSE PRACTITIONER

## 2021-01-01 PROCEDURE — 6360000002 HC RX W HCPCS: Performed by: NURSE PRACTITIONER

## 2021-01-01 PROCEDURE — 93010 ELECTROCARDIOGRAM REPORT: CPT | Performed by: INTERNAL MEDICINE

## 2021-01-01 PROCEDURE — 94640 AIRWAY INHALATION TREATMENT: CPT

## 2021-01-01 PROCEDURE — 85730 THROMBOPLASTIN TIME PARTIAL: CPT

## 2021-01-01 PROCEDURE — 87205 SMEAR GRAM STAIN: CPT

## 2021-01-01 PROCEDURE — 85025 COMPLETE CBC W/AUTO DIFF WBC: CPT

## 2021-01-01 PROCEDURE — 99222 1ST HOSP IP/OBS MODERATE 55: CPT | Performed by: INTERNAL MEDICINE

## 2021-01-01 PROCEDURE — 2500000003 HC RX 250 WO HCPCS: Performed by: SURGERY

## 2021-01-01 PROCEDURE — 6370000000 HC RX 637 (ALT 250 FOR IP): Performed by: PHYSICIAN ASSISTANT

## 2021-01-01 PROCEDURE — 80048 BASIC METABOLIC PNL TOTAL CA: CPT

## 2021-01-01 PROCEDURE — 84550 ASSAY OF BLOOD/URIC ACID: CPT

## 2021-01-01 PROCEDURE — 81001 URINALYSIS AUTO W/SCOPE: CPT

## 2021-01-01 PROCEDURE — 2580000003 HC RX 258: Performed by: INTERNAL MEDICINE

## 2021-01-01 PROCEDURE — 99223 1ST HOSP IP/OBS HIGH 75: CPT | Performed by: SURGERY

## 2021-01-01 PROCEDURE — 87040 BLOOD CULTURE FOR BACTERIA: CPT

## 2021-01-01 PROCEDURE — 31500 INSERT EMERGENCY AIRWAY: CPT

## 2021-01-01 PROCEDURE — 84145 PROCALCITONIN (PCT): CPT

## 2021-01-01 PROCEDURE — 99223 1ST HOSP IP/OBS HIGH 75: CPT | Performed by: INTERNAL MEDICINE

## 2021-01-01 PROCEDURE — 87150 DNA/RNA AMPLIFIED PROBE: CPT

## 2021-01-01 PROCEDURE — 83605 ASSAY OF LACTIC ACID: CPT

## 2021-01-01 PROCEDURE — 1200000000 HC SEMI PRIVATE

## 2021-01-01 PROCEDURE — 3609010800 HC BRONCHOSCOPY ALVEOLAR LAVAGE: Performed by: INTERNAL MEDICINE

## 2021-01-01 PROCEDURE — 82962 GLUCOSE BLOOD TEST: CPT

## 2021-01-01 PROCEDURE — 87186 SC STD MICRODIL/AGAR DIL: CPT

## 2021-01-01 PROCEDURE — 83540 ASSAY OF IRON: CPT

## 2021-01-01 PROCEDURE — 6360000002 HC RX W HCPCS: Performed by: SPECIALIST

## 2021-01-01 PROCEDURE — 6360000002 HC RX W HCPCS

## 2021-01-01 PROCEDURE — 2709999900 HC NON-CHARGEABLE SUPPLY

## 2021-01-01 PROCEDURE — 6370000000 HC RX 637 (ALT 250 FOR IP): Performed by: STUDENT IN AN ORGANIZED HEALTH CARE EDUCATION/TRAINING PROGRAM

## 2021-01-01 PROCEDURE — 82140 ASSAY OF AMMONIA: CPT

## 2021-01-01 PROCEDURE — 99222 1ST HOSP IP/OBS MODERATE 55: CPT | Performed by: NURSE PRACTITIONER

## 2021-01-01 PROCEDURE — 02HV33Z INSERTION OF INFUSION DEVICE INTO SUPERIOR VENA CAVA, PERCUTANEOUS APPROACH: ICD-10-PCS | Performed by: STUDENT IN AN ORGANIZED HEALTH CARE EDUCATION/TRAINING PROGRAM

## 2021-01-01 PROCEDURE — 2580000003 HC RX 258: Performed by: NURSE ANESTHETIST, CERTIFIED REGISTERED

## 2021-01-01 PROCEDURE — 74018 RADEX ABDOMEN 1 VIEW: CPT

## 2021-01-01 PROCEDURE — 85610 PROTHROMBIN TIME: CPT

## 2021-01-01 PROCEDURE — 6360000002 HC RX W HCPCS: Performed by: INTERNAL MEDICINE

## 2021-01-01 PROCEDURE — 2500000003 HC RX 250 WO HCPCS

## 2021-01-01 PROCEDURE — 6360000002 HC RX W HCPCS: Performed by: PHYSICIAN ASSISTANT

## 2021-01-01 PROCEDURE — 3700000000 HC ANESTHESIA ATTENDED CARE: Performed by: SURGERY

## 2021-01-01 PROCEDURE — 2580000003 HC RX 258: Performed by: STUDENT IN AN ORGANIZED HEALTH CARE EDUCATION/TRAINING PROGRAM

## 2021-01-01 PROCEDURE — C1751 CATH, INF, PER/CENT/MIDLINE: HCPCS

## 2021-01-01 PROCEDURE — 99222 1ST HOSP IP/OBS MODERATE 55: CPT | Performed by: SURGERY

## 2021-01-01 PROCEDURE — 2500000003 HC RX 250 WO HCPCS: Performed by: INTERNAL MEDICINE

## 2021-01-01 PROCEDURE — 85027 COMPLETE CBC AUTOMATED: CPT

## 2021-01-01 PROCEDURE — 36415 COLL VENOUS BLD VENIPUNCTURE: CPT

## 2021-01-01 PROCEDURE — C9113 INJ PANTOPRAZOLE SODIUM, VIA: HCPCS | Performed by: NURSE PRACTITIONER

## 2021-01-01 PROCEDURE — 6360000002 HC RX W HCPCS: Performed by: STUDENT IN AN ORGANIZED HEALTH CARE EDUCATION/TRAINING PROGRAM

## 2021-01-01 PROCEDURE — 83735 ASSAY OF MAGNESIUM: CPT

## 2021-01-01 PROCEDURE — 86334 IMMUNOFIX E-PHORESIS SERUM: CPT

## 2021-01-01 PROCEDURE — 80202 ASSAY OF VANCOMYCIN: CPT

## 2021-01-01 PROCEDURE — 87070 CULTURE OTHR SPECIMN AEROBIC: CPT

## 2021-01-01 PROCEDURE — P9047 ALBUMIN (HUMAN), 25%, 50ML: HCPCS | Performed by: NURSE PRACTITIONER

## 2021-01-01 PROCEDURE — 0Y6M0ZC DETACHMENT AT RIGHT FOOT, PARTIAL 3RD RAY, OPEN APPROACH: ICD-10-PCS | Performed by: PODIATRIST

## 2021-01-01 PROCEDURE — 99214 OFFICE O/P EST MOD 30 MIN: CPT | Performed by: NURSE PRACTITIONER

## 2021-01-01 PROCEDURE — 6370000000 HC RX 637 (ALT 250 FOR IP): Performed by: INTERNAL MEDICINE

## 2021-01-01 PROCEDURE — 2500000003 HC RX 250 WO HCPCS: Performed by: EMERGENCY MEDICINE

## 2021-01-01 PROCEDURE — 7100000001 HC PACU RECOVERY - ADDTL 15 MIN: Performed by: PODIATRIST

## 2021-01-01 PROCEDURE — G0378 HOSPITAL OBSERVATION PER HR: HCPCS

## 2021-01-01 PROCEDURE — 86140 C-REACTIVE PROTEIN: CPT

## 2021-01-01 PROCEDURE — 88305 TISSUE EXAM BY PATHOLOGIST: CPT

## 2021-01-01 PROCEDURE — 80053 COMPREHEN METABOLIC PANEL: CPT

## 2021-01-01 PROCEDURE — 86900 BLOOD TYPING SEROLOGIC ABO: CPT

## 2021-01-01 PROCEDURE — 99214 OFFICE O/P EST MOD 30 MIN: CPT | Performed by: INTERNAL MEDICINE

## 2021-01-01 PROCEDURE — 85347 COAGULATION TIME ACTIVATED: CPT

## 2021-01-01 PROCEDURE — 7100000000 HC PACU RECOVERY - FIRST 15 MIN: Performed by: SURGERY

## 2021-01-01 PROCEDURE — 80076 HEPATIC FUNCTION PANEL: CPT

## 2021-01-01 PROCEDURE — 84443 ASSAY THYROID STIM HORMONE: CPT

## 2021-01-01 PROCEDURE — 2060000000 HC ICU INTERMEDIATE R&B

## 2021-01-01 PROCEDURE — 6360000002 HC RX W HCPCS: Performed by: FAMILY MEDICINE

## 2021-01-01 PROCEDURE — 93017 CV STRESS TEST TRACING ONLY: CPT

## 2021-01-01 PROCEDURE — 2709999900 HC NON-CHARGEABLE SUPPLY: Performed by: PODIATRIST

## 2021-01-01 PROCEDURE — P9016 RBC LEUKOCYTES REDUCED: HCPCS

## 2021-01-01 PROCEDURE — 85014 HEMATOCRIT: CPT

## 2021-01-01 PROCEDURE — 2580000003 HC RX 258: Performed by: SPECIALIST

## 2021-01-01 PROCEDURE — 73620 X-RAY EXAM OF FOOT: CPT

## 2021-01-01 PROCEDURE — 99223 1ST HOSP IP/OBS HIGH 75: CPT | Performed by: PSYCHIATRY & NEUROLOGY

## 2021-01-01 PROCEDURE — 99204 OFFICE O/P NEW MOD 45 MIN: CPT

## 2021-01-01 PROCEDURE — 04CH0ZZ EXTIRPATION OF MATTER FROM RIGHT EXTERNAL ILIAC ARTERY, OPEN APPROACH: ICD-10-PCS | Performed by: SURGERY

## 2021-01-01 PROCEDURE — 86850 RBC ANTIBODY SCREEN: CPT

## 2021-01-01 PROCEDURE — 7100000010 HC PHASE II RECOVERY - FIRST 15 MIN: Performed by: SURGERY

## 2021-01-01 PROCEDURE — 96367 TX/PROPH/DG ADDL SEQ IV INF: CPT

## 2021-01-01 PROCEDURE — 2580000003 HC RX 258: Performed by: EMERGENCY MEDICINE

## 2021-01-01 PROCEDURE — 2700000000 HC OXYGEN THERAPY PER DAY

## 2021-01-01 PROCEDURE — 2580000003 HC RX 258: Performed by: SURGERY

## 2021-01-01 PROCEDURE — C1781 MESH (IMPLANTABLE): HCPCS | Performed by: SURGERY

## 2021-01-01 PROCEDURE — 02HV33Z INSERTION OF INFUSION DEVICE INTO SUPERIOR VENA CAVA, PERCUTANEOUS APPROACH: ICD-10-PCS | Performed by: INTERNAL MEDICINE

## 2021-01-01 PROCEDURE — APPSS60 APP SPLIT SHARED TIME 46-60 MINUTES: Performed by: NURSE PRACTITIONER

## 2021-01-01 PROCEDURE — 2580000003 HC RX 258: Performed by: PHYSICIAN ASSISTANT

## 2021-01-01 PROCEDURE — 93005 ELECTROCARDIOGRAM TRACING: CPT | Performed by: EMERGENCY MEDICINE

## 2021-01-01 PROCEDURE — 99232 SBSQ HOSP IP/OBS MODERATE 35: CPT | Performed by: PHYSICIAN ASSISTANT

## 2021-01-01 PROCEDURE — 82728 ASSAY OF FERRITIN: CPT

## 2021-01-01 PROCEDURE — 0B9J8ZX DRAINAGE OF LEFT LOWER LUNG LOBE, VIA NATURAL OR ARTIFICIAL OPENING ENDOSCOPIC, DIAGNOSTIC: ICD-10-PCS | Performed by: INTERNAL MEDICINE

## 2021-01-01 PROCEDURE — 6360000002 HC RX W HCPCS: Performed by: NURSE ANESTHETIST, CERTIFIED REGISTERED

## 2021-01-01 PROCEDURE — 85378 FIBRIN DEGRADE SEMIQUANT: CPT

## 2021-01-01 PROCEDURE — 84484 ASSAY OF TROPONIN QUANT: CPT

## 2021-01-01 PROCEDURE — 94760 N-INVAS EAR/PLS OXIMETRY 1: CPT

## 2021-01-01 PROCEDURE — 94664 DEMO&/EVAL PT USE INHALER: CPT

## 2021-01-01 PROCEDURE — P9612 CATHETERIZE FOR URINE SPEC: HCPCS

## 2021-01-01 PROCEDURE — 11042 DBRDMT SUBQ TIS 1ST 20SQCM/<: CPT

## 2021-01-01 PROCEDURE — 99024 POSTOP FOLLOW-UP VISIT: CPT | Performed by: SURGERY

## 2021-01-01 PROCEDURE — 2709999900 HC NON-CHARGEABLE SUPPLY: Performed by: INTERNAL MEDICINE

## 2021-01-01 PROCEDURE — 99214 OFFICE O/P EST MOD 30 MIN: CPT

## 2021-01-01 PROCEDURE — 99285 EMERGENCY DEPT VISIT HI MDM: CPT

## 2021-01-01 PROCEDURE — 0Y6S0Z0 DETACHMENT AT LEFT 2ND TOE, COMPLETE, OPEN APPROACH: ICD-10-PCS | Performed by: PODIATRIST

## 2021-01-01 PROCEDURE — 94003 VENT MGMT INPAT SUBQ DAY: CPT

## 2021-01-01 PROCEDURE — 75625 CONTRAST EXAM ABDOMINL AORTA: CPT | Performed by: SURGERY

## 2021-01-01 PROCEDURE — 2000000000 HC ICU R&B

## 2021-01-01 PROCEDURE — 84166 PROTEIN E-PHORESIS/URINE/CSF: CPT

## 2021-01-01 PROCEDURE — 51798 US URINE CAPACITY MEASURE: CPT

## 2021-01-01 PROCEDURE — 80179 DRUG ASSAY SALICYLATE: CPT

## 2021-01-01 PROCEDURE — 3600000002 HC SURGERY LEVEL 2 BASE: Performed by: PODIATRIST

## 2021-01-01 PROCEDURE — 84100 ASSAY OF PHOSPHORUS: CPT

## 2021-01-01 PROCEDURE — 3700000001 HC ADD 15 MINUTES (ANESTHESIA): Performed by: SURGERY

## 2021-01-01 PROCEDURE — 80061 LIPID PANEL: CPT

## 2021-01-01 PROCEDURE — 94002 VENT MGMT INPAT INIT DAY: CPT

## 2021-01-01 PROCEDURE — 86901 BLOOD TYPING SEROLOGIC RH(D): CPT

## 2021-01-01 PROCEDURE — 2580000003 HC RX 258

## 2021-01-01 PROCEDURE — 93970 EXTREMITY STUDY: CPT

## 2021-01-01 PROCEDURE — 85018 HEMOGLOBIN: CPT

## 2021-01-01 PROCEDURE — 96375 TX/PRO/DX INJ NEW DRUG ADDON: CPT

## 2021-01-01 PROCEDURE — 70450 CT HEAD/BRAIN W/O DYE: CPT

## 2021-01-01 PROCEDURE — 2780000010 HC IMPLANT OTHER: Performed by: SURGERY

## 2021-01-01 PROCEDURE — 36592 COLLECT BLOOD FROM PICC: CPT

## 2021-01-01 PROCEDURE — 0Y6M0ZD DETACHMENT AT RIGHT FOOT, PARTIAL 4TH RAY, OPEN APPROACH: ICD-10-PCS | Performed by: PODIATRIST

## 2021-01-01 PROCEDURE — 96365 THER/PROPH/DIAG IV INF INIT: CPT

## 2021-01-01 PROCEDURE — 84439 ASSAY OF FREE THYROXINE: CPT

## 2021-01-01 PROCEDURE — 89051 BODY FLUID CELL COUNT: CPT

## 2021-01-01 PROCEDURE — 3430000000 HC RX DIAGNOSTIC RADIOPHARMACEUTICAL: Performed by: RADIOLOGY

## 2021-01-01 PROCEDURE — 0QBL0ZZ EXCISION OF RIGHT TARSAL, OPEN APPROACH: ICD-10-PCS | Performed by: PODIATRIST

## 2021-01-01 PROCEDURE — 36430 TRANSFUSION BLD/BLD COMPNT: CPT

## 2021-01-01 PROCEDURE — 6360000002 HC RX W HCPCS: Performed by: SURGERY

## 2021-01-01 PROCEDURE — 96376 TX/PRO/DX INJ SAME DRUG ADON: CPT

## 2021-01-01 PROCEDURE — APPSS180 APP SPLIT SHARED TIME > 60 MINUTES: Performed by: NURSE PRACTITIONER

## 2021-01-01 PROCEDURE — 82607 VITAMIN B-12: CPT

## 2021-01-01 PROCEDURE — P9040 RBC LEUKOREDUCED IRRADIATED: HCPCS

## 2021-01-01 PROCEDURE — C1757 CATH, THROMBECTOMY/EMBOLECT: HCPCS | Performed by: SURGERY

## 2021-01-01 PROCEDURE — 73630 X-RAY EXAM OF FOOT: CPT

## 2021-01-01 PROCEDURE — 2580000003 HC RX 258: Performed by: RADIOLOGY

## 2021-01-01 PROCEDURE — APPSS45 APP SPLIT SHARED TIME 31-45 MINUTES: Performed by: NURSE PRACTITIONER

## 2021-01-01 PROCEDURE — 93005 ELECTROCARDIOGRAM TRACING: CPT | Performed by: PODIATRIST

## 2021-01-01 PROCEDURE — 86923 COMPATIBILITY TEST ELECTRIC: CPT

## 2021-01-01 PROCEDURE — 97165 OT EVAL LOW COMPLEX 30 MIN: CPT

## 2021-01-01 PROCEDURE — 6360000002 HC RX W HCPCS: Performed by: PODIATRIST

## 2021-01-01 PROCEDURE — 36620 INSERTION CATHETER ARTERY: CPT

## 2021-01-01 PROCEDURE — 83036 HEMOGLOBIN GLYCOSYLATED A1C: CPT

## 2021-01-01 PROCEDURE — A9500 TC99M SESTAMIBI: HCPCS | Performed by: RADIOLOGY

## 2021-01-01 PROCEDURE — 97161 PT EVAL LOW COMPLEX 20 MIN: CPT

## 2021-01-01 PROCEDURE — 96374 THER/PROPH/DIAG INJ IV PUSH: CPT

## 2021-01-01 PROCEDURE — 37609 LIGATION/BX TEMPORAL ARTERY: CPT | Performed by: SURGERY

## 2021-01-01 PROCEDURE — 87015 SPECIMEN INFECT AGNT CONCNTJ: CPT

## 2021-01-01 PROCEDURE — 87635 SARS-COV-2 COVID-19 AMP PRB: CPT

## 2021-01-01 PROCEDURE — 83690 ASSAY OF LIPASE: CPT

## 2021-01-01 PROCEDURE — 82805 BLOOD GASES W/O2 SATURATION: CPT

## 2021-01-01 PROCEDURE — 93000 ELECTROCARDIOGRAM COMPLETE: CPT | Performed by: INTERNAL MEDICINE

## 2021-01-01 PROCEDURE — 87077 CULTURE AEROBIC IDENTIFY: CPT

## 2021-01-01 PROCEDURE — 2500000003 HC RX 250 WO HCPCS: Performed by: NURSE PRACTITIONER

## 2021-01-01 PROCEDURE — 99233 SBSQ HOSP IP/OBS HIGH 50: CPT | Performed by: INTERNAL MEDICINE

## 2021-01-01 PROCEDURE — 96372 THER/PROPH/DIAG INJ SC/IM: CPT

## 2021-01-01 PROCEDURE — 72125 CT NECK SPINE W/O DYE: CPT

## 2021-01-01 PROCEDURE — 97530 THERAPEUTIC ACTIVITIES: CPT

## 2021-01-01 PROCEDURE — 7100000000 HC PACU RECOVERY - FIRST 15 MIN: Performed by: PODIATRIST

## 2021-01-01 PROCEDURE — 93296 REM INTERROG EVL PM/IDS: CPT | Performed by: INTERNAL MEDICINE

## 2021-01-01 PROCEDURE — 82746 ASSAY OF FOLIC ACID SERUM: CPT

## 2021-01-01 PROCEDURE — 93005 ELECTROCARDIOGRAM TRACING: CPT | Performed by: STUDENT IN AN ORGANIZED HEALTH CARE EDUCATION/TRAINING PROGRAM

## 2021-01-01 PROCEDURE — 82570 ASSAY OF URINE CREATININE: CPT

## 2021-01-01 PROCEDURE — 6360000002 HC RX W HCPCS: Performed by: EMERGENCY MEDICINE

## 2021-01-01 PROCEDURE — 2580000003 HC RX 258: Performed by: GENERAL PRACTICE

## 2021-01-01 PROCEDURE — 93018 CV STRESS TEST I&R ONLY: CPT | Performed by: INTERNAL MEDICINE

## 2021-01-01 PROCEDURE — 6370000000 HC RX 637 (ALT 250 FOR IP): Performed by: FAMILY MEDICINE

## 2021-01-01 PROCEDURE — 74176 CT ABD & PELVIS W/O CONTRAST: CPT

## 2021-01-01 PROCEDURE — 51702 INSERT TEMP BLADDER CATH: CPT

## 2021-01-01 PROCEDURE — 87081 CULTURE SCREEN ONLY: CPT

## 2021-01-01 PROCEDURE — 82330 ASSAY OF CALCIUM: CPT

## 2021-01-01 PROCEDURE — 11045 DBRDMT SUBQ TISS EACH ADDL: CPT

## 2021-01-01 PROCEDURE — 3700000000 HC ANESTHESIA ATTENDED CARE: Performed by: INTERNAL MEDICINE

## 2021-01-01 PROCEDURE — 82010 KETONE BODYS QUAN: CPT

## 2021-01-01 PROCEDURE — 71045 X-RAY EXAM CHEST 1 VIEW: CPT

## 2021-01-01 PROCEDURE — 76770 US EXAM ABDO BACK WALL COMP: CPT

## 2021-01-01 PROCEDURE — 36591 DRAW BLOOD OFF VENOUS DEVICE: CPT

## 2021-01-01 PROCEDURE — 99221 1ST HOSP IP/OBS SF/LOW 40: CPT | Performed by: NURSE PRACTITIONER

## 2021-01-01 PROCEDURE — 37799 UNLISTED PX VASCULAR SURGERY: CPT

## 2021-01-01 PROCEDURE — 0B9F8ZX DRAINAGE OF RIGHT LOWER LUNG LOBE, VIA NATURAL OR ARTIFICIAL OPENING ENDOSCOPIC, DIAGNOSTIC: ICD-10-PCS | Performed by: INTERNAL MEDICINE

## 2021-01-01 PROCEDURE — 83550 IRON BINDING TEST: CPT

## 2021-01-01 PROCEDURE — 82044 UR ALBUMIN SEMIQUANTITATIVE: CPT

## 2021-01-01 PROCEDURE — 36246 INS CATH ABD/L-EXT ART 2ND: CPT | Performed by: SURGERY

## 2021-01-01 PROCEDURE — 3600000005 HC SURGERY LEVEL 5 BASE: Performed by: SURGERY

## 2021-01-01 PROCEDURE — 2580000003 HC RX 258: Performed by: FAMILY MEDICINE

## 2021-01-01 PROCEDURE — 87206 SMEAR FLUORESCENT/ACID STAI: CPT

## 2021-01-01 PROCEDURE — 2709999900 HC NON-CHARGEABLE SUPPLY: Performed by: SURGERY

## 2021-01-01 PROCEDURE — 88311 DECALCIFY TISSUE: CPT

## 2021-01-01 PROCEDURE — 5A1945Z RESPIRATORY VENTILATION, 24-96 CONSECUTIVE HOURS: ICD-10-PCS | Performed by: STUDENT IN AN ORGANIZED HEALTH CARE EDUCATION/TRAINING PROGRAM

## 2021-01-01 PROCEDURE — B4101ZZ FLUOROSCOPY OF ABDOMINAL AORTA USING LOW OSMOLAR CONTRAST: ICD-10-PCS | Performed by: SURGERY

## 2021-01-01 PROCEDURE — C1769 GUIDE WIRE: HCPCS

## 2021-01-01 PROCEDURE — 2500000003 HC RX 250 WO HCPCS: Performed by: STUDENT IN AN ORGANIZED HEALTH CARE EDUCATION/TRAINING PROGRAM

## 2021-01-01 PROCEDURE — 80307 DRUG TEST PRSMV CHEM ANLYZR: CPT

## 2021-01-01 PROCEDURE — G0379 DIRECT REFER HOSPITAL OBSERV: HCPCS

## 2021-01-01 PROCEDURE — 95822 EEG COMA OR SLEEP ONLY: CPT

## 2021-01-01 PROCEDURE — P9041 ALBUMIN (HUMAN),5%, 50ML: HCPCS

## 2021-01-01 PROCEDURE — 36569 INSJ PICC 5 YR+ W/O IMAGING: CPT

## 2021-01-01 PROCEDURE — 82803 BLOOD GASES ANY COMBINATION: CPT

## 2021-01-01 PROCEDURE — 99283 EMERGENCY DEPT VISIT LOW MDM: CPT

## 2021-01-01 PROCEDURE — 88304 TISSUE EXAM BY PATHOLOGIST: CPT

## 2021-01-01 PROCEDURE — 6360000002 HC RX W HCPCS: Performed by: ANESTHESIOLOGY

## 2021-01-01 PROCEDURE — 82533 TOTAL CORTISOL: CPT

## 2021-01-01 PROCEDURE — 75716 ARTERY X-RAYS ARMS/LEGS: CPT | Performed by: SURGERY

## 2021-01-01 PROCEDURE — 3600000012 HC SURGERY LEVEL 2 ADDTL 15MIN: Performed by: SURGERY

## 2021-01-01 PROCEDURE — 87116 MYCOBACTERIA CULTURE: CPT

## 2021-01-01 PROCEDURE — 6360000004 HC RX CONTRAST MEDICATION: Performed by: RADIOLOGY

## 2021-01-01 PROCEDURE — 84300 ASSAY OF URINE SODIUM: CPT

## 2021-01-01 PROCEDURE — 99233 SBSQ HOSP IP/OBS HIGH 50: CPT | Performed by: PHYSICIAN ASSISTANT

## 2021-01-01 PROCEDURE — 87449 NOS EACH ORGANISM AG IA: CPT

## 2021-01-01 PROCEDURE — 0Y6M0ZF DETACHMENT AT RIGHT FOOT, PARTIAL 5TH RAY, OPEN APPROACH: ICD-10-PCS | Performed by: PODIATRIST

## 2021-01-01 PROCEDURE — 92950 HEART/LUNG RESUSCITATION CPR: CPT

## 2021-01-01 PROCEDURE — 2500000003 HC RX 250 WO HCPCS: Performed by: FAMILY MEDICINE

## 2021-01-01 PROCEDURE — 2500000003 HC RX 250 WO HCPCS: Performed by: NURSE ANESTHETIST, CERTIFIED REGISTERED

## 2021-01-01 PROCEDURE — 3600000002 HC SURGERY LEVEL 2 BASE: Performed by: SURGERY

## 2021-01-01 PROCEDURE — 7100000011 HC PHASE II RECOVERY - ADDTL 15 MIN: Performed by: SURGERY

## 2021-01-01 PROCEDURE — 93880 EXTRACRANIAL BILAT STUDY: CPT

## 2021-01-01 PROCEDURE — 99214 OFFICE O/P EST MOD 30 MIN: CPT | Performed by: SURGERY

## 2021-01-01 PROCEDURE — 76937 US GUIDE VASCULAR ACCESS: CPT

## 2021-01-01 PROCEDURE — 85045 AUTOMATED RETICULOCYTE COUNT: CPT

## 2021-01-01 PROCEDURE — 99239 HOSP IP/OBS DSCHRG MGMT >30: CPT | Performed by: INTERNAL MEDICINE

## 2021-01-01 PROCEDURE — 04CK0ZZ EXTIRPATION OF MATTER FROM RIGHT FEMORAL ARTERY, OPEN APPROACH: ICD-10-PCS | Performed by: SURGERY

## 2021-01-01 PROCEDURE — 80143 DRUG ASSAY ACETAMINOPHEN: CPT

## 2021-01-01 PROCEDURE — 71250 CT THORAX DX C-: CPT

## 2021-01-01 PROCEDURE — XW033N5 INTRODUCTION OF MEROPENEM-VABORBACTAM ANTI-INFECTIVE INTO PERIPHERAL VEIN, PERCUTANEOUS APPROACH, NEW TECHNOLOGY GROUP 5: ICD-10-PCS | Performed by: INTERNAL MEDICINE

## 2021-01-01 PROCEDURE — 72131 CT LUMBAR SPINE W/O DYE: CPT

## 2021-01-01 PROCEDURE — U0002 COVID-19 LAB TEST NON-CDC: HCPCS

## 2021-01-01 PROCEDURE — 84134 ASSAY OF PREALBUMIN: CPT

## 2021-01-01 PROCEDURE — 7100000001 HC PACU RECOVERY - ADDTL 15 MIN: Performed by: SURGERY

## 2021-01-01 PROCEDURE — 0Y6M0ZB DETACHMENT AT RIGHT FOOT, PARTIAL 2ND RAY, OPEN APPROACH: ICD-10-PCS | Performed by: PODIATRIST

## 2021-01-01 PROCEDURE — XW03396 INTRODUCTION OF CEFTOLOZANE/TAZOBACTAM ANTI-INFECTIVE INTO PERIPHERAL VEIN, PERCUTANEOUS APPROACH, NEW TECHNOLOGY GROUP 6: ICD-10-PCS | Performed by: INTERNAL MEDICINE

## 2021-01-01 PROCEDURE — 3700000001 HC ADD 15 MINUTES (ANESTHESIA): Performed by: PODIATRIST

## 2021-01-01 PROCEDURE — 2500000003 HC RX 250 WO HCPCS: Performed by: PODIATRIST

## 2021-01-01 PROCEDURE — C9113 INJ PANTOPRAZOLE SODIUM, VIA: HCPCS | Performed by: EMERGENCY MEDICINE

## 2021-01-01 PROCEDURE — 6370000000 HC RX 637 (ALT 250 FOR IP): Performed by: SURGERY

## 2021-01-01 PROCEDURE — C1894 INTRO/SHEATH, NON-LASER: HCPCS

## 2021-01-01 PROCEDURE — 82077 ASSAY SPEC XCP UR&BREATH IA: CPT

## 2021-01-01 PROCEDURE — 93294 REM INTERROG EVL PM/LDLS PM: CPT | Performed by: INTERNAL MEDICINE

## 2021-01-01 PROCEDURE — 3700000000 HC ANESTHESIA ATTENDED CARE: Performed by: PODIATRIST

## 2021-01-01 PROCEDURE — 83970 ASSAY OF PARATHORMONE: CPT

## 2021-01-01 PROCEDURE — 3600000012 HC SURGERY LEVEL 2 ADDTL 15MIN: Performed by: PODIATRIST

## 2021-01-01 PROCEDURE — 82306 VITAMIN D 25 HYDROXY: CPT

## 2021-01-01 PROCEDURE — 3600000015 HC SURGERY LEVEL 5 ADDTL 15MIN: Performed by: SURGERY

## 2021-01-01 PROCEDURE — 35355 RECHANNELING OF ARTERY: CPT | Performed by: SURGERY

## 2021-01-01 PROCEDURE — 82550 ASSAY OF CK (CPK): CPT

## 2021-01-01 PROCEDURE — 36556 INSERT NON-TUNNEL CV CATH: CPT

## 2021-01-01 PROCEDURE — 93922 UPR/L XTREMITY ART 2 LEVELS: CPT

## 2021-01-01 PROCEDURE — 99231 SBSQ HOSP IP/OBS SF/LOW 25: CPT | Performed by: NURSE PRACTITIONER

## 2021-01-01 PROCEDURE — 0Y6M0Z9 DETACHMENT AT RIGHT FOOT, PARTIAL 1ST RAY, OPEN APPROACH: ICD-10-PCS | Performed by: PODIATRIST

## 2021-01-01 PROCEDURE — 87106 FUNGI IDENTIFICATION YEAST: CPT

## 2021-01-01 PROCEDURE — 87088 URINE BACTERIA CULTURE: CPT

## 2021-01-01 PROCEDURE — 87075 CULTR BACTERIA EXCEPT BLOOD: CPT

## 2021-01-01 PROCEDURE — 7100000011 HC PHASE II RECOVERY - ADDTL 15 MIN: Performed by: INTERNAL MEDICINE

## 2021-01-01 PROCEDURE — 96368 THER/DIAG CONCURRENT INF: CPT

## 2021-01-01 PROCEDURE — 7100000010 HC PHASE II RECOVERY - FIRST 15 MIN: Performed by: INTERNAL MEDICINE

## 2021-01-01 PROCEDURE — 70498 CT ANGIOGRAPHY NECK: CPT

## 2021-01-01 PROCEDURE — B41D1ZZ FLUOROSCOPY OF AORTA AND BILATERAL LOWER EXTREMITY ARTERIES USING LOW OSMOLAR CONTRAST: ICD-10-PCS | Performed by: SURGERY

## 2021-01-01 PROCEDURE — 80074 ACUTE HEPATITIS PANEL: CPT

## 2021-01-01 PROCEDURE — 99284 EMERGENCY DEPT VISIT MOD MDM: CPT

## 2021-01-01 PROCEDURE — 81003 URINALYSIS AUTO W/O SCOPE: CPT

## 2021-01-01 PROCEDURE — 0202U NFCT DS 22 TRGT SARS-COV-2: CPT

## 2021-01-01 PROCEDURE — 78452 HT MUSCLE IMAGE SPECT MULT: CPT

## 2021-01-01 PROCEDURE — 93005 ELECTROCARDIOGRAM TRACING: CPT | Performed by: GENERAL PRACTICE

## 2021-01-01 PROCEDURE — 0BH18EZ INSERTION OF ENDOTRACHEAL AIRWAY INTO TRACHEA, VIA NATURAL OR ARTIFICIAL OPENING ENDOSCOPIC: ICD-10-PCS | Performed by: STUDENT IN AN ORGANIZED HEALTH CARE EDUCATION/TRAINING PROGRAM

## 2021-01-01 PROCEDURE — 93016 CV STRESS TEST SUPVJ ONLY: CPT | Performed by: INTERNAL MEDICINE

## 2021-01-01 PROCEDURE — 84165 PROTEIN E-PHORESIS SERUM: CPT

## 2021-01-01 PROCEDURE — 95822 EEG COMA OR SLEEP ONLY: CPT | Performed by: PSYCHIATRY & NEUROLOGY

## 2021-01-01 PROCEDURE — 87102 FUNGUS ISOLATION CULTURE: CPT

## 2021-01-01 PROCEDURE — 93005 ELECTROCARDIOGRAM TRACING: CPT | Performed by: INTERNAL MEDICINE

## 2021-01-01 PROCEDURE — 99291 CRITICAL CARE FIRST HOUR: CPT

## 2021-01-01 PROCEDURE — APPSS60 APP SPLIT SHARED TIME 46-60 MINUTES: Performed by: PHYSICIAN ASSISTANT

## 2021-01-01 PROCEDURE — 84436 ASSAY OF TOTAL THYROXINE: CPT

## 2021-01-01 PROCEDURE — 3700000001 HC ADD 15 MINUTES (ANESTHESIA): Performed by: INTERNAL MEDICINE

## 2021-01-01 PROCEDURE — G0328 FECAL BLOOD SCRN IMMUNOASSAY: HCPCS

## 2021-01-01 DEVICE — PATCH BIOLOGIC SURG 10CM WX16CM L .55MM THICKNESSXENOSURE: Type: IMPLANTABLE DEVICE | Site: GROIN | Status: FUNCTIONAL

## 2021-01-01 RX ORDER — IPRATROPIUM BROMIDE AND ALBUTEROL SULFATE 2.5; .5 MG/3ML; MG/3ML
1 SOLUTION RESPIRATORY (INHALATION) EVERY 4 HOURS PRN
Status: DISCONTINUED | OUTPATIENT
Start: 2021-01-01 | End: 2021-12-02 | Stop reason: HOSPADM

## 2021-01-01 RX ORDER — LEVOTHYROXINE SODIUM 0.03 MG/1
25 TABLET ORAL DAILY
Status: DISCONTINUED | OUTPATIENT
Start: 2021-01-01 | End: 2021-01-01 | Stop reason: HOSPADM

## 2021-01-01 RX ORDER — LIDOCAINE HYDROCHLORIDE 20 MG/ML
INJECTION, SOLUTION INFILTRATION; PERINEURAL PRN
Status: DISCONTINUED | OUTPATIENT
Start: 2021-01-01 | End: 2021-01-01 | Stop reason: ALTCHOICE

## 2021-01-01 RX ORDER — SODIUM CHLORIDE 0.9 % (FLUSH) 0.9 %
5-40 SYRINGE (ML) INJECTION PRN
Status: DISCONTINUED | OUTPATIENT
Start: 2021-01-01 | End: 2021-01-01 | Stop reason: HOSPADM

## 2021-01-01 RX ORDER — CIPROFLOXACIN 500 MG/1
500 TABLET, FILM COATED ORAL EVERY 12 HOURS SCHEDULED
Status: DISCONTINUED | OUTPATIENT
Start: 2021-01-01 | End: 2021-01-01

## 2021-01-01 RX ORDER — ACETAMINOPHEN 650 MG/1
650 SUPPOSITORY RECTAL EVERY 6 HOURS PRN
Status: DISCONTINUED | OUTPATIENT
Start: 2021-01-01 | End: 2021-01-01 | Stop reason: HOSPADM

## 2021-01-01 RX ORDER — METOLAZONE 2.5 MG/1
2.5 TABLET ORAL
Status: DISCONTINUED | OUTPATIENT
Start: 2021-01-01 | End: 2021-01-01 | Stop reason: HOSPADM

## 2021-01-01 RX ORDER — PROMETHAZINE HYDROCHLORIDE 25 MG/ML
6.25 INJECTION, SOLUTION INTRAMUSCULAR; INTRAVENOUS
Status: DISCONTINUED | OUTPATIENT
Start: 2021-01-01 | End: 2021-01-01

## 2021-01-01 RX ORDER — PANTOPRAZOLE SODIUM 40 MG/1
40 TABLET, DELAYED RELEASE ORAL
Status: DISCONTINUED | OUTPATIENT
Start: 2021-01-01 | End: 2021-01-01 | Stop reason: HOSPADM

## 2021-01-01 RX ORDER — TRAMADOL HYDROCHLORIDE 50 MG/1
TABLET ORAL EVERY 6 HOURS PRN
COMMUNITY
End: 2021-01-01

## 2021-01-01 RX ORDER — LIDOCAINE HYDROCHLORIDE 10 MG/ML
5 INJECTION, SOLUTION EPIDURAL; INFILTRATION; INTRACAUDAL; PERINEURAL ONCE
Status: DISCONTINUED | OUTPATIENT
Start: 2021-01-01 | End: 2021-01-01 | Stop reason: HOSPADM

## 2021-01-01 RX ORDER — MORPHINE SULFATE 10 MG/1
10 CAPSULE, EXTENDED RELEASE ORAL 2 TIMES DAILY
Status: DISCONTINUED | OUTPATIENT
Start: 2021-01-01 | End: 2021-01-01 | Stop reason: HOSPADM

## 2021-01-01 RX ORDER — SODIUM CHLORIDE 9 MG/ML
10 INJECTION INTRAVENOUS DAILY
Status: DISCONTINUED | OUTPATIENT
Start: 2021-01-01 | End: 2021-01-01 | Stop reason: SDUPTHER

## 2021-01-01 RX ORDER — HYDROXYZINE HYDROCHLORIDE 50 MG/ML
50 INJECTION, SOLUTION INTRAMUSCULAR ONCE
Status: COMPLETED | OUTPATIENT
Start: 2021-01-01 | End: 2021-01-01

## 2021-01-01 RX ORDER — METOPROLOL SUCCINATE 50 MG/1
50 TABLET, EXTENDED RELEASE ORAL DAILY
Status: DISCONTINUED | OUTPATIENT
Start: 2021-01-01 | End: 2021-01-01 | Stop reason: HOSPADM

## 2021-01-01 RX ORDER — PROTAMINE SULFATE 10 MG/ML
INJECTION, SOLUTION INTRAVENOUS PRN
Status: DISCONTINUED | OUTPATIENT
Start: 2021-01-01 | End: 2021-01-01 | Stop reason: SDUPTHER

## 2021-01-01 RX ORDER — ASPIRIN 81 MG/1
81 TABLET ORAL DAILY
Status: DISCONTINUED | OUTPATIENT
Start: 2021-01-01 | End: 2021-01-01 | Stop reason: HOSPADM

## 2021-01-01 RX ORDER — PROPOFOL 10 MG/ML
INJECTION, EMULSION INTRAVENOUS CONTINUOUS PRN
Status: DISCONTINUED | OUTPATIENT
Start: 2021-01-01 | End: 2021-01-01 | Stop reason: SDUPTHER

## 2021-01-01 RX ORDER — MORPHINE SULFATE 10 MG/1
10 CAPSULE, EXTENDED RELEASE ORAL 2 TIMES DAILY
Status: ON HOLD | COMMUNITY
End: 2021-01-01 | Stop reason: SDUPTHER

## 2021-01-01 RX ORDER — SODIUM CHLORIDE 0.9 % (FLUSH) 0.9 %
5-40 SYRINGE (ML) INJECTION EVERY 12 HOURS SCHEDULED
Status: DISCONTINUED | OUTPATIENT
Start: 2021-01-01 | End: 2021-01-01 | Stop reason: HOSPADM

## 2021-01-01 RX ORDER — LIDOCAINE HYDROCHLORIDE 20 MG/ML
INJECTION, SOLUTION INFILTRATION; PERINEURAL PRN
Status: DISCONTINUED | OUTPATIENT
Start: 2021-01-01 | End: 2021-01-01 | Stop reason: SDUPTHER

## 2021-01-01 RX ORDER — LEVOTHYROXINE SODIUM 0.03 MG/1
25 TABLET ORAL DAILY
Status: CANCELLED | OUTPATIENT
Start: 2021-01-01

## 2021-01-01 RX ORDER — LIDOCAINE HYDROCHLORIDE 20 MG/ML
SOLUTION OROPHARYNGEAL PRN
Status: DISCONTINUED | OUTPATIENT
Start: 2021-01-01 | End: 2021-01-01 | Stop reason: ALTCHOICE

## 2021-01-01 RX ORDER — GABAPENTIN 400 MG/1
400 CAPSULE ORAL 4 TIMES DAILY
Status: DISCONTINUED | OUTPATIENT
Start: 2021-01-01 | End: 2021-01-01 | Stop reason: HOSPADM

## 2021-01-01 RX ORDER — POLYETHYLENE GLYCOL 3350 17 G/17G
17 POWDER, FOR SOLUTION ORAL DAILY PRN
Qty: 527 G | Refills: 1 | DISCHARGE
Start: 2021-01-01 | End: 2021-12-06

## 2021-01-01 RX ORDER — FENTANYL CITRATE 50 UG/ML
INJECTION, SOLUTION INTRAMUSCULAR; INTRAVENOUS PRN
Status: DISCONTINUED | OUTPATIENT
Start: 2021-01-01 | End: 2021-01-01 | Stop reason: SDUPTHER

## 2021-01-01 RX ORDER — ALBUTEROL SULFATE 2.5 MG/3ML
2.5 SOLUTION RESPIRATORY (INHALATION) EVERY 6 HOURS PRN
Status: DISCONTINUED | OUTPATIENT
Start: 2021-01-01 | End: 2021-01-01 | Stop reason: HOSPADM

## 2021-01-01 RX ORDER — HYDROCODONE BITARTRATE AND ACETAMINOPHEN 10; 325 MG/1; MG/1
1 TABLET ORAL EVERY 6 HOURS PRN
Qty: 10 TABLET | Refills: 0 | Status: SHIPPED | OUTPATIENT
Start: 2021-01-01 | End: 2021-01-01

## 2021-01-01 RX ORDER — ACETAMINOPHEN 325 MG/1
650 TABLET ORAL EVERY 6 HOURS PRN
Status: DISCONTINUED | OUTPATIENT
Start: 2021-01-01 | End: 2021-01-01 | Stop reason: HOSPADM

## 2021-01-01 RX ORDER — FERROUS SULFATE 325(65) MG
325 TABLET ORAL 2 TIMES DAILY WITH MEALS
Qty: 30 TABLET | Refills: 3 | DISCHARGE
Start: 2021-01-01

## 2021-01-01 RX ORDER — MAGNESIUM SULFATE IN WATER 40 MG/ML
2000 INJECTION, SOLUTION INTRAVENOUS ONCE
Status: COMPLETED | OUTPATIENT
Start: 2021-01-01 | End: 2021-01-01

## 2021-01-01 RX ORDER — GABAPENTIN 100 MG/1
200 CAPSULE ORAL 3 TIMES DAILY
Status: DISCONTINUED | OUTPATIENT
Start: 2021-01-01 | End: 2021-01-01 | Stop reason: HOSPADM

## 2021-01-01 RX ORDER — SODIUM CHLORIDE 0.9 % (FLUSH) 0.9 %
10 SYRINGE (ML) INJECTION PRN
Status: DISCONTINUED | OUTPATIENT
Start: 2021-01-01 | End: 2021-01-01 | Stop reason: HOSPADM

## 2021-01-01 RX ORDER — MORPHINE SULFATE 15 MG/1
15 TABLET, FILM COATED, EXTENDED RELEASE ORAL EVERY 12 HOURS SCHEDULED
Status: DISCONTINUED | OUTPATIENT
Start: 2021-01-01 | End: 2021-01-01 | Stop reason: HOSPADM

## 2021-01-01 RX ORDER — HYDROCODONE BITARTRATE AND ACETAMINOPHEN 10; 325 MG/1; MG/1
1 TABLET ORAL EVERY 6 HOURS PRN
Status: DISCONTINUED | OUTPATIENT
Start: 2021-01-01 | End: 2021-01-01 | Stop reason: HOSPADM

## 2021-01-01 RX ORDER — LACTULOSE 10 G/15ML
20 SOLUTION ORAL 3 TIMES DAILY
Status: CANCELLED | OUTPATIENT
Start: 2021-01-01

## 2021-01-01 RX ORDER — MAGNESIUM 200 MG
200 TABLET ORAL DAILY
COMMUNITY

## 2021-01-01 RX ORDER — ONDANSETRON 2 MG/ML
4 INJECTION INTRAMUSCULAR; INTRAVENOUS EVERY 6 HOURS PRN
Status: DISCONTINUED | OUTPATIENT
Start: 2021-01-01 | End: 2021-01-01

## 2021-01-01 RX ORDER — SODIUM CHLORIDE 9 MG/ML
INJECTION, SOLUTION INTRAVENOUS CONTINUOUS
Status: DISCONTINUED | OUTPATIENT
Start: 2021-01-01 | End: 2021-01-01 | Stop reason: HOSPADM

## 2021-01-01 RX ORDER — ALLOPURINOL 300 MG/1
300 TABLET ORAL DAILY
Status: DISCONTINUED | OUTPATIENT
Start: 2021-01-01 | End: 2021-01-01 | Stop reason: HOSPADM

## 2021-01-01 RX ORDER — LOPERAMIDE HYDROCHLORIDE 2 MG/1
2 CAPSULE ORAL 4 TIMES DAILY PRN
Status: DISCONTINUED | OUTPATIENT
Start: 2021-01-01 | End: 2021-01-01 | Stop reason: HOSPADM

## 2021-01-01 RX ORDER — LIDOCAINE HYDROCHLORIDE 20 MG/ML
INJECTION, SOLUTION INTRAVENOUS DAILY PRN
Status: COMPLETED | OUTPATIENT
Start: 2021-01-01 | End: 2021-01-01

## 2021-01-01 RX ORDER — LABETALOL HYDROCHLORIDE 5 MG/ML
5 INJECTION, SOLUTION INTRAVENOUS EVERY 10 MIN PRN
Status: DISCONTINUED | OUTPATIENT
Start: 2021-01-01 | End: 2021-01-01 | Stop reason: HOSPADM

## 2021-01-01 RX ORDER — SODIUM CHLORIDE 0.9 % (FLUSH) 0.9 %
10 SYRINGE (ML) INJECTION PRN
Status: DISCONTINUED | OUTPATIENT
Start: 2021-01-01 | End: 2021-01-01 | Stop reason: SDUPTHER

## 2021-01-01 RX ORDER — INSULIN GLARGINE 100 [IU]/ML
22 INJECTION, SOLUTION SUBCUTANEOUS 2 TIMES DAILY
Status: DISCONTINUED | OUTPATIENT
Start: 2021-01-01 | End: 2021-01-01 | Stop reason: HOSPADM

## 2021-01-01 RX ORDER — SODIUM CHLORIDE, SODIUM LACTATE, POTASSIUM CHLORIDE, CALCIUM CHLORIDE 600; 310; 30; 20 MG/100ML; MG/100ML; MG/100ML; MG/100ML
INJECTION, SOLUTION INTRAVENOUS CONTINUOUS
Status: DISCONTINUED | OUTPATIENT
Start: 2021-01-01 | End: 2021-01-01

## 2021-01-01 RX ORDER — ASPIRIN 81 MG/1
81 TABLET ORAL DAILY
Status: CANCELLED | OUTPATIENT
Start: 2021-01-01

## 2021-01-01 RX ORDER — 0.9 % SODIUM CHLORIDE 0.9 %
500 INTRAVENOUS SOLUTION INTRAVENOUS ONCE
Status: COMPLETED | OUTPATIENT
Start: 2021-01-01 | End: 2021-01-01

## 2021-01-01 RX ORDER — ATORVASTATIN CALCIUM 10 MG/1
10 TABLET, FILM COATED ORAL DAILY
Status: DISCONTINUED | OUTPATIENT
Start: 2021-01-01 | End: 2021-01-01 | Stop reason: HOSPADM

## 2021-01-01 RX ORDER — LACTULOSE 10 G/15ML
20 SOLUTION ORAL 3 TIMES DAILY
Status: DISCONTINUED | OUTPATIENT
Start: 2021-01-01 | End: 2021-01-01 | Stop reason: HOSPADM

## 2021-01-01 RX ORDER — ONDANSETRON 2 MG/ML
4 INJECTION INTRAMUSCULAR; INTRAVENOUS EVERY 6 HOURS PRN
Status: CANCELLED | OUTPATIENT
Start: 2021-01-01

## 2021-01-01 RX ORDER — MORPHINE SULFATE 10 MG/1
10 CAPSULE, EXTENDED RELEASE ORAL 2 TIMES DAILY
Qty: 6 CAPSULE | Refills: 0 | Status: SHIPPED | OUTPATIENT
Start: 2021-01-01 | End: 2021-01-01

## 2021-01-01 RX ORDER — HYDROCODONE BITARTRATE AND ACETAMINOPHEN 10; 325 MG/15ML; MG/15ML
5 SOLUTION ORAL EVERY 6 HOURS PRN
COMMUNITY
End: 2021-01-01 | Stop reason: ALTCHOICE

## 2021-01-01 RX ORDER — LIDOCAINE HYDROCHLORIDE ANHYDROUS AND DEXTROSE MONOHYDRATE .4; 5 G/100ML; G/100ML
1 INJECTION, SOLUTION INTRAVENOUS CONTINUOUS
Status: DISCONTINUED | OUTPATIENT
Start: 2021-01-01 | End: 2021-01-01

## 2021-01-01 RX ORDER — PANTOPRAZOLE SODIUM 40 MG/1
40 TABLET, DELAYED RELEASE ORAL
Qty: 30 TABLET | Refills: 3 | Status: SHIPPED | OUTPATIENT
Start: 2021-01-01

## 2021-01-01 RX ORDER — MORPHINE SULFATE 2 MG/ML
2 INJECTION, SOLUTION INTRAMUSCULAR; INTRAVENOUS EVERY 4 HOURS PRN
Status: DISCONTINUED | OUTPATIENT
Start: 2021-01-01 | End: 2021-01-01

## 2021-01-01 RX ORDER — FERROUS SULFATE 325(65) MG
325 TABLET ORAL 2 TIMES DAILY WITH MEALS
Status: CANCELLED | OUTPATIENT
Start: 2021-01-01

## 2021-01-01 RX ORDER — LABETALOL HYDROCHLORIDE 5 MG/ML
10 INJECTION, SOLUTION INTRAVENOUS EVERY 4 HOURS PRN
Status: DISCONTINUED | OUTPATIENT
Start: 2021-01-01 | End: 2021-01-01 | Stop reason: HOSPADM

## 2021-01-01 RX ORDER — MEPERIDINE HYDROCHLORIDE 25 MG/ML
12.5 INJECTION INTRAMUSCULAR; INTRAVENOUS; SUBCUTANEOUS EVERY 5 MIN PRN
Status: DISCONTINUED | OUTPATIENT
Start: 2021-01-01 | End: 2021-01-01 | Stop reason: HOSPADM

## 2021-01-01 RX ORDER — GABAPENTIN 100 MG/1
200 CAPSULE ORAL 3 TIMES DAILY
Status: CANCELLED | OUTPATIENT
Start: 2021-01-01

## 2021-01-01 RX ORDER — SODIUM CHLORIDE, SODIUM LACTATE, POTASSIUM CHLORIDE, AND CALCIUM CHLORIDE .6; .31; .03; .02 G/100ML; G/100ML; G/100ML; G/100ML
30 INJECTION, SOLUTION INTRAVENOUS ONCE
Status: COMPLETED | OUTPATIENT
Start: 2021-01-01 | End: 2021-01-01

## 2021-01-01 RX ORDER — ARFORMOTEROL TARTRATE 15 UG/2ML
15 SOLUTION RESPIRATORY (INHALATION) 2 TIMES DAILY
Status: DISCONTINUED | OUTPATIENT
Start: 2021-01-01 | End: 2021-01-01 | Stop reason: HOSPADM

## 2021-01-01 RX ORDER — KETAMINE HYDROCHLORIDE 10 MG/ML
INJECTION, SOLUTION INTRAMUSCULAR; INTRAVENOUS
Status: COMPLETED
Start: 2021-01-01 | End: 2021-01-01

## 2021-01-01 RX ORDER — SODIUM CHLORIDE 9 MG/ML
10 INJECTION INTRAVENOUS 2 TIMES DAILY
Status: DISCONTINUED | OUTPATIENT
Start: 2021-01-01 | End: 2021-12-02 | Stop reason: HOSPADM

## 2021-01-01 RX ORDER — SODIUM CHLORIDE 0.9 % (FLUSH) 0.9 %
10 SYRINGE (ML) INJECTION EVERY 12 HOURS SCHEDULED
Status: DISCONTINUED | OUTPATIENT
Start: 2021-01-01 | End: 2021-01-01 | Stop reason: HOSPADM

## 2021-01-01 RX ORDER — CHOLECALCIFEROL (VITAMIN D3) 50 MCG
2000 TABLET ORAL DAILY
Status: DISCONTINUED | OUTPATIENT
Start: 2021-01-01 | End: 2021-01-01 | Stop reason: HOSPADM

## 2021-01-01 RX ORDER — ISOSORBIDE MONONITRATE 60 MG/1
120 TABLET, EXTENDED RELEASE ORAL DAILY
Status: DISCONTINUED | OUTPATIENT
Start: 2021-01-01 | End: 2021-01-01 | Stop reason: HOSPADM

## 2021-01-01 RX ORDER — POLYETHYLENE GLYCOL 3350 17 G/17G
17 POWDER, FOR SOLUTION ORAL DAILY PRN
Status: DISCONTINUED | OUTPATIENT
Start: 2021-01-01 | End: 2021-01-01 | Stop reason: HOSPADM

## 2021-01-01 RX ORDER — FENTANYL CITRATE 50 UG/ML
50 INJECTION, SOLUTION INTRAMUSCULAR; INTRAVENOUS EVERY 5 MIN PRN
Status: DISCONTINUED | OUTPATIENT
Start: 2021-01-01 | End: 2021-01-01

## 2021-01-01 RX ORDER — BUDESONIDE 0.25 MG/2ML
0.25 INHALANT ORAL 2 TIMES DAILY
Status: DISCONTINUED | OUTPATIENT
Start: 2021-01-01 | End: 2021-01-01 | Stop reason: HOSPADM

## 2021-01-01 RX ORDER — DEXTROSE MONOHYDRATE 25 G/50ML
50 INJECTION, SOLUTION INTRAVENOUS ONCE
Status: COMPLETED | OUTPATIENT
Start: 2021-01-01 | End: 2021-01-01

## 2021-01-01 RX ORDER — DEXTROSE, SODIUM CHLORIDE, AND POTASSIUM CHLORIDE 5; .45; .15 G/100ML; G/100ML; G/100ML
INJECTION INTRAVENOUS CONTINUOUS PRN
Status: DISCONTINUED | OUTPATIENT
Start: 2021-01-01 | End: 2021-01-01

## 2021-01-01 RX ORDER — HYDROCODONE BITARTRATE AND ACETAMINOPHEN 10; 325 MG/1; MG/1
1 TABLET ORAL EVERY 6 HOURS PRN
Status: ON HOLD | COMMUNITY
End: 2021-01-01 | Stop reason: SDUPTHER

## 2021-01-01 RX ORDER — NICOTINE POLACRILEX 4 MG
15 LOZENGE BUCCAL PRN
Status: DISCONTINUED | OUTPATIENT
Start: 2021-01-01 | End: 2021-01-01 | Stop reason: HOSPADM

## 2021-01-01 RX ORDER — DEXTROSE MONOHYDRATE 50 MG/ML
100 INJECTION, SOLUTION INTRAVENOUS PRN
Status: DISCONTINUED | OUTPATIENT
Start: 2021-01-01 | End: 2021-01-01 | Stop reason: HOSPADM

## 2021-01-01 RX ORDER — FENTANYL CITRATE 50 UG/ML
25 INJECTION, SOLUTION INTRAMUSCULAR; INTRAVENOUS
Status: DISCONTINUED | OUTPATIENT
Start: 2021-01-01 | End: 2021-12-02 | Stop reason: HOSPADM

## 2021-01-01 RX ORDER — DEXTROSE MONOHYDRATE 25 G/50ML
12.5 INJECTION, SOLUTION INTRAVENOUS PRN
Status: DISCONTINUED | OUTPATIENT
Start: 2021-01-01 | End: 2021-01-01 | Stop reason: HOSPADM

## 2021-01-01 RX ORDER — ACETYLCYSTEINE 100 MG/ML
4 SOLUTION ORAL; RESPIRATORY (INHALATION) EVERY 6 HOURS
Status: DISCONTINUED | OUTPATIENT
Start: 2021-01-01 | End: 2021-01-01 | Stop reason: HOSPADM

## 2021-01-01 RX ORDER — PANTOPRAZOLE SODIUM 40 MG/10ML
40 INJECTION, POWDER, LYOPHILIZED, FOR SOLUTION INTRAVENOUS DAILY
Status: DISCONTINUED | OUTPATIENT
Start: 2021-01-01 | End: 2021-01-01 | Stop reason: SDUPTHER

## 2021-01-01 RX ORDER — MORPHINE SULFATE 10 MG/1
10 CAPSULE, EXTENDED RELEASE ORAL 2 TIMES DAILY
Qty: 10 CAPSULE | Refills: 0 | Status: SHIPPED | OUTPATIENT
Start: 2021-01-01 | End: 2021-01-01

## 2021-01-01 RX ORDER — SODIUM CHLORIDE 9 MG/ML
INJECTION, SOLUTION INTRAVENOUS CONTINUOUS PRN
Status: DISCONTINUED | OUTPATIENT
Start: 2021-01-01 | End: 2021-01-01 | Stop reason: SDUPTHER

## 2021-01-01 RX ORDER — ALLOPURINOL 300 MG/1
300 TABLET ORAL DAILY
Status: CANCELLED | OUTPATIENT
Start: 2021-01-01

## 2021-01-01 RX ORDER — POTASSIUM CHLORIDE 20 MEQ/1
40 TABLET, EXTENDED RELEASE ORAL DAILY
Qty: 60 TABLET | Refills: 3 | DISCHARGE
Start: 2021-01-01

## 2021-01-01 RX ORDER — DULOXETIN HYDROCHLORIDE 60 MG/1
60 CAPSULE, DELAYED RELEASE ORAL DAILY
Status: DISCONTINUED | OUTPATIENT
Start: 2021-01-01 | End: 2021-01-01 | Stop reason: HOSPADM

## 2021-01-01 RX ORDER — 0.9 % SODIUM CHLORIDE 0.9 %
500 INTRAVENOUS SOLUTION INTRAVENOUS ONCE
Status: DISCONTINUED | OUTPATIENT
Start: 2021-01-01 | End: 2021-12-02 | Stop reason: HOSPADM

## 2021-01-01 RX ORDER — LABETALOL HYDROCHLORIDE 5 MG/ML
INJECTION, SOLUTION INTRAVENOUS PRN
Status: DISCONTINUED | OUTPATIENT
Start: 2021-01-01 | End: 2021-01-01 | Stop reason: SDUPTHER

## 2021-01-01 RX ORDER — DEXTROSE AND SODIUM CHLORIDE 5; .45 G/100ML; G/100ML
INJECTION, SOLUTION INTRAVENOUS CONTINUOUS PRN
Status: DISCONTINUED | OUTPATIENT
Start: 2021-01-01 | End: 2021-12-02 | Stop reason: HOSPADM

## 2021-01-01 RX ORDER — HYDRALAZINE HYDROCHLORIDE 10 MG/1
10 TABLET, FILM COATED ORAL EVERY 8 HOURS SCHEDULED
Status: DISCONTINUED | OUTPATIENT
Start: 2021-01-01 | End: 2021-01-01 | Stop reason: HOSPADM

## 2021-01-01 RX ORDER — HYDRALAZINE HYDROCHLORIDE 50 MG/1
50 TABLET, FILM COATED ORAL EVERY 8 HOURS SCHEDULED
Status: DISCONTINUED | OUTPATIENT
Start: 2021-01-01 | End: 2021-01-01 | Stop reason: HOSPADM

## 2021-01-01 RX ORDER — SODIUM CHLORIDE 450 MG/100ML
INJECTION, SOLUTION INTRAVENOUS CONTINUOUS
Status: DISCONTINUED | OUTPATIENT
Start: 2021-01-01 | End: 2021-01-01

## 2021-01-01 RX ORDER — BUMETANIDE 1 MG/1
1 TABLET ORAL DAILY
Status: DISCONTINUED | OUTPATIENT
Start: 2021-01-01 | End: 2021-01-01

## 2021-01-01 RX ORDER — EPINEPHRINE 0.1 MG/ML
SYRINGE (ML) INJECTION DAILY PRN
Status: COMPLETED | OUTPATIENT
Start: 2021-01-01 | End: 2021-01-01

## 2021-01-01 RX ORDER — SODIUM CHLORIDE 0.9 % (FLUSH) 0.9 %
10 SYRINGE (ML) INJECTION 2 TIMES DAILY
Status: DISCONTINUED | OUTPATIENT
Start: 2021-01-01 | End: 2021-01-01 | Stop reason: HOSPADM

## 2021-01-01 RX ORDER — GLYCOPYRROLATE 1 MG/5 ML
SYRINGE (ML) INTRAVENOUS PRN
Status: DISCONTINUED | OUTPATIENT
Start: 2021-01-01 | End: 2021-01-01 | Stop reason: SDUPTHER

## 2021-01-01 RX ORDER — SODIUM CHLORIDE 9 MG/ML
INJECTION, SOLUTION INTRAVENOUS CONTINUOUS
Status: DISCONTINUED | OUTPATIENT
Start: 2021-01-01 | End: 2021-01-01

## 2021-01-01 RX ORDER — FERROUS SULFATE 325(65) MG
325 TABLET ORAL 2 TIMES DAILY WITH MEALS
Status: DISCONTINUED | OUTPATIENT
Start: 2021-01-01 | End: 2021-01-01 | Stop reason: HOSPADM

## 2021-01-01 RX ORDER — LINEZOLID 600 MG/1
600 TABLET, FILM COATED ORAL EVERY 12 HOURS SCHEDULED
Status: DISCONTINUED | OUTPATIENT
Start: 2021-01-01 | End: 2021-01-01

## 2021-01-01 RX ORDER — AMIODARONE HYDROCHLORIDE 50 MG/ML
INJECTION, SOLUTION INTRAVENOUS DAILY PRN
Status: COMPLETED | OUTPATIENT
Start: 2021-01-01 | End: 2021-01-01

## 2021-01-01 RX ORDER — ATORVASTATIN CALCIUM 10 MG/1
10 TABLET, FILM COATED ORAL DAILY
Qty: 30 TABLET | Refills: 3 | DISCHARGE
Start: 2021-01-01

## 2021-01-01 RX ORDER — PROMETHAZINE HYDROCHLORIDE 25 MG/ML
25 INJECTION, SOLUTION INTRAMUSCULAR; INTRAVENOUS PRN
Status: DISCONTINUED | OUTPATIENT
Start: 2021-01-01 | End: 2021-01-01 | Stop reason: HOSPADM

## 2021-01-01 RX ORDER — BUMETANIDE 1 MG/1
1 TABLET ORAL DAILY
Status: ON HOLD | COMMUNITY
End: 2021-01-01 | Stop reason: HOSPADM

## 2021-01-01 RX ORDER — ISOSORBIDE MONONITRATE 30 MG/1
120 TABLET, EXTENDED RELEASE ORAL DAILY
Status: DISCONTINUED | OUTPATIENT
Start: 2021-01-01 | End: 2021-01-01 | Stop reason: HOSPADM

## 2021-01-01 RX ORDER — FENTANYL CITRATE 50 UG/ML
100 INJECTION, SOLUTION INTRAMUSCULAR; INTRAVENOUS
Status: DISCONTINUED | OUTPATIENT
Start: 2021-01-01 | End: 2021-01-01

## 2021-01-01 RX ORDER — NICOTINE POLACRILEX 4 MG
15 LOZENGE BUCCAL PRN
Status: DISCONTINUED | OUTPATIENT
Start: 2021-01-01 | End: 2021-12-02 | Stop reason: HOSPADM

## 2021-01-01 RX ORDER — MAGNESIUM SULFATE 1 G/100ML
1000 INJECTION INTRAVENOUS PRN
Status: DISCONTINUED | OUTPATIENT
Start: 2021-01-01 | End: 2021-12-02 | Stop reason: HOSPADM

## 2021-01-01 RX ORDER — SODIUM CHLORIDE 9 MG/ML
INJECTION, SOLUTION INTRAVENOUS PRN
Status: DISCONTINUED | OUTPATIENT
Start: 2021-01-01 | End: 2021-01-01 | Stop reason: HOSPADM

## 2021-01-01 RX ORDER — HYDROCODONE BITARTRATE AND ACETAMINOPHEN 7.5; 325 MG/1; MG/1
1 TABLET ORAL EVERY 6 HOURS PRN
Status: DISCONTINUED | OUTPATIENT
Start: 2021-01-01 | End: 2021-01-01 | Stop reason: HOSPADM

## 2021-01-01 RX ORDER — POTASSIUM CHLORIDE 20 MEQ/1
40 TABLET, EXTENDED RELEASE ORAL DAILY
Status: DISCONTINUED | OUTPATIENT
Start: 2021-01-01 | End: 2021-01-01

## 2021-01-01 RX ORDER — MORPHINE SULFATE 2 MG/ML
2 INJECTION, SOLUTION INTRAMUSCULAR; INTRAVENOUS
Status: DISCONTINUED | OUTPATIENT
Start: 2021-01-01 | End: 2021-01-01 | Stop reason: HOSPADM

## 2021-01-01 RX ORDER — ACETAMINOPHEN 650 MG/1
650 SUPPOSITORY RECTAL EVERY 6 HOURS PRN
Status: DISCONTINUED | OUTPATIENT
Start: 2021-01-01 | End: 2021-12-02 | Stop reason: HOSPADM

## 2021-01-01 RX ORDER — SUCRALFATE 1 G/1
1 TABLET ORAL 4 TIMES DAILY
Qty: 120 TABLET | Refills: 3 | Status: SHIPPED | OUTPATIENT
Start: 2021-01-01 | End: 2021-01-01 | Stop reason: ALTCHOICE

## 2021-01-01 RX ORDER — BUMETANIDE 0.25 MG/ML
2 INJECTION, SOLUTION INTRAMUSCULAR; INTRAVENOUS ONCE
Status: COMPLETED | OUTPATIENT
Start: 2021-01-01 | End: 2021-01-01

## 2021-01-01 RX ORDER — ANALGESIC BALM 1.74; 4.06 G/29G; G/29G
OINTMENT TOPICAL PRN
Status: DISCONTINUED | OUTPATIENT
Start: 2021-01-01 | End: 2021-01-01 | Stop reason: HOSPADM

## 2021-01-01 RX ORDER — SODIUM CHLORIDE 0.9 % (FLUSH) 0.9 %
10 SYRINGE (ML) INJECTION PRN
Status: DISCONTINUED | OUTPATIENT
Start: 2021-01-01 | End: 2021-01-01

## 2021-01-01 RX ORDER — HEPARIN SODIUM 10000 [USP'U]/ML
5000 INJECTION, SOLUTION INTRAVENOUS; SUBCUTANEOUS EVERY 12 HOURS
DISCHARGE
Start: 2021-01-01

## 2021-01-01 RX ORDER — ONDANSETRON 4 MG/1
4 TABLET, ORALLY DISINTEGRATING ORAL EVERY 8 HOURS PRN
Status: DISCONTINUED | OUTPATIENT
Start: 2021-01-01 | End: 2021-01-01

## 2021-01-01 RX ORDER — HYDRALAZINE HYDROCHLORIDE 20 MG/ML
10 INJECTION INTRAMUSCULAR; INTRAVENOUS EVERY 6 HOURS PRN
Status: DISCONTINUED | OUTPATIENT
Start: 2021-01-01 | End: 2021-01-01 | Stop reason: HOSPADM

## 2021-01-01 RX ORDER — ARFORMOTEROL TARTRATE 15 UG/2ML
15 SOLUTION RESPIRATORY (INHALATION) 2 TIMES DAILY
Status: CANCELLED | OUTPATIENT
Start: 2021-01-01

## 2021-01-01 RX ORDER — HEPARIN SODIUM 10000 [USP'U]/ML
5000 INJECTION, SOLUTION INTRAVENOUS; SUBCUTANEOUS EVERY 8 HOURS SCHEDULED
Status: DISCONTINUED | OUTPATIENT
Start: 2021-01-01 | End: 2021-01-01 | Stop reason: HOSPADM

## 2021-01-01 RX ORDER — HEPARIN SODIUM (PORCINE) LOCK FLUSH IV SOLN 100 UNIT/ML 100 UNIT/ML
3 SOLUTION INTRAVENOUS PRN
Status: DISCONTINUED | OUTPATIENT
Start: 2021-01-01 | End: 2021-01-01 | Stop reason: HOSPADM

## 2021-01-01 RX ORDER — IBUPROFEN 400 MG/1
400 TABLET ORAL ONCE
Status: COMPLETED | OUTPATIENT
Start: 2021-01-01 | End: 2021-01-01

## 2021-01-01 RX ORDER — LACTULOSE 10 G/15ML
20 SOLUTION ORAL
Status: DISCONTINUED | OUTPATIENT
Start: 2021-01-01 | End: 2021-01-01 | Stop reason: HOSPADM

## 2021-01-01 RX ORDER — POTASSIUM CHLORIDE 20 MEQ/1
40 TABLET, EXTENDED RELEASE ORAL DAILY
Status: ON HOLD | COMMUNITY
End: 2021-01-01 | Stop reason: HOSPADM

## 2021-01-01 RX ORDER — SODIUM CHLORIDE 9 MG/ML
25 INJECTION, SOLUTION INTRAVENOUS PRN
Status: DISCONTINUED | OUTPATIENT
Start: 2021-01-01 | End: 2021-01-01 | Stop reason: SDUPTHER

## 2021-01-01 RX ORDER — SODIUM CHLORIDE 0.9 % (FLUSH) 0.9 %
5-40 SYRINGE (ML) INJECTION PRN
Status: DISCONTINUED | OUTPATIENT
Start: 2021-01-01 | End: 2021-12-02 | Stop reason: HOSPADM

## 2021-01-01 RX ORDER — HEPARIN SODIUM 1000 [USP'U]/ML
INJECTION, SOLUTION INTRAVENOUS; SUBCUTANEOUS PRN
Status: DISCONTINUED | OUTPATIENT
Start: 2021-01-01 | End: 2021-01-01 | Stop reason: SDUPTHER

## 2021-01-01 RX ORDER — PANTOPRAZOLE SODIUM 40 MG/10ML
40 INJECTION, POWDER, LYOPHILIZED, FOR SOLUTION INTRAVENOUS 2 TIMES DAILY
Status: DISCONTINUED | OUTPATIENT
Start: 2021-01-01 | End: 2021-12-02 | Stop reason: HOSPADM

## 2021-01-01 RX ORDER — KETAMINE HYDROCHLORIDE 10 MG/ML
200 INJECTION, SOLUTION INTRAMUSCULAR; INTRAVENOUS ONCE
Status: COMPLETED | OUTPATIENT
Start: 2021-01-01 | End: 2021-01-01

## 2021-01-01 RX ORDER — ACETAMINOPHEN 325 MG/1
650 TABLET ORAL EVERY 6 HOURS PRN
Status: DISCONTINUED | OUTPATIENT
Start: 2021-01-01 | End: 2021-12-02 | Stop reason: HOSPADM

## 2021-01-01 RX ORDER — FUROSEMIDE 10 MG/ML
INJECTION INTRAMUSCULAR; INTRAVENOUS
Status: COMPLETED
Start: 2021-01-01 | End: 2021-01-01

## 2021-01-01 RX ORDER — HEPARIN SODIUM 10000 [USP'U]/ML
5000 INJECTION, SOLUTION INTRAVENOUS; SUBCUTANEOUS EVERY 12 HOURS
Status: DISCONTINUED | OUTPATIENT
Start: 2021-01-01 | End: 2021-01-01 | Stop reason: HOSPADM

## 2021-01-01 RX ORDER — CYANOCOBALAMIN 1000 UG/ML
1000 INJECTION INTRAMUSCULAR; SUBCUTANEOUS ONCE
COMMUNITY
End: 2021-01-01 | Stop reason: ALTCHOICE

## 2021-01-01 RX ORDER — FENTANYL CITRATE 50 UG/ML
INJECTION, SOLUTION INTRAMUSCULAR; INTRAVENOUS
Status: COMPLETED
Start: 2021-01-01 | End: 2021-01-01

## 2021-01-01 RX ORDER — SODIUM CHLORIDE 0.9 % (FLUSH) 0.9 %
10 SYRINGE (ML) INJECTION 2 TIMES DAILY
Status: DISCONTINUED | OUTPATIENT
Start: 2021-01-01 | End: 2021-01-01

## 2021-01-01 RX ORDER — MIRTAZAPINE 7.5 MG/1
7.5 TABLET, FILM COATED ORAL NIGHTLY
COMMUNITY

## 2021-01-01 RX ORDER — ACETYLCYSTEINE 100 MG/ML
4 SOLUTION ORAL; RESPIRATORY (INHALATION) EVERY 6 HOURS
DISCHARGE
Start: 2021-01-01

## 2021-01-01 RX ORDER — ONDANSETRON 4 MG/1
4 TABLET, ORALLY DISINTEGRATING ORAL EVERY 8 HOURS PRN
Status: DISCONTINUED | OUTPATIENT
Start: 2021-01-01 | End: 2021-01-01 | Stop reason: HOSPADM

## 2021-01-01 RX ORDER — POTASSIUM CHLORIDE 20 MEQ/1
40 TABLET, EXTENDED RELEASE ORAL ONCE
Status: DISCONTINUED | OUTPATIENT
Start: 2021-01-01 | End: 2021-01-01

## 2021-01-01 RX ORDER — MIDAZOLAM HYDROCHLORIDE 2 MG/2ML
2 INJECTION, SOLUTION INTRAMUSCULAR; INTRAVENOUS
Status: DISCONTINUED | OUTPATIENT
Start: 2021-01-01 | End: 2021-12-02 | Stop reason: HOSPADM

## 2021-01-01 RX ORDER — GABAPENTIN 100 MG/1
200 CAPSULE ORAL 4 TIMES DAILY
Status: DISCONTINUED | OUTPATIENT
Start: 2021-01-01 | End: 2021-01-01 | Stop reason: HOSPADM

## 2021-01-01 RX ORDER — BUMETANIDE 1 MG/1
1 TABLET ORAL DAILY
Status: DISCONTINUED | OUTPATIENT
Start: 2021-01-01 | End: 2021-01-01 | Stop reason: HOSPADM

## 2021-01-01 RX ORDER — SODIUM CHLORIDE 9 MG/ML
25 INJECTION, SOLUTION INTRAVENOUS PRN
Status: DISCONTINUED | OUTPATIENT
Start: 2021-01-01 | End: 2021-12-02 | Stop reason: HOSPADM

## 2021-01-01 RX ORDER — ACETAMINOPHEN 500 MG
1000 TABLET ORAL EVERY 6 HOURS
Status: DISCONTINUED | OUTPATIENT
Start: 2021-01-01 | End: 2021-01-01 | Stop reason: HOSPADM

## 2021-01-01 RX ORDER — SODIUM CHLORIDE 0.9 % (FLUSH) 0.9 %
5-40 SYRINGE (ML) INJECTION EVERY 12 HOURS SCHEDULED
Status: DISCONTINUED | OUTPATIENT
Start: 2021-01-01 | End: 2021-12-02 | Stop reason: HOSPADM

## 2021-01-01 RX ORDER — DEXAMETHASONE SODIUM PHOSPHATE 10 MG/ML
INJECTION INTRAMUSCULAR; INTRAVENOUS PRN
Status: DISCONTINUED | OUTPATIENT
Start: 2021-01-01 | End: 2021-01-01 | Stop reason: SDUPTHER

## 2021-01-01 RX ORDER — MAGNESIUM SULFATE 1 G/100ML
1000 INJECTION INTRAVENOUS ONCE
Status: COMPLETED | OUTPATIENT
Start: 2021-01-01 | End: 2021-01-01

## 2021-01-01 RX ORDER — SODIUM CHLORIDE 0.9 % (FLUSH) 0.9 %
10 SYRINGE (ML) INJECTION EVERY 12 HOURS SCHEDULED
Status: DISCONTINUED | OUTPATIENT
Start: 2021-01-01 | End: 2021-01-01 | Stop reason: SDUPTHER

## 2021-01-01 RX ORDER — ONDANSETRON 4 MG/1
4 TABLET, ORALLY DISINTEGRATING ORAL EVERY 8 HOURS PRN
Status: DISCONTINUED | OUTPATIENT
Start: 2021-01-01 | End: 2021-12-02 | Stop reason: HOSPADM

## 2021-01-01 RX ORDER — INSULIN GLARGINE 100 [IU]/ML
10 INJECTION, SOLUTION SUBCUTANEOUS DAILY
Status: DISCONTINUED | OUTPATIENT
Start: 2021-01-01 | End: 2021-12-02 | Stop reason: HOSPADM

## 2021-01-01 RX ORDER — MIRTAZAPINE 15 MG/1
7.5 TABLET, FILM COATED ORAL NIGHTLY
Status: DISCONTINUED | OUTPATIENT
Start: 2021-01-01 | End: 2021-01-01 | Stop reason: HOSPADM

## 2021-01-01 RX ORDER — BUDESONIDE 0.25 MG/2ML
250 INHALANT ORAL 2 TIMES DAILY
Status: DISCONTINUED | OUTPATIENT
Start: 2021-01-01 | End: 2021-01-01 | Stop reason: HOSPADM

## 2021-01-01 RX ORDER — HYDROXYZINE PAMOATE 25 MG/1
25 CAPSULE ORAL 4 TIMES DAILY
Status: DISCONTINUED | OUTPATIENT
Start: 2021-01-01 | End: 2021-01-01 | Stop reason: HOSPADM

## 2021-01-01 RX ORDER — GABAPENTIN 100 MG/1
200 CAPSULE ORAL 4 TIMES DAILY
Qty: 90 CAPSULE | Refills: 0 | DISCHARGE
Start: 2021-01-01 | End: 2021-12-06

## 2021-01-01 RX ORDER — HYDROCODONE BITARTRATE AND ACETAMINOPHEN 10; 325 MG/1; MG/1
1 TABLET ORAL EVERY 6 HOURS PRN
Qty: 9 TABLET | Refills: 0 | Status: SHIPPED | OUTPATIENT
Start: 2021-01-01 | End: 2021-01-01

## 2021-01-01 RX ORDER — 0.9 % SODIUM CHLORIDE 0.9 %
1000 INTRAVENOUS SOLUTION INTRAVENOUS ONCE
Status: COMPLETED | OUTPATIENT
Start: 2021-01-01 | End: 2021-01-01

## 2021-01-01 RX ORDER — CALCIUM CARBONATE/VITAMIN D3 500-10/5ML
200 LIQUID (ML) ORAL DAILY
COMMUNITY
End: 2021-01-01 | Stop reason: ALTCHOICE

## 2021-01-01 RX ORDER — BUMETANIDE 1 MG/1
1 TABLET ORAL 2 TIMES DAILY
Qty: 30 TABLET | Refills: 3 | DISCHARGE
Start: 2021-01-01

## 2021-01-01 RX ORDER — DEXTROSE MONOHYDRATE 25 G/50ML
25 INJECTION, SOLUTION INTRAVENOUS ONCE
Status: COMPLETED | OUTPATIENT
Start: 2021-01-01 | End: 2021-01-01

## 2021-01-01 RX ORDER — PHENYLEPHRINE HCL IN 0.9% NACL 1 MG/10 ML
SYRINGE (ML) INTRAVENOUS PRN
Status: DISCONTINUED | OUTPATIENT
Start: 2021-01-01 | End: 2021-01-01 | Stop reason: SDUPTHER

## 2021-01-01 RX ORDER — HYDRALAZINE HYDROCHLORIDE 50 MG/1
50 TABLET, FILM COATED ORAL EVERY 8 HOURS SCHEDULED
Status: CANCELLED | OUTPATIENT
Start: 2021-01-01

## 2021-01-01 RX ORDER — ALBUTEROL SULFATE 2.5 MG/3ML
2.5 SOLUTION RESPIRATORY (INHALATION) EVERY 6 HOURS PRN
Status: CANCELLED | OUTPATIENT
Start: 2021-01-01

## 2021-01-01 RX ORDER — MORPHINE SULFATE 2 MG/ML
2 INJECTION, SOLUTION INTRAMUSCULAR; INTRAVENOUS EVERY 4 HOURS PRN
Status: DISCONTINUED | OUTPATIENT
Start: 2021-01-01 | End: 2021-01-01 | Stop reason: HOSPADM

## 2021-01-01 RX ORDER — CALCIUM CHLORIDE 100 MG/ML
INJECTION INTRAVENOUS; INTRAVENTRICULAR DAILY PRN
Status: COMPLETED | OUTPATIENT
Start: 2021-01-01 | End: 2021-01-01

## 2021-01-01 RX ORDER — ALBUMIN, HUMAN INJ 5% 5 %
SOLUTION INTRAVENOUS PRN
Status: DISCONTINUED | OUTPATIENT
Start: 2021-01-01 | End: 2021-01-01 | Stop reason: SDUPTHER

## 2021-01-01 RX ORDER — FLUTICASONE PROPIONATE 50 MCG
1 SPRAY, SUSPENSION (ML) NASAL DAILY
Status: DISCONTINUED | OUTPATIENT
Start: 2021-01-01 | End: 2021-01-01 | Stop reason: HOSPADM

## 2021-01-01 RX ORDER — BUMETANIDE 1 MG/1
1 TABLET ORAL 2 TIMES DAILY
Status: DISCONTINUED | OUTPATIENT
Start: 2021-01-01 | End: 2021-01-01 | Stop reason: HOSPADM

## 2021-01-01 RX ORDER — EPINEPHRINE 1 MG/ML
INJECTION, SOLUTION, CONCENTRATE INTRAVENOUS DAILY PRN
Status: COMPLETED | OUTPATIENT
Start: 2021-01-01 | End: 2021-01-01

## 2021-01-01 RX ORDER — ALBUMIN (HUMAN) 12.5 G/50ML
25 SOLUTION INTRAVENOUS ONCE
Status: COMPLETED | OUTPATIENT
Start: 2021-01-01 | End: 2021-01-01

## 2021-01-01 RX ORDER — AZELASTINE 1 MG/ML
1 SPRAY, METERED NASAL 2 TIMES DAILY
Status: DISCONTINUED | OUTPATIENT
Start: 2021-01-01 | End: 2021-01-01

## 2021-01-01 RX ORDER — MEPERIDINE HYDROCHLORIDE 25 MG/ML
12.5 INJECTION INTRAMUSCULAR; INTRAVENOUS; SUBCUTANEOUS EVERY 5 MIN PRN
Status: DISCONTINUED | OUTPATIENT
Start: 2021-01-01 | End: 2021-01-01

## 2021-01-01 RX ORDER — CHLORHEXIDINE GLUCONATE 0.12 MG/ML
15 RINSE ORAL 2 TIMES DAILY
Status: DISCONTINUED | OUTPATIENT
Start: 2021-01-01 | End: 2021-12-02 | Stop reason: HOSPADM

## 2021-01-01 RX ORDER — DULOXETIN HYDROCHLORIDE 60 MG/1
60 CAPSULE, DELAYED RELEASE ORAL DAILY
Status: CANCELLED | OUTPATIENT
Start: 2021-01-01

## 2021-01-01 RX ORDER — ALBUTEROL SULFATE 2.5 MG/3ML
2.5 SOLUTION RESPIRATORY (INHALATION) EVERY 6 HOURS PRN
COMMUNITY

## 2021-01-01 RX ORDER — BUMETANIDE 0.25 MG/ML
1 INJECTION, SOLUTION INTRAMUSCULAR; INTRAVENOUS ONCE
Status: COMPLETED | OUTPATIENT
Start: 2021-01-01 | End: 2021-01-01

## 2021-01-01 RX ORDER — OXYCODONE HYDROCHLORIDE AND ACETAMINOPHEN 5; 325 MG/1; MG/1
1 TABLET ORAL
Status: DISCONTINUED | OUTPATIENT
Start: 2021-01-01 | End: 2021-01-01

## 2021-01-01 RX ORDER — BUMETANIDE 0.25 MG/ML
2 INJECTION, SOLUTION INTRAMUSCULAR; INTRAVENOUS ONCE
Status: DISCONTINUED | OUTPATIENT
Start: 2021-01-01 | End: 2021-01-01

## 2021-01-01 RX ORDER — HYDROXYZINE HYDROCHLORIDE 25 MG/1
25 TABLET, FILM COATED ORAL 4 TIMES DAILY
COMMUNITY

## 2021-01-01 RX ORDER — ONDANSETRON 2 MG/ML
4 INJECTION INTRAMUSCULAR; INTRAVENOUS EVERY 6 HOURS PRN
Status: DISCONTINUED | OUTPATIENT
Start: 2021-01-01 | End: 2021-01-01 | Stop reason: HOSPADM

## 2021-01-01 RX ORDER — INSULIN GLARGINE 100 [IU]/ML
22 INJECTION, SOLUTION SUBCUTANEOUS 2 TIMES DAILY
Qty: 1 VIAL | Refills: 3 | Status: SHIPPED | OUTPATIENT
Start: 2021-01-01 | End: 2021-01-01 | Stop reason: ALTCHOICE

## 2021-01-01 RX ORDER — ROCURONIUM BROMIDE 10 MG/ML
INJECTION, SOLUTION INTRAVENOUS PRN
Status: DISCONTINUED | OUTPATIENT
Start: 2021-01-01 | End: 2021-01-01 | Stop reason: SDUPTHER

## 2021-01-01 RX ORDER — DEXTROSE MONOHYDRATE 25 G/50ML
12.5 INJECTION, SOLUTION INTRAVENOUS PRN
Status: DISCONTINUED | OUTPATIENT
Start: 2021-01-01 | End: 2021-01-01 | Stop reason: SDUPTHER

## 2021-01-01 RX ORDER — POTASSIUM CHLORIDE 20 MEQ/1
40 TABLET, EXTENDED RELEASE ORAL DAILY
Status: DISCONTINUED | OUTPATIENT
Start: 2021-01-01 | End: 2021-01-01 | Stop reason: HOSPADM

## 2021-01-01 RX ORDER — HYDROCODONE BITARTRATE AND ACETAMINOPHEN 10; 325 MG/1; MG/1
1 TABLET ORAL EVERY 6 HOURS PRN
Status: DISCONTINUED | OUTPATIENT
Start: 2021-01-01 | End: 2021-01-01

## 2021-01-01 RX ORDER — PHENYLEPHRINE HYDROCHLORIDE 10 MG/ML
INJECTION INTRAVENOUS PRN
Status: DISCONTINUED | OUTPATIENT
Start: 2021-01-01 | End: 2021-01-01 | Stop reason: SDUPTHER

## 2021-01-01 RX ORDER — METOPROLOL SUCCINATE 25 MG/1
50 TABLET, EXTENDED RELEASE ORAL DAILY
Status: DISCONTINUED | OUTPATIENT
Start: 2021-01-01 | End: 2021-01-01 | Stop reason: HOSPADM

## 2021-01-01 RX ORDER — SODIUM CHLORIDE 9 MG/ML
25 INJECTION, SOLUTION INTRAVENOUS PRN
Status: DISCONTINUED | OUTPATIENT
Start: 2021-01-01 | End: 2021-01-01 | Stop reason: HOSPADM

## 2021-01-01 RX ORDER — MIDODRINE HYDROCHLORIDE 10 MG/1
10 TABLET ORAL
Status: DISCONTINUED | OUTPATIENT
Start: 2021-01-01 | End: 2021-12-02 | Stop reason: HOSPADM

## 2021-01-01 RX ORDER — LIDOCAINE HYDROCHLORIDE 40 MG/ML
SOLUTION TOPICAL ONCE
Status: DISCONTINUED | OUTPATIENT
Start: 2021-01-01 | End: 2021-01-01 | Stop reason: HOSPADM

## 2021-01-01 RX ORDER — FENTANYL CITRATE 50 UG/ML
25 INJECTION, SOLUTION INTRAMUSCULAR; INTRAVENOUS ONCE
Status: COMPLETED | OUTPATIENT
Start: 2021-01-01 | End: 2021-01-01

## 2021-01-01 RX ORDER — NEOSTIGMINE METHYLSULFATE 1 MG/ML
INJECTION, SOLUTION INTRAVENOUS PRN
Status: DISCONTINUED | OUTPATIENT
Start: 2021-01-01 | End: 2021-01-01 | Stop reason: SDUPTHER

## 2021-01-01 RX ORDER — DIPHENHYDRAMINE HCL 25 MG
25 CAPSULE ORAL DAILY PRN
COMMUNITY

## 2021-01-01 RX ORDER — HYDRALAZINE HYDROCHLORIDE 25 MG/1
25 TABLET, FILM COATED ORAL EVERY 8 HOURS SCHEDULED
Status: DISCONTINUED | OUTPATIENT
Start: 2021-01-01 | End: 2021-01-01

## 2021-01-01 RX ORDER — MORPHINE SULFATE 10 MG/1
10 CAPSULE, EXTENDED RELEASE ORAL 2 TIMES DAILY
COMMUNITY

## 2021-01-01 RX ORDER — SODIUM CHLORIDE 9 MG/ML
INJECTION, SOLUTION INTRAVENOUS CONTINUOUS
Status: DISCONTINUED | OUTPATIENT
Start: 2021-01-01 | End: 2021-12-02 | Stop reason: HOSPADM

## 2021-01-01 RX ORDER — LACTULOSE 20 G/30ML
20 SOLUTION ORAL
COMMUNITY

## 2021-01-01 RX ORDER — HYDROCODONE BITARTRATE AND ACETAMINOPHEN 10; 325 MG/1; MG/1
1 TABLET ORAL EVERY 6 HOURS PRN
Qty: 12 TABLET | Refills: 0 | Status: SHIPPED | OUTPATIENT
Start: 2021-01-01 | End: 2021-01-01

## 2021-01-01 RX ORDER — INSULIN LISPRO 100 [IU]/ML
0-6 INJECTION, SOLUTION INTRAVENOUS; SUBCUTANEOUS
COMMUNITY

## 2021-01-01 RX ORDER — POTASSIUM CHLORIDE 20 MEQ/1
20 TABLET, EXTENDED RELEASE ORAL ONCE
Status: COMPLETED | OUTPATIENT
Start: 2021-01-01 | End: 2021-01-01

## 2021-01-01 RX ORDER — HYDROCODONE BITARTRATE AND ACETAMINOPHEN 7.5; 325 MG/1; MG/1
1 TABLET ORAL EVERY 6 HOURS PRN
Status: DISCONTINUED | OUTPATIENT
Start: 2021-01-01 | End: 2021-01-01

## 2021-01-01 RX ORDER — DEXTROSE MONOHYDRATE 25 G/50ML
12.5 INJECTION, SOLUTION INTRAVENOUS PRN
Status: DISCONTINUED | OUTPATIENT
Start: 2021-01-01 | End: 2021-12-02 | Stop reason: HOSPADM

## 2021-01-01 RX ORDER — BUMETANIDE 0.25 MG/ML
1 INJECTION, SOLUTION INTRAMUSCULAR; INTRAVENOUS DAILY
Status: DISCONTINUED | OUTPATIENT
Start: 2021-01-01 | End: 2021-01-01

## 2021-01-01 RX ORDER — LIDOCAINE HYDROCHLORIDE 20 MG/ML
INJECTION, SOLUTION INTRAVENOUS PRN
Status: DISCONTINUED | OUTPATIENT
Start: 2021-01-01 | End: 2021-01-01 | Stop reason: SDUPTHER

## 2021-01-01 RX ORDER — BUDESONIDE 0.5 MG/2ML
0.5 INHALANT ORAL 2 TIMES DAILY
Status: DISCONTINUED | OUTPATIENT
Start: 2021-01-01 | End: 2021-12-02 | Stop reason: HOSPADM

## 2021-01-01 RX ORDER — ALBUMIN (HUMAN) 12.5 G/50ML
25 SOLUTION INTRAVENOUS ONCE
Status: DISCONTINUED | OUTPATIENT
Start: 2021-01-01 | End: 2021-12-02 | Stop reason: HOSPADM

## 2021-01-01 RX ORDER — KETAMINE HYDROCHLORIDE 100 MG/ML
INJECTION, SOLUTION INTRAMUSCULAR; INTRAVENOUS
Status: DISCONTINUED
Start: 2021-01-01 | End: 2021-01-01 | Stop reason: WASHOUT

## 2021-01-01 RX ORDER — DULOXETIN HYDROCHLORIDE 30 MG/1
30 CAPSULE, DELAYED RELEASE ORAL DAILY
Status: DISCONTINUED | OUTPATIENT
Start: 2021-01-01 | End: 2021-01-01

## 2021-01-01 RX ORDER — MORPHINE SULFATE 10 MG/1
10 CAPSULE, EXTENDED RELEASE ORAL 2 TIMES DAILY
Status: DISCONTINUED | OUTPATIENT
Start: 2021-01-01 | End: 2021-01-01

## 2021-01-01 RX ORDER — IPRATROPIUM BROMIDE AND ALBUTEROL SULFATE 2.5; .5 MG/3ML; MG/3ML
1 SOLUTION RESPIRATORY (INHALATION)
Status: DISCONTINUED | OUTPATIENT
Start: 2021-01-01 | End: 2021-12-02 | Stop reason: HOSPADM

## 2021-01-01 RX ORDER — FUROSEMIDE 10 MG/ML
20 INJECTION INTRAMUSCULAR; INTRAVENOUS ONCE
Status: DISCONTINUED | OUTPATIENT
Start: 2021-01-01 | End: 2021-01-01 | Stop reason: HOSPADM

## 2021-01-01 RX ORDER — ETOMIDATE 2 MG/ML
INJECTION INTRAVENOUS PRN
Status: DISCONTINUED | OUTPATIENT
Start: 2021-01-01 | End: 2021-01-01 | Stop reason: SDUPTHER

## 2021-01-01 RX ORDER — ACETAMINOPHEN 325 MG/1
650 TABLET ORAL EVERY 4 HOURS PRN
Status: DISCONTINUED | OUTPATIENT
Start: 2021-01-01 | End: 2021-01-01

## 2021-01-01 RX ORDER — SODIUM CHLORIDE 9 MG/ML
INJECTION, SOLUTION INTRAVENOUS EVERY 12 HOURS
Status: DISCONTINUED | OUTPATIENT
Start: 2021-01-01 | End: 2021-01-01 | Stop reason: HOSPADM

## 2021-01-01 RX ORDER — LOVASTATIN 20 MG/1
20 TABLET ORAL NIGHTLY
Status: DISCONTINUED | OUTPATIENT
Start: 2021-01-01 | End: 2021-01-01

## 2021-01-01 RX ORDER — ATORVASTATIN CALCIUM 10 MG/1
10 TABLET, FILM COATED ORAL NIGHTLY
Status: DISCONTINUED | OUTPATIENT
Start: 2021-01-01 | End: 2021-01-01 | Stop reason: HOSPADM

## 2021-01-01 RX ORDER — FENTANYL CITRATE 50 UG/ML
25 INJECTION, SOLUTION INTRAMUSCULAR; INTRAVENOUS EVERY 5 MIN PRN
Status: DISCONTINUED | OUTPATIENT
Start: 2021-01-01 | End: 2021-01-01

## 2021-01-01 RX ORDER — SUCRALFATE 1 G/1
1 TABLET ORAL 4 TIMES DAILY
Status: DISCONTINUED | OUTPATIENT
Start: 2021-01-01 | End: 2021-01-01 | Stop reason: HOSPADM

## 2021-01-01 RX ORDER — HYDRALAZINE HYDROCHLORIDE 50 MG/1
50 TABLET, FILM COATED ORAL EVERY 8 HOURS SCHEDULED
Qty: 90 TABLET | Refills: 3 | Status: SHIPPED | OUTPATIENT
Start: 2021-01-01

## 2021-01-01 RX ORDER — ONDANSETRON 2 MG/ML
4 INJECTION INTRAMUSCULAR; INTRAVENOUS
Status: DISCONTINUED | OUTPATIENT
Start: 2021-01-01 | End: 2021-01-01

## 2021-01-01 RX ORDER — HYDROCODONE BITARTRATE AND ACETAMINOPHEN 10; 325 MG/1; MG/1
1 TABLET ORAL EVERY 6 HOURS PRN
COMMUNITY

## 2021-01-01 RX ORDER — SODIUM BICARBONATE 650 MG/1
1300 TABLET ORAL 2 TIMES DAILY
Status: DISCONTINUED | OUTPATIENT
Start: 2021-01-01 | End: 2021-01-01 | Stop reason: HOSPADM

## 2021-01-01 RX ORDER — SUCRALFATE 1 G/1
1 TABLET ORAL EVERY 6 HOURS SCHEDULED
Status: DISCONTINUED | OUTPATIENT
Start: 2021-01-01 | End: 2021-01-01 | Stop reason: HOSPADM

## 2021-01-01 RX ORDER — SODIUM CHLORIDE, SODIUM LACTATE, POTASSIUM CHLORIDE, CALCIUM CHLORIDE 600; 310; 30; 20 MG/100ML; MG/100ML; MG/100ML; MG/100ML
INJECTION, SOLUTION INTRAVENOUS CONTINUOUS
Status: DISCONTINUED | OUTPATIENT
Start: 2021-01-01 | End: 2021-01-01 | Stop reason: HOSPADM

## 2021-01-01 RX ORDER — METOLAZONE 2.5 MG/1
2.5 TABLET ORAL
COMMUNITY

## 2021-01-01 RX ORDER — SODIUM POLYSTYRENE SULFONATE 15 G/60ML
15 SUSPENSION ORAL; RECTAL ONCE
Status: DISCONTINUED | OUTPATIENT
Start: 2021-01-01 | End: 2021-01-01

## 2021-01-01 RX ORDER — BUPIVACAINE HYDROCHLORIDE 5 MG/ML
INJECTION, SOLUTION EPIDURAL; INTRACAUDAL PRN
Status: DISCONTINUED | OUTPATIENT
Start: 2021-01-01 | End: 2021-01-01 | Stop reason: ALTCHOICE

## 2021-01-01 RX ORDER — SODIUM CHLORIDE, SODIUM LACTATE, POTASSIUM CHLORIDE, AND CALCIUM CHLORIDE .6; .31; .03; .02 G/100ML; G/100ML; G/100ML; G/100ML
1000 INJECTION, SOLUTION INTRAVENOUS ONCE
Status: COMPLETED | OUTPATIENT
Start: 2021-01-01 | End: 2021-01-01

## 2021-01-01 RX ORDER — MORPHINE SULFATE 4 MG/ML
4 INJECTION, SOLUTION INTRAMUSCULAR; INTRAVENOUS ONCE
Status: COMPLETED | OUTPATIENT
Start: 2021-01-01 | End: 2021-01-01

## 2021-01-01 RX ORDER — INSULIN GLARGINE 100 [IU]/ML
20 INJECTION, SOLUTION SUBCUTANEOUS 2 TIMES DAILY
Status: DISCONTINUED | OUTPATIENT
Start: 2021-01-01 | End: 2021-01-01 | Stop reason: HOSPADM

## 2021-01-01 RX ORDER — BUMETANIDE 0.25 MG/ML
1 INJECTION, SOLUTION INTRAMUSCULAR; INTRAVENOUS 2 TIMES DAILY
Status: DISCONTINUED | OUTPATIENT
Start: 2021-01-01 | End: 2021-01-01

## 2021-01-01 RX ORDER — CEFAZOLIN SODIUM 1 G/3ML
INJECTION, POWDER, FOR SOLUTION INTRAMUSCULAR; INTRAVENOUS PRN
Status: DISCONTINUED | OUTPATIENT
Start: 2021-01-01 | End: 2021-01-01 | Stop reason: SDUPTHER

## 2021-01-01 RX ORDER — BUDESONIDE 0.25 MG/2ML
250 INHALANT ORAL 2 TIMES DAILY
Status: CANCELLED | OUTPATIENT
Start: 2021-01-01

## 2021-01-01 RX ORDER — SUCRALFATE 1 G/1
1 TABLET ORAL 4 TIMES DAILY
COMMUNITY

## 2021-01-01 RX ORDER — POTASSIUM CHLORIDE 7.45 MG/ML
10 INJECTION INTRAVENOUS PRN
Status: DISCONTINUED | OUTPATIENT
Start: 2021-01-01 | End: 2021-12-02 | Stop reason: HOSPADM

## 2021-01-01 RX ORDER — LORAZEPAM 2 MG/ML
1 INJECTION INTRAMUSCULAR
Status: COMPLETED | OUTPATIENT
Start: 2021-01-01 | End: 2021-01-01

## 2021-01-01 RX ORDER — MORPHINE SULFATE 2 MG/ML
1 INJECTION, SOLUTION INTRAMUSCULAR; INTRAVENOUS
Status: DISCONTINUED | OUTPATIENT
Start: 2021-01-01 | End: 2021-01-01 | Stop reason: HOSPADM

## 2021-01-01 RX ORDER — ISOSORBIDE MONONITRATE 60 MG/1
120 TABLET, EXTENDED RELEASE ORAL DAILY
Status: CANCELLED | OUTPATIENT
Start: 2021-01-01

## 2021-01-01 RX ORDER — MORPHINE SULFATE 2 MG/ML
2 INJECTION, SOLUTION INTRAMUSCULAR; INTRAVENOUS ONCE
Status: COMPLETED | OUTPATIENT
Start: 2021-01-01 | End: 2021-01-01

## 2021-01-01 RX ORDER — ONDANSETRON 4 MG/1
4 TABLET, ORALLY DISINTEGRATING ORAL EVERY 8 HOURS PRN
Status: CANCELLED | OUTPATIENT
Start: 2021-01-01

## 2021-01-01 RX ORDER — SODIUM CHLORIDE 0.9 % (FLUSH) 0.9 %
5-40 SYRINGE (ML) INJECTION EVERY 12 HOURS SCHEDULED
Status: DISCONTINUED | OUTPATIENT
Start: 2021-01-01 | End: 2021-01-01 | Stop reason: SDUPTHER

## 2021-01-01 RX ORDER — ALBUTEROL SULFATE 2.5 MG/3ML
2.5 SOLUTION RESPIRATORY (INHALATION)
Status: DISCONTINUED | OUTPATIENT
Start: 2021-01-01 | End: 2021-01-01 | Stop reason: HOSPADM

## 2021-01-01 RX ORDER — ROCURONIUM BROMIDE 10 MG/ML
INJECTION, SOLUTION INTRAVENOUS
Status: COMPLETED
Start: 2021-01-01 | End: 2021-01-01

## 2021-01-01 RX ORDER — ROCURONIUM BROMIDE 10 MG/ML
100 INJECTION, SOLUTION INTRAVENOUS ONCE
Status: COMPLETED | OUTPATIENT
Start: 2021-01-01 | End: 2021-01-01

## 2021-01-01 RX ORDER — DULOXETIN HYDROCHLORIDE 60 MG/1
60 CAPSULE, DELAYED RELEASE ORAL DAILY
Qty: 30 CAPSULE | Refills: 3 | DISCHARGE
Start: 2021-01-01

## 2021-01-01 RX ORDER — SODIUM CHLORIDE 9 MG/ML
INJECTION, SOLUTION INTRAVENOUS PRN
Status: DISCONTINUED | OUTPATIENT
Start: 2021-01-01 | End: 2021-12-02 | Stop reason: HOSPADM

## 2021-01-01 RX ORDER — SODIUM CHLORIDE 0.9 % (FLUSH) 0.9 %
5-40 SYRINGE (ML) INJECTION PRN
Status: DISCONTINUED | OUTPATIENT
Start: 2021-01-01 | End: 2021-01-01 | Stop reason: SDUPTHER

## 2021-01-01 RX ORDER — LIDOCAINE HYDROCHLORIDE AND EPINEPHRINE 10; 10 MG/ML; UG/ML
INJECTION, SOLUTION INFILTRATION; PERINEURAL PRN
Status: DISCONTINUED | OUTPATIENT
Start: 2021-01-01 | End: 2021-01-01 | Stop reason: ALTCHOICE

## 2021-01-01 RX ORDER — DIPHENHYDRAMINE HCL 25 MG
25 TABLET ORAL DAILY PRN
Status: DISCONTINUED | OUTPATIENT
Start: 2021-01-01 | End: 2021-01-01 | Stop reason: HOSPADM

## 2021-01-01 RX ORDER — ONDANSETRON 2 MG/ML
4 INJECTION INTRAMUSCULAR; INTRAVENOUS EVERY 6 HOURS PRN
Status: DISCONTINUED | OUTPATIENT
Start: 2021-01-01 | End: 2021-12-02 | Stop reason: HOSPADM

## 2021-01-01 RX ORDER — DEXTROSE MONOHYDRATE 50 MG/ML
100 INJECTION, SOLUTION INTRAVENOUS PRN
Status: DISCONTINUED | OUTPATIENT
Start: 2021-01-01 | End: 2021-12-02 | Stop reason: HOSPADM

## 2021-01-01 RX ORDER — DIPHENHYDRAMINE HYDROCHLORIDE 50 MG/ML
12.5 INJECTION INTRAMUSCULAR; INTRAVENOUS
Status: DISCONTINUED | OUTPATIENT
Start: 2021-01-01 | End: 2021-01-01

## 2021-01-01 RX ORDER — HEPARIN SODIUM (PORCINE) LOCK FLUSH IV SOLN 100 UNIT/ML 100 UNIT/ML
3 SOLUTION INTRAVENOUS EVERY 12 HOURS SCHEDULED
Status: DISCONTINUED | OUTPATIENT
Start: 2021-01-01 | End: 2021-01-01 | Stop reason: HOSPADM

## 2021-01-01 RX ORDER — PROPOFOL 10 MG/ML
INJECTION, EMULSION INTRAVENOUS PRN
Status: DISCONTINUED | OUTPATIENT
Start: 2021-01-01 | End: 2021-01-01 | Stop reason: SDUPTHER

## 2021-01-01 RX ORDER — METOPROLOL SUCCINATE 50 MG/1
50 TABLET, EXTENDED RELEASE ORAL DAILY
Status: CANCELLED | OUTPATIENT
Start: 2021-01-01

## 2021-01-01 RX ORDER — FENTANYL CITRATE 50 UG/ML
100 INJECTION, SOLUTION INTRAMUSCULAR; INTRAVENOUS ONCE
Status: COMPLETED | OUTPATIENT
Start: 2021-01-01 | End: 2021-01-01

## 2021-01-01 RX ORDER — 0.9 % SODIUM CHLORIDE 0.9 %
30 INTRAVENOUS SOLUTION INTRAVENOUS ONCE
Status: COMPLETED | OUTPATIENT
Start: 2021-01-01 | End: 2021-01-01

## 2021-01-01 RX ORDER — HALOPERIDOL 5 MG/ML
2 INJECTION INTRAMUSCULAR EVERY 6 HOURS PRN
Status: DISCONTINUED | OUTPATIENT
Start: 2021-01-01 | End: 2021-01-01 | Stop reason: HOSPADM

## 2021-01-01 RX ORDER — ONDANSETRON 2 MG/ML
INJECTION INTRAMUSCULAR; INTRAVENOUS PRN
Status: DISCONTINUED | OUTPATIENT
Start: 2021-01-01 | End: 2021-01-01 | Stop reason: SDUPTHER

## 2021-01-01 RX ADMIN — SODIUM CHLORIDE, PRESERVATIVE FREE 10 ML: 5 INJECTION INTRAVENOUS at 12:28

## 2021-01-01 RX ADMIN — ENOXAPARIN SODIUM 40 MG: 100 INJECTION SUBCUTANEOUS at 08:09

## 2021-01-01 RX ADMIN — GABAPENTIN 400 MG: 400 CAPSULE ORAL at 17:15

## 2021-01-01 RX ADMIN — MORPHINE SULFATE 10 MG: 10 CAPSULE, EXTENDED RELEASE ORAL at 10:03

## 2021-01-01 RX ADMIN — HYDRALAZINE HYDROCHLORIDE 50 MG: 50 TABLET, FILM COATED ORAL at 06:33

## 2021-01-01 RX ADMIN — MIRTAZAPINE 7.5 MG: 15 TABLET, FILM COATED ORAL at 23:32

## 2021-01-01 RX ADMIN — METOPROLOL SUCCINATE 50 MG: 50 TABLET, EXTENDED RELEASE ORAL at 07:55

## 2021-01-01 RX ADMIN — ARFORMOTEROL TARTRATE 15 MCG: 15 SOLUTION RESPIRATORY (INHALATION) at 21:32

## 2021-01-01 RX ADMIN — IPRATROPIUM BROMIDE 0.5 MG: 0.5 SOLUTION RESPIRATORY (INHALATION) at 16:06

## 2021-01-01 RX ADMIN — SUCRALFATE 1 G: 1 TABLET ORAL at 06:40

## 2021-01-01 RX ADMIN — ISOSORBIDE MONONITRATE 120 MG: 60 TABLET, EXTENDED RELEASE ORAL at 09:08

## 2021-01-01 RX ADMIN — BUMETANIDE 1 MG: 0.25 INJECTION, SOLUTION INTRAMUSCULAR; INTRAVENOUS at 19:08

## 2021-01-01 RX ADMIN — MEROPENEM 1000 MG: 1 INJECTION, POWDER, FOR SOLUTION INTRAVENOUS at 03:20

## 2021-01-01 RX ADMIN — HYDROXYZINE PAMOATE 25 MG: 25 CAPSULE ORAL at 00:53

## 2021-01-01 RX ADMIN — FENTANYL CITRATE 50 MCG: 50 INJECTION, SOLUTION INTRAMUSCULAR; INTRAVENOUS at 09:50

## 2021-01-01 RX ADMIN — BUDESONIDE 250 MCG: 0.25 SUSPENSION RESPIRATORY (INHALATION) at 09:13

## 2021-01-01 RX ADMIN — IPRATROPIUM BROMIDE 0.5 MG: 0.5 SOLUTION RESPIRATORY (INHALATION) at 11:09

## 2021-01-01 RX ADMIN — CHLORHEXIDINE GLUCONATE 15 ML: 1.2 RINSE ORAL at 20:38

## 2021-01-01 RX ADMIN — ATORVASTATIN CALCIUM 10 MG: 10 TABLET, FILM COATED ORAL at 20:42

## 2021-01-01 RX ADMIN — INSULIN GLARGINE 20 UNITS: 100 INJECTION, SOLUTION SUBCUTANEOUS at 09:49

## 2021-01-01 RX ADMIN — FERROUS SULFATE TAB 325 MG (65 MG ELEMENTAL FE) 325 MG: 325 (65 FE) TAB at 08:19

## 2021-01-01 RX ADMIN — INSULIN GLARGINE 20 UNITS: 100 INJECTION, SOLUTION SUBCUTANEOUS at 10:04

## 2021-01-01 RX ADMIN — GABAPENTIN 200 MG: 100 CAPSULE ORAL at 16:17

## 2021-01-01 RX ADMIN — BUDESONIDE 250 MCG: 0.25 SUSPENSION RESPIRATORY (INHALATION) at 09:42

## 2021-01-01 RX ADMIN — SODIUM CHLORIDE, PRESERVATIVE FREE 300 UNITS: 5 INJECTION INTRAVENOUS at 23:40

## 2021-01-01 RX ADMIN — ISOSORBIDE MONONITRATE 120 MG: 30 TABLET ORAL at 09:44

## 2021-01-01 RX ADMIN — FERROUS SULFATE TAB 325 MG (65 MG ELEMENTAL FE) 325 MG: 325 (65 FE) TAB at 11:04

## 2021-01-01 RX ADMIN — SUCRALFATE 1 G: 1 TABLET ORAL at 11:49

## 2021-01-01 RX ADMIN — DULOXETINE HYDROCHLORIDE 60 MG: 60 CAPSULE, DELAYED RELEASE ORAL at 12:43

## 2021-01-01 RX ADMIN — Medication 2000 MG: at 01:00

## 2021-01-01 RX ADMIN — LEVOTHYROXINE SODIUM 25 MCG: 25 TABLET ORAL at 06:33

## 2021-01-01 RX ADMIN — PROPOFOL 75 MCG/KG/MIN: 10 INJECTION, EMULSION INTRAVENOUS at 20:43

## 2021-01-01 RX ADMIN — DULOXETINE HYDROCHLORIDE 30 MG: 30 CAPSULE, DELAYED RELEASE ORAL at 07:46

## 2021-01-01 RX ADMIN — HYDRALAZINE HYDROCHLORIDE 25 MG: 25 TABLET, FILM COATED ORAL at 15:55

## 2021-01-01 RX ADMIN — GABAPENTIN 400 MG: 400 CAPSULE ORAL at 13:55

## 2021-01-01 RX ADMIN — IPRATROPIUM BROMIDE AND ALBUTEROL SULFATE 1 AMPULE: .5; 2.5 SOLUTION RESPIRATORY (INHALATION) at 09:25

## 2021-01-01 RX ADMIN — INSULIN LISPRO 6 UNITS: 100 INJECTION, SOLUTION INTRAVENOUS; SUBCUTANEOUS at 03:18

## 2021-01-01 RX ADMIN — GABAPENTIN 200 MG: 100 CAPSULE ORAL at 07:55

## 2021-01-01 RX ADMIN — IPRATROPIUM BROMIDE AND ALBUTEROL SULFATE 1 AMPULE: .5; 2.5 SOLUTION RESPIRATORY (INHALATION) at 16:21

## 2021-01-01 RX ADMIN — FENTANYL CITRATE 100 MCG: 50 INJECTION, SOLUTION INTRAMUSCULAR; INTRAVENOUS at 09:12

## 2021-01-01 RX ADMIN — GABAPENTIN 200 MG: 100 CAPSULE ORAL at 09:56

## 2021-01-01 RX ADMIN — METOPROLOL SUCCINATE 50 MG: 50 TABLET, EXTENDED RELEASE ORAL at 09:09

## 2021-01-01 RX ADMIN — LACTULOSE 20 G: 20 SOLUTION ORAL at 21:32

## 2021-01-01 RX ADMIN — INSULIN HUMAN 10 UNITS: 100 INJECTION, SOLUTION PARENTERAL at 12:16

## 2021-01-01 RX ADMIN — VANCOMYCIN HYDROCHLORIDE 1250 MG: 10 INJECTION, POWDER, LYOPHILIZED, FOR SOLUTION INTRAVENOUS at 12:24

## 2021-01-01 RX ADMIN — FLUTICASONE PROPIONATE 1 SPRAY: 50 SPRAY, METERED NASAL at 08:54

## 2021-01-01 RX ADMIN — BUDESONIDE 250 MCG: 0.25 SUSPENSION RESPIRATORY (INHALATION) at 20:37

## 2021-01-01 RX ADMIN — INSULIN LISPRO 1 UNITS: 100 INJECTION, SOLUTION INTRAVENOUS; SUBCUTANEOUS at 16:47

## 2021-01-01 RX ADMIN — HYDROCODONE BITARTRATE AND ACETAMINOPHEN 1 TABLET: 7.5; 325 TABLET ORAL at 14:24

## 2021-01-01 RX ADMIN — CIPROFLOXACIN 500 MG: 500 TABLET, FILM COATED ORAL at 19:49

## 2021-01-01 RX ADMIN — DEXTROSE MONOHYDRATE 12.5 G: 25 INJECTION, SOLUTION INTRAVENOUS at 06:31

## 2021-01-01 RX ADMIN — SODIUM CHLORIDE, PRESERVATIVE FREE 300 UNITS: 5 INJECTION INTRAVENOUS at 20:35

## 2021-01-01 RX ADMIN — Medication 2000 MG: at 09:10

## 2021-01-01 RX ADMIN — ARFORMOTEROL TARTRATE 15 MCG: 15 SOLUTION RESPIRATORY (INHALATION) at 19:53

## 2021-01-01 RX ADMIN — GABAPENTIN 400 MG: 400 CAPSULE ORAL at 13:37

## 2021-01-01 RX ADMIN — FERROUS SULFATE TAB 325 MG (65 MG ELEMENTAL FE) 325 MG: 325 (65 FE) TAB at 17:36

## 2021-01-01 RX ADMIN — MORPHINE SULFATE 10 MG: 10 CAPSULE, EXTENDED RELEASE ORAL at 09:49

## 2021-01-01 RX ADMIN — ISOSORBIDE MONONITRATE 120 MG: 60 TABLET ORAL at 08:11

## 2021-01-01 RX ADMIN — LEVOTHYROXINE SODIUM 25 MCG: 0.03 TABLET ORAL at 08:09

## 2021-01-01 RX ADMIN — BUDESONIDE 500 MCG: 0.5 SUSPENSION RESPIRATORY (INHALATION) at 20:50

## 2021-01-01 RX ADMIN — IPRATROPIUM BROMIDE 0.5 MG: 0.5 SOLUTION RESPIRATORY (INHALATION) at 13:48

## 2021-01-01 RX ADMIN — GABAPENTIN 400 MG: 400 CAPSULE ORAL at 23:28

## 2021-01-01 RX ADMIN — FERROUS SULFATE TAB 325 MG (65 MG ELEMENTAL FE) 325 MG: 325 (65 FE) TAB at 08:56

## 2021-01-01 RX ADMIN — SODIUM CHLORIDE 10.9 UNITS/HR: 9 INJECTION, SOLUTION INTRAVENOUS at 05:11

## 2021-01-01 RX ADMIN — GABAPENTIN 400 MG: 400 CAPSULE ORAL at 21:09

## 2021-01-01 RX ADMIN — PANTOPRAZOLE SODIUM 40 MG: 40 TABLET, DELAYED RELEASE ORAL at 07:00

## 2021-01-01 RX ADMIN — INSULIN LISPRO 4 UNITS: 100 INJECTION, SOLUTION INTRAVENOUS; SUBCUTANEOUS at 17:22

## 2021-01-01 RX ADMIN — ACETAMINOPHEN 650 MG: 325 TABLET ORAL at 18:09

## 2021-01-01 RX ADMIN — VANCOMYCIN HYDROCHLORIDE 1500 MG: 10 INJECTION, POWDER, LYOPHILIZED, FOR SOLUTION INTRAVENOUS at 13:55

## 2021-01-01 RX ADMIN — LINEZOLID 600 MG: 600 TABLET, FILM COATED ORAL at 23:36

## 2021-01-01 RX ADMIN — ALLOPURINOL 300 MG: 300 TABLET ORAL at 09:57

## 2021-01-01 RX ADMIN — GABAPENTIN 200 MG: 100 CAPSULE ORAL at 15:55

## 2021-01-01 RX ADMIN — LEVOTHYROXINE SODIUM 25 MCG: 0.03 TABLET ORAL at 07:00

## 2021-01-01 RX ADMIN — BUMETANIDE 2 MG: 0.25 INJECTION INTRAMUSCULAR; INTRAVENOUS at 10:17

## 2021-01-01 RX ADMIN — ALBUTEROL SULFATE 2.5 MG: 2.5 SOLUTION RESPIRATORY (INHALATION) at 19:53

## 2021-01-01 RX ADMIN — MIRTAZAPINE 7.5 MG: 15 TABLET, FILM COATED ORAL at 00:53

## 2021-01-01 RX ADMIN — FERROUS SULFATE TAB 325 MG (65 MG ELEMENTAL FE) 325 MG: 325 (65 FE) TAB at 09:16

## 2021-01-01 RX ADMIN — BUDESONIDE 250 MCG: 0.25 SUSPENSION RESPIRATORY (INHALATION) at 20:30

## 2021-01-01 RX ADMIN — GABAPENTIN 400 MG: 400 CAPSULE ORAL at 10:03

## 2021-01-01 RX ADMIN — SODIUM CHLORIDE, PRESERVATIVE FREE 10 ML: 5 INJECTION INTRAVENOUS at 09:55

## 2021-01-01 RX ADMIN — FERROUS SULFATE TAB 325 MG (65 MG ELEMENTAL FE) 325 MG: 325 (65 FE) TAB at 18:05

## 2021-01-01 RX ADMIN — MEROPENEM 1000 MG: 1 INJECTION, POWDER, FOR SOLUTION INTRAVENOUS at 15:52

## 2021-01-01 RX ADMIN — PETROLATUM: 420 OINTMENT TOPICAL at 11:10

## 2021-01-01 RX ADMIN — POTASSIUM CHLORIDE 40 MEQ: 1500 TABLET, EXTENDED RELEASE ORAL at 08:06

## 2021-01-01 RX ADMIN — ALLOPURINOL 300 MG: 300 TABLET ORAL at 09:16

## 2021-01-01 RX ADMIN — HYDROCODONE BITARTRATE AND ACETAMINOPHEN 1 TABLET: 7.5; 325 TABLET ORAL at 08:12

## 2021-01-01 RX ADMIN — Medication 10 ML: at 11:04

## 2021-01-01 RX ADMIN — ASPIRIN 81 MG: 81 TABLET, COATED ORAL at 09:39

## 2021-01-01 RX ADMIN — ASPIRIN 81 MG: 81 TABLET, COATED ORAL at 08:07

## 2021-01-01 RX ADMIN — DEXTROSE 15 G: 15 GEL ORAL at 06:21

## 2021-01-01 RX ADMIN — GABAPENTIN 400 MG: 400 CAPSULE ORAL at 17:07

## 2021-01-01 RX ADMIN — INSULIN LISPRO 2 UNITS: 100 INJECTION, SOLUTION INTRAVENOUS; SUBCUTANEOUS at 12:50

## 2021-01-01 RX ADMIN — HYDROCODONE BITARTRATE AND ACETAMINOPHEN 1 TABLET: 10; 325 TABLET ORAL at 00:27

## 2021-01-01 RX ADMIN — HYDROMORPHONE HYDROCHLORIDE 1 MG: 1 INJECTION, SOLUTION INTRAMUSCULAR; INTRAVENOUS; SUBCUTANEOUS at 12:29

## 2021-01-01 RX ADMIN — IPRATROPIUM BROMIDE 0.5 MG: 0.5 SOLUTION RESPIRATORY (INHALATION) at 16:37

## 2021-01-01 RX ADMIN — HEPARIN SODIUM 5000 UNITS: 10000 INJECTION, SOLUTION INTRAVENOUS; SUBCUTANEOUS at 16:46

## 2021-01-01 RX ADMIN — Medication 5 ML: at 08:21

## 2021-01-01 RX ADMIN — GABAPENTIN 400 MG: 400 CAPSULE ORAL at 11:05

## 2021-01-01 RX ADMIN — GABAPENTIN 400 MG: 400 CAPSULE ORAL at 09:15

## 2021-01-01 RX ADMIN — SODIUM CHLORIDE, PRESERVATIVE FREE 10 ML: 5 INJECTION INTRAVENOUS at 20:34

## 2021-01-01 RX ADMIN — VASOPRESSIN 0.03 UNITS/MIN: 20 INJECTION INTRAVENOUS at 19:40

## 2021-01-01 RX ADMIN — SUCRALFATE 1 G: 1 TABLET ORAL at 12:42

## 2021-01-01 RX ADMIN — SUCRALFATE 1 G: 1 TABLET ORAL at 23:11

## 2021-01-01 RX ADMIN — FERROUS SULFATE TAB 325 MG (65 MG ELEMENTAL FE) 325 MG: 325 (65 FE) TAB at 09:54

## 2021-01-01 RX ADMIN — PETROLATUM: 420 OINTMENT TOPICAL at 14:55

## 2021-01-01 RX ADMIN — LINEZOLID 600 MG: 600 TABLET, FILM COATED ORAL at 14:24

## 2021-01-01 RX ADMIN — SODIUM CHLORIDE, PRESERVATIVE FREE 10 ML: 5 INJECTION INTRAVENOUS at 21:09

## 2021-01-01 RX ADMIN — HYDROCODONE BITARTRATE AND ACETAMINOPHEN 1 TABLET: 10; 325 TABLET ORAL at 09:20

## 2021-01-01 RX ADMIN — HYDROCODONE BITARTRATE AND ACETAMINOPHEN 1 TABLET: 7.5; 325 TABLET ORAL at 21:21

## 2021-01-01 RX ADMIN — ENOXAPARIN SODIUM 40 MG: 100 INJECTION SUBCUTANEOUS at 20:25

## 2021-01-01 RX ADMIN — ACETAMINOPHEN 650 MG: 650 SUPPOSITORY RECTAL at 05:34

## 2021-01-01 RX ADMIN — HYDROXYZINE PAMOATE 25 MG: 25 CAPSULE ORAL at 20:12

## 2021-01-01 RX ADMIN — GABAPENTIN 200 MG: 100 CAPSULE ORAL at 12:42

## 2021-01-01 RX ADMIN — IPRATROPIUM BROMIDE 0.5 MG: 0.5 SOLUTION RESPIRATORY (INHALATION) at 20:57

## 2021-01-01 RX ADMIN — SODIUM CHLORIDE, PRESERVATIVE FREE 10 ML: 5 INJECTION INTRAVENOUS at 09:21

## 2021-01-01 RX ADMIN — SUCRALFATE 1 G: 1 TABLET ORAL at 05:46

## 2021-01-01 RX ADMIN — HYDRALAZINE HYDROCHLORIDE 50 MG: 50 TABLET, FILM COATED ORAL at 05:24

## 2021-01-01 RX ADMIN — MEROPENEM 1000 MG: 1 INJECTION, POWDER, FOR SOLUTION INTRAVENOUS at 17:21

## 2021-01-01 RX ADMIN — PIPERACILLIN AND TAZOBACTAM 3375 MG: 3; .375 INJECTION, POWDER, LYOPHILIZED, FOR SOLUTION INTRAVENOUS at 06:40

## 2021-01-01 RX ADMIN — HYDROXYZINE PAMOATE 25 MG: 25 CAPSULE ORAL at 21:13

## 2021-01-01 RX ADMIN — ALLOPURINOL 300 MG: 300 TABLET ORAL at 08:19

## 2021-01-01 RX ADMIN — PANTOPRAZOLE SODIUM 40 MG: 40 TABLET, DELAYED RELEASE ORAL at 17:47

## 2021-01-01 RX ADMIN — ATORVASTATIN CALCIUM 10 MG: 10 TABLET, FILM COATED ORAL at 08:07

## 2021-01-01 RX ADMIN — HYDRALAZINE HYDROCHLORIDE 50 MG: 50 TABLET, FILM COATED ORAL at 20:13

## 2021-01-01 RX ADMIN — LEVOTHYROXINE SODIUM 25 MCG: 0.03 TABLET ORAL at 09:57

## 2021-01-01 RX ADMIN — Medication 10 ML: at 21:32

## 2021-01-01 RX ADMIN — HALOPERIDOL LACTATE 2 MG: 5 INJECTION, SOLUTION INTRAMUSCULAR at 20:36

## 2021-01-01 RX ADMIN — DULOXETINE HYDROCHLORIDE 30 MG: 30 CAPSULE, DELAYED RELEASE ORAL at 09:39

## 2021-01-01 RX ADMIN — HYDROCODONE BITARTRATE AND ACETAMINOPHEN 1 TABLET: 7.5; 325 TABLET ORAL at 09:54

## 2021-01-01 RX ADMIN — INSULIN LISPRO 1 UNITS: 100 INJECTION, SOLUTION INTRAVENOUS; SUBCUTANEOUS at 11:28

## 2021-01-01 RX ADMIN — EPINEPHRINE 1 MG: 1 INJECTION, SOLUTION INTRAMUSCULAR; SUBCUTANEOUS at 16:54

## 2021-01-01 RX ADMIN — ACETAMINOPHEN 650 MG: 325 TABLET ORAL at 04:46

## 2021-01-01 RX ADMIN — HEPARIN SODIUM 5000 UNITS: 10000 INJECTION, SOLUTION INTRAVENOUS; SUBCUTANEOUS at 15:06

## 2021-01-01 RX ADMIN — SUCRALFATE 1 G: 1 TABLET ORAL at 21:21

## 2021-01-01 RX ADMIN — SODIUM CHLORIDE, PRESERVATIVE FREE 300 UNITS: 5 INJECTION INTRAVENOUS at 11:03

## 2021-01-01 RX ADMIN — BUDESONIDE 250 MCG: 0.25 SUSPENSION RESPIRATORY (INHALATION) at 10:32

## 2021-01-01 RX ADMIN — HYDRALAZINE HYDROCHLORIDE 50 MG: 50 TABLET, FILM COATED ORAL at 06:30

## 2021-01-01 RX ADMIN — INSULIN LISPRO 1 UNITS: 100 INJECTION, SOLUTION INTRAVENOUS; SUBCUTANEOUS at 21:21

## 2021-01-01 RX ADMIN — HYDROCODONE BITARTRATE AND ACETAMINOPHEN 5 ML: 7.5; 325 SOLUTION ORAL at 11:53

## 2021-01-01 RX ADMIN — IPRATROPIUM BROMIDE 0.5 MG: 0.5 SOLUTION RESPIRATORY (INHALATION) at 16:54

## 2021-01-01 RX ADMIN — FERROUS SULFATE TAB 325 MG (65 MG ELEMENTAL FE) 325 MG: 325 (65 FE) TAB at 16:22

## 2021-01-01 RX ADMIN — POTASSIUM CHLORIDE, DEXTROSE MONOHYDRATE AND SODIUM CHLORIDE: 150; 5; 450 INJECTION, SOLUTION INTRAVENOUS at 17:26

## 2021-01-01 RX ADMIN — CALCIUM CHLORIDE 1 G: 100 INJECTION, SOLUTION INTRAVENOUS; INTRAVENTRICULAR at 17:05

## 2021-01-01 RX ADMIN — EPOETIN ALFA-EPBX 3000 UNITS: 3000 INJECTION, SOLUTION INTRAVENOUS; SUBCUTANEOUS at 09:20

## 2021-01-01 RX ADMIN — GABAPENTIN 400 MG: 400 CAPSULE ORAL at 08:57

## 2021-01-01 RX ADMIN — HYDROCODONE BITARTRATE AND ACETAMINOPHEN 1 TABLET: 7.5; 325 TABLET ORAL at 21:51

## 2021-01-01 RX ADMIN — DAPTOMYCIN 550 MG: 500 INJECTION, POWDER, LYOPHILIZED, FOR SOLUTION INTRAVENOUS at 15:06

## 2021-01-01 RX ADMIN — HYDROXYZINE PAMOATE 25 MG: 25 CAPSULE ORAL at 17:07

## 2021-01-01 RX ADMIN — LABETALOL HYDROCHLORIDE 2.5 MG: 5 INJECTION INTRAVENOUS at 10:38

## 2021-01-01 RX ADMIN — PROTAMINE SULFATE 50 MG: 10 INJECTION, SOLUTION INTRAVENOUS at 11:20

## 2021-01-01 RX ADMIN — BUDESONIDE 250 MCG: 0.25 SUSPENSION RESPIRATORY (INHALATION) at 09:48

## 2021-01-01 RX ADMIN — GABAPENTIN 200 MG: 100 CAPSULE ORAL at 07:38

## 2021-01-01 RX ADMIN — Medication 200 MG: at 08:11

## 2021-01-01 RX ADMIN — CHLORHEXIDINE GLUCONATE 15 ML: 1.2 RINSE ORAL at 09:38

## 2021-01-01 RX ADMIN — PIPERACILLIN AND TAZOBACTAM 3375 MG: 3; .375 INJECTION, POWDER, LYOPHILIZED, FOR SOLUTION INTRAVENOUS at 14:55

## 2021-01-01 RX ADMIN — ACETAMINOPHEN 650 MG: 325 TABLET ORAL at 17:41

## 2021-01-01 RX ADMIN — ROCURONIUM BROMIDE 100 MG: 10 SOLUTION INTRAVENOUS at 16:00

## 2021-01-01 RX ADMIN — Medication 10 ML: at 08:19

## 2021-01-01 RX ADMIN — ASPIRIN 81 MG: 81 TABLET, COATED ORAL at 08:41

## 2021-01-01 RX ADMIN — FUROSEMIDE 20 MG: 10 INJECTION, SOLUTION INTRAVENOUS at 15:11

## 2021-01-01 RX ADMIN — ALLOPURINOL 300 MG: 300 TABLET ORAL at 08:25

## 2021-01-01 RX ADMIN — INSULIN LISPRO 1 UNITS: 100 INJECTION, SOLUTION INTRAVENOUS; SUBCUTANEOUS at 11:17

## 2021-01-01 RX ADMIN — ATORVASTATIN CALCIUM 10 MG: 10 TABLET, FILM COATED ORAL at 08:25

## 2021-01-01 RX ADMIN — Medication 50 MCG: at 11:17

## 2021-01-01 RX ADMIN — BUDESONIDE 250 MCG: 0.25 SUSPENSION RESPIRATORY (INHALATION) at 07:55

## 2021-01-01 RX ADMIN — GABAPENTIN 400 MG: 400 CAPSULE ORAL at 16:21

## 2021-01-01 RX ADMIN — SODIUM CHLORIDE, PRESERVATIVE FREE 10 ML: 5 INJECTION INTRAVENOUS at 08:12

## 2021-01-01 RX ADMIN — HYDROXYZINE PAMOATE 25 MG: 25 CAPSULE ORAL at 17:36

## 2021-01-01 RX ADMIN — IPRATROPIUM BROMIDE 0.5 MG: 0.5 SOLUTION RESPIRATORY (INHALATION) at 17:23

## 2021-01-01 RX ADMIN — BUDESONIDE 250 MCG: 0.25 SUSPENSION RESPIRATORY (INHALATION) at 19:40

## 2021-01-01 RX ADMIN — BUDESONIDE 250 MCG: 0.25 SUSPENSION RESPIRATORY (INHALATION) at 09:47

## 2021-01-01 RX ADMIN — HYDRALAZINE HYDROCHLORIDE 50 MG: 50 TABLET, FILM COATED ORAL at 22:05

## 2021-01-01 RX ADMIN — BUDESONIDE 500 MCG: 0.5 SUSPENSION RESPIRATORY (INHALATION) at 09:50

## 2021-01-01 RX ADMIN — IPRATROPIUM BROMIDE 0.5 MG: 0.5 SOLUTION RESPIRATORY (INHALATION) at 12:35

## 2021-01-01 RX ADMIN — ISOSORBIDE MONONITRATE 120 MG: 30 TABLET ORAL at 08:09

## 2021-01-01 RX ADMIN — IPRATROPIUM BROMIDE 0.5 MG: 0.5 SOLUTION RESPIRATORY (INHALATION) at 19:17

## 2021-01-01 RX ADMIN — ARFORMOTEROL TARTRATE 15 MCG: 15 SOLUTION RESPIRATORY (INHALATION) at 09:47

## 2021-01-01 RX ADMIN — INSULIN GLARGINE 20 UNITS: 100 INJECTION, SOLUTION SUBCUTANEOUS at 08:16

## 2021-01-01 RX ADMIN — ARFORMOTEROL TARTRATE 15 MCG: 15 SOLUTION RESPIRATORY (INHALATION) at 21:53

## 2021-01-01 RX ADMIN — FERROUS SULFATE TAB 325 MG (65 MG ELEMENTAL FE) 325 MG: 325 (65 FE) TAB at 16:17

## 2021-01-01 RX ADMIN — SODIUM BICARBONATE 1300 MG: 650 TABLET ORAL at 08:56

## 2021-01-01 RX ADMIN — DIPHENHYDRAMINE HYDROCHLORIDE 25 MG: 25 TABLET ORAL at 13:08

## 2021-01-01 RX ADMIN — ISOSORBIDE MONONITRATE 120 MG: 60 TABLET, EXTENDED RELEASE ORAL at 09:39

## 2021-01-01 RX ADMIN — MAGNESIUM SULFATE 1000 MG: 1 INJECTION INTRAVENOUS at 02:14

## 2021-01-01 RX ADMIN — ARFORMOTEROL TARTRATE 15 MCG: 15 SOLUTION RESPIRATORY (INHALATION) at 13:17

## 2021-01-01 RX ADMIN — ATORVASTATIN CALCIUM 10 MG: 10 TABLET, FILM COATED ORAL at 09:54

## 2021-01-01 RX ADMIN — MORPHINE SULFATE 15 MG: 15 TABLET, FILM COATED, EXTENDED RELEASE ORAL at 09:50

## 2021-01-01 RX ADMIN — BUDESONIDE 250 MCG: 0.25 SUSPENSION RESPIRATORY (INHALATION) at 21:01

## 2021-01-01 RX ADMIN — SODIUM CHLORIDE, PRESERVATIVE FREE 10 ML: 5 INJECTION INTRAVENOUS at 10:06

## 2021-01-01 RX ADMIN — HYDROXYZINE PAMOATE 25 MG: 25 CAPSULE ORAL at 08:27

## 2021-01-01 RX ADMIN — INSULIN LISPRO 2 UNITS: 100 INJECTION, SOLUTION INTRAVENOUS; SUBCUTANEOUS at 17:28

## 2021-01-01 RX ADMIN — LACTULOSE 20 G: 20 SOLUTION ORAL at 21:36

## 2021-01-01 RX ADMIN — ARFORMOTEROL TARTRATE 15 MCG: 15 SOLUTION RESPIRATORY (INHALATION) at 20:37

## 2021-01-01 RX ADMIN — ARFORMOTEROL TARTRATE 15 MCG: 15 SOLUTION RESPIRATORY (INHALATION) at 08:02

## 2021-01-01 RX ADMIN — ATORVASTATIN CALCIUM 10 MG: 10 TABLET, FILM COATED ORAL at 10:25

## 2021-01-01 RX ADMIN — HYDRALAZINE HYDROCHLORIDE 50 MG: 50 TABLET, FILM COATED ORAL at 06:45

## 2021-01-01 RX ADMIN — GABAPENTIN 200 MG: 100 CAPSULE ORAL at 23:28

## 2021-01-01 RX ADMIN — ALLOPURINOL 300 MG: 300 TABLET ORAL at 10:03

## 2021-01-01 RX ADMIN — IPRATROPIUM BROMIDE 0.5 MG: 0.5 SOLUTION RESPIRATORY (INHALATION) at 09:09

## 2021-01-01 RX ADMIN — BUDESONIDE 250 MCG: 0.25 SUSPENSION RESPIRATORY (INHALATION) at 09:53

## 2021-01-01 RX ADMIN — GABAPENTIN 400 MG: 400 CAPSULE ORAL at 17:22

## 2021-01-01 RX ADMIN — Medication 5 ML: at 07:55

## 2021-01-01 RX ADMIN — PETROLATUM: 420 OINTMENT TOPICAL at 20:37

## 2021-01-01 RX ADMIN — ACETYLCYSTEINE 400 MG: 100 SOLUTION ORAL; RESPIRATORY (INHALATION) at 13:04

## 2021-01-01 RX ADMIN — SUCRALFATE 1 G: 1 TABLET ORAL at 22:56

## 2021-01-01 RX ADMIN — IPRATROPIUM BROMIDE 0.5 MG: 0.5 SOLUTION RESPIRATORY (INHALATION) at 21:50

## 2021-01-01 RX ADMIN — HYDROCODONE BITARTRATE AND ACETAMINOPHEN 1 TABLET: 7.5; 325 TABLET ORAL at 02:44

## 2021-01-01 RX ADMIN — HYDROMORPHONE HYDROCHLORIDE 1 MG: 1 INJECTION, SOLUTION INTRAMUSCULAR; INTRAVENOUS; SUBCUTANEOUS at 00:52

## 2021-01-01 RX ADMIN — ALLOPURINOL 300 MG: 300 TABLET ORAL at 08:39

## 2021-01-01 RX ADMIN — MIRTAZAPINE 7.5 MG: 15 TABLET, FILM COATED ORAL at 23:49

## 2021-01-01 RX ADMIN — FLUTICASONE PROPIONATE 1 SPRAY: 50 SPRAY, METERED NASAL at 09:40

## 2021-01-01 RX ADMIN — BUDESONIDE 250 MCG: 0.25 SUSPENSION RESPIRATORY (INHALATION) at 07:56

## 2021-01-01 RX ADMIN — ALBUTEROL SULFATE 2.5 MG: 2.5 SOLUTION RESPIRATORY (INHALATION) at 12:55

## 2021-01-01 RX ADMIN — PANTOPRAZOLE SODIUM 40 MG: 40 TABLET, DELAYED RELEASE ORAL at 16:47

## 2021-01-01 RX ADMIN — ARFORMOTEROL TARTRATE 15 MCG: 15 SOLUTION RESPIRATORY (INHALATION) at 12:57

## 2021-01-01 RX ADMIN — ATORVASTATIN CALCIUM 10 MG: 10 TABLET, FILM COATED ORAL at 21:09

## 2021-01-01 RX ADMIN — Medication 2000 MG: at 16:54

## 2021-01-01 RX ADMIN — PANTOPRAZOLE SODIUM 40 MG: 40 INJECTION, POWDER, FOR SOLUTION INTRAVENOUS at 09:20

## 2021-01-01 RX ADMIN — BUMETANIDE 2 MG: 0.25 INJECTION INTRAMUSCULAR; INTRAVENOUS at 09:57

## 2021-01-01 RX ADMIN — DULOXETINE HYDROCHLORIDE 60 MG: 60 CAPSULE, DELAYED RELEASE ORAL at 08:39

## 2021-01-01 RX ADMIN — METOPROLOL SUCCINATE 50 MG: 25 TABLET, EXTENDED RELEASE ORAL at 08:07

## 2021-01-01 RX ADMIN — ACETYLCYSTEINE 400 MG: 100 SOLUTION ORAL; RESPIRATORY (INHALATION) at 13:10

## 2021-01-01 RX ADMIN — DULOXETINE HYDROCHLORIDE 60 MG: 60 CAPSULE, DELAYED RELEASE ORAL at 08:19

## 2021-01-01 RX ADMIN — ISOSORBIDE MONONITRATE 120 MG: 30 TABLET ORAL at 08:39

## 2021-01-01 RX ADMIN — HYDROCORTISONE SODIUM SUCCINATE 100 MG: 100 INJECTION, POWDER, FOR SOLUTION INTRAMUSCULAR; INTRAVENOUS at 14:18

## 2021-01-01 RX ADMIN — INSULIN LISPRO 4 UNITS: 100 INJECTION, SOLUTION INTRAVENOUS; SUBCUTANEOUS at 08:54

## 2021-01-01 RX ADMIN — ARFORMOTEROL TARTRATE 15 MCG: 15 SOLUTION RESPIRATORY (INHALATION) at 09:12

## 2021-01-01 RX ADMIN — MORPHINE SULFATE 2 MG: 2 INJECTION, SOLUTION INTRAMUSCULAR; INTRAVENOUS at 19:41

## 2021-01-01 RX ADMIN — BUDESONIDE 250 MCG: 0.25 SUSPENSION RESPIRATORY (INHALATION) at 19:53

## 2021-01-01 RX ADMIN — IPRATROPIUM BROMIDE 0.5 MG: 0.5 SOLUTION RESPIRATORY (INHALATION) at 16:07

## 2021-01-01 RX ADMIN — GABAPENTIN 200 MG: 100 CAPSULE ORAL at 15:53

## 2021-01-01 RX ADMIN — HYDRALAZINE HYDROCHLORIDE 50 MG: 50 TABLET, FILM COATED ORAL at 14:11

## 2021-01-01 RX ADMIN — HYDRALAZINE HYDROCHLORIDE 50 MG: 50 TABLET, FILM COATED ORAL at 14:44

## 2021-01-01 RX ADMIN — GABAPENTIN 400 MG: 400 CAPSULE ORAL at 20:27

## 2021-01-01 RX ADMIN — ASPIRIN 81 MG: 81 TABLET, COATED ORAL at 12:43

## 2021-01-01 RX ADMIN — HYDROXYZINE HYDROCHLORIDE 50 MG: 50 INJECTION, SOLUTION INTRAMUSCULAR at 05:33

## 2021-01-01 RX ADMIN — Medication 10 ML: at 10:24

## 2021-01-01 RX ADMIN — GABAPENTIN 400 MG: 400 CAPSULE ORAL at 09:08

## 2021-01-01 RX ADMIN — PETROLATUM: 420 OINTMENT TOPICAL at 23:43

## 2021-01-01 RX ADMIN — LEVOTHYROXINE SODIUM 25 MCG: 0.03 TABLET ORAL at 08:39

## 2021-01-01 RX ADMIN — METOPROLOL SUCCINATE 50 MG: 50 TABLET, EXTENDED RELEASE ORAL at 09:54

## 2021-01-01 RX ADMIN — POTASSIUM CHLORIDE 10 MEQ: 7.46 INJECTION, SOLUTION INTRAVENOUS at 09:38

## 2021-01-01 RX ADMIN — ISOSORBIDE MONONITRATE 120 MG: 30 TABLET ORAL at 09:57

## 2021-01-01 RX ADMIN — ROCURONIUM BROMIDE 50 MG: 10 INJECTION, SOLUTION INTRAVENOUS at 09:12

## 2021-01-01 RX ADMIN — INSULIN LISPRO 1 UNITS: 100 INJECTION, SOLUTION INTRAVENOUS; SUBCUTANEOUS at 20:05

## 2021-01-01 RX ADMIN — GABAPENTIN 200 MG: 100 CAPSULE ORAL at 20:24

## 2021-01-01 RX ADMIN — SODIUM CHLORIDE, PRESERVATIVE FREE 10 ML: 5 INJECTION INTRAVENOUS at 08:09

## 2021-01-01 RX ADMIN — SODIUM BICARBONATE 1300 MG: 650 TABLET ORAL at 20:27

## 2021-01-01 RX ADMIN — HYDROXYZINE PAMOATE 25 MG: 25 CAPSULE ORAL at 08:51

## 2021-01-01 RX ADMIN — GABAPENTIN 200 MG: 100 CAPSULE ORAL at 16:42

## 2021-01-01 RX ADMIN — GABAPENTIN 400 MG: 400 CAPSULE ORAL at 09:23

## 2021-01-01 RX ADMIN — MORPHINE SULFATE 10 MG: 10 CAPSULE, EXTENDED RELEASE ORAL at 20:59

## 2021-01-01 RX ADMIN — IPRATROPIUM BROMIDE 0.5 MG: 0.5 SOLUTION RESPIRATORY (INHALATION) at 07:55

## 2021-01-01 RX ADMIN — DEXTROSE MONOHYDRATE 25 G: 25 INJECTION, SOLUTION INTRAVENOUS at 19:21

## 2021-01-01 RX ADMIN — ASPIRIN 81 MG: 81 TABLET, COATED ORAL at 08:09

## 2021-01-01 RX ADMIN — ALLOPURINOL 300 MG: 300 TABLET ORAL at 09:18

## 2021-01-01 RX ADMIN — IPRATROPIUM BROMIDE 0.5 MG: 0.5 SOLUTION RESPIRATORY (INHALATION) at 13:18

## 2021-01-01 RX ADMIN — ARFORMOTEROL TARTRATE 15 MCG: 15 SOLUTION RESPIRATORY (INHALATION) at 09:48

## 2021-01-01 RX ADMIN — SODIUM BICARBONATE 50 MEQ: 84 INJECTION, SOLUTION INTRAVENOUS at 17:04

## 2021-01-01 RX ADMIN — SILVER SULFADIAZINE: 10 CREAM TOPICAL at 23:44

## 2021-01-01 RX ADMIN — LEVOTHYROXINE SODIUM 25 MCG: 25 TABLET ORAL at 05:55

## 2021-01-01 RX ADMIN — HYDRALAZINE HYDROCHLORIDE 50 MG: 50 TABLET, FILM COATED ORAL at 23:28

## 2021-01-01 RX ADMIN — ISOSORBIDE MONONITRATE 120 MG: 60 TABLET, EXTENDED RELEASE ORAL at 07:54

## 2021-01-01 RX ADMIN — MEROPENEM 1000 MG: 1 INJECTION, POWDER, FOR SOLUTION INTRAVENOUS at 05:57

## 2021-01-01 RX ADMIN — HYDRALAZINE HYDROCHLORIDE 50 MG: 50 TABLET, FILM COATED ORAL at 14:30

## 2021-01-01 RX ADMIN — GABAPENTIN 200 MG: 100 CAPSULE ORAL at 09:07

## 2021-01-01 RX ADMIN — PANTOPRAZOLE SODIUM 40 MG: 40 TABLET, DELAYED RELEASE ORAL at 16:43

## 2021-01-01 RX ADMIN — IPRATROPIUM BROMIDE 0.5 MG: 0.5 SOLUTION RESPIRATORY (INHALATION) at 08:01

## 2021-01-01 RX ADMIN — ATORVASTATIN CALCIUM 10 MG: 10 TABLET, FILM COATED ORAL at 09:08

## 2021-01-01 RX ADMIN — BUMETANIDE 1 MG: 1 TABLET ORAL at 07:02

## 2021-01-01 RX ADMIN — ARFORMOTEROL TARTRATE 15 MCG: 15 SOLUTION RESPIRATORY (INHALATION) at 07:55

## 2021-01-01 RX ADMIN — HYDRALAZINE HYDROCHLORIDE 50 MG: 50 TABLET, FILM COATED ORAL at 16:13

## 2021-01-01 RX ADMIN — LACTULOSE 20 G: 20 SOLUTION ORAL at 10:26

## 2021-01-01 RX ADMIN — SODIUM CHLORIDE, PRESERVATIVE FREE 10 ML: 5 INJECTION INTRAVENOUS at 09:39

## 2021-01-01 RX ADMIN — INSULIN LISPRO 1 UNITS: 100 INJECTION, SOLUTION INTRAVENOUS; SUBCUTANEOUS at 00:53

## 2021-01-01 RX ADMIN — BUMETANIDE 1 MG: 0.25 INJECTION, SOLUTION INTRAMUSCULAR; INTRAVENOUS at 09:25

## 2021-01-01 RX ADMIN — MEROPENEM 1000 MG: 1 INJECTION, POWDER, FOR SOLUTION INTRAVENOUS at 15:07

## 2021-01-01 RX ADMIN — FLUTICASONE PROPIONATE 1 SPRAY: 50 SPRAY, METERED NASAL at 07:47

## 2021-01-01 RX ADMIN — ALLOPURINOL 300 MG: 300 TABLET ORAL at 10:24

## 2021-01-01 RX ADMIN — ASPIRIN 81 MG: 81 TABLET, COATED ORAL at 08:39

## 2021-01-01 RX ADMIN — LACTULOSE 20 G: 20 SOLUTION ORAL at 23:10

## 2021-01-01 RX ADMIN — ALLOPURINOL 300 MG: 300 TABLET ORAL at 08:18

## 2021-01-01 RX ADMIN — ENOXAPARIN SODIUM 40 MG: 40 INJECTION SUBCUTANEOUS at 08:55

## 2021-01-01 RX ADMIN — CHLORHEXIDINE GLUCONATE 15 ML: 1.2 RINSE ORAL at 09:20

## 2021-01-01 RX ADMIN — ENOXAPARIN SODIUM 40 MG: 40 INJECTION SUBCUTANEOUS at 12:00

## 2021-01-01 RX ADMIN — ASPIRIN 81 MG: 81 TABLET, COATED ORAL at 09:07

## 2021-01-01 RX ADMIN — HYDRALAZINE HYDROCHLORIDE 50 MG: 50 TABLET, FILM COATED ORAL at 14:03

## 2021-01-01 RX ADMIN — HYDROCODONE BITARTRATE AND ACETAMINOPHEN 1 TABLET: 10; 325 TABLET ORAL at 13:07

## 2021-01-01 RX ADMIN — IPRATROPIUM BROMIDE 0.5 MG: 0.5 SOLUTION RESPIRATORY (INHALATION) at 21:32

## 2021-01-01 RX ADMIN — LIDOCAINE HYDROCHLORIDE 40 MG: 20 INJECTION, SOLUTION INFILTRATION; PERINEURAL at 13:42

## 2021-01-01 RX ADMIN — HYDROCODONE BITARTRATE AND ACETAMINOPHEN 1 TABLET: 10; 325 TABLET ORAL at 16:38

## 2021-01-01 RX ADMIN — DULOXETINE HYDROCHLORIDE 60 MG: 60 CAPSULE, DELAYED RELEASE ORAL at 07:54

## 2021-01-01 RX ADMIN — SODIUM BICARBONATE 1300 MG: 650 TABLET ORAL at 08:12

## 2021-01-01 RX ADMIN — GABAPENTIN 400 MG: 400 CAPSULE ORAL at 08:09

## 2021-01-01 RX ADMIN — LIDOCAINE HYDROCHLORIDE 1 MG/MIN: 4 INJECTION, SOLUTION INTRAVENOUS at 17:20

## 2021-01-01 RX ADMIN — BUDESONIDE 250 MCG: 0.25 SUSPENSION RESPIRATORY (INHALATION) at 08:55

## 2021-01-01 RX ADMIN — GABAPENTIN 400 MG: 400 CAPSULE ORAL at 13:00

## 2021-01-01 RX ADMIN — ALLOPURINOL 300 MG: 300 TABLET ORAL at 08:10

## 2021-01-01 RX ADMIN — PANTOPRAZOLE SODIUM 40 MG: 40 TABLET, DELAYED RELEASE ORAL at 06:40

## 2021-01-01 RX ADMIN — LEVOTHYROXINE SODIUM 25 MCG: 0.03 TABLET ORAL at 06:37

## 2021-01-01 RX ADMIN — HYDROCODONE BITARTRATE AND ACETAMINOPHEN 1 TABLET: 7.5; 325 TABLET ORAL at 15:53

## 2021-01-01 RX ADMIN — HYDRALAZINE HYDROCHLORIDE 10 MG: 10 TABLET, FILM COATED ORAL at 09:24

## 2021-01-01 RX ADMIN — METOPROLOL SUCCINATE 50 MG: 50 TABLET, EXTENDED RELEASE ORAL at 07:38

## 2021-01-01 RX ADMIN — FERROUS SULFATE TAB 325 MG (65 MG ELEMENTAL FE) 325 MG: 325 (65 FE) TAB at 08:18

## 2021-01-01 RX ADMIN — MORPHINE SULFATE 15 MG: 15 TABLET, FILM COATED, EXTENDED RELEASE ORAL at 10:19

## 2021-01-01 RX ADMIN — MORPHINE SULFATE 4 MG: 4 INJECTION, SOLUTION INTRAMUSCULAR; INTRAVENOUS at 01:13

## 2021-01-01 RX ADMIN — HYDROXYZINE PAMOATE 25 MG: 25 CAPSULE ORAL at 13:08

## 2021-01-01 RX ADMIN — HYDROCODONE BITARTRATE AND ACETAMINOPHEN 1 TABLET: 10; 325 TABLET ORAL at 14:03

## 2021-01-01 RX ADMIN — INSULIN HUMAN 10 UNITS: 100 INJECTION, SOLUTION PARENTERAL at 19:20

## 2021-01-01 RX ADMIN — METOPROLOL SUCCINATE 50 MG: 25 TABLET, EXTENDED RELEASE ORAL at 08:18

## 2021-01-01 RX ADMIN — SODIUM CHLORIDE, PRESERVATIVE FREE 10 ML: 5 INJECTION INTRAVENOUS at 20:00

## 2021-01-01 RX ADMIN — METOPROLOL SUCCINATE 50 MG: 50 TABLET, EXTENDED RELEASE ORAL at 08:26

## 2021-01-01 RX ADMIN — GABAPENTIN 400 MG: 400 CAPSULE ORAL at 13:09

## 2021-01-01 RX ADMIN — ISOSORBIDE MONONITRATE 120 MG: 60 TABLET ORAL at 08:10

## 2021-01-01 RX ADMIN — DULOXETINE HYDROCHLORIDE 60 MG: 60 CAPSULE, DELAYED RELEASE ORAL at 09:44

## 2021-01-01 RX ADMIN — SODIUM CHLORIDE, PRESERVATIVE FREE 10 ML: 5 INJECTION INTRAVENOUS at 09:20

## 2021-01-01 RX ADMIN — ARFORMOTEROL TARTRATE 15 MCG: 15 SOLUTION RESPIRATORY (INHALATION) at 20:50

## 2021-01-01 RX ADMIN — IPRATROPIUM BROMIDE 0.5 MG: 0.5 SOLUTION RESPIRATORY (INHALATION) at 12:49

## 2021-01-01 RX ADMIN — BUDESONIDE 250 MCG: 0.25 SUSPENSION RESPIRATORY (INHALATION) at 09:23

## 2021-01-01 RX ADMIN — ISOSORBIDE MONONITRATE 120 MG: 60 TABLET ORAL at 10:00

## 2021-01-01 RX ADMIN — GABAPENTIN 200 MG: 100 CAPSULE ORAL at 08:19

## 2021-01-01 RX ADMIN — IPRATROPIUM BROMIDE 0.5 MG: 0.5 SOLUTION RESPIRATORY (INHALATION) at 12:53

## 2021-01-01 RX ADMIN — GABAPENTIN 400 MG: 400 CAPSULE ORAL at 16:38

## 2021-01-01 RX ADMIN — KETAMINE HYDROCHLORIDE 200 MG: 10 INJECTION, SOLUTION INTRAMUSCULAR; INTRAVENOUS at 16:00

## 2021-01-01 RX ADMIN — ALLOPURINOL 300 MG: 300 TABLET ORAL at 09:44

## 2021-01-01 RX ADMIN — SODIUM CHLORIDE, PRESERVATIVE FREE 10 ML: 5 INJECTION INTRAVENOUS at 09:43

## 2021-01-01 RX ADMIN — HEPARIN SODIUM 5000 UNITS: 10000 INJECTION, SOLUTION INTRAVENOUS; SUBCUTANEOUS at 21:17

## 2021-01-01 RX ADMIN — HYDROCODONE BITARTRATE AND ACETAMINOPHEN 5 ML: 7.5; 325 SOLUTION ORAL at 23:19

## 2021-01-01 RX ADMIN — LEVOTHYROXINE SODIUM 25 MCG: 0.03 TABLET ORAL at 11:09

## 2021-01-01 RX ADMIN — PHENYLEPHRINE HYDROCHLORIDE 100 MCG: 10 INJECTION, SOLUTION INTRAVENOUS at 09:34

## 2021-01-01 RX ADMIN — DEXTROSE MONOHYDRATE 50 ML: 25 INJECTION, SOLUTION INTRAVENOUS at 12:16

## 2021-01-01 RX ADMIN — VANCOMYCIN HYDROCHLORIDE 1000 MG: 1 INJECTION, POWDER, LYOPHILIZED, FOR SOLUTION INTRAVENOUS at 01:03

## 2021-01-01 RX ADMIN — FERROUS SULFATE TAB 325 MG (65 MG ELEMENTAL FE) 325 MG: 325 (65 FE) TAB at 16:38

## 2021-01-01 RX ADMIN — MEROPENEM 1000 MG: 1 INJECTION, POWDER, FOR SOLUTION INTRAVENOUS at 18:23

## 2021-01-01 RX ADMIN — ENOXAPARIN SODIUM 40 MG: 40 INJECTION SUBCUTANEOUS at 07:47

## 2021-01-01 RX ADMIN — Medication 200 MG: at 10:28

## 2021-01-01 RX ADMIN — ENOXAPARIN SODIUM 40 MG: 40 INJECTION SUBCUTANEOUS at 08:19

## 2021-01-01 RX ADMIN — IPRATROPIUM BROMIDE 0.5 MG: 0.5 SOLUTION RESPIRATORY (INHALATION) at 15:49

## 2021-01-01 RX ADMIN — BUDESONIDE 250 MCG: 0.25 SUSPENSION RESPIRATORY (INHALATION) at 13:17

## 2021-01-01 RX ADMIN — PANTOPRAZOLE SODIUM 40 MG: 40 TABLET, DELAYED RELEASE ORAL at 17:36

## 2021-01-01 RX ADMIN — HYDROXYZINE PAMOATE 25 MG: 25 CAPSULE ORAL at 19:49

## 2021-01-01 RX ADMIN — SODIUM CHLORIDE, POTASSIUM CHLORIDE, SODIUM LACTATE AND CALCIUM CHLORIDE: 600; 310; 30; 20 INJECTION, SOLUTION INTRAVENOUS at 08:57

## 2021-01-01 RX ADMIN — INSULIN LISPRO 1 UNITS: 100 INJECTION, SOLUTION INTRAVENOUS; SUBCUTANEOUS at 11:47

## 2021-01-01 RX ADMIN — HYDROXYZINE PAMOATE 25 MG: 25 CAPSULE ORAL at 13:09

## 2021-01-01 RX ADMIN — HYDRALAZINE HYDROCHLORIDE 50 MG: 50 TABLET, FILM COATED ORAL at 14:56

## 2021-01-01 RX ADMIN — Medication 200 MG: at 09:08

## 2021-01-01 RX ADMIN — PANTOPRAZOLE SODIUM 40 MG: 40 TABLET, DELAYED RELEASE ORAL at 06:38

## 2021-01-01 RX ADMIN — HYDROXYZINE PAMOATE 25 MG: 25 CAPSULE ORAL at 12:47

## 2021-01-01 RX ADMIN — ACETAMINOPHEN 1000 MG: 500 TABLET ORAL at 12:28

## 2021-01-01 RX ADMIN — HYDRALAZINE HYDROCHLORIDE 50 MG: 50 TABLET, FILM COATED ORAL at 15:06

## 2021-01-01 RX ADMIN — INSULIN LISPRO 4 UNITS: 100 INJECTION, SOLUTION INTRAVENOUS; SUBCUTANEOUS at 17:08

## 2021-01-01 RX ADMIN — FERROUS SULFATE TAB 325 MG (65 MG ELEMENTAL FE) 325 MG: 325 (65 FE) TAB at 08:12

## 2021-01-01 RX ADMIN — SUCRALFATE 1 G: 1 TABLET ORAL at 23:43

## 2021-01-01 RX ADMIN — SODIUM CHLORIDE, PRESERVATIVE FREE 10 ML: 5 INJECTION INTRAVENOUS at 07:47

## 2021-01-01 RX ADMIN — FERROUS SULFATE TAB 325 MG (65 MG ELEMENTAL FE) 325 MG: 325 (65 FE) TAB at 16:43

## 2021-01-01 RX ADMIN — MEROPENEM 1000 MG: 1 INJECTION, POWDER, FOR SOLUTION INTRAVENOUS at 14:51

## 2021-01-01 RX ADMIN — GABAPENTIN 400 MG: 400 CAPSULE ORAL at 20:36

## 2021-01-01 RX ADMIN — FENTANYL CITRATE 50 MCG: 50 INJECTION, SOLUTION INTRAMUSCULAR; INTRAVENOUS at 21:00

## 2021-01-01 RX ADMIN — DEXAMETHASONE SODIUM PHOSPHATE 10 MG: 10 INJECTION INTRAMUSCULAR; INTRAVENOUS at 09:17

## 2021-01-01 RX ADMIN — HYDROXYZINE PAMOATE 25 MG: 25 CAPSULE ORAL at 10:28

## 2021-01-01 RX ADMIN — FLUTICASONE PROPIONATE 1 SPRAY: 50 SPRAY, METERED NASAL at 09:18

## 2021-01-01 RX ADMIN — GABAPENTIN 400 MG: 400 CAPSULE ORAL at 12:08

## 2021-01-01 RX ADMIN — INSULIN LISPRO 1 UNITS: 100 INJECTION, SOLUTION INTRAVENOUS; SUBCUTANEOUS at 12:05

## 2021-01-01 RX ADMIN — ARFORMOTEROL TARTRATE 15 MCG: 15 SOLUTION RESPIRATORY (INHALATION) at 09:03

## 2021-01-01 RX ADMIN — LEVOTHYROXINE SODIUM 25 MCG: 25 TABLET ORAL at 12:42

## 2021-01-01 RX ADMIN — INSULIN GLARGINE 20 UNITS: 100 INJECTION, SOLUTION SUBCUTANEOUS at 21:56

## 2021-01-01 RX ADMIN — IPRATROPIUM BROMIDE 0.5 MG: 0.5 SOLUTION RESPIRATORY (INHALATION) at 09:42

## 2021-01-01 RX ADMIN — ACETAMINOPHEN 650 MG: 325 TABLET ORAL at 00:19

## 2021-01-01 RX ADMIN — HYDROCODONE BITARTRATE AND ACETAMINOPHEN 5 ML: 7.5; 325 SOLUTION ORAL at 06:30

## 2021-01-01 RX ADMIN — IPRATROPIUM BROMIDE 0.5 MG: 0.5 SOLUTION RESPIRATORY (INHALATION) at 20:50

## 2021-01-01 RX ADMIN — ALLOPURINOL 300 MG: 300 TABLET ORAL at 08:09

## 2021-01-01 RX ADMIN — BUMETANIDE 1 MG: 1 TABLET ORAL at 16:42

## 2021-01-01 RX ADMIN — ALLOPURINOL 300 MG: 300 TABLET ORAL at 08:57

## 2021-01-01 RX ADMIN — GABAPENTIN 200 MG: 100 CAPSULE ORAL at 21:21

## 2021-01-01 RX ADMIN — INSULIN LISPRO 1 UNITS: 100 INJECTION, SOLUTION INTRAVENOUS; SUBCUTANEOUS at 21:08

## 2021-01-01 RX ADMIN — FERROUS SULFATE TAB 325 MG (65 MG ELEMENTAL FE) 325 MG: 325 (65 FE) TAB at 08:34

## 2021-01-01 RX ADMIN — ISOSORBIDE MONONITRATE 120 MG: 30 TABLET ORAL at 07:38

## 2021-01-01 RX ADMIN — ISOSORBIDE MONONITRATE 120 MG: 60 TABLET, EXTENDED RELEASE ORAL at 12:43

## 2021-01-01 RX ADMIN — SODIUM CHLORIDE: 9 INJECTION, SOLUTION INTRAVENOUS at 12:16

## 2021-01-01 RX ADMIN — Medication 18 MCG/MIN: at 16:16

## 2021-01-01 RX ADMIN — HEPARIN SODIUM 2000 UNITS: 1000 INJECTION INTRAVENOUS; SUBCUTANEOUS at 10:17

## 2021-01-01 RX ADMIN — SODIUM CHLORIDE: 9 INJECTION, SOLUTION INTRAVENOUS at 12:34

## 2021-01-01 RX ADMIN — IPRATROPIUM BROMIDE 0.5 MG: 0.5 SOLUTION RESPIRATORY (INHALATION) at 15:26

## 2021-01-01 RX ADMIN — BUDESONIDE 250 MCG: 0.25 SUSPENSION RESPIRATORY (INHALATION) at 11:29

## 2021-01-01 RX ADMIN — POTASSIUM & SODIUM PHOSPHATES POWDER PACK 280-160-250 MG 250 MG: 280-160-250 PACK at 17:14

## 2021-01-01 RX ADMIN — BUDESONIDE 250 MCG: 0.25 SUSPENSION RESPIRATORY (INHALATION) at 21:32

## 2021-01-01 RX ADMIN — BUMETANIDE 2 MG: 0.25 INJECTION INTRAMUSCULAR; INTRAVENOUS at 10:00

## 2021-01-01 RX ADMIN — MIDODRINE HYDROCHLORIDE 10 MG: 10 TABLET ORAL at 12:00

## 2021-01-01 RX ADMIN — ATORVASTATIN CALCIUM 10 MG: 10 TABLET, FILM COATED ORAL at 08:51

## 2021-01-01 RX ADMIN — IPRATROPIUM BROMIDE 0.5 MG: 0.5 SOLUTION RESPIRATORY (INHALATION) at 16:05

## 2021-01-01 RX ADMIN — INSULIN GLARGINE 20 UNITS: 100 INJECTION, SOLUTION SUBCUTANEOUS at 21:15

## 2021-01-01 RX ADMIN — SODIUM CHLORIDE: 9 INJECTION, SOLUTION INTRAVENOUS at 08:41

## 2021-01-01 RX ADMIN — PETROLATUM: 420 OINTMENT TOPICAL at 08:59

## 2021-01-01 RX ADMIN — CEFEPIME HYDROCHLORIDE 2000 MG: 2 INJECTION, POWDER, FOR SOLUTION INTRAVENOUS at 13:54

## 2021-01-01 RX ADMIN — SODIUM BICARBONATE 1300 MG: 650 TABLET ORAL at 08:51

## 2021-01-01 RX ADMIN — ALLOPURINOL 300 MG: 300 TABLET ORAL at 09:07

## 2021-01-01 RX ADMIN — IPRATROPIUM BROMIDE 0.5 MG: 0.5 SOLUTION RESPIRATORY (INHALATION) at 10:35

## 2021-01-01 RX ADMIN — ALLOPURINOL 300 MG: 300 TABLET ORAL at 10:16

## 2021-01-01 RX ADMIN — GABAPENTIN 400 MG: 400 CAPSULE ORAL at 20:12

## 2021-01-01 RX ADMIN — BUDESONIDE 250 MCG: 0.25 SUSPENSION RESPIRATORY (INHALATION) at 20:48

## 2021-01-01 RX ADMIN — HYDROCODONE BITARTRATE AND ACETAMINOPHEN 1 TABLET: 10; 325 TABLET ORAL at 06:27

## 2021-01-01 RX ADMIN — Medication 200 MG: at 08:57

## 2021-01-01 RX ADMIN — ENOXAPARIN SODIUM 40 MG: 100 INJECTION SUBCUTANEOUS at 10:18

## 2021-01-01 RX ADMIN — HYDRALAZINE HYDROCHLORIDE 50 MG: 50 TABLET, FILM COATED ORAL at 00:54

## 2021-01-01 RX ADMIN — HYDRALAZINE HYDROCHLORIDE 50 MG: 50 TABLET, FILM COATED ORAL at 06:39

## 2021-01-01 RX ADMIN — GABAPENTIN 400 MG: 400 CAPSULE ORAL at 15:59

## 2021-01-01 RX ADMIN — ARFORMOTEROL TARTRATE 15 MCG: 15 SOLUTION RESPIRATORY (INHALATION) at 19:40

## 2021-01-01 RX ADMIN — SODIUM CHLORIDE: 9 INJECTION, SOLUTION INTRAVENOUS at 00:51

## 2021-01-01 RX ADMIN — ATORVASTATIN CALCIUM 10 MG: 10 TABLET, FILM COATED ORAL at 08:17

## 2021-01-01 RX ADMIN — ALLOPURINOL 300 MG: 300 TABLET ORAL at 12:43

## 2021-01-01 RX ADMIN — Medication 10 ML: at 22:11

## 2021-01-01 RX ADMIN — INSULIN GLARGINE 20 UNITS: 100 INJECTION, SOLUTION SUBCUTANEOUS at 07:50

## 2021-01-01 RX ADMIN — FERROUS SULFATE TAB 325 MG (65 MG ELEMENTAL FE) 325 MG: 325 (65 FE) TAB at 09:39

## 2021-01-01 RX ADMIN — HYDRALAZINE HYDROCHLORIDE 50 MG: 50 TABLET, FILM COATED ORAL at 07:03

## 2021-01-01 RX ADMIN — CALCIUM GLUCONATE 1000 MG: 98 INJECTION, SOLUTION INTRAVENOUS at 19:23

## 2021-01-01 RX ADMIN — GABAPENTIN 400 MG: 400 CAPSULE ORAL at 23:49

## 2021-01-01 RX ADMIN — ASPIRIN 81 MG: 81 TABLET, COATED ORAL at 07:55

## 2021-01-01 RX ADMIN — PETROLATUM: 420 OINTMENT TOPICAL at 23:31

## 2021-01-01 RX ADMIN — ACETAMINOPHEN 650 MG: 325 TABLET ORAL at 14:51

## 2021-01-01 RX ADMIN — ACETAMINOPHEN 650 MG: 325 TABLET ORAL at 11:41

## 2021-01-01 RX ADMIN — IPRATROPIUM BROMIDE 0.5 MG: 0.5 SOLUTION RESPIRATORY (INHALATION) at 16:39

## 2021-01-01 RX ADMIN — BUDESONIDE 250 MCG: 0.25 SUSPENSION RESPIRATORY (INHALATION) at 20:44

## 2021-01-01 RX ADMIN — ATORVASTATIN CALCIUM 10 MG: 10 TABLET, FILM COATED ORAL at 09:28

## 2021-01-01 RX ADMIN — SODIUM BICARBONATE 1300 MG: 650 TABLET ORAL at 21:13

## 2021-01-01 RX ADMIN — GABAPENTIN 200 MG: 100 CAPSULE ORAL at 16:13

## 2021-01-01 RX ADMIN — LACTULOSE 20 G: 20 SOLUTION ORAL at 09:58

## 2021-01-01 RX ADMIN — ISOSORBIDE MONONITRATE 120 MG: 60 TABLET, EXTENDED RELEASE ORAL at 08:41

## 2021-01-01 RX ADMIN — ISOSORBIDE MONONITRATE 120 MG: 60 TABLET ORAL at 11:09

## 2021-01-01 RX ADMIN — HYDRALAZINE HYDROCHLORIDE 50 MG: 50 TABLET, FILM COATED ORAL at 06:04

## 2021-01-01 RX ADMIN — PANTOPRAZOLE SODIUM 40 MG: 40 TABLET, DELAYED RELEASE ORAL at 15:06

## 2021-01-01 RX ADMIN — MORPHINE SULFATE 10 MG: 10 CAPSULE, EXTENDED RELEASE ORAL at 09:17

## 2021-01-01 RX ADMIN — PHENYLEPHRINE HYDROCHLORIDE 1 SPRAY: 1 SPRAY NASAL at 06:33

## 2021-01-01 RX ADMIN — INSULIN LISPRO 1 UNITS: 100 INJECTION, SOLUTION INTRAVENOUS; SUBCUTANEOUS at 20:40

## 2021-01-01 RX ADMIN — PETROLATUM: 420 OINTMENT TOPICAL at 10:14

## 2021-01-01 RX ADMIN — METOPROLOL SUCCINATE 50 MG: 50 TABLET, EXTENDED RELEASE ORAL at 12:43

## 2021-01-01 RX ADMIN — PANTOPRAZOLE SODIUM 40 MG: 40 INJECTION, POWDER, FOR SOLUTION INTRAVENOUS at 20:38

## 2021-01-01 RX ADMIN — ALBUTEROL SULFATE 2.5 MG: 2.5 SOLUTION RESPIRATORY (INHALATION) at 13:03

## 2021-01-01 RX ADMIN — LEVOTHYROXINE SODIUM 25 MCG: 25 TABLET ORAL at 10:16

## 2021-01-01 RX ADMIN — BUDESONIDE 250 MCG: 0.25 SUSPENSION RESPIRATORY (INHALATION) at 20:47

## 2021-01-01 RX ADMIN — BUDESONIDE 250 MCG: 0.25 SUSPENSION RESPIRATORY (INHALATION) at 21:50

## 2021-01-01 RX ADMIN — LACTULOSE 20 G: 20 SOLUTION ORAL at 08:24

## 2021-01-01 RX ADMIN — SODIUM ZIRCONIUM CYCLOSILICATE 10 G: 10 POWDER, FOR SUSPENSION ORAL at 18:04

## 2021-01-01 RX ADMIN — IPRATROPIUM BROMIDE 0.5 MG: 0.5 SOLUTION RESPIRATORY (INHALATION) at 12:42

## 2021-01-01 RX ADMIN — BUMETANIDE 1 MG: 1 TABLET ORAL at 08:10

## 2021-01-01 RX ADMIN — SODIUM CHLORIDE: 9 INJECTION, SOLUTION INTRAVENOUS at 13:29

## 2021-01-01 RX ADMIN — GABAPENTIN 400 MG: 400 CAPSULE ORAL at 16:48

## 2021-01-01 RX ADMIN — GABAPENTIN 400 MG: 400 CAPSULE ORAL at 01:13

## 2021-01-01 RX ADMIN — FERROUS SULFATE TAB 325 MG (65 MG ELEMENTAL FE) 325 MG: 325 (65 FE) TAB at 17:21

## 2021-01-01 RX ADMIN — MORPHINE SULFATE 2 MG: 2 INJECTION, SOLUTION INTRAMUSCULAR; INTRAVENOUS at 01:00

## 2021-01-01 RX ADMIN — BUDESONIDE 250 MCG: 0.25 SUSPENSION RESPIRATORY (INHALATION) at 19:42

## 2021-01-01 RX ADMIN — DULOXETINE HYDROCHLORIDE 60 MG: 60 CAPSULE, DELAYED RELEASE ORAL at 10:25

## 2021-01-01 RX ADMIN — INSULIN LISPRO 4 UNITS: 100 INJECTION, SOLUTION INTRAVENOUS; SUBCUTANEOUS at 12:30

## 2021-01-01 RX ADMIN — ALLOPURINOL 300 MG: 300 TABLET ORAL at 08:42

## 2021-01-01 RX ADMIN — GABAPENTIN 400 MG: 400 CAPSULE ORAL at 12:58

## 2021-01-01 RX ADMIN — FERROUS SULFATE TAB 325 MG (65 MG ELEMENTAL FE) 325 MG: 325 (65 FE) TAB at 12:45

## 2021-01-01 RX ADMIN — LEVOTHYROXINE SODIUM 25 MCG: 0.03 TABLET ORAL at 06:39

## 2021-01-01 RX ADMIN — HYDRALAZINE HYDROCHLORIDE 50 MG: 50 TABLET, FILM COATED ORAL at 23:40

## 2021-01-01 RX ADMIN — SODIUM CHLORIDE, PRESERVATIVE FREE 300 UNITS: 5 INJECTION INTRAVENOUS at 22:02

## 2021-01-01 RX ADMIN — INSULIN LISPRO 1 UNITS: 100 INJECTION, SOLUTION INTRAVENOUS; SUBCUTANEOUS at 20:28

## 2021-01-01 RX ADMIN — ISOSORBIDE MONONITRATE 120 MG: 60 TABLET, EXTENDED RELEASE ORAL at 09:24

## 2021-01-01 RX ADMIN — Medication 8 MCG/MIN: at 18:38

## 2021-01-01 RX ADMIN — GABAPENTIN 400 MG: 400 CAPSULE ORAL at 19:49

## 2021-01-01 RX ADMIN — Medication 10 ML: at 08:09

## 2021-01-01 RX ADMIN — HYDRALAZINE HYDROCHLORIDE 50 MG: 50 TABLET, FILM COATED ORAL at 06:37

## 2021-01-01 RX ADMIN — MIRTAZAPINE 7.5 MG: 15 TABLET, FILM COATED ORAL at 22:01

## 2021-01-01 RX ADMIN — IPRATROPIUM BROMIDE 0.5 MG: 0.5 SOLUTION RESPIRATORY (INHALATION) at 20:30

## 2021-01-01 RX ADMIN — HYDROCODONE BITARTRATE AND ACETAMINOPHEN 1 TABLET: 7.5; 325 TABLET ORAL at 06:39

## 2021-01-01 RX ADMIN — VANCOMYCIN HYDROCHLORIDE 1250 MG: 10 INJECTION, POWDER, LYOPHILIZED, FOR SOLUTION INTRAVENOUS at 12:07

## 2021-01-01 RX ADMIN — HYDROMORPHONE HYDROCHLORIDE 0.5 MG: 1 INJECTION, SOLUTION INTRAMUSCULAR; INTRAVENOUS; SUBCUTANEOUS at 12:48

## 2021-01-01 RX ADMIN — POTASSIUM CHLORIDE 40 MEQ: 1500 TABLET, EXTENDED RELEASE ORAL at 08:17

## 2021-01-01 RX ADMIN — Medication 2000 MG: at 17:34

## 2021-01-01 RX ADMIN — ENOXAPARIN SODIUM 40 MG: 40 INJECTION SUBCUTANEOUS at 08:08

## 2021-01-01 RX ADMIN — GABAPENTIN 200 MG: 100 CAPSULE ORAL at 09:44

## 2021-01-01 RX ADMIN — ASPIRIN 81 MG: 81 TABLET, COATED ORAL at 09:08

## 2021-01-01 RX ADMIN — GABAPENTIN 400 MG: 400 CAPSULE ORAL at 18:23

## 2021-01-01 RX ADMIN — SODIUM CHLORIDE, PRESERVATIVE FREE 300 UNITS: 5 INJECTION INTRAVENOUS at 10:30

## 2021-01-01 RX ADMIN — MORPHINE SULFATE 15 MG: 15 TABLET, FILM COATED, EXTENDED RELEASE ORAL at 23:09

## 2021-01-01 RX ADMIN — MIDODRINE HYDROCHLORIDE 10 MG: 10 TABLET ORAL at 17:37

## 2021-01-01 RX ADMIN — GABAPENTIN 400 MG: 400 CAPSULE ORAL at 21:17

## 2021-01-01 RX ADMIN — SODIUM CHLORIDE 8 MG/HR: 9 INJECTION, SOLUTION INTRAVENOUS at 17:55

## 2021-01-01 RX ADMIN — GABAPENTIN 400 MG: 400 CAPSULE ORAL at 09:18

## 2021-01-01 RX ADMIN — GABAPENTIN 400 MG: 400 CAPSULE ORAL at 20:34

## 2021-01-01 RX ADMIN — ACETYLCYSTEINE 400 MG: 100 SOLUTION ORAL; RESPIRATORY (INHALATION) at 19:53

## 2021-01-01 RX ADMIN — GABAPENTIN 400 MG: 400 CAPSULE ORAL at 08:12

## 2021-01-01 RX ADMIN — FENTANYL CITRATE 25 MCG: 50 INJECTION, SOLUTION INTRAMUSCULAR; INTRAVENOUS at 11:00

## 2021-01-01 RX ADMIN — IPRATROPIUM BROMIDE 0.5 MG: 0.5 SOLUTION RESPIRATORY (INHALATION) at 07:49

## 2021-01-01 RX ADMIN — FERROUS SULFATE TAB 325 MG (65 MG ELEMENTAL FE) 325 MG: 325 (65 FE) TAB at 07:46

## 2021-01-01 RX ADMIN — PETROLATUM: 420 OINTMENT TOPICAL at 23:52

## 2021-01-01 RX ADMIN — HYDROXYZINE PAMOATE 25 MG: 25 CAPSULE ORAL at 17:41

## 2021-01-01 RX ADMIN — CIPROFLOXACIN 500 MG: 500 TABLET, FILM COATED ORAL at 23:30

## 2021-01-01 RX ADMIN — PANTOPRAZOLE SODIUM 40 MG: 40 TABLET, DELAYED RELEASE ORAL at 06:33

## 2021-01-01 RX ADMIN — GABAPENTIN 400 MG: 400 CAPSULE ORAL at 15:53

## 2021-01-01 RX ADMIN — HYDRALAZINE HYDROCHLORIDE 50 MG: 50 TABLET, FILM COATED ORAL at 13:37

## 2021-01-01 RX ADMIN — BUDESONIDE 250 MCG: 0.25 SUSPENSION RESPIRATORY (INHALATION) at 10:36

## 2021-01-01 RX ADMIN — BUDESONIDE 250 MCG: 0.25 SUSPENSION RESPIRATORY (INHALATION) at 09:10

## 2021-01-01 RX ADMIN — HYDROXYZINE PAMOATE 25 MG: 25 CAPSULE ORAL at 13:54

## 2021-01-01 RX ADMIN — ARFORMOTEROL TARTRATE 15 MCG: 15 SOLUTION RESPIRATORY (INHALATION) at 08:01

## 2021-01-01 RX ADMIN — LABETALOL HYDROCHLORIDE 10 MG: 5 INJECTION INTRAVENOUS at 21:21

## 2021-01-01 RX ADMIN — HEPARIN SODIUM 5000 UNITS: 10000 INJECTION, SOLUTION INTRAVENOUS; SUBCUTANEOUS at 15:59

## 2021-01-01 RX ADMIN — METOPROLOL SUCCINATE 50 MG: 50 TABLET, EXTENDED RELEASE ORAL at 08:09

## 2021-01-01 RX ADMIN — IPRATROPIUM BROMIDE AND ALBUTEROL SULFATE 1 AMPULE: .5; 2.5 SOLUTION RESPIRATORY (INHALATION) at 20:50

## 2021-01-01 RX ADMIN — INSULIN LISPRO 1 UNITS: 100 INJECTION, SOLUTION INTRAVENOUS; SUBCUTANEOUS at 09:02

## 2021-01-01 RX ADMIN — GABAPENTIN 400 MG: 400 CAPSULE ORAL at 22:01

## 2021-01-01 RX ADMIN — CIPROFLOXACIN 500 MG: 500 TABLET, FILM COATED ORAL at 08:27

## 2021-01-01 RX ADMIN — HYDRALAZINE HYDROCHLORIDE 50 MG: 50 TABLET, FILM COATED ORAL at 09:54

## 2021-01-01 RX ADMIN — BUMETANIDE 1 MG: 1 TABLET ORAL at 08:25

## 2021-01-01 RX ADMIN — IPRATROPIUM BROMIDE 0.5 MG: 0.5 SOLUTION RESPIRATORY (INHALATION) at 09:47

## 2021-01-01 RX ADMIN — LEVOTHYROXINE SODIUM 25 MCG: 0.03 TABLET ORAL at 06:45

## 2021-01-01 RX ADMIN — ARFORMOTEROL TARTRATE 15 MCG: 15 SOLUTION RESPIRATORY (INHALATION) at 19:42

## 2021-01-01 RX ADMIN — HYDRALAZINE HYDROCHLORIDE 50 MG: 50 TABLET, FILM COATED ORAL at 15:54

## 2021-01-01 RX ADMIN — SODIUM BICARBONATE: 84 INJECTION, SOLUTION INTRAVENOUS at 17:17

## 2021-01-01 RX ADMIN — FERROUS SULFATE TAB 325 MG (65 MG ELEMENTAL FE) 325 MG: 325 (65 FE) TAB at 17:13

## 2021-01-01 RX ADMIN — SODIUM CHLORIDE, PRESERVATIVE FREE 10 ML: 5 INJECTION INTRAVENOUS at 21:19

## 2021-01-01 RX ADMIN — HEPARIN SODIUM 5000 UNITS: 10000 INJECTION, SOLUTION INTRAVENOUS; SUBCUTANEOUS at 06:20

## 2021-01-01 RX ADMIN — ISOSORBIDE MONONITRATE 120 MG: 60 TABLET, EXTENDED RELEASE ORAL at 10:04

## 2021-01-01 RX ADMIN — Medication 10 ML: at 10:01

## 2021-01-01 RX ADMIN — INSULIN LISPRO 1 UNITS: 100 INJECTION, SOLUTION INTRAVENOUS; SUBCUTANEOUS at 21:42

## 2021-01-01 RX ADMIN — PETROLATUM: 420 OINTMENT TOPICAL at 20:36

## 2021-01-01 RX ADMIN — ISOSORBIDE MONONITRATE 120 MG: 60 TABLET, EXTENDED RELEASE ORAL at 09:17

## 2021-01-01 RX ADMIN — SUCRALFATE 1 G: 1 TABLET ORAL at 20:36

## 2021-01-01 RX ADMIN — GABAPENTIN 400 MG: 400 CAPSULE ORAL at 20:56

## 2021-01-01 RX ADMIN — ARFORMOTEROL TARTRATE 15 MCG: 15 SOLUTION RESPIRATORY (INHALATION) at 08:23

## 2021-01-01 RX ADMIN — LEVOTHYROXINE SODIUM 25 MCG: 25 TABLET ORAL at 06:23

## 2021-01-01 RX ADMIN — BUDESONIDE 250 MCG: 0.25 SUSPENSION RESPIRATORY (INHALATION) at 20:57

## 2021-01-01 RX ADMIN — LEVOTHYROXINE SODIUM 25 MCG: 25 TABLET ORAL at 06:32

## 2021-01-01 RX ADMIN — IPRATROPIUM BROMIDE 0.5 MG: 0.5 SOLUTION RESPIRATORY (INHALATION) at 09:52

## 2021-01-01 RX ADMIN — LACTULOSE 20 G: 20 SOLUTION ORAL at 21:17

## 2021-01-01 RX ADMIN — ISOSORBIDE MONONITRATE 120 MG: 60 TABLET ORAL at 09:16

## 2021-01-01 RX ADMIN — PETROLATUM: 420 OINTMENT TOPICAL at 21:21

## 2021-01-01 RX ADMIN — HYDRALAZINE HYDROCHLORIDE 50 MG: 50 TABLET, FILM COATED ORAL at 00:05

## 2021-01-01 RX ADMIN — ACETAMINOPHEN 650 MG: 325 TABLET ORAL at 17:47

## 2021-01-01 RX ADMIN — IPRATROPIUM BROMIDE 0.5 MG: 0.5 SOLUTION RESPIRATORY (INHALATION) at 10:32

## 2021-01-01 RX ADMIN — INSULIN LISPRO 1 UNITS: 100 INJECTION, SOLUTION INTRAVENOUS; SUBCUTANEOUS at 23:35

## 2021-01-01 RX ADMIN — Medication 2000 MG: at 22:57

## 2021-01-01 RX ADMIN — GABAPENTIN 400 MG: 400 CAPSULE ORAL at 13:39

## 2021-01-01 RX ADMIN — BUDESONIDE 250 MCG: 0.25 SUSPENSION RESPIRATORY (INHALATION) at 21:45

## 2021-01-01 RX ADMIN — BUDESONIDE 250 MCG: 0.25 SUSPENSION RESPIRATORY (INHALATION) at 21:58

## 2021-01-01 RX ADMIN — IPRATROPIUM BROMIDE 0.5 MG: 0.5 SOLUTION RESPIRATORY (INHALATION) at 17:01

## 2021-01-01 RX ADMIN — ATORVASTATIN CALCIUM 10 MG: 10 TABLET, FILM COATED ORAL at 08:57

## 2021-01-01 RX ADMIN — VANCOMYCIN HYDROCHLORIDE 1250 MG: 10 INJECTION, POWDER, LYOPHILIZED, FOR SOLUTION INTRAVENOUS at 12:12

## 2021-01-01 RX ADMIN — MORPHINE SULFATE 2 MG: 2 INJECTION, SOLUTION INTRAMUSCULAR; INTRAVENOUS at 21:59

## 2021-01-01 RX ADMIN — IPRATROPIUM BROMIDE 0.5 MG: 0.5 SOLUTION RESPIRATORY (INHALATION) at 21:58

## 2021-01-01 RX ADMIN — PETROLATUM: 420 OINTMENT TOPICAL at 09:15

## 2021-01-01 RX ADMIN — SODIUM CHLORIDE 500 ML: 9 INJECTION, SOLUTION INTRAVENOUS at 01:48

## 2021-01-01 RX ADMIN — HYDROXYZINE PAMOATE 25 MG: 25 CAPSULE ORAL at 17:21

## 2021-01-01 RX ADMIN — SODIUM BICARBONATE 1300 MG: 650 TABLET ORAL at 20:12

## 2021-01-01 RX ADMIN — ALBUTEROL SULFATE 2.5 MG: 2.5 SOLUTION RESPIRATORY (INHALATION) at 09:47

## 2021-01-01 RX ADMIN — GABAPENTIN 400 MG: 400 CAPSULE ORAL at 21:14

## 2021-01-01 RX ADMIN — LACTULOSE 20 G: 20 SOLUTION ORAL at 10:15

## 2021-01-01 RX ADMIN — HYDROXYZINE PAMOATE 25 MG: 25 CAPSULE ORAL at 17:13

## 2021-01-01 RX ADMIN — GABAPENTIN 200 MG: 100 CAPSULE ORAL at 08:17

## 2021-01-01 RX ADMIN — SODIUM CHLORIDE, PRESERVATIVE FREE 300 UNITS: 5 INJECTION INTRAVENOUS at 08:58

## 2021-01-01 RX ADMIN — IPRATROPIUM BROMIDE 0.5 MG: 0.5 SOLUTION RESPIRATORY (INHALATION) at 08:24

## 2021-01-01 RX ADMIN — MEROPENEM 1000 MG: 1 INJECTION, POWDER, FOR SOLUTION INTRAVENOUS at 03:31

## 2021-01-01 RX ADMIN — IPRATROPIUM BROMIDE 0.5 MG: 0.5 SOLUTION RESPIRATORY (INHALATION) at 12:46

## 2021-01-01 RX ADMIN — SUCRALFATE 1 G: 1 TABLET ORAL at 06:38

## 2021-01-01 RX ADMIN — ALLOPURINOL 300 MG: 300 TABLET ORAL at 09:54

## 2021-01-01 RX ADMIN — HYDRALAZINE HYDROCHLORIDE 50 MG: 50 TABLET, FILM COATED ORAL at 21:14

## 2021-01-01 RX ADMIN — HYDROCODONE BITARTRATE AND ACETAMINOPHEN 1 TABLET: 10; 325 TABLET ORAL at 08:09

## 2021-01-01 RX ADMIN — Medication 10 ML: at 08:29

## 2021-01-01 RX ADMIN — INSULIN GLARGINE 20 UNITS: 100 INJECTION, SOLUTION SUBCUTANEOUS at 20:56

## 2021-01-01 RX ADMIN — GABAPENTIN 400 MG: 400 CAPSULE ORAL at 15:39

## 2021-01-01 RX ADMIN — SODIUM BICARBONATE 1300 MG: 650 TABLET ORAL at 20:35

## 2021-01-01 RX ADMIN — Medication 200 MG: at 09:25

## 2021-01-01 RX ADMIN — MEROPENEM 1000 MG: 1 INJECTION, POWDER, FOR SOLUTION INTRAVENOUS at 17:14

## 2021-01-01 RX ADMIN — INSULIN LISPRO 1 UNITS: 100 INJECTION, SOLUTION INTRAVENOUS; SUBCUTANEOUS at 17:14

## 2021-01-01 RX ADMIN — SODIUM CHLORIDE, PRESERVATIVE FREE 10 ML: 5 INJECTION INTRAVENOUS at 09:19

## 2021-01-01 RX ADMIN — INSULIN LISPRO 2 UNITS: 100 INJECTION, SOLUTION INTRAVENOUS; SUBCUTANEOUS at 11:53

## 2021-01-01 RX ADMIN — INSULIN GLARGINE 20 UNITS: 100 INJECTION, SOLUTION SUBCUTANEOUS at 20:35

## 2021-01-01 RX ADMIN — BUDESONIDE 250 MCG: 0.25 SUSPENSION RESPIRATORY (INHALATION) at 12:57

## 2021-01-01 RX ADMIN — LEVOTHYROXINE SODIUM 25 MCG: 25 TABLET ORAL at 07:55

## 2021-01-01 RX ADMIN — ALLOPURINOL 300 MG: 300 TABLET ORAL at 07:38

## 2021-01-01 RX ADMIN — SODIUM PHOSPHATE, MONOBASIC, MONOHYDRATE 15 MMOL: 276; 142 INJECTION, SOLUTION INTRAVENOUS at 17:26

## 2021-01-01 RX ADMIN — SODIUM BICARBONATE 50 MEQ: 84 INJECTION, SOLUTION INTRAVENOUS at 16:54

## 2021-01-01 RX ADMIN — MEROPENEM 1000 MG: 1 INJECTION, POWDER, FOR SOLUTION INTRAVENOUS at 14:04

## 2021-01-01 RX ADMIN — ENOXAPARIN SODIUM 40 MG: 40 INJECTION SUBCUTANEOUS at 09:18

## 2021-01-01 RX ADMIN — ATORVASTATIN CALCIUM 10 MG: 10 TABLET, FILM COATED ORAL at 20:34

## 2021-01-01 RX ADMIN — DULOXETINE HYDROCHLORIDE 60 MG: 60 CAPSULE, DELAYED RELEASE ORAL at 09:22

## 2021-01-01 RX ADMIN — FENTANYL CITRATE 100 MCG: 50 INJECTION, SOLUTION INTRAMUSCULAR; INTRAVENOUS at 16:01

## 2021-01-01 RX ADMIN — INSULIN LISPRO 1 UNITS: 100 INJECTION, SOLUTION INTRAVENOUS; SUBCUTANEOUS at 22:11

## 2021-01-01 RX ADMIN — FERROUS SULFATE TAB 325 MG (65 MG ELEMENTAL FE) 325 MG: 325 (65 FE) TAB at 17:41

## 2021-01-01 RX ADMIN — GABAPENTIN 400 MG: 400 CAPSULE ORAL at 17:41

## 2021-01-01 RX ADMIN — IPRATROPIUM BROMIDE 0.5 MG: 0.5 SOLUTION RESPIRATORY (INHALATION) at 14:34

## 2021-01-01 RX ADMIN — METOPROLOL SUCCINATE 50 MG: 25 TABLET, EXTENDED RELEASE ORAL at 09:39

## 2021-01-01 RX ADMIN — METOPROLOL SUCCINATE 50 MG: 50 TABLET, EXTENDED RELEASE ORAL at 09:24

## 2021-01-01 RX ADMIN — HYDROCODONE BITARTRATE AND ACETAMINOPHEN 1 TABLET: 7.5; 325 TABLET ORAL at 18:04

## 2021-01-01 RX ADMIN — DULOXETINE HYDROCHLORIDE 60 MG: 60 CAPSULE, DELAYED RELEASE ORAL at 08:25

## 2021-01-01 RX ADMIN — LEVOTHYROXINE SODIUM 25 MCG: 0.03 TABLET ORAL at 05:57

## 2021-01-01 RX ADMIN — METOPROLOL SUCCINATE 50 MG: 50 TABLET, EXTENDED RELEASE ORAL at 08:08

## 2021-01-01 RX ADMIN — ARFORMOTEROL TARTRATE 15 MCG: 15 SOLUTION RESPIRATORY (INHALATION) at 07:48

## 2021-01-01 RX ADMIN — SUCRALFATE 1 G: 1 TABLET ORAL at 17:36

## 2021-01-01 RX ADMIN — DULOXETINE HYDROCHLORIDE 60 MG: 60 CAPSULE, DELAYED RELEASE ORAL at 10:16

## 2021-01-01 RX ADMIN — DULOXETINE HYDROCHLORIDE 60 MG: 60 CAPSULE, DELAYED RELEASE ORAL at 08:57

## 2021-01-01 RX ADMIN — IPRATROPIUM BROMIDE 0.5 MG: 0.5 SOLUTION RESPIRATORY (INHALATION) at 21:13

## 2021-01-01 RX ADMIN — METOPROLOL SUCCINATE 50 MG: 50 TABLET, EXTENDED RELEASE ORAL at 08:12

## 2021-01-01 RX ADMIN — BUMETANIDE 1 MG: 1 TABLET ORAL at 08:09

## 2021-01-01 RX ADMIN — Medication 0.6 MG: at 11:48

## 2021-01-01 RX ADMIN — LEVOTHYROXINE SODIUM 25 MCG: 25 TABLET ORAL at 05:48

## 2021-01-01 RX ADMIN — HEPARIN SODIUM 5000 UNITS: 10000 INJECTION, SOLUTION INTRAVENOUS; SUBCUTANEOUS at 03:20

## 2021-01-01 RX ADMIN — ISOSORBIDE MONONITRATE 120 MG: 60 TABLET, EXTENDED RELEASE ORAL at 09:07

## 2021-01-01 RX ADMIN — HYDROMORPHONE HYDROCHLORIDE 1 MG: 1 INJECTION, SOLUTION INTRAMUSCULAR; INTRAVENOUS; SUBCUTANEOUS at 00:03

## 2021-01-01 RX ADMIN — HYDRALAZINE HYDROCHLORIDE 10 MG: 10 TABLET, FILM COATED ORAL at 23:44

## 2021-01-01 RX ADMIN — HYDROCODONE BITARTRATE AND ACETAMINOPHEN 1 TABLET: 10; 325 TABLET ORAL at 06:05

## 2021-01-01 RX ADMIN — GABAPENTIN 400 MG: 400 CAPSULE ORAL at 12:27

## 2021-01-01 RX ADMIN — ISOSORBIDE MONONITRATE 120 MG: 60 TABLET ORAL at 10:24

## 2021-01-01 RX ADMIN — HYDRALAZINE HYDROCHLORIDE 50 MG: 50 TABLET, FILM COATED ORAL at 05:26

## 2021-01-01 RX ADMIN — PANTOPRAZOLE SODIUM 40 MG: 40 TABLET, DELAYED RELEASE ORAL at 18:04

## 2021-01-01 RX ADMIN — HYDROCODONE BITARTRATE AND ACETAMINOPHEN 1 TABLET: 10; 325 TABLET ORAL at 12:12

## 2021-01-01 RX ADMIN — MIRTAZAPINE 7.5 MG: 15 TABLET, FILM COATED ORAL at 20:35

## 2021-01-01 RX ADMIN — DULOXETINE HYDROCHLORIDE 60 MG: 60 CAPSULE, DELAYED RELEASE ORAL at 09:08

## 2021-01-01 RX ADMIN — GABAPENTIN 200 MG: 100 CAPSULE ORAL at 14:10

## 2021-01-01 RX ADMIN — IPRATROPIUM BROMIDE 0.5 MG: 0.5 SOLUTION RESPIRATORY (INHALATION) at 18:04

## 2021-01-01 RX ADMIN — PIPERACILLIN AND TAZOBACTAM 3375 MG: 3; .375 INJECTION, POWDER, LYOPHILIZED, FOR SOLUTION INTRAVENOUS at 00:52

## 2021-01-01 RX ADMIN — Medication 10 ML: at 21:08

## 2021-01-01 RX ADMIN — ASPIRIN 81 MG: 81 TABLET, COATED ORAL at 07:46

## 2021-01-01 RX ADMIN — SODIUM BICARBONATE 1300 MG: 650 TABLET ORAL at 11:08

## 2021-01-01 RX ADMIN — INSULIN LISPRO 1 UNITS: 100 INJECTION, SOLUTION INTRAVENOUS; SUBCUTANEOUS at 06:35

## 2021-01-01 RX ADMIN — LEVOTHYROXINE SODIUM 25 MCG: 0.03 TABLET ORAL at 06:40

## 2021-01-01 RX ADMIN — PROPOFOL 250 MG: 10 INJECTION, EMULSION INTRAVENOUS at 13:42

## 2021-01-01 RX ADMIN — PROPOFOL 75 MCG/KG/MIN: 10 INJECTION, EMULSION INTRAVENOUS at 12:44

## 2021-01-01 RX ADMIN — IPRATROPIUM BROMIDE 0.5 MG: 0.5 SOLUTION RESPIRATORY (INHALATION) at 19:18

## 2021-01-01 RX ADMIN — DULOXETINE HYDROCHLORIDE 60 MG: 60 CAPSULE, DELAYED RELEASE ORAL at 10:28

## 2021-01-01 RX ADMIN — INSULIN LISPRO 2 UNITS: 100 INJECTION, SOLUTION INTRAVENOUS; SUBCUTANEOUS at 08:29

## 2021-01-01 RX ADMIN — ATORVASTATIN CALCIUM 10 MG: 10 TABLET, FILM COATED ORAL at 21:55

## 2021-01-01 RX ADMIN — MIRTAZAPINE 7.5 MG: 15 TABLET, FILM COATED ORAL at 22:55

## 2021-01-01 RX ADMIN — INSULIN LISPRO 1 UNITS: 100 INJECTION, SOLUTION INTRAVENOUS; SUBCUTANEOUS at 08:58

## 2021-01-01 RX ADMIN — POTASSIUM CHLORIDE, DEXTROSE MONOHYDRATE AND SODIUM CHLORIDE: 150; 5; 450 INJECTION, SOLUTION INTRAVENOUS at 00:00

## 2021-01-01 RX ADMIN — ARFORMOTEROL TARTRATE 15 MCG: 15 SOLUTION RESPIRATORY (INHALATION) at 20:05

## 2021-01-01 RX ADMIN — SODIUM CHLORIDE: 9 INJECTION, SOLUTION INTRAVENOUS at 14:35

## 2021-01-01 RX ADMIN — MORPHINE SULFATE 10 MG: 10 CAPSULE, EXTENDED RELEASE ORAL at 20:43

## 2021-01-01 RX ADMIN — IPRATROPIUM BROMIDE 0.5 MG: 0.5 SOLUTION RESPIRATORY (INHALATION) at 09:23

## 2021-01-01 RX ADMIN — ACETAMINOPHEN 1000 MG: 500 TABLET ORAL at 01:54

## 2021-01-01 RX ADMIN — SILVER SULFADIAZINE: 10 CREAM TOPICAL at 07:01

## 2021-01-01 RX ADMIN — HEPARIN SODIUM 5000 UNITS: 10000 INJECTION, SOLUTION INTRAVENOUS; SUBCUTANEOUS at 14:51

## 2021-01-01 RX ADMIN — LACTULOSE 20 G: 20 SOLUTION ORAL at 13:00

## 2021-01-01 RX ADMIN — IPRATROPIUM BROMIDE 0.5 MG: 0.5 SOLUTION RESPIRATORY (INHALATION) at 17:50

## 2021-01-01 RX ADMIN — IPRATROPIUM BROMIDE 0.5 MG: 0.5 SOLUTION RESPIRATORY (INHALATION) at 19:40

## 2021-01-01 RX ADMIN — ARFORMOTEROL TARTRATE 15 MCG: 15 SOLUTION RESPIRATORY (INHALATION) at 20:30

## 2021-01-01 RX ADMIN — ACETAMINOPHEN 650 MG: 325 TABLET ORAL at 01:15

## 2021-01-01 RX ADMIN — GABAPENTIN 400 MG: 400 CAPSULE ORAL at 20:11

## 2021-01-01 RX ADMIN — GABAPENTIN 400 MG: 400 CAPSULE ORAL at 23:31

## 2021-01-01 RX ADMIN — FLUTICASONE PROPIONATE 1 SPRAY: 50 SPRAY, METERED NASAL at 21:55

## 2021-01-01 RX ADMIN — IPRATROPIUM BROMIDE 0.5 MG: 0.5 SOLUTION RESPIRATORY (INHALATION) at 12:17

## 2021-01-01 RX ADMIN — ATORVASTATIN CALCIUM 10 MG: 10 TABLET, FILM COATED ORAL at 11:07

## 2021-01-01 RX ADMIN — HYDROCODONE BITARTRATE AND ACETAMINOPHEN 1 TABLET: 10; 325 TABLET ORAL at 08:36

## 2021-01-01 RX ADMIN — DULOXETINE HYDROCHLORIDE 30 MG: 30 CAPSULE, DELAYED RELEASE ORAL at 14:56

## 2021-01-01 RX ADMIN — LACTULOSE 20 G: 20 SOLUTION ORAL at 00:53

## 2021-01-01 RX ADMIN — ACETAMINOPHEN 650 MG: 325 TABLET ORAL at 20:40

## 2021-01-01 RX ADMIN — LEVOTHYROXINE SODIUM 25 MCG: 25 TABLET ORAL at 08:19

## 2021-01-01 RX ADMIN — CEFEPIME HYDROCHLORIDE 1000 MG: 1 INJECTION, POWDER, FOR SOLUTION INTRAMUSCULAR; INTRAVENOUS at 15:20

## 2021-01-01 RX ADMIN — Medication 200 MG: at 09:54

## 2021-01-01 RX ADMIN — ROCURONIUM BROMIDE 100 MG: 10 INJECTION, SOLUTION INTRAVENOUS at 16:00

## 2021-01-01 RX ADMIN — LACTULOSE 20 G: 20 SOLUTION ORAL at 20:05

## 2021-01-01 RX ADMIN — SODIUM CHLORIDE, PRESERVATIVE FREE 10 ML: 5 INJECTION INTRAVENOUS at 21:58

## 2021-01-01 RX ADMIN — GABAPENTIN 400 MG: 400 CAPSULE ORAL at 10:26

## 2021-01-01 RX ADMIN — HEPARIN SODIUM 5000 UNITS: 10000 INJECTION, SOLUTION INTRAVENOUS; SUBCUTANEOUS at 06:32

## 2021-01-01 RX ADMIN — MENTHOL AND METHYL SALICYLATE: 7.6; 29 OINTMENT TOPICAL at 09:23

## 2021-01-01 RX ADMIN — ENOXAPARIN SODIUM 40 MG: 40 INJECTION SUBCUTANEOUS at 09:43

## 2021-01-01 RX ADMIN — ENOXAPARIN SODIUM 40 MG: 100 INJECTION SUBCUTANEOUS at 20:39

## 2021-01-01 RX ADMIN — Medication 200 MG: at 08:17

## 2021-01-01 RX ADMIN — ENOXAPARIN SODIUM 30 MG: 30 INJECTION SUBCUTANEOUS at 10:05

## 2021-01-01 RX ADMIN — HYDROCODONE BITARTRATE AND ACETAMINOPHEN 1 TABLET: 10; 325 TABLET ORAL at 06:49

## 2021-01-01 RX ADMIN — GABAPENTIN 400 MG: 400 CAPSULE ORAL at 14:24

## 2021-01-01 RX ADMIN — ALBUTEROL SULFATE 2.5 MG: 2.5 SOLUTION RESPIRATORY (INHALATION) at 21:30

## 2021-01-01 RX ADMIN — LACTULOSE 20 G: 20 SOLUTION ORAL at 08:27

## 2021-01-01 RX ADMIN — DULOXETINE HYDROCHLORIDE 60 MG: 60 CAPSULE, DELAYED RELEASE ORAL at 08:12

## 2021-01-01 RX ADMIN — ALLOPURINOL 300 MG: 300 TABLET ORAL at 10:28

## 2021-01-01 RX ADMIN — FERROUS SULFATE TAB 325 MG (65 MG ELEMENTAL FE) 325 MG: 325 (65 FE) TAB at 07:38

## 2021-01-01 RX ADMIN — BUDESONIDE 250 MCG: 0.25 SUSPENSION RESPIRATORY (INHALATION) at 20:05

## 2021-01-01 RX ADMIN — IPRATROPIUM BROMIDE 0.5 MG: 0.5 SOLUTION RESPIRATORY (INHALATION) at 13:17

## 2021-01-01 RX ADMIN — IPRATROPIUM BROMIDE 0.5 MG: 0.5 SOLUTION RESPIRATORY (INHALATION) at 21:29

## 2021-01-01 RX ADMIN — FERROUS SULFATE TAB 325 MG (65 MG ELEMENTAL FE) 325 MG: 325 (65 FE) TAB at 17:22

## 2021-01-01 RX ADMIN — DIPHENHYDRAMINE HYDROCHLORIDE 25 MG: 25 TABLET ORAL at 20:12

## 2021-01-01 RX ADMIN — HYDRALAZINE HYDROCHLORIDE 50 MG: 50 TABLET, FILM COATED ORAL at 22:56

## 2021-01-01 RX ADMIN — ISOSORBIDE MONONITRATE 120 MG: 60 TABLET, EXTENDED RELEASE ORAL at 10:16

## 2021-01-01 RX ADMIN — BUDESONIDE 250 MCG: 0.25 SUSPENSION RESPIRATORY (INHALATION) at 19:17

## 2021-01-01 RX ADMIN — IPRATROPIUM BROMIDE 0.5 MG: 0.5 SOLUTION RESPIRATORY (INHALATION) at 16:36

## 2021-01-01 RX ADMIN — HYDRALAZINE HYDROCHLORIDE 50 MG: 50 TABLET, FILM COATED ORAL at 14:24

## 2021-01-01 RX ADMIN — FERROUS SULFATE TAB 325 MG (65 MG ELEMENTAL FE) 325 MG: 325 (65 FE) TAB at 17:15

## 2021-01-01 RX ADMIN — SODIUM BICARBONATE: 84 INJECTION, SOLUTION INTRAVENOUS at 23:06

## 2021-01-01 RX ADMIN — ETOMIDATE 20 MG: 2 INJECTION, SOLUTION INTRAVENOUS at 09:12

## 2021-01-01 RX ADMIN — SODIUM CHLORIDE: 9 INJECTION, SOLUTION INTRAVENOUS at 08:30

## 2021-01-01 RX ADMIN — MORPHINE SULFATE 2 MG: 2 INJECTION, SOLUTION INTRAMUSCULAR; INTRAVENOUS at 19:52

## 2021-01-01 RX ADMIN — AMIODARONE HYDROCHLORIDE 300 MG: 50 INJECTION, SOLUTION INTRAVENOUS at 17:01

## 2021-01-01 RX ADMIN — DULOXETINE HYDROCHLORIDE 60 MG: 60 CAPSULE, DELAYED RELEASE ORAL at 08:07

## 2021-01-01 RX ADMIN — INSULIN LISPRO 1 UNITS: 100 INJECTION, SOLUTION INTRAVENOUS; SUBCUTANEOUS at 17:11

## 2021-01-01 RX ADMIN — SODIUM CHLORIDE, PRESERVATIVE FREE 10 ML: 5 INJECTION INTRAVENOUS at 09:25

## 2021-01-01 RX ADMIN — LEVOTHYROXINE SODIUM 25 MCG: 0.03 TABLET ORAL at 10:02

## 2021-01-01 RX ADMIN — ARFORMOTEROL TARTRATE 15 MCG: 15 SOLUTION RESPIRATORY (INHALATION) at 09:10

## 2021-01-01 RX ADMIN — GABAPENTIN 400 MG: 400 CAPSULE ORAL at 08:51

## 2021-01-01 RX ADMIN — SODIUM BICARBONATE 1300 MG: 650 TABLET ORAL at 09:16

## 2021-01-01 RX ADMIN — ARFORMOTEROL TARTRATE 15 MCG: 15 SOLUTION RESPIRATORY (INHALATION) at 09:23

## 2021-01-01 RX ADMIN — HYDROCODONE BITARTRATE AND ACETAMINOPHEN 1 TABLET: 10; 325 TABLET ORAL at 03:51

## 2021-01-01 RX ADMIN — INSULIN LISPRO 1 UNITS: 100 INJECTION, SOLUTION INTRAVENOUS; SUBCUTANEOUS at 11:53

## 2021-01-01 RX ADMIN — METOPROLOL SUCCINATE 50 MG: 50 TABLET, EXTENDED RELEASE ORAL at 09:57

## 2021-01-01 RX ADMIN — GABAPENTIN 400 MG: 400 CAPSULE ORAL at 09:54

## 2021-01-01 RX ADMIN — IPRATROPIUM BROMIDE 0.5 MG: 0.5 SOLUTION RESPIRATORY (INHALATION) at 21:53

## 2021-01-01 RX ADMIN — INSULIN LISPRO 2 UNITS: 100 INJECTION, SOLUTION INTRAVENOUS; SUBCUTANEOUS at 17:41

## 2021-01-01 RX ADMIN — GABAPENTIN 400 MG: 400 CAPSULE ORAL at 12:24

## 2021-01-01 RX ADMIN — BUDESONIDE 250 MCG: 0.25 SUSPENSION RESPIRATORY (INHALATION) at 07:49

## 2021-01-01 RX ADMIN — DIPHENHYDRAMINE HYDROCHLORIDE 25 MG: 25 TABLET ORAL at 20:27

## 2021-01-01 RX ADMIN — INSULIN LISPRO 4 UNITS: 100 INJECTION, SOLUTION INTRAVENOUS; SUBCUTANEOUS at 17:14

## 2021-01-01 RX ADMIN — DULOXETINE HYDROCHLORIDE 60 MG: 60 CAPSULE, DELAYED RELEASE ORAL at 08:10

## 2021-01-01 RX ADMIN — SODIUM CHLORIDE: 9 INJECTION, SOLUTION INTRAVENOUS at 20:29

## 2021-01-01 RX ADMIN — MEPERIDINE HYDROCHLORIDE 12.5 MG: 25 INJECTION, SOLUTION INTRAMUSCULAR; INTRAVENOUS; SUBCUTANEOUS at 14:15

## 2021-01-01 RX ADMIN — ARFORMOTEROL TARTRATE 15 MCG: 15 SOLUTION RESPIRATORY (INHALATION) at 21:05

## 2021-01-01 RX ADMIN — SODIUM CHLORIDE, PRESERVATIVE FREE 10 ML: 5 INJECTION INTRAVENOUS at 20:39

## 2021-01-01 RX ADMIN — SODIUM CHLORIDE: 9 INJECTION, SOLUTION INTRAVENOUS at 08:31

## 2021-01-01 RX ADMIN — SODIUM CHLORIDE, PRESERVATIVE FREE 300 UNITS: 5 INJECTION INTRAVENOUS at 20:28

## 2021-01-01 RX ADMIN — SODIUM CHLORIDE, PRESERVATIVE FREE 10 ML: 5 INJECTION INTRAVENOUS at 21:00

## 2021-01-01 RX ADMIN — METOPROLOL SUCCINATE 50 MG: 50 TABLET, EXTENDED RELEASE ORAL at 11:04

## 2021-01-01 RX ADMIN — Medication 200 MG: at 08:51

## 2021-01-01 RX ADMIN — Medication 200 MG: at 11:08

## 2021-01-01 RX ADMIN — FENTANYL CITRATE 100 MCG: 50 INJECTION INTRAMUSCULAR; INTRAVENOUS at 16:01

## 2021-01-01 RX ADMIN — HYDROXYZINE PAMOATE 25 MG: 25 CAPSULE ORAL at 10:00

## 2021-01-01 RX ADMIN — SODIUM BICARBONATE 50 MEQ: 84 INJECTION, SOLUTION INTRAVENOUS at 17:02

## 2021-01-01 RX ADMIN — ARFORMOTEROL TARTRATE 15 MCG: 15 SOLUTION RESPIRATORY (INHALATION) at 21:50

## 2021-01-01 RX ADMIN — ALBUTEROL SULFATE 2.5 MG: 2.5 SOLUTION RESPIRATORY (INHALATION) at 17:07

## 2021-01-01 RX ADMIN — HYDROXYZINE PAMOATE 25 MG: 25 CAPSULE ORAL at 14:24

## 2021-01-01 RX ADMIN — HYDRALAZINE HYDROCHLORIDE 50 MG: 50 TABLET, FILM COATED ORAL at 20:25

## 2021-01-01 RX ADMIN — HYDROXYZINE PAMOATE 25 MG: 25 CAPSULE ORAL at 16:21

## 2021-01-01 RX ADMIN — FERROUS SULFATE TAB 325 MG (65 MG ELEMENTAL FE) 325 MG: 325 (65 FE) TAB at 18:22

## 2021-01-01 RX ADMIN — SODIUM CHLORIDE 125 MG: 900 INJECTION INTRAVENOUS at 15:54

## 2021-01-01 RX ADMIN — Medication 10 ML: at 09:59

## 2021-01-01 RX ADMIN — PETROLATUM: 420 OINTMENT TOPICAL at 08:55

## 2021-01-01 RX ADMIN — ATORVASTATIN CALCIUM 10 MG: 10 TABLET, FILM COATED ORAL at 10:28

## 2021-01-01 RX ADMIN — LIDOCAINE HYDROCHLORIDE 20 MG: 20 INJECTION, SOLUTION INTRAVENOUS at 10:32

## 2021-01-01 RX ADMIN — METOPROLOL SUCCINATE 50 MG: 50 TABLET, EXTENDED RELEASE ORAL at 08:10

## 2021-01-01 RX ADMIN — FERROUS SULFATE TAB 325 MG (65 MG ELEMENTAL FE) 325 MG: 325 (65 FE) TAB at 09:09

## 2021-01-01 RX ADMIN — IPRATROPIUM BROMIDE 0.5 MG: 0.5 SOLUTION RESPIRATORY (INHALATION) at 20:05

## 2021-01-01 RX ADMIN — ARFORMOTEROL TARTRATE 15 MCG: 15 SOLUTION RESPIRATORY (INHALATION) at 08:55

## 2021-01-01 RX ADMIN — CIPROFLOXACIN 500 MG: 500 TABLET, FILM COATED ORAL at 10:28

## 2021-01-01 RX ADMIN — SODIUM CHLORIDE 1000 ML: 9 INJECTION, SOLUTION INTRAVENOUS at 14:17

## 2021-01-01 RX ADMIN — HYDROXYZINE PAMOATE 25 MG: 25 CAPSULE ORAL at 18:23

## 2021-01-01 RX ADMIN — INSULIN LISPRO 4 UNITS: 100 INJECTION, SOLUTION INTRAVENOUS; SUBCUTANEOUS at 17:50

## 2021-01-01 RX ADMIN — FERROUS SULFATE TAB 325 MG (65 MG ELEMENTAL FE) 325 MG: 325 (65 FE) TAB at 08:51

## 2021-01-01 RX ADMIN — ENOXAPARIN SODIUM 40 MG: 40 INJECTION SUBCUTANEOUS at 10:15

## 2021-01-01 RX ADMIN — SUCRALFATE 1 G: 1 TABLET ORAL at 17:41

## 2021-01-01 RX ADMIN — SUCRALFATE 1 G: 1 TABLET ORAL at 12:48

## 2021-01-01 RX ADMIN — ARFORMOTEROL TARTRATE 15 MCG: 15 SOLUTION RESPIRATORY (INHALATION) at 21:56

## 2021-01-01 RX ADMIN — SUCRALFATE 1 G: 1 TABLET ORAL at 23:34

## 2021-01-01 RX ADMIN — METOPROLOL SUCCINATE 50 MG: 50 TABLET, EXTENDED RELEASE ORAL at 08:34

## 2021-01-01 RX ADMIN — FENTANYL CITRATE 50 MCG: 50 INJECTION, SOLUTION INTRAMUSCULAR; INTRAVENOUS at 20:43

## 2021-01-01 RX ADMIN — Medication 35 MILLICURIE: at 13:08

## 2021-01-01 RX ADMIN — GABAPENTIN 400 MG: 400 CAPSULE ORAL at 07:46

## 2021-01-01 RX ADMIN — MEROPENEM 1000 MG: 1 INJECTION, POWDER, FOR SOLUTION INTRAVENOUS at 18:04

## 2021-01-01 RX ADMIN — SUCRALFATE 1 G: 1 TABLET ORAL at 16:17

## 2021-01-01 RX ADMIN — SODIUM CHLORIDE: 9 INJECTION, SOLUTION INTRAVENOUS at 04:01

## 2021-01-01 RX ADMIN — HYDROXYZINE PAMOATE 25 MG: 25 CAPSULE ORAL at 23:29

## 2021-01-01 RX ADMIN — IPRATROPIUM BROMIDE 0.5 MG: 0.5 SOLUTION RESPIRATORY (INHALATION) at 12:51

## 2021-01-01 RX ADMIN — INSULIN LISPRO 2 UNITS: 100 INJECTION, SOLUTION INTRAVENOUS; SUBCUTANEOUS at 12:58

## 2021-01-01 RX ADMIN — INSULIN GLARGINE 22 UNITS: 100 INJECTION, SOLUTION SUBCUTANEOUS at 21:17

## 2021-01-01 RX ADMIN — DEXTROSE AND SODIUM CHLORIDE: 5; 450 INJECTION, SOLUTION INTRAVENOUS at 05:52

## 2021-01-01 RX ADMIN — DIPHENHYDRAMINE HYDROCHLORIDE 25 MG: 25 TABLET ORAL at 20:35

## 2021-01-01 RX ADMIN — HYDRALAZINE HYDROCHLORIDE 25 MG: 25 TABLET, FILM COATED ORAL at 21:32

## 2021-01-01 RX ADMIN — LACTULOSE 20 G: 20 SOLUTION ORAL at 08:51

## 2021-01-01 RX ADMIN — GABAPENTIN 400 MG: 400 CAPSULE ORAL at 08:07

## 2021-01-01 RX ADMIN — IPRATROPIUM BROMIDE 0.5 MG: 0.5 SOLUTION RESPIRATORY (INHALATION) at 11:51

## 2021-01-01 RX ADMIN — HYDROXYZINE PAMOATE 25 MG: 25 CAPSULE ORAL at 09:15

## 2021-01-01 RX ADMIN — INSULIN LISPRO 1 UNITS: 100 INJECTION, SOLUTION INTRAVENOUS; SUBCUTANEOUS at 17:34

## 2021-01-01 RX ADMIN — HYDRALAZINE HYDROCHLORIDE 50 MG: 50 TABLET, FILM COATED ORAL at 22:12

## 2021-01-01 RX ADMIN — ASPIRIN 81 MG: 81 TABLET, COATED ORAL at 07:38

## 2021-01-01 RX ADMIN — HYDROCODONE BITARTRATE AND ACETAMINOPHEN 1 TABLET: 7.5; 325 TABLET ORAL at 08:34

## 2021-01-01 RX ADMIN — METOPROLOL SUCCINATE 50 MG: 25 TABLET, EXTENDED RELEASE ORAL at 10:02

## 2021-01-01 RX ADMIN — VANCOMYCIN HYDROCHLORIDE 1500 MG: 10 INJECTION, POWDER, LYOPHILIZED, FOR SOLUTION INTRAVENOUS at 12:01

## 2021-01-01 RX ADMIN — IPRATROPIUM BROMIDE 0.5 MG: 0.5 SOLUTION RESPIRATORY (INHALATION) at 09:13

## 2021-01-01 RX ADMIN — MORPHINE SULFATE 10 MG: 10 CAPSULE, EXTENDED RELEASE ORAL at 10:05

## 2021-01-01 RX ADMIN — INSULIN LISPRO 1 UNITS: 100 INJECTION, SOLUTION INTRAVENOUS; SUBCUTANEOUS at 12:59

## 2021-01-01 RX ADMIN — ALLOPURINOL 300 MG: 300 TABLET ORAL at 11:07

## 2021-01-01 RX ADMIN — INSULIN LISPRO 2 UNITS: 100 INJECTION, SOLUTION INTRAVENOUS; SUBCUTANEOUS at 08:19

## 2021-01-01 RX ADMIN — BUDESONIDE 250 MCG: 0.25 SUSPENSION RESPIRATORY (INHALATION) at 21:29

## 2021-01-01 RX ADMIN — HYDRALAZINE HYDROCHLORIDE 50 MG: 50 TABLET, FILM COATED ORAL at 14:51

## 2021-01-01 RX ADMIN — SUCRALFATE 1 G: 1 TABLET ORAL at 23:28

## 2021-01-01 RX ADMIN — ISOSORBIDE MONONITRATE 120 MG: 60 TABLET, EXTENDED RELEASE ORAL at 08:19

## 2021-01-01 RX ADMIN — MIRTAZAPINE 7.5 MG: 15 TABLET, FILM COATED ORAL at 23:42

## 2021-01-01 RX ADMIN — INSULIN LISPRO 1 UNITS: 100 INJECTION, SOLUTION INTRAVENOUS; SUBCUTANEOUS at 20:35

## 2021-01-01 RX ADMIN — LEVOTHYROXINE SODIUM 25 MCG: 25 TABLET ORAL at 06:20

## 2021-01-01 RX ADMIN — INSULIN LISPRO 1 UNITS: 100 INJECTION, SOLUTION INTRAVENOUS; SUBCUTANEOUS at 06:28

## 2021-01-01 RX ADMIN — FERROUS SULFATE TAB 325 MG (65 MG ELEMENTAL FE) 325 MG: 325 (65 FE) TAB at 10:24

## 2021-01-01 RX ADMIN — HYDRALAZINE HYDROCHLORIDE 50 MG: 50 TABLET, FILM COATED ORAL at 05:57

## 2021-01-01 RX ADMIN — METOPROLOL SUCCINATE 50 MG: 50 TABLET, EXTENDED RELEASE ORAL at 08:57

## 2021-01-01 RX ADMIN — METOLAZONE 2.5 MG: 2.5 TABLET ORAL at 07:02

## 2021-01-01 RX ADMIN — GABAPENTIN 200 MG: 100 CAPSULE ORAL at 22:12

## 2021-01-01 RX ADMIN — BUDESONIDE 250 MCG: 0.25 SUSPENSION RESPIRATORY (INHALATION) at 09:03

## 2021-01-01 RX ADMIN — HYDROCODONE BITARTRATE AND ACETAMINOPHEN 1 TABLET: 10; 325 TABLET ORAL at 05:48

## 2021-01-01 RX ADMIN — HYDROXYZINE PAMOATE 25 MG: 25 CAPSULE ORAL at 14:02

## 2021-01-01 RX ADMIN — GABAPENTIN 400 MG: 400 CAPSULE ORAL at 17:13

## 2021-01-01 RX ADMIN — DIPHENHYDRAMINE HYDROCHLORIDE 25 MG: 25 TABLET ORAL at 23:41

## 2021-01-01 RX ADMIN — ASPIRIN 81 MG: 81 TABLET, COATED ORAL at 08:19

## 2021-01-01 RX ADMIN — ISOSORBIDE MONONITRATE 120 MG: 60 TABLET ORAL at 08:56

## 2021-01-01 RX ADMIN — ISOSORBIDE MONONITRATE 120 MG: 60 TABLET ORAL at 09:54

## 2021-01-01 RX ADMIN — MIRTAZAPINE 7.5 MG: 15 TABLET, FILM COATED ORAL at 20:27

## 2021-01-01 RX ADMIN — SODIUM BICARBONATE 1300 MG: 650 TABLET ORAL at 23:52

## 2021-01-01 RX ADMIN — MORPHINE SULFATE 2 MG: 2 INJECTION, SOLUTION INTRAMUSCULAR; INTRAVENOUS at 11:48

## 2021-01-01 RX ADMIN — MIRTAZAPINE 7.5 MG: 15 TABLET, FILM COATED ORAL at 23:29

## 2021-01-01 RX ADMIN — HYDRALAZINE HYDROCHLORIDE 50 MG: 50 TABLET, FILM COATED ORAL at 21:48

## 2021-01-01 RX ADMIN — ARFORMOTEROL TARTRATE 15 MCG: 15 SOLUTION RESPIRATORY (INHALATION) at 10:36

## 2021-01-01 RX ADMIN — Medication 10 ML: at 23:42

## 2021-01-01 RX ADMIN — ALLOPURINOL 300 MG: 300 TABLET ORAL at 08:06

## 2021-01-01 RX ADMIN — PANTOPRAZOLE SODIUM 40 MG: 40 INJECTION, POWDER, FOR SOLUTION INTRAVENOUS at 09:38

## 2021-01-01 RX ADMIN — ALBUMIN (HUMAN) 25 G: 12.5 INJECTION, SOLUTION INTRAVENOUS at 09:37

## 2021-01-01 RX ADMIN — ISOSORBIDE MONONITRATE 120 MG: 60 TABLET, EXTENDED RELEASE ORAL at 09:18

## 2021-01-01 RX ADMIN — Medication 200 MG: at 10:25

## 2021-01-01 RX ADMIN — INSULIN GLARGINE 22 UNITS: 100 INJECTION, SOLUTION SUBCUTANEOUS at 09:21

## 2021-01-01 RX ADMIN — DEXTROSE MONOHYDRATE 12.5 G: 25 INJECTION, SOLUTION INTRAVENOUS at 20:33

## 2021-01-01 RX ADMIN — METOPROLOL SUCCINATE 50 MG: 50 TABLET, EXTENDED RELEASE ORAL at 09:16

## 2021-01-01 RX ADMIN — LEVOTHYROXINE SODIUM 25 MCG: 0.03 TABLET ORAL at 07:38

## 2021-01-01 RX ADMIN — BUDESONIDE 250 MCG: 0.25 SUSPENSION RESPIRATORY (INHALATION) at 08:02

## 2021-01-01 RX ADMIN — ATORVASTATIN CALCIUM 10 MG: 10 TABLET, FILM COATED ORAL at 09:07

## 2021-01-01 RX ADMIN — SUCRALFATE 1 G: 1 TABLET ORAL at 23:10

## 2021-01-01 RX ADMIN — ARFORMOTEROL TARTRATE 15 MCG: 15 SOLUTION RESPIRATORY (INHALATION) at 09:07

## 2021-01-01 RX ADMIN — INSULIN LISPRO 4 UNITS: 100 INJECTION, SOLUTION INTRAVENOUS; SUBCUTANEOUS at 11:50

## 2021-01-01 RX ADMIN — HYDROCODONE BITARTRATE AND ACETAMINOPHEN 1 TABLET: 10; 325 TABLET ORAL at 13:00

## 2021-01-01 RX ADMIN — GABAPENTIN 400 MG: 400 CAPSULE ORAL at 17:48

## 2021-01-01 RX ADMIN — INSULIN LISPRO 1 UNITS: 100 INJECTION, SOLUTION INTRAVENOUS; SUBCUTANEOUS at 11:21

## 2021-01-01 RX ADMIN — ARFORMOTEROL TARTRATE 15 MCG: 15 SOLUTION RESPIRATORY (INHALATION) at 10:32

## 2021-01-01 RX ADMIN — ACETYLCYSTEINE 400 MG: 100 SOLUTION ORAL; RESPIRATORY (INHALATION) at 02:15

## 2021-01-01 RX ADMIN — HYDROCODONE BITARTRATE AND ACETAMINOPHEN 1 TABLET: 10; 325 TABLET ORAL at 18:04

## 2021-01-01 RX ADMIN — Medication 1 MG: at 16:56

## 2021-01-01 RX ADMIN — HYDRALAZINE HYDROCHLORIDE 50 MG: 50 TABLET, FILM COATED ORAL at 21:21

## 2021-01-01 RX ADMIN — GABAPENTIN 200 MG: 100 CAPSULE ORAL at 10:15

## 2021-01-01 RX ADMIN — HYDRALAZINE HYDROCHLORIDE 50 MG: 50 TABLET, FILM COATED ORAL at 15:55

## 2021-01-01 RX ADMIN — FERROUS SULFATE TAB 325 MG (65 MG ELEMENTAL FE) 325 MG: 325 (65 FE) TAB at 08:26

## 2021-01-01 RX ADMIN — FERROUS SULFATE TAB 325 MG (65 MG ELEMENTAL FE) 325 MG: 325 (65 FE) TAB at 09:07

## 2021-01-01 RX ADMIN — SUCRALFATE 1 G: 1 TABLET ORAL at 08:18

## 2021-01-01 RX ADMIN — SUCRALFATE 1 G: 1 TABLET ORAL at 12:58

## 2021-01-01 RX ADMIN — FENTANYL CITRATE 25 MCG: 50 INJECTION, SOLUTION INTRAMUSCULAR; INTRAVENOUS at 13:00

## 2021-01-01 RX ADMIN — SUCRALFATE 1 G: 1 TABLET ORAL at 13:08

## 2021-01-01 RX ADMIN — INSULIN HUMAN 10 UNITS: 100 INJECTION, SOLUTION PARENTERAL at 08:53

## 2021-01-01 RX ADMIN — ALBUTEROL SULFATE 2.5 MG: 2.5 SOLUTION RESPIRATORY (INHALATION) at 21:45

## 2021-01-01 RX ADMIN — ALLOPURINOL 300 MG: 300 TABLET ORAL at 09:24

## 2021-01-01 RX ADMIN — LINEZOLID 600 MG: 600 TABLET, FILM COATED ORAL at 10:28

## 2021-01-01 RX ADMIN — LACTULOSE 20 G: 20 SOLUTION ORAL at 16:13

## 2021-01-01 RX ADMIN — POTASSIUM CHLORIDE 40 MEQ: 1500 TABLET, EXTENDED RELEASE ORAL at 09:08

## 2021-01-01 RX ADMIN — GABAPENTIN 200 MG: 100 CAPSULE ORAL at 13:00

## 2021-01-01 RX ADMIN — FERROUS SULFATE TAB 325 MG (65 MG ELEMENTAL FE) 325 MG: 325 (65 FE) TAB at 07:55

## 2021-01-01 RX ADMIN — HYDROCODONE BITARTRATE AND ACETAMINOPHEN 1 TABLET: 10; 325 TABLET ORAL at 00:19

## 2021-01-01 RX ADMIN — CEFAZOLIN 2000 MG: 1 INJECTION, POWDER, FOR SOLUTION INTRAMUSCULAR; INTRAVENOUS at 09:10

## 2021-01-01 RX ADMIN — LEVOTHYROXINE SODIUM 25 MCG: 0.03 TABLET ORAL at 05:26

## 2021-01-01 RX ADMIN — GABAPENTIN 200 MG: 100 CAPSULE ORAL at 14:55

## 2021-01-01 RX ADMIN — SODIUM CHLORIDE, PRESERVATIVE FREE 10 ML: 5 INJECTION INTRAVENOUS at 20:42

## 2021-01-01 RX ADMIN — ASPIRIN 81 MG: 81 TABLET, COATED ORAL at 09:18

## 2021-01-01 RX ADMIN — HYDRALAZINE HYDROCHLORIDE 50 MG: 50 TABLET, FILM COATED ORAL at 23:09

## 2021-01-01 RX ADMIN — HYDRALAZINE HYDROCHLORIDE 50 MG: 50 TABLET, FILM COATED ORAL at 13:43

## 2021-01-01 RX ADMIN — IPRATROPIUM BROMIDE 0.5 MG: 0.5 SOLUTION RESPIRATORY (INHALATION) at 19:45

## 2021-01-01 RX ADMIN — MEROPENEM 1000 MG: 1 INJECTION, POWDER, FOR SOLUTION INTRAVENOUS at 17:36

## 2021-01-01 RX ADMIN — Medication 200 MG: at 08:25

## 2021-01-01 RX ADMIN — SODIUM CHLORIDE, PRESERVATIVE FREE 300 UNITS: 5 INJECTION INTRAVENOUS at 00:02

## 2021-01-01 RX ADMIN — HYDROXYZINE PAMOATE 25 MG: 25 CAPSULE ORAL at 12:58

## 2021-01-01 RX ADMIN — METOPROLOL SUCCINATE 50 MG: 50 TABLET, EXTENDED RELEASE ORAL at 08:51

## 2021-01-01 RX ADMIN — Medication 11 MILLICURIE: at 11:28

## 2021-01-01 RX ADMIN — IPRATROPIUM BROMIDE 0.5 MG: 0.5 SOLUTION RESPIRATORY (INHALATION) at 09:03

## 2021-01-01 RX ADMIN — SODIUM CHLORIDE: 9 INJECTION, SOLUTION INTRAVENOUS at 11:30

## 2021-01-01 RX ADMIN — DULOXETINE HYDROCHLORIDE 60 MG: 60 CAPSULE, DELAYED RELEASE ORAL at 09:56

## 2021-01-01 RX ADMIN — HYDROCODONE BITARTRATE AND ACETAMINOPHEN 5 ML: 7.5; 325 SOLUTION ORAL at 12:44

## 2021-01-01 RX ADMIN — DULOXETINE HYDROCHLORIDE 60 MG: 60 CAPSULE, DELAYED RELEASE ORAL at 08:51

## 2021-01-01 RX ADMIN — Medication 10 ML: at 12:46

## 2021-01-01 RX ADMIN — ASPIRIN 81 MG: 81 TABLET, COATED ORAL at 09:57

## 2021-01-01 RX ADMIN — DULOXETINE HYDROCHLORIDE 30 MG: 30 CAPSULE, DELAYED RELEASE ORAL at 09:17

## 2021-01-01 RX ADMIN — Medication 200 MG: at 09:15

## 2021-01-01 RX ADMIN — ARFORMOTEROL TARTRATE 15 MCG: 15 SOLUTION RESPIRATORY (INHALATION) at 09:53

## 2021-01-01 RX ADMIN — DAPTOMYCIN 550 MG: 500 INJECTION, POWDER, LYOPHILIZED, FOR SOLUTION INTRAVENOUS at 14:15

## 2021-01-01 RX ADMIN — ALLOPURINOL 300 MG: 300 TABLET ORAL at 08:12

## 2021-01-01 RX ADMIN — ARFORMOTEROL TARTRATE 15 MCG: 15 SOLUTION RESPIRATORY (INHALATION) at 20:47

## 2021-01-01 RX ADMIN — SODIUM CHLORIDE, PRESERVATIVE FREE 10 ML: 5 INJECTION INTRAVENOUS at 09:56

## 2021-01-01 RX ADMIN — GABAPENTIN 400 MG: 400 CAPSULE ORAL at 08:42

## 2021-01-01 RX ADMIN — SODIUM BICARBONATE 1300 MG: 650 TABLET ORAL at 23:31

## 2021-01-01 RX ADMIN — HYDRALAZINE HYDROCHLORIDE 50 MG: 50 TABLET, FILM COATED ORAL at 06:07

## 2021-01-01 RX ADMIN — CALCIUM GLUCONATE 1000 MG: 98 INJECTION, SOLUTION INTRAVENOUS at 08:52

## 2021-01-01 RX ADMIN — SODIUM CHLORIDE: 4.5 INJECTION, SOLUTION INTRAVENOUS at 05:11

## 2021-01-01 RX ADMIN — METOLAZONE 2.5 MG: 2.5 TABLET ORAL at 00:53

## 2021-01-01 RX ADMIN — SODIUM CHLORIDE 18.45 UNITS/HR: 9 INJECTION, SOLUTION INTRAVENOUS at 09:37

## 2021-01-01 RX ADMIN — MORPHINE SULFATE 15 MG: 15 TABLET, FILM COATED, EXTENDED RELEASE ORAL at 20:25

## 2021-01-01 RX ADMIN — HYDROCODONE BITARTRATE AND ACETAMINOPHEN 1 TABLET: 7.5; 325 TABLET ORAL at 23:24

## 2021-01-01 RX ADMIN — BUDESONIDE 250 MCG: 0.25 SUSPENSION RESPIRATORY (INHALATION) at 21:57

## 2021-01-01 RX ADMIN — CIPROFLOXACIN 500 MG: 500 TABLET, FILM COATED ORAL at 14:24

## 2021-01-01 RX ADMIN — ACETYLCYSTEINE 400 MG: 100 SOLUTION ORAL; RESPIRATORY (INHALATION) at 21:45

## 2021-01-01 RX ADMIN — PETROLATUM: 420 OINTMENT TOPICAL at 09:58

## 2021-01-01 RX ADMIN — ALBUTEROL SULFATE 2.5 MG: 2.5 SOLUTION RESPIRATORY (INHALATION) at 17:35

## 2021-01-01 RX ADMIN — FERROUS SULFATE TAB 325 MG (65 MG ELEMENTAL FE) 325 MG: 325 (65 FE) TAB at 17:11

## 2021-01-01 RX ADMIN — INSULIN HUMAN 10 UNITS: 100 INJECTION, SOLUTION PARENTERAL at 06:32

## 2021-01-01 RX ADMIN — HYDRALAZINE HYDROCHLORIDE 50 MG: 50 TABLET, FILM COATED ORAL at 15:10

## 2021-01-01 RX ADMIN — FERROUS SULFATE TAB 325 MG (65 MG ELEMENTAL FE) 325 MG: 325 (65 FE) TAB at 17:14

## 2021-01-01 RX ADMIN — ARFORMOTEROL TARTRATE 15 MCG: 15 SOLUTION RESPIRATORY (INHALATION) at 20:57

## 2021-01-01 RX ADMIN — METOPROLOL SUCCINATE 50 MG: 25 TABLET, EXTENDED RELEASE ORAL at 09:07

## 2021-01-01 RX ADMIN — GABAPENTIN 400 MG: 400 CAPSULE ORAL at 17:36

## 2021-01-01 RX ADMIN — SUCRALFATE 1 G: 1 TABLET ORAL at 17:47

## 2021-01-01 RX ADMIN — SODIUM CHLORIDE, PRESERVATIVE FREE 300 UNITS: 5 INJECTION INTRAVENOUS at 08:19

## 2021-01-01 RX ADMIN — ACETAMINOPHEN 650 MG: 325 TABLET ORAL at 17:21

## 2021-01-01 RX ADMIN — MORPHINE SULFATE 10 MG: 10 CAPSULE, EXTENDED RELEASE ORAL at 09:19

## 2021-01-01 RX ADMIN — GABAPENTIN 400 MG: 400 CAPSULE ORAL at 20:42

## 2021-01-01 RX ADMIN — BUDESONIDE 250 MCG: 0.25 SUSPENSION RESPIRATORY (INHALATION) at 21:13

## 2021-01-01 RX ADMIN — Medication 2000 UNITS: at 15:55

## 2021-01-01 RX ADMIN — INSULIN GLARGINE 20 UNITS: 100 INJECTION, SOLUTION SUBCUTANEOUS at 09:20

## 2021-01-01 RX ADMIN — HYDRALAZINE HYDROCHLORIDE 50 MG: 50 TABLET, FILM COATED ORAL at 21:22

## 2021-01-01 RX ADMIN — INSULIN GLARGINE 22 UNITS: 100 INJECTION, SOLUTION SUBCUTANEOUS at 09:28

## 2021-01-01 RX ADMIN — SODIUM CHLORIDE 25 MG: 9 INJECTION, SOLUTION INTRAVENOUS at 14:58

## 2021-01-01 RX ADMIN — INSULIN GLARGINE 20 UNITS: 100 INJECTION, SOLUTION SUBCUTANEOUS at 20:41

## 2021-01-01 RX ADMIN — MEROPENEM 1000 MG: 1 INJECTION, POWDER, FOR SOLUTION INTRAVENOUS at 16:46

## 2021-01-01 RX ADMIN — IPRATROPIUM BROMIDE 0.5 MG: 0.5 SOLUTION RESPIRATORY (INHALATION) at 12:32

## 2021-01-01 RX ADMIN — FERROUS SULFATE TAB 325 MG (65 MG ELEMENTAL FE) 325 MG: 325 (65 FE) TAB at 20:10

## 2021-01-01 RX ADMIN — PETROLATUM: 420 OINTMENT TOPICAL at 15:09

## 2021-01-01 RX ADMIN — HYDRALAZINE HYDROCHLORIDE 50 MG: 50 TABLET, FILM COATED ORAL at 21:51

## 2021-01-01 RX ADMIN — INSULIN GLARGINE 22 UNITS: 100 INJECTION, SOLUTION SUBCUTANEOUS at 08:16

## 2021-01-01 RX ADMIN — DULOXETINE HYDROCHLORIDE 60 MG: 60 CAPSULE, DELAYED RELEASE ORAL at 09:54

## 2021-01-01 RX ADMIN — ALLOPURINOL 300 MG: 300 TABLET ORAL at 10:04

## 2021-01-01 RX ADMIN — GABAPENTIN 400 MG: 400 CAPSULE ORAL at 18:00

## 2021-01-01 RX ADMIN — GABAPENTIN 400 MG: 400 CAPSULE ORAL at 08:26

## 2021-01-01 RX ADMIN — PETROLATUM: 420 OINTMENT TOPICAL at 21:00

## 2021-01-01 RX ADMIN — SODIUM BICARBONATE 1300 MG: 650 TABLET ORAL at 09:53

## 2021-01-01 RX ADMIN — INSULIN LISPRO 1 UNITS: 100 INJECTION, SOLUTION INTRAVENOUS; SUBCUTANEOUS at 12:29

## 2021-01-01 RX ADMIN — VANCOMYCIN HYDROCHLORIDE 2000 MG: 10 INJECTION, POWDER, LYOPHILIZED, FOR SOLUTION INTRAVENOUS at 23:05

## 2021-01-01 RX ADMIN — IPRATROPIUM BROMIDE AND ALBUTEROL SULFATE 1 AMPULE: .5; 2.5 SOLUTION RESPIRATORY (INHALATION) at 16:16

## 2021-01-01 RX ADMIN — ATORVASTATIN CALCIUM 10 MG: 10 TABLET, FILM COATED ORAL at 09:15

## 2021-01-01 RX ADMIN — DAPTOMYCIN 550 MG: 500 INJECTION, POWDER, LYOPHILIZED, FOR SOLUTION INTRAVENOUS at 13:07

## 2021-01-01 RX ADMIN — SODIUM CHLORIDE, PRESERVATIVE FREE 300 UNITS: 5 INJECTION INTRAVENOUS at 20:13

## 2021-01-01 RX ADMIN — GABAPENTIN 200 MG: 100 CAPSULE ORAL at 22:11

## 2021-01-01 RX ADMIN — INSULIN LISPRO 1 UNITS: 100 INJECTION, SOLUTION INTRAVENOUS; SUBCUTANEOUS at 06:07

## 2021-01-01 RX ADMIN — SODIUM CHLORIDE 500 ML: 9 INJECTION, SOLUTION INTRAVENOUS at 05:16

## 2021-01-01 RX ADMIN — IPRATROPIUM BROMIDE 0.5 MG: 0.5 SOLUTION RESPIRATORY (INHALATION) at 21:05

## 2021-01-01 RX ADMIN — SUCRALFATE 1 G: 1 TABLET ORAL at 09:07

## 2021-01-01 RX ADMIN — METOPROLOL SUCCINATE 50 MG: 25 TABLET, EXTENDED RELEASE ORAL at 09:18

## 2021-01-01 RX ADMIN — ARFORMOTEROL TARTRATE 15 MCG: 15 SOLUTION RESPIRATORY (INHALATION) at 20:44

## 2021-01-01 RX ADMIN — ALBUMIN (HUMAN) 25 G: 0.25 INJECTION, SOLUTION INTRAVENOUS at 14:52

## 2021-01-01 RX ADMIN — ISOSORBIDE MONONITRATE 120 MG: 60 TABLET ORAL at 08:51

## 2021-01-01 RX ADMIN — INSULIN LISPRO 2 UNITS: 100 INJECTION, SOLUTION INTRAVENOUS; SUBCUTANEOUS at 08:15

## 2021-01-01 RX ADMIN — HYDRALAZINE HYDROCHLORIDE 10 MG: 10 TABLET, FILM COATED ORAL at 17:36

## 2021-01-01 RX ADMIN — SODIUM CHLORIDE 500 ML: 9 INJECTION, SOLUTION INTRAVENOUS at 19:11

## 2021-01-01 RX ADMIN — DULOXETINE HYDROCHLORIDE 60 MG: 60 CAPSULE, DELAYED RELEASE ORAL at 09:15

## 2021-01-01 RX ADMIN — BUMETANIDE 1 MG: 0.25 INJECTION, SOLUTION INTRAMUSCULAR; INTRAVENOUS at 09:09

## 2021-01-01 RX ADMIN — METOPROLOL SUCCINATE 50 MG: 50 TABLET, EXTENDED RELEASE ORAL at 10:26

## 2021-01-01 RX ADMIN — INSULIN LISPRO 2 UNITS: 100 INJECTION, SOLUTION INTRAVENOUS; SUBCUTANEOUS at 12:09

## 2021-01-01 RX ADMIN — INSULIN LISPRO 1 UNITS: 100 INJECTION, SOLUTION INTRAVENOUS; SUBCUTANEOUS at 22:50

## 2021-01-01 RX ADMIN — INSULIN LISPRO 2 UNITS: 100 INJECTION, SOLUTION INTRAVENOUS; SUBCUTANEOUS at 08:52

## 2021-01-01 RX ADMIN — DAPTOMYCIN 700 MG: 500 INJECTION, POWDER, LYOPHILIZED, FOR SOLUTION INTRAVENOUS at 11:49

## 2021-01-01 RX ADMIN — FENTANYL CITRATE 25 MCG: 0.05 INJECTION, SOLUTION INTRAMUSCULAR; INTRAVENOUS at 03:49

## 2021-01-01 RX ADMIN — GABAPENTIN 200 MG: 100 CAPSULE ORAL at 16:46

## 2021-01-01 RX ADMIN — HYDROXYZINE PAMOATE 25 MG: 25 CAPSULE ORAL at 20:35

## 2021-01-01 RX ADMIN — LIDOCAINE HYDROCHLORIDE 60 MG: 20 INJECTION, SOLUTION INTRAVENOUS at 09:12

## 2021-01-01 RX ADMIN — HYDRALAZINE HYDROCHLORIDE 10 MG: 10 TABLET, FILM COATED ORAL at 01:12

## 2021-01-01 RX ADMIN — ACETAMINOPHEN 650 MG: 325 TABLET ORAL at 10:25

## 2021-01-01 RX ADMIN — BUDESONIDE 250 MCG: 0.25 SUSPENSION RESPIRATORY (INHALATION) at 21:54

## 2021-01-01 RX ADMIN — ASPIRIN 81 MG: 81 TABLET, COATED ORAL at 10:16

## 2021-01-01 RX ADMIN — BUMETANIDE 2 MG: 0.25 INJECTION INTRAMUSCULAR; INTRAVENOUS at 10:24

## 2021-01-01 RX ADMIN — BUDESONIDE 250 MCG: 0.25 SUSPENSION RESPIRATORY (INHALATION) at 09:07

## 2021-01-01 RX ADMIN — SUCRALFATE 1 G: 1 TABLET ORAL at 16:46

## 2021-01-01 RX ADMIN — IPRATROPIUM BROMIDE 0.5 MG: 0.5 SOLUTION RESPIRATORY (INHALATION) at 17:20

## 2021-01-01 RX ADMIN — PANTOPRAZOLE SODIUM 40 MG: 40 TABLET, DELAYED RELEASE ORAL at 20:36

## 2021-01-01 RX ADMIN — MAGNESIUM SULFATE HEPTAHYDRATE 2000 MG: 40 INJECTION, SOLUTION INTRAVENOUS at 03:16

## 2021-01-01 RX ADMIN — IPRATROPIUM BROMIDE 0.5 MG: 0.5 SOLUTION RESPIRATORY (INHALATION) at 16:10

## 2021-01-01 RX ADMIN — HYDROCODONE BITARTRATE AND ACETAMINOPHEN 1 TABLET: 7.5; 325 TABLET ORAL at 11:15

## 2021-01-01 RX ADMIN — MIRTAZAPINE 7.5 MG: 15 TABLET, FILM COATED ORAL at 23:44

## 2021-01-01 RX ADMIN — SODIUM CHLORIDE, POTASSIUM CHLORIDE, SODIUM LACTATE AND CALCIUM CHLORIDE 1000 ML: 600; 310; 30; 20 INJECTION, SOLUTION INTRAVENOUS at 09:57

## 2021-01-01 RX ADMIN — GABAPENTIN 200 MG: 100 CAPSULE ORAL at 21:32

## 2021-01-01 RX ADMIN — SODIUM CHLORIDE: 9 INJECTION, SOLUTION INTRAVENOUS at 08:55

## 2021-01-01 RX ADMIN — Medication 3 MG: at 11:48

## 2021-01-01 RX ADMIN — LEVOTHYROXINE SODIUM 25 MCG: 0.03 TABLET ORAL at 05:46

## 2021-01-01 RX ADMIN — SODIUM CHLORIDE, PRESERVATIVE FREE 10 ML: 5 INJECTION INTRAVENOUS at 10:01

## 2021-01-01 RX ADMIN — GABAPENTIN 200 MG: 100 CAPSULE ORAL at 14:36

## 2021-01-01 RX ADMIN — GABAPENTIN 200 MG: 100 CAPSULE ORAL at 23:09

## 2021-01-01 RX ADMIN — POTASSIUM CHLORIDE, DEXTROSE MONOHYDRATE AND SODIUM CHLORIDE: 150; 5; 450 INJECTION, SOLUTION INTRAVENOUS at 10:32

## 2021-01-01 RX ADMIN — IPRATROPIUM BROMIDE 0.5 MG: 0.5 SOLUTION RESPIRATORY (INHALATION) at 20:44

## 2021-01-01 RX ADMIN — GABAPENTIN 400 MG: 400 CAPSULE ORAL at 08:34

## 2021-01-01 RX ADMIN — HYDRALAZINE HYDROCHLORIDE 50 MG: 50 TABLET, FILM COATED ORAL at 13:08

## 2021-01-01 RX ADMIN — PANTOPRAZOLE SODIUM 40 MG: 40 TABLET, DELAYED RELEASE ORAL at 05:46

## 2021-01-01 RX ADMIN — SODIUM CHLORIDE: 9 INJECTION, SOLUTION INTRAVENOUS at 15:02

## 2021-01-01 RX ADMIN — SODIUM CHLORIDE: 9 INJECTION, SOLUTION INTRAVENOUS at 05:25

## 2021-01-01 RX ADMIN — DEXTROSE MONOHYDRATE 25 G: 25 INJECTION, SOLUTION INTRAVENOUS at 08:52

## 2021-01-01 RX ADMIN — METOPROLOL SUCCINATE 50 MG: 25 TABLET, EXTENDED RELEASE ORAL at 12:14

## 2021-01-01 RX ADMIN — BUDESONIDE 250 MCG: 0.25 SUSPENSION RESPIRATORY (INHALATION) at 08:23

## 2021-01-01 RX ADMIN — MORPHINE SULFATE 2 MG: 2 INJECTION, SOLUTION INTRAMUSCULAR; INTRAVENOUS at 11:44

## 2021-01-01 RX ADMIN — SODIUM CHLORIDE, PRESERVATIVE FREE 10 ML: 5 INJECTION INTRAVENOUS at 20:25

## 2021-01-01 RX ADMIN — BUMETANIDE 2 MG: 0.25 INJECTION INTRAMUSCULAR; INTRAVENOUS at 09:34

## 2021-01-01 RX ADMIN — LABETALOL HYDROCHLORIDE 10 MG: 5 INJECTION INTRAVENOUS at 08:09

## 2021-01-01 RX ADMIN — ACETAMINOPHEN 1000 MG: 500 TABLET ORAL at 14:09

## 2021-01-01 RX ADMIN — HYDROXYZINE PAMOATE 25 MG: 25 CAPSULE ORAL at 23:40

## 2021-01-01 RX ADMIN — HEPARIN SODIUM 5000 UNITS: 10000 INJECTION, SOLUTION INTRAVENOUS; SUBCUTANEOUS at 03:00

## 2021-01-01 RX ADMIN — HYDROCODONE BITARTRATE AND ACETAMINOPHEN 1 TABLET: 10; 325 TABLET ORAL at 00:06

## 2021-01-01 RX ADMIN — ACETAMINOPHEN 650 MG: 325 TABLET ORAL at 19:52

## 2021-01-01 RX ADMIN — INSULIN LISPRO 1 UNITS: 100 INJECTION, SOLUTION INTRAVENOUS; SUBCUTANEOUS at 20:37

## 2021-01-01 RX ADMIN — ENOXAPARIN SODIUM 40 MG: 40 INJECTION SUBCUTANEOUS at 07:54

## 2021-01-01 RX ADMIN — FERROUS SULFATE TAB 325 MG (65 MG ELEMENTAL FE) 325 MG: 325 (65 FE) TAB at 15:53

## 2021-01-01 RX ADMIN — INSULIN LISPRO 1 UNITS: 100 INJECTION, SOLUTION INTRAVENOUS; SUBCUTANEOUS at 12:08

## 2021-01-01 RX ADMIN — LEVOTHYROXINE SODIUM 25 MCG: 25 TABLET ORAL at 07:02

## 2021-01-01 RX ADMIN — BUDESONIDE 250 MCG: 0.25 SUSPENSION RESPIRATORY (INHALATION) at 21:05

## 2021-01-01 RX ADMIN — Medication 10 ML: at 09:23

## 2021-01-01 RX ADMIN — PETROLATUM: 420 OINTMENT TOPICAL at 10:31

## 2021-01-01 RX ADMIN — IPRATROPIUM BROMIDE AND ALBUTEROL SULFATE 1 AMPULE: .5; 2.5 SOLUTION RESPIRATORY (INHALATION) at 12:49

## 2021-01-01 RX ADMIN — PANTOPRAZOLE SODIUM 40 MG: 40 INJECTION, POWDER, FOR SOLUTION INTRAVENOUS at 23:07

## 2021-01-01 RX ADMIN — SODIUM CHLORIDE, PRESERVATIVE FREE 10 ML: 5 INJECTION INTRAVENOUS at 23:52

## 2021-01-01 RX ADMIN — INSULIN LISPRO 2 UNITS: 100 INJECTION, SOLUTION INTRAVENOUS; SUBCUTANEOUS at 18:00

## 2021-01-01 RX ADMIN — GABAPENTIN 200 MG: 100 CAPSULE ORAL at 14:19

## 2021-01-01 RX ADMIN — ALLOPURINOL 300 MG: 300 TABLET ORAL at 09:17

## 2021-01-01 RX ADMIN — IOPAMIDOL 60 ML: 755 INJECTION, SOLUTION INTRAVENOUS at 14:03

## 2021-01-01 RX ADMIN — LEVOTHYROXINE SODIUM 25 MCG: 0.03 TABLET ORAL at 06:10

## 2021-01-01 RX ADMIN — SODIUM CHLORIDE: 9 INJECTION, SOLUTION INTRAVENOUS at 09:06

## 2021-01-01 RX ADMIN — SODIUM CHLORIDE, PRESERVATIVE FREE 10 ML: 5 INJECTION INTRAVENOUS at 23:10

## 2021-01-01 RX ADMIN — ASPIRIN 81 MG: 81 TABLET, COATED ORAL at 09:44

## 2021-01-01 RX ADMIN — CEFEPIME HYDROCHLORIDE 1000 MG: 1 INJECTION, POWDER, FOR SOLUTION INTRAMUSCULAR; INTRAVENOUS at 14:52

## 2021-01-01 RX ADMIN — HYDROCODONE BITARTRATE AND ACETAMINOPHEN 5 ML: 7.5; 325 SOLUTION ORAL at 10:17

## 2021-01-01 RX ADMIN — LINEZOLID 600 MG: 600 TABLET, FILM COATED ORAL at 19:49

## 2021-01-01 RX ADMIN — INSULIN LISPRO 1 UNITS: 100 INJECTION, SOLUTION INTRAVENOUS; SUBCUTANEOUS at 06:38

## 2021-01-01 RX ADMIN — INSULIN LISPRO 4 UNITS: 100 INJECTION, SOLUTION INTRAVENOUS; SUBCUTANEOUS at 11:44

## 2021-01-01 RX ADMIN — IPRATROPIUM BROMIDE 0.5 MG: 0.5 SOLUTION RESPIRATORY (INHALATION) at 16:42

## 2021-01-01 RX ADMIN — HYDROXYZINE PAMOATE 25 MG: 25 CAPSULE ORAL at 12:57

## 2021-01-01 RX ADMIN — REGADENOSON 0.4 MG: 0.08 INJECTION, SOLUTION INTRAVENOUS at 12:31

## 2021-01-01 RX ADMIN — DULOXETINE HYDROCHLORIDE 60 MG: 60 CAPSULE, DELAYED RELEASE ORAL at 11:08

## 2021-01-01 RX ADMIN — FERROUS SULFATE TAB 325 MG (65 MG ELEMENTAL FE) 325 MG: 325 (65 FE) TAB at 17:43

## 2021-01-01 RX ADMIN — GABAPENTIN 400 MG: 400 CAPSULE ORAL at 21:56

## 2021-01-01 RX ADMIN — ALLOPURINOL 300 MG: 300 TABLET ORAL at 08:51

## 2021-01-01 RX ADMIN — METOPROLOL SUCCINATE 50 MG: 25 TABLET, EXTENDED RELEASE ORAL at 08:41

## 2021-01-01 RX ADMIN — IPRATROPIUM BROMIDE 0.5 MG: 0.5 SOLUTION RESPIRATORY (INHALATION) at 21:02

## 2021-01-01 RX ADMIN — Medication 10 ML: at 00:51

## 2021-01-01 RX ADMIN — HYDROCODONE BITARTRATE AND ACETAMINOPHEN 1 TABLET: 10; 325 TABLET ORAL at 06:16

## 2021-01-01 RX ADMIN — DULOXETINE HYDROCHLORIDE 60 MG: 60 CAPSULE, DELAYED RELEASE ORAL at 08:09

## 2021-01-01 RX ADMIN — Medication 5 MCG/MIN: at 16:10

## 2021-01-01 RX ADMIN — ARFORMOTEROL TARTRATE 15 MCG: 15 SOLUTION RESPIRATORY (INHALATION) at 21:14

## 2021-01-01 RX ADMIN — ASPIRIN 81 MG: 81 TABLET, COATED ORAL at 08:18

## 2021-01-01 RX ADMIN — FERROUS SULFATE TAB 325 MG (65 MG ELEMENTAL FE) 325 MG: 325 (65 FE) TAB at 11:15

## 2021-01-01 RX ADMIN — HEPARIN SODIUM 5000 UNITS: 10000 INJECTION, SOLUTION INTRAVENOUS; SUBCUTANEOUS at 23:43

## 2021-01-01 RX ADMIN — HYDROMORPHONE HYDROCHLORIDE 1 MG: 1 INJECTION, SOLUTION INTRAMUSCULAR; INTRAVENOUS; SUBCUTANEOUS at 05:00

## 2021-01-01 RX ADMIN — HYDRALAZINE HYDROCHLORIDE 50 MG: 50 TABLET, FILM COATED ORAL at 15:52

## 2021-01-01 RX ADMIN — POTASSIUM CHLORIDE 10 MEQ: 7.46 INJECTION, SOLUTION INTRAVENOUS at 08:35

## 2021-01-01 RX ADMIN — ENOXAPARIN SODIUM 40 MG: 40 INJECTION SUBCUTANEOUS at 09:40

## 2021-01-01 RX ADMIN — MORPHINE SULFATE 10 MG: 10 CAPSULE, EXTENDED RELEASE ORAL at 21:56

## 2021-01-01 RX ADMIN — ATORVASTATIN CALCIUM 10 MG: 10 TABLET, FILM COATED ORAL at 08:19

## 2021-01-01 RX ADMIN — MORPHINE SULFATE 10 MG: 10 CAPSULE, EXTENDED RELEASE ORAL at 21:12

## 2021-01-01 RX ADMIN — ISOSORBIDE MONONITRATE 120 MG: 30 TABLET ORAL at 10:02

## 2021-01-01 RX ADMIN — GABAPENTIN 400 MG: 400 CAPSULE ORAL at 13:07

## 2021-01-01 RX ADMIN — INSULIN LISPRO 2 UNITS: 100 INJECTION, SOLUTION INTRAVENOUS; SUBCUTANEOUS at 17:43

## 2021-01-01 RX ADMIN — FERROUS SULFATE TAB 325 MG (65 MG ELEMENTAL FE) 325 MG: 325 (65 FE) TAB at 17:52

## 2021-01-01 RX ADMIN — Medication 11 UNITS: at 05:25

## 2021-01-01 RX ADMIN — SODIUM CHLORIDE: 9 INJECTION, SOLUTION INTRAVENOUS at 18:39

## 2021-01-01 RX ADMIN — SODIUM CHLORIDE 100 MG: 9 INJECTION, SOLUTION INTRAVENOUS at 17:15

## 2021-01-01 RX ADMIN — CHLORHEXIDINE GLUCONATE 15 ML: 1.2 RINSE ORAL at 23:06

## 2021-01-01 RX ADMIN — SUCRALFATE 1 G: 1 TABLET ORAL at 18:04

## 2021-01-01 RX ADMIN — METOPROLOL SUCCINATE 50 MG: 50 TABLET, EXTENDED RELEASE ORAL at 10:15

## 2021-01-01 RX ADMIN — Medication 10 ML: at 08:57

## 2021-01-01 RX ADMIN — LIDOCAINE HYDROCHLORIDE 100 MG: 20 INJECTION, SOLUTION INTRAVENOUS at 16:55

## 2021-01-01 RX ADMIN — FENTANYL CITRATE 50 MCG: 50 INJECTION INTRAMUSCULAR; INTRAVENOUS at 21:50

## 2021-01-01 RX ADMIN — SODIUM CHLORIDE: 9 INJECTION, SOLUTION INTRAVENOUS at 09:52

## 2021-01-01 RX ADMIN — FERROUS SULFATE TAB 325 MG (65 MG ELEMENTAL FE) 325 MG: 325 (65 FE) TAB at 09:44

## 2021-01-01 RX ADMIN — ALLOPURINOL 300 MG: 300 TABLET ORAL at 09:39

## 2021-01-01 RX ADMIN — HEPARIN SODIUM 5000 UNITS: 10000 INJECTION, SOLUTION INTRAVENOUS; SUBCUTANEOUS at 06:43

## 2021-01-01 RX ADMIN — INSULIN LISPRO 1 UNITS: 100 INJECTION, SOLUTION INTRAVENOUS; SUBCUTANEOUS at 20:19

## 2021-01-01 RX ADMIN — ASPIRIN 81 MG: 81 TABLET, COATED ORAL at 09:17

## 2021-01-01 RX ADMIN — MORPHINE SULFATE 10 MG: 10 CAPSULE, EXTENDED RELEASE ORAL at 20:34

## 2021-01-01 RX ADMIN — INSULIN GLARGINE 10 UNITS: 100 INJECTION, SOLUTION SUBCUTANEOUS at 11:42

## 2021-01-01 RX ADMIN — HYDRALAZINE HYDROCHLORIDE 50 MG: 50 TABLET, FILM COATED ORAL at 06:35

## 2021-01-01 RX ADMIN — PANTOPRAZOLE SODIUM 40 MG: 40 TABLET, DELAYED RELEASE ORAL at 17:41

## 2021-01-01 RX ADMIN — SODIUM CHLORIDE, PRESERVATIVE FREE 10 ML: 5 INJECTION INTRAVENOUS at 09:09

## 2021-01-01 RX ADMIN — MORPHINE SULFATE 2 MG: 2 INJECTION, SOLUTION INTRAMUSCULAR; INTRAVENOUS at 00:55

## 2021-01-01 RX ADMIN — ARFORMOTEROL TARTRATE 15 MCG: 15 SOLUTION RESPIRATORY (INHALATION) at 21:58

## 2021-01-01 RX ADMIN — Medication 200 MG: at 09:07

## 2021-01-01 RX ADMIN — HYDROXYZINE PAMOATE 25 MG: 25 CAPSULE ORAL at 20:27

## 2021-01-01 RX ADMIN — ARFORMOTEROL TARTRATE 15 MCG: 15 SOLUTION RESPIRATORY (INHALATION) at 09:42

## 2021-01-01 RX ADMIN — HYDROXYZINE PAMOATE 25 MG: 25 CAPSULE ORAL at 23:31

## 2021-01-01 RX ADMIN — IPRATROPIUM BROMIDE 0.5 MG: 0.5 SOLUTION RESPIRATORY (INHALATION) at 17:05

## 2021-01-01 RX ADMIN — GABAPENTIN 400 MG: 400 CAPSULE ORAL at 14:02

## 2021-01-01 RX ADMIN — GABAPENTIN 200 MG: 100 CAPSULE ORAL at 08:09

## 2021-01-01 RX ADMIN — FERROUS SULFATE TAB 325 MG (65 MG ELEMENTAL FE) 325 MG: 325 (65 FE) TAB at 08:10

## 2021-01-01 RX ADMIN — ARFORMOTEROL TARTRATE 15 MCG: 15 SOLUTION RESPIRATORY (INHALATION) at 11:28

## 2021-01-01 RX ADMIN — ARFORMOTEROL TARTRATE 15 MCG: 15 SOLUTION RESPIRATORY (INHALATION) at 21:29

## 2021-01-01 RX ADMIN — ACETAMINOPHEN 1000 MG: 500 TABLET ORAL at 05:00

## 2021-01-01 RX ADMIN — GABAPENTIN 400 MG: 400 CAPSULE ORAL at 00:54

## 2021-01-01 RX ADMIN — MIRTAZAPINE 7.5 MG: 15 TABLET, FILM COATED ORAL at 19:49

## 2021-01-01 RX ADMIN — LACTULOSE 20 G: 20 SOLUTION ORAL at 15:54

## 2021-01-01 RX ADMIN — HYDROXYZINE PAMOATE 25 MG: 25 CAPSULE ORAL at 17:48

## 2021-01-01 RX ADMIN — INSULIN LISPRO 1 UNITS: 100 INJECTION, SOLUTION INTRAVENOUS; SUBCUTANEOUS at 23:16

## 2021-01-01 RX ADMIN — GABAPENTIN 400 MG: 400 CAPSULE ORAL at 23:44

## 2021-01-01 RX ADMIN — METOPROLOL SUCCINATE 50 MG: 50 TABLET, EXTENDED RELEASE ORAL at 08:19

## 2021-01-01 RX ADMIN — DULOXETINE HYDROCHLORIDE 60 MG: 60 CAPSULE, DELAYED RELEASE ORAL at 09:07

## 2021-01-01 RX ADMIN — Medication 10 ML: at 23:53

## 2021-01-01 RX ADMIN — HYDRALAZINE HYDROCHLORIDE 10 MG: 20 INJECTION INTRAMUSCULAR; INTRAVENOUS at 20:05

## 2021-01-01 RX ADMIN — FENTANYL CITRATE 25 MCG: 50 INJECTION, SOLUTION INTRAMUSCULAR; INTRAVENOUS at 10:52

## 2021-01-01 RX ADMIN — FERROUS SULFATE TAB 325 MG (65 MG ELEMENTAL FE) 325 MG: 325 (65 FE) TAB at 09:24

## 2021-01-01 RX ADMIN — SUCRALFATE 1 G: 1 TABLET ORAL at 06:45

## 2021-01-01 RX ADMIN — HYDRALAZINE HYDROCHLORIDE 50 MG: 50 TABLET, FILM COATED ORAL at 06:11

## 2021-01-01 RX ADMIN — ISOSORBIDE MONONITRATE 120 MG: 60 TABLET, EXTENDED RELEASE ORAL at 08:08

## 2021-01-01 RX ADMIN — FERROUS SULFATE TAB 325 MG (65 MG ELEMENTAL FE) 325 MG: 325 (65 FE) TAB at 16:49

## 2021-01-01 RX ADMIN — DIPHENHYDRAMINE HYDROCHLORIDE 25 MG: 25 TABLET ORAL at 23:31

## 2021-01-01 RX ADMIN — INSULIN LISPRO 6 UNITS: 100 INJECTION, SOLUTION INTRAVENOUS; SUBCUTANEOUS at 11:40

## 2021-01-01 RX ADMIN — FERROUS SULFATE TAB 325 MG (65 MG ELEMENTAL FE) 325 MG: 325 (65 FE) TAB at 17:07

## 2021-01-01 RX ADMIN — MORPHINE SULFATE 10 MG: 10 CAPSULE, EXTENDED RELEASE ORAL at 07:47

## 2021-01-01 RX ADMIN — CALCIUM GLUCONATE 1000 MG: 98 INJECTION, SOLUTION INTRAVENOUS at 12:01

## 2021-01-01 RX ADMIN — PHENYLEPHRINE HYDROCHLORIDE 100 MCG: 10 INJECTION INTRAVENOUS at 13:18

## 2021-01-01 RX ADMIN — ARFORMOTEROL TARTRATE 15 MCG: 15 SOLUTION RESPIRATORY (INHALATION) at 19:16

## 2021-01-01 RX ADMIN — SODIUM CHLORIDE, PRESERVATIVE FREE 10 ML: 5 INJECTION INTRAVENOUS at 21:10

## 2021-01-01 RX ADMIN — HYDROCODONE BITARTRATE AND ACETAMINOPHEN 1 TABLET: 7.5; 325 TABLET ORAL at 08:10

## 2021-01-01 RX ADMIN — HYDROCODONE BITARTRATE AND ACETAMINOPHEN 1 TABLET: 10; 325 TABLET ORAL at 04:25

## 2021-01-01 RX ADMIN — INSULIN LISPRO 1 UNITS: 100 INJECTION, SOLUTION INTRAVENOUS; SUBCUTANEOUS at 23:39

## 2021-01-01 RX ADMIN — GABAPENTIN 400 MG: 400 CAPSULE ORAL at 12:47

## 2021-01-01 RX ADMIN — SUCRALFATE 1 G: 1 TABLET ORAL at 16:43

## 2021-01-01 RX ADMIN — GABAPENTIN 400 MG: 400 CAPSULE ORAL at 12:07

## 2021-01-01 RX ADMIN — Medication 25 MCG/HR: at 07:11

## 2021-01-01 RX ADMIN — SODIUM CHLORIDE, PRESERVATIVE FREE 10 ML: 5 INJECTION INTRAVENOUS at 09:00

## 2021-01-01 RX ADMIN — Medication 10 ML: at 21:26

## 2021-01-01 RX ADMIN — Medication 2000 UNITS: at 07:55

## 2021-01-01 RX ADMIN — ACETAMINOPHEN 650 MG: 325 TABLET ORAL at 22:56

## 2021-01-01 RX ADMIN — METOPROLOL SUCCINATE 50 MG: 50 TABLET, EXTENDED RELEASE ORAL at 10:02

## 2021-01-01 RX ADMIN — ALBUTEROL SULFATE 2.5 MG: 2.5 SOLUTION RESPIRATORY (INHALATION) at 21:24

## 2021-01-01 RX ADMIN — HYDROXYZINE PAMOATE 25 MG: 25 CAPSULE ORAL at 12:27

## 2021-01-01 RX ADMIN — FERROUS SULFATE TAB 325 MG (65 MG ELEMENTAL FE) 325 MG: 325 (65 FE) TAB at 09:19

## 2021-01-01 RX ADMIN — HYDROCODONE BITARTRATE AND ACETAMINOPHEN 1 TABLET: 10; 325 TABLET ORAL at 15:41

## 2021-01-01 RX ADMIN — ISOSORBIDE MONONITRATE 120 MG: 60 TABLET, EXTENDED RELEASE ORAL at 10:01

## 2021-01-01 RX ADMIN — SODIUM CHLORIDE: 9 INJECTION, SOLUTION INTRAVENOUS at 06:43

## 2021-01-01 RX ADMIN — Medication 10 ML: at 22:01

## 2021-01-01 RX ADMIN — METOPROLOL SUCCINATE 50 MG: 25 TABLET, EXTENDED RELEASE ORAL at 07:46

## 2021-01-01 RX ADMIN — INSULIN LISPRO 2 UNITS: 100 INJECTION, SOLUTION INTRAVENOUS; SUBCUTANEOUS at 08:44

## 2021-01-01 RX ADMIN — FERROUS SULFATE TAB 325 MG (65 MG ELEMENTAL FE) 325 MG: 325 (65 FE) TAB at 16:46

## 2021-01-01 RX ADMIN — ARFORMOTEROL TARTRATE 15 MCG: 15 SOLUTION RESPIRATORY (INHALATION) at 21:45

## 2021-01-01 RX ADMIN — GABAPENTIN 400 MG: 400 CAPSULE ORAL at 13:54

## 2021-01-01 RX ADMIN — HYDRALAZINE HYDROCHLORIDE 50 MG: 50 TABLET, FILM COATED ORAL at 13:54

## 2021-01-01 RX ADMIN — SODIUM CHLORIDE: 9 INJECTION, SOLUTION INTRAVENOUS at 10:23

## 2021-01-01 RX ADMIN — IPRATROPIUM BROMIDE 0.5 MG: 0.5 SOLUTION RESPIRATORY (INHALATION) at 20:47

## 2021-01-01 RX ADMIN — BUDESONIDE 250 MCG: 0.25 SUSPENSION RESPIRATORY (INHALATION) at 08:01

## 2021-01-01 RX ADMIN — BUMETANIDE 1 MG: 1 TABLET ORAL at 15:06

## 2021-01-01 RX ADMIN — ASPIRIN 81 MG: 81 TABLET, COATED ORAL at 09:22

## 2021-01-01 RX ADMIN — GABAPENTIN 400 MG: 400 CAPSULE ORAL at 18:26

## 2021-01-01 RX ADMIN — Medication 10 ML: at 10:17

## 2021-01-01 RX ADMIN — FERROUS SULFATE TAB 325 MG (65 MG ELEMENTAL FE) 325 MG: 325 (65 FE) TAB at 17:35

## 2021-01-01 RX ADMIN — PANTOPRAZOLE SODIUM 40 MG: 40 TABLET, DELAYED RELEASE ORAL at 07:01

## 2021-01-01 RX ADMIN — ACETAMINOPHEN 650 MG: 325 TABLET ORAL at 03:45

## 2021-01-01 RX ADMIN — INSULIN GLARGINE 20 UNITS: 100 INJECTION, SOLUTION SUBCUTANEOUS at 08:42

## 2021-01-01 RX ADMIN — IPRATROPIUM BROMIDE 0.5 MG: 0.5 SOLUTION RESPIRATORY (INHALATION) at 07:56

## 2021-01-01 RX ADMIN — LACTULOSE 20 G: 20 SOLUTION ORAL at 08:09

## 2021-01-01 RX ADMIN — METOLAZONE 2.5 MG: 2.5 TABLET ORAL at 08:26

## 2021-01-01 RX ADMIN — GABAPENTIN 400 MG: 400 CAPSULE ORAL at 23:41

## 2021-01-01 RX ADMIN — GABAPENTIN 200 MG: 100 CAPSULE ORAL at 21:08

## 2021-01-01 RX ADMIN — IPRATROPIUM BROMIDE 0.5 MG: 0.5 SOLUTION RESPIRATORY (INHALATION) at 14:03

## 2021-01-01 RX ADMIN — ISOSORBIDE MONONITRATE 120 MG: 60 TABLET, EXTENDED RELEASE ORAL at 07:46

## 2021-01-01 RX ADMIN — ALLOPURINOL 300 MG: 300 TABLET ORAL at 07:54

## 2021-01-01 RX ADMIN — BUDESONIDE 500 MCG: 0.5 SUSPENSION RESPIRATORY (INHALATION) at 09:24

## 2021-01-01 RX ADMIN — LACTULOSE 20 G: 20 SOLUTION ORAL at 07:54

## 2021-01-01 RX ADMIN — ACETAMINOPHEN 1000 MG: 500 TABLET ORAL at 08:39

## 2021-01-01 RX ADMIN — SODIUM CHLORIDE: 9 INJECTION, SOLUTION INTRAVENOUS at 10:01

## 2021-01-01 RX ADMIN — GABAPENTIN 200 MG: 100 CAPSULE ORAL at 22:49

## 2021-01-01 RX ADMIN — HYDROCODONE BITARTRATE AND ACETAMINOPHEN 1 TABLET: 10; 325 TABLET ORAL at 18:30

## 2021-01-01 RX ADMIN — INSULIN LISPRO 1 UNITS: 100 INJECTION, SOLUTION INTRAVENOUS; SUBCUTANEOUS at 20:17

## 2021-01-01 RX ADMIN — SODIUM BICARBONATE 1300 MG: 650 TABLET ORAL at 10:25

## 2021-01-01 RX ADMIN — IPRATROPIUM BROMIDE 0.5 MG: 0.5 SOLUTION RESPIRATORY (INHALATION) at 08:02

## 2021-01-01 RX ADMIN — HYDROXYZINE PAMOATE 25 MG: 25 CAPSULE ORAL at 18:04

## 2021-01-01 RX ADMIN — HEPARIN SODIUM 10000 UNITS: 1000 INJECTION INTRAVENOUS; SUBCUTANEOUS at 10:08

## 2021-01-01 RX ADMIN — HYDROCODONE BITARTRATE AND ACETAMINOPHEN 1 TABLET: 10; 325 TABLET ORAL at 21:17

## 2021-01-01 RX ADMIN — SODIUM CHLORIDE 80 MG: 9 INJECTION, SOLUTION INTRAVENOUS at 17:23

## 2021-01-01 RX ADMIN — IBUPROFEN 400 MG: 400 TABLET, FILM COATED ORAL at 15:53

## 2021-01-01 RX ADMIN — HYDROCODONE BITARTRATE AND ACETAMINOPHEN 1 TABLET: 7.5; 325 TABLET ORAL at 21:11

## 2021-01-01 RX ADMIN — Medication 200 MG: at 08:10

## 2021-01-01 RX ADMIN — METOPROLOL SUCCINATE 50 MG: 50 TABLET, EXTENDED RELEASE ORAL at 09:44

## 2021-01-01 RX ADMIN — INSULIN GLARGINE 22 UNITS: 100 INJECTION, SOLUTION SUBCUTANEOUS at 23:44

## 2021-01-01 RX ADMIN — DEXTROSE MONOHYDRATE 500 ML: 50 INJECTION, SOLUTION INTRAVENOUS at 23:39

## 2021-01-01 RX ADMIN — SODIUM CHLORIDE 3267 ML: 9 INJECTION, SOLUTION INTRAVENOUS at 16:02

## 2021-01-01 RX ADMIN — INSULIN LISPRO 1 UNITS: 100 INJECTION, SOLUTION INTRAVENOUS; SUBCUTANEOUS at 17:22

## 2021-01-01 RX ADMIN — LORAZEPAM 1 MG: 2 INJECTION INTRAMUSCULAR; INTRAVENOUS at 13:56

## 2021-01-01 RX ADMIN — HYDRALAZINE HYDROCHLORIDE 10 MG: 10 TABLET, FILM COATED ORAL at 15:59

## 2021-01-01 RX ADMIN — CEFEPIME HYDROCHLORIDE 2000 MG: 2 INJECTION, POWDER, FOR SOLUTION INTRAVENOUS at 15:26

## 2021-01-01 RX ADMIN — POTASSIUM CHLORIDE 40 MEQ: 1500 TABLET, EXTENDED RELEASE ORAL at 09:23

## 2021-01-01 RX ADMIN — FERROUS SULFATE TAB 325 MG (65 MG ELEMENTAL FE) 325 MG: 325 (65 FE) TAB at 16:18

## 2021-01-01 RX ADMIN — Medication 10 ML: at 08:55

## 2021-01-01 RX ADMIN — LEVOTHYROXINE SODIUM 25 MCG: 25 TABLET ORAL at 06:47

## 2021-01-01 RX ADMIN — SODIUM CHLORIDE 500 ML: 9 INJECTION, SOLUTION INTRAVENOUS at 04:22

## 2021-01-01 RX ADMIN — IPRATROPIUM BROMIDE 0.5 MG: 0.5 SOLUTION RESPIRATORY (INHALATION) at 21:56

## 2021-01-01 RX ADMIN — HYDRALAZINE HYDROCHLORIDE 50 MG: 50 TABLET, FILM COATED ORAL at 21:08

## 2021-01-01 RX ADMIN — IPRATROPIUM BROMIDE AND ALBUTEROL SULFATE 1 AMPULE: .5; 2.5 SOLUTION RESPIRATORY (INHALATION) at 12:15

## 2021-01-01 RX ADMIN — ALLOPURINOL 300 MG: 300 TABLET ORAL at 09:08

## 2021-01-01 RX ADMIN — MIRTAZAPINE 7.5 MG: 15 TABLET, FILM COATED ORAL at 20:12

## 2021-01-01 RX ADMIN — FENTANYL CITRATE 50 MCG: 50 INJECTION INTRAMUSCULAR; INTRAVENOUS at 22:00

## 2021-01-01 RX ADMIN — SODIUM CHLORIDE, POTASSIUM CHLORIDE, SODIUM LACTATE AND CALCIUM CHLORIDE 3267 ML: 600; 310; 30; 20 INJECTION, SOLUTION INTRAVENOUS at 20:15

## 2021-01-01 RX ADMIN — ARFORMOTEROL TARTRATE 15 MCG: 15 SOLUTION RESPIRATORY (INHALATION) at 07:56

## 2021-01-01 RX ADMIN — MORPHINE SULFATE 10 MG: 10 CAPSULE, EXTENDED RELEASE ORAL at 21:09

## 2021-01-01 RX ADMIN — HYDROXYZINE PAMOATE 25 MG: 25 CAPSULE ORAL at 08:57

## 2021-01-01 RX ADMIN — IPRATROPIUM BROMIDE 0.5 MG: 0.5 SOLUTION RESPIRATORY (INHALATION) at 20:37

## 2021-01-01 RX ADMIN — FLUTICASONE PROPIONATE 1 SPRAY: 50 SPRAY, METERED NASAL at 10:05

## 2021-01-01 RX ADMIN — HYDROCODONE BITARTRATE AND ACETAMINOPHEN 1 TABLET: 7.5; 325 TABLET ORAL at 17:13

## 2021-01-01 RX ADMIN — POTASSIUM CHLORIDE 20 MEQ: 20 TABLET, EXTENDED RELEASE ORAL at 18:04

## 2021-01-01 RX ADMIN — SODIUM CHLORIDE, PRESERVATIVE FREE 10 ML: 5 INJECTION INTRAVENOUS at 20:40

## 2021-01-01 RX ADMIN — HYDROXYZINE PAMOATE 25 MG: 25 CAPSULE ORAL at 23:50

## 2021-01-01 RX ADMIN — SODIUM BICARBONATE 1300 MG: 650 TABLET ORAL at 22:01

## 2021-01-01 RX ADMIN — Medication 10 ML: at 09:34

## 2021-01-01 RX ADMIN — HYDRALAZINE HYDROCHLORIDE 50 MG: 50 TABLET, FILM COATED ORAL at 22:49

## 2021-01-01 RX ADMIN — ARFORMOTEROL TARTRATE 15 MCG: 15 SOLUTION RESPIRATORY (INHALATION) at 21:01

## 2021-01-01 RX ADMIN — IPRATROPIUM BROMIDE 0.5 MG: 0.5 SOLUTION RESPIRATORY (INHALATION) at 08:55

## 2021-01-01 RX ADMIN — GABAPENTIN 400 MG: 400 CAPSULE ORAL at 18:04

## 2021-01-01 RX ADMIN — SODIUM BICARBONATE 1300 MG: 650 TABLET ORAL at 23:42

## 2021-01-01 RX ADMIN — INSULIN LISPRO 1 UNITS: 100 INJECTION, SOLUTION INTRAVENOUS; SUBCUTANEOUS at 19:52

## 2021-01-01 RX ADMIN — ATORVASTATIN CALCIUM 10 MG: 10 TABLET, FILM COATED ORAL at 20:56

## 2021-01-01 RX ADMIN — LEVOTHYROXINE SODIUM 25 MCG: 0.03 TABLET ORAL at 06:07

## 2021-01-01 RX ADMIN — BUDESONIDE 250 MCG: 0.25 SUSPENSION RESPIRATORY (INHALATION) at 20:50

## 2021-01-01 RX ADMIN — GABAPENTIN 400 MG: 400 CAPSULE ORAL at 17:27

## 2021-01-01 RX ADMIN — LEVOTHYROXINE SODIUM 25 MCG: 25 TABLET ORAL at 06:07

## 2021-01-01 RX ADMIN — POTASSIUM & SODIUM PHOSPHATES POWDER PACK 280-160-250 MG 250 MG: 280-160-250 PACK at 07:55

## 2021-01-01 RX ADMIN — Medication 10 ML: at 20:06

## 2021-01-01 RX ADMIN — MORPHINE SULFATE 2 MG: 2 INJECTION, SOLUTION INTRAMUSCULAR; INTRAVENOUS at 02:07

## 2021-01-01 RX ADMIN — HYDROCODONE BITARTRATE AND ACETAMINOPHEN 5 ML: 7.5; 325 SOLUTION ORAL at 02:49

## 2021-01-01 RX ADMIN — SODIUM CHLORIDE, PRESERVATIVE FREE 300 UNITS: 5 INJECTION INTRAVENOUS at 09:57

## 2021-01-01 RX ADMIN — CALCIUM CHLORIDE 1 G: 100 INJECTION, SOLUTION INTRAVENOUS; INTRAVENTRICULAR at 16:55

## 2021-01-01 RX ADMIN — ACETAMINOPHEN 650 MG: 325 TABLET ORAL at 11:27

## 2021-01-01 RX ADMIN — FERROUS SULFATE TAB 325 MG (65 MG ELEMENTAL FE) 325 MG: 325 (65 FE) TAB at 08:09

## 2021-01-01 RX ADMIN — INSULIN LISPRO 1 UNITS: 100 INJECTION, SOLUTION INTRAVENOUS; SUBCUTANEOUS at 16:48

## 2021-01-01 RX ADMIN — GABAPENTIN 200 MG: 100 CAPSULE ORAL at 21:49

## 2021-01-01 RX ADMIN — GABAPENTIN 200 MG: 100 CAPSULE ORAL at 10:01

## 2021-01-01 RX ADMIN — IPRATROPIUM BROMIDE 0.5 MG: 0.5 SOLUTION RESPIRATORY (INHALATION) at 09:07

## 2021-01-01 RX ADMIN — Medication 20 ML: at 06:17

## 2021-01-01 RX ADMIN — Medication 10 ML: at 21:13

## 2021-01-01 RX ADMIN — HYDROCODONE BITARTRATE AND ACETAMINOPHEN 1 TABLET: 10; 325 TABLET ORAL at 23:49

## 2021-01-01 RX ADMIN — FERROUS SULFATE TAB 325 MG (65 MG ELEMENTAL FE) 325 MG: 325 (65 FE) TAB at 08:08

## 2021-01-01 RX ADMIN — GABAPENTIN 200 MG: 100 CAPSULE ORAL at 10:20

## 2021-01-01 RX ADMIN — LEVOTHYROXINE SODIUM 25 MCG: 0.03 TABLET ORAL at 09:44

## 2021-01-01 RX ADMIN — HYDROXYZINE PAMOATE 25 MG: 25 CAPSULE ORAL at 08:09

## 2021-01-01 RX ADMIN — FERROUS SULFATE TAB 325 MG (65 MG ELEMENTAL FE) 325 MG: 325 (65 FE) TAB at 08:40

## 2021-01-01 RX ADMIN — ATORVASTATIN CALCIUM 10 MG: 10 TABLET, FILM COATED ORAL at 08:10

## 2021-01-01 RX ADMIN — GABAPENTIN 200 MG: 100 CAPSULE ORAL at 22:55

## 2021-01-01 RX ADMIN — Medication 10 ML: at 11:44

## 2021-01-01 RX ADMIN — MIRTAZAPINE 7.5 MG: 15 TABLET, FILM COATED ORAL at 23:30

## 2021-01-01 RX ADMIN — DULOXETINE HYDROCHLORIDE 60 MG: 60 CAPSULE, DELAYED RELEASE ORAL at 08:18

## 2021-01-01 RX ADMIN — SODIUM CHLORIDE, POTASSIUM CHLORIDE, SODIUM LACTATE AND CALCIUM CHLORIDE: 600; 310; 30; 20 INJECTION, SOLUTION INTRAVENOUS at 22:59

## 2021-01-01 RX ADMIN — DAPTOMYCIN 700 MG: 500 INJECTION, POWDER, LYOPHILIZED, FOR SOLUTION INTRAVENOUS at 16:52

## 2021-01-01 RX ADMIN — PROPOFOL 50 MCG/KG/MIN: 10 INJECTION, EMULSION INTRAVENOUS at 10:32

## 2021-01-01 RX ADMIN — Medication 2000 MG: at 06:36

## 2021-01-01 RX ADMIN — ISOSORBIDE MONONITRATE 120 MG: 60 TABLET, EXTENDED RELEASE ORAL at 08:18

## 2021-01-01 RX ADMIN — FERROUS SULFATE TAB 325 MG (65 MG ELEMENTAL FE) 325 MG: 325 (65 FE) TAB at 17:47

## 2021-01-01 RX ADMIN — HYDRALAZINE HYDROCHLORIDE 50 MG: 50 TABLET, FILM COATED ORAL at 05:32

## 2021-01-01 RX ADMIN — HYDROCODONE BITARTRATE AND ACETAMINOPHEN 1 TABLET: 7.5; 325 TABLET ORAL at 20:27

## 2021-01-01 RX ADMIN — GABAPENTIN 200 MG: 100 CAPSULE ORAL at 14:31

## 2021-01-01 RX ADMIN — ARFORMOTEROL TARTRATE 15 MCG: 15 SOLUTION RESPIRATORY (INHALATION) at 20:58

## 2021-01-01 RX ADMIN — METOLAZONE 2.5 MG: 2.5 TABLET ORAL at 09:08

## 2021-01-01 RX ADMIN — SODIUM BICARBONATE: 84 INJECTION, SOLUTION INTRAVENOUS at 08:42

## 2021-01-01 RX ADMIN — ACETAMINOPHEN 1000 MG: 500 TABLET ORAL at 14:32

## 2021-01-01 RX ADMIN — ISOSORBIDE MONONITRATE 120 MG: 60 TABLET ORAL at 08:25

## 2021-01-01 RX ADMIN — BUDESONIDE 250 MCG: 0.25 SUSPENSION RESPIRATORY (INHALATION) at 19:18

## 2021-01-01 RX ADMIN — VANCOMYCIN HYDROCHLORIDE 2000 MG: 10 INJECTION, POWDER, LYOPHILIZED, FOR SOLUTION INTRAVENOUS at 12:08

## 2021-01-01 RX ADMIN — CEFAZOLIN 2000 MG: 1 INJECTION, POWDER, FOR SOLUTION INTRAMUSCULAR; INTRAVENOUS at 20:48

## 2021-01-01 RX ADMIN — HYDROXYZINE PAMOATE 25 MG: 25 CAPSULE ORAL at 08:12

## 2021-01-01 RX ADMIN — GABAPENTIN 200 MG: 100 CAPSULE ORAL at 20:05

## 2021-01-01 RX ADMIN — ASPIRIN 81 MG: 81 TABLET, COATED ORAL at 10:03

## 2021-01-01 RX ADMIN — ONDANSETRON HYDROCHLORIDE 4 MG: 2 INJECTION, SOLUTION INTRAMUSCULAR; INTRAVENOUS at 11:40

## 2021-01-01 RX ADMIN — SODIUM BICARBONATE 50 MEQ: 84 INJECTION, SOLUTION INTRAVENOUS at 12:16

## 2021-01-01 RX ADMIN — Medication 10 ML: at 09:57

## 2021-01-01 RX ADMIN — HYDRALAZINE HYDROCHLORIDE 50 MG: 50 TABLET, FILM COATED ORAL at 06:59

## 2021-01-01 RX ADMIN — VANCOMYCIN HYDROCHLORIDE 2000 MG: 10 INJECTION, POWDER, LYOPHILIZED, FOR SOLUTION INTRAVENOUS at 00:51

## 2021-01-01 RX ADMIN — ACETAMINOPHEN 650 MG: 325 TABLET ORAL at 08:11

## 2021-01-01 RX ADMIN — MORPHINE SULFATE 2 MG: 2 INJECTION, SOLUTION INTRAMUSCULAR; INTRAVENOUS at 15:52

## 2021-01-01 RX ADMIN — POTASSIUM CHLORIDE 40 MEQ: 1500 TABLET, EXTENDED RELEASE ORAL at 08:34

## 2021-01-01 RX ADMIN — Medication 200 MG: at 08:07

## 2021-01-01 RX ADMIN — LINEZOLID 600 MG: 600 TABLET, FILM COATED ORAL at 08:26

## 2021-01-01 RX ADMIN — ALLOPURINOL 300 MG: 300 TABLET ORAL at 07:46

## 2021-01-01 RX ADMIN — HYDROXYZINE PAMOATE 25 MG: 25 CAPSULE ORAL at 09:54

## 2021-01-01 RX ADMIN — METOPROLOL SUCCINATE 50 MG: 50 TABLET, EXTENDED RELEASE ORAL at 08:39

## 2021-01-01 RX ADMIN — INSULIN LISPRO 1 UNITS: 100 INJECTION, SOLUTION INTRAVENOUS; SUBCUTANEOUS at 16:15

## 2021-01-01 RX ADMIN — SODIUM BICARBONATE 50 MEQ: 84 INJECTION, SOLUTION INTRAVENOUS at 17:01

## 2021-01-01 RX ADMIN — INSULIN GLARGINE 20 UNITS: 100 INJECTION, SOLUTION SUBCUTANEOUS at 09:18

## 2021-01-01 RX ADMIN — Medication 10 ML: at 20:13

## 2021-01-01 RX ADMIN — MORPHINE SULFATE 15 MG: 15 TABLET, FILM COATED, EXTENDED RELEASE ORAL at 10:02

## 2021-01-01 RX ADMIN — POTASSIUM & SODIUM PHOSPHATES POWDER PACK 280-160-250 MG 250 MG: 280-160-250 PACK at 17:11

## 2021-01-01 RX ADMIN — IPRATROPIUM BROMIDE AND ALBUTEROL SULFATE 1 AMPULE: .5; 2.5 SOLUTION RESPIRATORY (INHALATION) at 09:50

## 2021-01-01 RX ADMIN — INSULIN LISPRO 1 UNITS: 100 INJECTION, SOLUTION INTRAVENOUS; SUBCUTANEOUS at 06:03

## 2021-01-01 RX ADMIN — GABAPENTIN 400 MG: 400 CAPSULE ORAL at 09:38

## 2021-01-01 RX ADMIN — CEFEPIME HYDROCHLORIDE 2000 MG: 2 INJECTION, POWDER, FOR SOLUTION INTRAVENOUS at 13:46

## 2021-01-01 RX ADMIN — BUDESONIDE 250 MCG: 0.25 SUSPENSION RESPIRATORY (INHALATION) at 20:58

## 2021-01-01 RX ADMIN — MIRTAZAPINE 7.5 MG: 15 TABLET, FILM COATED ORAL at 21:21

## 2021-01-01 RX ADMIN — HYDRALAZINE HYDROCHLORIDE 10 MG: 20 INJECTION INTRAMUSCULAR; INTRAVENOUS at 04:33

## 2021-01-01 RX ADMIN — FERROUS SULFATE TAB 325 MG (65 MG ELEMENTAL FE) 325 MG: 325 (65 FE) TAB at 09:56

## 2021-01-01 RX ADMIN — SODIUM CHLORIDE, PRESERVATIVE FREE 10 ML: 5 INJECTION INTRAVENOUS at 23:34

## 2021-01-01 RX ADMIN — HYDROMORPHONE HYDROCHLORIDE 0.5 MG: 1 INJECTION, SOLUTION INTRAMUSCULAR; INTRAVENOUS; SUBCUTANEOUS at 12:41

## 2021-01-01 RX ADMIN — FERROUS SULFATE TAB 325 MG (65 MG ELEMENTAL FE) 325 MG: 325 (65 FE) TAB at 17:27

## 2021-01-01 RX ADMIN — ACETYLCYSTEINE 400 MG: 100 SOLUTION ORAL; RESPIRATORY (INHALATION) at 09:47

## 2021-01-01 RX ADMIN — DULOXETINE HYDROCHLORIDE 60 MG: 60 CAPSULE, DELAYED RELEASE ORAL at 07:38

## 2021-01-01 RX ADMIN — PHENYLEPHRINE HYDROCHLORIDE 100 MCG: 10 INJECTION INTRAVENOUS at 13:07

## 2021-01-01 RX ADMIN — GABAPENTIN 200 MG: 100 CAPSULE ORAL at 11:49

## 2021-01-01 RX ADMIN — INSULIN LISPRO 1 UNITS: 100 INJECTION, SOLUTION INTRAVENOUS; SUBCUTANEOUS at 08:15

## 2021-01-01 RX ADMIN — Medication 5 ML: at 11:23

## 2021-01-01 RX ADMIN — Medication 10 ML: at 10:29

## 2021-01-01 RX ADMIN — HYDROXYZINE PAMOATE 25 MG: 25 CAPSULE ORAL at 11:06

## 2021-01-01 RX ADMIN — LEVOTHYROXINE SODIUM 25 MCG: 25 TABLET ORAL at 06:16

## 2021-01-01 RX ADMIN — HYDRALAZINE HYDROCHLORIDE 50 MG: 50 TABLET, FILM COATED ORAL at 05:46

## 2021-01-01 RX ADMIN — SODIUM CHLORIDE, PRESERVATIVE FREE 10 ML: 5 INJECTION INTRAVENOUS at 08:29

## 2021-01-01 RX ADMIN — METOPROLOL SUCCINATE 50 MG: 50 TABLET, EXTENDED RELEASE ORAL at 09:23

## 2021-01-01 RX ADMIN — ACETAMINOPHEN 1000 MG: 500 TABLET ORAL at 23:09

## 2021-01-01 RX ADMIN — HYDRALAZINE HYDROCHLORIDE 10 MG: 10 TABLET, FILM COATED ORAL at 08:06

## 2021-01-01 RX ADMIN — Medication 10 ML: at 14:03

## 2021-01-01 RX ADMIN — ARFORMOTEROL TARTRATE 15 MCG: 15 SOLUTION RESPIRATORY (INHALATION) at 19:17

## 2021-01-01 RX ADMIN — IPRATROPIUM BROMIDE 0.5 MG: 0.5 SOLUTION RESPIRATORY (INHALATION) at 11:29

## 2021-01-01 RX ADMIN — LEVOTHYROXINE SODIUM 25 MCG: 25 TABLET ORAL at 06:43

## 2021-01-01 ASSESSMENT — PAIN DESCRIPTION - FREQUENCY
FREQUENCY: CONTINUOUS
FREQUENCY: INTERMITTENT
FREQUENCY: INTERMITTENT
FREQUENCY: CONTINUOUS
FREQUENCY: CONTINUOUS
FREQUENCY: INTERMITTENT
FREQUENCY: CONTINUOUS
FREQUENCY: CONTINUOUS
FREQUENCY: INTERMITTENT
FREQUENCY: CONTINUOUS
FREQUENCY: INTERMITTENT
FREQUENCY: CONTINUOUS
FREQUENCY: INTERMITTENT
FREQUENCY: CONTINUOUS
FREQUENCY: CONTINUOUS
FREQUENCY: INTERMITTENT
FREQUENCY: INTERMITTENT
FREQUENCY: CONTINUOUS
FREQUENCY: INTERMITTENT
FREQUENCY: CONTINUOUS
FREQUENCY: INTERMITTENT
FREQUENCY: INTERMITTENT
FREQUENCY: CONTINUOUS
FREQUENCY: INTERMITTENT
FREQUENCY: CONTINUOUS
FREQUENCY: INTERMITTENT
FREQUENCY: INTERMITTENT
FREQUENCY: CONTINUOUS

## 2021-01-01 ASSESSMENT — PULMONARY FUNCTION TESTS
PIF_VALUE: 19
PIF_VALUE: 1
PIF_VALUE: 17
PIF_VALUE: 11
PIF_VALUE: 1
PIF_VALUE: 19
PIF_VALUE: 0
PIF_VALUE: 13
PIF_VALUE: 11
PIF_VALUE: 21
PIF_VALUE: 0
PIF_VALUE: 1
PIF_VALUE: 0
PIF_VALUE: 11
PIF_VALUE: 1
PIF_VALUE: 19
PIF_VALUE: 20
PIF_VALUE: 1
PIF_VALUE: 1
PIF_VALUE: 12
PIF_VALUE: 20
PIF_VALUE: 1
PIF_VALUE: 21
PIF_VALUE: 1
PIF_VALUE: 19
PIF_VALUE: 19
PIF_VALUE: 18
PIF_VALUE: 18
PIF_VALUE: 20
PIF_VALUE: 21
PIF_VALUE: 19
PIF_VALUE: 0
PIF_VALUE: 12
PIF_VALUE: 20
PIF_VALUE: 1
PIF_VALUE: 11
PIF_VALUE: 0
PIF_VALUE: 21
PIF_VALUE: 17
PIF_VALUE: 19
PIF_VALUE: 20
PIF_VALUE: 0
PIF_VALUE: 18
PIF_VALUE: 1
PIF_VALUE: 1
PIF_VALUE: 12
PIF_VALUE: 22
PIF_VALUE: 1
PIF_VALUE: 15
PIF_VALUE: 20
PIF_VALUE: 1
PIF_VALUE: 1
PIF_VALUE: 22
PIF_VALUE: 0
PIF_VALUE: 0
PIF_VALUE: 21
PIF_VALUE: 19
PIF_VALUE: 22
PIF_VALUE: 20
PIF_VALUE: 19
PIF_VALUE: 1
PIF_VALUE: 19
PIF_VALUE: 20
PIF_VALUE: 20
PIF_VALUE: 12
PIF_VALUE: 19
PIF_VALUE: 27
PIF_VALUE: 0
PIF_VALUE: 19
PIF_VALUE: 21
PIF_VALUE: 0
PIF_VALUE: 1
PIF_VALUE: 3
PIF_VALUE: 1
PIF_VALUE: 18
PIF_VALUE: 19
PIF_VALUE: 1
PIF_VALUE: 0
PIF_VALUE: 21
PIF_VALUE: 28
PIF_VALUE: 1
PIF_VALUE: 23
PIF_VALUE: 0
PIF_VALUE: 1
PIF_VALUE: 19
PIF_VALUE: 21
PIF_VALUE: 24
PIF_VALUE: 17
PIF_VALUE: 1
PIF_VALUE: 21
PIF_VALUE: 18
PIF_VALUE: 18
PIF_VALUE: 5
PIF_VALUE: 21
PIF_VALUE: 0
PIF_VALUE: 21
PIF_VALUE: 19
PIF_VALUE: 11
PIF_VALUE: 20
PIF_VALUE: 19
PIF_VALUE: 11
PIF_VALUE: 19
PIF_VALUE: 0
PIF_VALUE: 21
PIF_VALUE: 19
PIF_VALUE: 1
PIF_VALUE: 21
PIF_VALUE: 0
PIF_VALUE: 17
PIF_VALUE: 20
PIF_VALUE: 11
PIF_VALUE: 21
PIF_VALUE: 18
PIF_VALUE: 0
PIF_VALUE: 18
PIF_VALUE: 21
PIF_VALUE: 21
PIF_VALUE: 19
PIF_VALUE: 20
PIF_VALUE: 1
PIF_VALUE: 21
PIF_VALUE: 22
PIF_VALUE: 22
PIF_VALUE: 29
PIF_VALUE: 21
PIF_VALUE: 1
PIF_VALUE: 13
PIF_VALUE: 19
PIF_VALUE: 1
PIF_VALUE: 22
PIF_VALUE: 20
PIF_VALUE: 8
PIF_VALUE: 21
PIF_VALUE: 19
PIF_VALUE: 19
PIF_VALUE: 0
PIF_VALUE: 1
PIF_VALUE: 19
PIF_VALUE: 20
PIF_VALUE: 21
PIF_VALUE: 0
PIF_VALUE: 20
PIF_VALUE: 23
PIF_VALUE: 21
PIF_VALUE: 20
PIF_VALUE: 1
PIF_VALUE: 22
PIF_VALUE: 0
PIF_VALUE: 1
PIF_VALUE: 1
PIF_VALUE: 19
PIF_VALUE: 1
PIF_VALUE: 17
PIF_VALUE: 21
PIF_VALUE: 1
PIF_VALUE: 21
PIF_VALUE: 0
PIF_VALUE: 0
PIF_VALUE: 1
PIF_VALUE: 19
PIF_VALUE: 21
PIF_VALUE: 20
PIF_VALUE: 21
PIF_VALUE: 20
PIF_VALUE: 2
PIF_VALUE: 24
PIF_VALUE: 21
PIF_VALUE: 20
PIF_VALUE: 1
PIF_VALUE: 18
PIF_VALUE: 18
PIF_VALUE: 1
PIF_VALUE: 1
PIF_VALUE: 21
PIF_VALUE: 0
PIF_VALUE: 19
PIF_VALUE: 20
PIF_VALUE: 1
PIF_VALUE: 11
PIF_VALUE: 17
PIF_VALUE: 0
PIF_VALUE: 21
PIF_VALUE: 12
PIF_VALUE: 17
PIF_VALUE: 0
PIF_VALUE: 19
PIF_VALUE: 0
PIF_VALUE: 11
PIF_VALUE: 1
PIF_VALUE: 1
PIF_VALUE: 19
PIF_VALUE: 0
PIF_VALUE: 24
PIF_VALUE: 17
PIF_VALUE: 21
PIF_VALUE: 12
PIF_VALUE: 22
PIF_VALUE: 1
PIF_VALUE: 0
PIF_VALUE: 18
PIF_VALUE: 22
PIF_VALUE: 1
PIF_VALUE: 20
PIF_VALUE: 21
PIF_VALUE: 0
PIF_VALUE: 20
PIF_VALUE: 0
PIF_VALUE: 21
PIF_VALUE: 1
PIF_VALUE: 19
PIF_VALUE: 1
PIF_VALUE: 18
PIF_VALUE: 21
PIF_VALUE: 19
PIF_VALUE: 21
PIF_VALUE: 20
PIF_VALUE: 0
PIF_VALUE: 11
PIF_VALUE: 11
PIF_VALUE: 1
PIF_VALUE: 20
PIF_VALUE: 21
PIF_VALUE: 20
PIF_VALUE: 21
PIF_VALUE: 0
PIF_VALUE: 17
PIF_VALUE: 0
PIF_VALUE: 21
PIF_VALUE: 1
PIF_VALUE: 20
PIF_VALUE: 20
PIF_VALUE: 21
PIF_VALUE: 0
PIF_VALUE: 0
PIF_VALUE: 23
PIF_VALUE: 17
PIF_VALUE: 1
PIF_VALUE: 26
PIF_VALUE: 18
PIF_VALUE: 1
PIF_VALUE: 11
PIF_VALUE: 17
PIF_VALUE: 21
PIF_VALUE: 21
PIF_VALUE: 0
PIF_VALUE: 12
PIF_VALUE: 0
PIF_VALUE: 19
PIF_VALUE: 1
PIF_VALUE: 23
PIF_VALUE: 0
PIF_VALUE: 1
PIF_VALUE: 18
PIF_VALUE: 19
PIF_VALUE: 0
PIF_VALUE: 1
PIF_VALUE: 21
PIF_VALUE: 1
PIF_VALUE: 17
PIF_VALUE: 0
PIF_VALUE: 21
PIF_VALUE: 21
PIF_VALUE: 19
PIF_VALUE: 17
PIF_VALUE: 19
PIF_VALUE: 1
PIF_VALUE: 19
PIF_VALUE: 1
PIF_VALUE: 21
PIF_VALUE: 1
PIF_VALUE: 12
PIF_VALUE: 20
PIF_VALUE: 22
PIF_VALUE: 0
PIF_VALUE: 21
PIF_VALUE: 19
PIF_VALUE: 21
PIF_VALUE: 1
PIF_VALUE: 0
PIF_VALUE: 20
PIF_VALUE: 21
PIF_VALUE: 22
PIF_VALUE: 21
PIF_VALUE: 23
PIF_VALUE: 19
PIF_VALUE: 12
PIF_VALUE: 1
PIF_VALUE: 0
PIF_VALUE: 24
PIF_VALUE: 13
PIF_VALUE: 11
PIF_VALUE: 21
PIF_VALUE: 19
PIF_VALUE: 19
PIF_VALUE: 21
PIF_VALUE: 1
PIF_VALUE: 1
PIF_VALUE: 20
PIF_VALUE: 20
PIF_VALUE: 19
PIF_VALUE: 20
PIF_VALUE: 1
PIF_VALUE: 1
PIF_VALUE: 19
PIF_VALUE: 21
PIF_VALUE: 21
PIF_VALUE: 19
PIF_VALUE: 0
PIF_VALUE: 18
PIF_VALUE: 21
PIF_VALUE: 21
PIF_VALUE: 18
PIF_VALUE: 19
PIF_VALUE: 1
PIF_VALUE: 0
PIF_VALUE: 20
PIF_VALUE: 2
PIF_VALUE: 18
PIF_VALUE: 20
PIF_VALUE: 1
PIF_VALUE: 19
PIF_VALUE: 0
PIF_VALUE: 20
PIF_VALUE: 1
PIF_VALUE: 21
PIF_VALUE: 1
PIF_VALUE: 21
PIF_VALUE: 1
PIF_VALUE: 1
PIF_VALUE: 12
PIF_VALUE: 1
PIF_VALUE: 20
PIF_VALUE: 0
PIF_VALUE: 1
PIF_VALUE: 12
PIF_VALUE: 22
PIF_VALUE: 0
PIF_VALUE: 21
PIF_VALUE: 21
PIF_VALUE: 18
PIF_VALUE: 24
PIF_VALUE: 20
PIF_VALUE: 20
PIF_VALUE: 1
PIF_VALUE: 21
PIF_VALUE: 21
PIF_VALUE: 1
PIF_VALUE: 13
PIF_VALUE: 19
PIF_VALUE: 19
PIF_VALUE: 21
PIF_VALUE: 20
PIF_VALUE: 21
PIF_VALUE: 0
PIF_VALUE: 16
PIF_VALUE: 21
PIF_VALUE: 0
PIF_VALUE: 18

## 2021-01-01 ASSESSMENT — PAIN DESCRIPTION - PROGRESSION
CLINICAL_PROGRESSION: NOT CHANGED
CLINICAL_PROGRESSION: GRADUALLY IMPROVING
CLINICAL_PROGRESSION: NOT CHANGED
CLINICAL_PROGRESSION: GRADUALLY IMPROVING
CLINICAL_PROGRESSION: GRADUALLY IMPROVING
CLINICAL_PROGRESSION: NOT CHANGED

## 2021-01-01 ASSESSMENT — PAIN DESCRIPTION - DESCRIPTORS
DESCRIPTORS: ACHING;CONSTANT;DISCOMFORT;SORE
DESCRIPTORS: ACHING;DISCOMFORT
DESCRIPTORS: CONSTANT;DISCOMFORT;SORE
DESCRIPTORS: ACHING;DISCOMFORT
DESCRIPTORS: ACHING;DISCOMFORT
DESCRIPTORS: CONSTANT;DISCOMFORT;SORE
DESCRIPTORS: ACHING;CONSTANT;DISCOMFORT;SORE
DESCRIPTORS: CONSTANT;DISCOMFORT;SHARP
DESCRIPTORS: ACHING;CONSTANT;DISCOMFORT
DESCRIPTORS: ACHING;DISCOMFORT
DESCRIPTORS: ACHING;CONSTANT;DISCOMFORT
DESCRIPTORS: ACHING;DISCOMFORT;SORE
DESCRIPTORS: ACHING;DISCOMFORT
DESCRIPTORS: ACHING;CONSTANT
DESCRIPTORS: ACHING
DESCRIPTORS: ACHING;DISCOMFORT
DESCRIPTORS: ACHING;DISCOMFORT
DESCRIPTORS: ACHING;DISCOMFORT;CONSTANT
DESCRIPTORS: ACHING
DESCRIPTORS: ACHING;CONSTANT;DISCOMFORT
DESCRIPTORS: ACHING
DESCRIPTORS: ACHING;CONSTANT;DISCOMFORT
DESCRIPTORS: ACHING
DESCRIPTORS: ACHING
DESCRIPTORS: ACHING;DISCOMFORT
DESCRIPTORS: ACHING;DISCOMFORT;CONSTANT
DESCRIPTORS: ACHING;DISCOMFORT
DESCRIPTORS: ACHING;DISCOMFORT;SORE
DESCRIPTORS: ACHING;CONSTANT;DISCOMFORT;SORE
DESCRIPTORS: ACHING;DISCOMFORT
DESCRIPTORS: CONSTANT;DISCOMFORT;SHARP
DESCRIPTORS: ACHING;BURNING;CONSTANT
DESCRIPTORS: ACHING;DISCOMFORT
DESCRIPTORS: ACHING;CONSTANT;DISCOMFORT;SORE
DESCRIPTORS: BURNING;DISCOMFORT
DESCRIPTORS: BURNING;DISCOMFORT;SHARP
DESCRIPTORS: ACHING
DESCRIPTORS: ACHING;DISCOMFORT
DESCRIPTORS: CONSTANT;DISCOMFORT;SORE
DESCRIPTORS: ACHING;CONSTANT
DESCRIPTORS: ACHING

## 2021-01-01 ASSESSMENT — PAIN SCALES - GENERAL
PAINLEVEL_OUTOF10: 8
PAINLEVEL_OUTOF10: 8
PAINLEVEL_OUTOF10: 6
PAINLEVEL_OUTOF10: 0
PAINLEVEL_OUTOF10: 8
PAINLEVEL_OUTOF10: 2
PAINLEVEL_OUTOF10: 8
PAINLEVEL_OUTOF10: 5
PAINLEVEL_OUTOF10: 5
PAINLEVEL_OUTOF10: 6
PAINLEVEL_OUTOF10: 8
PAINLEVEL_OUTOF10: 7
PAINLEVEL_OUTOF10: 0
PAINLEVEL_OUTOF10: 8
PAINLEVEL_OUTOF10: 6
PAINLEVEL_OUTOF10: 7
PAINLEVEL_OUTOF10: 0
PAINLEVEL_OUTOF10: 6
PAINLEVEL_OUTOF10: 0
PAINLEVEL_OUTOF10: 8
PAINLEVEL_OUTOF10: 0
PAINLEVEL_OUTOF10: 0
PAINLEVEL_OUTOF10: 3
PAINLEVEL_OUTOF10: 0
PAINLEVEL_OUTOF10: 0
PAINLEVEL_OUTOF10: 8
PAINLEVEL_OUTOF10: 9
PAINLEVEL_OUTOF10: 9
PAINLEVEL_OUTOF10: 6
PAINLEVEL_OUTOF10: 8
PAINLEVEL_OUTOF10: 0
PAINLEVEL_OUTOF10: 0
PAINLEVEL_OUTOF10: 9
PAINLEVEL_OUTOF10: 9
PAINLEVEL_OUTOF10: 5
PAINLEVEL_OUTOF10: 8
PAINLEVEL_OUTOF10: 0
PAINLEVEL_OUTOF10: 8
PAINLEVEL_OUTOF10: 7
PAINLEVEL_OUTOF10: 8
PAINLEVEL_OUTOF10: 7
PAINLEVEL_OUTOF10: 9
PAINLEVEL_OUTOF10: 7
PAINLEVEL_OUTOF10: 8
PAINLEVEL_OUTOF10: 0
PAINLEVEL_OUTOF10: 4
PAINLEVEL_OUTOF10: 0
PAINLEVEL_OUTOF10: 0
PAINLEVEL_OUTOF10: 8
PAINLEVEL_OUTOF10: 8
PAINLEVEL_OUTOF10: 7
PAINLEVEL_OUTOF10: 8
PAINLEVEL_OUTOF10: 1
PAINLEVEL_OUTOF10: 9
PAINLEVEL_OUTOF10: 9
PAINLEVEL_OUTOF10: 0
PAINLEVEL_OUTOF10: 7
PAINLEVEL_OUTOF10: 0
PAINLEVEL_OUTOF10: 0
PAINLEVEL_OUTOF10: 10
PAINLEVEL_OUTOF10: 10
PAINLEVEL_OUTOF10: 0
PAINLEVEL_OUTOF10: 0
PAINLEVEL_OUTOF10: 9
PAINLEVEL_OUTOF10: 0
PAINLEVEL_OUTOF10: 10
PAINLEVEL_OUTOF10: 0
PAINLEVEL_OUTOF10: 8
PAINLEVEL_OUTOF10: 8
PAINLEVEL_OUTOF10: 4
PAINLEVEL_OUTOF10: 0
PAINLEVEL_OUTOF10: 5
PAINLEVEL_OUTOF10: 0
PAINLEVEL_OUTOF10: 8
PAINLEVEL_OUTOF10: 7
PAINLEVEL_OUTOF10: 8
PAINLEVEL_OUTOF10: 0
PAINLEVEL_OUTOF10: 10
PAINLEVEL_OUTOF10: 9
PAINLEVEL_OUTOF10: 10
PAINLEVEL_OUTOF10: 8
PAINLEVEL_OUTOF10: 0
PAINLEVEL_OUTOF10: 0
PAINLEVEL_OUTOF10: 10
PAINLEVEL_OUTOF10: 0
PAINLEVEL_OUTOF10: 6
PAINLEVEL_OUTOF10: 0
PAINLEVEL_OUTOF10: 10
PAINLEVEL_OUTOF10: 9
PAINLEVEL_OUTOF10: 7
PAINLEVEL_OUTOF10: 8
PAINLEVEL_OUTOF10: 7
PAINLEVEL_OUTOF10: 9
PAINLEVEL_OUTOF10: 8
PAINLEVEL_OUTOF10: 8
PAINLEVEL_OUTOF10: 0
PAINLEVEL_OUTOF10: 6
PAINLEVEL_OUTOF10: 0
PAINLEVEL_OUTOF10: 3
PAINLEVEL_OUTOF10: 7
PAINLEVEL_OUTOF10: 0
PAINLEVEL_OUTOF10: 8
PAINLEVEL_OUTOF10: 8
PAINLEVEL_OUTOF10: 7
PAINLEVEL_OUTOF10: 7
PAINLEVEL_OUTOF10: 0
PAINLEVEL_OUTOF10: 0
PAINLEVEL_OUTOF10: 9
PAINLEVEL_OUTOF10: 8
PAINLEVEL_OUTOF10: 0
PAINLEVEL_OUTOF10: 9
PAINLEVEL_OUTOF10: 0
PAINLEVEL_OUTOF10: 9
PAINLEVEL_OUTOF10: 0
PAINLEVEL_OUTOF10: 8
PAINLEVEL_OUTOF10: 6
PAINLEVEL_OUTOF10: 8
PAINLEVEL_OUTOF10: 8
PAINLEVEL_OUTOF10: 7
PAINLEVEL_OUTOF10: 8
PAINLEVEL_OUTOF10: 0
PAINLEVEL_OUTOF10: 0
PAINLEVEL_OUTOF10: 7
PAINLEVEL_OUTOF10: 0
PAINLEVEL_OUTOF10: 8
PAINLEVEL_OUTOF10: 0
PAINLEVEL_OUTOF10: 0
PAINLEVEL_OUTOF10: 6
PAINLEVEL_OUTOF10: 8
PAINLEVEL_OUTOF10: 6
PAINLEVEL_OUTOF10: 0
PAINLEVEL_OUTOF10: 9
PAINLEVEL_OUTOF10: 8
PAINLEVEL_OUTOF10: 6
PAINLEVEL_OUTOF10: 6
PAINLEVEL_OUTOF10: 0
PAINLEVEL_OUTOF10: 7
PAINLEVEL_OUTOF10: 0
PAINLEVEL_OUTOF10: 0
PAINLEVEL_OUTOF10: 9
PAINLEVEL_OUTOF10: 8
PAINLEVEL_OUTOF10: 10
PAINLEVEL_OUTOF10: 0
PAINLEVEL_OUTOF10: 9
PAINLEVEL_OUTOF10: 0
PAINLEVEL_OUTOF10: 8
PAINLEVEL_OUTOF10: 6
PAINLEVEL_OUTOF10: 5
PAINLEVEL_OUTOF10: 10
PAINLEVEL_OUTOF10: 10
PAINLEVEL_OUTOF10: 0
PAINLEVEL_OUTOF10: 0
PAINLEVEL_OUTOF10: 8
PAINLEVEL_OUTOF10: 10
PAINLEVEL_OUTOF10: 9
PAINLEVEL_OUTOF10: 7
PAINLEVEL_OUTOF10: 0
PAINLEVEL_OUTOF10: 8
PAINLEVEL_OUTOF10: 3
PAINLEVEL_OUTOF10: 0
PAINLEVEL_OUTOF10: 0
PAINLEVEL_OUTOF10: 8
PAINLEVEL_OUTOF10: 6
PAINLEVEL_OUTOF10: 7
PAINLEVEL_OUTOF10: 0
PAINLEVEL_OUTOF10: 0
PAINLEVEL_OUTOF10: 7
PAINLEVEL_OUTOF10: 8
PAINLEVEL_OUTOF10: 8
PAINLEVEL_OUTOF10: 6
PAINLEVEL_OUTOF10: 7
PAINLEVEL_OUTOF10: 8
PAINLEVEL_OUTOF10: 0
PAINLEVEL_OUTOF10: 0
PAINLEVEL_OUTOF10: 6
PAINLEVEL_OUTOF10: 0
PAINLEVEL_OUTOF10: 1
PAINLEVEL_OUTOF10: 4
PAINLEVEL_OUTOF10: 0
PAINLEVEL_OUTOF10: 7
PAINLEVEL_OUTOF10: 10
PAINLEVEL_OUTOF10: 9
PAINLEVEL_OUTOF10: 8
PAINLEVEL_OUTOF10: 7
PAINLEVEL_OUTOF10: 0
PAINLEVEL_OUTOF10: 0
PAINLEVEL_OUTOF10: 7
PAINLEVEL_OUTOF10: 10

## 2021-01-01 ASSESSMENT — PAIN DESCRIPTION - PAIN TYPE
TYPE: CHRONIC PAIN
TYPE: ACUTE PAIN
TYPE: ACUTE PAIN
TYPE: SURGICAL PAIN
TYPE: ACUTE PAIN
TYPE: ACUTE PAIN
TYPE: CHRONIC PAIN
TYPE: ACUTE PAIN
TYPE: ACUTE PAIN;SURGICAL PAIN
TYPE: ACUTE PAIN
TYPE: SURGICAL PAIN
TYPE: ACUTE PAIN
TYPE: CHRONIC PAIN
TYPE: ACUTE PAIN;SURGICAL PAIN
TYPE: ACUTE PAIN
TYPE: ACUTE PAIN;SURGICAL PAIN
TYPE: ACUTE PAIN
TYPE: ACUTE PAIN
TYPE: CHRONIC PAIN
TYPE: ACUTE PAIN
TYPE: ACUTE PAIN
TYPE: ACUTE PAIN;SURGICAL PAIN
TYPE: ACUTE PAIN
TYPE: ACUTE PAIN;SURGICAL PAIN
TYPE: ACUTE PAIN
TYPE: CHRONIC PAIN
TYPE: ACUTE PAIN
TYPE: ACUTE PAIN
TYPE: CHRONIC PAIN
TYPE: ACUTE PAIN
TYPE: CHRONIC PAIN;NEUROPATHIC PAIN
TYPE: SURGICAL PAIN
TYPE: CHRONIC PAIN
TYPE: ACUTE PAIN
TYPE: ACUTE PAIN;SURGICAL PAIN
TYPE: CHRONIC PAIN
TYPE: ACUTE PAIN
TYPE: ACUTE PAIN
TYPE: CHRONIC PAIN
TYPE: ACUTE PAIN;CHRONIC PAIN
TYPE: CHRONIC PAIN
TYPE: CHRONIC PAIN
TYPE: ACUTE PAIN
TYPE: CHRONIC PAIN
TYPE: CHRONIC PAIN

## 2021-01-01 ASSESSMENT — PAIN DESCRIPTION - ONSET
ONSET: ON-GOING

## 2021-01-01 ASSESSMENT — ENCOUNTER SYMPTOMS
PHOTOPHOBIA: 0
DYSPNEA ACTIVITY LEVEL: AFTER AMBULATING 1 FLIGHT OF STAIRS
DIARRHEA: 0
DYSPNEA ACTIVITY LEVEL: AFTER AMBULATING 1 FLIGHT OF STAIRS
NAUSEA: 0
SHORTNESS OF BREATH: 0
EYE REDNESS: 0
COUGH: 0
SHORTNESS OF BREATH: 0
DYSPNEA ACTIVITY LEVEL: AFTER AMBULATING 1 FLIGHT OF STAIRS
RHINORRHEA: 1
NAUSEA: 0
VOMITING: 0
VOMITING: 0
SHORTNESS OF BREATH: 0
DIARRHEA: 0
EYE DISCHARGE: 0
BACK PAIN: 0
SORE THROAT: 0
NAUSEA: 0
COUGH: 0
SHORTNESS OF BREATH: 1
ABDOMINAL PAIN: 0
SHORTNESS OF BREATH: 0
COUGH: 0
SHORTNESS OF BREATH: 1
SORE THROAT: 0
DYSPNEA ACTIVITY LEVEL: AFTER AMBULATING 1 FLIGHT OF STAIRS
BACK PAIN: 1
SORE THROAT: 0
BACK PAIN: 0
VOMITING: 0
EYE DISCHARGE: 0
WHEEZING: 0
ABDOMINAL PAIN: 0
ABDOMINAL PAIN: 0
SORE THROAT: 0
SHORTNESS OF BREATH: 1
BACK PAIN: 0
EYE PAIN: 0
EYE PAIN: 1
SHORTNESS OF BREATH: 1
EYE REDNESS: 0
SHORTNESS OF BREATH: 1
NAUSEA: 0
SINUS PRESSURE: 0
COUGH: 0
ABDOMINAL PAIN: 0

## 2021-01-01 ASSESSMENT — PAIN DESCRIPTION - LOCATION
LOCATION: BACK
LOCATION: BACK
LOCATION: INCISION;LEG
LOCATION: FOOT
LOCATION: FOOT
LOCATION: BACK
LOCATION: LEG
LOCATION: GENERALIZED
LOCATION: FOOT
LOCATION: INCISION;LEG
LOCATION: FOOT
LOCATION: INCISION;LEG
LOCATION: FOOT
LOCATION: LEG
LOCATION: GENERALIZED
LOCATION: BACK
LOCATION: LEG
LOCATION: INCISION;LEG
LOCATION: BACK
LOCATION: FOOT
LOCATION: INCISION;LEG
LOCATION: BACK;LEG;ELBOW
LOCATION: FOOT
LOCATION: INCISION;LEG
LOCATION: LEG
LOCATION: BACK;FOOT
LOCATION: BACK
LOCATION: INCISION;LEG
LOCATION: LEG
LOCATION: GENERALIZED
LOCATION: FOOT;LEG
LOCATION: BACK
LOCATION: BACK
LOCATION: LEG
LOCATION: INCISION;LEG
LOCATION: LEG
LOCATION: BACK
LOCATION: BACK;LEG
LOCATION: FOOT
LOCATION: BACK;LEG
LOCATION: BACK
LOCATION: FOOT
LOCATION: BACK
LOCATION: LEG
LOCATION: FOOT
LOCATION: GENERALIZED;BACK
LOCATION: GROIN;FLANK;BACK
LOCATION: BACK;HIP
LOCATION: INCISION;LEG
LOCATION: FOOT
LOCATION: INCISION;LEG
LOCATION: INCISION;LEG

## 2021-01-01 ASSESSMENT — PAIN - FUNCTIONAL ASSESSMENT
PAIN_FUNCTIONAL_ASSESSMENT: PREVENTS OR INTERFERES SOME ACTIVE ACTIVITIES AND ADLS
PAIN_FUNCTIONAL_ASSESSMENT: PREVENTS OR INTERFERES SOME ACTIVE ACTIVITIES AND ADLS
PAIN_FUNCTIONAL_ASSESSMENT: 0-10
PAIN_FUNCTIONAL_ASSESSMENT: PREVENTS OR INTERFERES SOME ACTIVE ACTIVITIES AND ADLS
PAIN_FUNCTIONAL_ASSESSMENT: ACTIVITIES ARE NOT PREVENTED
PAIN_FUNCTIONAL_ASSESSMENT: PREVENTS OR INTERFERES SOME ACTIVE ACTIVITIES AND ADLS

## 2021-01-01 ASSESSMENT — PAIN DESCRIPTION - ORIENTATION
ORIENTATION: RIGHT;LEFT
ORIENTATION: LOWER
ORIENTATION: RIGHT
ORIENTATION: RIGHT;LEFT
ORIENTATION: RIGHT
ORIENTATION: LEFT
ORIENTATION: RIGHT
ORIENTATION: RIGHT;LEFT
ORIENTATION: LEFT
ORIENTATION: RIGHT
ORIENTATION: LOWER
ORIENTATION: RIGHT;LEFT
ORIENTATION: RIGHT
ORIENTATION: RIGHT
ORIENTATION: RIGHT;ANTERIOR
ORIENTATION: RIGHT
ORIENTATION: RIGHT
ORIENTATION: LOWER
ORIENTATION: RIGHT
ORIENTATION: RIGHT;LEFT
ORIENTATION: RIGHT;LEFT
ORIENTATION: RIGHT
ORIENTATION: RIGHT;LEFT
ORIENTATION: LEFT
ORIENTATION: RIGHT;LEFT
ORIENTATION: RIGHT

## 2021-01-01 ASSESSMENT — PAIN DESCRIPTION - DIRECTION: RADIATING_TOWARDS: LEGS/FEET

## 2021-01-01 ASSESSMENT — COPD QUESTIONNAIRES: CAT_SEVERITY: MILD

## 2021-01-01 ASSESSMENT — PAIN SCALES - WONG BAKER
WONGBAKER_NUMERICALRESPONSE: 8
WONGBAKER_NUMERICALRESPONSE: 2;4

## 2021-01-01 ASSESSMENT — EJECTION FRACTION: EF_VALUE: 45-50%

## 2021-01-05 NOTE — BRIEF OP NOTE
Brief Postoperative Note      Patient: Kashif Arreola  YOB: 1940  MRN: [id number]    Date of Procedure: 1/5/2021    Pre-Op Diagnosis: Decreased vision, elevated sed rate, rule out temporal arteritis    Post-Op Diagnosis: Same       Procedure(s):  BILATERAL TEMPORAL ARTERY BIOPSY    Surgeon(s):  Sania Smith MD    Assistant:  Surgical Assistant: Jolly Gomez    Anesthesia: Monitor Anesthesia Care    Estimated Blood Loss (mL): less than 50     Complications: None    Specimens:   ID Type Source Tests Collected by Time Destination   A :  LEFT TEMPORAL ARTERY BIOPSY Tissue Tissue SURGICAL PATHOLOGY Sania Smith MD 1/5/2021 1032    B : RIGHT TEMPORAL ARTERY BIOPSY Tissue Tissue SURGICAL PATHOLOGY Sania Smith MD 1/5/2021 1032        Implants:  * No implants in log *      Drains: * No LDAs found *    Findings:     Electronically signed by Sania Smith MD on 1/5/2021 at 11:50 AM

## 2021-01-05 NOTE — PROGRESS NOTES
COVID testing completed on: 12/29/2020  Results of the test: negative  The patient verbally confirms that they did adhere to the self-quarantine guidelines. No signs or symptoms expressed or observed.

## 2021-01-05 NOTE — ANESTHESIA PRE PROCEDURE
Department of Anesthesiology  Preprocedure Note       Name:  Pancho Marvin   Age:  79 y.o.  :  1940                                          MRN:  [id number]         Date:  2021      Surgeon: Tye Pulido):  Abbey Woodard MD    Procedure: Procedure(s):  BILATERAL TEMPORAL ARTERY BIOPSY    Medications prior to admission:   Prior to Admission medications    Medication Sig Start Date End Date Taking? Authorizing Provider   predniSONE (DELTASONE) 20 MG tablet TAKE 2 TABLETS BY MOUTH EVERY DAY WITH FOOD FOR 5 DAYS 20  Yes Historical Provider, MD   azelastine (ASTELIN) 0.1 % nasal spray PLACE 1 SPRAY IN EACH NOSTRIL TWICE A DAY AS DIRECTED 20  Yes Carline Garcia MD   aspirin EC 81 MG EC tablet Take 1 tablet by mouth daily 20  Yes Bulmaro Vera MD   albuterol (PROVENTIL) (2.5 MG/3ML) 0.083% nebulizer solution Take 3 mLs by nebulization every 6 hours as needed for Wheezing or Shortness of Breath DX: COPD J44.9 Please bill Medicare Part B 20  Yes Carline Garcia MD   losartan (COZAAR) 50 MG tablet Take 1 tablet by mouth daily 19  Yes Dhara Carr DO   levothyroxine (SYNTHROID) 25 MCG tablet TAKE 1 TABLET BY MOUTH EVERY DAY 19  Yes Historical Provider, MD   gabapentin (NEURONTIN) 400 MG capsule Take 400 mg by mouth 4 times daily. 19  Yes Historical Provider, MD   metoprolol succinate (TOPROL XL) 50 MG extended release tablet Take 50 mg by mouth daily   Yes Historical Provider, MD   isosorbide mononitrate (IMDUR) 120 MG extended release tablet Take 1 tablet by mouth daily 18  Yes Primo Crawford MD   morphine (THIERRY) 10 MG extended release capsule Take 10 mg by mouth 2 times daily.  .   Yes Historical Provider, MD   insulin glargine (LANTUS) 100 UNIT/ML injection vial Inject 25 Units into the skin 2 times daily    Yes Historical Provider, MD HYDROcodone-acetaminophen (NORCO)  MG per tablet Take 1 tablet by mouth every 6 hours as needed for Pain 9/6/16  Yes Tanisha Montiel MD   lovastatin (MEVACOR) 20 MG tablet Take 20 mg by mouth nightly. 11/11/13  Yes Historical Provider, MD   fluticasone [facility]) 50 MCG/ACT nasal spray 1 spray by Nasal route daily  5/3/18   Historical Provider, MD   allopurinol (ZYLOPRIM) 300 MG tablet Take 300 mg by mouth daily  2/27/15   Historical Provider, MD       Current medications:    Current Facility-Administered Medications   Medication Dose Route Frequency Provider Last Rate Last Admin    0.9 % sodium chloride infusion   Intravenous Continuous Mable Bender MD        sodium chloride flush 0.9 % injection 10 mL  10 mL Intravenous 2 times per day Mable Bender MD        sodium chloride flush 0.9 % injection 10 mL  10 mL Intravenous PRN Mable Bender MD           Allergies:  No Known Allergies    Problem List:    Patient Active Problem List   Diagnosis Code    Essential hypertension I10    S/P carotid endarterectomy Z98.890    Pacemaker Z95.0    S/P AVR Z95.2    CKD (chronic kidney disease) stage 3, GFR 30-59 ml/min N18.30    Pulmonary nodule R91.1    Diabetes mellitus type 2, uncontrolled (HCC) E11.65    Hyperlipidemia LDL goal <100 E78.5    Acquired hypothyroidism E03.9    CAD (coronary artery disease), native coronary artery I25.10    Chronic pain G89.29    History of CVA (cerebrovascular accident) Z86.73    Chest pain R07.9    Obesity (BMI 30-39. 9) E66.9    Anemia D64.9    Elevated sed rate R70.0    Vision decreased H54.7    Bilateral carotid bruits R09.89    Bilateral carotid artery stenosis I65.23       Past Medical History:        Diagnosis Date    Arthritis     Bilateral carotid artery stenosis 12/31/2020    Bilateral carotid bruits 12/31/2020    Blood circulation, collateral     CAD (coronary artery disease)     Carotid bruit 3/27/2013  CHF (congestive heart failure) (HCC)     Chronic kidney disease     CKD (chronic kidney disease) stage 3, GFR 30-59 ml/min 3/20/2016    Diabetes mellitus (HCC)     Dyspnea on exertion     History of blood transfusion     Hyperlipidemia     Hypertension     Movement disorder     NSTEMI, initial [facility] of care ([facility] 2018    Obesity     PVD (peripheral vascular disease) with claudication ([facility] 3/27/2014    S/P carotid endarterectomy 3/27/2013    Sleep apnea     SOB (shortness of breath)     Thyroid disease     Unspecified cerebral artery occlusion with cerebral infarction     Vision decreased 2020       Past Surgical History:        Procedure Laterality Date    CARDIAC SURGERY      5 years ago    CARDIAC VALVE SURGERY      CORONARY ARTERY BYPASS GRAFT      DIAGNOSTIC CARDIAC CATH LAB PROCEDURE  10/14/08    DIAGNOSTIC CARDIAC CATH LAB PROCEDURE  10/21/10    JOINT REPLACEMENT      R knee replacement    KNEE SURGERY      OTHER SURGICAL HISTORY Right 2017    debridement,bone bx foot    PACEMAKER INSERTION  2014    D-PPM   ()    Dr. Grubbs [place]      carotids       Social History:    Social History     Tobacco Use    Smoking status: Former Smoker     Packs/day: 2.00     Years: 25.00     Pack years: 50.00     Types: Cigarettes     Start date: 1953     Quit date: 1981     Years since quittin.5    Smokeless tobacco: Never Used   Substance Use Topics    Alcohol use: Never     Frequency: Never     Comment:                                  Counseling given: Not Answered      Vital Signs (Current):   Vitals:    20 1354 21 0745   BP:  132/69   Pulse:  64   Resp:  20   Temp:  36.2 °C (97.2 °F)   TempSrc:  Temporal   SpO2:  94%   Weight: 259 lb (117.5 kg) 259 lb (117.5 kg)   Height: 5' 8\" (1.727 m) 5' 8\" (1.727 m)                                              BP Readings from Last 3 Encounters:   21 132/69 10/20/20 135/65   08/07/20 122/80       NPO Status: Time of last liquid consumption: 2355                        Time of last solid consumption: 2355                        Date of last liquid consumption: 01/04/21                        Date of last solid food consumption: 01/04/21      EKG 7/30/2020    AV dual-paced rhythm       ECHO 7/31/2020  Summary   Severe left ventricle hypertrophy. Visually estimated LVEF is 45-50 %. Paradoxical septal wall motion. Severely dilated left atrium. Right ventricle not well visualized. Grossly normal.   Severe mitral annular calcification. Mild mitral stenosis. Mild mitral   valve regurgitation. Bioprosthetic aortic valve leaflets not well seen. No evidence of   obstruction with similar transvalvular pressure gradient to the prior echo   in 2018. No aortic regurgitation. Compared to prior echo in 1/21/2018, the paradoxical septal wall motion   abnormality likely due to interventricular conduction delay is new. Carotid US 12/10/2020  Impression  The right internal carotid artery demonstrates 50-69%. The left internal carotid artery demonstrates 50-69%. Bilateral vertebral arteries are patent with flow in the normal direction. Prominent atherosclerotic plaque is seen in the visualized carotid arteries. BMI:   Wt Readings from Last 3 Encounters:   01/05/21 259 lb (117.5 kg)   12/31/20 259 lb (117.5 kg)   10/19/20 260 lb (117.9 kg)     Body mass index is 39.38 kg/m².     CBC:   Lab Results   Component Value Date    WBC 7.1 10/20/2020    RBC 2.44 10/20/2020    HGB 7.8 10/20/2020    HCT 25.0 10/20/2020    .5 10/20/2020    RDW 14.9 10/20/2020     10/20/2020       CMP:   Lab Results   Component Value Date     10/20/2020    K 4.3 10/20/2020     10/20/2020    CO2 27 10/20/2020    BUN 33 10/20/2020    CREATININE 1.7 10/20/2020    GFRAA 47 10/20/2020    LABGLOM 39 10/20/2020    GLUCOSE 164 10/20/2020    GLUCOSE 111 07/16/2011 PROT 6.7 10/19/2020    CALCIUM 9.6 10/20/2020    BILITOT <0.2 10/19/2020    ALKPHOS 67 10/19/2020    AST 12 10/19/2020    ALT 7 10/19/2020       POC Tests: No results for input(s): POCGLU, POCNA, POCK, POCCL, POCBUN, POCHEMO, POCHCT in the last 72 hours. Coags:   Lab Results   Component Value Date    PROTIME 21.8 10/19/2020    PROTIME 11.3 06/21/2011    INR 1.8 10/19/2020    APTT 39.3 10/19/2020       HCG (If Applicable): No results found for: PREGTESTUR, PREGSERUM, HCG, HCGQUANT     ABGs:   Lab Results   Component Value Date    G4QXKLVI 96.7 09/06/2012        Type & Screen (If Applicable):  No results found for: LABABO, [address]    Drug/Infectious Status (If Applicable):  No results found for: HIV, HEPCAB    COVID-19 Screening (If Applicable):   Lab Results   Component Value Date    COVID19 Not Detected 12/29/2020         Anesthesia Evaluation  Patient summary reviewed and Nursing notes reviewed no history of anesthetic complications:   Airway: Mallampati: II  TM distance: <3 FB   Neck ROM: full  Mouth opening: > = 3 FB Dental:    (+) edentulous      Pulmonary: breath sounds clear to auscultation  (+) shortness of breath: no interval change,  sleep apnea: on noncompliant,      Smoker: former smoker quit in 1981.                            Cardiovascular:  Exercise tolerance: poor (<4 METS),   (+) hypertension:, valvular problems/murmurs (s/p AVR):, pacemaker: pacemaker, past MI: > 6 months, CAD:, CABG/stent (8248):, CHF: systolic, RAMOS:, hyperlipidemia      ECG reviewed  Rhythm: regular  Rate: normal  Echocardiogram reviewed         Beta Blocker:  Dose within 24 Hrs        PE comment: Paced rhythm   Neuro/Psych:   (+) CVA (2003, 2005):,             GI/Hepatic/Renal:   (+) renal disease (CKD):,           Endo/Other:    (+) Diabetes (Last A1c was 6.8)Type II DM, using insulin, hypothyroidism, blood dyscrasia: anemia, arthritis: OA., .                 Abdominal:   (+) obese,         Vascular: + PVD, aortic or cerebral, . ROS comment: S/p carotid endarterectomy on right 2004      . Anesthesia Plan      MAC     ASA 4     (Discussed GA an alternate plan)  Induction: intravenous. Anesthetic plan and risks discussed with patient. Use of blood products discussed with patient whom consented to blood products. Plan discussed with CRNA and attending. Rockland Councilman, RN   1/5/2021    DOS STAFF ADDENDUM:    Patient seen and chart reviewed. Physical exam and history updated as indicated. NPO status confirmed. Anesthesia options and plan discussed including risks benefits with patient/legal guardian and family as available. Concerns and questions addressed. Consent verbalized to proceed.   Anesthesia plan, options and intraoperative/postoperative concerns discussed with care team.    Norbert Lynn MD  1/5/2021  9:54 AM

## 2021-01-06 NOTE — OP NOTE
[address]                  [address] [facility], [address] [place] [address]                                OPERATIVE REPORT    PATIENT NAME: Dariela Ha                     :        1940  MED REC NO:   [id number]                            ROOM:  ACCOUNT NO:   [de-identified]                           ADMIT DATE: 2021  PROVIDER:     Celine Santana MD    DATE OF PROCEDURE:  2021    PREOPERATIVE DIAGNOSIS:  Decreased vision with elevated sed rate, rule  out temporal arteritis. POSTOPERATIVE DIAGNOSIS:  Decreased vision with elevated sed rate, rule  out temporal arteritis. PROCEDURE DONE:  Bilateral temporal artery biopsy. ANESTHESIA:  LMAC. SURGEON:  Celine Santana MD    ASSISTANT:  Jonathan Ly CFA/CST    OPERATIVE PROCEDURE:  The patient was placed in spine position. After  prepping both the temporal areas and draped in the usual  fashion, 1% lidocaine used for local anesthesia. Horizontal incision of  3 cm length was made over the right superficial temporal area, deepened  down to the subcutaneous tissue where the artery was dissected for 3 cm,  ligated with 3-0 silk, excised, and sent for pathology. In a similar fashion, a 3-cm incision was made over the left superficial  temporal artery area, deepened down to the subcutaneous tissue where the  artery was dissected for approximately 3 cm, ligated with 3-0 silk,  excised, and sent for pathology. Hemostasis secured. Incision was  closed in layers by approximating the subcu with 4-0 Vicryl, skin with  5-0 Vicryl, dressed with Dermabond, and the patient transferred back to  recovery room in stable condition.   Blood loss was minimal.        Abbi Day MD    D: 2021 12:06:22       T: 2021 12:12:55     [email or web address]  Job#: [id number]     Doc#: [id number]    CC:

## 2021-01-08 NOTE — ANESTHESIA POSTPROCEDURE EVALUATION
Department of Anesthesiology  Postprocedure Note    Patient: Lucy Hughes  MRN: [id number]  YOB: 1940  Date of evaluation: 1/8/2021  Time:  2:04 PM     Procedure Summary     Date: 01/05/21 Room / Location: [place] [id number] / [facility]    Anesthesia Start: 1025 Anesthesia Stop: 1138    Procedure: BILATERAL TEMPORAL ARTERY BIOPSY (Bilateral ) Diagnosis: (TEMPORAL ARTERITIS)    Surgeons: Robles Thomas MD Responsible Provider: Loyd Martinez MD    Anesthesia Type: MAC ASA Status: 4          Anesthesia Type: MAC    Mayela Phase I: Mayela Score: 10    Mayela Phase II: Mayela Score: 9    Last vitals: Reviewed and per EMR flowsheets.        Anesthesia Post Evaluation    Patient location during evaluation: PACU  Patient participation: complete - patient participated  Level of consciousness: awake and alert  Airway patency: patent  Nausea & Vomiting: no nausea and no vomiting  Complications: no  Cardiovascular status: blood pressure returned to baseline and hemodynamically stable  Respiratory status: acceptable and spontaneous ventilation  Hydration status: euvolemic

## 2021-01-13 NOTE — PROGRESS NOTES
Status post bilateral temporal artery biopsy    Patient doing well    Both the temporal artery biopsy incisions are healing well    Patient was informed, that the biopsy, normal, no evidence of temporal arteritis and to follow-up with just ophthalmology Dr. Arthur Cronin    Recommended follow-up evaluation see me to monitor recurrent stenosis of the carotid arteries in 6 to 12 months time

## 2021-01-19 PROBLEM — R79.89 ELEVATED TROPONIN: Status: ACTIVE | Noted: 2021-01-01

## 2021-01-19 PROBLEM — I07.1: Status: ACTIVE | Noted: 2018-01-01

## 2021-01-19 PROBLEM — R77.8 ELEVATED TROPONIN: Status: ACTIVE | Noted: 2021-01-01

## 2021-01-19 PROBLEM — I27.20 PULMONARY HYPERTENSION (HCC): Status: ACTIVE | Noted: 2018-01-01

## 2021-01-19 PROBLEM — N28.9 ACUTE RENAL INSUFFICIENCY: Status: ACTIVE | Noted: 2021-01-01

## 2021-01-19 NOTE — H&P
History and Physical      CHIEF COMPLAINT: Weakness of legs    History of Present Illness: 78-year-old male patient of Dr. Soha Vila. I am asked to admit and follow. Patient states he felt fine until yesterday when he had one episode of \"legs giving out\" as he was nearing his bed. He fell became lodged between the bed and wall. He pushed his medic alert button, his brother was summoned. Brother was unable to move the patient, EMS had to be called. After patient was able to stand, he declined hospitalization felt fine. Later in the evening, he was in his wheelchair after returning from the bathroom to urinate. While sitting in his wheelchair, his air cushion began to slide and he slid out of the chair onto the floor. He again was unable to stand walk EMS had to be summoned and he was transported to the ED. He states \"my legs simply collapsed\". The first episode he was down no longer than 45 minutes. --He states he felt fine up until the above episodes. There has been no fever chills cough chest pain nausea emesis diarrhea. His appetite has been good has been consuming normal amounts of food and liquids. --The patient has previous history of CVA; peripheral vascular disease.   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  --SARS-CoV-2 was not detected  --Urinalysis negative        Past Medical History:   Diagnosis Date    (HFpEF) heart failure with preserved ejection fraction ([facility] [address] 07/2020    Diagnosed by cardiology    Arthritis     Bilateral carotid artery stenosis 12/31/2020 reports that he quit smoking about 39 years ago. His smoking use included cigarettes. He started smoking about 67 years ago. He has a 50.00 pack-year smoking history. He has never used smokeless tobacco. He reports previous drug use. Drug: Marijuana. He reports that he does not drink alcohol. Family History:   family history includes Cancer in his sister; Heart Disease in his father and mother; Heart Failure in his father and mother; Heart Surgery in his father; High Blood Pressure in his sister. REVIEW OF SYSTEMS:  As above in the HPI, otherwise negative    PHYSICAL EXAM:    VS: BP (!) 168/92   Pulse 70   Temp 98.8 °F (37.1 °C) (Oral)   Resp 16   Ht 5' 8\" (1.727 m)   Wt 260 lb 12.8 oz (118.3 kg)   SpO2 97%   BMI 39.65 kg/m²     General appearance: Alert, Awake, Oriented times 3, no distress; uncomfortable because of back pain  Skin: Warm and dry ; no rashes  Head: Normocephalic. No masses, lesions or tenderness noted  Eyes: Conjunctivae pink, sclera white. PERRL,EOM-I  Ears: External ears normal  Nose/Sinuses: Nares normal. Septum midline. Mucosa normal. No drainage  Oropharynx: Oropharynx clear with no exudate seen  Neck: Supple. No jugular venous distension, lymphadenopathy or thyromegaly Trachea midline  Lungs: Clear to auscultation bilaterally. No rhonchi, crackles or wheezes  Heart: S1 S2  Regular rate and rhythm. No rub, murmur or gallop; ventricular pacemaker  Abdomen: Soft, non-tender.  BS normal. No masses, organomegaly; no rebound or guarding  Extremities: 1+ edema, Peripheral pulses weak  Musculoskeletal: Significant weakness bilateral lower extremities  Neuro: Cranial nerves II through XII intact  Breast: deferred  Rectal: deferred  Genitalia:  deferred    LABS:  CBC:   Lab Results   Component Value Date    WBC 8.7 01/19/2021    RBC 3.45 01/19/2021    HGB 9.8 01/19/2021    HCT 31.1 01/19/2021    MCV 90.1 01/19/2021    MCH 28.4 01/19/2021    MCHC 31.5 01/19/2021    RDW 18.6 01/19/2021  01/19/2021    MPV 9.6 01/19/2021     CBC with Differential:    Lab Results   Component Value Date    WBC 8.7 01/19/2021    RBC 3.45 01/19/2021    HGB 9.8 01/19/2021    HCT 31.1 01/19/2021     01/19/2021    MCV 90.1 01/19/2021    MCH 28.4 01/19/2021    MCHC 31.5 01/19/2021    RDW 18.6 01/19/2021    NRBC 0.0 04/22/2018    SEGSPCT 76 03/16/2014    BANDSPCT 1 09/02/2016    LYMPHOPCT 11.6 01/19/2021    MONOPCT 5.0 01/19/2021    BASOPCT 0.3 01/19/2021    MONOSABS 0.44 01/19/2021    LYMPHSABS 1.01 01/19/2021    EOSABS 0.24 01/19/2021    BASOSABS 0.03 01/19/2021     Hemoglobin/Hematocrit:    Lab Results   Component Value Date    HGB 9.8 01/19/2021    HCT 31.1 01/19/2021     CMP:    Lab Results   Component Value Date     01/19/2021    K 5.2 01/19/2021    CL 97 01/19/2021    CO2 28 01/19/2021    BUN 30 01/19/2021    CREATININE 2.1 01/19/2021    GFRAA 37 01/19/2021    LABGLOM 31 01/19/2021    GLUCOSE 148 01/19/2021    GLUCOSE 111 07/16/2011    PROT 7.2 01/19/2021    LABALBU 3.8 01/19/2021    CALCIUM 9.7 01/19/2021    BILITOT 0.5 01/19/2021    ALKPHOS 70 01/19/2021    AST 14 01/19/2021    ALT 8 01/19/2021     BMP:    Lab Results   Component Value Date     01/19/2021    K 5.2 01/19/2021    CL 97 01/19/2021    CO2 28 01/19/2021    BUN 30 01/19/2021    LABALBU 3.8 01/19/2021    CREATININE 2.1 01/19/2021    CALCIUM 9.7 01/19/2021    GFRAA 37 01/19/2021    LABGLOM 31 01/19/2021    GLUCOSE 148 01/19/2021    GLUCOSE 111 07/16/2011     Hepatic Function Panel:    Lab Results   Component Value Date    ALKPHOS 70 01/19/2021    ALT 8 01/19/2021    AST 14 01/19/2021    PROT 7.2 01/19/2021    BILITOT 0.5 01/19/2021    BILIDIR 0.2 03/16/2014    LABALBU 3.8 01/19/2021     Ionized Calcium:  No results found for: IONCA  Magnesium:    Lab Results   Component Value Date    MG 1.9 04/22/2018     Phosphorus:    Lab Results   Component Value Date    PHOS 1.9 11/02/2019     LDH:  No results found for: LDH Uric Acid:  No results found for: LABURIC, URICACID  PT/INR:    Lab Results   Component Value Date    PROTIME 11.7 01/05/2021    PROTIME 11.3 06/21/2011    INR 1.0 01/05/2021     Troponin:    Lab Results   Component Value Date    TROPONINI 0.06 01/19/2021     Last 3 Troponin:    Lab Results   Component Value Date    TROPONINI 0.06 01/19/2021    TROPONINI 0.09 01/19/2021    TROPONINI 0.03 07/30/2020     U/A:    Lab Results   Component Value Date    COLORU Yellow 01/19/2021    PROTEINU TRACE 01/19/2021    PHUR 7.5 01/19/2021    LABCAST RARE 10/11/2017    WBCUA 1-3 01/19/2021    WBCUA NONE 07/12/2011    RBCUA 0-1 01/19/2021    RBCUA 1-3 03/17/2014    BACTERIA NONE SEEN 01/19/2021    CLARITYU Clear 01/19/2021    SPECGRAV 1.010 01/19/2021    LEUKOCYTESUR Negative 01/19/2021    UROBILINOGEN 0.2 01/19/2021    BILIRUBINUR Negative 01/19/2021    BILIRUBINUR NEGATIVE 07/12/2011    BLOODU Negative 01/19/2021    GLUCOSEU Negative 01/19/2021    GLUCOSEU NEGATIVE 07/12/2011     HgBA1c:    Lab Results   Component Value Date    LABA1C 6.8 07/30/2020     FLP:    Lab Results   Component Value Date    TRIG 103 01/20/2018    HDL 40 01/20/2018    LDLCALC 88 01/20/2018    LABVLDL 21 01/20/2018     TSH:    Lab Results   Component Value Date    TSH 1.430 07/31/2020     VITAMIN B12: No components found for: B12  FOLATE:  No results found for: FOLATE  IRON:  No results found for: IRON  Iron Saturation:  No components found for: PERCENTFE  TIBC:  No results found for: TIBC  FERRITIN:  No results found for: FERRITIN    RADIOLOGY:  XR CHEST PORTABLE   Final Result   Similar findings favored to be chronic. PA and lateral views would be useful   for further assessment.           ASSESSMENT:      Active Hospital Problems    Diagnosis    Essential hypertension [I10]     Priority: Medium    Elevated troponin [R77.8]    Acute renal insufficiency [N28.9]    (HFpEF) heart failure with preserved ejection fraction ([facility] [address] [I50.30]  History of CVA (cerebrovascular accident) [Z86.73]    Diabetes mellitus type 2, uncontrolled ([facility] [E11.65]    HARVEY (acute kidney injury) ([facility] [N17.9]    Type 2 diabetes mellitus with stage 3 chronic kidney disease, with long-term current use of insulin ([facility] [E11.22, N18.30, Z79.4]    S/P AVR [Z95.2]    Pacemaker [Z95.0]    PVD (peripheral vascular disease) with claudication ([facility] [I73.9]       PLAN:  Medications discussed with patient  GI prophylaxis  DVT prophylaxis  Albuterol nebulizer as needed  Enoxaparin 40 mg daily  Gabapentin 400 mg 4 times daily  Physical therapy Occupational Therapy  Hydrocodone 10, every 6 hours as needed for breakthrough pain  Lantus 20 units subcu twice daily  Sliding-scale insulin  Normal saline 100 cc an hour continuous  Monitor lab          Please note that over 50 minutes was spent in evaluating the patient, review of records and results, discussion with staff/family, etc.    Jackeline Martino DO  2:09 PM  1/19/2021    Voice recognition software use for dictation

## 2021-01-19 NOTE — PROGRESS NOTES
Pharmacy Note    Derek Peñaloza was ordered Astelin nasal spray. Per the [address], this medication is non-formulary and not stocked by pharmacy for the reason indicated below. The medication can be reordered at discharge.      Medications in which risks outweigh benefits during hospitalization:         -  oral bisphosphonates         -  raloxifene (Evista)      Medications that lack necessity during an acute hospital stay:      -  nasal antihistamines        -  nasal ipratropium 0.03% and 0.06%        -  nasal miacalcin        -  acyclovir topical cream/ointment orders for herpes labialis (cold sores)  Stephen Reyes RPh 1/19/2021 6:18 AM

## 2021-01-19 NOTE — ED NOTES
Bed: 23  Expected date:   Expected time:   Means of arrival:   Comments:  350 Joesph Herrera RN  01/19/21 [id number]

## 2021-01-19 NOTE — PROGRESS NOTES
Asuncion Alison was ordered Ivon which is a nonformulary medication. Please see that the home supply of this medication will be brought in to the hospital for inpatient use. If the medication has not been administered by 1400 on the following day from the time the order was placed, a pharmacist will follow-up with the nurse of the patient to assess the capability of the patient to bring in the medication. If it is determined that the patient cannot supply the medication and it is not available to be dispensed from the pharmacy, a call will be placed to the ordering provider to discuss alternative options.     Ravindra Stevens, [address] 1/19/2021 12:45 PM

## 2021-01-19 NOTE — ED NOTES
Pt states he is still unable to provide urine specimen; pt is adamant that he is not going to allow a catheter placed for urine specimen and that a urologist told him never to allow a catheter placed.  Pt does not know the name of the urologist.      Kelsy Fall RN  01/19/21 [id number]

## 2021-01-19 NOTE — ED NOTES
Assumed care of patient. Pt lying in bed in no apparent distress. Pt states that he is \"unable to pee\" at this time.       Samanta Soria RN  01/19/21 [id number]

## 2021-01-19 NOTE — ED PROVIDER NOTES
ED  Provider Note  Admit Date/RoomTime: 1/19/2021 12:56 AM  ED Room: 23/23     HPI:   Foster Blanton is a 79 y.o. male presenting to the ED for weakness, beginning 12 hours ago. History comes primarily from the patient. Past medical history includes coronary artery disease, chronic kidney disease, obstructive sleep apnea, prior CVA with residual left-sided deficits, obesity, hypertension. The complaint has been constant, moderate in severity, improved by nothing and worsened by nothing. Giorgio Bruno had EMS come to his home after he slid out of his wheelchair onto his rear end and was unable to rise to standing position. The patient was at home and is normally able to care for himself with some level of competency at baseline. However today, after falling to the ground he was unable to get up and stand or get back into his wheelchair. The patient states that he did not syncopized, did not hit his head or neck, did not lose consciousness with this fall, he does states that he has been too weak to do anything. For this reason EMS was called and on arrival the patient was found to be shivering while sitting upright on the ground from his wheelchair. He was transported to [facility] emergency department for further evaluation and treatment. On arrival, he was assessed with history, physical exam, imaging studies, laboratory studies, EKG, vital signs. The patient's vital signs were stable on arrival and he was afebrile. Review of Systems   Constitutional: Positive for activity change and fatigue. Negative for chills and fever. HENT: Negative for ear pain, sinus pressure and sore throat. Eyes: Negative for pain, discharge and redness. Respiratory: Negative for cough, shortness of breath and wheezing. Cardiovascular: Negative for chest pain. Gastrointestinal: Negative for abdominal pain, diarrhea, nausea and vomiting. Genitourinary: Negative for dysuria and frequency. Musculoskeletal: Negative for arthralgias and back pain. Skin: Negative for rash and wound. Neurological: Positive for weakness (Residual left-sided weakness). Negative for headaches. Hematological: Negative for adenopathy. All other systems reviewed and are negative. Physical Exam  Vitals signs reviewed. Constitutional:       General: He is not in acute distress. Appearance: He is well-developed. He is ill-appearing (Patient is actively shivering during assessment). He is not diaphoretic. HENT:      Head: Normocephalic and atraumatic. Mouth/Throat:      Dentition: Abnormal dentition. Eyes:      Pupils: Pupils are equal, round, and reactive to light. Neck:      Musculoskeletal: Normal range of motion. Vascular: No JVD. Trachea: No tracheal deviation. Cardiovascular:      Rate and Rhythm: Regular rhythm. Heart sounds: No murmur. No friction rub. No gallop. Pulmonary:      Effort: Pulmonary effort is normal. No respiratory distress. Breath sounds: Normal breath sounds. No stridor. No wheezing or rales. Chest:      Chest wall: No tenderness. Abdominal:      General: Bowel sounds are normal. There is no distension. Palpations: Abdomen is soft. Tenderness: There is no abdominal tenderness. There is no guarding. Musculoskeletal: Normal range of motion. Skin:     General: Skin is warm and dry. Capillary Refill: Capillary refill takes less than 2 seconds. Neurological:      General: No focal deficit present. Mental Status: He is alert and oriented to person, place, and time. Cranial Nerves: No cranial nerve deficit. Sensory: No sensory deficit.    Psychiatric:         Behavior: Behavior normal.          Procedures     MDM  Number of Diagnoses or Management Options Diagnosis management comments: Emergency Department evaluation was notable for new onset weakness and decline in functional status in the setting of acute renal insufficiency with elevated troponin. Patient also noted to have a mild lactic acidosis as well as some elevation in proBNP, although this particular lab is not at its high watermark historically. Patient could benefit from further evaluation and treatment in the inpatient setting. This information was relayed to the patient who understood this plan of care and was amenable to the plan. Patient was discussed with the admitting service (Dr. Chapincito Valles) who concurred with the decision for admission, and have agreed to admit the patient to telemetry.           --------------------------------------------- PAST HISTORY ---------------------------------------------  Past Medical History:  has a past medical history of Arthritis, Bilateral carotid artery stenosis, Bilateral carotid bruits, Blood circulation, collateral, CAD (coronary artery disease), Carotid bruit, CHF (congestive heart failure) ([facility] [address] Chronic kidney disease, CKD (chronic kidney disease) stage 3, GFR 30-59 ml/min, Diabetes mellitus ([address] Dyspnea on exertion, History of blood transfusion, Hyperlipidemia, Hypertension, Movement disorder, NSTEMI, initial episode of care ([facility] [address] Obesity, PVD (peripheral vascular disease) with claudication ([facility] [address] S/P carotid endarterectomy, Sleep apnea, SOB (shortness of breath), Thyroid disease, Unspecified cerebral artery occlusion with cerebral infarction, and Vision decreased. Past Surgical History:  has a past surgical history that includes Cardiac surgery; vascular surgery; joint replacement; Diagnostic Cardiac Cath Lab Procedure (10/14/08); Diagnostic Cardiac Cath Lab Procedure (10/21/10); knee surgery; Cardiac valuve replacement; Coronary artery bypass graft; Tonsillectomy; Pacemaker insertion (11/12/2014); other surgical history (Right, 02/14/2017); and BIOPSY / LIGATION TEMPORAL ARTERY (Bilateral, 1/5/2021). Social History:  reports that he quit smoking about 39 years ago. His smoking use included cigarettes. He started smoking about 67 years ago. He has a 50.00 pack-year smoking history. He has never used smokeless tobacco. He reports previous drug use. Drug: Marijuana. He reports that he does not drink alcohol. Family History: family history includes Cancer in his sister; Heart Disease in his father and mother; Heart Failure in his father and mother; Heart Surgery in his father; High Blood Pressure in his sister. The patients home medications have been reviewed. Allergies: Patient has no known allergies.     -------------------------------------------------- RESULTS -------------------------------------------------    LABS:  Results for orders placed or performed during the hospital encounter of 01/19/21   CBC Auto Differential   Result Value Ref Range    WBC 8.7 4.5 - 11.5 E9/L    RBC 3.45 (L) 3.80 - 5.80 E12/L    Hemoglobin 9.8 (L) 12.5 - 16.5 g/dL    Hematocrit 31.1 (L) 37.0 - 54.0 %    MCV 90.1 80.0 - 99.9 fL    MCH 28.4 26.0 - 35.0 pg    MCHC 31.5 (L) 32.0 - 34.5 %    RDW 18.6 (H) 11.5 - 15.0 fL    Platelets 488 120 - 543 E9/L    MPV 9.6 7.0 - 12.0 fL    Neutrophils % 79.9 43.0 - 80.0 %    Immature Granulocytes % 0.5 0.0 - 5.0 %    Lymphocytes % 11.6 (L) 20.0 - 42.0 %    Monocytes % 5.0 2.0 - 12.0 %    Eosinophils % 2.7 0.0 - 6.0 %    Basophils % 0.3 0.0 - 2.0 %    Neutrophils Absolute 6.98 1.80 - 7.30 E9/L    Immature Granulocytes # 0.04 E9/L Lymphocytes Absolute 1.01 (L) 1.50 - 4.00 E9/L    Monocytes Absolute 0.44 0.10 - 0.95 E9/L    Eosinophils Absolute 0.24 0.05 - 0.50 E9/L    Basophils Absolute 0.03 0.00 - 0.20 E9/L   Comprehensive Metabolic Panel w/ Reflex to MG   Result Value Ref Range    Sodium 135 132 - 146 mmol/L    Potassium reflex Magnesium 5.2 (H) 3.5 - 5.0 mmol/L    Chloride 97 (L) 98 - 107 mmol/L    CO2 28 22 - 29 mmol/L    Anion Gap 10 7 - 16 mmol/L    Glucose 153 (H) 74 - 99 mg/dL    BUN 30 (H) 8 - 23 mg/dL    CREATININE 2.1 (H) 0.7 - 1.2 mg/dL    GFR Non-African American 31 >=60 mL/min/1.73    GFR African American 37     Calcium 9.7 8.6 - 10.2 mg/dL    Total Protein 7.2 6.4 - 8.3 g/dL    Alb 3.8 3.5 - 5.2 g/dL    Total Bilirubin 0.5 0.0 - 1.2 mg/dL    Alkaline Phosphatase 70 40 - 129 U/L    ALT 8 0 - 40 U/L    AST 14 0 - 39 U/L   Troponin   Result Value Ref Range    Troponin 0.09 (H) 0.00 - 0.03 ng/mL   Brain Natriuretic Peptide   Result Value Ref Range    Pro-BNP 2,952 (H) 0 - 450 pg/mL   Lactate, Sepsis   Result Value Ref Range    Lactic Acid, Sepsis 2.1 (H) 0.5 - 1.9 mmol/L   COVID-19   Result Value Ref Range    SARS-CoV-2, NAAT Not Detected Not Detected   EKG 12 Lead   Result Value Ref Range    Ventricular Rate 63 BPM    Atrial Rate 47 BPM    QRS Duration 160 ms    Q-T Interval 490 ms    QTc Calculation (Bazett) 501 ms    P Axis -89 degrees    R Axis 67 degrees    T Axis -122 degrees       RADIOLOGY:  XR CHEST PORTABLE   Final Result   Similar findings favored to be chronic. PA and lateral views would be useful   for further assessment. EKG #1:  Interpreted by emergency department physician unless otherwise noted. Time:  0118    Rate: 63 bpm  Rhythm: Paced rhythm.   Interpretation: Pacemaker rhythm.          ------------------------- NURSING NOTES AND VITALS REVIEWED ---------------------------  Date / Time Roomed:  1/19/2021 12:56 AM  ED Bed Assignment:  23/23 The nursing notes within the ED encounter and vital signs as below have been reviewed. Patient Vitals for the past 24 hrs:   BP Temp Temp src Pulse Resp SpO2 Height Weight   01/19/21 0339    68       01/19/21 0335 (!) 160/66 98.9 °F (37.2 °C) Oral 63 20 93 % 5' 8\" (1.727 m) 260 lb 12.8 oz (118.3 kg)   01/19/21 0249 (!) 157/79 98.6 °F (37 °C) Oral 68 16 95 %     01/19/21 0104 (!) 158/68 99.5 °F (37.5 °C) Oral 68 20 94 % 5' 8\" (1.727 m)        Oxygen Saturation Interpretation: Normal    ------------------------------------------ PROGRESS NOTES ------------------------------------------  Re-evaluation(s):  Time: 5:02 AM EST  Patients symptoms show no change  Repeat physical examination is not changed    Counseling:  I have spoken with the patient and discussed todays results, in addition to providing specific details for the plan of care and counseling regarding the diagnosis and prognosis. Their questions are answered at this time and they are agreeable with the plan of admission.    --------------------------------- ADDITIONAL PROVIDER NOTES ---------------------------------  Consultations:  Time: 5:02 AM EST. Spoke with Dr. Black Alford. Discussed case. They will admit the patient. This patient's ED course included: a personal history and physicial examination, re-evaluation prior to disposition, multiple bedside re-evaluations and continuous pulse oximetry    This patient has remained hemodynamically stable during their ED course. Diagnosis:  1. Acute renal insufficiency    2. Fall, initial encounter    3. Adult failure to thrive    4. Elevated troponin        Disposition:  Patient's disposition: Admit to telemetry  Patient's condition is fair.                    Svetlana Patel,   Resident  01/19/21 1810

## 2021-01-19 NOTE — ED NOTES
This RN spoke with pt's brother. Brother states that he will bring in pt's home medication (ER Morphine 10mg) due to pharmacy not carrying such medication. Will follow up.       Pratima Valdivia RN  01/19/21 0042

## 2021-01-19 NOTE — PROGRESS NOTES
Database complete. Medications reconciled. Care plans and education initiated. Has pacemaker. Was using cane but now using walker. Left sided deficits from previous CVA. Had 2 falls yesterday. States his Cardiologist was Dr. Adelfo Dickerson, but is unsure who he sees now.

## 2021-01-20 NOTE — PROGRESS NOTES
Physical Therapy    Facility/Department: [address] INTERNAL MEDICINE 2  Initial Assessment    NAME: Foster Blanton  : 1940  MRN: [id number]    Date of Service: 2021       REQUIRES PT FOLLOW UP: Yes       Patient Diagnosis(es): The primary encounter diagnosis was Acute renal insufficiency. Diagnoses of Fall, initial encounter, Adult failure to thrive, and Elevated troponin were also pertinent to this visit. has a past medical history of (HFpEF) heart failure with preserved ejection fraction ([address] Arthritis, Bilateral carotid artery stenosis, Bilateral carotid bruits, Blood circulation, collateral, CAD (coronary artery disease), Carotid bruit, CHF (congestive heart failure) ([address] Chronic kidney disease, CKD (chronic kidney disease) stage 3, GFR 30-59 ml/min, COPD (chronic obstructive pulmonary disease) ([address] Diabetes mellitus ([address] Diabetic neuropathy associated with type 2 diabetes mellitus ([address] Dyspnea on exertion, History of blood transfusion, Hyperlipidemia, Hypertension, Mild mitral regurgitation, Moderate tricuspid regurgitation by prior echocardiography, Movement disorder, NSTEMI, initial episode of care ([address] Obesity, Pulmonary hypertension ([address] PVD (peripheral vascular disease) with claudication ([address] S/P carotid endarterectomy, Sleep apnea, SOB (shortness of breath), Thyroid disease, Unspecified cerebral artery occlusion with cerebral infarction, and Vision decreased. has a past surgical history that includes Cardiac surgery; vascular surgery; joint replacement; Diagnostic Cardiac Cath Lab Procedure (10/14/2008); Diagnostic Cardiac Cath Lab Procedure (10/21/2010); knee surgery; Cardiac valuve replacement; Coronary artery bypass graft (); Tonsillectomy; Pacemaker insertion (2014); other surgical history (Right, 2017); and BIOPSY / LIGATION TEMPORAL ARTERY (Bilateral, 2021).     Therapist: Ric Daley, PT     Referring provider: Dr. Tremayne Bey #: [id number] DIAGNOSIS: acute renal insufficiency . 2 falls prior to admission   PRECAUTIONS: falls     Social:  Pt lives alone in a 1 floor plan apartment  no steps and no rails to enter. Prior to admission pt walked with a ww. Has SPC. States he walks short distances . Has w/c       Initial Evaluation  Date: 1/20/2021 Treatment    Short Term/ Long Term   Goals   Was pt agreeable to Eval/treatment? yes       Does pt have pain? Chronic back pain, B LEs         Bed Mobility  Rolling : min assist   Sit to supine: mod assist    Independent    Transfers Sit to stand: min assist   Stand to sit: min assist   Stand pivot: min assist    Independent   Ambulation    3 feet  X 1 with ww min assist    100 feet with AAD Independent   AM-PAC Raw Score  15/24          Pt is alert and Oriented x3     BLE ROM is WFL. BLE strength is grossly 4-/5.     Balance: min assist, high fall risk       Sensation: neuropathy B feet.     Endurance: decreased      ASSESSMENT     Pt displays functional ability as noted in the objective portion of this evaluation.       Comments/Treatment:  Mobility as above. Pt required increased time to perform limited mobility. Pt attempted to sit without being in a safe position. Cues given to safe sit . Pt became upset with therapists for verbal instruction      Pt was left sitting up in chair with call light in reach. No alarm box available, staff informed      Rehab potential is Good for reaching above PT goals.        Pts/ family goals      1. None stated      Patient and or family understand(s) diagnosis, prognosis, and plan of care. - yes     PLAN     PT care will be provided in accordance with the objectives noted above. Vanessa Pham appropriate, clear delegation orders will be provided for nursing staff.  Exercises and functional mobility practice will be used as well as appropriate assistive devices or modalities to obtain goals.  Patient and family education will also be administered as needed.    Frequency of treatments will be 2-5x/week x 5-7 days     Time in: 1105  Time out: 1120      Hamida Ortega   PT 0600

## 2021-01-20 NOTE — CARE COORDINATION
CM NOTE: Per QFR--- admitted for renal insuff s/p fall at home. Cultures pending. Given dose vanc per ID. Possible pacemaker infection. +weakness. CM attempted to speak with pt by phone but NA in room. CM attempted to meet with pt but therapy here for eval. From home alone. Has medic alert button. Hx falls at home. States recently treated at [facility]. Await thercaroilne evals & input.

## 2021-01-20 NOTE — PROGRESS NOTES
PROGRESS  NOTE --                                                          INTERNAL  MEDICINE                                                                              I  PERSONALLY SAW , EXAMINED, AND CARED [id number] [facility], 1/20/2021     LABS, XRAY ,CHART, AND MEDICATIONS  REVIEWED BY ME       Chief complaint: Weakness of legs      1/20/2021-SUBJECTIVE: Dolores Kwon is alert awake and cooperative; oriented ×3. Denies any chest pain dyspnea nausea emesis. Tolerating diet. No abdominal pain. He states he did have normal bowel movement this morning followed by explosive diarrhea; since that he is feeling weaker again. Afebrile last 24 hours. Blood pressure 142/64.  96% saturation on room air. Intake and output +1101 cc. BUN 20 creatinine 1.4, improved. Protein 6.2 albumin 3.2. Glucose 161. Troponins have been elevated as high as 0.09; most recently 0.05. Hemoglobin 8.8 WBC 5.8 platelets 413. Preliminary blood cultures, gram-positive cocci in clusters times 2 out of 4. Urine culture mixed organisms still evaluating. CT of lumbar spine as follows--    FINDINGS:   BONES/ALIGNMENT: There is normal alignment of the spine.  Mild levoscoliosis   with tip at L4.  The vertebral body heights are maintained. No osseous   destructive lesion is seen. DEGENERATIVE CHANGES: No significant degenerative changes of the lumbar spine. At L1-L2: Subtle retrolisthesis.  Otherwise unremarkable. At L2-L3: Subtle retrolisthesis.  Otherwise unremarkable. At L3-L4, subtle retrolisthesis and mild bilateral facet arthropathy. Findings result in mild canal stenosis and mild bilateral neural foraminal   narrowing.    At L4-L5: Vacuum phenomenon within the disc space.  Subtle anterolisthesis   and 2 mm disc bulging.  Moderate bilateral set arthropathy and ligamentum flavum thickening.  Mild canal stenosis and mild bilateral neural foraminal   narrowing. L5-S1: Moderate endplate sclerosis.  Moderate bilateral set arthropathy. Mild left neural foraminal narrowing. SOFT TISSUES/RETROPERITONEUM: No paraspinal mass is seen.  Moderate   degenerative changes of the abdominal aorta and its branches are noted.      Impression:       Mild levoscoliosis with tip at L4. At L1-L2 and L2-L3, subtle retrolisthesis. At L3-L4, subtle retrolisthesis, mild canal stenosis and mild bilateral   neural foraminal narrowing. At L4-L5, subtle anterolisthesis, mild canal stenosis and mild bilateral   neural foraminal narrowing. At L5-S1, moderate endplate sclerosis and mild left neural foraminal   narrowing. ID consult from today, coagulase-negative staph possible contaminant. Cannot exclude pacemaker infection. Repeat blood cultures x2, 1 dose of vancomycin. Physical therapy AM PAC score from today 15/24. Neuropathy bilateral lower extremities; endurance decreased.     Objective:     PHYSICAL EXAM:    VS: BP (!) 142/64   Pulse 66   Temp 98.1 °F (36.7 °C) (Oral)   Resp 16   Ht 5' 8\" (1.727 m)   Wt 246 lb 8 oz (111.8 kg)   SpO2 96%   BMI 37.48 kg/m²     Labs:   CBC:   Lab Results   Component Value Date    WBC 5.8 01/20/2021    RBC 3.18 01/20/2021    HGB 8.8 01/20/2021    HCT 28.5 01/20/2021    MCV 89.6 01/20/2021    MCH 27.7 01/20/2021    MCHC 30.9 01/20/2021    RDW 18.4 01/20/2021     01/20/2021    MPV 8.8 01/20/2021     CBC with Differential:    Lab Results   Component Value Date    WBC 5.8 01/20/2021    RBC 3.18 01/20/2021    HGB 8.8 01/20/2021    HCT 28.5 01/20/2021     01/20/2021    MCV 89.6 01/20/2021    MCH 27.7 01/20/2021    MCHC 30.9 01/20/2021    RDW 18.4 01/20/2021    NRBC 0.0 04/22/2018    SEGSPCT 76 03/16/2014    BANDSPCT 1 09/02/2016    LYMPHOPCT 18.6 01/20/2021    MONOPCT 5.9 01/20/2021    BASOPCT 0.5 01/20/2021    MONOSABS 0.34 01/20/2021 LYMPHSABS 1.07 01/20/2021    EOSABS 0.26 01/20/2021    BASOSABS 0.03 01/20/2021     Hemoglobin/Hematocrit:    Lab Results   Component Value Date    HGB 8.8 01/20/2021    HCT 28.5 01/20/2021     CMP:    Lab Results   Component Value Date     01/20/2021    K 4.3 01/20/2021     01/20/2021    CO2 24 01/20/2021    BUN 20 01/20/2021    CREATININE 1.4 01/20/2021    GFRAA 59 01/20/2021    LABGLOM 49 01/20/2021    GLUCOSE 161 01/20/2021    GLUCOSE 111 07/16/2011    PROT 6.2 01/20/2021    LABALBU 3.2 01/20/2021    CALCIUM 8.9 01/20/2021    BILITOT 0.5 01/20/2021    ALKPHOS 57 01/20/2021    AST 14 01/20/2021    ALT 6 01/20/2021     BMP:    Lab Results   Component Value Date     01/20/2021    K 4.3 01/20/2021     01/20/2021    CO2 24 01/20/2021    BUN 20 01/20/2021    LABALBU 3.2 01/20/2021    CREATININE 1.4 01/20/2021    CALCIUM 8.9 01/20/2021    GFRAA 59 01/20/2021    LABGLOM 49 01/20/2021    GLUCOSE 161 01/20/2021    GLUCOSE 111 07/16/2011     Hepatic Function Panel:    Lab Results   Component Value Date    ALKPHOS 57 01/20/2021    ALT 6 01/20/2021    AST 14 01/20/2021    PROT 6.2 01/20/2021    BILITOT 0.5 01/20/2021    BILIDIR 0.2 03/16/2014    LABALBU 3.2 01/20/2021     Ionized Calcium:  No results found for: IONCA  Magnesium:    Lab Results   Component Value Date    MG 1.9 04/22/2018     Phosphorus:    Lab Results   Component Value Date    PHOS 1.9 11/02/2019     LDH:  No results found for: LDH  Uric Acid:  No results found for: LABURIC, URICACID  PT/INR:    Lab Results   Component Value Date    PROTIME 11.7 01/05/2021    PROTIME 11.3 06/21/2011    INR 1.0 01/05/2021     Warfarin PT/INR:  No components found for: PTPATWAR, PTINRWAR  PTT:    Lab Results   Component Value Date    APTT 25.5 01/05/2021   [APTT}  Troponin:    Lab Results   Component Value Date    TROPONINI 0.05 01/19/2021     Last 3 Troponin:    Lab Results   Component Value Date    TROPONINI 0.05 01/19/2021 TROPONINI 0.06 01/19/2021    TROPONINI 0.09 01/19/2021     U/A:    Lab Results   Component Value Date    COLORU Yellow 01/19/2021    PROTEINU TRACE 01/19/2021    PHUR 7.5 01/19/2021    LABCAST RARE 10/11/2017    WBCUA 1-3 01/19/2021    WBCUA NONE 07/12/2011    RBCUA 0-1 01/19/2021    RBCUA 1-3 03/17/2014    BACTERIA NONE SEEN 01/19/2021    CLARITYU Clear 01/19/2021    SPECGRAV 1.010 01/19/2021    LEUKOCYTESUR Negative 01/19/2021    UROBILINOGEN 0.2 01/19/2021    BILIRUBINUR Negative 01/19/2021    BILIRUBINUR NEGATIVE 07/12/2011    BLOODU Negative 01/19/2021    GLUCOSEU Negative 01/19/2021    GLUCOSEU NEGATIVE 07/12/2011     HgBA1c:    Lab Results   Component Value Date    LABA1C 6.8 07/30/2020     FLP:    Lab Results   Component Value Date    TRIG 103 01/20/2018    HDL 40 01/20/2018    LDLCALC 88 01/20/2018    LABVLDL 21 01/20/2018     TSH:    Lab Results   Component Value Date    TSH 1.430 07/31/2020     VITAMIN B12: No components found for: B12  FOLATE:  No results found for: FOLATE     General appearance: Alert, Awake, Oriented times 3, no distress  Skin: Warm and dry ; no rashes  Head: Normocephalic. No masses, lesions or tenderness noted  Eyes: Conjunctivae pink, sclera white. PERRL,EOM-I  Ears: External ears normal  Nose/Sinuses: Nares normal. Septum midline. Mucosa normal. No drainage  Oropharynx: Oropharynx clear with no exudate seen  Neck: Supple. No jugular venous distension, lymphadenopathy or thyromegaly Trachea midline  Lungs: Clear to auscultation bilaterally. No rhonchi, crackles or wheezes  Heart: S1 S2  Regular rate and rhythm. No rub, murmur or gallop; ventricular pacemaker  Abdomen: Soft, non-tender.  BS normal. No masses, organomegaly; no rebound or guarding  Extremities: +1 edema, Peripheral pulses   Musculoskeletal: Significant weakness bilateral lower extremities left worse than right  Neuro: Cranial nerves II through XII intact    TELEMETRY: REVIEWED--Telemetry: Ventricular pacemaker ASSESSMENT:   Principal Problem:    HARVEY (acute kidney injury) ([facility]  Active Problems:    Essential hypertension    PVD (peripheral vascular disease) with claudication ([facility])    Pacemaker    S/P AVR    Type 2 diabetes mellitus with stage 3 chronic kidney disease, with long-term current use of insulin ([facility])    Diabetes mellitus type 2, uncontrolled ([facility]    History of CVA (cerebrovascular accident)    Elevated troponin    Acute renal insufficiency    Pulmonary hypertension ([facility])    Moderate tricuspid regurgitation by prior echocardiography    (HFpEF) heart failure with preserved ejection fraction ([facility])    COPD (chronic obstructive pulmonary disease) ([facility])    Diabetic neuropathy associated with type 2 diabetes mellitus ([facility]  Resolved Problems:    * No resolved hospital problems. *      PLAN:  SEE ORDERS      RE  CHANGES AND FINDINGS   Medications reviewed with patient  GI prophylaxis  DVT prophylaxis  Consultants notes reviewed   1 dose vancomycin IV per ID  Repeat blood cultures  Imodium as needed for diarrhea  Allopurinol 3 mg daily  Aspirin 81 mg daily  Enoxaparin 30 mg daily  Lantus 20 units twice daily  Sliding-scale insulin  Gabapentin 400 mg 4 times daily  Morphine extended release 10 mg twice daily by mouth  Toprol-XL 50 mg daily  Synthroid 25 mcg  Iron studies a.m.  TSH free T4 a.m. Appropriate labs        Discussed with patient and nursing.       Chucho Dinh Vickyangeles LINDSAY     1:09 PM     1/20/2021    TIME > 35 MINUTES    >  50 %  OF  TIME  DISCUSSION               ------------  INFORMATION  -----------      DIET:DIET CARB CONTROL;      No Known Allergies      MEDICATION SIDE EFFECTS:none       SCHEDULED MEDS:                                 Scheduled Meds:   enoxaparin  30 mg Subcutaneous Daily    vancomycin  2,000 mg Intravenous Once    allopurinol  300 mg Oral Daily    aspirin EC  81 mg Oral Daily    fluticasone  1 spray Nasal Daily    gabapentin  400 mg Oral 4x Daily  insulin glargine  20 Units Subcutaneous BID    isosorbide mononitrate  120 mg Oral Daily    levothyroxine  25 mcg Oral Daily    metoprolol succinate  50 mg Oral Daily    morphine  10 mg Oral BID    sodium chloride flush  10 mL Intravenous 2 times per day    insulin lispro  0-6 Units Subcutaneous TID WC    insulin lispro  0-3 Units Subcutaneous Nightly    atorvastatin  10 mg Oral Nightly       Continuous Infusions:   sodium chloride 100 mL/hr at 01/20/21 0830    dextrose           Data:       Intake/Output Summary (Last 24 hours) at 1/20/2021 1309  Last data filed at 1/20/2021 1305  Gross per 24 hour   Intake 726 ml   Output 625 ml   Net 101 ml       Wt Readings from Last 3 Encounters:   01/20/21 246 lb 8 oz (111.8 kg)   01/13/21 259 lb (117.5 kg)   01/05/21 259 lb (117.5 kg)       Labs: Additional    GLUCOSE:No results for input(s): POCGLU in the last 72 hours. BNP:No results found for: BNP    CRP: No results for input(s): CRP in the last 72 hours. ESR:No results for input(s): SEDRATE in the last 72 hours. RADIOLOGY: REVIEWED AVAILABLE REPORT  CT LUMBAR SPINE WO CONTRAST   Final Result   Mild levoscoliosis with tip at L4. At L1-L2 and L2-L3, subtle retrolisthesis. At L3-L4, subtle retrolisthesis, mild canal stenosis and mild bilateral   neural foraminal narrowing. At L4-L5, subtle anterolisthesis, mild canal stenosis and mild bilateral   neural foraminal narrowing. At L5-S1, moderate endplate sclerosis and mild left neural foraminal   narrowing. XR CHEST PORTABLE   Final Result   Similar findings favored to be chronic. PA and lateral views would be useful   for further assessment.                 [address]  DO   1:09 PM     1/20/2021      Voice recognition software used for dictation

## 2021-01-20 NOTE — PROGRESS NOTES
Occupational Therapy  OCCUPATIONAL THERAPY INITIAL EVALUATION      Date:2021  Patient Name: Tyesha Guerrero  MRN: [id number]  : 1940  Room: [address] [place] [address]-A    Referring Provider: LILLIANA Flores  Evaluating OT: Kanchan Acuña. Geoffrey Bains - [phone]    AM-[facility] Daily Activity Raw Score: 15/24      Recommended Adaptive Equipment: Continue to assess. Diagnosis: Acute renal insufficiency [N28.9]    Patient presented to ED s/p two falls at home. Pertinent Medical History: CVA, CAD, CHF, CKD, COPD, DM, neuropathy, HTN, arthritis, sleep apnea, PVD, obesity      Precautions: falls, bed/chair alarms, skin integrity    Home Living: Patient lives alone in a one-floor apartment. Equipment Owned: emergency alert button, wheelchair    Prior Level of Function (PLOF): Patient reported that he was independent with ADLs and most household-based IADLs; patient had assistance with housekeeping tasks and other IADLs, as needed. Pain Level: Patient reported experiencing discomfort/pain in B LEs and stated that he has chronic pain in his back. Patient did not rate his pain. Cognition: Patient alert and oriented grossly. Fair command follow demonstrated. Fair insight to current abilities/limitations demonstrated. Memory: WFL   Sequencing: Fair   Problem Solving: Fair   Judgement/Safety: Impaired    Impulsivity demonstrated. Functional Assessment:   Initial Eval Status  Date: 2021 Treatment Status  Date:  Short Term Goals   Feeding Setup     Grooming Min A  Mod I  (seated/standing at sink)   UB Dressing Min A  Independent   LB Dressing Max A  Min A - with use of AE, as needed/appropriate   Bathing Max A  Min A - with use of AE/DME, as needed/appropriate   Toileting Mod A  Mod I / Independent   Bed Mobility  Supine-to-Sit: Mod A and increased time     Functional Transfers Sit-to-Stands: Min A   from EOB and bedside chair. Cues given to maximize safety.   Independent Functional Mobility Min A for few steps from EOB to bedside chair and then from bedside chair to [facility] (with walker); increased time needed. Decreased safety awareness demonstrated with performance of stand-to-sit twice during this session; agitation noted with provision of cues to maximize safety with stand-to-sit (both in bedside chair and BSC). Mod I with functional mobility (with device, as needed/appropriate) in order to maximize independence with ADLs/IADLs and other functional tasks. Balance Sitting: Good-  (at EOB)  Standing: Fair-  (with walker)  Fair+ dynamic standing balance during completion of ADLs/IADLs and other functional tasks. Activity Tolerance Limited  Patient will demonstrate Good understanding and consistent implementation of energy conservation techniques and work simplification techniques into ADL/IADL routines. Visual/  Perceptual Fair  Patient wears glasses. N/A   B UE Strength 3+/5 grossly  Patient will demonstrate 4/5 B UE strength in order to maximize independence with ADLs/IADLs and functional transfers. Long-Term Goal: Patient will increase functional independence to PLOF in order to allow patient to live in least restrictive environment. ROM: Additional Information:    R UE  WFL grossly    L UE WFL grossly      Hearing: Fair  Sensation: No complaints of numbness/tingling in B UEs. Tone: WFL  Edema: No (B UEs)    Comments: RN approved patient's participation in [address] [place] [address] activities. Upon arrival, patient supine in bed. At end of session, patient seated on [facility] with call light and phone within reach, nursing staff member present, and all lines and tubes intact. Patient would benefit from continued skilled OT to increase safety and independence with completion of ADL/IADL tasks for functional independence and quality of life. Treatment: OT treatment provided this date included:  ? Instruction/training on safety and adapted techniques for completion of ADLs. ?  Instruction/training on safe functional mobility/transfer techniques. Patient education provided regardin) importance of having staff assistance with ADLs and other OOB activities to prevent falls/injury during hospitalization. Patient indicated [address] understanding. Further skilled OT treatment indicated to increase patient's safety and independence with completion of ADL/IADL tasks in order to maximize patient's functional independence and quality of life.     Assessment of Current Deficits:   Functional Mobility [x]  ADLs [x] Strength [x]  Cognition []  Functional Transfers  [x] IADLs [x] Safety Awareness [x]  Endurance [x]  Fine Motor Coordination [] Balance [x] Vision/Perception [] Sensation []   Gross Motor Coordination [] ROM [] Delirium []                  Motor Control []    Plan of Care: 2-5 days/week for 1-2 weeks PRN  [x]ADL retraining/adaptive techniques and AE recommendations to increase functional independence within precautions  [x]Energy conservation techniques to improve tolerance for ADLs/IADLs  [x]Functional transfer/mobility training/DME recommendations for increased independence, safety and fall prevention  [x]Patient/family education to increase safety and functional independence  [x]Environmental modifications for safe mobility and completion of ADLs/IADLs  []Cognitive retraining exercises to improve problem solving skills & safe participation in ADLs/IADLs   []Sensory re-education techniques to improve extremity awareness, maintain skin integrity and improve hand function   []Visual/Perceptual retraining  to improve body awareness and safety during transfers and ADLs   []Splinting/positioning needs to maintain joint/skin integrity and prevent contractures   [x]Therapeutic activity to improve functional performance during ADLs/IADLs  [x]Therapeutic exercise to improve tolerance and functional strength for ADLs/IADLs [x]Balance retraining/tolerance tasks for facilitation of postural control with dynamic challenges during ADLs   [x]Neuromuscular re-education: facilitate righting/equilibrium reactions, midline orientation, scapular stability/mobility, normalize muscle tone, and facilitate active functional movement/attention  []Delirium prevention/treatment   []Positioning to improve functional independence and prevent skin breakdown   []Other:     Rehab Potential: Good for established goals. Patient / Family Goal: No goal stated. Patient and/or family were instructed on functional diagnosis, prognosis/goals, and OT plan of care. Demonstrated Fair understanding. Eval Complexity: Low    Time In: 1105  Time Out: 1125  Total Treatment Time: 10 minutes      Minutes Units   OT Eval Low 32650 15 1   OT Eval Medium 96456     OT Eval High 12550     OT Re-Eval [id number]     Therapeutic Ex 71625     Therapeutic Activities 13450 10 1   ADL/Self Care 08341     Orthotic Management 81816     Neuro Re-Ed 51124     Non-Billable Time N/A ---     Evaluation time includes thorough review of current medical information, gathering information on past medical history/social history and prior level of function, completion of standardized testing/informal observation of tasks, assessment of data, and education on plan of care and goals. Clare Raza, OTR/L  License Number: [id number]

## 2021-01-20 NOTE — PROGRESS NOTES
[facility] notified of consult via the office. Lyudmila Cantu took consult information.  Estrella ramirez

## 2021-01-20 NOTE — PROGRESS NOTES
Hello Colie Mcburney,    Your patient is on a medication that requires a renal dose adjustment. Renal Function Assessment:    Date Body Weight IBW Adj. Body Weight SCr CrCl Dialysis status   1/20/2021 111.8 kg 68.4 kg   2.1 28 ml/min         Pharmacy has renally dose-adjusted the following medication(s):    Date Medication Original Dosing Regimen New Dosing Regimen   01/19/2021 Enoxaparin 40 mg daily 30 mg daily           These changes were made per protocol according to the [facility] Renal Function-Based Dose Adjustments Policy    *Please note this dose may need readjusted if your patient's renal function significantly improves. Please contact pharmacy with any questions regarding these changes.

## 2021-01-20 NOTE — CONSULTS
[address] [place] [address] [facility]  Consultation Note     Admit Date: 1/19/2021 12:56 AM    Reason for Consult:   Positive blood cultures    Attending Physician:  Delores Pink [address] [place] [address]    HISTORY OF PRESENT ILLNESS:             The history is obtained from extensive review of available past medical records. The patient is a 79 y.o. male who is known to the ID service. Who presents to the ED on 1/19/2020 after a couple of falls. He says that his knees gave out from under him but there was no trauma. He denies any fevers, chills or diaphoresis. His vital signs were unremarkable other than hypertension. White count was normal and lactic acid was slightly elevated. He was admitted with acute renal insufficiency. As part of his work-up he had blood cultures done. 2 out of 4 bottles have turned positive for CoNS.     Past Medical History:        Diagnosis Date    (HFpEF) heart failure with preserved ejection fraction ([address] 07/2020    Diagnosed by cardiology    Arthritis     Bilateral carotid artery stenosis 12/31/2020    Bilateral carotid bruits 12/31/2020    Blood circulation, collateral     CAD (coronary artery disease)     Carotid bruit 03/27/2013    CHF (congestive heart failure) (HCC)     Chronic kidney disease     CKD (chronic kidney disease) stage 3, GFR 30-59 ml/min 03/20/2016    COPD (chronic obstructive pulmonary disease) ([address]     Follows with pulmonary    Diabetes mellitus ([address]     Diabetic neuropathy associated with type 2 diabetes mellitus (HCC)     Dyspnea on exertion     History of blood transfusion     Hyperlipidemia     Hypertension     Mild mitral regurgitation     Moderate tricuspid regurgitation by prior echocardiography 2018    Movement disorder     NSTEMI, initial episode of care ([address] 01/2018    Obesity     Pulmonary hypertension ([address] 2018    PVD (peripheral vascular disease) with claudication ([address] 03/27/2014  S/P carotid endarterectomy 03/27/2013    Sleep apnea     SOB (shortness of breath)     Thyroid disease     Unspecified cerebral artery occlusion with cerebral infarction     Vision decreased 12/31/2020 February 2017. Admitted to [facility] by his podiatrist.  He was being treated for a neuropathic ulcer on the right foot and had received 2 different courses of antibiotics that included Bactrim and Clindamycin. He underwent a biopsy of the first metatarsal.  Treated empirically with Vancomycin and Cefepime. Cultures were negative. Antibiotics were discontinued and he was discharged.     Past Surgical History:        Procedure Laterality Date    BIOPSY / LIGATION TEMPORAL ARTERY Bilateral 01/05/2021    BILATERAL TEMPORAL ARTERY BIOPSY performed [address] Raymond [address]uddleston MD [address]      5 years ago   [address] CORONARY ARTERY BYPASS GRAFT  2008    1 vessel and aortic valve repair    DIAGNOSTIC CARDIAC CATH LAB PROCEDURE  10/14/2008    DIAGNOSTIC CARDIAC CATH LAB PROCEDURE  10/21/2010    JOINT REPLACEMENT      R knee replacement    KNEE SURGERY      OTHER SURGICAL HISTORY Right 02/14/2017    debridement,bone bx foot    PACEMAKER INSERTION  11/12/2014    D-PPM   ()    Dr. Brenton johnson     Current Medications:   Scheduled Meds:   enoxaparin  30 mg Subcutaneous Daily    allopurinol  300 mg Oral Daily    aspirin EC  81 mg Oral Daily    fluticasone  1 spray Nasal Daily    gabapentin  400 mg Oral 4x Daily    insulin glargine  20 Units Subcutaneous BID    isosorbide mononitrate  120 mg Oral Daily    levothyroxine  25 mcg Oral Daily    metoprolol succinate  50 mg Oral Daily    morphine  10 mg Oral BID    sodium chloride flush  10 mL Intravenous 2 times per day    insulin lispro  0-6 Units Subcutaneous TID WC    insulin lispro  0-3 Units Subcutaneous Nightly    atorvastatin  10 mg Oral Nightly Continuous Infusions:   sodium chloride 100 mL/hr at 21 0830    dextrose       PRN Meds:albuterol, HYDROcodone-acetaminophen, sodium chloride flush, acetaminophen **OR** acetaminophen, glucose, dextrose, glucagon (rDNA), dextrose, polyethylene glycol, trimethobenzamide    Allergies:  Patient has no known allergies. Social History:   Social History     Socioeconomic History    Marital status:      Spouse name: None    Number of children: None    Years of education: None    Highest education level: None   Occupational History    Occupation: retired- [employer]   Social Needs    Financial resource strain: None    Food insecurity     Worry: None     Inability: None    Transportation needs     Medical: None     Non-medical: None   Tobacco Use    Smoking status: Former Smoker     Packs/day: 2.00     Years: 25.00     Pack years: 50.00     Types: Cigarettes     Start date: 1953     Quit date: 1981     Years since quittin.5    Smokeless tobacco: Never Used   Substance and Sexual Activity    Alcohol use: Never     Frequency: Never     Comment:      Drug use: Not Currently     Comment: no longer eating edibles. states he had a bad episode.  Sexual activity: Never   Lifestyle    Physical activity     Days per week: None     Minutes per session: None    Stress: None   Relationships    Social connections     Talks on phone: None     Gets together: None     Attends Oriental orthodox service: None     Active member of club or organization: None     Attends meetings of clubs or organizations: None     Relationship status: None    Intimate partner violence     Fear of current or ex partner: None     Emotionally abused: None     Physically abused: None     Forced sexual activity: None   Other Topics Concern    None   Social History Narrative    1 cup coffee daily      Pets: 5 cats  The patient lives alone.   He retired from [employer]    Family History:       Problem Relation Age of Onset  Heart Disease Mother     Heart Failure Mother     Heart Surgery Father     Heart Disease Father     Heart Failure Father     High Blood Pressure Sister     Cancer Sister    . Otherwise non-pertinent to the chief complaint. REVIEW OF SYSTEMS:    Constitutional: Negative for fevers, chills, diaphoresis  Neurologic: Negative   Psychiatric: Negative  Rheumatologic: Negative   Endocrine: Negative  Hematologic: Negative  Immunologic: Negative  ENT: Negative  Respiratory: Negative   Cardiovascular: Negative  GI: Negative  : Negative  Musculoskeletal: Negative  Skin: No rashes. PHYSICAL EXAM:    Vitals:   BP (!) 156/54   Pulse 72   Temp 98.1 °F (36.7 °C) (Oral)   Resp 18   Ht 5' 8\" (1.727 m)   Wt 246 lb 8 oz (111.8 kg)   SpO2 95%   BMI 37.48 kg/m²   Constitutional: The patient is awake, alert, and oriented. Morbidly obese. Sitting in a chair. No distress. Skin: Warm and dry. No rashes were noted. HEENT: Eyes show round, and reactive pupils. No jaundice. Moist mucous membranes, no ulcerations, no thrush. Neck: Supple to movements. No lymphadenopathy. Chest: No use of accessory muscles to breathe. Symmetrical expansion. Auscultation reveals no wheezing, crackles, or rhonchi. Left pacer. Cardiovascular: S1 and S2 are rhythmic and regular. No murmurs appreciated. Abdomen: Positive bowel sounds to auscultation. Benign to palpation. No masses felt. No hepatosplenomegaly. Extremities: No clubbing, no cyanosis, no edema. Bilateral TKR. No effusion.   Lines: peripheral      CBC+dif:  Recent Labs     01/19/21  0122 01/20/21  0815   WBC 8.7 5.8   HGB 9.8* 8.8*   HCT 31.1* 28.5*   MCV 90.1 89.6    175   NEUTROABS 6.98 4.03     Lab Results   Component Value Date    CRP 2.6 (H) 02/14/2017    CRP 2.8 (H) 02/13/2017    CRP 2.5 (H) 02/04/2017      No results found for: [facility]  Lab Results   Component Value Date    SEDRATE 57 (H) 02/14/2017    SEDRATE 60 (H) 02/13/2017 SEDRATE 70 (H) 02/04/2017     Lab Results   Component Value Date    ALT 6 01/20/2021    AST 14 01/20/2021    ALKPHOS 57 01/20/2021    BILITOT 0.5 01/20/2021     Lab Results   Component Value Date     01/20/2021    K 4.3 01/20/2021     01/20/2021    CO2 24 01/20/2021    BUN 20 01/20/2021    CREATININE 1.4 01/20/2021    GFRAA 59 01/20/2021    LABGLOM 49 01/20/2021    GLUCOSE 161 01/20/2021    GLUCOSE 111 07/16/2011    PROT 6.2 01/20/2021    LABALBU 3.2 01/20/2021    CALCIUM 8.9 01/20/2021    BILITOT 0.5 01/20/2021    ALKPHOS 57 01/20/2021    AST 14 01/20/2021    ALT 6 01/20/2021       Lab Results   Component Value Date    PROTIME 11.7 01/05/2021    PROTIME 11.3 06/21/2011    INR 1.0 01/05/2021       Lab Results   Component Value Date    TSH 1.430 07/31/2020       Lab Results   Component Value Date    COLORU Yellow 01/19/2021    PHUR 7.5 01/19/2021    LABCAST RARE 10/11/2017    WBCUA 1-3 01/19/2021    WBCUA NONE 07/12/2011    RBCUA 0-1 01/19/2021    RBCUA 1-3 03/17/2014    BACTERIA NONE SEEN 01/19/2021    CLARITYU Clear 01/19/2021    SPECGRAV 1.010 01/19/2021    LEUKOCYTESUR Negative 01/19/2021    UROBILINOGEN 0.2 01/19/2021    BILIRUBINUR Negative 01/19/2021    BILIRUBINUR NEGATIVE 07/12/2011    BLOODU Negative 01/19/2021    GLUCOSEU Negative 01/19/2021    GLUCOSEU NEGATIVE 07/12/2011       Lab Results   Component Value Date    HCO3 23.4 06/03/2018    BE -1.2 06/03/2018    O2SAT 96.7 06/03/2018    PH 7.396 06/03/2018    PH 7.453 09/06/2012    PCO2 39.0 06/03/2018    PO2 92.8 06/03/2018     Radiology:  Reviewed    Microbiology:  Pending  Recent Labs     01/19/21  0122   BC Gram stain performed from blood culture bottle media  Gram positive cocci in clusters  *     No results for input(s): ORG in the last 72 hours.   Recent Labs     01/19/21  0122   BLOODCULT2 Gram stain performed from blood culture bottle media  Gram positive cocci in clusters  * No results for input(s): STREPNEUMAGU in the last 72 hours. No results for input(s): LP1UAG in the last 72 hours. No results for input(s): ASO in the last 72 hours. No results for input(s): CULTRESP in the last 72 hours. No results for input(s): PROCAL in the last 72 hours. Assessment:  · CoNS bacteremia. Possible contaminant  · Possible pacer infection  · Weakness associated to the above  · Acute kidney injury  · History of elevated ESR. Status post temporal artery biopsy. Negative for giant cell arteritis    Plan:    · Repeat blood cultures x2  · Check cultures, baseline ESR, CRP  · We will give him a single dose of Vancomycin after repeat blood cultures are drawn. Check random level in a.m. · Will follow with you    Thank you for having us see this patient in consultation. I will be discussing this case with the treating physicians.     Maru Cardona  11:23 AM  1/20/2021

## 2021-01-21 NOTE — PROGRESS NOTES
Occupational Therapy  OT BEDSIDE TREATMENT NOTE      Date:2021  Patient Name: Lucy Hughes  MRN: [id number]  : 1940  Room: [address] [place], [place] [address]     Referring Provider: LILLIANA West  Evaluating OT: Leno Ma. Jessica OTR/L - [id number]     AM-PAC Daily Activity Raw Score: 15/24      Recommended Adaptive Equipment: Continue to assess.      Diagnosis: Acute renal insufficiency [N28.9]               Patient presented to ED s/p two falls at home.     Pertinent Medical History: CVA, CAD, CHF, CKD, COPD, DM, neuropathy, HTN, arthritis, sleep apnea, PVD, obesity      Precautions: falls,      Home Living: Patient lives alone in a one-floor apartment. Equipment Owned: emergency alert button, wheelchair     Prior Level of Function (PLOF): Patient reported that he was independent with ADLs and most household-based IADLs; patient had assistance with housekeeping tasks and other IADLs, as needed.     Pain Level: Pt reports back pain. Pt reports he had received morphine earlier in the day for his pain. Cognition: Patient alert and oriented grossly. limited safety awareness/judgement.      Functional Assessment:    Initial Eval Status  Date: 2021 Treatment Status    Short Term Goals   Feeding Setup Setup to open containers/packets on lunch tray.       Grooming Min A   Mod I  (seated/standing at sink)   UB Dressing Min A Min  A  Independent   LB Dressing Max A Mod A   Pt able to arrange brief when standing.   Min A - with use of AE, as needed/appropriate   Bathing Max A   Min A - with use of AE/DME, as needed/appropriate   Toileting Mod A   Mod I / Independent   Bed Mobility  Supine-to-Sit: Mod A and increased time Pt sitting on the EOB upon therapist arrival.         Functional Transfers Sit-to-Stands: Min A   from EOB and bedside chair. Cues given to maximize safety. Agreeable to transfers. Cues for hand placement and safety technique. Pt leaning down onto one side of the walker causing it to tip to the side. Instructed with proper hand placement however pt limited reception to instruction.   min A sit to stand from bed. Independent   Functional Mobility Min A for few steps from EOB to bedside chair and then from bedside chair to [facility] (with walker); increased time needed. Decreased safety awareness demonstrated with performance of stand-to-sit twice during this session; agitation noted with provision of cues to maximize safety with stand-to-sit (both in bedside chair and BSC).  min A pivot to chair with limited safety technique. Mod I with functional mobility (with device, as needed/appropriate) in order to maximize independence with ADLs/IADLs and other functional tasks. Balance Sitting: Good-  (at EOB)  Standing: Fair-  (with walker) Fair during AdL.   Fair+ dynamic standing balance during completion of ADLs/IADLs and other functional tasks. Activity Tolerance Limited  poor+  tolerance due to complaint of back pain this session. Patient will demonstrate Good understanding and consistent implementation of energy conservation techniques and work simplification techniques into ADL/IADL routines. B UE Strength 3+/5 grossly   Patient will demonstrate 4/5 B UE strength in order to maximize independence with ADLs/IADLs and functional transfers       Comments:  Pt sitting on the EOB upon therapist arrival.  Complaint of back pain and states \"it is a bad day\". Agreeable to get to chair to eat lunch. Instructed with back safety and not to twist during transfer. Remained in chair at end of the session. Limited tolerance for activity. Education/treatment:  Therapeutic activity to address balance, strength, and endurance for ADL and transfers. Pt education of walker safety and transfer safety. · Pt has made limited progress towards set goals. Plan of Care: 2-5 days/week for 1-2 weeks PRN  [x]? ?ADL retraining/adaptive techniques and AE recommendations to increase functional independence within precautions  [x]? ? Energy conservation techniques to improve tolerance for ADLs/IADLs  [x]? ? Functional transfer/mobility training/DME recommendations for increased independence, safety and fall prevention  [x]? ?Patient/family education to increase safety and functional independence  [x]? ? Environmental modifications for safe mobility and completion of ADLs/IADLs  []? ?Cognitive retraining exercises to improve problem solving skills & safe participation in ADLs/IADLs   []? ?Sensory re-education techniques to improve extremity awareness, maintain skin integrity and improve hand function   []? ? Visual/Perceptual retraining  to improve body awareness and safety during transfers and ADLs   []? ? Splinting/positioning needs to maintain joint/skin integrity and prevent contractures   [x]? ? Therapeutic activity to improve functional performance during ADLs/IADLs  [x]? ? Therapeutic exercise to improve tolerance and functional strength for ADLs/IADLs  [x]? ? Balance retraining/tolerance tasks for facilitation of postural control with dynamic challenges during ADLs   [x]? ? Neuromuscular re-education: facilitate righting/equilibrium reactions, midline orientation, scapular stability/mobility, normalize muscle tone, and facilitate active functional movement/attention  []? ? Delirium prevention/treatment   []? Positioning to improve functional independence and prevent skin breakdown   []? ?Other:        Time In: 11:12   Time Out: 11:25     Min Units   Therapeutic Ex [id number]     Therapeutic Activities [id number]   ADL/Self Care [id number] 5    Orthotic Management [id number]     Neuro Re-Ed [id number]     Non-Billable Time     TOTAL TIMED TREATMENT 13 [address] [facility]/ [address]

## 2021-01-21 NOTE — PROGRESS NOTES
PROGRESS  NOTE --                                                          INTERNAL  MEDICINE                                                                              I  PERSONALLY SAW , EXAMINED, AND CARED [id number] [facility] [place], 1/21/2021     LABS, XRAY ,CHART, AND MEDICATIONS  REVIEWED BY ME       Chief complaint: Weakness of legs      1/20/2021-SUBJECTIVE: Montserrat Nichols is alert awake and cooperative; oriented ×3. Denies any chest pain dyspnea nausea emesis. Tolerating diet. No abdominal pain. He states he did have normal bowel movement this morning followed by explosive diarrhea; since that he is feeling weaker again. Afebrile last 24 hours. Blood pressure 142/64.  96% saturation on room air. Intake and output +1101 cc. BUN 20 creatinine 1.4, improved. Protein 6.2 albumin 3.2. Glucose 161. Troponins have been elevated as high as 0.09; most recently 0.05. Hemoglobin 8.8 WBC 5.8 platelets 984. Preliminary blood cultures, gram-positive cocci in clusters times 2 out of 4. Urine culture mixed organisms still evaluating. CT of lumbar spine as follows--    FINDINGS:   BONES/ALIGNMENT: There is normal alignment of the spine.  Mild levoscoliosis   with tip at L4.  The vertebral body heights are maintained. No osseous   destructive lesion is seen. DEGENERATIVE CHANGES: No significant degenerative changes of the lumbar spine. At L1-L2: Subtle retrolisthesis.  Otherwise unremarkable. At L2-L3: Subtle retrolisthesis.  Otherwise unremarkable. At L3-L4, subtle retrolisthesis and mild bilateral facet arthropathy. Findings result in mild canal stenosis and mild bilateral neural foraminal   narrowing.    At L4-L5: Vacuum phenomenon within the disc space.  Subtle anterolisthesis   and 2 mm disc bulging.  Moderate bilateral set arthropathy and ligamentum flavum thickening.  Mild canal stenosis and mild bilateral neural foraminal   narrowing. L5-S1: Moderate endplate sclerosis.  Moderate bilateral set arthropathy. Mild left neural foraminal narrowing. SOFT TISSUES/RETROPERITONEUM: No paraspinal mass is seen.  Moderate   degenerative changes of the abdominal aorta and its branches are noted.      Impression:       Mild levoscoliosis with tip at L4. At L1-L2 and L2-L3, subtle retrolisthesis. At L3-L4, subtle retrolisthesis, mild canal stenosis and mild bilateral   neural foraminal narrowing. At L4-L5, subtle anterolisthesis, mild canal stenosis and mild bilateral   neural foraminal narrowing. At L5-S1, moderate endplate sclerosis and mild left neural foraminal   narrowing. ID consult from today, coagulase-negative staph possible contaminant. Cannot exclude pacemaker infection. Repeat blood cultures x2, 1 dose of vancomycin. Physical therapy AM PAC score from today 15/24. Neuropathy bilateral lower extremities; endurance decreased. 1/21/2021-patient sitting on side of bed with legs dangling. Still has neuropathic sensations both legs. I discussed his lumbar CT with him. 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 He is noticing increasing edema of his left arm as well as somewhat of his feet. No dyspnea at rest.  No chest pain. Afebrile last 24 hours. 97% saturation on room air. Intake and output +1111 cc. Procalcitonin 0.21. BUN 19 creatinine 1.3.  proBNP 3534. CRP 3.6, had been 5.4. A1c 6.5.  TSH free T4 normal.  Hemoglobin 7.7 WBC 4.4 platelets 628. Hemoglobin had been 8.8 yesterday and 9.8 on admission. TIBC, iron, iron saturation all low. Folate and B12 normal.  Sed rate 70. ID note from today appreciated, still uncertain if coagulase-negative staph bacteremia is true versus contaminant especially in view of pacemaker. 1 more dose vancomycin today. Occupational therapy AMPAC score from today 15/24. Objective:     PHYSICAL EXAM:    VS: BP (!) 122/58   Pulse 60   Temp 98.1 °F (36.7 °C) (Oral)   Resp 16   Ht 5' 8\" (1.727 m)   Wt 251 lb (113.9 kg)   SpO2 97%   BMI 38.16 kg/m²     Labs:   CBC:   Lab Results   Component Value Date    WBC 4.4 01/21/2021    RBC 2.81 01/21/2021    HGB 7.7 01/21/2021    HCT 25.6 01/21/2021    MCV 91.1 01/21/2021    MCH 27.4 01/21/2021    MCHC 30.1 01/21/2021    RDW 18.3 01/21/2021     01/21/2021    MPV 9.5 01/21/2021     CBC with Differential:    Lab Results   Component Value Date    WBC 4.4 01/21/2021    RBC 2.81 01/21/2021    HGB 7.7 01/21/2021    HCT 25.6 01/21/2021     01/21/2021    MCV 91.1 01/21/2021    MCH 27.4 01/21/2021    MCHC 30.1 01/21/2021    RDW 18.3 01/21/2021    NRBC 0.0 04/22/2018    SEGSPCT 76 03/16/2014    BANDSPCT 1 09/02/2016    LYMPHOPCT 18.8 01/21/2021    MONOPCT 8.0 01/21/2021    BASOPCT 0.5 01/21/2021    MONOSABS 0.35 01/21/2021    LYMPHSABS 0.82 01/21/2021    EOSABS 0.30 01/21/2021    BASOSABS 0.02 01/21/2021     Hemoglobin/Hematocrit:    Lab Results   Component Value Date    HGB 7.7 01/21/2021    HCT 25.6 01/21/2021     CMP:    Lab Results   Component Value Date     01/21/2021    K 3.9 01/21/2021    K 4.3 01/20/2021     01/21/2021    CO2 22 01/21/2021    BUN 19 01/21/2021    CREATININE 1.3 01/21/2021    GFRAA >60 01/21/2021    LABGLOM 53 01/21/2021    GLUCOSE 151 01/21/2021    GLUCOSE 111 07/16/2011    PROT 5.7 01/21/2021    LABALBU 2.8 01/21/2021    CALCIUM 8.4 01/21/2021    BILITOT 0.3 01/21/2021    ALKPHOS 56 01/21/2021    AST 13 01/21/2021    ALT 7 01/21/2021     BMP:    Lab Results   Component Value Date     01/21/2021    K 3.9 01/21/2021    K 4.3 01/20/2021     01/21/2021    CO2 22 01/21/2021    BUN 19 01/21/2021    LABALBU 2.8 01/21/2021    CREATININE 1.3 01/21/2021    CALCIUM 8.4 01/21/2021    GFRAA >60 01/21/2021    LABGLOM 53 01/21/2021    GLUCOSE 151 01/21/2021    GLUCOSE 111 07/16/2011   RE  CHANGES AND FINDINGS   Medications reviewed with patient  GI prophylaxis  DVT prophylaxis  Consultants notes reviewed   1 dose vancomycin IV per ID  Repeat blood cultures  Imodium as needed for diarrhea  Allopurinol 3 mg daily  Aspirin 81 mg daily  Enoxaparin 30 mg daily  Lantus 20 units twice daily  Sliding-scale insulin  Gabapentin 400 mg 4 times daily  Morphine extended release 10 mg twice daily by mouth  Toprol-XL 50 mg daily  Synthroid 25 mcg  Iron studies a.m.  TSH free T4 a.m. Appropriate labs  Repeat stool for occult blood  Reticulocyte count  Await results of final blood cultures  Sliding-scale insulin  Morphine extended release 10 mg twice daily by mouth  Discontinue IV fluids due to the above edema  Monitor labs          Discussed with patient and nursing.       Nila Martino  DO     1:33 PM     1/21/2021    TIME > 35 MINUTES    >  50 %  OF  TIME  DISCUSSION               ------------  INFORMATION  -----------      DIET:DIET CARB CONTROL;      No Known Allergies      MEDICATION SIDE EFFECTS:none       SCHEDULED MEDS:                                 Scheduled Meds:   enoxaparin  40 mg Subcutaneous Daily    vancomycin  1,500 mg Intravenous Once    allopurinol  300 mg Oral Daily    aspirin EC  81 mg Oral Daily    fluticasone  1 spray Nasal Daily    gabapentin  400 mg Oral 4x Daily    insulin glargine  20 Units Subcutaneous BID    isosorbide mononitrate  120 mg Oral Daily    levothyroxine  25 mcg Oral Daily    metoprolol succinate  50 mg Oral Daily    morphine  10 mg Oral BID    sodium chloride flush  10 mL Intravenous 2 times per day    insulin lispro  0-6 Units Subcutaneous TID WC    insulin lispro  0-3 Units Subcutaneous Nightly    atorvastatin  10 mg Oral Nightly       Continuous Infusions:   sodium chloride 100 mL/hr at 01/21/21 0841    dextrose           Data:       Intake/Output Summary (Last 24 hours) at 1/21/2021 1333  Last data filed at 1/21/2021 1200 Gross per 24 hour   Intake 860 ml   Output 850 ml   Net 10 ml       Wt Readings from Last 3 Encounters:   01/21/21 251 lb (113.9 kg)   01/13/21 259 lb (117.5 kg)   01/05/21 259 lb (117.5 kg)       Labs: Additional    GLUCOSE:No results for input(s): POCGLU in the last 72 hours. BNP:No results found for: BNP    CRP:   Recent Labs     01/20/21  1225 01/21/21  0600   CRP 5.4* 3.6*       ESR:  Recent Labs     01/20/21  1225 01/21/21  0600   SEDRATE 66* 70*       RADIOLOGY: REVIEWED AVAILABLE REPORT  CT LUMBAR SPINE WO CONTRAST   Final Result   Mild levoscoliosis with tip at L4. At L1-L2 and L2-L3, subtle retrolisthesis. At L3-L4, subtle retrolisthesis, mild canal stenosis and mild bilateral   neural foraminal narrowing. At L4-L5, subtle anterolisthesis, mild canal stenosis and mild bilateral   neural foraminal narrowing. At L5-S1, moderate endplate sclerosis and mild left neural foraminal   narrowing. XR CHEST PORTABLE   Final Result   Similar findings favored to be chronic. PA and lateral views would be useful   for further assessment.                 [address]  DO   1:33 PM     1/21/2021      Voice recognition software used for dictation

## 2021-01-21 NOTE — PROGRESS NOTES
Angy Nichole,    Your patient is on a medication that had required a renal dose adjustment. The renal function has improved and the original dosing has been resumed. Renal Function Assessment:    Date Body Weight IBW Adj. Body Weight SCr CrCl Dialysis status   1/21/2021 113.9 kg 68.4 kg   1.4 41 ml/min         Pharmacy has renally dose-adjusted the following medication(s):    Date Medication Original Dosing Regimen New Dosing Regimen   01/20/2021 Enoxaparin 30 mg daily 40 mg daily           These changes were made per protocol according to the [facility] Renal Function-Based Dose Adjustments Policy    *Please note this dose may need readjusted if your patient's renal function significantly improves. Please contact pharmacy with any questions regarding these changes.

## 2021-01-21 NOTE — CARE COORDINATION
Social Work / Discharge Planning : SW followed up with patient in regards to discharge planning : Therapy am/pac noted 15/24. Patient familiar with SW from previous admits and he has had SNF stays at [facility] and [facility] along with [facility]. SNF vs [facility] discussed at length and patient firmly stating several times throughout conversation that he is NOT ready to make a decision. He stated he is not well, retaining fluid and cannot see past this day. SW discussed importance with discharge planning. Again, is not ready to make any decision. However, he knows his options of home alone with [facility] as well as being eligible for SNF stay. SW to follow.  Electronically signed by JOSE Hoang on 1/21/21 at 9:05 AM EST

## 2021-01-21 NOTE — PROGRESS NOTES
[address] [place] [address] [facility]  BR[place]GET[place]  Progress Note    SUBJECTIVE:  Chief Complaint   Patient presents with   [facility] Fall     The patient is feeling \"lousy\". Complaining of swelling in his lower extremities. He is still having some back pain and weakness in his legs. No fevers or chills. No nausea, vomiting or diarrhea. Review of systems:  As stated above in the chief complaint, otherwise negative. Medications:  Scheduled Meds:   enoxaparin  40 mg Subcutaneous Daily    allopurinol  300 mg Oral Daily    aspirin EC  81 mg Oral Daily    fluticasone  1 spray Nasal Daily    gabapentin  400 mg Oral 4x Daily    insulin glargine  20 Units Subcutaneous BID    isosorbide mononitrate  120 mg Oral Daily    levothyroxine  25 mcg Oral Daily    metoprolol succinate  50 mg Oral Daily    morphine  10 mg Oral BID    sodium chloride flush  10 mL Intravenous 2 times per day    insulin lispro  0-6 Units Subcutaneous TID WC    insulin lispro  0-3 Units Subcutaneous Nightly    atorvastatin  10 mg Oral Nightly     Continuous Infusions:   sodium chloride 100 mL/hr at 21 0841    dextrose       PRN Meds:loperamide, albuterol, HYDROcodone-acetaminophen, sodium chloride flush, acetaminophen **OR** acetaminophen, glucose, dextrose, glucagon (rDNA), dextrose, polyethylene glycol, trimethobenzamide    OBJECTIVE:  BP (!) 122/58   Pulse 60   Temp 98.1 °F (36.7 °C) (Oral)   Resp 16   Ht 5' 8\" (1.727 m)   Wt 251 lb (113.9 kg)   SpO2 97%   BMI 38.16 kg/m²   Temp  Av °F (36.7 °C)  Min: 97.8 °F (36.6 °C)  Max: 98.1 °F (36.7 °C)  Constitutional: The patient is awake, alert, and oriented. Sitting up in a chair. No distress. Skin: Warm and dry. No rashes were noted. HEENT: Round and reactive pupils. Moist mucous membranes. No ulcerations or thrush. Neck: Supple to movements. Chest: No use of accessory muscles to breathe. Symmetrical expansion. No wheezing, crackles or rhonchi. Left pacer unremarkable. Cardiovascular: S1 and S2 are rhythmic and regular. No murmurs appreciated. Abdomen: Positive bowel sounds to auscultation. Benign to palpation. Extremities: Minimal edema. Lines: peripheral    Laboratory and Tests Review:  Lab Results   Component Value Date    WBC 4.4 (L) 01/21/2021    WBC 5.8 01/20/2021    WBC 8.7 01/19/2021    HGB 7.7 (L) 01/21/2021    HCT 25.6 (L) 01/21/2021    MCV 91.1 01/21/2021     01/21/2021     Lab Results   Component Value Date    NEUTROABS 2.85 01/21/2021    NEUTROABS 4.03 01/20/2021    NEUTROABS 6.98 01/19/2021     No results found for: [facility]  Lab Results   Component Value Date    ALT 7 01/21/2021    AST 13 01/21/2021    ALKPHOS 56 01/21/2021    BILITOT 0.3 01/21/2021     Lab Results   Component Value Date     01/21/2021    K 3.9 01/21/2021    K 4.3 01/20/2021     01/21/2021    CO2 22 01/21/2021    BUN 19 01/21/2021    CREATININE 1.3 01/21/2021    CREATININE 1.4 01/20/2021    CREATININE 2.1 01/19/2021    GFRAA >60 01/21/2021    LABGLOM 53 01/21/2021    GLUCOSE 151 01/21/2021    GLUCOSE 111 07/16/2011    PROT 5.7 01/21/2021    LABALBU 2.8 01/21/2021    CALCIUM 8.4 01/21/2021    BILITOT 0.3 01/21/2021    ALKPHOS 56 01/21/2021    AST 13 01/21/2021    ALT 7 01/21/2021     Lab Results   Component Value Date    CRP 3.6 (H) 01/21/2021    CRP 5.4 (H) 01/20/2021    CRP 2.6 (H) 02/14/2017     Lab Results   Component Value Date    SEDRATE 70 (H) 01/21/2021    SEDRATE 66 (H) 01/20/2021    SEDRATE 57 (H) 02/14/2017     Radiology:      Microbiology:   Blood cultures 1/19/2021: Staphylococcus species in 2 of 2 sets  Blood cultures 1/20/2021: Negative so far  Urine culture 1/19/2021: Mixed GPO    Recent Labs     01/21/21  0600   PROCAL 0.21*       ASSESSMENT:  · CoNS bacteremia.   Contaminant versus true infection associated to intravascular device · Possible pacer infection  · Weakness associated to the above  · Acute kidney injury    PLAN:  · Patient has received 1 dose of Vancomycin on 1/20/2021.   We will give him 1 more dose today and check another random level tomorrow  · Check final cultures  · Monitor labs    Klarissa Vance  11:14 AM  1/21/2021

## 2021-01-22 NOTE — PROGRESS NOTES
PROGRESS  NOTE --                                                          INTERNAL  MEDICINE                                                                              I  PERSONALLY SAW , EXAMINED, AND CARED [id number] [facility] [place], 1/22/2021     LABS, XRAY ,CHART, AND MEDICATIONS  REVIEWED BY ME       Chief complaint: Weakness of legs      1/20/2021-SUBJECTIVE: Soraida Gonzalez is alert awake and cooperative; oriented ×3. Denies any chest pain dyspnea nausea emesis. Tolerating diet. No abdominal pain. He states he did have normal bowel movement this morning followed by explosive diarrhea; since that he is feeling weaker again. Afebrile last 24 hours. Blood pressure 142/64.  96% saturation on room air. Intake and output +1101 cc. BUN 20 creatinine 1.4, improved. Protein 6.2 albumin 3.2. Glucose 161. Troponins have been elevated as high as 0.09; most recently 0.05. Hemoglobin 8.8 WBC 5.8 platelets 796. Preliminary blood cultures, gram-positive cocci in clusters times 2 out of 4. Urine culture mixed organisms still evaluating. CT of lumbar spine as follows--    FINDINGS:   BONES/ALIGNMENT: There is normal alignment of the spine.  Mild levoscoliosis   with tip at L4.  The vertebral body heights are maintained. No osseous   destructive lesion is seen. DEGENERATIVE CHANGES: No significant degenerative changes of the lumbar spine. At L1-L2: Subtle retrolisthesis.  Otherwise unremarkable. At L2-L3: Subtle retrolisthesis.  Otherwise unremarkable. At L3-L4, subtle retrolisthesis and mild bilateral facet arthropathy. Findings result in mild canal stenosis and mild bilateral neural foraminal   narrowing.    At L4-L5: Vacuum phenomenon within the disc space.  Subtle anterolisthesis   and 2 mm disc bulging.  Moderate bilateral set arthropathy and ligamentum flavum thickening.  Mild canal stenosis and mild bilateral neural foraminal   narrowing. L5-S1: Moderate endplate sclerosis.  Moderate bilateral set arthropathy. Mild left neural foraminal narrowing. SOFT TISSUES/RETROPERITONEUM: No paraspinal mass is seen.  Moderate   degenerative changes of the abdominal aorta and its branches are noted.      Impression:       Mild levoscoliosis with tip at L4. At L1-L2 and L2-L3, subtle retrolisthesis. At L3-L4, subtle retrolisthesis, mild canal stenosis and mild bilateral   neural foraminal narrowing. At L4-L5, subtle anterolisthesis, mild canal stenosis and mild bilateral   neural foraminal narrowing. At L5-S1, moderate endplate sclerosis and mild left neural foraminal   narrowing. ID consult from today, coagulase-negative staph possible contaminant. Cannot exclude pacemaker infection. Repeat blood cultures x2, 1 dose of vancomycin. Physical therapy AM PAC score from today 15/24. Neuropathy bilateral lower extremities; endurance decreased. 1/21/2021-patient sitting on side of bed with legs dangling. Still has neuropathic sensations both legs. I discussed his lumbar CT with him. 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 He is noticing increasing edema of his left arm as well as somewhat of his feet. No dyspnea at rest.  No chest pain. Afebrile last 24 hours. 97% saturation on room air. Intake and output +1111 cc. Procalcitonin 0.21. BUN 19 creatinine 1.3.  proBNP 3534. CRP 3.6, had been 5.4. A1c 6.5.  TSH free T4 normal.  Hemoglobin 7.7 WBC 4.4 platelets 948. Hemoglobin had been 8.8 yesterday and 9.8 on admission. TIBC, iron, iron saturation all low. Folate and B12 normal.  Sed rate 70. ID note from today appreciated, still uncertain if coagulase-negative staph bacteremia is true versus contaminant especially in view of pacemaker. 1 more dose vancomycin today. Occupational therapy AMPAC score from today 15/24. 1/22/2021-patient laying in bed; states it is a bad day. No chest pain no dyspnea. Having severe bilateral knee pains, osteoarthritic, he believes because it is snowing outside. He usually uses topical analgesic at home. He states he is not sure if he is getting his allopurinol, chart checked, he is getting it. He states he also uses albuterol as needed at home. It is ordered as needed; states he feels wheezy today. He is having significant postnasal drainage would like something for that also. He also request something extra for his neuropathy. Afebrile last 24 hours. Blood pressure 175/77.  98% saturation on room air. Intake and output +449 cc. Glucose 112. CRP 2.1. Hemoglobin 8.2 WBC 4.9 reticulocyte count increased. Blood culture has resulted with following--    Susceptibility    Staphylococcus hominis ssp hominis (1)    Antibiotic Interpretation WINNIE Status    clindamycin Resistant >=^4 mcg/mL     doxycycline Sensitive ^1 mcg/mL     erythromycin Resistant >=^8 mcg/mL     gentamicin Sensitive <=^0.5 mcg/mL     oxacillin Resistant >=^4 mcg/mL     trimethoprim-sulfamethoxazole Resistant ^160 mcg/mL     vancomycin Sensitive <=^0.5 mcg/mL       ID note from today appreciated; possible pacer infection. Continue vancomycin once daily, check trough tomorrow. If repeat cultures done on 1/20/2021 are negative after 5 days antibiotic can be discontinued. Social service note from today, patient planning on returning home with home health care rather than subacute rehab. His physical therapy [facility] score from 1/20/2021 was 15/24.       Objective:     PHYSICAL EXAM:    VS: BP (!) 175/77   Pulse 62   Temp 98.4 °F (36.9 °C) (Oral)   Resp 18   Ht 5' 8\" (1.727 m)   Wt 250 lb (113.4 kg)   SpO2 96%   BMI 38.01 kg/m²     Labs:   CBC:   Lab Results   Component Value Date    WBC 4.9 01/22/2021    RBC 3.01 01/22/2021    HGB 8.2 01/22/2021    HCT 27.5 01/22/2021    MCV 91.4 01/22/2021    MCH 27.2 01/22/2021 MCHC 29.8 01/22/2021    RDW 18.6 01/22/2021     01/22/2021    MPV 9.2 01/22/2021     CBC with Differential:    Lab Results   Component Value Date    WBC 4.9 01/22/2021    RBC 3.01 01/22/2021    HGB 8.2 01/22/2021    HCT 27.5 01/22/2021     01/22/2021    MCV 91.4 01/22/2021    MCH 27.2 01/22/2021    MCHC 29.8 01/22/2021    RDW 18.6 01/22/2021    NRBC 0.0 04/22/2018    SEGSPCT 76 03/16/2014    BANDSPCT 1 09/02/2016    LYMPHOPCT 26.3 01/22/2021    MONOPCT 10.1 01/22/2021    BASOPCT 0.6 01/22/2021    MONOSABS 0.49 01/22/2021    LYMPHSABS 1.28 01/22/2021    EOSABS 0.33 01/22/2021    BASOSABS 0.03 01/22/2021     Hemoglobin/Hematocrit:    Lab Results   Component Value Date    HGB 8.2 01/22/2021    HCT 27.5 01/22/2021     CMP:    Lab Results   Component Value Date     01/22/2021    K 4.1 01/22/2021    K 4.3 01/20/2021     01/22/2021    CO2 23 01/22/2021    BUN 17 01/22/2021    CREATININE 1.2 01/22/2021    GFRAA >60 01/22/2021    LABGLOM 58 01/22/2021    GLUCOSE 100 01/22/2021    GLUCOSE 111 07/16/2011    PROT 6.1 01/22/2021    LABALBU 3.3 01/22/2021    CALCIUM 8.9 01/22/2021    BILITOT 0.3 01/22/2021    ALKPHOS 58 01/22/2021    AST 14 01/22/2021    ALT 7 01/22/2021     BMP:    Lab Results   Component Value Date     01/22/2021    K 4.1 01/22/2021    K 4.3 01/20/2021     01/22/2021    CO2 23 01/22/2021    BUN 17 01/22/2021    LABALBU 3.3 01/22/2021    CREATININE 1.2 01/22/2021    CALCIUM 8.9 01/22/2021    GFRAA >60 01/22/2021    LABGLOM 58 01/22/2021    GLUCOSE 100 01/22/2021    GLUCOSE 111 07/16/2011     Hepatic Function Panel:    Lab Results   Component Value Date    ALKPHOS 58 01/22/2021    ALT 7 01/22/2021    AST 14 01/22/2021    PROT 6.1 01/22/2021    BILITOT 0.3 01/22/2021    BILIDIR <0.2 01/22/2021    IBILI see below 01/22/2021    LABALBU 3.3 01/22/2021     Ionized Calcium:  No results found for: IONCA  Magnesium:    Lab Results   Component Value Date    MG 1.8 01/22/2021 Phosphorus:    Lab Results   Component Value Date    PHOS 3.2 01/22/2021     LDH:  No results found for: LDH  Uric Acid:    Lab Results   Component Value Date    LABURIC 3.5 01/21/2021     PT/INR:    Lab Results   Component Value Date    PROTIME 11.7 01/05/2021    PROTIME 11.3 06/21/2011    INR 1.0 01/05/2021     Warfarin PT/INR:  No components found for: Diann Urbina  PTT:    Lab Results   Component Value Date    APTT 25.5 01/05/2021   [APTT}  Troponin:    Lab Results   Component Value Date    TROPONINI 0.05 01/19/2021     Last 3 Troponin:    Lab Results   Component Value Date    TROPONINI 0.05 01/19/2021    TROPONINI 0.06 01/19/2021    TROPONINI 0.09 01/19/2021     U/A:    Lab Results   Component Value Date    COLORU Yellow 01/19/2021    PROTEINU TRACE 01/19/2021    PHUR 7.5 01/19/2021    LABCAST RARE 10/11/2017    WBCUA 1-3 01/19/2021    WBCUA NONE 07/12/2011    RBCUA 0-1 01/19/2021    RBCUA 1-3 03/17/2014    BACTERIA NONE SEEN 01/19/2021    CLARITYU Clear 01/19/2021    SPECGRAV 1.010 01/19/2021    LEUKOCYTESUR Negative 01/19/2021    UROBILINOGEN 0.2 01/19/2021    BILIRUBINUR Negative 01/19/2021    BILIRUBINUR NEGATIVE 07/12/2011    BLOODU Negative 01/19/2021    GLUCOSEU Negative 01/19/2021    GLUCOSEU NEGATIVE 07/12/2011     HgBA1c:    Lab Results   Component Value Date    LABA1C 6.5 01/21/2021     FLP:    Lab Results   Component Value Date    TRIG 89 01/21/2021    HDL 30 01/21/2021    LDLCALC 52 01/21/2021    LABVLDL 18 01/21/2021     TSH:    Lab Results   Component Value Date    TSH 1.340 01/21/2021     VITAMIN B12: No components found for: B12  FOLATE:    Lab Results   Component Value Date    FOLATE >20.0 01/21/2021        General appearance: Alert, Awake, Oriented times 3, no distress  Skin: Warm and dry ; no rashes  Head: Normocephalic. No masses, lesions or tenderness noted  Eyes: Conjunctivae pink, sclera white.  PERRL,EOM-I  Ears: External ears normal Morphine extended release 10 mg twice daily by mouth  Toprol-XL 50 mg daily  Synthroid 25 mcg  Iron studies a.m.  TSH free T4 a.m. Appropriate labs  Repeat stool for occult blood  Reticulocyte count  Await results of final blood cultures  Sliding-scale insulin  Morphine extended release 10 mg twice daily by mouth  Discontinue IV fluids due to the above edema  Monitor labs  Vancomycin IV daily until final cultures available  Still have no stool for occult blood  Sliding-scale insulin  Morphine extended release 10 mg twice daily by mouth  Gabapentin 400 mg 4 times daily  Ferrlecit protocol, IV  Ferrous sulfate 325 mg twice daily  Cymbalta 30 mg daily to help neuropathy pain  BenGay topical  Josr-Synephrine, saline nasal spray for head congestion  Nursing to advise patient he does have albuterol ordered as needed  Furosemide 20 mg IV once today          Discussed with patient and nursing.       Kaitlin Martino DO     12:53 PM     1/22/2021    TIME > 35 MINUTES    >  50 %  OF  TIME  DISCUSSION               ------------  INFORMATION  -----------      DIET:DIET CARB CONTROL;      No Known Allergies      MEDICATION SIDE EFFECTS:none       SCHEDULED MEDS:                                 Scheduled Meds:   vancomycin  1,250 mg Intravenous Q24H    enoxaparin  40 mg Subcutaneous Daily    allopurinol  300 mg Oral Daily    aspirin EC  81 mg Oral Daily    fluticasone  1 spray Nasal Daily    gabapentin  400 mg Oral 4x Daily    insulin glargine  20 Units Subcutaneous BID    isosorbide mononitrate  120 mg Oral Daily    levothyroxine  25 mcg Oral Daily    metoprolol succinate  50 mg Oral Daily    morphine  10 mg Oral BID    sodium chloride flush  10 mL Intravenous 2 times per day    insulin lispro  0-6 Units Subcutaneous TID     insulin lispro  0-3 Units Subcutaneous Nightly    atorvastatin  10 mg Oral Nightly       Continuous Infusions:   dextrose           Data: Intake/Output Summary (Last 24 hours) at 1/22/2021 1253  Last data filed at 1/22/2021 1214  Gross per 24 hour   Intake 618 ml   Output 1300 ml   Net -682 ml       Wt Readings from Last 3 Encounters:   01/22/21 250 lb (113.4 kg)   01/13/21 259 lb (117.5 kg)   01/05/21 259 lb (117.5 kg)       Labs: Additional    GLUCOSE:No results for input(s): POCGLU in the last 72 hours. BNP:No results found for: BNP    CRP:   Recent Labs     01/20/21  1225 01/21/21  0600 01/22/21  0545   CRP 5.4* 3.6* 2.1*       ESR:  Recent Labs     01/20/21  1225 01/21/21  0600 01/22/21  0545   SEDRATE 66* 70* 68*       RADIOLOGY: REVIEWED AVAILABLE REPORT  CT LUMBAR SPINE WO CONTRAST   Final Result   Mild levoscoliosis with tip at L4. At L1-L2 and L2-L3, subtle retrolisthesis. At L3-L4, subtle retrolisthesis, mild canal stenosis and mild bilateral   neural foraminal narrowing. At L4-L5, subtle anterolisthesis, mild canal stenosis and mild bilateral   neural foraminal narrowing. At L5-S1, moderate endplate sclerosis and mild left neural foraminal   narrowing. XR CHEST PORTABLE   Final Result   Similar findings favored to be chronic. PA and lateral views would be useful   for further assessment.                 Dmitriy Esparza DO   12:53 PM     1/22/2021      Voice recognition software used for dictation

## 2021-01-22 NOTE — PROGRESS NOTES
[address] [place] [address] [facility]  [facility]  Progress Note    SUBJECTIVE:  Chief Complaint   Patient presents with   Steffaniel Clara Fall     The patient is feeling somewhat better today. Denies any new complaints. No fever. Admits to some weakness. Review of systems:  As stated above in the chief complaint, otherwise negative. Medications:  Scheduled Meds:   enoxaparin  40 mg Subcutaneous Daily    allopurinol  300 mg Oral Daily    aspirin EC  81 mg Oral Daily    fluticasone  1 spray Nasal Daily    gabapentin  400 mg Oral 4x Daily    insulin glargine  20 Units Subcutaneous BID    isosorbide mononitrate  120 mg Oral Daily    levothyroxine  25 mcg Oral Daily    metoprolol succinate  50 mg Oral Daily    morphine  10 mg Oral BID    sodium chloride flush  10 mL Intravenous 2 times per day    insulin lispro  0-6 Units Subcutaneous TID WC    insulin lispro  0-3 Units Subcutaneous Nightly    atorvastatin  10 mg Oral Nightly     Continuous Infusions:   dextrose       PRN Meds:loperamide, albuterol, HYDROcodone-acetaminophen, sodium chloride flush, acetaminophen **OR** acetaminophen, glucose, dextrose, glucagon (rDNA), dextrose, polyethylene glycol, trimethobenzamide    OBJECTIVE:  BP (!) 156/76   Pulse 61   Temp 98.4 °F (36.9 °C) (Oral)   Resp 18   Ht 5' 8\" (1.727 m)   Wt 250 lb (113.4 kg)   SpO2 96%   BMI 38.01 kg/m²   Temp  Av.1 °F (36.7 °C)  Min: 97.7 °F (36.5 °C)  Max: 98.4 °F (36.9 °C)  Constitutional: The patient is lying in bed. Awake and alert. No distress  Skin: Warm and dry. No rashes were noted. HEENT: Round and reactive pupils. Moist mucous membranes. No ulcerations or thrush. Neck: Supple to movements. Chest: No respiratory distress. No crackles. Cardiovascular: Heart sounds rhythmic and regular. Abdomen: Positive bowel sounds to auscultation. Benign to palpation. Extremities: Minimal edema.   Lines: peripheral    Laboratory and Tests Review:  Lab Results Component Value Date    WBC 4.9 01/22/2021    WBC 4.4 (L) 01/21/2021    WBC 5.8 01/20/2021    HGB 8.2 (L) 01/22/2021    HCT 27.5 (L) 01/22/2021    MCV 91.4 01/22/2021     01/22/2021     Lab Results   Component Value Date    NEUTROABS 2.73 01/22/2021    NEUTROABS 2.85 01/21/2021    NEUTROABS 4.03 01/20/2021     No results found for: CRP  Lab Results   Component Value Date    ALT 7 01/22/2021    AST 14 01/22/2021    ALKPHOS 58 01/22/2021    BILITOT 0.3 01/22/2021     Lab Results   Component Value Date     01/22/2021    K 4.1 01/22/2021    K 4.3 01/20/2021     01/22/2021    CO2 23 01/22/2021    BUN 17 01/22/2021    CREATININE 1.2 01/22/2021    CREATININE 1.3 01/21/2021    CREATININE 1.4 01/20/2021    GFRAA >60 01/22/2021    LABGLOM 58 01/22/2021    GLUCOSE 100 01/22/2021    GLUCOSE 111 07/16/2011    PROT 6.1 01/22/2021    LABALBU 3.3 01/22/2021    CALCIUM 8.9 01/22/2021    BILITOT 0.3 01/22/2021    ALKPHOS 58 01/22/2021    AST 14 01/22/2021    ALT 7 01/22/2021     Lab Results   Component Value Date    CRP 3.6 (H) 01/21/2021    CRP 5.4 (H) 01/20/2021    CRP 2.6 (H) 02/14/2017     Lab Results   Component Value Date    SEDRATE 70 (H) 01/21/2021    SEDRATE 66 (H) 01/20/2021    SEDRATE 57 (H) 02/14/2017     Radiology:      Microbiology:   Blood cultures 1/19/2021: Staphylococcus hominis in 2 of 2 sets  Blood cultures 1/20/2021: Negative so far  Urine culture 1/19/2021: Mixed GPO    Recent Labs     01/21/21  0600   PROCAL 0.21*       ASSESSMENT:  · CoNS bacteremia. Contaminant versus true infection associated to intravascular device  · Possible pacer infection  · Weakness associated to the above  · Acute kidney injury    PLAN:  · Continue Vancomycin once daily. Check trough tomorrow.   If the repeat blood cultures from 1/20/2021 (drawn off antibiotics) are negative after 5 days, this antibiotic can be discontinued altogether  · Check final cultures    Pranav Gavin  9:07 AM  1/22/2021

## 2021-01-22 NOTE — PLAN OF CARE
Problem: Falls - Risk of:  Goal: Will remain free from falls  Description: Will remain free from falls  Outcome: Ongoing  Goal: Absence of physical injury  Description: Absence of physical injury  Outcome: Ongoing     Problem: Pain:  Goal: Pain level will decrease  Description: Pain level will decrease  Outcome: Ongoing  Goal: Control of acute pain  Description: Control of acute pain  Outcome: Ongoing  Goal: Control of chronic pain  Description: Control of chronic pain  Outcome: Ongoing     Problem: Fluid and Electrolyte Imbalance  Goal: Fluid and electrolyte balance are achieved/maintained  Outcome: Ongoing     Problem: HH FLUID RETENTION-CHF  Goal: Absence of fluid overload signs and symptoms  Outcome: Ongoing     Problem: Cardiac Output - Decreased:  Goal: Ability to maintain an adequate cardiac output will be supported  Description: Ability to maintain an adequate cardiac output will be supported  Outcome: Ongoing     Problem: HEMODYNAMIC STATUS  Goal: Hemoglobin within specified parameters  Outcome: Ongoing     Problem: Skin Integrity:  Goal: Will show no infection signs and symptoms  Description: Will show no infection signs and symptoms  Outcome: Ongoing  Goal: Absence of new skin breakdown  Description: Absence of new skin breakdown  Outcome: Ongoing

## 2021-01-22 NOTE — CARE COORDINATION
1/22/2021  Social Work Discharge [address] is 15/24. SW discussed [facility] vs [facility] who he prefers. Pt said he wants to return home. SW made a referral to Caroline with [facility]. Waiting reply. Order will be needed. Pt has a ww and wc at home. Pt is on room air.  Electronically signed by JOSE Stephens on 1/22/2021 at 11:32 AM

## 2021-01-23 NOTE — PROGRESS NOTES
[address] [place] [address] [facility]  [facility]  Progress Note    SUBJECTIVE:  Chief Complaint   Patient presents with    Fall     Having some anxiety. C/o generalized joint achiness. Little nausea. + constipation. No fever. Review of systems:  As stated above in the chief complaint, otherwise negative. Medications:  Scheduled Meds:   vancomycin  1,250 mg Intravenous Q24H    ferrous sulfate  325 mg Oral BID WC    ferric gluconate (FERRLECIT) IVPB  125 mg Intravenous Once    DULoxetine  30 mg Oral Daily    furosemide  20 mg Intravenous Once    enoxaparin  40 mg Subcutaneous Daily    allopurinol  300 mg Oral Daily    aspirin EC  81 mg Oral Daily    fluticasone  1 spray Nasal Daily    gabapentin  400 mg Oral 4x Daily    insulin glargine  20 Units Subcutaneous BID    isosorbide mononitrate  120 mg Oral Daily    levothyroxine  25 mcg Oral Daily    metoprolol succinate  50 mg Oral Daily    morphine  10 mg Oral BID    sodium chloride flush  10 mL Intravenous 2 times per day    insulin lispro  0-6 Units Subcutaneous TID WC    insulin lispro  0-3 Units Subcutaneous Nightly    atorvastatin  10 mg Oral Nightly     Continuous Infusions:   dextrose       PRN Meds:analgesic ointment, phenylephrine, sodium chloride, loperamide, albuterol, HYDROcodone-acetaminophen, sodium chloride flush, acetaminophen **OR** acetaminophen, glucose, dextrose, glucagon (rDNA), dextrose, polyethylene glycol, trimethobenzamide    OBJECTIVE:  BP (!) 166/72   Pulse 62   Temp 98.4 °F (36.9 °C) (Temporal)   Resp 18   Ht 5' 8\" (1.727 m)   Wt 250 lb (113.4 kg)   SpO2 96%   BMI 38.01 kg/m²   Temp  Av.3 °F (36.8 °C)  Min: 97.8 °F (36.6 °C)  Max: 98.6 °F (37 °C)  Constitutional: The patient is lying in bed. Awake and alert. No distress. Chronically ill in appearance  Skin: Warm and dry. No rashes were noted. HEENT: Round and reactive pupils. Moist mucous membranes. No ulcerations or thrush. Neck: Supple to movements. Chest: No respiratory distress. No crackles. Cardiovascular: Heart sounds rhythmic and regular. Abdomen: Positive bowel sounds to auscultation. Benign to palpation. Extremities: +3 edema b/l LEs  Lines: peripheral    Laboratory and Tests Review:  Lab Results   Component Value Date    WBC 5.6 01/23/2021    WBC 4.9 01/22/2021    WBC 4.4 (L) 01/21/2021    HGB 8.4 (L) 01/23/2021    HCT 26.5 (L) 01/23/2021    MCV 88.6 01/23/2021     01/23/2021     Lab Results   Component Value Date    NEUTROABS 3.64 01/23/2021    NEUTROABS 2.73 01/22/2021    NEUTROABS 2.85 01/21/2021     No results found for: [facility]  Lab Results   Component Value Date    ALT 8 01/23/2021    AST 14 01/23/2021    ALKPHOS 57 01/23/2021    BILITOT 0.3 01/23/2021     Lab Results   Component Value Date     01/23/2021    K 3.9 01/23/2021    K 4.3 01/20/2021     01/23/2021    CO2 23 01/23/2021    BUN 18 01/23/2021    CREATININE 1.2 01/23/2021    CREATININE 1.2 01/22/2021    CREATININE 1.3 01/21/2021    GFRAA >60 01/23/2021    LABGLOM 58 01/23/2021    GLUCOSE 111 01/23/2021    GLUCOSE 111 07/16/2011    PROT 6.2 01/23/2021    LABALBU 3.4 01/23/2021    CALCIUM 9.0 01/23/2021    BILITOT 0.3 01/23/2021    ALKPHOS 57 01/23/2021    AST 14 01/23/2021    ALT 8 01/23/2021     Lab Results   Component Value Date    CRP 1.9 (H) 01/23/2021    CRP 2.1 (H) 01/22/2021    CRP 3.6 (H) 01/21/2021     Lab Results   Component Value Date    SEDRATE 72 (H) 01/23/2021    SEDRATE 68 (H) 01/22/2021    SEDRATE 70 (H) 01/21/2021     Radiology:      Microbiology:   Blood cultures 1/19/2021: Staphylococcus hominis in 2 of 2 sets  Blood cultures 1/20/2021: Negative so far  Urine culture 1/19/2021: Mixed GPO    Recent Labs     01/21/21  0600   PROCAL 0.21*       ASSESSMENT:  · CoNS bacteremia.   Contaminant versus true infection associated to intravascular device  · Possible pacer infection  · Weakness associated to the above · Acute kidney injury    PLAN:  · Continue Vancomycin once daily. Trough 16.3. If the repeat blood cultures from 1/20/2021 (drawn off antibiotics) are negative after 5 days, this antibiotic can be discontinued altogether  · Check final cultures    Felisha Jennings CNP  12:41 PM  1/23/2021     Pt seen and examined. I discussed and co-formulated the plan with advanced practice nurse. Labs, cultures, and radiographs reviewed. Face to Face encounter occurred. Changes made as necessary by me.      Beth Pozo MD  Infectious Disease

## 2021-01-23 NOTE — PROGRESS NOTES
PROGRESS  NOTE --                                                          INTERNAL  MEDICINE                                                                              I  PERSONALLY SAW , EXAMINED, AND CARED [id number] [facility] [place], 1/23/2021     LABS, XRAY ,CHART, AND MEDICATIONS  REVIEWED BY ME       Chief complaint: Weakness of legs      1/20/2021-SUBJECTIVE: Derek Peñaloza is alert awake and cooperative; oriented ×3. Denies any chest pain dyspnea nausea emesis. Tolerating diet. No abdominal pain. He states he did have normal bowel movement this morning followed by explosive diarrhea; since that he is feeling weaker again. Afebrile last 24 hours. Blood pressure 142/64.  96% saturation on room air. Intake and output +1101 cc. BUN 20 creatinine 1.4, improved. Protein 6.2 albumin 3.2. Glucose 161. Troponins have been elevated as high as 0.09; most recently 0.05. Hemoglobin 8.8 WBC 5.8 platelets 982. Preliminary blood cultures, gram-positive cocci in clusters times 2 out of 4. Urine culture mixed organisms still evaluating. CT of lumbar spine as follows--    FINDINGS:   BONES/ALIGNMENT: There is normal alignment of the spine.  Mild levoscoliosis   with tip at L4.  The vertebral body heights are maintained. No osseous   destructive lesion is seen. DEGENERATIVE CHANGES: No significant degenerative changes of the lumbar spine. At L1-L2: Subtle retrolisthesis.  Otherwise unremarkable. At L2-L3: Subtle retrolisthesis.  Otherwise unremarkable. At L3-L4, subtle retrolisthesis and mild bilateral facet arthropathy. Findings result in mild canal stenosis and mild bilateral neural foraminal   narrowing.    At L4-L5: Vacuum phenomenon within the disc space.  Subtle anterolisthesis   and 2 mm disc bulging.  Moderate bilateral set arthropathy and ligamentum 1/22/2021-patient laying in bed; states it is a bad day. No chest pain no dyspnea. Having severe bilateral knee pains, osteoarthritic, he believes because it is snowing outside. He usually uses topical analgesic at home. He states he is not sure if he is getting his allopurinol, chart checked, he is getting it. He states he also uses albuterol as needed at home. It is ordered as needed; states he feels wheezy today. He is having significant postnasal drainage would like something for that also. He also request something extra for his neuropathy. Afebrile last 24 hours. Blood pressure 175/77.  98% saturation on room air. Intake and output +449 cc. Glucose 112. CRP 2.1. Hemoglobin 8.2 WBC 4.9 reticulocyte count increased. Blood culture has resulted with following--    Susceptibility    Staphylococcus hominis ssp hominis (1)    Antibiotic Interpretation WINNIE Status    clindamycin Resistant >=^4 mcg/mL     doxycycline Sensitive ^1 mcg/mL     erythromycin Resistant >=^8 mcg/mL     gentamicin Sensitive <=^0.5 mcg/mL     oxacillin Resistant >=^4 mcg/mL     trimethoprim-sulfamethoxazole Resistant ^160 mcg/mL     vancomycin Sensitive <=^0.5 mcg/mL       ID note from today appreciated; possible pacer infection. Continue vancomycin once daily, check trough tomorrow. If repeat cultures done on 1/20/2021 are negative after 5 days antibiotic can be discontinued. Social service note from today, patient planning on returning home with home health care rather than subacute rehab. His physical therapy [facility] score from 1/20/2021 was 15/24. 1/23/2021-patient laying quietly in bed; states his knee pain is slightly better since using the topical BenGay. However, today his shoulders hurt elbows hurt left ankle hurts. He used albuterol last night, breathing was much improved. Head congestion better with saline nasal spray etc.  No chest pain. He is planning on home with home health care upon discharge. Afebrile last 24 hours. Blood pressure 166/72.  96% saturation on room air. Blood pressure is high as 180/70 yesterday. Intake and output -531 cc. Glucose 111 albumin 3.4. Hemoglobin 8.4 WBC 5.6. Most recent blood cultures, no growth to date. ID note from today, continue vancomycin once daily; if cultures remain negative tomorrow can be stopped.       Objective:     PHYSICAL EXAM:    VS: BP (!) 166/72   Pulse 62   Temp 98.4 °F (36.9 °C) (Temporal)   Resp 18   Ht 5' 8\" (1.727 m)   Wt 250 lb (113.4 kg)   SpO2 96%   BMI 38.01 kg/m²     Labs:   CBC:   Lab Results   Component Value Date    WBC 5.6 01/23/2021    RBC 2.99 01/23/2021    HGB 8.4 01/23/2021    HCT 26.5 01/23/2021    MCV 88.6 01/23/2021    MCH 28.1 01/23/2021    MCHC 31.7 01/23/2021    RDW 18.4 01/23/2021     01/23/2021    MPV 10.1 01/23/2021     CBC with Differential:    Lab Results   Component Value Date    WBC 5.6 01/23/2021    RBC 2.99 01/23/2021    HGB 8.4 01/23/2021    HCT 26.5 01/23/2021     01/23/2021    MCV 88.6 01/23/2021    MCH 28.1 01/23/2021    MCHC 31.7 01/23/2021    RDW 18.4 01/23/2021    NRBC 0.0 04/22/2018    SEGSPCT 76 03/16/2014    BANDSPCT 1 09/02/2016    LYMPHOPCT 22.5 01/23/2021    MONOPCT 7.0 01/23/2021    BASOPCT 0.5 01/23/2021    MONOSABS 0.39 01/23/2021    LYMPHSABS 1.26 01/23/2021    EOSABS 0.25 01/23/2021    BASOSABS 0.03 01/23/2021     Hemoglobin/Hematocrit:    Lab Results   Component Value Date    HGB 8.4 01/23/2021    HCT 26.5 01/23/2021     CMP:    Lab Results   Component Value Date     01/23/2021    K 3.9 01/23/2021 K 4.3 01/20/2021     01/23/2021    CO2 23 01/23/2021    BUN 18 01/23/2021    CREATININE 1.2 01/23/2021    GFRAA >60 01/23/2021    LABGLOM 58 01/23/2021    GLUCOSE 111 01/23/2021    GLUCOSE 111 07/16/2011    PROT 6.2 01/23/2021    LABALBU 3.4 01/23/2021    CALCIUM 9.0 01/23/2021    BILITOT 0.3 01/23/2021    ALKPHOS 57 01/23/2021    AST 14 01/23/2021    ALT 8 01/23/2021     BMP:    Lab Results   Component Value Date     01/23/2021    K 3.9 01/23/2021    K 4.3 01/20/2021     01/23/2021    CO2 23 01/23/2021    BUN 18 01/23/2021    LABALBU 3.4 01/23/2021    CREATININE 1.2 01/23/2021    CALCIUM 9.0 01/23/2021    GFRAA >60 01/23/2021    LABGLOM 58 01/23/2021    GLUCOSE 111 01/23/2021    GLUCOSE 111 07/16/2011     Hepatic Function Panel:    Lab Results   Component Value Date    ALKPHOS 57 01/23/2021    ALT 8 01/23/2021    AST 14 01/23/2021    PROT 6.2 01/23/2021    BILITOT 0.3 01/23/2021    BILIDIR <0.2 01/23/2021    IBILI see below 01/23/2021    LABALBU 3.4 01/23/2021     Ionized Calcium:  No results found for: IONCA  Magnesium:    Lab Results   Component Value Date    MG 1.7 01/23/2021     Phosphorus:    Lab Results   Component Value Date    PHOS 3.3 01/23/2021     LDH:  No results found for: LDH  Uric Acid:    Lab Results   Component Value Date    LABURIC 3.5 01/21/2021     PT/INR:    Lab Results   Component Value Date    PROTIME 11.7 01/05/2021    PROTIME 11.3 06/21/2011    INR 1.0 01/05/2021     Warfarin PT/INR:  No components found for: PTPATWAR, PTINRWAR  PTT:    Lab Results   Component Value Date    APTT 25.5 01/05/2021   [APTT}  Troponin:    Lab Results   Component Value Date    TROPONINI 0.05 01/19/2021     Last 3 Troponin:    Lab Results   Component Value Date    TROPONINI 0.05 01/19/2021    TROPONINI 0.06 01/19/2021    TROPONINI 0.09 01/19/2021     U/A:    Lab Results   Component Value Date    COLORU Yellow 01/19/2021    PROTEINU TRACE 01/19/2021    PHUR 7.5 01/19/2021 LABCAST RARE 10/11/2017    WBCUA 1-3 01/19/2021    WBCUA NONE 07/12/2011    RBCUA 0-1 01/19/2021    RBCUA 1-3 03/17/2014    BACTERIA NONE SEEN 01/19/2021    CLARITYU Clear 01/19/2021    SPECGRAV 1.010 01/19/2021    LEUKOCYTESUR Negative 01/19/2021    UROBILINOGEN 0.2 01/19/2021    BILIRUBINUR Negative 01/19/2021    BILIRUBINUR NEGATIVE 07/12/2011    BLOODU Negative 01/19/2021    GLUCOSEU Negative 01/19/2021    GLUCOSEU NEGATIVE 07/12/2011     HgBA1c:    Lab Results   Component Value Date    LABA1C 6.5 01/21/2021     FLP:    Lab Results   Component Value Date    TRIG 89 01/21/2021    HDL 30 01/21/2021    LDLCALC 52 01/21/2021    LABVLDL 18 01/21/2021     TSH:    Lab Results   Component Value Date    TSH 1.340 01/21/2021     VITAMIN B12: No components found for: B12  FOLATE:    Lab Results   Component Value Date    FOLATE >20.0 01/21/2021        General appearance: Alert, Awake, Oriented times 3, no distress  Skin: Warm and dry ; no rashes  Head: Normocephalic. No masses, lesions or tenderness noted  Eyes: Conjunctivae pink, sclera white. PERRL,EOM-I  Ears: External ears normal  Nose/Sinuses: Nares normal. Septum midline. Mucosa normal. No drainage  Oropharynx: Oropharynx clear with no exudate seen  Neck: Supple. No jugular venous distension, lymphadenopathy or thyromegaly Trachea midline  Lungs: Mostly clear  Heart: S1 S2  Regular rate and rhythm. No rub, murmur or gallop; ventricular pacemaker  Abdomen: Soft, non-tender. BS normal. No masses, organomegaly; no rebound or guarding  Extremities: 1+ edema both lower extremities, now with 1+ edema of left hand. He had to have his arm band cut off because of the edema.   Musculoskeletal: Significant weakness bilateral lower extremities left worse than right  Neuro: Cranial nerves II through XII intact    TELEMETRY: REVIEWED--Telemetry: Ventricular pacemaker    ASSESSMENT:   Principal Problem:    HARVEY (acute kidney injury) ([facility] [address]  Active Problems:    Essential hypertension PVD (peripheral vascular disease) with claudication (HCC)    Pacemaker    S/P AVR    Type 2 diabetes mellitus with stage 3 chronic kidney disease, with long-term current use of insulin (HCC)    Diabetes mellitus type 2, uncontrolled ([facility]    History of CVA (cerebrovascular accident)    Elevated troponin    Acute renal insufficiency    Pulmonary hypertension (HCC)    Moderate tricuspid regurgitation by prior echocardiography    (HFpEF) heart failure with preserved ejection fraction (HCC)    COPD (chronic obstructive pulmonary disease) ([facility] [address]    Diabetic neuropathy associated with type 2 diabetes mellitus ([facility]  Resolved Problems:    * No resolved hospital problems. *      PLAN:  SEE ORDERS      RE  CHANGES AND FINDINGS   Medications reviewed with patient  GI prophylaxis  DVT prophylaxis  Consultants notes reviewed   1 dose vancomycin IV per ID  Repeat blood cultures  Imodium as needed for diarrhea  Allopurinol 3 mg daily  Aspirin 81 mg daily  Enoxaparin 30 mg daily  Lantus 20 units twice daily  Sliding-scale insulin  Gabapentin 400 mg 4 times daily  Morphine extended release 10 mg twice daily by mouth  Toprol-XL 50 mg daily  Synthroid 25 mcg  Iron studies a.m.  TSH free T4 a.m.   Appropriate labs  Repeat stool for occult blood  Reticulocyte count  Await results of final blood cultures  Sliding-scale insulin  Morphine extended release 10 mg twice daily by mouth  Discontinue IV fluids due to the above edema  Monitor labs  Vancomycin IV daily until final cultures available  Still have no stool for occult blood  Sliding-scale insulin  Morphine extended release 10 mg twice daily by mouth  Gabapentin 400 mg 4 times daily  Ferrlecit protocol, IV  Ferrous sulfate 325 mg twice daily  Cymbalta 30 mg daily to help neuropathy pain  BenGay topical  Josr-Synephrine, saline nasal spray for head congestion  Nursing to advise patient he does have albuterol ordered as needed  Furosemide 20 mg IV once today GLUCOSE:No results for input(s): POCGLU in the last 72 hours. BNP:No results found for: BNP    CRP:   Recent Labs     01/21/21  0600 01/22/21  0545 01/23/21  0340   CRP 3.6* 2.1* 1.9*       ESR:  Recent Labs     01/21/21  0600 01/22/21  0545 01/23/21  0340   SEDRATE 70* 68* 72*       RADIOLOGY: REVIEWED AVAILABLE REPORT  CT LUMBAR SPINE WO CONTRAST   Final Result   Mild levoscoliosis with tip at L4. At L1-L2 and L2-L3, subtle retrolisthesis. At L3-L4, subtle retrolisthesis, mild canal stenosis and mild bilateral   neural foraminal narrowing. At L4-L5, subtle anterolisthesis, mild canal stenosis and mild bilateral   neural foraminal narrowing. At L5-S1, moderate endplate sclerosis and mild left neural foraminal   narrowing. XR CHEST PORTABLE   Final Result   Similar findings favored to be chronic. PA and lateral views would be useful   for further assessment.                 Simeon Acharya DO   2:40 PM     1/23/2021      Voice recognition software used for dictation

## 2021-01-24 NOTE — PROGRESS NOTES
flavum thickening.  Mild canal stenosis and mild bilateral neural foraminal   narrowing. L5-S1: Moderate endplate sclerosis.  Moderate bilateral set arthropathy. Mild left neural foraminal narrowing. SOFT TISSUES/RETROPERITONEUM: No paraspinal mass is seen.  Moderate   degenerative changes of the abdominal aorta and its branches are noted.      Impression:       Mild levoscoliosis with tip at L4. At L1-L2 and L2-L3, subtle retrolisthesis. At L3-L4, subtle retrolisthesis, mild canal stenosis and mild bilateral   neural foraminal narrowing. At L4-L5, subtle anterolisthesis, mild canal stenosis and mild bilateral   neural foraminal narrowing. At L5-S1, moderate endplate sclerosis and mild left neural foraminal   narrowing. ID consult from today, coagulase-negative staph possible contaminant. Cannot exclude pacemaker infection. Repeat blood cultures x2, 1 dose of vancomycin. Physical therapy AM PAC score from today 15/24. Neuropathy bilateral lower extremities; endurance decreased. 1/21/2021-patient sitting on side of bed with legs dangling. Still has neuropathic sensations both legs. I discussed his lumbar CT with him. 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 He is noticing increasing edema of his left arm as well as somewhat of his feet. No dyspnea at rest.  No chest pain. Afebrile last 24 hours. 97% saturation on room air. Intake and output +1111 cc. Procalcitonin 0.21. BUN 19 creatinine 1.3.  proBNP 3534. CRP 3.6, had been 5.4. A1c 6.5.  TSH free T4 normal.  Hemoglobin 7.7 WBC 4.4 platelets 694. Hemoglobin had been 8.8 yesterday and 9.8 on admission. TIBC, iron, iron saturation all low. Folate and B12 normal.  Sed rate 70. ID note from today appreciated, still uncertain if coagulase-negative staph bacteremia is true versus contaminant especially in view of pacemaker. 1 more dose vancomycin today. Occupational therapy AMPAC score from today 15/24. 1/22/2021-patient laying in bed; states it is a bad day. No chest pain no dyspnea. Having severe bilateral knee pains, osteoarthritic, he believes because it is snowing outside. He usually uses topical analgesic at home. He states he is not sure if he is getting his allopurinol, chart checked, he is getting it. He states he also uses albuterol as needed at home. It is ordered as needed; states he feels wheezy today. He is having significant postnasal drainage would like something for that also. He also request something extra for his neuropathy. Afebrile last 24 hours. Blood pressure 175/77.  98% saturation on room air. Intake and output +449 cc. Glucose 112. CRP 2.1. Hemoglobin 8.2 WBC 4.9 reticulocyte count increased. Blood culture has resulted with following--    Susceptibility    Staphylococcus hominis ssp hominis (1)    Antibiotic Interpretation WINNIE Status    clindamycin Resistant >=^4 mcg/mL     doxycycline Sensitive ^1 mcg/mL     erythromycin Resistant >=^8 mcg/mL     gentamicin Sensitive <=^0.5 mcg/mL     oxacillin Resistant >=^4 mcg/mL     trimethoprim-sulfamethoxazole Resistant ^160 mcg/mL     vancomycin Sensitive <=^0.5 mcg/mL       ID note from today appreciated; possible pacer infection. Continue vancomycin once daily, check trough tomorrow. If repeat cultures done on 1/20/2021 are negative after 5 days antibiotic can be discontinued. Social service note from today, patient planning on returning home with home health care rather than subacute rehab. His physical therapy [facility] score from 1/20/2021 was 15/24. 1/23/2021-patient laying quietly in bed; states his knee pain is slightly better since using the topical BenGay. However, today his shoulders hurt elbows hurt left ankle hurts. He used albuterol last night, breathing was much improved. Head congestion better with saline nasal spray etc.  No chest pain. He is planning on home with home health care upon discharge. Afebrile last 24 hours. Blood pressure 166/72.  96% saturation on room air. Blood pressure is high as 180/70 yesterday. Intake and output -531 cc. Glucose 111 albumin 3.4. Hemoglobin 8.4 WBC 5.6. Most recent blood cultures, no growth to date. ID note from today, continue vancomycin once daily; if cultures remain negative tomorrow can be stopped. 1/24/2021-resting quietly in bed; no chest pain no dyspnea. Today, right shoulder seems to be the most painful of the joints. Appetite fair. Ferrlecit drip finishing at this time. Afebrile last 24 hours. 95% saturation on room air. Intake and output -931 cc. BUN 20 creatinine now increased to 1.5. Protein 5.9 albumin 3.2. Hemoglobin 7.9 WBC 5.0. Sed rate 72. ID note from today appreciated; IV vancomycin will be stopped. Repeat blood cultures remain sterile.       Objective:     PHYSICAL EXAM:    VS: /60   Pulse 60   Temp 97.6 °F (36.4 °C) (Oral)   Resp 18   Ht 5' 8\" (1.727 m)   Wt 254 lb (115.2 kg)   SpO2 95%   BMI 38.62 kg/m²     Labs:   CBC:   Lab Results   Component Value Date    WBC 5.0 01/24/2021    RBC 2.86 01/24/2021    HGB 7.9 01/24/2021    HCT 26.0 01/24/2021    MCV 90.9 01/24/2021    MCH 27.6 01/24/2021    MCHC 30.4 01/24/2021    RDW 18.6 01/24/2021     01/24/2021    MPV 9.2 01/24/2021     CBC with Differential:    Lab Results   Component Value Date    WBC 5.0 01/24/2021    RBC 2.86 01/24/2021    HGB 7.9 01/24/2021    HCT 26.0 01/24/2021     01/24/2021    MCV 90.9 01/24/2021    MCH 27.6 01/24/2021    MCHC 30.4 01/24/2021    RDW 18.6 01/24/2021 NRBC 0.0 04/22/2018    SEGSPCT 76 03/16/2014    BANDSPCT 1 09/02/2016    LYMPHOPCT 25.8 01/24/2021    MONOPCT 12.1 01/24/2021    BASOPCT 0.8 01/24/2021    MONOSABS 0.61 01/24/2021    LYMPHSABS 1.30 01/24/2021    EOSABS 0.29 01/24/2021    BASOSABS 0.04 01/24/2021     Hemoglobin/Hematocrit:    Lab Results   Component Value Date    HGB 7.9 01/24/2021    HCT 26.0 01/24/2021     CMP:    Lab Results   Component Value Date     01/24/2021    K 4.0 01/24/2021    K 4.3 01/20/2021     01/24/2021    CO2 25 01/24/2021    BUN 20 01/24/2021    CREATININE 1.5 01/24/2021    GFRAA 55 01/24/2021    LABGLOM 45 01/24/2021    GLUCOSE 62 01/24/2021    GLUCOSE 111 07/16/2011    PROT 5.9 01/24/2021    LABALBU 3.2 01/24/2021    CALCIUM 8.7 01/24/2021    BILITOT 0.3 01/24/2021    ALKPHOS 53 01/24/2021    AST 15 01/24/2021    ALT 8 01/24/2021     BMP:    Lab Results   Component Value Date     01/24/2021    K 4.0 01/24/2021    K 4.3 01/20/2021     01/24/2021    CO2 25 01/24/2021    BUN 20 01/24/2021    LABALBU 3.2 01/24/2021    CREATININE 1.5 01/24/2021    CALCIUM 8.7 01/24/2021    GFRAA 55 01/24/2021    LABGLOM 45 01/24/2021    GLUCOSE 62 01/24/2021    GLUCOSE 111 07/16/2011     Hepatic Function Panel:    Lab Results   Component Value Date    ALKPHOS 53 01/24/2021    ALT 8 01/24/2021    AST 15 01/24/2021    PROT 5.9 01/24/2021    BILITOT 0.3 01/24/2021    BILIDIR <0.2 01/24/2021    IBILI see below 01/24/2021    LABALBU 3.2 01/24/2021     Ionized Calcium:  No results found for: IONCA  Magnesium:    Lab Results   Component Value Date    MG 1.9 01/24/2021     Phosphorus:    Lab Results   Component Value Date    PHOS 3.9 01/24/2021     LDH:  No results found for: LDH  Uric Acid:    Lab Results   Component Value Date    LABURIC 3.5 01/21/2021     PT/INR:    Lab Results   Component Value Date    PROTIME 11.7 01/05/2021    PROTIME 11.3 06/21/2011    INR 1.0 01/05/2021 Abdomen: Soft, non-tender. BS normal. No masses, organomegaly; no rebound or guarding  Extremities: 1+ edema both lower extremities, left hand edema resolved  Musculoskeletal: Significant weakness bilateral lower extremities left worse than right  Neuro: Cranial nerves II through XII intact    TELEMETRY: REVIEWED--Telemetry: Ventricular pacemaker    ASSESSMENT:   Principal Problem:    HARVEY (acute kidney injury) ([facility]  Active Problems:    Essential hypertension    PVD (peripheral vascular disease) with claudication ([facility] [facility]    Pacemaker    S/P AVR    Type 2 diabetes mellitus with stage 3 chronic kidney disease, with long-term current use of insulin (HCC)    Diabetes mellitus type 2, uncontrolled ([address]    History of CVA (cerebrovascular accident)    Elevated troponin    Acute renal insufficiency    Pulmonary hypertension (HCC)    Moderate tricuspid regurgitation by prior echocardiography    (HFpEF) heart failure with preserved ejection fraction (HCC)    COPD (chronic obstructive pulmonary disease) ([facility])    Diabetic neuropathy associated with type 2 diabetes mellitus ([address]  Resolved Problems:    * No resolved hospital problems. *      PLAN:  SEE ORDERS      RE  CHANGES AND FINDINGS   Medications reviewed with patient  GI prophylaxis  DVT prophylaxis  Consultants notes reviewed   1 dose vancomycin IV per ID  Repeat blood cultures  Imodium as needed for diarrhea  Allopurinol 3 mg daily  Aspirin 81 mg daily  Enoxaparin 30 mg daily  Lantus 20 units twice daily  Sliding-scale insulin  Gabapentin 400 mg 4 times daily  Morphine extended release 10 mg twice daily by mouth  Toprol-XL 50 mg daily  Synthroid 25 mcg  Iron studies a.m.  TSH free T4 a.m.   Appropriate labs  Repeat stool for occult blood  Reticulocyte count  Await results of final blood cultures  Sliding-scale insulin  Morphine extended release 10 mg twice daily by mouth  Discontinue IV fluids due to the above edema  Monitor labs Vancomycin IV daily until final cultures available  Still have no stool for occult blood  Sliding-scale insulin  Morphine extended release 10 mg twice daily by mouth  Gabapentin 400 mg 4 times daily  Ferrlecit protocol, IV  Ferrous sulfate 325 mg twice daily  Cymbalta 30 mg daily to help neuropathy pain  BenGay topical  Josr-Synephrine, saline nasal spray for head congestion  Nursing to advise patient he does have albuterol ordered as needed  Furosemide 20 mg IV once today  Vancomycin IV daily until final cultures, possibly tomorrow  Sliding-scale insulin  Morphine extended release 10 mg twice daily by mouth  Zyloprim 300 mg daily  Aspirin 81 mg daily  Atorvastatin 10 mg daily  Cymbalta 30 mg daily, neuropathy  Tolerated Ferrlecit  Ferrous sulfate 325 mg by mouth  Gabapentin 400 mg 4 times daily  Ibuprofen 400 mg 1 time today due to arthralgias  IV vancomycin has been discontinued  Cymbalta 30 mg daily, neuropathy  Ferrous sulfate 325 mg by mouth  No further ibuprofen  Morphine extended release 10 mg twice daily  Sliding-scale insulin  Hopefully discharge home tomorrow with home health care      Discussed with patient and nursing.       Kaitlin Martino DO     2:34 PM     1/24/2021    TIME > 35 MINUTES    >  50 %  OF  TIME  DISCUSSION               ------------  INFORMATION  -----------      DIET:DIET CARB CONTROL;      No Known Allergies      MEDICATION SIDE EFFECTS:none       SCHEDULED MEDS:                                 Scheduled Meds:   ferrous sulfate  325 mg Oral BID WC    DULoxetine  30 mg Oral Daily    furosemide  20 mg Intravenous Once    enoxaparin  40 mg Subcutaneous Daily    allopurinol  300 mg Oral Daily    aspirin EC  81 mg Oral Daily    fluticasone  1 spray Nasal Daily    gabapentin  400 mg Oral 4x Daily    insulin glargine  20 Units Subcutaneous BID    isosorbide mononitrate  120 mg Oral Daily    levothyroxine  25 mcg Oral Daily    metoprolol succinate  50 mg Oral Daily  morphine  10 mg Oral BID    sodium chloride flush  10 mL Intravenous 2 times per day    insulin lispro  0-6 Units Subcutaneous TID WC    insulin lispro  0-3 Units Subcutaneous Nightly    atorvastatin  10 mg Oral Nightly       Continuous Infusions:   dextrose           Data:       Intake/Output Summary (Last 24 hours) at 1/24/2021 1434  Last data filed at 1/24/2021 1420  Gross per 24 hour   Intake 180 ml   Output 480 ml   Net -300 ml       Wt Readings from Last 3 Encounters:   01/24/21 254 lb (115.2 kg)   01/13/21 259 lb (117.5 kg)   01/05/21 259 lb (117.5 kg)       Labs: Additional    GLUCOSE:No results for input(s): POCGLU in the last 72 hours. BNP:No results found for: BNP    CRP:   Recent Labs     01/22/21  0545 01/23/21  0340   CRP 2.1* 1.9*       ESR:  Recent Labs     01/22/21  0545 01/23/21  0340 01/24/21  0340   SEDRATE 68* 72* 72*       RADIOLOGY: REVIEWED AVAILABLE REPORT  CT LUMBAR SPINE WO CONTRAST   Final Result   Mild levoscoliosis with tip at L4. At L1-L2 and L2-L3, subtle retrolisthesis. At L3-L4, subtle retrolisthesis, mild canal stenosis and mild bilateral   neural foraminal narrowing. At L4-L5, subtle anterolisthesis, mild canal stenosis and mild bilateral   neural foraminal narrowing. At L5-S1, moderate endplate sclerosis and mild left neural foraminal   narrowing. XR CHEST PORTABLE   Final Result   Similar findings favored to be chronic. PA and lateral views would be useful   for further assessment.                 [address] [place] [address]  DO   2:34 PM     1/24/2021      Voice recognition software used for dictation

## 2021-01-24 NOTE — PROGRESS NOTES
[address] [place] [address] [facility]  Progress Note    SUBJECTIVE:  Chief Complaint   Patient presents with    Fall     Tired. Denies n/v/d. + constipation. No fever. Review of systems:  As stated above in the chief complaint, otherwise negative. Medications:  Scheduled Meds:   vancomycin  1,250 mg Intravenous Q24H    ferrous sulfate  325 mg Oral BID WC    DULoxetine  30 mg Oral Daily    furosemide  20 mg Intravenous Once    enoxaparin  40 mg Subcutaneous Daily    allopurinol  300 mg Oral Daily    aspirin EC  81 mg Oral Daily    fluticasone  1 spray Nasal Daily    gabapentin  400 mg Oral 4x Daily    insulin glargine  20 Units Subcutaneous BID    isosorbide mononitrate  120 mg Oral Daily    levothyroxine  25 mcg Oral Daily    metoprolol succinate  50 mg Oral Daily    morphine  10 mg Oral BID    sodium chloride flush  10 mL Intravenous 2 times per day    insulin lispro  0-6 Units Subcutaneous TID WC    insulin lispro  0-3 Units Subcutaneous Nightly    atorvastatin  10 mg Oral Nightly     Continuous Infusions:   dextrose       PRN Meds:analgesic ointment, phenylephrine, sodium chloride, loperamide, albuterol, HYDROcodone-acetaminophen, sodium chloride flush, acetaminophen **OR** acetaminophen, glucose, dextrose, glucagon (rDNA), dextrose, polyethylene glycol, trimethobenzamide    OBJECTIVE:  /60   Pulse 60   Temp 97.6 °F (36.4 °C) (Oral)   Resp 18   Ht 5' 8\" (1.727 m)   Wt 254 lb (115.2 kg)   SpO2 95%   BMI 38.62 kg/m²   Temp  Av.5 °F (36.4 °C)  Min: 97.4 °F (36.3 °C)  Max: 97.6 °F (36.4 °C)  Constitutional: The patient is lying in bed. Awake and alert. No distress. Chronically ill in appearance  Skin: Warm and dry. No rashes were noted. HEENT: Round and reactive pupils. Moist mucous membranes. No ulcerations or thrush. Neck: Supple to movements. Chest: No respiratory distress. No crackles. Cardiovascular: Heart sounds rhythmic and regular. + murmur  Abdomen: Positive bowel sounds to auscultation. Benign to palpation. Extremities: +3 edema b/l LEs  Lines: peripheral    Laboratory and Tests Review:  Lab Results   Component Value Date    WBC 5.0 01/24/2021    WBC 5.6 01/23/2021    WBC 4.9 01/22/2021    HGB 7.9 (L) 01/24/2021    HCT 26.0 (L) 01/24/2021    MCV 90.9 01/24/2021     01/24/2021     Lab Results   Component Value Date    NEUTROABS 2.76 01/24/2021    NEUTROABS 3.64 01/23/2021    NEUTROABS 2.73 01/22/2021     No results found for: [facility]  Lab Results   Component Value Date    ALT 8 01/24/2021    AST 15 01/24/2021    ALKPHOS 53 01/24/2021    BILITOT 0.3 01/24/2021     Lab Results   Component Value Date     01/24/2021    K 4.0 01/24/2021    K 4.3 01/20/2021     01/24/2021    CO2 25 01/24/2021    BUN 20 01/24/2021    CREATININE 1.5 01/24/2021    CREATININE 1.2 01/23/2021    CREATININE 1.2 01/22/2021    GFRAA 55 01/24/2021    LABGLOM 45 01/24/2021    GLUCOSE 62 01/24/2021    GLUCOSE 111 07/16/2011    PROT 5.9 01/24/2021    LABALBU 3.2 01/24/2021    CALCIUM 8.7 01/24/2021    BILITOT 0.3 01/24/2021    ALKPHOS 53 01/24/2021    AST 15 01/24/2021    ALT 8 01/24/2021     Lab Results   Component Value Date    CRP 1.9 (H) 01/23/2021    CRP 2.1 (H) 01/22/2021    CRP 3.6 (H) 01/21/2021     Lab Results   Component Value Date    SEDRATE 72 (H) 01/24/2021    SEDRATE 72 (H) 01/23/2021    SEDRATE 68 (H) 01/22/2021     Radiology:      Microbiology:   Blood cultures 1/19/2021: Staphylococcus hominis in 2 of 2 sets  Blood cultures 1/20/2021: Negative so far  Urine culture 1/19/2021: Mixed GPO    No results for input(s): PROCAL in the last 72 hours. ASSESSMENT:  · CoNS bacteremia.   Contaminant versus true infection associated to intravascular device  · Possible pacer infection  · Weakness associated to the above  · Acute kidney injury    PLAN: · Creat up to 1.5. repeat blood cx remain sterile. Stop vanco   · Check final cultures    April Gabriel Luong CNP  12:46 PM  1/24/2021     Pt seen and examined. I discussed and co-formulated the plan with advanced practice nurse. Labs, cultures, and radiographs reviewed. Face to Face encounter occurred. Changes made as necessary by me.      Danitza Glasgow MD  Infectious Disease

## 2021-01-24 NOTE — PLAN OF CARE
Problem: Falls - Risk of:  Goal: Will remain free from falls  Description: Will remain free from falls  Outcome: Met This Shift     Problem: Pain:  Goal: Control of acute pain  Description: Control of acute pain  Outcome: Met This Shift     Problem: Skin Integrity:  Goal: Will show no infection signs and symptoms  Description: Will show no infection signs and symptoms  Outcome: Met This Shift  Goal: Absence of new skin breakdown  Description: Absence of new skin breakdown  Outcome: Met This Shift

## 2021-01-25 NOTE — PLAN OF CARE
Problem: Falls - Risk of:  Goal: Will remain free from falls  Description: Will remain free from falls  Outcome: Met This Shift  Goal: Absence of physical injury  Description: Absence of physical injury  Outcome: Met This Shift     Problem: Pain:  Goal: Control of acute pain  Description: Control of acute pain  Outcome: Met This Shift  Goal: Control of chronic pain  Description: Control of chronic pain  Outcome: Met This Shift     Problem: Skin Integrity:  Goal: Will show no infection signs and symptoms  Description: Will show no infection signs and symptoms  Outcome: Met This Shift  Goal: Absence of new skin breakdown  Description: Absence of new skin breakdown  Outcome: Met This Shift     Problem: Falls - Risk of:  Goal: Will remain free from falls  Description: Will remain free from falls  Outcome: Met This Shift  Goal: Absence of physical injury  Description: Absence of physical injury  Outcome: Met This Shift     Problem: Pain:  Goal: Control of acute pain  Description: Control of acute pain  Outcome: Met This Shift  Goal: Control of chronic pain  Description: Control of chronic pain  Outcome: Met This Shift     Problem: Skin Integrity:  Goal: Will show no infection signs and symptoms  Description: Will show no infection signs and symptoms  Outcome: Met This Shift  Goal: Absence of new skin breakdown  Description: Absence of new skin breakdown  Outcome: Met This Shift

## 2021-01-25 NOTE — DISCHARGE INSTR - COC
Continuity of Care Form    Patient Name: Norm Maki   :  1940  MRN:  [id number]    Admit date:  2021  Discharge date:  21    Code Status Order: Full Code   Advance Directives:   Advance Care Flowsheet Documentation       Date/Time Healthcare Directive Type of Healthcare Directive Copy in [address] Agent's Name Healthcare Agent's Phone Number    [phone]  Yes, patient has an advance directive for healthcare treatment  Health care treatment directive  No, copy requested from family  Adult siblings  Annette Mcclelland  [phone] - [phone]            Admitting Physician:  Melo Xavier DO  PCP: Fabiano Beyer MD    Discharging Nurse: Jonathan Al RN   [address] Unit/Room#: 7116/7170-G  Discharging Unit Phone Number: [phone]  Emergency Contact:   Extended Emergency Contact Information  Primary Emergency Contact: ABEL ABRAHAM  Home Phone: [phone]  Mobile Phone: [phone]  Relation: Brother/Sister    Past Surgical History:  Past Surgical History:   Procedure Laterality Date    BIOPSY / LIGATION TEMPORAL ARTERY Bilateral 2021    BILATERAL TEMPORAL ARTERY BIOPSY performed by Michael Eli MD at [address]      5 years ago   [address] GRAFT      1 vessel and aortic valve repair    DIAGNOSTIC CARDIAC CATH LAB PROCEDURE  10/14/2008    DIAGNOSTIC CARDIAC CATH LAB PROCEDURE  10/21/2010    JOINT REPLACEMENT      R knee replacement    KNEE SURGERY      OTHER SURGICAL HISTORY Right 2017    debridement,bone bx foot    PACEMAKER INSERTION  2014    D-PPM   ()    Dr. Ed De Souza      carotids       Immunization History:   Immunization History   Administered Date(s) Administered    Influenza Virus Vaccine 2016    Pneumococcal Conjugate 13-valent (Pratima Velez) 2016  Pneumococcal Conjugate Vaccine 12/13/2016       Active Problems:  Patient Active Problem List   Diagnosis Code    Essential hypertension I10    Post-operative state Z98.890    PVD (peripheral vascular disease) with claudication ([facility]) I73.9    Pacemaker Z95.0    S/P AVR Z95.2    HARVEY (acute kidney injury) ([facility]) N17.9    CKD (chronic kidney disease) stage 3, GFR 30-59 ml/min N18.30    Type 2 diabetes mellitus with stage 3 chronic kidney disease, with long-term current use of insulin ([facility]) E11.22, N18.30, Z79.4    Pulmonary nodule R91.1    Diabetes mellitus type 2, uncontrolled ([facility]) E11.65    Hyperlipidemia LDL goal <100 E78.5    Acquired hypothyroidism E03.9    CAD (coronary artery disease), native coronary artery I25.10    Chronic pain G89.29    History of CVA (cerebrovascular accident) Z86.73    Chest pain R07.9    Obesity (BMI 30-39. 9) E66.9    Anemia D64.9    Elevated sed rate R70.0    Vision decreased H54.7    Bilateral carotid bruits R09.89    Bilateral carotid artery stenosis I65.23    Elevated troponin R77.8    Acute renal insufficiency N28.9    Mild mitral regurgitation I34.0    Pulmonary hypertension ([facility]) I27.20    Moderate tricuspid regurgitation by prior echocardiography I07.1    (HFpEF) heart failure with preserved ejection fraction ([facility]) I50.30    COPD (chronic obstructive pulmonary disease) ([facility] J44.9    Diabetic neuropathy associated with type 2 diabetes mellitus ([facility] E11.40       Isolation/Infection:   Isolation            No Isolation          Patient Infection Status       Infection Onset Added Last Indicated Last Indicated By Review Planned Expiration Resolved Resolved By    None active    Resolved    COVID-19 Rule Out 01/19/21 01/19/21 01/19/21 COVID-19 (Ordered)   01/19/21 Rule-Out Test Resulted    COVID-19 Rule Out 12/29/20 12/29/20 12/29/20 Covid-19 Ambulatory (Ordered)   12/31/20 Rule-Out Test Resulted            Nurse Assessment: Last Vital Signs: /68   Pulse 67   Temp 98.6 °F (37 °C) (Oral)   Resp 20   Ht 5' 8\" (1.727 m)   Wt 259 lb (117.5 kg)   SpO2 94%   BMI 39.38 kg/m²     Last documented pain score (0-10 scale): Pain Level: 8  Last Weight:   Wt Readings from Last 1 Encounters:   01/25/21 259 lb (117.5 kg)     Mental Status:  oriented and alert    IV Access:  - None    Nursing Mobility/ADLs:  Walking   Assisted  Transfer  Assisted  Bathing  Assisted  Dressing  Assisted  Toileting  Assisted  Feeding  Assisted  Med Admin  Assisted  Med Delivery   whole    Wound Care Documentation and Therapy:        Elimination:  Continence:   · Bowel: Yes  · Bladder: Yes  Urinary Catheter: None   Colostomy/Ileostomy/Ileal Conduit: No       Date of Last BM: 01/25/21    Intake/Output Summary (Last 24 hours) at 1/25/2021 1452  Last data filed at 1/25/2021 1329  Gross per 24 hour   Intake 120 ml   Output 375 ml   Net -255 ml     I/O last 3 completed shifts: In: 120 [P.O.:120]  Out: 500 [Urine:500]    Safety Concerns: At Risk for Falls    Impairments/Disabilities:      Vision    Nutrition Therapy:  Current Nutrition Therapy:   - Oral Diet:  [address] PAUL Oral [id number]}    Routes of Feeding: Oral  Liquids: Thin Liquids, carb controlled diet   Daily Fluid Restriction: no  Last Modified Barium Swallow with Video (Video Swallowing Test): not done    Treatments at the Time of Hospital Discharge:   Respiratory Treatments: Albuterol 2.5 mg q 6 hrs prn   Oxygen Therapy:  is not on home oxygen therapy.   Ventilator:    - No ventilator support    Rehab Therapies: Physical Therapy and Occupational Therapy  Weight Bearing Status/Restrictions: No weight bearing restirctions  Other Medical Equipment (for information only, NOT a DME order):  walker  Other Treatments: ***    Patient's personal belongings (please select all that are sent with patient):  Ruth MARTINEZ SIGNATURE:  {Esignature:[id number]}    CASE MANAGEMENT/SOCIAL WORK SECTION Inpatient Status Date: ***    Readmission Risk Assessment Score:  Readmission Risk              Risk of Unplanned Readmission:        23           Discharging to Facility/ Agency   · Name: Angela Feliz  · [address] [id number]  · [phone]  · [phone]    Dialysis Facility (if applicable)   · Name:  · Address:  · Dialysis Schedule:  · Phone:  · Fax:    / signature: Electronically signed by JOSE Landeros on 1/25/2021 at 2:52 PM      PHYSICIAN SECTION    Prognosis: Fair    Condition at Discharge: Stable    Rehab Potential (if transferring to Rehab): Fair    Recommended Labs or Other Treatments After Discharge: CBC, CMP, magnesium, phosphorus weekly x3 weeks    Physician Certification: I certify the above information and transfer of Elvia Tadeo  is necessary for the continuing treatment of the diagnosis listed and that he requires skilled snf for {LESS:[id number]} 30 days.      Update Admission H&P: ASSESSMENT:   Principal Problem:    HARVEY (acute kidney injury) ([address]  Active Problems:    Essential hypertension    PVD (peripheral vascular disease) with claudication (HCC)    Pacemaker    S/P AVR    Type 2 diabetes mellitus with stage 3 chronic kidney disease, with long-term current use of insulin (HCC)    Diabetes mellitus type 2, uncontrolled ([address]    History of CVA (cerebrovascular accident)    Elevated troponin    Acute renal insufficiency    Pulmonary hypertension (HCC)    Moderate tricuspid regurgitation by prior echocardiography    (HFpEF) heart failure with preserved ejection fraction (HCC)    COPD (chronic obstructive pulmonary disease) ([address]    Diabetic neuropathy associated with type 2 diabetes mellitus ([address]    PHYSICIAN SIGNATURE:  Electronically signed by Jaylen Johnson DO on 1/25/21 at 3:23 PM EST

## 2021-01-25 NOTE — PROGRESS NOTES
flavum thickening.  Mild canal stenosis and mild bilateral neural foraminal   narrowing. L5-S1: Moderate endplate sclerosis.  Moderate bilateral set arthropathy. Mild left neural foraminal narrowing. SOFT TISSUES/RETROPERITONEUM: No paraspinal mass is seen.  Moderate   degenerative changes of the abdominal aorta and its branches are noted.      Impression:       Mild levoscoliosis with tip at L4. At L1-L2 and L2-L3, subtle retrolisthesis. At L3-L4, subtle retrolisthesis, mild canal stenosis and mild bilateral   neural foraminal narrowing. At L4-L5, subtle anterolisthesis, mild canal stenosis and mild bilateral   neural foraminal narrowing. At L5-S1, moderate endplate sclerosis and mild left neural foraminal   narrowing. ID consult from today, coagulase-negative staph possible contaminant. Cannot exclude pacemaker infection. Repeat blood cultures x2, 1 dose of vancomycin. Physical therapy AM PAC score from today 15/24. Neuropathy bilateral lower extremities; endurance decreased. 1/21/2021-patient sitting on side of bed with legs dangling. Still has neuropathic sensations both legs. I discussed his lumbar CT with him. 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 He is noticing increasing edema of his left arm as well as somewhat of his feet. No dyspnea at rest.  No chest pain. Afebrile last 24 hours. 97% saturation on room air. Intake and output +1111 cc. Procalcitonin 0.21. BUN 19 creatinine 1.3.  proBNP 3534. CRP 3.6, had been 5.4. A1c 6.5.  TSH free T4 normal.  Hemoglobin 7.7 WBC 4.4 platelets 911. Hemoglobin had been 8.8 yesterday and 9.8 on admission. TIBC, iron, iron saturation all low. Folate and B12 normal.  Sed rate 70. ID note from today appreciated, still uncertain if coagulase-negative staph bacteremia is true versus contaminant especially in view of pacemaker. 1 more dose vancomycin today. Occupational therapy AMPAC score from today 15/24. 1/22/2021-patient laying in bed; states it is a bad day. No chest pain no dyspnea. Having severe bilateral knee pains, osteoarthritic, he believes because it is snowing outside. He usually uses topical analgesic at home. He states he is not sure if he is getting his allopurinol, chart checked, he is getting it. He states he also uses albuterol as needed at home. It is ordered as needed; states he feels wheezy today. He is having significant postnasal drainage would like something for that also. He also request something extra for his neuropathy. Afebrile last 24 hours. Blood pressure 175/77.  98% saturation on room air. Intake and output +449 cc. Glucose 112. CRP 2.1. Hemoglobin 8.2 WBC 4.9 reticulocyte count increased. Blood culture has resulted with following--    Susceptibility    Staphylococcus hominis ssp hominis (1)    Antibiotic Interpretation WINNIE Status    clindamycin Resistant >=^4 mcg/mL     doxycycline Sensitive ^1 mcg/mL     erythromycin Resistant >=^8 mcg/mL     gentamicin Sensitive <=^0.5 mcg/mL     oxacillin Resistant >=^4 mcg/mL     trimethoprim-sulfamethoxazole Resistant ^160 mcg/mL     vancomycin Sensitive <=^0.5 mcg/mL       ID note from today appreciated; possible pacer infection. Continue vancomycin once daily, check trough tomorrow. If repeat cultures done on 1/20/2021 are negative after 5 days antibiotic can be discontinued. Social service note from today, patient planning on returning home with home health care rather than subacute rehab. His physical therapy [facility] score from 1/20/2021 was 15/24. 1/23/2021-patient laying quietly in bed; states his knee pain is slightly better since using the topical BenGay. However, today his shoulders hurt elbows hurt left ankle hurts. He used albuterol last night, breathing was much improved. Head congestion better with saline nasal spray etc.  No chest pain. He is planning on home with home health care upon discharge. Afebrile last 24 hours. Blood pressure 166/72.  96% saturation on room air. Blood pressure is high as 180/70 yesterday. Intake and output -531 cc. Glucose 111 albumin 3.4. Hemoglobin 8.4 WBC 5.6. Most recent blood cultures, no growth to date. ID note from today, continue vancomycin once daily; if cultures remain negative tomorrow can be stopped. 1/24/2021-resting quietly in bed; no chest pain no dyspnea. Today, right shoulder seems to be the most painful of the joints. Appetite fair. Ferrlecit drip finishing at this time. Afebrile last 24 hours. 95% saturation on room air. Intake and output -931 cc. BUN 20 creatinine now increased to 1.5. Protein 5.9 albumin 3.2. Hemoglobin 7.9 WBC 5.0. Sed rate 72. ID note from today appreciated; IV vancomycin will be stopped. Repeat blood cultures remain sterile. 1/25/2021-patient laying quietly in bed; no chest pain no dyspnea. Today he states he feels \"too weak\" to be discharged home with home health care he would rather subacute rehab. He states he spoke with [facility] today and they recommended subacute rehab. Still having significant neuropathic pain. Afebrile last 24 hours. 94% saturation on room air. Intake and output -1011 cc. BUN 21 creatinine 1.3 protein 5.6 albumin 2.9. Hemoglobin 8.1 WBC 5.7. ID note from today, most likely contaminant by Staph hominis; no further treatment warranted will sign off. Physical therapy [facility] score from today 16/24.       Objective:     PHYSICAL EXAM: VS: /68   Pulse 67   Temp 98.6 °F (37 °C) (Oral)   Resp 20   Ht 5' 8\" (1.727 m)   Wt 259 lb (117.5 kg)   SpO2 94%   BMI 39.38 kg/m²     Labs:   CBC:   Lab Results   Component Value Date    WBC 5.7 01/25/2021    RBC 2.84 01/25/2021    HGB 8.1 01/25/2021    HCT 25.5 01/25/2021    MCV 89.8 01/25/2021    MCH 28.5 01/25/2021    MCHC 31.8 01/25/2021    RDW 18.8 01/25/2021     01/25/2021    MPV 10.1 01/25/2021     CBC with Differential:    Lab Results   Component Value Date    WBC 5.7 01/25/2021    RBC 2.84 01/25/2021    HGB 8.1 01/25/2021    HCT 25.5 01/25/2021     01/25/2021    MCV 89.8 01/25/2021    MCH 28.5 01/25/2021    MCHC 31.8 01/25/2021    RDW 18.8 01/25/2021    NRBC 0.0 04/22/2018    SEGSPCT 76 03/16/2014    BANDSPCT 1 09/02/2016    LYMPHOPCT 22.6 01/25/2021    MONOPCT 9.3 01/25/2021    BASOPCT 0.5 01/25/2021    MONOSABS 0.53 01/25/2021    LYMPHSABS 1.29 01/25/2021    EOSABS 0.26 01/25/2021    BASOSABS 0.03 01/25/2021     Hemoglobin/Hematocrit:    Lab Results   Component Value Date    HGB 8.1 01/25/2021    HCT 25.5 01/25/2021     CMP:    Lab Results   Component Value Date     01/25/2021    K 4.0 01/25/2021    K 4.3 01/20/2021     01/25/2021    CO2 23 01/25/2021    BUN 21 01/25/2021    CREATININE 1.3 01/25/2021    GFRAA >60 01/25/2021    LABGLOM 53 01/25/2021    GLUCOSE 78 01/25/2021    GLUCOSE 111 07/16/2011    PROT 5.6 01/25/2021    LABALBU 2.9 01/25/2021    CALCIUM 8.6 01/25/2021    BILITOT 0.3 01/25/2021    ALKPHOS 52 01/25/2021    AST 16 01/25/2021    ALT 8 01/25/2021     BMP:    Lab Results   Component Value Date     01/25/2021    K 4.0 01/25/2021    K 4.3 01/20/2021     01/25/2021    CO2 23 01/25/2021    BUN 21 01/25/2021    LABALBU 2.9 01/25/2021    CREATININE 1.3 01/25/2021    CALCIUM 8.6 01/25/2021    GFRAA >60 01/25/2021    LABGLOM 53 01/25/2021    GLUCOSE 78 01/25/2021    GLUCOSE 111 07/16/2011     Hepatic Function Panel:    Lab Results Component Value Date    ALKPHOS 52 01/25/2021    ALT 8 01/25/2021    AST 16 01/25/2021    PROT 5.6 01/25/2021    BILITOT 0.3 01/25/2021    BILIDIR <0.2 01/25/2021    IBILI see below 01/25/2021    LABALBU 2.9 01/25/2021     Ionized Calcium:  No results found for: IONCA  Magnesium:    Lab Results   Component Value Date    MG 1.7 01/25/2021     Phosphorus:    Lab Results   Component Value Date    PHOS 3.7 01/25/2021     LDH:  No results found for: LDH  Uric Acid:    Lab Results   Component Value Date    LABURIC 3.5 01/21/2021     PT/INR:    Lab Results   Component Value Date    PROTIME 11.7 01/05/2021    PROTIME 11.3 06/21/2011    INR 1.0 01/05/2021     Warfarin PT/INR:  No components found for: PTPATWAR, PTINRWAR  PTT:    Lab Results   Component Value Date    APTT 25.5 01/05/2021   [APTT}  Troponin:    Lab Results   Component Value Date    TROPONINI 0.05 01/19/2021     Last 3 Troponin:    Lab Results   Component Value Date    TROPONINI 0.05 01/19/2021    TROPONINI 0.06 01/19/2021    TROPONINI 0.09 01/19/2021     U/A:    Lab Results   Component Value Date    COLORU Yellow 01/19/2021    PROTEINU TRACE 01/19/2021    PHUR 7.5 01/19/2021    LABCAST RARE 10/11/2017    WBCUA 1-3 01/19/2021    WBCUA NONE 07/12/2011    RBCUA 0-1 01/19/2021    RBCUA 1-3 03/17/2014    BACTERIA NONE SEEN 01/19/2021    CLARITYU Clear 01/19/2021    SPECGRAV 1.010 01/19/2021    LEUKOCYTESUR Negative 01/19/2021    UROBILINOGEN 0.2 01/19/2021    BILIRUBINUR Negative 01/19/2021    BILIRUBINUR NEGATIVE 07/12/2011    BLOODU Negative 01/19/2021    GLUCOSEU Negative 01/19/2021    GLUCOSEU NEGATIVE 07/12/2011     HgBA1c:    Lab Results   Component Value Date    LABA1C 6.5 01/21/2021     FLP:    Lab Results   Component Value Date    TRIG 89 01/21/2021    HDL 30 01/21/2021    LDLCALC 52 01/21/2021    LABVLDL 18 01/21/2021     TSH:    Lab Results   Component Value Date    TSH 1.340 01/21/2021     VITAMIN B12: No components found for: B12  FOLATE: Lab Results   Component Value Date    FOLATE >20.0 01/21/2021        General appearance: Alert, Awake, Oriented times 3, no distress  Skin: Warm and dry ; no rashes  Head: Normocephalic. No masses, lesions or tenderness noted  Eyes: Conjunctivae pale, sclera white. PERRL,EOM-I  Ears: External ears normal  Nose/Sinuses: Nares normal. Septum midline. Mucosa normal. No drainage  Oropharynx: Oropharynx clear with no exudate seen  Neck: Supple. No jugular venous distension, lymphadenopathy or thyromegaly Trachea midline  Lungs: Mostly clear  Heart: S1 S2  Regular rate and rhythm. No rub, murmur or gallop; ventricular pacemaker  Abdomen: Soft, non-tender. BS normal. No masses, organomegaly; no rebound or guarding  Extremities: 1+ edema both lower extremities, left hand edema resolved  Musculoskeletal: Significant weakness bilateral lower extremities left worse than right  Neuro: Cranial nerves II through XII intact    TELEMETRY: REVIEWED--Telemetry: Ventricular pacemaker    ASSESSMENT:   Principal Problem:    HARVEY (acute kidney injury) ([address]  Active Problems:    Essential hypertension    PVD (peripheral vascular disease) with claudication ([address]    Pacemaker    S/P AVR    Type 2 diabetes mellitus with stage 3 chronic kidney disease, with long-term current use of insulin (HCC)    Diabetes mellitus type 2, uncontrolled ([address]    History of CVA (cerebrovascular accident)    Elevated troponin    Acute renal insufficiency    Pulmonary hypertension (HCC)    Moderate tricuspid regurgitation by prior echocardiography    (HFpEF) heart failure with preserved ejection fraction (HCC)    COPD (chronic obstructive pulmonary disease) (HCC)    Diabetic neuropathy associated with type 2 diabetes mellitus ([address]  Resolved Problems:    * No resolved hospital problems.  *      PLAN:  SEE ORDERS      RE  CHANGES AND FINDINGS   Medications reviewed with patient  GI prophylaxis  DVT prophylaxis  Consultants notes reviewed Gabapentin 400 mg 4 times daily  Lantus 20 units subcu twice daily  Sliding-scale insulin  Isosorbide mononitrate 120 mg daily  Morphine extended release 10 mg twice daily  Patient has been accepted to [facility] reconciliation completed  Prescriptions written  Follow-up PCP 1 week after out of [address] [place] [address] with patient and nursing. Ganga Martino DO     1:14 PM     1/25/2021    TIME > 35 MINUTES    >  50 %  OF  TIME  DISCUSSION               ------------  INFORMATION  -----------      DIET:DIET CARB CONTROL;      No Known Allergies      MEDICATION SIDE EFFECTS:none       SCHEDULED MEDS:                                 Scheduled Meds:   ferrous sulfate  325 mg Oral BID WC    DULoxetine  30 mg Oral Daily    furosemide  20 mg Intravenous Once    enoxaparin  40 mg Subcutaneous Daily    allopurinol  300 mg Oral Daily    aspirin EC  81 mg Oral Daily    fluticasone  1 spray Nasal Daily    gabapentin  400 mg Oral 4x Daily    insulin glargine  20 Units Subcutaneous BID    isosorbide mononitrate  120 mg Oral Daily    levothyroxine  25 mcg Oral Daily    metoprolol succinate  50 mg Oral Daily    morphine  10 mg Oral BID    sodium chloride flush  10 mL Intravenous 2 times per day    insulin lispro  0-6 Units Subcutaneous TID WC    insulin lispro  0-3 Units Subcutaneous Nightly    atorvastatin  10 mg Oral Nightly       Continuous Infusions:   dextrose           Data:       Intake/Output Summary (Last 24 hours) at 1/25/2021 1314  Last data filed at 1/25/2021 0646  Gross per 24 hour   Intake 120 ml   Output 500 ml   Net -380 ml       Wt Readings from Last 3 Encounters:   01/25/21 259 lb (117.5 kg)   01/13/21 259 lb (117.5 kg)   01/05/21 259 lb (117.5 kg)       Labs: Additional    GLUCOSE:No results for input(s): POCGLU in the last 72 hours.     BNP:No results found for: BNP    CRP:   Recent Labs     01/23/21  0340   CRP 1.9*       ESR:  Recent Labs     01/23/21  0340 01/24/21 0340 01/25/21  0348   SEDRATE 72* 72* 65*       RADIOLOGY: REVIEWED AVAILABLE REPORT  CT LUMBAR SPINE WO CONTRAST   Final Result   Mild levoscoliosis with tip at L4. At L1-L2 and L2-L3, subtle retrolisthesis. At L3-L4, subtle retrolisthesis, mild canal stenosis and mild bilateral   neural foraminal narrowing. At L4-L5, subtle anterolisthesis, mild canal stenosis and mild bilateral   neural foraminal narrowing. At L5-S1, moderate endplate sclerosis and mild left neural foraminal   narrowing. XR CHEST PORTABLE   Final Result   Similar findings favored to be chronic. PA and lateral views would be useful   for further assessment.                 Caroline Lopez DO   1:14 PM     1/25/2021      Voice recognition software used for dictation

## 2021-01-25 NOTE — PROGRESS NOTES
Occupational Therapy  OT BEDSIDE TREATMENT NOTE      Date:2021  Patient Name: Yogesh Barry  MRN: [id number]  : 1940  Room: [address] [place] [address]     Referring LILLIANA Roque  Evaluating OT: Jan Burnett. Jessica, OTR/L - [id number]     AM-PAC Daily Activity Raw Score: 15      Recommended Adaptive Equipment: Continue to assess.      Diagnosis: Acute renal insufficiency [N28.9]               Patient presented to ED s/p two falls at home.     Pertinent Medical History: CVA, CAD, CHF, CKD, COPD, DM, neuropathy, HTN, arthritis, sleep apnea, PVD, obesity      Precautions: falls,      Home Living: Patient lives alone in a one-floor apartment. Equipment Owned: emergency alert button, wheelchair     Prior Level of Function (PLOF): Patient reported that he was independent with ADLs and most household-based IADLs; patient had assistance with housekeeping tasks and other IADLs, as needed.     Pain Level: Back- Mild  Cognition: Patient alert and oriented      Functional Assessment:    Initial Eval Status  Date: 2021 Treatment Status 21    Short Term Goals   Feeding Setup      Grooming Min A  Mod I  (seated/standing at sink)   UB Dressing Min A  Independent   LB Dressing Max A Pt stated he lifts B LE on edge of bed at home to manage socks Min A - with use of AE, as needed/appropriate   Bathing Max A   Min A - with use of AE/DME, as needed/appropriate   Toileting Mod A Min A  Mod I / Independent   Bed Mobility  Supine-to-Sit: Mod A and increased time Supine to sit: Sup using bed rails with HOB slightly elevated        Functional Transfers Sit-to-Stands: Min A   from EOB and bedside chair. Cues given to maximize safety.  STS: Min A from EOB and commode Independent Functional Mobility Min A for few steps from EOB to bedside chair and then from bedside chair to BSC (with walker); increased time needed. Decreased safety awareness demonstrated with performance of stand-to-sit twice during this session; agitation noted with provision of cues to maximize safety with stand-to-sit (both in bedside chair and BSC). CGA- Min A using ww in room Mod I with functional mobility (with device, as needed/appropriate) in order to maximize independence with ADLs/IADLs and other functional tasks. Balance Sitting: Good-  (at EOB)  Standing: Fair-  (with walker) Sitting: Independent  Standing: CGA  Fair+ dynamic standing balance during completion of ADLs/IADLs and other functional tasks. Activity Tolerance Limited Fair- Patient will demonstrate Good understanding and consistent implementation of energy conservation techniques and work simplification techniques into ADL/IADL routines.             B UE Strength 3+/5 grossly B UE WFL during tx.  Patient will demonstrate 4/5 B UE strength in order to maximize independence with ADLs/IADLs and functional transfers         Comments: Upon arrival pt was supine in bed. At end of session pt was transferred to chair with alarm on, all lines and tubes intact and call light within reach. Treatment: Cues provided for hand placement during transfers; however, pt disregarded safety cues by attempting top pull self to standing position using ww. Cues also provided for use of grab bar to stabilize self during toileting. Pt now requesting to go to SNF due to limited strength.  notified. Education: Safe transfer training, safety during ADLs and positioning techniques to decrease back pain when suipine    · Pt has made good progress towards set goals. Plan of Care: 2-5 days/week for 1-2 weeks PRN  [x]? ?? ADL retraining/adaptive techniques and AE recommendations to increase functional independence within precautions [x]?? ? Energy conservation techniques to improve tolerance for ADLs/IADLs  [x]? ? ? Functional transfer/mobility training/DME recommendations for increased independence, safety and fall prevention  [x]? ? ? Patient/family education to increase safety and functional independence  [x]? ? ? Environmental modifications for safe mobility and completion of ADLs/IADLs  []? ?? Cognitive retraining exercises to improve problem solving skills & safe participation in ADLs/IADLs   []? ? ?Sensory re-education techniques to improve extremity awareness, maintain skin integrity and improve hand function   []? ? ? Visual/Perceptual retraining  to improve body awareness and safety during transfers and ADLs   []? ?? Splinting/positioning needs to maintain joint/skin integrity and prevent contractures   [x]? ? ? Therapeutic activity to improve functional performance during ADLs/IADLs  [x]? ? ? Therapeutic exercise to improve tolerance and functional strength for ADLs/IADLs  [x]? ? ? Balance retraining/tolerance tasks for facilitation of postural control with dynamic challenges during ADLs   [x]? ? ?Neuromuscular re-education: facilitate righting/equilibrium reactions, midline orientation, scapular stability/mobility, normalize muscle tone, and facilitate active functional movement/attention  []? ? ? Delirium prevention/treatment   []? ? Positioning to improve functional independence and prevent skin breakdown   []? ??Other:     Treatment Charges: Mins Units   Ther Ex  12816     Manual Therapy 42683     Thera Activities 98799 10 1   ADL/Home Mgt 15400 7 0   Neuro Re-ed 13713     Group Therapy      Orthotic manage/training  07916     Non-Billable Time       Time In: 9:00  Time Out: 9:17  Total Time: [facility] NELL/L [id number]

## 2021-01-25 NOTE — PROGRESS NOTES
Physical Therapy  Facility/Department: [address] INTERNAL MEDICINE 2  Daily Treatment Note  NAME: Tressa Pena  : 1940  MRN: [id number]    Date of Service: 2021    Patient Diagnosis(es): The primary encounter diagnosis was Acute renal insufficiency. Diagnoses of Fall, initial encounter, Adult failure to thrive, and Elevated troponin were also pertinent to this visit. has a past medical history of (HFpEF) heart failure with preserved ejection fraction ([address] Arthritis, Bilateral carotid artery stenosis, Bilateral carotid bruits, Blood circulation, collateral, CAD (coronary artery disease), Carotid bruit, CHF (congestive heart failure) ([address] Chronic kidney disease, CKD (chronic kidney disease) stage 3, GFR 30-59 ml/min, COPD (chronic obstructive pulmonary disease) ([address] Diabetes mellitus ([address] Diabetic neuropathy associated with type 2 diabetes mellitus ([address] Dyspnea on exertion, History of blood transfusion, Hyperlipidemia, Hypertension, Mild mitral regurgitation, Moderate tricuspid regurgitation by prior echocardiography, Movement disorder, NSTEMI, initial episode of care ([address] Obesity, Pulmonary hypertension ([address] PVD (peripheral vascular disease) with claudication ([address] S/P carotid endarterectomy, Sleep apnea, SOB (shortness of breath), Thyroid disease, Unspecified cerebral artery occlusion with cerebral infarction, and Vision decreased. has a past surgical history that includes Cardiac surgery; vascular surgery; joint replacement; Diagnostic Cardiac Cath Lab Procedure (10/14/2008); Diagnostic Cardiac Cath Lab Procedure (10/21/2010); knee surgery; Cardiac valuve replacement; Coronary artery bypass graft (); Tonsillectomy; Pacemaker insertion (2014); other surgical history (Right, 2017); and BIOPSY / LIGATION TEMPORAL ARTERY (Bilateral, 2021).     Therapist: Matty Underwood PT      Referring provider: Dr. Ana M Kelsey #: [id number] DIAGNOSIS: acute renal insufficiency . 2 falls prior to admission   PRECAUTIONS: falls     Social:  Pt lives alone in a 1 floor plan apartment  no steps and no rails to enter. Prior to admission pt walked with a ww. Has SPC. States he walks short distances . Has w/c       Initial Evaluation  Date: 1/20/2021 Treatment   1/25/21 Short Term/ Long Term   Goals   Was pt agreeable to Eval/treatment? yes  yes     Does pt have pain? Chronic back pain, B LEs   Chronic back pain      Bed Mobility  Rolling : min assist   Sit to supine: mod assist   supine to sit SBA with use of rail Independent    Transfers Sit to stand: min assist   Stand to sit: min assist   Stand pivot: min assist   sit to stand min assist  Stand to sit min assist Independent   Ambulation    3 feet  X 1 with ww min assist   15 feet x2 with ww min assist 100 feet with AAD Independent   AM-PAC Raw Score  15/24  16/24          Treatment/therapeutic exercises    Patient education  Pt was educated on transfer technique    Patient response to education:   Pt verbalized understanding Pt demonstrated skill Pt requires further education in this area   no With assist yes     Additional Comments: Pt unsteady upon first standing. With first attempt at sit to stand pt with loss of balance posterior causing him to sit back to bed. Second attempt pt able to stand at walker. Pt ambulated to bathroom and back to chair. Unsteady with ambulation. Pt was left in chair with call light left by patient. Time in: 0900  Time out: 0917    Total treatment time 17 minutes    Pt is making  progress toward established Physical Therapy goals. Continue with physical therapy current plan of care.     Dawna PT [id number]      CPT codes:  [] Low Complexity PT evaluation 59431  [] Moderate Complexity PT evaluation 04826  [] High Complexity PT evaluation 54968  [] PT Re-evaluation 02000  [] Gait training 70927  minutes  [] Manual therapy 72123    minutes [x] Therapeutic activities 84925 17   minutes  [] Therapeutic exercises 10624     minutes  [] Neuromuscular reeducation 53383     minutes

## 2021-01-25 NOTE — PLAN OF CARE
Problem: Skin Integrity:  Goal: Will show no infection signs and symptoms  1/25/2021 0654 by Izaiah Kelly, RN  Outcome: Met This Shift  Goal: Absence of new skin breakdown  1/25/2021 0654 by Izaiah Kelly RN  Outcome: Met This Shift

## 2021-01-25 NOTE — CARE COORDINATION
Now requesting rehab; prefers [facility]. Referral called to [facility]; transport packet on chart will need HENS. attempted to reach brother ariella caceres per pt request; left VM message. Roxanna Quan. Per bo; they can accept today; no need for covid testing (neg 12/29);notified sw of need for Hens; transport packet on chart. To transport via stretcher [facility] 9pm tonite. facility/pt made aware made aware. Roxanna Quan.

## 2021-01-25 NOTE — CARE COORDINATION
Pt currently on room air; pt preference is home. Prefers [facility]; accepted; orders on chart. Prompted [facility] team remaining plan of care. Consider poss discharge today. Will follow. Macho Christianson.

## 2021-01-25 NOTE — PROGRESS NOTES
[address] [place] [address] [facility]  Progress Note    SUBJECTIVE:  Chief Complaint   Patient presents with    Fall     No complaints. No fever. No diaphoresis. Review of systems:  As stated above in the chief complaint, otherwise negative. Medications:  Scheduled Meds:   ferrous sulfate  325 mg Oral BID WC    DULoxetine  30 mg Oral Daily    furosemide  20 mg Intravenous Once    enoxaparin  40 mg Subcutaneous Daily    allopurinol  300 mg Oral Daily    aspirin EC  81 mg Oral Daily    fluticasone  1 spray Nasal Daily    gabapentin  400 mg Oral 4x Daily    insulin glargine  20 Units Subcutaneous BID    isosorbide mononitrate  120 mg Oral Daily    levothyroxine  25 mcg Oral Daily    metoprolol succinate  50 mg Oral Daily    morphine  10 mg Oral BID    sodium chloride flush  10 mL Intravenous 2 times per day    insulin lispro  0-6 Units Subcutaneous TID     insulin lispro  0-3 Units Subcutaneous Nightly    atorvastatin  10 mg Oral Nightly     Continuous Infusions:   dextrose       PRN Meds:analgesic ointment, phenylephrine, sodium chloride, loperamide, albuterol, HYDROcodone-acetaminophen, sodium chloride flush, acetaminophen **OR** acetaminophen, glucose, dextrose, glucagon (rDNA), dextrose, polyethylene glycol, trimethobenzamide    OBJECTIVE:  /68   Pulse 67   Temp 98.6 °F (37 °C) (Oral)   Resp 20   Ht 5' 8\" (1.727 m)   Wt 259 lb (117.5 kg)   SpO2 94%   BMI 39.38 kg/m²   Temp  Av.2 °F (36.8 °C)  Min: 97.8 °F (36.6 °C)  Max: 98.6 °F (37 °C)  Constitutional: The patient is lying in bed. Awake and alert. No distress. Skin: Warm and dry. No rashes were noted. HEENT: Round and reactive pupils. Moist mucous membranes. No ulcerations or thrush. Neck: Supple to movements. Chest: No respiratory distress. No crackles. Cardiovascular: Heart sounds rhythmic and regular. Abdomen: Positive bowel sounds to auscultation. Benign to palpation. Extremities: Lateral moderate edema. Lines: peripheral    Laboratory and Tests Review:  Lab Results   Component Value Date    WBC 5.7 01/25/2021    WBC 5.0 01/24/2021    WBC 5.6 01/23/2021    HGB 8.1 (L) 01/25/2021    HCT 25.5 (L) 01/25/2021    MCV 89.8 01/25/2021     01/25/2021     Lab Results   Component Value Date    NEUTROABS 3.57 01/25/2021    NEUTROABS 2.76 01/24/2021    NEUTROABS 3.64 01/23/2021     No results found for: [facility]  Lab Results   Component Value Date    ALT 8 01/25/2021    AST 16 01/25/2021    ALKPHOS 52 01/25/2021    BILITOT 0.3 01/25/2021     Lab Results   Component Value Date     01/25/2021    K 4.0 01/25/2021    K 4.3 01/20/2021     01/25/2021    CO2 23 01/25/2021    BUN 21 01/25/2021    CREATININE 1.3 01/25/2021    CREATININE 1.5 01/24/2021    CREATININE 1.2 01/23/2021    GFRAA >60 01/25/2021    LABGLOM 53 01/25/2021    GLUCOSE 78 01/25/2021    GLUCOSE 111 07/16/2011    PROT 5.6 01/25/2021    LABALBU 2.9 01/25/2021    CALCIUM 8.6 01/25/2021    BILITOT 0.3 01/25/2021    ALKPHOS 52 01/25/2021    AST 16 01/25/2021    ALT 8 01/25/2021     Lab Results   Component Value Date    CRP 1.9 (H) 01/23/2021    CRP 2.1 (H) 01/22/2021    CRP 3.6 (H) 01/21/2021     Lab Results   Component Value Date    SEDRATE 65 (H) 01/25/2021    SEDRATE 72 (H) 01/24/2021    SEDRATE 72 (H) 01/23/2021     Radiology:      Microbiology:   Blood cultures 1/19/2021: Staphylococcus hominis in 2 of 2 sets  Blood cultures 1/20/2021: Negative so far  Urine culture 1/19/2021: Mixed GPO    No results for input(s): PROCAL in the last 72 hours. ASSESSMENT:  · Staphylococcus hominis bacteremia. Although the patient has a pacer, this is most likely a contaminant. Repeat blood cultures off antibiotics were negative  · Patient infection unlikely  · Weakness associated to the above  · Acute kidney injury    PLAN:  · No further antibiotic treatment is warranted  · We will sign off.   Call if needed Nicolas Asher  10:57 AM  1/25/2021

## 2021-01-25 NOTE — PLAN OF CARE
Problem: Falls - Risk of:  Goal: Will remain free from falls  1/25/2021 1820 by Arlene Garcia RN  Outcome: Completed  1/25/2021 0654 by Jarod Parker RN  Outcome: Met This Shift  Goal: Absence of physical injury  1/25/2021 1820 by Arlene Garcia RN  Outcome: Completed  1/25/2021 0654 by Jarod Parker RN  Outcome: Met This Shift     Problem: Pain:  Goal: Pain level will decrease  Outcome: Completed  Goal: Control of acute pain  1/25/2021 1820 by Arlene Garcia RN  Outcome: Completed  1/25/2021 0654 by Jarod Parker RN  Outcome: Met This Shift  Goal: Control of chronic pain  1/25/2021 1820 by Arlene Garcia RN  Outcome: Completed  1/25/2021 0654 by Jarod Parker RN  Outcome: Met This Shift     Problem: Skin Integrity:  Goal: Will show no infection signs and symptoms  1/25/2021 1820 by Arlene Garcia RN  Outcome: Completed  1/25/2021 0654 by Jarod Parker RN  Outcome: Met This Shift  Goal: Absence of new skin breakdown  1/25/2021 1820 by Arlene Garcia RN  Outcome: Completed  1/25/2021 0654 by Jarod Parker RN  Outcome: Met This Shift

## 2021-01-25 NOTE — CARE COORDINATION
1/25/2021  Social Work Discharge Planning:Pt to go to [facility]. N-17 generated, hens completed and transport form is in chart. Electronically signed by JOSE Marinelli on 1/25/2021 at 2:51 PM

## 2021-01-27 NOTE — DISCHARGE SUMMARY
DISCHARGE SUMMARY  Patient ID:  Jorge Carranza  [id number]  79 y.o.  1940    Admit date: 1/19/2021      Discharge date : 1/25/2021      Admission Diagnoses: Acute renal insufficiency [N28.9]    Discharge Diagnosis  Principal Problem:    HARVEY (acute kidney injury) ([facility]  Active Problems:    Essential hypertension    PVD (peripheral vascular disease) with claudication (HCC)    Pacemaker    S/P AVR    Type 2 diabetes mellitus with stage 3 chronic kidney disease, with long-term current use of insulin (HCC)    Diabetes mellitus type 2, uncontrolled ([facility]    History of CVA (cerebrovascular accident)    Elevated troponin    Acute renal insufficiency    Pulmonary hypertension (HCC)    Moderate tricuspid regurgitation by prior echocardiography    (HFpEF) heart failure with preserved ejection fraction (HCC)    COPD (chronic obstructive pulmonary disease) ([facility]    Diabetic neuropathy associated with type 2 diabetes mellitus ([facility]  Resolved Problems:    * No resolved hospital problems. *      Hospital Course:  CHIEF COMPLAINT: Weakness of legs     History of Present Illness: 79-year-old male patient of Dr. Quincy Barrera. I am asked to admit and follow. Patient states he felt fine until yesterday when he had one episode of \"legs giving out\" as he was nearing his bed. He fell became lodged between the bed and wall. He pushed his medic alert button, his brother was summoned. Brother was unable to move the patient, EMS had to be called. After patient was able to stand, he declined hospitalization felt fine. Later in the evening, he was in his wheelchair after returning from the bathroom to urinate. While sitting in his wheelchair, his air cushion began to slide and he slid out of the chair onto the floor. He again was unable to stand walk EMS had to be summoned and he was transported to the ED. He states \"my legs simply collapsed\". The first episode he was down no longer than 45 minutes. At L3-L4, subtle retrolisthesis and mild bilateral facet arthropathy. Findings result in mild canal stenosis and mild bilateral neural foraminal   narrowing. At L4-L5: Vacuum phenomenon within the disc space.  Subtle anterolisthesis   and 2 mm disc bulging.  Moderate bilateral set arthropathy and ligamentum   flavum thickening.  Mild canal stenosis and mild bilateral neural foraminal   narrowing. L5-S1: Moderate endplate sclerosis.  Moderate bilateral set arthropathy. Mild left neural foraminal narrowing. SOFT TISSUES/RETROPERITONEUM: No paraspinal mass is seen.  Moderate   degenerative changes of the abdominal aorta and its branches are noted.       Impression:       Mild levoscoliosis with tip at L4. At L1-L2 and L2-L3, subtle retrolisthesis. At L3-L4, subtle retrolisthesis, mild canal stenosis and mild bilateral   neural foraminal narrowing. At L4-L5, subtle anterolisthesis, mild canal stenosis and mild bilateral   neural foraminal narrowing. At L5-S1, moderate endplate sclerosis and mild left neural foraminal   narrowing.         ID consult from today, coagulase-negative staph possible contaminant. Cannot exclude pacemaker infection. Repeat blood cultures x2, 1 dose of vancomycin. Physical therapy AM PAC score from today 15/24.   Neuropathy bilateral lower extremities; endurance decreased.    1/21/2021-patient sitting on side of bed with legs dangling. Still has neuropathic sensations both legs. I discussed his lumbar CT with him. 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 He is noticing increasing edema of his left arm as well as somewhat of his feet. No dyspnea at rest.  No chest pain. Afebrile last 24 hours. 97% saturation on room air. Intake and output +1111 cc. Procalcitonin 0.21. BUN 19 creatinine 1.3.  proBNP 3534. CRP 3.6, had been 5.4. A1c 6.5.  TSH free T4 normal.  Hemoglobin 7.7 WBC 4.4 platelets 385. Hemoglobin had been 8.8 yesterday and 9.8 on admission. TIBC, iron, iron saturation all low. Folate and B12 normal.  Sed rate 70. ID note from today appreciated, still uncertain if coagulase-negative staph bacteremia is true versus contaminant especially in view of pacemaker. 1 more dose vancomycin today. Occupational therapy AMPAC score from today 15/24.     1/22/2021-patient laying in bed; states it is a bad day. No chest pain no dyspnea. Having severe bilateral knee pains, osteoarthritic, he believes because it is snowing outside. He usually uses topical analgesic at home. He states he is not sure if he is getting his allopurinol, chart checked, he is getting it. He states he also uses albuterol as needed at home. It is ordered as needed; states he feels wheezy today. He is having significant postnasal drainage would like something for that also. He also request something extra for his neuropathy. Afebrile last 24 hours. Blood pressure 175/77.  98% saturation on room air. Intake and output +449 cc. Glucose 112. CRP 2.1. Hemoglobin 8.2 WBC 4.9 reticulocyte count increased.   Blood culture has resulted with following--     Susceptibility     Staphylococcus hominis ssp hominis (1)             Antibiotic Interpretation WINNIE Status     clindamycin Resistant >=^4 mcg/mL       doxycycline Sensitive ^1 mcg/mL     erythromycin Resistant >=^8 mcg/mL       gentamicin Sensitive <=^0.5 mcg/mL       oxacillin Resistant >=^4 mcg/mL       trimethoprim-sulfamethoxazole Resistant ^160 mcg/mL       vancomycin Sensitive <=^0.5 mcg/mL          ID note from today appreciated; possible pacer infection. Continue vancomycin once daily, check trough tomorrow. If repeat cultures done on 1/20/2021 are negative after 5 days antibiotic can be discontinued. Social service note from today, patient planning on returning home with home health care rather than subacute rehab. His physical therapy [facility] score from 1/20/2021 was 15/24.     1/23/2021-patient laying quietly in bed; states his knee pain is slightly better since using the topical BenGay. However, today his shoulders hurt elbows hurt left ankle hurts. He used albuterol last night, breathing was much improved. Head congestion better with saline nasal spray etc.  No chest pain. He is planning on home with home health care upon discharge. Afebrile last 24 hours. Blood pressure 166/72.  96% saturation on room air. Blood pressure is high as 180/70 yesterday. Intake and output -531 cc. Glucose 111 albumin 3.4. Hemoglobin 8.4 WBC 5.6. Most recent blood cultures, no growth to date. ID note from today, continue vancomycin once daily; if cultures remain negative tomorrow can be stopped.     1/24/2021-resting quietly in bed; no chest pain no dyspnea. Today, right shoulder seems to be the most painful of the joints. Appetite fair. Ferrlecit drip finishing at this time. Afebrile last 24 hours. 95% saturation on room air. Intake and output -931 cc. BUN 20 creatinine now increased to 1.5. Protein 5.9 albumin 3.2. Hemoglobin 7.9 WBC 5.0. Sed rate 72. ID note from today appreciated; IV vancomycin will be stopped.   Repeat blood cultures remain sterile.    1/25/2021-patient laying quietly in bed; no chest pain no dyspnea. Today he states he feels \"too weak\" to be discharged home with home health care he would rather subacute rehab. He states he spoke with Janette Blanca today and they recommended subacute rehab. Still having significant neuropathic pain. Afebrile last 24 hours. 94% saturation on room air. Intake and output -1011 cc. BUN 21 creatinine 1.3 protein 5.6 albumin 2.9. Hemoglobin 8.1 WBC 5.7. ID note from today, most likely contaminant by Staph hominis; no further treatment warranted will sign off. Physical therapy AMPA score from today 16/24. Hospital orders:       RE  CHANGES AND FINDINGS   Medications reviewed with patient  GI prophylaxis  DVT prophylaxis  Consultants notes reviewed   1 dose vancomycin IV per ID  Repeat blood cultures  Imodium as needed for diarrhea  Allopurinol 3 mg daily  Aspirin 81 mg daily  Enoxaparin 30 mg daily  Lantus 20 units twice daily  Sliding-scale insulin  Gabapentin 400 mg 4 times daily  Morphine extended release 10 mg twice daily by mouth  Toprol-XL 50 mg daily  Synthroid 25 mcg  Iron studies a.m.  TSH free T4 a.m.   Appropriate labs  Repeat stool for occult blood  Reticulocyte count  Await results of final blood cultures  Sliding-scale insulin  Morphine extended release 10 mg twice daily by mouth  Discontinue IV fluids due to the above edema  Monitor labs  Vancomycin IV daily until final cultures available  Still have no stool for occult blood  Sliding-scale insulin  Morphine extended release 10 mg twice daily by mouth  Gabapentin 400 mg 4 times daily  Ferrlecit protocol, IV  Ferrous sulfate 325 mg twice daily  Cymbalta 30 mg daily to help neuropathy pain  BenGay topical  Josr-Synephrine, saline nasal spray for head congestion  Nursing to advise patient he does have albuterol ordered as needed  Furosemide 20 mg IV once today  Vancomycin IV daily until final cultures, possibly tomorrow  Sliding-scale insulin Morphine extended release 10 mg twice daily by mouth  Zyloprim 300 mg daily  Aspirin 81 mg daily  Atorvastatin 10 mg daily  Cymbalta 30 mg daily, neuropathy  Tolerated Ferrlecit  Ferrous sulfate 325 mg by mouth  Gabapentin 400 mg 4 times daily  Ibuprofen 400 mg 1 time today due to arthralgias  IV vancomycin has been discontinued  Cymbalta 30 mg daily, neuropathy  Ferrous sulfate 325 mg by mouth  No further ibuprofen  Morphine extended release 10 mg twice daily  Sliding-scale insulin  Hopefully discharge home tomorrow with home health care    Instructions at discharge:  I discussed patient's preference for subacute rehab instead of home health care with .   [facility], [facility] started  Increase Cymbalta 60 mg daily  Ferrous sulfate 325 mg twice daily  Gabapentin 400 mg 4 times daily  Lantus 20 units subcu twice daily  Sliding-scale insulin  Isosorbide mononitrate 120 mg daily  Morphine extended release 10 mg twice daily  Patient has been accepted to [address] reconciliation completed  Prescriptions written  Follow-up PCP 1 week after out of [facility] [place] [id number]     Discharged to: Skilled nursing facility    Discharge Instructions: Medications reviewed with patient    Consults:ID     Past Medical Hx :   Past Medical History:   Diagnosis Date    (HFpEF) heart failure with preserved ejection fraction ([facility] 07/2020    Diagnosed by cardiology    Arthritis     Bilateral carotid artery stenosis 12/31/2020    Bilateral carotid bruits 12/31/2020    Blood circulation, collateral     CAD (coronary artery disease)     Carotid bruit 03/27/2013    CHF (congestive heart failure) ([facility]     Chronic kidney disease     CKD (chronic kidney disease) stage 3, GFR 30-59 ml/min 03/20/2016    COPD (chronic obstructive pulmonary disease) ([address]     Follows with pulmonary    Diabetes mellitus ([address]  Diabetic neuropathy associated with type 2 diabetes mellitus (HCC)     Dyspnea on exertion     History of blood transfusion     Hyperlipidemia     Hypertension     Mild mitral regurgitation     Moderate tricuspid regurgitation by prior echocardiography 2018    Movement disorder     [facility], [facility] 01/[facility]    Obesity     Pulmonary hypertension ([facility] 2018    PVD (peripheral vascular disease) with claudication ([facility] 03/27/2014    S/P carotid endarterectomy 03/27/2013    Sleep apnea     SOB (shortness of breath)     Thyroid disease     Unspecified cerebral artery occlusion with cerebral infarction     Vision decreased 12/31/2020       Past Surgical Hx :  Past Surgical History:   Procedure Laterality Date    BIOPSY / LIGATION TEMPORAL ARTERY Bilateral 01/05/2021    BILATERAL TEMPORAL ARTERY BIOPSY performed by Yelena Mcdonough MD at [address]      5 years ago   [facility]  [facility]    1 vessel and aortic valve repair    DIAGNOSTIC CARDIAC CATH LAB PROCEDURE  10/14/2008    DIAGNOSTIC CARDIAC CATH LAB PROCEDURE  10/21/2010    JOINT REPLACEMENT      R knee replacement    KNEE SURGERY      OTHER SURGICAL HISTORY Right 02/14/2017    debridement,bone bx foot    PACEMAKER INSERTION  11/12/2014    D-PPM   ()    Dr. Selin ayoubs       Labs:   CBC:   Lab Results   Component Value Date    WBC 5.7 01/25/2021    RBC 2.84 01/25/2021    HGB 8.1 01/25/2021    HCT 25.5 01/25/2021    MCV 89.8 01/25/2021    MCH 28.5 01/25/2021    MCHC 31.8 01/25/2021    RDW 18.8 01/25/2021     01/25/2021    MPV 10.1 01/25/2021     CBC with Differential:    Lab Results   Component Value Date    WBC 5.7 01/25/2021    RBC 2.84 01/25/2021    HGB 8.1 01/25/2021    HCT 25.5 01/25/2021     01/25/2021    MCV 89.8 01/25/2021    MCH 28.5 01/25/2021    MCHC 31.8 01/25/2021 RDW 18.8 01/25/2021    NRBC 0.0 04/22/2018    SEGSPCT 76 03/16/2014    BANDSPCT 1 09/02/2016    LYMPHOPCT 22.6 01/25/2021    MONOPCT 9.3 01/25/2021    BASOPCT 0.5 01/25/2021    MONOSABS 0.53 01/25/2021    LYMPHSABS 1.29 01/25/2021    EOSABS 0.26 01/25/2021    BASOSABS 0.03 01/25/2021     Hemoglobin/Hematocrit:    Lab Results   Component Value Date    HGB 8.1 01/25/2021    HCT 25.5 01/25/2021     CMP:    Lab Results   Component Value Date     01/25/2021    K 4.0 01/25/2021    K 4.3 01/20/2021     01/25/2021    CO2 23 01/25/2021    BUN 21 01/25/2021    CREATININE 1.3 01/25/2021    GFRAA >60 01/25/2021    LABGLOM 53 01/25/2021    GLUCOSE 78 01/25/2021    GLUCOSE 111 07/16/2011    PROT 5.6 01/25/2021    LABALBU 2.9 01/25/2021    CALCIUM 8.6 01/25/2021    BILITOT 0.3 01/25/2021    ALKPHOS 52 01/25/2021    AST 16 01/25/2021    ALT 8 01/25/2021     BMP:    Lab Results   Component Value Date     01/25/2021    K 4.0 01/25/2021    K 4.3 01/20/2021     01/25/2021    CO2 23 01/25/2021    BUN 21 01/25/2021    LABALBU 2.9 01/25/2021    CREATININE 1.3 01/25/2021    CALCIUM 8.6 01/25/2021    GFRAA >60 01/25/2021    LABGLOM 53 01/25/2021    GLUCOSE 78 01/25/2021    GLUCOSE 111 07/16/2011     Hepatic Function Panel:    Lab Results   Component Value Date    ALKPHOS 52 01/25/2021    ALT 8 01/25/2021    AST 16 01/25/2021    PROT 5.6 01/25/2021    BILITOT 0.3 01/25/2021    BILIDIR <0.2 01/25/2021    IBILI see below 01/25/2021    LABALBU 2.9 01/25/2021     Magnesium:    Lab Results   Component Value Date    MG 1.7 01/25/2021     Phosphorus:    Lab Results   Component Value Date    PHOS 3.7 01/25/2021     LDH:  No results found for: LDH  Uric Acid:    Lab Results   Component Value Date    LABURIC 3.5 01/21/2021     PT/INR:    Lab Results   Component Value Date    PROTIME 11.7 01/05/2021    PROTIME 11.3 06/21/2011    INR 1.0 01/05/2021     Warfarin PT/INR:  No components found for: PTPATWAR, PTINRWAR  PTT: GLUCOSE:No results for input(s): POCGLU in the last 72 hours. CMP:  Recent Labs     01/25/21  0348      K 4.0      CO2 23   BUN 21   CREATININE 1.3*   GFRAA >60   LABGLOM 53   GLUCOSE 78   PROT 5.6*   LABALBU 2.9*   CALCIUM 8.6   BILITOT 0.3   ALKPHOS 52   AST 16   ALT 8         BNP:No results found for: BNP    PROTIME/INR:No results for input(s): PROTIME, INR in the last 72 hours. CRP: No results for input(s): CRP in the last 72 hours. ESR:  Recent Labs     01/25/21  0348   SEDRATE 65*       LIPASE , AMYLASE:  Lab Results   Component Value Date    LIPASE 16 04/22/2018      Lab Results   Component Value Date    AMYLASE 79 09/02/2016       ABGs: No results found for: PHART, PO2ART, [id number]    CARDIAC: No results for input(s): CKTOTAL, CKMB, CKMBINDEX, TROPONINI in the last 72 hours.     Lipid Profile:   Lab Results   Component Value Date    TRIG 89 01/21/2021    HDL 30 01/21/2021    LDLCALC 52 01/21/2021    CHOL 100 01/21/2021        Ct Lumbar Spine Wo Contrast    Result Date: 1/19/2021 EXAMINATION: CT OF THE LUMBAR SPINE WITHOUT CONTRAST  1/19/2021 TECHNIQUE: CT of the lumbar spine was performed without the administration of intravenous contrast. Multiplanar reformatted images are provided for review. Dose modulation, iterative reconstruction, and/or weight based adjustment of the mA/kV was utilized to reduce the radiation dose to as low as reasonably achievable. COMPARISON: None HISTORY: ORDERING SYSTEM PROVIDED HISTORY: SPINAL STENOSIS TECHNOLOGIST PROVIDED HISTORY: Reason for exam:->Spinal stenosis leg weakness FINDINGS: BONES/ALIGNMENT: There is normal alignment of the spine. Mild levoscoliosis with tip at L4. The vertebral body heights are maintained. No osseous destructive lesion is seen. DEGENERATIVE CHANGES: No significant degenerative changes of the lumbar spine. At L1-L2: Subtle retrolisthesis. Otherwise unremarkable. At L2-L3: Subtle retrolisthesis. Otherwise unremarkable. At L3-L4, subtle retrolisthesis and mild bilateral facet arthropathy. Findings result in mild canal stenosis and mild bilateral neural foraminal narrowing. At L4-L5: Vacuum phenomenon within the disc space. Subtle anterolisthesis and 2 mm disc bulging. Moderate bilateral set arthropathy and ligamentum flavum thickening. Mild canal stenosis and mild bilateral neural foraminal narrowing. L5-S1: Moderate endplate sclerosis. Moderate bilateral set arthropathy. Mild left neural foraminal narrowing. SOFT TISSUES/RETROPERITONEUM: No paraspinal mass is seen. Moderate degenerative changes of the abdominal aorta and its branches are noted. Mild levoscoliosis with tip at L4. At L1-L2 and L2-L3, subtle retrolisthesis. At L3-L4, subtle retrolisthesis, mild canal stenosis and mild bilateral neural foraminal narrowing. At L4-L5, subtle anterolisthesis, mild canal stenosis and mild bilateral neural foraminal narrowing. At L5-S1, moderate endplate sclerosis and mild left neural foraminal narrowing.     Xr Chest Portable Result Date: 1/19/2021  EXAMINATION: ONE XRAY VIEW OF THE CHEST 1/19/2021 1:45 am COMPARISON: 07/30/2020 HISTORY: ORDERING SYSTEM PROVIDED HISTORY: weakness, febrile TECHNOLOGIST PROVIDED HISTORY: Reason for exam:->weakness, febrile FINDINGS: EKG leads overlie the chest.  Left-sided cardiac pacemaker. Sternotomy wires. Cardiac silhouette is borderline prominent similar to previous. Scattered vascular calcifications. No pneumothorax. Coarsened interstitial opacities are likely chronic. Improved aeration of the left lung base. Some other opacities in the lung bases are otherwise overall similar to previous and favored to be chronic. Similar findings favored to be chronic. PA and lateral views would be useful for further assessment. Discharge Exam:  See progress note from today    Discharge Medication List as of 1/25/2021  6:21 PM      START taking these medications    Details   ferrous sulfate (IRON 325) 325 (65 Fe) MG tablet Take 1 tablet by mouth 2 times daily (with meals), Disp-30 tablet, R-3DC to SNF      sodium chloride (OCEAN, BABY AYR) 0.65 % nasal spray 1 spray by Nasal route as needed for Congestion, R-0DC to SNF      phenylephrine (BRIDGETT-SYNEPHRINE) 1 % nasal spray 1 drop by Nasal route every 6 hours as needed for Congestion, R-1DC to SNF      DULoxetine (CYMBALTA) 60 MG extended release capsule Take 1 capsule by mouth daily, Disp-30 capsule, R-3DC to SNF         CONTINUE these medications which have NOT CHANGED    Details   traMADol (ULTRAM) 50 MG tablet Take by mouth every 6 hours as needed for Pain. Historical Med      Fluticasone-Umeclidin-Vilant (TRELEGY ELLIPTA IN) Inhale 1 puff into the lungs DailyHistorical Med      azelastine (ASTELIN) 0.1 % nasal spray PLACE 1 SPRAY IN EACH NOSTRIL TWICE A DAY AS DIRECTED, Disp-1 Bottle,R-1Normal      aspirin EC 81 MG EC tablet Take 1 tablet by mouth daily, Disp-90 tablet,R-1OTC albuterol (PROVENTIL) (2.5 MG/3ML) 0.083% nebulizer solution Take 3 mLs by nebulization every 6 hours as needed for Wheezing or Shortness of Breath DX: COPD J44.9 Please bill Medicare Part B, Disp-120 mL, R-6Normal      levothyroxine (SYNTHROID) 25 MCG tablet TAKE 1 TABLET BY MOUTH EVERY DAY, R-1Historical Med      gabapentin (NEURONTIN) 400 MG capsule Take 400 mg by mouth 4 times daily. Historical Med      metoprolol succinate (TOPROL XL) 50 MG extended release tablet Take 50 mg by mouth dailyHistorical Med      isosorbide mononitrate (IMDUR) 120 MG extended release tablet Take 1 tablet by mouth daily, Disp-30 tablet, R-0Normal      morphine (THIERRY) 10 MG extended release capsule Take 10 mg by mouth 2 times daily. Enrique Nayak Historical Med      insulin glargine (LANTUS) 100 UNIT/ML injection vial Inject 22 Units into the skin 2 times daily Historical Med      HYDROcodone-acetaminophen (NORCO)  MG per tablet Take 1 tablet by mouth every 6 hours as needed for Pain, Disp-15 tablet, R-0      allopurinol (ZYLOPRIM) 300 MG tablet Take 300 mg by mouth daily       lovastatin (MEVACOR) 20 MG tablet Take 20 mg by mouth nightly. STOP taking these medications       predniSONE (DELTASONE) 20 MG tablet Comments:   Reason for Stopping:         losartan (COZAAR) 50 MG tablet Comments:   Reason for Stopping:         fluticasone (FLONASE) 50 MCG/ACT nasal spray Comments:   Reason for Stopping:               Time Spent on discharge is more than 30 minutes --      TIME  INCLUDES TIME THAT WAS  SPENT WITH DISCHARGE PAPERS, MEDICATION REVIEW, MEDICATION RECONCILIATION,   PRESCRIPTIONS, CHART REVIEW, PATIENT EXAM , FINAL PROGRESS NOTE, DISCUSSION OF FINDINGS WITH PATIENT AND AVAILABLE FAMILY , AND DICTATION  WHERE NEEDED ; Home Health Care [facility]) FORMS COMPLETED ; N-17  COMPLETION ;  H&P UPDATED ; DURABLE EQUIPMENT FORMS.      Active Hospital Problems    Diagnosis    Essential hypertension [I10]     Priority: Medium  Elevated troponin [R77.8]    Acute renal insufficiency [N28.9]    (HFpEF) heart failure with preserved ejection fraction ([facility]) [I50.30]    Diabetic neuropathy associated with type 2 diabetes mellitus [facility] [E11.40]    COPD (chronic obstructive pulmonary disease) ([facility]) [J44.9]    History of CVA (cerebrovascular accident) [Z86.73]    Diabetes mellitus type 2, uncontrolled ([facility] [E11.65]    Moderate tricuspid regurgitation by prior echocardiography [I07.1]    Pulmonary hypertension ([facility] [I27.20]    HARVEY (acute kidney injury) ([facility] [N17.9]    Type 2 diabetes mellitus with stage 3 chronic kidney disease, with long-term current use of insulin ([facility]) [E11.22, N18.30, Z79.4]    S/P AVR [Z95.2]    Pacemaker [Z95.0]    PVD (peripheral vascular disease) with claudication ([facility] [I73.9]       Signed:    Chucho Velásquez DO    1/27/2021    1:49 PM    Voice recognition software use for dictation

## 2021-02-18 PROBLEM — R77.8 ELEVATED TROPONIN: Status: RESOLVED | Noted: 2021-01-01 | Resolved: 2021-01-01

## 2021-02-18 PROBLEM — R79.89 ELEVATED TROPONIN: Status: RESOLVED | Noted: 2021-01-01 | Resolved: 2021-01-01

## 2021-03-10 NOTE — PROGRESS NOTES
Michael [facility]   CHF Clinic       Foster Blanton   1940  [phone]       Referring Doctor Dr. Tiff Hidalgo  Cardiologist:  Pt not sure/ Dr. Tosin Ryan inserted pacemaker  Nephrologist: n/a        History of Present Illness:     Foster Blanton is a 79 y.o. male with a history of HFpEF, most recent EF 39 -50% on 7/30/20. Patient Story:    He does  have dyspnea with exertion, shortness of breath, or decline in overall functional capacity. He does not have orthopnea, PND, nocturnal cough or hemoptysis. He does have abdominal distention or bloating, early satiety, anorexia/change in appetite. He does has a good urinary response to their oral diuretic. He does lower extremity edema. He denies lightheadedness, dizziness. He denies palpitations, syncope or near syncope. He does not complain of chest pain, pressure, discomfort. No Known Allergies      Outpatient Medications Marked as Taking for the 3/10/21 encounter [facility] Encounter) with [facility] CHF ROOM 4   Medication Sig Dispense Refill    bumetanide (BUMEX) 1 MG tablet Take 1 mg by mouth every 48 hours      potassium chloride (KLOR-CON M) 20 MEQ extended release tablet Take 20 mEq by mouth daily             Guideline directed medical:  ARNI/ACE I/ARB: No  Beta blocker:   Yes  Aldosterone antagonist:  No        Physical Examination:     BP (!) 156/70   Pulse 67   Resp 18   Wt 257 lb (116.6 kg)   BMI 39.08 kg/m²                    HEENT  HEENT (WDL): Exceptions to WDL  Right Eye: Impaired vision  Left Eye: Impaired vision    Respiratory  Respiratory Pattern: Regular  Respiratory Depth: Normal  Respiratory Quality/Effort: Dyspnea with exertion  Chest Assessment: Chest expansion symmetrical  L Breath Sounds: Clear, Diminished  R Breath Sounds: Clear, Diminished              Cardiac  Cardiac Rhythm: Ventricular Paced    Rhythm Interpretation  Pulse: 67         Gastrointestinal  Abdominal (WDL): Exceptions to WDL  Abdomen Inspection: Distended, Soft  RUQ Bowel Sounds: Active  LUQ Bowel Sounds: Active  RLQ Bowel Sounds: Active  LLQ Bowel Sounds: Active          Bowel Sounds  RUQ Bowel Sounds: Active  LUQ Bowel Sounds: Active  RLQ Bowel Sounds: Active  LLQ Bowel Sounds: Active    Peripheral Vascular  Peripheral Vascular (WDL): Exceptions to WDL  Edema: Right lower extremity, Left lower extremity  RLE Edema: +2, Pitting  LLE Edema: +3, Pitting    Skin Color/Condition  Skin Color: Appropriate for ethnicity  Skin Condition/Temp: Warm, Swollen                                            Pulse: 67                   LAB DATA:    BNP  No results for input(s): BNP in the last 72 hours. proBNP  No results for input(s): PROBNP in the last 72 hours. BMP  No results for input(s): NA, K, CL, CO2, BUN, CREATININE, GLUCOSE, CALCIUM in the last 72 hours. WEIGHTS:  Wt Readings from Last 3 Encounters:   03/10/21 257 lb (116.6 kg)   01/25/21 259 lb (117.5 kg)   01/13/21 259 lb (117.5 kg)         TELEMETRY:  Cardiac Regularity: Irregular  Cardiac Rhythm/Interpretation: V-Paced          ASSESSMENT:  Elvia Tadeo is hypervolemic with weight gain     Interventions completed this visit:  IV diuretics given yes, Bumex 2mg IV  Lab work obtained yes, BNP/BMP   Reviewed continue current medications that patient as prescribed answered any questions   Educated on signs and symptoms of HF  Educated on low sodium diet    PLAN:  Scheduled to follow up in CHF clinic on 3/24/2021  Given clinic phone number and aware of signs and symptoms to call with any HF change in symptoms.

## 2021-03-24 NOTE — PROGRESS NOTES
[facility]   CHF Clinic       Michelle Cabrera   1940  [phone]        Referring Doctor: Bao Shipman  Cardiologist: n/a  Nephrologist: N/a        History of Present Illness:     Michelle Cabrera is a 79 y.o. male with a history of HFpEF, most recent EF 45-50% on 7/31/2020. Patient Story:    He does not  have dyspnea with exertion, shortness of breath, or decline in overall functional capacity. He does not have orthopnea, PND, nocturnal cough or hemoptysis. He does have abdominal distention or bloating, early satiety, anorexia/change in appetite. He does has a good urinary response to their oral diuretic. He does have  lower extremity edema. He denies lightheadedness, dizziness. He denies palpitations, syncope or near syncope. He does not complain of chest pain, pressure, discomfort. No Known Allergies      No outpatient medications have been marked as taking for the 3/24/21 encounter [facility] Encounter) with [facility] CHF ROOM 4.           Guideline directed medical:  ARNI/ACE I/ARB: No  Beta blocker: Yes  Aldosterone antagonist:  No        Physical Examination:     BP (!) 155/69   Pulse 66   Resp 18   Wt 261 lb (118.4 kg)   BMI 39.68 kg/m²                    HEENT  HEENT (WDL): Exceptions to WDL  Right Eye: Impaired vision  Left Eye: Impaired vision    Respiratory  Respiratory Pattern: Regular  Respiratory Depth: Normal  Respiratory Quality/Effort: Unlabored  Chest Assessment: Chest expansion symmetrical  L Breath Sounds: Clear, Diminished  R Breath Sounds: Clear, Diminished              Cardiac  Cardiac Rhythm: Ventricular Paced    Rhythm Interpretation  Pulse: 66         Gastrointestinal  Abdominal (WDL): Exceptions to WDL  Abdomen Inspection: Distended, Soft  RUQ Bowel Sounds: Active  LUQ Bowel Sounds: Active  RLQ Bowel Sounds: Active  LLQ Bowel Sounds: Active          Bowel Sounds  RUQ Bowel Sounds: Active  LUQ Bowel Sounds: Active  RLQ Bowel Sounds:  Active  LLQ Bowel Sounds: Active    Peripheral Vascular  Peripheral Vascular (WDL): Exceptions to WDL  Edema: Right lower extremity, Left lower extremity  RLE Edema: +2, Pitting  LLE Edema: +3, Pitting    Skin Color/Condition  Skin Color: Appropriate for ethnicity  Skin Condition/Temp: Swollen, Warm                                            Pulse: 66                   LAB DATA:    BNP  No results for input(s): BNP in the last 72 hours. proBNP  No results for input(s): PROBNP in the last 72 hours. BMP  No results for input(s): NA, K, CL, CO2, BUN, CREATININE, GLUCOSE, CALCIUM in the last 72 hours. WEIGHTS:  Wt Readings from Last 3 Encounters:   03/24/21 261 lb (118.4 kg)   03/10/21 257 lb (116.6 kg)   01/25/21 259 lb (117.5 kg)         TELEMETRY:  Cardiac Regularity: Regular  Cardiac Rhythm/Interpretation: Vent paced        ASSESSMENT:  Petros Castaneda is hypervolemic with weight gain     Interventions completed this visit:  IV diuretics given yes, Bumex 2 mg IV  Lab work obtained yes, BNP/BMP   Reviewed continue current medications that patient as prescribed answered any questions   Educated on signs and symptoms of HF  Educated on low sodium diet    PLAN:  Scheduled to follow up in CHF clinic onApril 7, 2021Given clinic phone number and aware of signs and symptoms to call with any HF change in symptoms.

## 2021-04-07 NOTE — PROGRESS NOTES
.    [facility]   CHF Clinic       Josue Vernon   1940  [phone]        Referring Doctor: Bao Shipman  Cardiologist: n/a  Nephrologist: N/a        History of Present Illness:     Josue Vernon is a 79 y.o. male with a history of HFpEF, most recent EF 45-50% on 7/31/2020. Patient Story:    He does not  have dyspnea with exertion, shortness of breath, or decline in overall functional capacity. He does not have orthopnea, PND, nocturnal cough or hemoptysis. He does have abdominal distention or bloating, early satiety, anorexia/change in appetite. He does has a good urinary response to their oral diuretic. He does have  lower extremity edema. He denies lightheadedness, dizziness. He denies palpitations, syncope or near syncope. He does not complain of chest pain, pressure, discomfort. No Known Allergies      Outpatient Medications Marked as Taking for the 4/7/21 encounter [facility] Encounter) with [facility] ROOM 2   Medication Sig Dispense Refill    metOLazone (ZAROXOLYN) 2.5 MG tablet Take 2.5 mg by mouth three times a week      magnesium oxide (MAG-OX) 400 MG tablet Take 200 mg by mouth daily             Guideline directed medical:  ARNI/ACE I/ARB: No  Beta blocker: Yes  Aldosterone antagonist:  No        Physical Examination:     BP (!) 142/63   Pulse 61   Resp 18   Wt 257 lb (116.6 kg)   BMI 39.08 kg/m²                    HEENT  HEENT (WDL): Exceptions to WDL  Right Eye: Impaired vision  Left Eye: Impaired vision    Respiratory  Respiratory Pattern: Regular  Respiratory Depth: Normal  Respiratory Quality/Effort: Unlabored  Chest Assessment: Chest expansion symmetrical  L Breath Sounds: Clear, Diminished  R Breath Sounds: Clear, Diminished              Cardiac  Cardiac Rhythm: Ventricular Paced    Rhythm Interpretation  Pulse: 61         Gastrointestinal  Abdominal (WDL): Exceptions to WDL  Abdomen Inspection: Distended, Soft  RUQ Bowel Sounds:  Active  LUQ Bowel Sounds: Active  RLQ Bowel Sounds: Active  LLQ Bowel Sounds: Active          Bowel Sounds  RUQ Bowel Sounds: Active  LUQ Bowel Sounds: Active  RLQ Bowel Sounds: Active  LLQ Bowel Sounds: Active    Peripheral Vascular  Peripheral Vascular (WDL): Exceptions to WDL  Edema: Right lower extremity, Left lower extremity  RLE Edema: +2, Pitting  LLE Edema: +3, Pitting                                                 Pulse: 61                   LAB DATA:    BNP  No results for input(s): BNP in the last 72 hours. proBNP  No results for input(s): PROBNP in the last 72 hours. BMP  No results for input(s): NA, K, CL, CO2, BUN, CREATININE, GLUCOSE, CALCIUM in the last 72 hours. WEIGHTS:  Wt Readings from Last 3 Encounters:   04/07/21 257 lb (116.6 kg)   03/24/21 261 lb (118.4 kg)   03/10/21 257 lb (116.6 kg)         TELEMETRY:  Cardiac Regularity: Regular  Cardiac Rhythm/Interpretation: Vent paced        ASSESSMENT:  Ayesha Aden is hypervolemic with stable weights    Interventions completed this visit:  IV diuretics given yes, Bumex 2 mg IV  Lab work obtained yes, BNP/BMP   Reviewed continue current medications that patient as prescribed answered any questions   Educated on signs and symptoms of HF  Educated on low sodium diet    PLAN:  Scheduled to follow up in CHF clinic on  April 27, 2021. Given clinic phone number and aware of signs and symptoms to call with any HF change in symptoms.

## 2021-04-27 NOTE — PROGRESS NOTES
noel    [facility]   CHF Clinic       Juan Atkinson   1940  [phone]        Referring Doctor: Bao Shipman  Cardiologist: N/A  Nephrologist: N/A        History of Present Illness:     Juan Atkinson is a 79 y.o. male with a history of HFpEF, most recent EF 45-50% on 7/31/2020. Patient Story:    He does not  have dyspnea with exertion, shortness of breath, or decline in overall functional capacity. He does not have orthopnea, PND, nocturnal cough or hemoptysis. He does have abdominal distention or bloating, early satiety, anorexia/change in appetite. He does has a good urinary response to their oral diuretic. He does have  lower extremity edema. He denies lightheadedness, dizziness. He denies palpitations, syncope or near syncope. He does not complain of chest pain, pressure, discomfort. No Known Allergies      No outpatient medications have been marked as taking for the 4/27/21 encounter [facility] Encounter) with [facility] CHF ROOM 2.           Guideline directed medical:  ARNI/ACE I/ARB: No  Beta blocker: Yes  Aldosterone antagonist:  No        Physical Examination:     /60   Pulse 61   Resp 18   Wt 255 lb (115.7 kg)   BMI 38.77 kg/m²     Assessment  Charting Type: Shift assessment              HEENT  HEENT (WDL): Exceptions to WDL  Right Eye: Impaired vision  Left Eye: Impaired vision    Respiratory  Respiratory Pattern: Regular  Respiratory Depth: Normal  Respiratory Quality/Effort: Unlabored  Chest Assessment: Chest expansion symmetrical  L Breath Sounds: Clear, Diminished  R Breath Sounds: Clear, Diminished              Cardiac  Cardiac Rhythm: Ventricular Paced    Rhythm Interpretation  Pulse: 61         Gastrointestinal  Abdominal (WDL): Exceptions to WDL  Abdomen Inspection: Distended, Soft  RUQ Bowel Sounds: Active  LUQ Bowel Sounds: Active  RLQ Bowel Sounds: Active  LLQ Bowel Sounds: Active          Bowel Sounds  RUQ Bowel Sounds:  Active  LUQ Bowel Sounds: Active  RLQ Bowel Sounds: Active  LLQ Bowel Sounds: Active    Peripheral Vascular  Peripheral Vascular (WDL): Exceptions to WDL  Edema: Right lower extremity, Left lower extremity  RLE Edema: +2, Pitting  LLE Edema: +2, Pitting                                                 Pulse: 61                   LAB DATA:    BNP  No results for input(s): BNP in the last 72 hours. proBNP  No results for input(s): PROBNP in the last 72 hours. BMP  No results for input(s): NA, K, CL, CO2, BUN, CREATININE, GLUCOSE, CALCIUM in the last 72 hours. WEIGHTS:  Wt Readings from Last 3 Encounters:   04/27/21 255 lb (115.7 kg)   04/07/21 257 lb (116.6 kg)   03/24/21 261 lb (118.4 kg)         TELEMETRY:  Cardiac Regularity: Regular  Cardiac Rhythm/Interpretation: Vent paced        ASSESSMENT:  Alicia Geiger is hypervolemic with stable weights    Interventions completed this visit:  IV diuretics given yes, Bumex 2 mg IV  Lab work obtained yes, BNP/BMP   Reviewed continue current medications that patient as prescribed answered any questions   Educated on signs and symptoms of HF  Educated on low sodium diet    PLAN:  Scheduled to follow up in CHF clinic on  May 25. Given clinic phone number and aware of signs and symptoms to call with any HF change in symptoms.

## 2021-05-06 NOTE — PROGRESS NOTES
See [employer] [place] report. Remote monitoring reviewed over a 90 day period.   End of 90 day monitoring period date of service 05/05/2021

## 2021-05-25 NOTE — PROGRESS NOTES
.    [address] [facility]   CHF Clinic       Arlene Montiel   1940  [phone]        Referring Doctor: Bao Shipman  Cardiologist: N/A  Nephrologist: N/A        History of Present Illness:     Arlene Montiel is a 79 y.o. male with a history of HFpEF, most recent EF 45-50% on 7/31/2020. Patient Story:    He does not  have dyspnea with exertion, shortness of breath, or decline in overall functional capacity. He does not have orthopnea, PND, nocturnal cough or hemoptysis. He does have abdominal distention or bloating, early satiety, anorexia/change in appetite. He does has a good urinary response to their oral diuretic. He does have  lower extremity edema. He denies lightheadedness, dizziness. He denies palpitations, syncope or near syncope. He does not complain of chest pain, pressure, discomfort. No Known Allergies      No outpatient medications have been marked as taking for the 5/25/21 encounter [facility] Encounter) with [facility] CHF ROOM 2.           Guideline directed medical:  ARNI/ACE I/ARB: No  Beta blocker: Yes  Aldosterone antagonist:  No        Physical Examination:     /64   Pulse 60   Resp 18   Wt 248 lb (112.5 kg)   BMI 37.71 kg/m²     Assessment  Charting Type: Shift assessment              HEENT  HEENT (WDL): Exceptions to WDL  Right Eye: Impaired vision  Left Eye: Impaired vision    Respiratory  Respiratory Pattern: Regular  Respiratory Depth: Normal  Respiratory Quality/Effort: Unlabored  Chest Assessment: Chest expansion symmetrical  L Breath Sounds: Clear, Diminished  R Breath Sounds: Clear, Diminished              Cardiac  Cardiac Rhythm: Ventricular Paced    Rhythm Interpretation  Pulse: 60         Gastrointestinal  Abdominal (WDL): Exceptions to WDL  Abdomen Inspection: Distended, Soft  RUQ Bowel Sounds: Active  LUQ Bowel Sounds: Active  RLQ Bowel Sounds: Active  LLQ Bowel Sounds: Active          Bowel Sounds  RUQ Bowel Sounds:  Active  LUQ Bowel Sounds: Active  RLQ Bowel Sounds: Active  LLQ Bowel Sounds: Active    Peripheral Vascular  Peripheral Vascular (WDL): Exceptions to WDL  Edema: Right lower extremity, Left lower extremity  RLE Edema: Pitting, +1  LLE Edema: +2, Pitting    Skin Color/Condition  Skin Color: Appropriate for ethnicity  Skin Condition/Temp:  (wounds BLE)                                            Pulse: 60                   LAB DATA:    BNP  No results for input(s): BNP in the last 72 hours. proBNP  No results for input(s): PROBNP in the last 72 hours. BMP  No results for input(s): NA, K, CL, CO2, BUN, CREATININE, GLUCOSE, CALCIUM in the last 72 hours. WEIGHTS:  Wt Readings from Last 3 Encounters:   05/25/21 248 lb (112.5 kg)   04/27/21 255 lb (115.7 kg)   04/07/21 257 lb (116.6 kg)         TELEMETRY:  Cardiac Regularity: Regular  Cardiac Rhythm/Interpretation: Vent paced        ASSESSMENT:  Teo Kendrick is euvolemic with weight loss. Interventions completed this visit:  IV diuretics given no  Lab work obtained yes, BNP/BMP   Reviewed continue current medications that patient as prescribed answered any questions   Educated on signs and symptoms of HF  Educated on low sodium diet    PLAN:  Scheduled to follow up in CHF clinic on June 23, 2021Given clinic phone number and aware of signs and symptoms to call with any HF change in symptoms.

## 2021-06-22 NOTE — PROGRESS NOTES
Home going instructions reviewed. Questions reviewed.  DANIEL Savage RN Detail Level: Detailed Quality 130: Documentation Of Current Medications In The Medical Record: Current Medications Documented

## 2021-06-23 NOTE — PROGRESS NOTES
.    [address] [facility]   CHF Clinic       Darinel Kwon   1940  [phone]        Referring Doctor: Bao Shipman  Cardiologist: N/A  Nephrologist: N/A        History of Present Illness:     Darinel Kwon is a 79 y.o. male with a history of HFpEF, most recent EF 45-50% on 7/31/2020. Patient Story:    He does not  have dyspnea with exertion, shortness of breath, or decline in overall functional capacity. He does not have orthopnea, PND, nocturnal cough or hemoptysis. He does have abdominal distention or bloating, early satiety, anorexia/change in appetite. He does has a good urinary response to their oral diuretic. He does have  lower extremity edema. He denies lightheadedness, dizziness. He denies palpitations, syncope or near syncope. He does not complain of chest pain, pressure, discomfort. No Known Allergies      No outpatient medications have been marked as taking for the 6/23/21 encounter [facility] Encounter) with [facility] CHF ROOM 2.           Guideline directed medical:  ARNI/ACE I/ARB: No  Beta blocker: Yes  Aldosterone antagonist:  No        Physical Examination:     BP (!) 152/67   Pulse 76   Resp 18   Wt 253 lb (114.8 kg)   BMI 38.47 kg/m²     Assessment  Charting Type: Shift assessment              HEENT  HEENT (WDL): Exceptions to WDL  Right Eye: Impaired vision  Left Eye: Impaired vision    Respiratory  Respiratory Pattern: Regular  Respiratory Depth: Normal  Respiratory Quality/Effort: Unlabored  Chest Assessment: Chest expansion symmetrical  L Breath Sounds: Clear, Diminished  R Breath Sounds: Clear, Diminished              Cardiac  Cardiac Rhythm: Ventricular Paced    Rhythm Interpretation  Pulse: 76         Gastrointestinal  Abdominal (WDL): Exceptions to WDL  Abdomen Inspection: Distended, Soft  RUQ Bowel Sounds: Active  LUQ Bowel Sounds: Active  RLQ Bowel Sounds: Active  LLQ Bowel Sounds: Active          Bowel Sounds  RUQ Bowel Sounds:  Active  LUQ Bowel Sounds: Active  RLQ Bowel Sounds: Active  LLQ Bowel Sounds: Active    Peripheral Vascular  Peripheral Vascular (WDL): Exceptions to WDL  Edema: Right lower extremity, Left lower extremity  RLE Edema: Pitting, +1  LLE Edema: Pitting, +1    Skin Color/Condition  Skin Color: Appropriate for ethnicity                                            Pulse: 76                   LAB DATA:    BNP  No results for input(s): BNP in the last 72 hours. proBNP  No results for input(s): PROBNP in the last 72 hours. BMP  No results for input(s): NA, K, CL, CO2, BUN, CREATININE, GLUCOSE, CALCIUM in the last 72 hours. WEIGHTS:  Wt Readings from Last 3 Encounters:   06/23/21 253 lb (114.8 kg)   05/25/21 248 lb (112.5 kg)   04/27/21 255 lb (115.7 kg)         TELEMETRY:  Cardiac Regularity: Regular  Cardiac Rhythm/Interpretation: Vent paced        ASSESSMENT:  Rhetta Faster is hypervolemic with WEIGHT GAIN,   Interventions completed this visit:  IV diuretics given yes, Bumex 2 mg IV  Lab work obtained yes, BNP/BMP   Reviewed continue current medications that patient as prescribed answered any questions   Educated on signs and symptoms of HF  Educated on low sodium diet    PLAN:  Scheduled to follow up in CHF clinic on  jULY 20, 2021  Given clinic phone number and aware of signs and symptoms to call with any HF change in symptoms.

## 2021-07-14 PROBLEM — R07.9 CHEST PAIN: Status: RESOLVED | Noted: 2020-07-31 | Resolved: 2021-01-01

## 2021-07-14 PROBLEM — N28.9 ACUTE RENAL INSUFFICIENCY: Status: RESOLVED | Noted: 2021-01-01 | Resolved: 2021-01-01

## 2021-07-14 NOTE — PROGRESS NOTES
Chief Complaint:   Chief Complaint   Patient presents with    Circulatory Problem     Follow up carotid stenosis.          HPI: Patient came to the office in a wheelchair, tells me currently he is residing in a nursing home facility, can walk short distances slowly, denies any symptoms of rest pain    Patient recently had some swelling of the leg, had some of superficial skin blisters and ulceration, undergoing compression wrapping, by the wound care doctor at the facility who comes and sees him once a week, recently underwent arterial Doppler study and was told that he has peripheral vascular disease, tells me the skin ulcers have almost healed completely    Patient denies any focal lateralizing neurological symptoms like loss of speech, vision or loss of function of extremity    Patient can walk short distances slowly, and denies any symptoms of rest pain    No Known Allergies    Current Outpatient Medications   Medication Sig Dispense Refill    lactulose (CEPHULAC) 20 g packet Take 20 g by mouth 3 times daily      metOLazone (ZAROXOLYN) 2.5 MG tablet Take 2.5 mg by mouth three times a week      magnesium oxide (MAG-OX) 400 MG tablet Take 200 mg by mouth daily      bumetanide (BUMEX) 1 MG tablet Take 1 mg by mouth daily TAKE bUMEX 1MG EXTRA AS NEEDED FOR WEIGHT GAIN, EDEMA      potassium chloride (KLOR-CON M) 20 MEQ extended release tablet Take 40 mEq by mouth daily       ferrous sulfate (IRON 325) 325 (65 Fe) MG tablet Take 1 tablet by mouth 2 times daily (with meals) 30 tablet 3    sodium chloride (OCEAN, BABY AYR) 0.65 % nasal spray 1 spray by Nasal route as needed for Congestion  0    DULoxetine (CYMBALTA) 60 MG extended release capsule Take 1 capsule by mouth daily 30 capsule 3    Fluticasone-Umeclidin-Vilant (TRELEGY ELLIPTA IN) Inhale 1 puff into the lungs Daily      azelastine (ASTELIN) 0.1 % nasal spray PLACE 1 SPRAY IN EACH NOSTRIL TWICE A DAY AS DIRECTED (Patient taking differently: 1 spray by Nasal route daily as needed ) 1 Bottle 1    albuterol (PROVENTIL) (2.5 MG/3ML) 0.083% nebulizer solution Take 3 mLs by nebulization every 6 hours as needed for Wheezing or Shortness of Breath DX: COPD J44.9 Please bill Medicare Part B 120 mL 6    levothyroxine (SYNTHROID) 25 MCG tablet TAKE 1 TABLET BY MOUTH EVERY DAY  1    gabapentin (NEURONTIN) 400 MG capsule Take 400 mg by mouth 4 times daily.  metoprolol succinate (TOPROL XL) 50 MG extended release tablet Take 50 mg by mouth daily      isosorbide mononitrate (IMDUR) 120 MG extended release tablet Take 1 tablet by mouth daily 30 tablet 0    morphine (THIERRY) 10 MG extended release capsule Take 10 mg by mouth 2 times daily. Orly Jim insulin glargine (LANTUS) 100 UNIT/ML injection vial Inject 22 Units into the skin 2 times daily       HYDROcodone-acetaminophen (NORCO)  MG per tablet Take 1 tablet by mouth every 6 hours as needed for Pain 15 tablet 0    allopurinol (ZYLOPRIM) 300 MG tablet Take 300 mg by mouth daily       lovastatin (MEVACOR) 20 MG tablet Take 20 mg by mouth nightly.  aspirin EC 81 MG EC tablet Take 1 tablet by mouth daily 90 tablet 1     No current facility-administered medications for this visit.        Past Medical History:   Diagnosis Date    (HFpEF) heart failure with preserved ejection fraction ([facility] 07/2020    Diagnosed by cardiology    Arthritis     Bilateral carotid artery stenosis 12/31/2020    Bilateral carotid bruits 12/31/2020    Blood circulation, collateral     CAD (coronary artery disease)     Carotid bruit 03/27/2013    CHF (congestive heart failure) [facility]     Chronic kidney disease     CKD (chronic kidney disease) stage 3, GFR 30-59 ml/min ([facility]) 03/20/2016    COPD (chronic obstructive pulmonary disease) ([facility]     Follows with pulmonary    Diabetes mellitus ([facility]     Diabetic neuropathy associated with type 2 diabetes mellitus ([facility])     Dyspnea on exertion     History of blood transfusion     Hyperlipidemia     Hypertension     Mild mitral regurgitation     Moderate tricuspid regurgitation by prior echocardiography 2018    Movement disorder     [facility] of [facility] 01/2018    Obesity     Pulmonary hypertension ([facility] 2018    PVD (peripheral vascular disease) with claudication ([facility] 03/27/2014    S/P carotid endarterectomy 03/27/2013    Sleep apnea     SOB (shortness of breath)     Thyroid disease     Unspecified cerebral artery occlusion with cerebral infarction     Vision decreased 12/31/2020       Past Surgical History:   Procedure Laterality Date    BIOPSY / LIGATION TEMPORAL ARTERY Bilateral 01/05/2021    BILATERAL TEMPORAL ARTERY BIOPSY performed by Liban Miranda MD at [address]      5 years ago   [address] GRAFT  2008    1 vessel and aortic valve repair    DIAGNOSTIC CARDIAC CATH LAB PROCEDURE  10/14/2008    DIAGNOSTIC CARDIAC CATH LAB PROCEDURE  10/21/2010    JOINT REPLACEMENT      R knee replacement    KNEE SURGERY      OTHER SURGICAL HISTORY Right 02/14/2017    debridement,bone bx foot    PACEMAKER INSERTION  11/12/2014    D-PPM   ()    Dr. Em johnson       Family History   Problem Relation Age of Onset    Heart Disease Mother     Heart Failure Mother     Heart Surgery Father     Heart Disease Father     Heart Failure Father     High Blood Pressure Sister     Cancer Sister        Social History     Socioeconomic History    Marital status:      Spouse name: Not on file    Number of children: Not on file    Years of education: Not on file    Highest education level: Not on file   Occupational History    Occupation: retired- [employer]   Tobacco Use    Smoking status: Former Smoker     Packs/day: 2.00     Years: 25.00     Pack years: 50.00     Types: Cigarettes     Start date: 11/1/1953     Quit date: 7/9/1981     Years since quittin.0    Smokeless tobacco: Never Used   Vaping Use    Vaping Use: Never used   Substance and Sexual Activity    Alcohol use: Never     Comment:      Drug use: Not Currently     Comment: no longer eating edibles. states he had a bad episode.  Sexual activity: Never   Other Topics Concern    Not on file   Social History Narrative    1 cup coffee daily     Social Determinants of Health     Financial Resource Strain:     Difficulty of Paying Living Expenses:    Food Insecurity:     Worried About Running Out of Food in the Last Year:     Ran Out of Food in the Last Year:    Transportation Needs:     Lack of Transportation (Medical):  Lack of Transportation (Non-Medical):    Physical Activity:     Days of Exercise per Week:     Minutes of Exercise per Session:    Stress:     Feeling of Stress :    Social Connections:     Frequency of Communication with Friends and Family:     Frequency of Social Gatherings with Friends and Family:     Attends Mandaen Services:     Active Member of Clubs or Organizations:     Attends Club or Organization Meetings:     Marital Status:    Intimate Partner Violence:     Fear of Current or Ex-Partner:     Emotionally Abused:     Physically Abused:     Sexually Abused:        Review of Systems:    Eyes:  No blurring, diplopia or vision loss. Respiratory:  No cough, pleuritic chest pain, dyspnea, or wheezing. Chronic obstructive lung disease  Cardiovascular: No angina, palpitations . Coronary artery disease, hypertension, hyperlipidemia  Musculoskeletal:  No arthritis or weakness. Neurologic:  No paralysis, paresis, paresthesia, seizures or headache. Endocrinology: Hypothyroidism, diabetes          Physical Exam:  General appearance:  Alert, awake, oriented x 3. No distress. Eyes:  Conjunctivae appear normal; PERRL  Neck:  No jugular venous distention, lymphadenopathy or thyromegaly. Carotid bruit noted  Lungs:  Clear to ausculation bilaterally. No rhonchi, crackles, wheezes  Heart:  Regular rate and rhythm. No rub or murmur  Abdomen:  Soft, non-tender. No masses, organomegaly. Musculoskeletal : No joint effusions, tenderness swelling    Neuro: Speech is intact. Moving all extremities. No focal motor or sensory deficits      Extremities:  Both feet are warm to touch. The color of both feet is normal.    Patient does have mild edema both legs, does have compression wrapping    Pulses Right  Left    Brachial 3 3    Radial    3=normal   Femoral 2 2  2=diminished   Popliteal    1=barely palpable   Dorsalis pedis    0=absent   Posterior tibial    4=aneurysmal             Other pertinent information:1. The past medical records were reviewed. 2 with ankle-brachial index that was done a few months ago was reviewed, 0.52 on the right and 0.60 on the left    Assessment:    1. Bilateral carotid artery stenosis    2. Bilateral carotid bruits    3. PVD (peripheral vascular disease) with claudication ([facility]              Plan:       Discussed the patient regarding all options, risks benefits and alternatives, patient recommended carotid ultrasound and call me. If any neurological symptoms, explained    Patient also was informed that he does have peripheral vascular disease but call me immediately if patient has recurrent ulcerations or any worsening of the symptoms involving the legs              Patient was instructed to continue walking program and to call if any worsening of symptoms and to call if any focal lateralizing neurological symptoms like loss of speech, vision or loss of function of extremity. All the questions were answered. Orders Placed This Encounter   Procedures    US CAROTID ARTERY BILATERAL             Indicated follow-up: Return in about 6 months (around 1/14/2022), or if symptoms worsen or fail to improve.

## 2021-07-15 NOTE — TELEPHONE ENCOUNTER
Notified Brea at [facility] of carotid ultrasound at [facility] on 7-27-21 at 9:30 am.  [place] at 9:00 am.

## 2021-07-20 NOTE — PROGRESS NOTES
Sounds: Active  RLQ Bowel Sounds: Active  LLQ Bowel Sounds: Active    Peripheral Vascular  Peripheral Vascular (WDL): Exceptions to WDL  Edema: Right lower extremity, Left lower extremity  RLE Edema: Pitting, +1  LLE Edema: Pitting, +1                                                 Pulse: 74                   LAB DATA:    BNP  No results for input(s): BNP in the last 72 hours. proBNP  No results for input(s): PROBNP in the last 72 hours. BMP  No results for input(s): NA, K, CL, CO2, BUN, CREATININE, GLUCOSE, CALCIUM in the last 72 hours. WEIGHTS:  Wt Readings from Last 3 Encounters:   07/20/21 251 lb (113.9 kg)   06/23/21 253 lb (114.8 kg)   05/25/21 248 lb (112.5 kg)         TELEMETRY:  Cardiac Regularity: Regular  Cardiac Rhythm/Interpretation: Vent paced        ASSESSMENT:  Namita Branham is hypervolemic with slight weight loss   Interventions completed this visit:  IV diuretics given: No  Lab work obtained yes, BNP/BMP   Reviewed continue current medications that patient as prescribed answered any questions   Educated on signs and symptoms of HF  Educated on low sodium diet    PLAN:  Scheduled to follow up in CHF clinic on  Sept 21,2021  Given clinic phone number and aware of signs and symptoms to call with any HF change in symptoms.

## 2021-08-19 PROBLEM — I50.9 CONGESTIVE HEART FAILURE (HCC): Status: ACTIVE | Noted: 2021-01-01

## 2021-08-19 NOTE — ED PROVIDER NOTES
78 yo male presenting for increased falls. He states according to facility he has fallen 5 times since last night. He said twice was from slipping. He did not hit his head when he fell but states \"they hit my head on the wheel and said it was minor. \" He denies falling any other times, he eased himself out of bed onto mat as he was trying to locate remote. He state he has not been able to hold anything in his hands for a few months and that he drops things. Per nursing note, patient has been falling asleep midsentence and not acting appropriately yesterday during the day. Patient denies any fever, chills, chest pain, shortness of breath, dizziness, and lightheadedness. Review of Systems   Constitutional: Negative for chills and fever. HENT: Negative for congestion and sore throat. Eyes: Negative for photophobia and visual disturbance. Respiratory: Negative for cough and shortness of breath. Cardiovascular: Negative for chest pain. Gastrointestinal: Negative for abdominal pain and nausea. Genitourinary: Negative for dysuria and flank pain. Musculoskeletal: Negative for back pain and myalgias. Skin: Negative for rash and wound. Neurological: Negative for dizziness, light-headedness and headaches. Physical Exam  Constitutional:       General: He is not in acute distress. Appearance: Normal appearance. He is obese. He is not ill-appearing. HENT:      Head: Normocephalic and atraumatic. Right Ear: External ear normal.      Left Ear: External ear normal.      Nose: Nose normal.   Eyes:      Extraocular Movements: Extraocular movements intact. Conjunctiva/sclera: Conjunctivae normal.      Pupils: Pupils are equal, round, and reactive to light. Cardiovascular:      Rate and Rhythm: Normal rate and regular rhythm. Heart sounds: Murmur heard. Pulmonary:      Effort: Pulmonary effort is normal.      Breath sounds: Normal breath sounds.    Abdominal: General: There is no distension. Palpations: Abdomen is soft. Tenderness: There is no abdominal tenderness. Comments: Ecchymoses R lower quadrant (patient attributes to insulin injections)   Musculoskeletal:         General: No deformity. Cervical back: Normal range of motion and neck supple. No tenderness. Comments: Bilateral lower extremity edema with chronic overlying skin wounds bilaterally   Skin:     General: Skin is warm and dry. Neurological:      General: No focal deficit present. Mental Status: He is alert. Cranial Nerves: No cranial nerve deficit. Comments: AAOx4, answering questions appropriately   Psychiatric:         Mood and Affect: Mood normal.         Behavior: Behavior normal.          Procedures     MDM  Number of Diagnoses or Management Options  Acute kidney injury superimposed on CKD ([facility] [address]  Acute on chronic congestive heart failure, unspecified heart failure type [facility])  Diagnosis management comments: 80-year-old male sent from nursing facility for concern of frequent falls and AMS. Work-up remarkable for elevated BNP 3571, from previous of approximately 1000. Pleural effusions on chest x-ray. Troponin elevated, however patient not having any chest pain or shortness of breath, and no ischemic changes on EKG, ACS less likely; suspected elevation is due to HARVEY on CKD as well as fluid overload. Do not suspect pneumonia at this time as patient is afebrile and WBC is WNL. Creatinine 2.5, up from previous baseline of 1.8. Patient was given dose of Bumex in the ED. Due to HARVEY on CKD and CHF exacerbation, decision was made to admit patient for further care. Patient was admitted to telemetry. Amount and/or Complexity of Data Reviewed  Clinical lab tests: reviewed                  ED Course as of Aug 19 2310   Thu Aug 19, 2021   2100 Spoke to [place] with [facility]; she is agreeable to admission.     [AP]      ED Course User Index  [AP] Lyssa Rodriguez MD --------------------------------------------- PAST HISTORY ---------------------------------------------  Past Medical History:  has a past medical history of (HFpEF) heart failure with preserved ejection fraction ([facility] Arthritis, Bilateral carotid artery stenosis, Bilateral carotid bruits, Blood circulation, collateral, CAD (coronary artery disease), Carotid bruit, CHF (congestive heart failure) ([facility] Chronic kidney disease, CKD (chronic kidney disease) stage 3, GFR 30-59 ml/min ([facility]), COPD (chronic obstructive pulmonary disease) ([facility] Diabetes mellitus ([facility] Diabetic neuropathy associated with type 2 diabetes mellitus ([facility] Dyspnea on exertion, History of blood transfusion, Hyperlipidemia, Hypertension, Mild mitral regurgitation, Moderate tricuspid regurgitation by prior echocardiography, Movement disorder, NSTEMI, initial episode of care ([facility] Obesity, Pulmonary hypertension ([facility] PVD (peripheral vascular disease) with claudication ([facility] S/P carotid endarterectomy, Sleep apnea, SOB (shortness of breath), Thyroid disease, Unspecified cerebral artery occlusion with cerebral infarction, and Vision decreased. Past Surgical History:  has a past surgical history that includes Cardiac surgery; vascular surgery; joint replacement; Diagnostic Cardiac Cath Lab Procedure (10/14/2008); Diagnostic Cardiac Cath Lab Procedure (10/21/2010); knee surgery; Cardiac valuve replacement; Coronary artery bypass graft (2008); Tonsillectomy; Pacemaker insertion (11/12/2014); other surgical history (Right, 02/14/2017); and BIOPSY / LIGATION TEMPORAL ARTERY (Bilateral, 01/05/2021). Social History:  reports that he quit smoking about 40 years ago. His smoking use included cigarettes. He started smoking about 67 years ago. He has a 50.00 pack-year smoking history. He has never used smokeless tobacco. He reports previous drug use. He reports that he does not drink alcohol.     Family History: family history includes Cancer in his sister; Heart Disease in his father and mother; Heart Failure in his father and mother; Heart Surgery in his father; High Blood Pressure in his sister. The patients home medications have been reviewed. Allergies: Patient has no known allergies.     -------------------------------------------------- RESULTS -------------------------------------------------    LABS:  Results for orders placed or performed during the hospital encounter of 08/19/21   CBC Auto Differential   Result Value Ref Range    WBC 8.5 4.5 - 11.5 E9/L    RBC 3.13 (L) 3.80 - 5.80 E12/L    Hemoglobin 9.9 (L) 12.5 - 16.5 g/dL    Hematocrit 30.2 (L) 37.0 - 54.0 %    MCV 96.5 80.0 - 99.9 fL    MCH 31.6 26.0 - 35.0 pg    MCHC 32.8 32.0 - 34.5 %    RDW 16.0 (H) 11.5 - 15.0 fL    Platelets 292 088 - 878 E9/L    MPV 9.7 7.0 - 12.0 fL    Neutrophils % 72.8 43.0 - 80.0 %    Immature Granulocytes % 0.4 0.0 - 5.0 %    Lymphocytes % 13.7 (L) 20.0 - 42.0 %    Monocytes % 8.2 2.0 - 12.0 %    Eosinophils % 4.5 0.0 - 6.0 %    Basophils % 0.4 0.0 - 2.0 %    Neutrophils Absolute 6.20 1.80 - 7.30 E9/L    Immature Granulocytes # 0.03 E9/L    Lymphocytes Absolute 1.17 (L) 1.50 - 4.00 E9/L    Monocytes Absolute 0.70 0.10 - 0.95 E9/L    Eosinophils Absolute 0.38 0.05 - 0.50 E9/L    Basophils Absolute 0.03 0.00 - 0.20 E9/L   Comprehensive Metabolic Panel w/ Reflex to MG   Result Value Ref Range    Sodium 135 132 - 146 mmol/L    Potassium reflex Magnesium 4.5 3.5 - 5.0 mmol/L    Chloride 98 98 - 107 mmol/L    CO2 26 22 - 29 mmol/L    Anion Gap 11 7 - 16 mmol/L    Glucose 123 (H) 74 - 99 mg/dL    BUN 60 (H) 6 - 23 mg/dL    CREATININE 2.5 (H) 0.7 - 1.2 mg/dL    GFR Non-African American 25 >=60 mL/min/1.73    GFR African American 30     Calcium 9.0 8.6 - 10.2 mg/dL    Total Protein 6.2 (L) 6.4 - 8.3 g/dL    Albumin 3.0 (L) 3.5 - 5.2 g/dL    Total Bilirubin 0.3 0.0 - 1.2 mg/dL    Alkaline Phosphatase 69 40 - 129 U/L    ALT 11 0 - 40 U/L    AST 19 0 - 39 U/L Troponin   Result Value Ref Range    Troponin, High Sensitivity 114 (H) 0 - 11 ng/L   Brain Natriuretic Peptide   Result Value Ref Range    Pro-BNP 3,571 (H) 0 - 450 pg/mL   Urinalysis, reflex to microscopic   Result Value Ref Range    Color, UA Yellow Straw/Yellow    Clarity, UA Clear Clear    Glucose, Ur Negative Negative mg/dL    Bilirubin Urine Negative Negative    Ketones, Urine Negative Negative mg/dL    Specific Gravity, UA 1.015 1.005 - 1.030    Blood, Urine TRACE-INTACT Negative    pH, UA 5.5 5.0 - 9.0    Protein, UA TRACE Negative mg/dL    Urobilinogen, Urine 0.2 <2.0 E.U./dL    Nitrite, Urine Negative Negative    Leukocyte Esterase, Urine TRACE (A) Negative   Lactic Acid, Plasma   Result Value Ref Range    Lactic Acid 1.0 0.5 - 2.2 mmol/L   Ammonia   Result Value Ref Range    Ammonia 14.0 (L) 16.0 - 60.0 umol/L   Serum Drug Screen   Result Value Ref Range    Ethanol Lvl <10 mg/dL    Acetaminophen Level <5.0 (L) 10.0 - 70.2 mcg/mL    Salicylate, Serum <6.6 0.0 - 30.0 mg/dL    TCA Scrn NEGATIVE Cutoff:300 ng/mL   URINE DRUG SCREEN   Result Value Ref Range    Amphetamine Screen, Urine NOT DETECTED Negative <1000 ng/mL    Barbiturate Screen, Ur NOT DETECTED Negative < 200 ng/mL    Benzodiazepine Screen, Urine NOT DETECTED Negative < 200 ng/mL    Cannabinoid Scrn, Ur NOT DETECTED Negative < 50ng/mL    Cocaine Metabolite Screen, Urine NOT DETECTED Negative < 300 ng/mL    Opiate Scrn, Ur POSITIVE (A) Negative < 300ng/mL    PCP Screen, Urine NOT DETECTED Negative < 25 ng/mL    Methadone Screen, Urine NOT DETECTED Negative <300 ng/mL    Oxycodone Urine NOT DETECTED Negative <100 ng/mL    FENTANYL SCREEN, URINE NOT DETECTED Negative <1 ng/mL    Drug Screen Comment: see below    Lipase   Result Value Ref Range    Lipase 14 13 - 60 U/L   TSH WITHOUT REFLEX   Result Value Ref Range    TSH 4.270 (H) 0.270 - 4.200 uIU/mL   T4   Result Value Ref Range    T4, Total 6.0 4.5 - 11.7 mcg/dL   Troponin   Result Value Ref Range    Troponin, High Sensitivity 84 (H) 0 - 11 ng/L   Microscopic Urinalysis   Result Value Ref Range    WBC, UA 5-10 (A) 0 - 5 /HPF    RBC, UA 2-5 0 - 2 /HPF    Bacteria, UA RARE (A) None Seen /HPF   EKG 12 Lead   Result Value Ref Range    Ventricular Rate 72 BPM    Atrial Rate 77 BPM    QRS Duration 182 ms    Q-T Interval 490 ms    QTc Calculation (Bazett) 536 ms    R Axis -127 degrees    T Axis 72 degrees       RADIOLOGY:  CT Head WO Contrast   Final Result   CT head without contrast:      1. No skull fracture or acute intracranial abnormality. CT cervical spine without contrast:      1. No fracture or joint dislocation is seen. 2. Degenerative changes, as described. CT Cervical Spine WO Contrast   Final Result   CT head without contrast:      1. No skull fracture or acute intracranial abnormality. CT cervical spine without contrast:      1. No fracture or joint dislocation is seen. 2. Degenerative changes, as described. XR CHEST PORTABLE   Final Result   1. Limited due to patient rotation. 2. Interstitial and hazy opacities bilaterally suggestive of pulmonary   vascular congestion or pneumonia with left basilar atelectasis or pleural   effusion. EKG:  This EKG is signed and interpreted by me. Rate: 72  Rhythm: ventricular paced  Interpretation: QTc 536; no acute ischemic changes   Comparison: stable as compared to patient's most recent EKG      ------------------------- NURSING NOTES AND VITALS REVIEWED ---------------------------  Date / Time Roomed:  8/19/2021  3:47 PM  ED Bed Assignment:  12/12    The nursing notes within the ED encounter and vital signs as below have been reviewed.      Patient Vitals for the past 24 hrs:   BP Temp Temp src Pulse Resp SpO2 Height Weight   08/19/21 2145 134/78 97.9 °F (36.6 °C) -- 80 20 98 % -- --   08/19/21 2045 (!) 146/83 98.4 °F (36.9 °C) -- 70 18 98 % -- --   08/19/21 1830 (!) 182/73 98.4 °F (36.9 °C) -- 75 20 98 % -- -- 08/19/21 1620 (!) 128/58 97.9 °F (36.6 °C) Oral 65 20 97 % 5' 8\" (1.727 m) 250 lb (113.4 kg)       Oxygen Saturation Interpretation: Normal    ------------------------------------------ PROGRESS NOTES ------------------------------------------    Counseling:  I have spoken with the patient and discussed todays results, in addition to providing specific details for the plan of care and counseling regarding the diagnosis and prognosis. Their questions are answered at this time and they are agreeable with the plan of admission.    --------------------------------- ADDITIONAL PROVIDER NOTES ---------------------------------  This patient's ED course included: a personal history and physicial examination, re-evaluation prior to disposition, multiple bedside re-evaluations, IV medications, cardiac monitoring and continuous pulse oximetry    This patient has remained hemodynamically stable during their ED course. Diagnosis:  1. Acute on chronic congestive heart failure, unspecified heart failure type ([address]    2. Acute kidney injury superimposed on CKD ([facility] [address]        Disposition:  Patient's disposition: Admit to telemetry  Patient's condition is stable.          Donny Farris MD  Resident  08/19/21 8028

## 2021-08-19 NOTE — ED NOTES
Bed: 12  Expected date:   Expected time:   Means of arrival:   Comments:  EMS     Cinthia Alcantar RN  08/19/21 [id number]

## 2021-08-20 NOTE — PLAN OF CARE
Problem: Falls - Risk of:  Goal: Will remain free from falls  Description: Will remain free from falls  Outcome: Met This Shift     Problem: Pain:  Goal: Pain level will decrease  Description: Pain level will decrease  Outcome: Ongoing  Goal: Control of acute pain  Description: Control of acute pain  Outcome: Ongoing     Problem: Skin Integrity:  Goal: Absence of new skin breakdown  Description: Absence of new skin breakdown  Outcome: Ongoing

## 2021-08-20 NOTE — CONSULTS
Inpatient Cardiology Consultation      Reason for Consult: CHF    Consulting Physician: Dr. Lisette Gonzalez    Requesting Physician:  Dr. Matilde Hernandez    Date of Consultation: 8/20/2021      HISTORY OF PRESENT ILLNESS:     Mr. Anthony Euceda is a 79year old male who is known to [facility] and was most recently seen per Dr. Elidia Giles. He has a PMHx of CAD s/p CABG, bioprosthetic AVR, chronic HFrEF (EF 40-45%), PAFL, PPM in situ, CKD, morbid obesity     He presented to [facility] ED on 8/19/2021 from facility for mechanical falls and reported AMS. The patient does admit that he had fallen out of his wheelchair when bending over to pick something off the floor. He denies hitting his head or LOC. He denies any worsening shortness of breath, orthopnea, PND, chest pain, pressure, heaviness, palpitations, abdominal bloating, early satiety. He does have chronic lower extremity edema with chronic wounds that he reports have improved. Evaluation in the ED with no acute radiologic findings. However CXR with pleural effusions and elevated pro-bnp 3571 with HARVEY. He was treated with IV Bumex and admitted to the hospital for further evaluation and treatment. He is currently lying in bed in no apparent acute distress with his head elevated. He continues to deny any chest pain, pressure, heaviness, worsening shortness of breath or has any current cardiac complaints. Please note: past medical records were reviewed per electronic medical record (EMR) - see detailed reports under Past Medical/ Surgical History.    Past Medical History:    Past Medical History:   Diagnosis Date    (HFpEF) heart failure with preserved ejection fraction ([address] 07/2020    Diagnosed by cardiology    Arthritis     Bilateral carotid artery stenosis 12/31/2020    Bilateral carotid bruits 12/31/2020    Blood circulation, collateral     CAD (coronary artery disease)     Carotid bruit 03/27/2013    CHF (congestive heart failure) (HCC)     Chronic kidney disease secondary to hyperdynamic ventricle.    15. SCOOTER, [facility], 3/2017, EF 60-65%, mild mitral stenosis, 1-2+ MR.  Normal bioprosthetic AVR with preserved leaflet motion with mean transgastric gradient of 28 mmHg.  Overall findings support normal AVR with no prosthetic aortic stenosis.    16. Echocardiogram, 08/2017, EF 60%. Stage 1 diastolic dysfunction, severely dilated left atrium, S/P AVR (peak velocity 2.8, mean gradient 13.7).    17. Sinus node dysfunction, S/P dual chamber pacemaker [facility]), 11/2014.    18. Carotid stenosis, 60% HILARIO and 70% left ICA stenosis.    19. Obstructive sleep apnea, noncompliant with CPAP therapy.   20. 2/2017 diabetic foot ulcer  21. [place]  22. Bilteral TKA's  23. Spinal stenosis s/p injections  24. Hospitalized, 10/2017, with acute on chronic diastolic heart failure.  Pro BNP 2840. 25. Admitted, 1/19/2018, with acute on chronic diastolic heart failure.  H&H 11.3/33.2, creatinine 1.3, troponin peaked at .14, pro BNP 1657.  Diuresed and discharged.    26. Lexiscan MPS, 1/21/2018, fixed lateral defect with no ischemia.  EF 36%.    27. Echocardiogram, 1/21/2018, Dr. Que Espinosa, EF 60%. Hypokinesis of the basal inferior wall, reduced RV function, moderate to severe left atrial enlargement, mild MR, RVSP 43 mmHg, bioprosthetic AVR with a mean gradient of 12 mmHg.    28. CKD, stage 3.  Baseline creatinine between 1.3 and 1.5.    29. Admission 05/12/2018 with chest pain and shortness of breath.  EKG showed no acute changes.  Troponin elevation not consistent with NSTEMI (0.05, 0.06, 0.08).  Thought secondary to COPD exacerbation.  No cardiac workup performed. 30. Admission 11/1/2019-11/5/2019 for fall from wheelchair. Dehydrated with HARVEY. Demadex stopped  31. 5/2020 PAFL noted on PPM interrogation placed on Xarelto 20 mg QD  32. TTE (7/31/2020) Summary: Severe left ventricle hypertrophy. Visually estimated LVEF is 45-50 %. Paradoxical septal wall motion. Severely dilated left atrium.  Right once Inject 1 ml IM once a day every month   Yes Historical Provider, MD   lactulose (CEPHULAC) 20 g packet Take 20 g by mouth 3 times daily    Historical Provider, MD   metOLazone (ZAROXOLYN) 2.5 MG tablet Take 2.5 mg by mouth three times a week Q Tues, Thur, Sat    Historical Provider, MD   magnesium oxide (MAG-OX) 400 MG tablet Take 200 mg by mouth daily    Historical Provider, MD   bumetanide (BUMEX) 1 MG tablet Take 1 mg by mouth daily TAKE bUMEX 1MG EXTRA AS NEEDED FOR WEIGHT GAIN, EDEMA    Historical Provider, MD   potassium chloride (KLOR-CON M) 20 MEQ extended release tablet Take 40 mEq by mouth daily     Historical Provider, MD   ferrous sulfate (IRON 325) 325 (65 Fe) MG tablet Take 1 tablet by mouth 2 times daily (with meals) 1/25/21   Sly Martino DO   sodium chloride (OCEAN, BABY AYR) 0.65 % nasal spray 1 spray by Nasal route as needed for Congestion 1/25/21   Sly Martino DO   DULoxetine (CYMBALTA) 60 MG extended release capsule Take 1 capsule by mouth daily 1/26/21   Sly Martino DO   Fluticasone-Umeclidin-Vilant (TRELEGY ELLIPTA IN) Inhale 1 puff into the lungs Daily    Historical Provider, MD   aspirin EC 81 MG EC tablet Take 1 tablet by mouth daily 8/7/20   Darek Lynch MD   albuterol (PROVENTIL) (2.5 MG/3ML) 0.083% nebulizer solution Take 3 mLs by nebulization every 6 hours as needed for Wheezing or Shortness of Breath DX: COPD J44.9 Please bill Medicare Part B 1/6/20   Carline Calero MD   levothyroxine (SYNTHROID) 25 MCG tablet TAKE 1 TABLET BY MOUTH EVERY DAY 7/29/19   Historical Provider, MD   gabapentin (NEURONTIN) 400 MG capsule Take 400 mg by mouth 4 times daily.   5/19/19   Historical Provider, MD   metoprolol succinate (TOPROL XL) 50 MG extended release tablet Take 50 mg by mouth daily    Historical Provider, MD   isosorbide mononitrate (IMDUR) 120 MG extended release tablet Take 1 tablet by mouth daily 1/23/18   Fatemeh Ann MD   morphine (THIERRY) 10 MG extended release capsule Take 10 mg by mouth 2 times daily. Maria E Darden Historical Provider, MD   insulin glargine (LANTUS) 100 UNIT/ML injection vial Inject 18 Units into the skin 2 times daily     Historical Provider, MD   HYDROcodone-acetaminophen (NORCO)  MG per tablet Take 1 tablet by mouth every 6 hours as needed for Pain 9/6/16   Tanesha Nuñez MD   allopurinol (ZYLOPRIM) 300 MG tablet Take 300 mg by mouth daily  2/27/15   Historical Provider, MD   lovastatin (MEVACOR) 20 MG tablet Take 20 mg by mouth nightly.  11/11/13   Historical Provider, MD       Current Medications:    Current Facility-Administered Medications: albuterol (PROVENTIL) nebulizer solution 2.5 mg, 2.5 mg, Nebulization, Q6H PRN  allopurinol (ZYLOPRIM) tablet 300 mg, 300 mg, Oral, Daily  aspirin EC tablet 81 mg, 81 mg, Oral, Daily  DULoxetine (CYMBALTA) extended release capsule 60 mg, 60 mg, Oral, Daily  ferrous sulfate (IRON 325) tablet 325 mg, 325 mg, Oral, BID WC  gabapentin (NEURONTIN) capsule 400 mg, 400 mg, Oral, 4x Daily  HYDROcodone-acetaminophen (NORCO)  MG per tablet 1 tablet, 1 tablet, Oral, Q6H PRN  insulin glargine (LANTUS) injection vial 22 Units, 22 Units, Subcutaneous, BID  isosorbide mononitrate (IMDUR) extended release tablet 120 mg, 120 mg, Oral, Daily  lactulose (CHRONULAC) 10 GM/15ML solution 20 g, 20 g, Oral, TID  levothyroxine (SYNTHROID) tablet 25 mcg, 25 mcg, Oral, Daily  atorvastatin (LIPITOR) tablet 10 mg, 10 mg, Oral, Daily  magnesium oxide (MAG-OX) tablet 200 mg, 200 mg, Oral, Daily  metOLazone (ZAROXOLYN) tablet 2.5 mg, 2.5 mg, Oral, Once per day on Mon Wed Fri  metoprolol succinate (TOPROL XL) extended release tablet 50 mg, 50 mg, Oral, Daily  morphine (THIERRY) extended release capsule 10 mg, 10 mg, Oral, BID  potassium chloride (KLOR-CON M) extended release tablet 40 mEq, 40 mEq, Oral, Daily  sodium chloride (OCEAN, BABY AYR) 0.65 % nasal spray 1 spray, 1 spray, Nasal, PRN  sodium chloride flush 0.9 % injection 5-40 mL, 5-40 mL, Intravenous, 2 times per day  sodium chloride flush 0.9 % injection 10 mL, 10 mL, Intravenous, PRN  0.9 % sodium chloride infusion, 25 mL, Intravenous, PRN  ondansetron (ZOFRAN-ODT) disintegrating tablet 4 mg, 4 mg, Oral, Q8H PRN **OR** ondansetron (ZOFRAN) injection 4 mg, 4 mg, Intravenous, Q6H PRN  magnesium hydroxide (MILK OF MAGNESIA) 400 MG/5ML suspension 30 mL, 30 mL, Oral, Daily PRN  acetaminophen (TYLENOL) tablet 650 mg, 650 mg, Oral, Q6H PRN **OR** acetaminophen (TYLENOL) suppository 650 mg, 650 mg, Rectal, Q6H PRN  heparin (porcine) injection 5,000 Units, 5,000 Units, Subcutaneous, 3 times per day  bumetanide (BUMEX) injection 1 mg, 1 mg, Intravenous, Daily  budesonide (PULMICORT) nebulizer suspension 250 mcg, 250 mcg, Nebulization, BID  ipratropium (ATROVENT) 0.02 % nebulizer solution 0.5 mg, 0.5 mg, Nebulization, 4x daily  Arformoterol Tartrate (BROVANA) nebulizer solution 15 mcg, 15 mcg, Nebulization, BID    Allergies:  Patient has no known allergies. Social History: Former smoker  Denies EtOH or illicit drug abuse  Social History     Socioeconomic History    Marital status:      Spouse name: Not on file    Number of children: Not on file    Years of education: Not on file    Highest education level: Not on file   Occupational History    Occupation: retired- [employer]   Tobacco Use    Smoking status: Former Smoker     Packs/day: 2.00     Years: 25.00     Pack years: 50.00     Types: Cigarettes     Start date: 1953     Quit date: 1981     Years since quittin.1    Smokeless tobacco: Never Used   Vaping Use    Vaping Use: Never used   Substance and Sexual Activity    Alcohol use: Never     Comment:      Drug use: Not Currently     Comment: no longer eating edibles. states he had a bad episode.      Sexual activity: Never   Other Topics Concern    Not on file   Social History Narrative    1 cup coffee daily     Social Determinants of Health     Financial Resource Strain:  Difficulty of Paying Living Expenses:    Food Insecurity:     Worried About Running Out of Food in the Last Year:     Ran Out of Food in the Last Year:    Transportation Needs:     Lack of Transportation (Medical):  Lack of Transportation (Non-Medical):    Physical Activity:     Days of Exercise per Week:     Minutes of Exercise per Session:    Stress:     Feeling of Stress :    Social Connections:     Frequency of Communication with Friends and Family:     Frequency of Social Gatherings with Friends and Family:     Attends Church Services:     Active Member of Clubs or Organizations:     Attends Club or Organization Meetings:     Marital Status:    Intimate Partner Violence:     Fear of Current or Ex-Partner:     Emotionally Abused:     Physically Abused:     Sexually Abused:        Family History: Limited in nature given advanced age  Family History   Problem Relation Age of Onset    Heart Disease Mother     Heart Failure Mother     Heart Surgery Father     Heart Disease Father     Heart Failure Father     High Blood Pressure Sister     Cancer Sister        REVIEW OF SYSTEMS:     · Constitutional: Denies fatigue, fevers, chills or night sweats  · Eyes: Denies visual changes or drainage  · ENT: Denies headaches or hearing loss. No mouth sores or sore throat. No epistaxis   · Cardiovascular: Denies chest pain, pressure or palpitations. No lower extremity swelling. · Respiratory: Denies RAMOS, cough, orthopnea or PND. No hemoptysis   · Gastrointestinal: Denies hematemesis or anorexia. No hematochezia or melena    · Genitourinary: Denies urgency, dysuria or hematuria. · Musculoskeletal: Denies gait disturbance, weakness or joint complaints  · Integumentary: Denies rash, hives or pruritis   · Neurological: Denies dizziness, headaches or seizures. No numbness or tingling  · Psychiatric: Denies anxiety or depression. · Endocrine: Denies temperature intolerance. No recent weight change. · Hematologic/Lymphatic: Denies abnormal bruising or bleeding. No swollen lymph nodes    PHYSICAL EXAM:   /77   Pulse 86   Temp 98.7 °F (37.1 °C) (Oral)   Resp 18   Ht 5' 8\" (1.727 m)   Wt 250 lb (113.4 kg)   SpO2 98%   BMI 38.01 kg/m²   CONST:  Well developed, well nourished  male who appears of stated age. Awake, alert and cooperative. No apparent distress. HEENT:   Head- Normocephalic, atraumatic   Eyes- Conjunctivae pink, anicteric  Throat- Oral mucosa pink and moist  Neck-  No stridor, trachea midline, no jugular venous distention. No carotid bruit. CHEST: Chest symmetrical and non-tender to palpation. No accessory muscle use or intercostal retractions  RESPIRATORY: Lung sounds -diminished bases otherwise clear throughout fields   CARDIOVASCULAR:     Heart Inspection- shows no noted pulsations  Heart Palpation- no heaves or thrills; PMI is non-displaced   Heart Ausculation- Regular rate and rhythm, no murmur. No s3, s4 or rub   PV: 1-2+ bialteral lower extremity edema. No varicosities. Pedal pulses palpable, no clubbing or cyanosis   ABDOMEN: Obese, soft, non-tender to light palpation. Bowel sounds present. No palpable masses no organomegaly; no abdominal bruit  MS: Good muscle strength and tone. No atrophy or abnormal movements. : Deferred  SKIN: Warm and dry no statis dermatitis. + ulcers bilateral lower extremities with dry guaze in place. NEURO / PSYCH: Oriented to person, place and time. Speech clear and appropriate. Follows all commands. Pleasant affect     DATA:    ECG / Tele strips: please see HPI   Diagnostic:    CXR (8/19/2021)  Impression  1. Limited due to patient rotation. 2. Interstitial and hazy opacities bilaterally suggestive of pulmonary vascular congestion or pneumonia with left basilar atelectasis or pleural effusion. CT Head/cervical spine (8/19/2021)  Impression  1.  No skull fracture or acute intracranial abnormality.  CT cervical spine without contrast:  1.  No fracture or joint dislocation is seen. 2. Degenerative changes, as described. Intake/Output Summary (Last 24 hours) at 8/20/2021 0812  Last data filed at 8/20/2021 0631  Gross per 24 hour   Intake --   Output 1500 ml   Net -1500 ml       Labs:   CBC:   Recent Labs     08/19/21  1624   WBC 8.5   HGB 9.9*   HCT 30.2*        BMP:   Recent Labs     08/19/21  1624      K 4.5   CO2 26   BUN 60*   CREATININE 2.5*   LABGLOM 25   CALCIUM 9.0     TFT:   Lab Results   Component Value Date    TSH 4.270 (H) 08/19/2021    E3VSAUK 6.0 08/19/2021    T4FREE 1.24 01/21/2021      HgA1c:   Lab Results   Component Value Date    LABA1C 6.5 (H) 01/21/2021     proBNP:   Recent Labs     08/19/21  1624   PROBNP 3,571*     CARDIAC ENZYMES:  Recent Labs     08/19/21  1624 08/19/21  1837   TROPHS 114* 84*     FASTING LIPID PANEL:  Lab Results   Component Value Date    CHOL 100 01/21/2021    HDL 30 01/21/2021    LDLCALC 52 01/21/2021    TRIG 89 01/21/2021     LIVER PROFILE:  Recent Labs     08/19/21  1624   AST 19   ALT 11   LABALBU 3.0*       [id number] Score for Atrial Fibrillation Stroke Risk   Risk   Factors  Component Value   C CHF Yes 1   H HTN Yes 1   A2 Age >= 76 Yes,  (76 y.o.) 2   D DM Yes 1   S2 Prior Stroke/TIA Yes 2   V Vascular Disease No 0   A Age 74-69 No,  (76 y.o.) 0   Sc Sex male 0    [id number]  Score  7   Score last updated 8/20/21 12:17 PM EDT        ASSESSMENT:  1. Acute on chronic HFrEF (EF 45%)  2. ACC stage C / NYHA class III  3. CAD s/p CABG  4. S/p bioprosthetic AVR (2008)  5. PPM in situ   6. Since 2018, his need for ventricular pacing has increased slowly from 25% to 95% on the last interrogation. Also, his echocardiogram showed worsening ejection fraction from 60% to 45% with dyssynchrony between the lateral wall and the septal wall on the echocardiogram. He was referred to EP however cancelled appointment   7.  PAFL - no OAC due to severe Epistaxis in past (10/2020)  8. HARVEY on top of CKD  9. HTN  10. T2DM with neuropathy  11. Chronic lower extremity wounds   12. COPD  15. Hypothyroidism  14. Hx of vasovagal syncope   15. Hx of CVA  16. Spinal stenosis on chronic narcotics  17. Morbid obesity   18. Depression        PLAN:  1. Add hydralazine 10 mg TID  2. Continue rest of current GDMT  3. Patient with elevated chadsvasc - it appears [address] for PAFL was discontinue in 10/2020 when patient was admitted for acute GIB secondary to epistaxis. He was supposed to follow up with ENT to be cleared for [address]. Would recommend resuming Eliquis if ok per ENT. 4. Patient cancelled EP appointment in 9/2020 recommend to reschedule   5. Follow up OP with cardiology (Dr. Han Lyon)  6. Continue to follow with CHF clinic as OP    Above discussed with Dr. Al Herman     Electronically signed by MADDI Armas CNP on 8/20/2021 at 8:12 AM       Addendum:  Cody Masters MD    Reason for consult: CHF    Patient previously known to: Dr. Izaiah Starkey, seen by Dr Yon Ovalle ( will f/u with Dr Han Lyon)    History of Present illness: 79 yr old with PMHx of CAD s/p CABG, bioprosthetic AVR, chronic HFrEF (EF 40-45%), PAFL, PPM in situ, CKD, morbid obesity     He presented to [facility] ED on 8/19/2021 from facility for mechanical falls and reported AMS. The patient does admit that he had fallen out of his wheelchair when bending over to pick something off the floor. He denies hitting his head or LOC. He denies any worsening shortness of breath, orthopnea, PND, chest pain, pressure, heaviness, palpitations, abdominal bloating, early satiety. He does have chronic lower extremity edema with chronic wounds that he reports have improved. Evaluation in the ED with no acute radiologic findings. However CXR with pleural effusions and elevated pro-bnp 3571 with HARVEY. He was treated with IV Bumex and admitted to the hospital for further evaluation and treatment.   He is currently lying in bed in no apparent acute distress with his head elevated. He continues to deny any chest pain, pressure, heaviness, worsening shortness of breath or has any current cardiac complaints. Please note: past medical records were reviewed per electronic medical record (EMR) - see detailed reports under Past Medical/ Surgical History. Review of systems:  Review of 10 systems negative except as mentioned in the HPI    Medical History: Reviewed    Surgical history: Reviewed    FamilyHistory: Reviewed    Allergies:  Reviewed    Social Hx: Reviewed. Medications: reviewed. Labs/imaging studies: Reviewed. Above LAURY exam, assessment reviewed and reflect my work. I personally saw, examined, and evaluated the patient today. I personally reviewed the medications, rhythm strips, pertinent labs and test reports. I directly participated in the medical-decision making, ordering pertinent tests and medication adjustment. Physical exam:     Vitals:    08/20/21 1036   BP:    Pulse:    Resp: 16   Temp:    SpO2: 92%       In general, this is a well developed, well nourished who appears stated age. awake, alert, cooperative, no apparent distress    HEENT: eyes -conjunctivae pink,   Neck-  no stridor, no carotid bruit. no jugular venous distention   RESPIRATORY: Chest symmetrical and non-tender to palpation. No accessory muscles use. Lung auscultation - few rhonchi  CARDIOVASCULAR:     Heart Palpation - no palpable thrills  Heart Ausculation - Regular rate and rhythm, 2/6 systolic murmur, No s3 or rub.   ++lower extremity edema, mild erythema, Distal pulses palpable  ABDOMEN: Soft, nontender,  Bowel sounds present. MS: n/a.   : Deferred  Rectal Exam: Deferred  SKIN: warm and dry  NEURO / PSYCH: oriented to person, place        Impression/    Acute on chronic HFrEF (EF 45%) - Hold diuretics due to HARVEY, Monitor wts, daily I/Os, BP, renal Fn    Elevated Troponin - Likely from CKD, CHF, No CP    CAD s/p CABG    S/p bioprosthetic AVR (2008)    S/p Pacemaker - Since 2018, his need for ventricular pacing has increased slowly from 25% to 95% on the last interrogation. Also, his echocardiogram showed worsening ejection fraction from 60% to 45% with dyssynchrony between the lateral wall and the septal wall on the echocardiogram. He was referred to EP however cancelled appointment     Paroxysmal  AFib - Not on [address] due to severe Epistaxis in past (10/2020)    HARVEY on top of CKD    HTN    T2DM with neuropathy    Chronic lower extremity wounds     Hx of CVA        PLAN:  7. Add hydralazine 10 mg TID  8. Continue rest of current GDMT  9. Patient with elevated chadsvasc - it appears [address] for PAFL was discontinue in 10/2020 when patient was admitted for acute GIB secondary to epistaxis. He was supposed to follow up with ENT to be cleared for [address]. Would recommend resuming Eliquis if ok per ENT. 10. Patient cancelled EP appointment in 9/2020 recommend to reschedule   11. Follow up OP with cardiology (Dr. Anjelica Garg)        Above d/w him and all questions answered. Thank you for the consult.         Troy Gonzalez MD  8/20/2021  [facility]) Cardiology

## 2021-08-20 NOTE — PROGRESS NOTES
Physical Therapy    Facility/Department: [facility] MED SURG  Initial Assessment    NAME: Esteban Peñaloza  : 1940  MRN: [id number]    Date of Service: 2021      Patient Diagnosis(es): The primary encounter diagnosis was Acute on chronic congestive heart failure, unspecified heart failure type ([facility] A diagnosis of Acute kidney injury superimposed on CKD [facility]) was also pertinent to this visit. has a past medical history of (HFpEF) heart failure with preserved ejection fraction ([address] Arthritis, Bilateral carotid artery stenosis, Bilateral carotid bruits, Blood circulation, collateral, CAD (coronary artery disease), Carotid bruit, CHF (congestive heart failure) ([address] Chronic kidney disease, CKD (chronic kidney disease) stage 3, GFR 30-59 ml/min ([facility]), COPD (chronic obstructive pulmonary disease) ([address] Diabetes mellitus ([address] Diabetic neuropathy associated with type 2 diabetes mellitus ([address] Dyspnea on exertion, History of blood transfusion, Hyperlipidemia, Hypertension, Mild mitral regurgitation, Moderate tricuspid regurgitation by prior echocardiography, Movement disorder, NSTEMI, initial episode of care ([address] Obesity, Pulmonary hypertension ([address] PVD (peripheral vascular disease) with claudication ([address] S/P carotid endarterectomy, Sleep apnea, SOB (shortness of breath), Thyroid disease, Unspecified cerebral artery occlusion with cerebral infarction, and Vision decreased. has a past surgical history that includes Cardiac surgery; vascular surgery; joint replacement; Diagnostic Cardiac Cath Lab Procedure (10/14/2008); Diagnostic Cardiac Cath Lab Procedure (10/21/2010); knee surgery; Cardiac valuve replacement; Coronary artery bypass graft (); Tonsillectomy; Pacemaker insertion (2014); other surgical history (Right, 2017); and BIOPSY / LIGATION TEMPORAL ARTERY (Bilateral, 2021).     Evaluating Therapist: Nena Marquez PT      Room #:  5092/9091-N  Diagnosis:  Acute kidney injury superimposed on CKD ([facility] [N17.9, N18.9]  Acute on chronic congestive heart failure, unspecified heart failure type ([facility] [I50.9]  Congestive heart failure, unspecified HF chronicity, unspecified heart failure type ([facility] [I50.9]  PMHx/PSHx:  CAD, CKD, COPD, DM, B LE wounds  Precautions:  Falls, alarm      Social:  Pt admitted from SNF. States transfers to w/c without assist.      Initial Evaluation  Date: 8/20/21 Treatment      Short Term/ Long Term   Goals   Was pt agreeable to Eval/treatment? yes     Does pt have pain? B LE's     Bed Mobility  Rolling: mod assist  Supine to sit: max assist  Sit to supine: mod assist  Scooting: mod assist  SBA   Transfers Sit to stand: mod assist  Stand to sit: mod assist  Stand pivot: NT  CGA   Ambulation    NT  N/A   Stair Negotiation  Ascended and descended  NT   N/A   LE strength     3+/5    4-/5   balance      fair     AM-PAC Raw score               10/24         Pt is alert and grossly oriented  LE ROM: WFL  Sensation: decreased B LEs  Endurance: fair     ASSESSMENT:    Pt displays functional ability as noted in the objective portion of this evaluation. Patient education  Pt educated on PT objectives    Patient response to education:   Pt verbalized understanding Pt demonstrated skill Pt requires further education in this area   yes           Comments/treatment:  Pt sat edge of bed x10 minutes working on core strength and stability. Pt able to stand and scoot along bed using bed rail with mod assist. Per chart pt with falls at facility, pt reports he did not fall but rolled out of bed onto floor to try to find bed control. Pt's/ family goals   1.  To be independent    Conditions Requiring Skilled Therapeutic Intervention:    [x]Decreased strength     []Decreased ROM  [x]Decreased functional mobility  [x]Decreased balance   []Decreased endurance   []Decreased posture  []Decreased sensation  []Decreased coordination   []Decreased vision  [x]Decreased safety awareness []Increased pain       Patient and or family understand(s) diagnosis, prognosis, and plan of care. Prognosis is good for reaching above PT goals    PHYSICAL THERAPY PLAN OF CARE:    PT POC is established based on physician order and patient diagnosis     Referring provider/PT Order: Melinda Ortega MD/ PT eval and treat      Current Treatment Recommendations:     [x] Strengthening to improve independence with functional mobility   [] ROM to improve independence with functional mobility   [x] Balance Training to improve static/dynamic balance and to reduce fall risk  [x] Endurance Training to improve activity tolerance during functional mobility   [x] Transfer Training to improve safety and independence with all functional transfers   [] Gait Training to improve gait mechanics, endurance and assess need for appropriate assistive device  [] Stair Training in preparation for safe discharge home and/or into the community   [] Positioning to prevent skin breakdown and contractures  [x] Safety and Education Training   [x] Patient/Caregiver Education   [] HEP  [] Other     PT long term treatment goals are located in above grid    Frequency of treatments: 2-5x/week x 5 days. Time in  0840  Time out  0906    Total Treatment Time  12 minutes     Evaluation Time includes thorough review of current medical information, gathering information on past medical history/social history and prior level of function, completion of standardized testing/informal observation of tasks, assessment of data and education on plan of care and goals.       CPT codes:  [x] Low Complexity PT evaluation 63932  [] Moderate Complexity PT evaluation 00774  [] High Complexity PT evaluation 93314  [] PT Re-evaluation 54834  [] Gait training 99937 minutes  [] Manual therapy 17955 minutes  [x] Therapeutic activities 10424 12 minutes  [] Therapeutic exercises 99545 minutes  [] Neuromuscular reeducation 56870 minutes     Dawna PT [id number]

## 2021-08-20 NOTE — PROGRESS NOTES
Arrival time: 0030  Patient admitted to room  621  From ED. Transferred from stretcher to bed  with 3 staff assist.  Patient was unable to help move. He states he is from [facility] and normally gets around with the assistance of a wheelchair. Alert and oriented x 4. Pain level 9/10, chronic back pain and  generalized . PIV noted to L AC. Ace bandages to BLE on admission. Removed and cleaned. BLE is red, dry, flaky with multiple open areas. R foot has 3 toes with abrasions (1st,4th and 5th) and L foot has 1 toe (3rd) with abrasion. Wet to dry gauze applied with Kerlix wrap from feet to below knees on both legs. Bruising noted to BUE and abdomen. Wound care consult placed. Patient states he bruises easily and it is from insulin shots given. Oriented to room, white board, call light, and hourly rounding.

## 2021-08-20 NOTE — DISCHARGE INSTR - COC
Continuity of Care Form    Patient Name: Prisca Muller   :  1940  MRN:  [id number]    Admit date:  2021  Discharge date:  ***    Code Status Order: Full Code   Advance Directives:      Admitting Physician:  Mirela Velasquez MD  PCP: Tonya Sommers MD    Discharging Nurse: [facility] Unit/Room#: [address] [place] [address]  Discharging Unit Phone Number: ***    Emergency Contact:   Extended Emergency Contact Information  Primary Emergency Contact: ABEL ABRAHAM  Home Phone: [phone]  Mobile Phone: [phone]  Relation: Brother/Sister    Past Surgical History:  Past Surgical History:   Procedure Laterality Date    BIOPSY / LIGATION TEMPORAL ARTERY Bilateral 2021    BILATERAL TEMPORAL ARTERY BIOPSY performed by Blas Wild MD at [address]      5 years ago   [address] GRAFT  2008    1 vessel and aortic valve repair    DIAGNOSTIC CARDIAC CATH LAB PROCEDURE  10/14/2008    DIAGNOSTIC CARDIAC CATH LAB PROCEDURE  10/21/2010    JOINT REPLACEMENT      R knee replacement    KNEE SURGERY      OTHER SURGICAL HISTORY Right 2017    debridement,bone bx foot    PACEMAKER INSERTION  2014    D-PPM   ()    Dr. Gutierrez Delay      carotids       Immunization History:   Immunization History   Administered Date(s) Administered    Influenza Virus Vaccine 2016    Pneumococcal Conjugate 13-valent ([id number]) 2016    Pneumococcal Conjugate Vaccine 2016       Active Problems:  Patient Active Problem List   Diagnosis Code    Essential hypertension I10    PVD (peripheral vascular disease) with claudication ([facility] I73.9    Pacemaker Z95.0    S/P AVR Z95.2    CKD (chronic kidney disease) stage 3, GFR 30-59 ml/min N18.30    Type 2 diabetes mellitus with stage 3 chronic kidney disease, with long-term current use of insulin ([address] E11.22, N18.30, Z79.4    Pulmonary nodule R91.1    Diabetes · Bowel: No  · Bladder: No  Urinary Catheter: None   Colostomy/Ileostomy/Ileal Conduit: No       Date of Last BM: 8/21/2021      Intake/Output Summary (Last 24 hours) at 8/20/2021 1325  Last data filed at 8/20/2021 0920  Gross per 24 hour   Intake 360 ml   Output 1500 ml   Net -1140 ml     I/O last 3 completed shifts:  In: -   Out: 1500 [Urine:1500]    Safety Concerns:     None    Impairments/Disabilities:      None    Nutrition Therapy:  Current Nutrition Therapy:   - Oral Diet:  General    Routes of Feeding: Oral  Liquids: No Restrictions  Daily Fluid Restriction: no  Last Modified Barium Swallow with Video (Video Swallowing Test): not done    Treatments at the Time of Hospital Discharge:   Respiratory Treatments: ***  Oxygen Therapy:  is not on home oxygen therapy.   Ventilator:    - No ventilator support    Rehab Therapies: Physical Therapy and Occupational Therapy  Weight Bearing Status/Restrictions: No weight bearing restirctions  Other Medical Equipment (for information only, NOT a DME order):  wheelchair  Other Treatments: ***    Patient's personal belongings (please select all that are sent with patient):  None    RN SIGNATURE:  Electronically signed by Amanda Chowdary RN on 8/22/21 at 10:11 AM EDT    CASE MANAGEMENT/SOCIAL WORK SECTION    Inpatient Status Date: ***    Readmission Risk Assessment Score:  Readmission Risk              Risk of Unplanned Readmission:  22           Discharging to Facility/ Agency   · Name: [facility]  · [address] [place] [address]  · Phone:[phone]  · Fax:[phone]    Dialysis Facility (if applicable)   · Name:  · Address:  · Dialysis Schedule:  · Phone:  · Fax:    / signature: Electronically signed by JOSE Lambert on 8/20/21 at 1:30 PM EDT    PHYSICIAN SECTION    Prognosis: Good    Condition at Discharge: Stable    Rehab Potential (if transferring to Rehab): Good    Recommended Labs or Other Treatments After Discharge: BMP and CBC in 1 week    Physician Certification: I certify the above information and transfer of Dustin Fried  is necessary for the continuing treatment of the diagnosis listed and that he requires Intermediate Nursing Care for greater 30 days.      Update Admission H&P: No change in H&P    PHYSICIAN SIGNATURE:  Electronically signed by Zigmund Favre, APRN - CNP on 8/22/21 at 9:26 AM EDT

## 2021-08-20 NOTE — H&P
[address] [facility]  History and Physical      CHIEF COMPLAINT:    Chief Complaint   Patient presents with    Fall        Patient of Venus Lennox, MD presents with:  Congestive heart failure ([facility]    History of Present Illness:   Patient is a 71-year-old male with a significant past medical history of bilateral carotid artery stenosis, CAD, CHF, CKD, COPD and arthritis. Patient presented to the ER with complaints of increased falls at the facility. Patient states he is constantly falling. Patient has multiple wounds on his left bilateral lower extremities. Patient has a significant past medical history. Patient states there are no aggravating factors and there are no relieving factors. Patient admits he does not cause injury when he follows nor has he and his head. Patient is alert and oriented on exam.  Patient denies any chest pain, fever, chills, headache, abdominal pain, dizziness, blurred vision, nausea, and diarrhea. ER work-up reveals an elevated BNP of 3,571 and elevated troponin of 114. BUN/creatinine is 60/2.5. Patient was admitted to telemetry unit for further testing and treatment. REVIEW OF SYSTEMS:  Pertinent negatives are above in HPI. 10 point ROS otherwise negative.       Past Medical History:   Diagnosis Date    (HFpEF) heart failure with preserved ejection fraction ([facility] 07/2020    Diagnosed by cardiology    Arthritis     Bilateral carotid artery stenosis 12/31/2020    Bilateral carotid bruits 12/31/2020    Blood circulation, collateral     CAD (coronary artery disease)     Carotid bruit 03/27/2013    CHF (congestive heart failure) (HCC)     Chronic kidney disease     CKD (chronic kidney disease) stage 3, GFR 30-59 ml/min ([facility] 03/20/2016    COPD (chronic obstructive pulmonary disease) ([facility]     Follows with pulmonary    Diabetes mellitus ([facility]     Diabetic neuropathy associated with type 2 diabetes mellitus (HCC)     Dyspnea on exertion     History Take 1 tablet by mouth 2 times daily (with meals)  sodium chloride (OCEAN, BABY AYR) 0.65 % nasal spray, 1 spray by Nasal route as needed for Congestion  DULoxetine (CYMBALTA) 60 MG extended release capsule, Take 1 capsule by mouth daily  Fluticasone-Umeclidin-Vilant (TRELEGY ELLIPTA IN), Inhale 1 puff into the lungs Daily  aspirin EC 81 MG EC tablet, Take 1 tablet by mouth daily  albuterol (PROVENTIL) (2.5 MG/3ML) 0.083% nebulizer solution, Take 3 mLs by nebulization every 6 hours as needed for Wheezing or Shortness of Breath DX: COPD J44.9 Please bill Medicare Part B  levothyroxine (SYNTHROID) 25 MCG tablet, TAKE 1 TABLET BY MOUTH EVERY DAY  gabapentin (NEURONTIN) 400 MG capsule, Take 400 mg by mouth 4 times daily. metoprolol succinate (TOPROL XL) 50 MG extended release tablet, Take 50 mg by mouth daily  isosorbide mononitrate (IMDUR) 120 MG extended release tablet, Take 1 tablet by mouth daily  morphine (THIERRY) 10 MG extended release capsule, Take 10 mg by mouth 2 times daily. .  insulin glargine (LANTUS) 100 UNIT/ML injection vial, Inject 18 Units into the skin 2 times daily   HYDROcodone-acetaminophen (NORCO)  MG per tablet, Take 1 tablet by mouth every 6 hours as needed for Pain  allopurinol (ZYLOPRIM) 300 MG tablet, Take 300 mg by mouth daily   lovastatin (MEVACOR) 20 MG tablet, Take 20 mg by mouth nightly. Note that the patient's home medications were reviewed and the above list is accurate to the best of my knowledge at the time of the exam.    Allergies:    Patient has no known allergies. Social History:    reports that he quit smoking about 40 years ago. His smoking use included cigarettes. He started smoking about 67 years ago. He has a 50.00 pack-year smoking history. He has never used smokeless tobacco. He reports previous drug use. He reports that he does not drink alcohol. Family History:   family history includes Cancer in his sister;  Heart Disease in his father and mother; Heart Failure in his father and mother; Heart Surgery in his father; High Blood Pressure in his sister. PHYSICAL EXAM:    Vitals:  BP (!) 170/85   Pulse 78   Temp 98.6 °F (37 °C) (Axillary)   Resp 16   Ht 5' 8\" (1.727 m)   Wt 250 lb (113.4 kg)   SpO2 92%   BMI 38.01 kg/m²       General appearance: NAD, conversant, pleasant  Eyes: Sclerae anicteric, PERRLA  HEENT: AT/NC, MMM  Neck: FROM, supple, no thyromegaly  Lymph: No cervical / supraclavicular lymphadenopathy  Lungs: Clear to auscultation, WOB normal  CV: Irregular, no MRGs, bilateral lower extremity edema, pacemaker  Abdomen: Soft, non-tender; no masses or HSM, +BS  Extremities: FROM without synovitis. No clubbing or cyanosis of the hands. Bilateral lower extremity edema  Skin: no rash, induration, lesions, or ulcers  Psych: Calm and cooperative. Normal judgement and insight. Normal mood and affect. Neuro: Alert and interactive, face symmetric, speech fluent. LABS:  All labs reviewed.   Of note:  CBC with Differential:    Lab Results   Component Value Date    WBC 8.5 08/19/2021    RBC 3.13 08/19/2021    HGB 9.9 08/19/2021    HCT 30.2 08/19/2021     08/19/2021    MCV 96.5 08/19/2021    MCH 31.6 08/19/2021    MCHC 32.8 08/19/2021    RDW 16.0 08/19/2021    NRBC 0.0 04/22/2018    SEGSPCT 76 03/16/2014    BANDSPCT 1 09/02/2016    LYMPHOPCT 13.7 08/19/2021    MONOPCT 8.2 08/19/2021    BASOPCT 0.4 08/19/2021    MONOSABS 0.70 08/19/2021    LYMPHSABS 1.17 08/19/2021    EOSABS 0.38 08/19/2021    BASOSABS 0.03 08/19/2021     CMP:    Lab Results   Component Value Date     08/19/2021    K 4.5 08/19/2021    CL 98 08/19/2021    CO2 26 08/19/2021    BUN 60 08/19/2021    CREATININE 2.5 08/19/2021    GFRAA 30 08/19/2021    LABGLOM 25 08/19/2021    GLUCOSE 123 08/19/2021    GLUCOSE 111 07/16/2011    PROT 6.2 08/19/2021    LABALBU 3.0 08/19/2021    CALCIUM 9.0 08/19/2021    BILITOT 0.3 08/19/2021    ALKPHOS 69 08/19/2021    AST 19 08/19/2021    ALT 11 08/19/2021       Imaging:  CXR: Limited due to patient rotation. Interstitial and hazy opacities bilaterally suggestive of pulmonary vascular congestion or pneumonia with left basilar atelectasis or pleural effusion. CT head/cervical spine: No skull fracture or acute intracranial abnormality. No fracture or joint dislocation is seen. Degenerative changes. EKG:  I've personally reviewed the patient's EKG:  V paced    Telemetry:  I've personally reviewed the patient's telemetry:  V paced    ASSESSMENT/PLAN:  Principal Problem:    Congestive heart failure ([address]  Active Problems:    Essential hypertension  Resolved Problems:    * No resolved hospital problems. *    79-year-old male with a past medical history of CAD, COPD, PVD, cardiomyopathy admitted to telemetry unit with    CHF  -Diurese cautiously so as to avoid renal insufficiency-Bumex 1 mg IV twice daily  -BNP and other labs in a.m.  -Daily weights strict I's and O's  -Low-sodium diet daily  -Supplement O2 to maintain pulse ox duration greater than 93% wean as tolerated  -Medications for other comorbidities continue as appropriate with dose adjustments as needed.  -Consult wound nurse. Bilateral lower extremity wounds  -Obtain orthostatic BPs  -Continue goal-directed medical therapy, being mindful of blood pressure and volume status  -Medication adjustments as needed  -Cardiology input appreciated  -Patient is wheelchair-bound-okay to return to facility soon once volume status addressed    Medication for other comorbidities continue as appropriate dose adjustment as necessary. DVT prophylaxis  PT OT  Discharge planning  Case discussed with attending and agreed upon plan of care. Code status: Full  Requires inpatient level of care  Leigh Salinas, MADDI - CNP    1:55 PM  8/20/2021     Above edited to reflect my thoughts    I personally saw, examined and provided care for the patient.  Radiographs, labs and medication list were reviewed by me independently. The case was discussed in detail and plans for care were established. Review of [address] [place] [address], documentation was conducted and revisions were made as appropriate directly by me. I agree with the above documented exam, problem list, and plan of care.      Alena Gitelman, MD  4:31 PM  8/20/2021

## 2021-08-20 NOTE — CARE COORDINATION
covid pended 8/20; pt from [facility]; per bo; pt is a bed hold; ok to return; NO PRECERT as long as plan of care does not change. ( no IV's etc)the patient agreeable to return. covid required within 7 days of discharge. Pt agreeable to return . Will need transport forms on chart. Unique Garg.

## 2021-08-20 NOTE — CARE COORDINATION
Social work / Discharge Planning:       N-17 and ambulance form completed for return to [address] [place] [address] when medically stable. No precert needed. Awaiting covid result.   Electronically signed by JOSE Doshi on 8/20/2021 at 1:32 PM

## 2021-08-20 NOTE — PROGRESS NOTES
Nutrition Assessment     Type and Reason for Visit: Consult, Patient Education (CHF Diet Ed)    Nutrition Assessment:  Consult for CHF Diet Edu. Chart reviewed. Pt from SNF w/ noted plan for return to facility once ok'd for d/c; CHF diet edu not appropriate at this time. Will follow-up per policy. Please consult if RD needed.     Electronically signed by Kulwant Costa RD, PHANI on 8/20/21 at 1:16 PM EDT    Contact: ext [phone]

## 2021-08-21 NOTE — PROGRESS NOTES
Subjective: The patient is awake and alert. Lying in bed  No acute events overnight. Denies chest pain, angina, SOB     Objective:    /65   Pulse 60   Temp 97.8 °F (36.6 °C) (Oral)   Resp 18   Ht 5' 8\" (1.727 m)   Wt 250 lb (113.4 kg)   SpO2 96%   BMI 38.01 kg/m²     In: 360 [P.O.:360]  Out: -   In: 360   Out: -     General appearance: NAD, conversant  HEENT: AT/NC, MMM  Neck: FROM, supple  Lungs: Clear to auscultation  CV: RRR, no MRGs  Vasc: Radial pulses 2+  Abdomen: Soft, non-tender; no masses or HSM  Extremities: No peripheral edema or digital cyanosis  Skin: no rash, lesions or ulcers  Psych: Alert and oriented to person, place and time  Neuro: Alert and interactive     Recent Labs     08/19/21  1624   WBC 8.5   HGB 9.9*   HCT 30.2*          Recent Labs     08/19/21  1624 08/20/21  1300 08/21/21  0345    135 132   K 4.5 4.5 4.5   CL 98 96* 96*   CO2 26 27 32*   BUN 60* 52* 54*   CREATININE 2.5* 2.1* 2.1*   CALCIUM 9.0 9.0 9.4       Assessment:    Principal Problem:    Congestive heart failure (HCC)  Active Problems:    Essential hypertension  Resolved Problems:    * No resolved hospital problems. *      Plan:  71-year-old male with a past medical history of CAD, COPD, PVD, cardiomyopathy admitted to telemetry unit with     CHF  -Diurese cautiously so as to avoid renal insufficiency-Bumex 1 mg IV twice daily  -BNP and other labs in a.m.  -Daily weights strict I's and O's  -Low-sodium diet daily  -Supplement O2 to maintain pulse ox duration greater than 93% wean as tolerated  -Medications for other comorbidities continue as appropriate with dose adjustments as needed.  -Consult wound nurse.   Bilateral lower extremity wounds  -Obtain orthostatic BPs  -Continue goal-directed medical therapy, being mindful of blood pressure and volume status  -Medication adjustments as needed  -Cardiology input appreciated - Patient is to be on an [address] due to paf had epistaxis (October 2020) need clearance from ENT to resume.  -Patient is wheelchair-bound-okay to return to facility soon once volume status addressed  Please get daily weight.  -Cardiology following  Transition to p.o. diuretic when okay with cardiology  Holding ACE ARB-discontinue for now secondary to elevated BUN/creatinine which actually appears to be near his baseline    Plan discharge tomorrow 8/22/2021 back to facility-patient is a bed hold-   Medication for other comorbidities continue as appropriate dose adjustment as necessary. DVT Prophylaxis   PT/OT  Discharge planning     Rizwan Grullon, APRN - CNP  10:28 AM  8/21/2021     I personally saw, examined and provided care for the patient. Radiographs, labs and medication list were reviewed by me independently. The case was discussed in detail and plans for care were established. Review of [address] [place] [address], documentation was conducted and revisions were made as appropriate directly by me. I agree with the above documented exam, problem list, and plan of care.      Liam Bautista MD  3:14 PM  8/21/2021

## 2021-08-21 NOTE — PROGRESS NOTES
INPATIENT CARDIOLOGY FOLLOW-UP    Name: Heena العلي    Age: 79 y.o. Date of Admission: 8/19/2021  3:47 PM    Date of Service: 8/21/2021    Primary Cardiologist: Previously Dr Nirmala Saravia, scheduled to follow with Dr. Eugene Mckeon    Chief Complaint: Follow-up for CHF    Interim History:  Urine output is not documented    Feels well. Denies chest pain or shortness of breath. Review of Systems:   Negative except as described above    Problem List:  Patient Active Problem List   Diagnosis    Essential hypertension    PVD (peripheral vascular disease) with claudication ([facility] [address] [facility]    Pacemaker    S/P AVR    CKD (chronic kidney disease) stage 3, GFR 30-59 ml/min    Type 2 diabetes mellitus with stage 3 chronic kidney disease, with long-term current use of insulin (HCC)    Pulmonary nodule    Diabetes mellitus type 2, uncontrolled ([facility] [address] [facility]    Hyperlipidemia LDL goal <100    Acquired hypothyroidism    CAD (coronary artery disease), native coronary artery    Chronic pain    History of CVA (cerebrovascular accident)    Obesity (BMI 30-39. 9)    Anemia    Elevated sed rate    Vision decreased    Bilateral carotid bruits    Bilateral carotid artery stenosis    Mild mitral regurgitation    Pulmonary hypertension (HCC)    Moderate tricuspid regurgitation by prior echocardiography    (HFpEF) heart failure with preserved ejection fraction (HCC)    COPD (chronic obstructive pulmonary disease) (HCC)    Diabetic neuropathy associated with type 2 diabetes mellitus (HCC)    Congestive heart failure (HCC)       Current Medications:    Current Facility-Administered Medications:     albuterol (PROVENTIL) nebulizer solution 2.5 mg, 2.5 mg, Nebulization, Q6H PRN, Lesli Reeves MD    allopurinol (ZYLOPRIM) tablet 300 mg, 300 mg, Oral, Daily, Lesli Reeves MD, 300 mg at 08/20/21 0908    aspirin EC tablet 81 mg, 81 mg, Oral, Daily, Lesli Reeves MD, 81 mg at 08/20/21 0908    DULoxetine (CYMBALTA) extended release capsule 60 mg, 60 mg, Oral, Daily, Nickie Stout MD, 60 mg at 08/20/21 0908    ferrous sulfate (IRON 325) tablet 325 mg, 325 mg, Oral, BID WC, Nickie Stout MD, 325 mg at 08/20/21 1622    gabapentin (NEURONTIN) capsule 400 mg, 400 mg, Oral, 4x Daily, Nickie Stout MD, 400 mg at 08/20/21 2344    HYDROcodone-acetaminophen (NORCO)  MG per tablet 1 tablet, 1 tablet, Oral, Q6H PRN, Nickie Stout MD, 1 tablet at 08/21/21 0605    insulin glargine (LANTUS) injection vial 22 Units, 22 Units, Subcutaneous, BID, Nickie Stout MD, 22 Units at 08/20/21 2344    isosorbide mononitrate (IMDUR) extended release tablet 120 mg, 120 mg, Oral, Daily, Nickie Stout MD, 120 mg at 08/20/21 0908    lactulose (CHRONULAC) 10 GM/15ML solution 20 g, 20 g, Oral, TID, Nickie Stout MD    levothyroxine (SYNTHROID) tablet 25 mcg, 25 mcg, Oral, Daily, Nickie Stout MD, 25 mcg at 08/21/21 0555    atorvastatin (LIPITOR) tablet 10 mg, 10 mg, Oral, Daily, Nickie Stout MD, 10 mg at 08/20/21 0908    magnesium oxide (MAG-OX) tablet 200 mg, 200 mg, Oral, Daily, Nickie Stout MD, 200 mg at 08/20/21 0908    metOLazone (ZAROXOLYN) tablet 2.5 mg, 2.5 mg, Oral, Once per day on Mon Wed Fri, Nickie Stout MD, 2.5 mg at 08/20/21 0908    metoprolol succinate (TOPROL XL) extended release tablet 50 mg, 50 mg, Oral, Daily, Nickie Stout MD, 50 mg at 08/20/21 0909    morphine (THIERRY) extended release capsule 10 mg, 10 mg, Oral, BID, Nickie Stout MD    potassium chloride (KLOR-CON M) extended release tablet 40 mEq, 40 mEq, Oral, Daily, Nickie Stout MD, 40 mEq at 08/20/21 0908    sodium chloride (OCEAN, BABY AYR) 0.65 % nasal spray 1 spray, 1 spray, Nasal, PRN, Nickie Stout MD    sodium chloride flush 0.9 % injection 5-40 mL, 5-40 mL, Intravenous, 2 times per day, Nickie Stout MD, 10 mL at 08/20/21 8522    sodium chloride flush 0.9 % injection 10 mL, 10 mL, Intravenous, PRN, Nickie Stout MD    0.9 % sodium chloride infusion, 25 mL, Intravenous, PRN, Nickie Stout MD    ondansetron (ZOFRAN-ODT) disintegrating tablet 4 mg, 4 mg, Oral, Q8H PRN **OR** ondansetron (ZOFRAN) injection 4 mg, 4 mg, Intravenous, Q6H PRN, Ana Ji MD    magnesium hydroxide (MILK OF MAGNESIA) 400 MG/5ML suspension 30 mL, 30 mL, Oral, Daily PRN, Ana Ji MD    acetaminophen (TYLENOL) tablet 650 mg, 650 mg, Oral, Q6H PRN, 650 mg at 08/20/21 1127 **OR** acetaminophen (TYLENOL) suppository 650 mg, 650 mg, Rectal, Q6H PRN, Ana Ji MD    heparin (porcine) injection 5,000 Units, 5,000 Units, Subcutaneous, 3 times per day, nAa Ji MD, 5,000 Units at 08/21/21 0643    bumetanide (BUMEX) injection 1 mg, 1 mg, Intravenous, Daily, Ana Ji MD, 1 mg at 08/20/21 0909    budesonide (PULMICORT) nebulizer suspension 250 mcg, 250 mcg, Nebulization, BID, Ana Ji MD, 250 mcg at 08/20/21 2105    ipratropium (ATROVENT) 0.02 % nebulizer solution 0.5 mg, 0.5 mg, Nebulization, 4x daily, Ana Ji MD, 0.5 mg at 08/20/21 2105    Arformoterol Tartrate (BROVANA) nebulizer solution 15 mcg, 15 mcg, Nebulization, BID, Ana Ji MD, 15 mcg at 08/20/21 2105    hydrALAZINE (APRESOLINE) tablet 10 mg, 10 mg, Oral, 3 times per day, Hilaria Watts APRN - CNP, 10 mg at 08/20/21 2344    Physical Exam:  /65   Pulse 60   Temp 97.8 °F (36.6 °C) (Oral)   Resp 18   Ht 5' 8\" (1.727 m)   Wt 250 lb (113.4 kg)   SpO2 96%   BMI 38.01 kg/m²   Wt Readings from Last 3 Encounters:   08/19/21 250 lb (113.4 kg)   07/20/21 251 lb (113.9 kg)   06/23/21 253 lb (114.8 kg)     Appearance: Morbidly obese male. Awake, alert, no acute respiratory distress  Skin: Intact, no rash  Head: Normocephalic, atraumatic  Eyes: EOMI, no conjunctival erythema  ENMT: No pharyngeal erythema, MMM, no rhinorrhea  Neck: Supple, no elevated JVP, no carotid bruits  Lungs: Clear to auscultation bilaterally. No wheezes, rales, or rhonchi. Cardiac: PMI nondisplaced, Regular rhythm with a normal rate, S1 & S2 normal.  Well-healed sternotomy scar.   Soft systolic murmurs right sternal border. Left chest pacemaker. Abdomen: Soft, nontender, +bowel sounds  Extremities: Moves all extremities x 4, bilateral lower extremity wounds are wrapped  Neurologic: No focal motor deficits apparent, normal mood and affect  Peripheral Pulses: Intact posterior tibial pulses bilaterally    Intake/Output:    Intake/Output Summary (Last 24 hours) at 8/21/2021 0833  Last data filed at 8/21/2021 0651  Gross per 24 hour   Intake 720 ml   Output --   Net 720 ml     No intake/output data recorded.     Laboratory Tests:  Recent Labs     08/19/21  1624 08/20/21  1300 08/21/21  0345    135 132   K 4.5 4.5 4.5   CL 98 96* 96*   CO2 26 27 32*   BUN 60* 52* 54*   CREATININE 2.5* 2.1* 2.1*   GLUCOSE 123* 132* 135*   CALCIUM 9.0 9.0 9.4     Lab Results   Component Value Date    MG 1.8 08/21/2021     Recent Labs     08/19/21  1624   ALKPHOS 69   ALT 11   AST 19   PROT 6.2*   BILITOT 0.3   LABALBU 3.0*     Recent Labs     08/19/21  1624   WBC 8.5   RBC 3.13*   HGB 9.9*   HCT 30.2*   MCV 96.5   MCH 31.6   MCHC 32.8   RDW 16.0*      MPV 9.7     Lab Results   Component Value Date    CKTOTAL 245 (H) 11/01/2019    CKMB 8.2 (H) 01/20/2018    TROPONINI 0.05 (H) 01/19/2021    TROPONINI 0.06 (H) 01/19/2021    TROPONINI 0.09 (H) 01/19/2021     Lab Results   Component Value Date    INR 1.0 01/05/2021    INR see note (AA) 01/05/2021    INR 1.8 10/19/2020    PROTIME 11.7 01/05/2021    PROTIME see note (AA) 01/05/2021    PROTIME 21.8 (H) 10/19/2020     Lab Results   Component Value Date    TSH 4.270 (H) 08/19/2021     Lab Results   Component Value Date    LABA1C 6.5 (H) 01/21/2021     No results found for: EAG  Lab Results   Component Value Date    CHOL 112 08/20/2021    CHOL 100 01/21/2021    CHOL 149 01/20/2018     Lab Results   Component Value Date    TRIG 87 08/20/2021    TRIG 89 01/21/2021    TRIG 103 01/20/2018     Lab Results   Component Value Date    HDL 38 08/20/2021    HDL 30 01/21/2021 HDL 40 2018     Lab Results   Component Value Date    LDLCALC 57 2021    LDLCALC 52 2021    LDLCALC 88 2018     Lab Results   Component Value Date    LABVLDL 17 2021    LABVLDL 18 2021    LABVLDL 21 2018     No results found for: CHOLHDLRATIO  Recent Labs     21  1624 21  0345   PROBNP 3,571* 3,357*       Cardiac Tests:    EK2021: Atrial sensed ventricular paced rhythm 72 bpm    Telemetry: Ventricular paced 60 bpm    Chest X-ray:   21  Poor quality film, patient rotated to the left. Left chest dual-chamber pacemaker      Impression   1. Limited due to patient rotation. 2. Interstitial and hazy opacities bilaterally suggestive of pulmonary   vascular congestion or pneumonia with left basilar atelectasis or pleural   effusion. Echocardiogram:   Transthoracic echo 2020    Summary   Severe left ventricle hypertrophy. Visually estimated LVEF is 45-50 %. Paradoxical septal wall motion. Severely dilated left atrium. Right ventricle not well visualized. Grossly normal.   Severe mitral annular calcification. Mild mitral stenosis. Mild mitral   valve regurgitation. Bioprosthetic aortic valve leaflets not well seen. No evidence of   obstruction with similar transvalvular pressure gradient to the prior echo   in 2018. No aortic regurgitation. Compared to prior echo in 2018, the paradoxical septal wall motion   abnormality likely due to interventricular conduction delay is new. Stress test:    Pharmacologic MPS, 2018, fixed lateral defect with no ischemia.  EF 36%.      Cardiac catheterization:   Cardiac catheterization, 10/2010, mild diffuse disease in the left system, CT of the RCA.  Patent SVG to RCA. EF 60%.      Device interrogation 2021  Battery longevity 7.1 years  DDD  45% a paced 99% V paced  0% AT/AF  0% mode switch    ----------------------------------------------------------------------------------------------------------------------------------------------------------------  IMPRESSION:  1. Mild acute on chronic heart failure with mildly reduced ejection fraction. proBNP 3400. Resolved  2. Cardiomyopathy. Likely mixed etiology, EF most recently 45-50% 7/2020. Ischemic + valvular  + RV pacing  3. CAD status post CABG times 11/2008. SVG to RCA at time of AVR  4. Status post bioprosthetic AVR 11/2008 [id number]  5. Status post permanent pacemaker 2014. Reported sinus node dysfunction. Now 99% V paced  6. Paroxysmal atrial fibrillation/atrial flutter. Previously on anticoagulation, held due to epistaxis. 0% burden on recent interrogation  7. Elevated hs-cTnT: 114-84 ng/L. Probable acute myocardial injury. No evidence of ACS  8. HARVEY/CKD. Creatinine 2.5-2.1. Recent baseline 1.4-1.7.  9. Anemia Hgb 9.9  10. Hypertension  11. Type 2 diabetes with neuropathy  12. Chronic lower extremity wounds/wheelchair-bound  13. COPD  15. Hypothyroidism  15. Obesity  16. TREVOR  17. History of CVA    RECOMMENDATIONS:  Stable from a cardiac standpoint.  Transition back to oral diuretic regimen   Continue aspirin, statin   Anticoagulation currently on hold pending ENT evaluation   Continue isosorbide mononitrate, hydralazine, metoprolol succinate   Hold off on ACE/ARB/ARNI due to renal failure   Recommend repeating echocardiogram as outpatient to assess EF   May benefit from upgrade to CRT-P or CRT-D device depending on ejection fraction   Aggressive risk factor modification with weight loss recommended  Randell Arzola for discharge from cardiology standpoint   Outpatient follow-up with EP and general cardiology   Will see as needed, please call with questions    Da Hardin MD, [facility]    NOTE: This report was transcribed using voice recognition software.  Every effort was made to ensure accuracy; however, inadvertent computerized transcription errors may be present.

## 2021-08-22 NOTE — CARE COORDINATION
Discharge order noted. Transportation set up w/ [employer] for 10:15  to [facility]. Nursing and facility notified. Attempted to call brother Larayne Meckel  [phone] and son Octavio Zaldivar [phone]- no answer- unable to leave .  N-17, luis form on chart Heriberto Howell, RN case manager

## 2021-08-22 NOTE — PROGRESS NOTES
Report called and given to [facility] MICHELLE SANCHEZ at [facility].

## 2021-08-27 NOTE — PROGRESS NOTES
.c    [address] [place] [address],[address] F Ifft   1940  [phone]        Referring Doctor: Bao Shipman  Cardiologist: N/A  Nephrologist: N/A        History of Present Illness:     Hilaria Verdin is a 79 y.o. male with a history of HFpEF, most recent EF 45-50% on 7/31/2020. Patient Story:    He does not  have dyspnea with exertion, shortness of breath, or decline in overall functional capacity. He does not have orthopnea, PND, nocturnal cough or hemoptysis. He does have abdominal distention or bloating, early satiety, anorexia/change in appetite. He does has a good urinary response to their oral diuretic. He does have  lower extremity edema. He denies lightheadedness, dizziness. He denies palpitations, syncope or near syncope. He does not complain of chest pain, pressure, discomfort. No Known Allergies      No outpatient medications have been marked as taking for the 8/27/21 encounter [facility] Encounter) with [facility] ROOM 2.           Guideline directed medical:  ARNI/ACE I/ARB: No  Beta blocker: Yes  Aldosterone antagonist:  No        Physical Examination:     BP (!) 157/65   Pulse 68   Resp 18   Wt 249 lb (112.9 kg)   SpO2 94%   BMI 37.86 kg/m²     Assessment  Charting Type: Shift assessment              HEENT  HEENT (WDL): Exceptions to WDL  Right Eye: Impaired vision  Left Eye: Impaired vision    Respiratory  Respiratory Pattern: Regular  Respiratory Depth: Normal  Respiratory Quality/Effort: Unlabored  Chest Assessment: Chest expansion symmetrical  L Breath Sounds: Clear, Diminished  R Breath Sounds: Clear, Diminished              Cardiac  Cardiac Rhythm: Sinus rhythm, Ventricular paced    Rhythm Interpretation  Pulse: 68         Gastrointestinal  Abdominal (WDL): Exceptions to WDL  Abdomen Inspection: Distended, Soft  RUQ Bowel Sounds: Active  LUQ Bowel Sounds: Active  RLQ Bowel Sounds: Active  LLQ Bowel Sounds:  Active          Bowel Sounds  RUQ Bowel Sounds: Active  LUQ Bowel Sounds: Active  RLQ Bowel Sounds: Active  LLQ Bowel Sounds: Active    Peripheral Vascular  Peripheral Vascular (WDL): Exceptions to WDL  Edema: Right lower extremity, Left lower extremity  RLE Edema: Pitting, +1  LLE Edema: Pitting, +1    Skin Color/Condition  Skin Color: Appropriate for ethnicity  Skin Condition/Temp: Dry    Skin Integrity  Skin Integrity:  (BLE WRAPPED)                                       Pulse: 68                   LAB DATA:    BNP  No results for input(s): BNP in the last 72 hours. proBNP  No results for input(s): PROBNP in the last 72 hours. BMP  No results for input(s): NA, K, CL, CO2, BUN, CREATININE, GLUCOSE, CALCIUM in the last 72 hours. WEIGHTS:  Wt Readings from Last 3 Encounters:   08/27/21 249 lb (112.9 kg)   08/21/21 244 lb 4.8 oz (110.8 kg)   07/20/21 251 lb (113.9 kg)         TELEMETRY:  Cardiac Regularity: Regular  Cardiac Rhythm/Interpretation: Vent paced        ASSESSMENT:  Erik Boast Ifft   HAD 2LB  WEIGHT LOSS SINCE LAST VISIT ON 7/21/21Interventions completed this visit:  IV diuretics given: No  Lab work obtained yes, BNP/BMP   Reviewed continue current medications that patient as prescribed answered any questions   Educated on signs and symptoms of HF  Educated on low sodium diet    PLAN:  Scheduled to follow up in CHF clinic on  Sept 21,2021  Given clinic phone number and aware of signs and symptoms to call with any HF change in symptoms.

## 2021-09-02 NOTE — PROGRESS NOTES
[facility] Cardiology  Office Visit         Reason for Visit: Heart Failure    Primary Cardiologist: Dr. Darek Fraser         History of Present Illness:     Mr. Esme Hassan is a 79year old female with a PMHx of CAD s/p CABG, bioprosthetic AVR, chronic HFrEF (EF 40-45%), PAFL, PPM in situ, CKD, morbid obesity. He presented to [facility] ED on 8/19/2021 from facility for mechanical falls in the setting of increased bilateral lower extremity edema. He was admitted to the hospital for acute heart failure, IV diuresed with subjective improvement and discharged to nursing facility. He presents today in post acute heart failure hospitalization follow-up, since discharge from the hospital he reports good urinary response to oral bumetanide with stable weights. He is at a nursing facility, therefore unsure sodium content of his food, however does not add any extra salt. He has no current cardiac complaints. He denies dyspnea with exertion, shortness of breath, or decline in overall functional capacity. He denies orthopnea, PND, nocturnal cough or hemoptysis. He denies abdominal distention or bloating, early satiety, anorexia/change in appetite, unintentional weight loss. He does lower extremity edema. He denies exertional lightheadedness. He denies palpitations, syncope or near syncope. Review of systems is negative for chest pain, pressure, discomfort. When ambulating on an incline, He does not leg claudication. History is negative for neurological symptoms including transient loss of vision, asymmetric weakness, aphasia, dysphasia, numbness, tingling.        Patient Active Problem List    Diagnosis Date Noted    Essential hypertension 07/31/2012     Priority: Medium    Congestive heart failure ([address] 08/19/2021    Mild mitral regurgitation     (HFpEF) heart failure with preserved ejection fraction (HCC)     COPD (chronic obstructive pulmonary disease) ([address]      Follows with pulmonary      Diabetic neuropathy associated with type 2 diabetes mellitus ([address]     Elevated sed rate 12/31/2020    Vision decreased 12/31/2020    Bilateral carotid bruits 12/31/2020    Bilateral carotid artery stenosis 12/31/2020    Anemia 10/19/2020    History of CVA (cerebrovascular accident) 07/31/2020    Obesity (BMI 30-39.9) 07/31/2020    Chronic pain 11/02/2019    Diabetes mellitus type 2, uncontrolled ([address] 01/22/2018    Hyperlipidemia LDL goal <100 01/22/2018    Acquired hypothyroidism 01/22/2018    CAD (coronary artery disease), native coronary artery 01/22/2018    Pulmonary hypertension ([address] 2018    Moderate tricuspid regurgitation by prior echocardiography 2018    Pulmonary nodule 10/12/2017    CKD (chronic kidney disease) stage 3, GFR 30-59 ml/min 03/20/2016     Class: Chronic    Type 2 diabetes mellitus with stage 3 chronic kidney disease, with long-term current use of insulin ([facility] 03/20/2016    S/P AVR 03/19/2016    Pacemaker 11/25/2014     A). [employer], model #: [id number], serial #: [id number]. DOI: 11/12/14  B). The right atrial lead is a SJM model #: [id number], serial #: [id number]. C). The RV pacemaker lead is a SJM model #: [id number], serial #: [id number].  PVD (peripheral vascular disease) with claudication ([facility] 03/27/2014     PAST MEDICAL HISTORY:  1. Obesity.  BMI: 38.7 on 7/30/2020  2. No known drug allergies. 3. Former smoker.  40 pack year smoking history.  Quit in 1981.    4. T2DM insulin requiring with neuropathy, nephropathy   5. Hypertension.    6. Hyperlipidemia.    7. COPD  8. 2003 syncope with R sdied weakness--> resolved  9. 2004 R CEA  10. 11/2008 CABG x 1. SVG to RCA and AVR with bioprosthetic [facility] in 2008.  [facility]  11. Cardiac catheterization, 10/2010, mild diffuse disease in the left system, CT of the RCA.  Patent SVG to RCA. EF 60%.    12. Lexiscan MPS 09/05/2014, fixed lateral wall perfusion defect consistent with scarring.  No reversible defects.  EF 45%.    13.  Echocardiogram, 06/2016, EF 99-06%, stage 1 diastolic dysfunction, moderate left atrial enlargement, abnormal aortic valve function with mean gradient of 30 and a V max of 4.3 m/sec, moderate mitral stenosis (mean gradient 7.6 mmHg, RVSP 35).    14. SCOOTER, 6/2016, bioprosthetic aortic valve, peak velocity 2.7 with a mean gradient of 16, mean transmitral gradient of 3.5 mmHg, aortic valve gradient thought secondary to hyperdynamic ventricle.    15. SCOOTER, [facility], 3/2017, EF 60-65%, mild mitral stenosis, 1-2+ MR.  Normal bioprosthetic AVR with preserved leaflet motion with mean transgastric gradient of 28 mmHg.  Overall findings support normal AVR with no prosthetic aortic stenosis.    16. Echocardiogram, 08/2017, EF 60%. Stage 1 diastolic dysfunction, severely dilated left atrium, S/P AVR (peak velocity 2.8, mean gradient 13.7).    17. Sinus node dysfunction, S/P dual chamber pacemaker [facility]), 11/2014.    18. Carotid stenosis, 60% HILARIO and 70% left ICA stenosis.    19. Obstructive sleep apnea, noncompliant with CPAP therapy.   20. 2/2017 diabetic foot ulcer  21. [place]  22. Bilteral TKA's  23. Spinal stenosis s/p injections  24. Hospitalized, 10/2017, with acute on chronic diastolic heart failure.  Pro BNP 2840. 25. Admitted, 1/19/2018, with acute on chronic diastolic heart failure.  H&H 11.3/33.2, creatinine 1.3, troponin peaked at .14, pro BNP 1657.  Diuresed and discharged.    26. Lexiscan MPS, 1/21/2018, fixed lateral defect with no ischemia.  EF 36%.    27. Echocardiogram, 1/21/2018, Dr. Alissa Pérez, EF 60%. Hypokinesis of the basal inferior wall, reduced RV function, moderate to severe left atrial enlargement, mild MR, RVSP 43 mmHg, bioprosthetic AVR with a mean gradient of 12 mmHg.    28. CKD, stage 3.  Baseline creatinine between 1.3 and 1.5.    29. Admission 05/12/2018 with chest pain and shortness of breath.  EKG showed no acute changes.  Troponin elevation not consistent with NSTEMI (0.05, 0.06, 0.08).   Thought secondary to COPD exacerbation.  No cardiac workup performed. 30. Admission 11/1/2019-11/5/2019 for fall from wheelchair. Dehydrated with HARVEY. Demadex stopped  31. 5/2020 PAFL noted on PPM interrogation placed on Xarelto 20 mg QD  32. TTE (7/31/2020) Summary: Severe left ventricle hypertrophy. Visually estimated LVEF is 45-50 %. Paradoxical septal wall motion. Severely dilated left atrium. Right ventricle not well visualized. Grossly normal. Severe mitral annular calcification. Mild mitral stenosis. Mild mitral valve regurgitation. Bioprosthetic aortic valve leaflets not well seen. No evidence of obstruction with similar transvalvular pressure gradient to the prior echo in 2018. No aortic regurgitation. Compared to prior echo in 1/21/2018, the paradoxical septal wall motion abnormality likely due to interventricular conduction delay is new. 35. Per Cardiology note (8/17/2020): Since 2018, his need for ventricular pacing has increased slowly from 25% to 95% on the last interrogation.  Also, his echocardiogram showed worsening ejection fraction from 60% to 45% with dyssynchrony between the lateral wall and the septal wall on the echocardiogram.  According to the brother, patient has been worsening over the last 2 years with decreasing energy.  We will refer to electrophysiology for possible upgrade to biventricular pacer.   29. Per office note patient cancelled appointment with EP on 9/9/2020      Past Medical History:   Diagnosis Date    (HFpEF) heart failure with preserved ejection fraction ([address] 07/2020    Diagnosed by cardiology    Arthritis     Bilateral carotid artery stenosis 12/31/2020    Bilateral carotid bruits 12/31/2020    Blood circulation, collateral     CAD (coronary artery disease)     Carotid bruit 03/27/2013    CHF (congestive heart failure) (HCC)     Chronic kidney disease     CKD (chronic kidney disease) stage 3, GFR 30-59 ml/min ([address] 03/20/2016    COPD (chronic obstructive pulmonary disease) ([address] Follows with pulmonary    Diabetes mellitus [facility]     Diabetic neuropathy associated with type 2 diabetes mellitus ([facility]     Dyspnea on exertion     History of blood transfusion     Hyperlipidemia     Hypertension     Mild mitral regurgitation     Moderate tricuspid regurgitation by prior echocardiography 2018    Movement disorder     NSTEMI, initial episode of care ([facility] 01/2018    Obesity     Pulmonary hypertension ([facility] 2018    PVD (peripheral vascular disease) [facility]audication [facility] 03/27/2014    S/P carotid endarterectomy 03/27/2013    Sleep apnea     SOB (shortness of breath)     Thyroid disease     Unspecified cerebral artery occlusion with cerebral infarction     Vision decreased 12/31/2020           Past Surgical History:   Procedure Laterality Date    BIOPSY / LIGATION TEMPORAL ARTERY Bilateral 01/05/2021    BILATERAL TEMPORAL ARTERY BIOPSY performed by Imelda Dai MD at [address]      5 years ago   [address] GRAFT  2008    1 vessel and aortic valve repair    DIAGNOSTIC CARDIAC CATH LAB PROCEDURE  10/14/2008    DIAGNOSTIC CARDIAC CATH LAB PROCEDURE  10/21/2010    JOINT REPLACEMENT      R knee replacement    KNEE SURGERY      OTHER SURGICAL HISTORY Right 02/14/2017    debridement,bone bx foot    PACEMAKER INSERTION  11/12/2014    D-PPM   ()    Dr. Sawant Fears      carotids             No Known Allergies      Outpatient Medications Marked as Taking for the 9/2/21 encounter (Office Visit) with MADDI Finch - CNP   Medication Sig Dispense Refill    diphenhydrAMINE (BENADRYL) 25 MG capsule Take 25 mg by mouth daily as needed for Itching      insulin lispro, 1 Unit Dial, (HUMALOG KWIKPEN) 100 UNIT/ML SOPN Inject into the skin Sliding scale 140-199, give 1 unit, 200-249, give 2 units, 250-299, give 3 units, 300-349, give 4 units, 350-399, give 5 units, 400-450 give 6 HPI  HEENT: No visual disturbances, difficult swallowing  GI: No nausea, vomiting, abdominal pain  : No dysuria or hematuria  Endocrine: No thyroid disease or diabetes  Musculoskeletal: AGUILLON x 4, no focal motor deficits  Skin: Intact, no rashes  Neuro/Psych: No headache or seizures      Weights: Wt Readings from Last 3 Encounters:   09/02/21 251 lb 3.2 oz (113.9 kg)   08/27/21 249 lb (112.9 kg)   08/21/21 244 lb 4.8 oz (110.8 kg)         Physical Examination:     BP (!) 102/54 (Site: Right Upper Arm, Position: Sitting, Cuff Size: Large Adult)   Pulse 81   Resp 16   Ht 5' 8\" (1.727 m)   Wt 251 lb 3.2 oz (113.9 kg)   SpO2 94%   BMI 38.19 kg/m²     CONSTITUTIONAL: Alert and oriented times 3, no acute distress and cooperative to examination with proper mood and affect. SKIN: Skin color, texture, turgor normal. No rashes or lesions. LYMPH: no cervical nodes, no inguinal nodes  HEENT: Head is normocephalic, atraumatic. EOMI, PERRLA. NECK: Supple, symmetrical, trachea midline, no adenopathy, thyroid symmetric, not enlarged and no tenderness, skin normal.  CHEST/LUNGS: chest symmetric with normal A/P diameter, normal respiratory rate and rhythm, lungs clear to auscultation without wheezes, rales or rhonchi. No accessory muscle use. Scars None   CARDIOVASCULAR: Heart sounds are normal.  Regular rate and rhythm with II/VI systolic murmur, gallop or rub. Normal S1 and S2. . Carotid and femoral pulses 2+/4 and equal bilaterally. ABDOMEN: Obese. No and Laparoscopic scar(s) present. Normal bowel sounds. No bruits. soft, nondistended, no masses or organomegaly. no evidence of hernia. Percussion: Normal without hepatosplenomegally. Tenderness: absent. RECTAL: deferred, not clinically indicated  NEUROLOGIC: There are no focalizing motor or sensory deficits. CN II-XII are grossly intact. Pamalee Bucker EXTREMITIES: no cyanosis, no clubbing. 2+ bilateral lower extremity edema. Warm and well perfused.            All the following diagnostics were personally reviewed and interpreted by me. LAB DATA:     8/22/2021 09:18 8/27/2021 08:50   Sodium 135 135   Potassium 4.0 4.6   Chloride 97 (L) 98   CO2 29 28   BUN 43 (H) 51 (H)   Creatinine 1.6 (H) 1.9 (H)   Anion Gap 9 9   GFR Non- 42 34   GFR African American 51 41   Magnesium 1.7    Glucose 201 (H) 184 (H)   Calcium 9.1 9.6   Pro-BNP  1,405 (H)       IMAGING:    CXR (8/19/2021)  Impression  1. Limited due to patient rotation. 2. Interstitial and hazy opacities bilaterally suggestive of pulmonary vascular congestion or pneumonia with left basilar atelectasis or pleural effusion. CARDIAC TESTING:    TTE (7/31/2020)   Summary   Severe left ventricle hypertrophy. Visually estimated LVEF is 45-50 %. Paradoxical septal wall motion. Severely dilated left atrium. Right ventricle not well visualized. Grossly normal.   Severe mitral annular calcification. Mild mitral stenosis. Mild mitral valve regurgitation. Bioprosthetic aortic valve leaflets not well seen. No evidence of   obstruction with similar transvalvular pressure gradient to the prior echo   in 2018. No aortic regurgitation. Compared to prior echo in 1/21/2018, the paradoxical septal wall motion   abnormality likely due to interventricular conduction delay is new. Device interrogation 5/2021  Battery longevity 7.1 years  DDD  45% a paced 99% V paced  0% AT/AF  0% mode switch    EKG  V paced       ASSESSMENT:  1. Chronic HFrEF (EF 45%)  2. ACC stage C / NYHA class III  3. Euvolemic with dependent lower extremity edema  4. Mixed cardiomyopathy, ischemic, valvular and RV pacing  5. LVEF 45-50%, LVEDD per TTE 7/2020  6. CAD s/p CABG (SVG-RCA)  7. S/p bioprosthetic AVR (2008)  8.  PPM in situ   · Since 2018, his need for ventricular pacing has increased from 25% to 95% on the last interrogation.  Also, his echocardiogram showed worsening ejection fraction from 60% to 45% with dyssynchrony between the lateral wall and the septal wall on the TTE echocardiogram. He was referred to EP however cancelled appointment   9. PAFL - no OAC due to severe Epistaxis in past (10/2020). 0% AF burden on recent interrogation. 10. CKD, baseline 1.4-1.7  11. HTN  12. T2DM with neuropathy and chronic lower extremity wounds   13. Chronic anemia   14. COPD  13. Hypothyroidism  16. Hx of CVA  17. Spinal stenosis on chronic narcotics, wheelchair bound   18. Morbid obesity   19. Depression      PLAN:  1. Continue current cardiac medications    2. Return for echocardiogrm     3. Continue to follow up with CHF clinic     4. Follow up with EP - May benefit from upgrade to CRT-P or CRT-D device depending on ejection fraction    5. Follow up with Dr. Trey Anthony in 3 months    6. Weigh yourself daily    -Stay Hydrated    -Diet should sodium restricted to 2 grams    -Again watch your daily weight trends and if you gain water weight please follow below instructions.    -If you gain 3-5 pounds in 2-3 days OR notice that you are retaining fluid in anyway just like you did before then take an extra dose of your water pill (Bumetanide/Bumex) every day until you lose the weight or feel better.    -If you notice that you have taken more than 2 extra doses in 1 week then please call and let us know. -If at any time you feel that you are retaining fluid, your medications are not working, or you feel ill in anyway, then please call us for either same day appointment or the next day, and for instructions. Our goal is to keep you out of the emergency room and the hospital and we have ways to do it. You just need to call us in a timely manner.     -If you become sick for other reasons, and notice that you are not urinating as much, the urine is very dark, you have significant diarrhea or vomiting, then please DO NOT take your water pill and CALL US immediately.     Gauri LINDA-CNP  [facility] Cardiology

## 2021-09-02 NOTE — PATIENT INSTRUCTIONS
1. Continue current cardiac medications    2. Return for echocardiogrm     3. Continue to follow up with CHF clinic     4. Follow up with EP    5. Follow up with Dr. Ishan Calderon in 3 months    6. Weigh yourself daily    -Stay Hydrated    -Diet should sodium restricted to 2 grams    -Again watch your daily weight trends and if you gain water weight please follow below instructions.    -If you gain 3-5 pounds in 2-3 days OR notice that you are retaining fluid in anyway just like you did before then take an extra dose of your water pill (Bumetanide/Bumex) every day until you lose the weight or feel better.    -If you notice that you have taken more than 2 extra doses in 1 week then please call and let us know. -If at any time you feel that you are retaining fluid, your medications are not working, or you feel ill in anyway, then please call us for either same day appointment or the next day, and for instructions. Our goal is to keep you out of the emergency room and the hospital and we have ways to do it. You just need to call us in a timely manner.     -If you become sick for other reasons, and notice that you are not urinating as much, the urine is very dark, you have significant diarrhea or vomiting, then please DO NOT take your water pill and CALL US immediately.

## 2021-09-09 NOTE — DISCHARGE SUMMARY
Physician Discharge Summary     Patient ID:  Rhea Sherman  [id number]  79 y.o.  1940    Admit date: 8/19/2021    Discharge date and time: 8/22/2021    Admission Diagnoses:   Patient Active Problem List   Diagnosis    Essential hypertension    PVD (peripheral vascular disease) with claudication [facility]    Pacemaker    S/P AVR    CKD (chronic kidney disease) stage 3, GFR 30-59 ml/min    Type 2 diabetes mellitus with stage 3 chronic kidney disease, with long-term current use of insulin (HCC)    Pulmonary nodule    Diabetes mellitus type 2, uncontrolled ([facility]    Hyperlipidemia LDL goal <100    Acquired hypothyroidism    CAD (coronary artery disease), native coronary artery    Chronic pain    History of CVA (cerebrovascular accident)    Obesity (BMI 30-39. 9)    Anemia    Elevated sed rate    Vision decreased    Bilateral carotid bruits    Bilateral carotid artery stenosis    Mild mitral regurgitation    Pulmonary hypertension (HCC)    Moderate tricuspid regurgitation by prior echocardiography    (HFpEF) heart failure with preserved ejection fraction (HCC)    COPD (chronic obstructive pulmonary disease) (HCC)    Diabetic neuropathy associated with type 2 diabetes mellitus (HCC)    Congestive heart failure ([facility]       Discharge Diagnoses: CHF     Consults: cardiology    Procedures: None    Hospital Course: The patient is a 79 y.o. male of Juliana Diaz MD  who presents with shortness of breath.   71-year-old male with a past medical history of CAD, COPD, PVD, cardiomyopathy admitted to telemetry unit with     CHF  -Diurese cautiously so as to avoid renal insufficiency-Bumex 1 mg IV twice daily  -BNP and other labs in a.m.  -Daily weights strict I's and O's  -Low-sodium diet daily  -Supplement O2 to maintain pulse ox duration greater than 93% wean as tolerated  -Medications for other comorbidities continue as appropriate with dose adjustments as needed.  -Consult wound nurse. Alexei malloy daily     bumetanide 1 MG tablet  Commonly known as: BUMEX     cyanocobalamin 1000 MCG/ML injection     DULoxetine 60 MG extended release capsule  Commonly known as: CYMBALTA  Take 1 capsule by mouth daily     ferrous sulfate 325 (65 Fe) MG tablet  Commonly known as: IRON 325  Take 1 tablet by mouth 2 times daily (with meals)     gabapentin 400 MG capsule  Commonly known as: NEURONTIN     isosorbide mononitrate 120 MG extended release tablet  Commonly known as: IMDUR  Take 1 tablet by mouth daily     lactulose 20 g packet  Commonly known as: CEPHULAC     levothyroxine 25 MCG tablet  Commonly known as: SYNTHROID     lovastatin 20 MG tablet  Commonly known as: MEVACOR     magnesium oxide 400 MG tablet  Commonly known as: MAG-OX     metOLazone 2.5 MG tablet  Commonly known as: ZAROXOLYN     metoprolol succinate 50 MG extended release tablet  Commonly known as: TOPROL XL     potassium chloride 20 MEQ extended release tablet  Commonly known as: KLOR-CON M     TRELEGY ELLIPTA IN        STOP taking these medications    azelastine 0.1 % nasal spray  Commonly known as: ASTELIN     HYDROcodone-acetaminophen  MG per tablet  Commonly known as: Norco     morphine 10 MG extended release capsule  Commonly known as: THIERRY     sodium chloride 0.65 % nasal spray  Commonly known as: OCEAN, BABY AYR        ASK your doctor about these medications    HYDROcodone-acetaminophen  MG per tablet  Commonly known as: Norco  Take 1 tablet by mouth every 6 hours as needed for Pain for up to 3 days. Ask about: Should I take this medication?     morphine 10 MG extended release capsule  Commonly known as: THIERRY  Take 1 capsule by mouth 2 times daily for 5 days. Ask about: Should I take this medication?            Where to Get Your Medications      These medications were sent to [address] [place], [place] [id number]  [address] [place] [address]    Phone: [phone] · insulin glargine 100 UNIT/ML injection vial     You can get these medications from any pharmacy    Bring a paper prescription for each of these medications  · HYDROcodone-acetaminophen  MG per tablet  · morphine 10 MG extended release capsule       Activity: activity as tolerated  Diet: cardiac diet and diabetic diet    Pt has been advised to: Follow-up with Kehinde Pavon MD in 1 week.   Follow-up with consultants as recommended by them    Note that over 30 minutes was spent in preparing discharge papers, discussing discharge with patient, medication review, etc.    Signed:  Bethany Yepez MD  9/9/2021  5:50 PM

## 2021-09-23 PROBLEM — L03.90 CELLULITIS: Status: ACTIVE | Noted: 2021-01-01

## 2021-09-23 NOTE — CONSULTS
Department of Podiatry Surgery   Consult Note        Reason for Consult:  B/L leg wounds, necrotic toes right foot  Requesting Physician:  Dr. Damon Drilling:  B/L leg wounds, necrotic toes right foot     HISTORY OF PRESENT ILLNESS:  The patient is an [de-identified]year old male who presents with a chief complaint of chronic leg wounds and necrotic toes. The patient states that he's had these wounds on his lower extremities for 7 months. He states that he was being seen by a doctor who visited at [facility] where he stays. The patient states that the doctor has tried everything and the wounds remain.  He denies any N/V/F/C/CP/SOB    Medical History     Past Medical History:   Diagnosis Date    (HFpEF) heart failure with preserved ejection fraction ([address] 07/2020    Diagnosed by cardiology    Arthritis     Bilateral carotid artery stenosis 12/31/2020    Bilateral carotid bruits 12/31/2020    Blood circulation, collateral     CAD (coronary artery disease)     Carotid bruit 03/27/2013    [facility] failure[facility]     Chronic kidney disease     CKD (chronic kidney disease) stage 3, GFR 30-59 ml/min ([facility]) 03/20/2016    COPD (chronic obstructive pulmonary disease) ([address]     Follows with pulmonary    Diabetes mellitus ([address]     Diabetic neuropathy associated with type 2 diabetes mellitus ([facility])     Dyspnea on exertion     History of blood transfusion     Hyperlipidemia     Hypertension     Mild mitral regurgitation     Moderate tricuspid regurgitation by prior echocardiography 2018    Movement disorder     NSTEMI, initial episode of care ([address] 01/2018    Obesity     Pulmonary hypertension ([address] 2018    PVD (peripheral vascular disease) with claudication ([address] 03/27/2014    S/P carotid endarterectomy 03/27/2013    Sleep apnea     SOB (shortness of breath)     Thyroid disease     Unspecified cerebral artery occlusion with cerebral infarction     Vision decreased 12/31/2020 Surgical History:  Past Surgical History:   Procedure Laterality Date    BIOPSY / LIGATION TEMPORAL ARTERY Bilateral 01/05/2021    BILATERAL TEMPORAL ARTERY BIOPSY performed by Will Louis MD at [address]      5 years ago   [address] CORONARY ARTERY BYPASS GRAFT  2008    1 vessel and aortic valve repair    DIAGNOSTIC CARDIAC CATH LAB PROCEDURE  10/14/2008    DIAGNOSTIC CARDIAC CATH LAB PROCEDURE  10/21/2010    JOINT REPLACEMENT      R knee replacement    KNEE SURGERY      OTHER SURGICAL HISTORY Right 02/14/2017    debridement,bone bx foot    PACEMAKER INSERTION  11/12/2014    D-PPM   ()    Dr. Dennie Lites carotids       Family History:  Family History   Problem Relation Age of Onset    Heart Disease Mother     Heart Failure Mother     Heart Surgery Father     Heart Disease Father     Heart Failure Father     High Blood Pressure Sister    Britton Shahid Cancer Sister        Allergies:  No Known Allergies    I reviewed the patient's past medical and surgical history, Medications and Allergies.       Current Facility-Administered Medications:     lactated ringers infusion, , IntraVENous, Continuous, Kami Manzanares MD    ceFAZolin (ANCEF) 2000 mg in sterile water 20 mL IV syringe, 2,000 mg, IntraVENous, Q8H, Kami Manzanares MD, 2,000 mg at 09/23/21 0910    labetalol (NORMODYNE;TRANDATE) injection 10 mg, 10 mg, IntraVENous, Q4H PRN, Kami Manzanares MD    perflutren lipid microspheres (DEFINITY) injection 1.65 mg, 1.5 mL, IntraVENous, ONCE PRN, Marzena Roberto, APRN - CNP     REVIEW OF SYSTEMS:    10 point review of systems is negative unless otherwise noted below:  History obtained from chart review and the patient        PHYSICAL EXAM:       Vitals:    09/23/21 0044 09/23/21 0356 09/23/21 0740 09/23/21 0945   BP: (!) 185/85 (!) 193/85 (!) 162/82 (!) 166/70   Pulse: 80 84 85 83   Resp: 18 16 20 18   Temp: 98.8 °F (37.1 °C) 98 °F (36.7 °C) 98.7 °F (37.1 °C) 99 °F (37.2 °C)   TempSrc: Oral   Oral   SpO2: 94% 94% 95% 93%   Weight: 243 lb (110.2 kg)      Height: 5' 7\" (1.702 m)          VASCULAR:  DP and PT pulses are non-palpable. CFT less than 3 seconds. Warm to cool from the tibial tuberosity to the distal aspect of the digits dorsally. Hair growth absent to the lower extremity. Diffuse edema noted   NEUROLOGIC:  Gross sensation intact to the lower extremity  DERM:  Venous stasis dermatitis noted to the b/l lower. Partial thickness wounds are noted to the anterior surface of the b/l lower extremities. Diffuse erythema is noted to the lower extremities b/l. Toes 2 and 4 on the right foot show gangrenous changes, with the 3rd toe being completely gangrenous in appearance. There are two eschars noted the lateral aspect of the right foot and the level of the 5th MPJ.   MUSCULOSKELETAL: 5/5 for all pedal muscle groups. Pain is noted at the area of the the wounds.                          LABS:  CBC with Differential:    Lab Results   Component Value Date    WBC 10.8 09/23/2021    RBC 3.74 09/23/2021    HGB 11.9 09/23/2021    HCT 36.2 09/23/2021     09/23/2021    MCV 96.8 09/23/2021    MCH 31.8 09/23/2021    MCHC 32.9 09/23/2021    RDW 14.9 09/23/2021    NRBC 0.0 04/22/2018    SEGSPCT 76 03/16/2014    BANDSPCT 1 09/02/2016    LYMPHOPCT 8.4 09/23/2021    MONOPCT 7.0 09/23/2021    BASOPCT 0.5 09/23/2021    MONOSABS 0.76 09/23/2021    LYMPHSABS 0.91 09/23/2021    EOSABS 0.58 09/23/2021    BASOSABS 0.05 09/23/2021     Hemoglobin/Hematocrit:    Lab Results   Component Value Date    HGB 11.9 09/23/2021    HCT 36.2 09/23/2021       Scheduled Meds:   ceFAZolin  2,000 mg IntraVENous Q8H       Continuous Infusions:   lactated ringers         PRN Meds:.labetalol, perflutren lipid microspheres    DIAGNOSTICS:  XR FOOT RIGHT (MIN 3 VIEWS)    Result Date: 9/23/2021  EXAMINATION: THREE XRAY VIEWS OF THE RIGHT FOOT 9/23/2021 1:27 am COMPARISON: 07/31/2012 HISTORY: ORDERING SYSTEM PROVIDED HISTORY: Right foot pain, necrotic toe TECHNOLOGIST PROVIDED HISTORY: Reason for exam:->Right foot pain, necrotic toe FINDINGS: There is no evidence of acute fracture. There is normal alignment of the tarsometatarsal joints. No acute joint abnormality. Small dorsal and plantar calcaneal spurs. No focal soft tissue abnormality. No acute osseous abnormality. Assessment  1. Chronic leg wounds  2. Gangrenous toes 2-4, right foot  3. PVD    Plan  - Patient was examined and evaluated. - Reviewed patient's recent lab results and pertinent diagnostic imaging.  - Reviewed ancillary service notes. - NIVS pending  -  ID consulted  - Vascular surgery consulted. - Wound dressings applied consisting of xeroform and DSD  - Surgical debridement will be held off until the patient is worked up by other services and vascular studies return. Will continue with daily dressing changes at this time. - Will continue to follow patient while they are in-house.     D/w: Davidjohn Kaplan DPM FACFAS  Fellowship-Trained Foot and Ankle Surgeon  Diplomate, American Board of Foot and Ankle Surgeons  [phone]    Justin Chapa DPM PGY-3  Cell: [phone]    Electronically signed by Jeramy Álvarez DPM on 9/23/2021 at 10:49 AM

## 2021-09-23 NOTE — CONSULTS
Vascular Surgery Consultation Note    Reason for Consult:  PVD with ulceration    HPI : This is a [de-identified] y.o. male admitted on 9/23/2021 12:37 AM with worsening wounds of bilateral LE. Per pt he has had wounds since 2/2021. Currently he is living at [facility] and his wounds have been taken care of by wound physician who goes there. 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 He has choronic le swelling and edema. His pain associated with wounds is currently 5/10  It is achey and sore. Vascular surgery is consulted in regards to pvd with wounds.       ROS : All others Negative if blank [], Positive if [x]  General Urinary   [] Fevers [] Hematuria   [] Chills [] Dysuria   [] Weight Loss Vascular   Skin [] Claudication   [x] Tissue Loss [] Rest Pain   Eyes Neurologic   [x] Wears Glasses/Contacts [x] Stroke/TIA   [] Vision Changes [] Focal weakness   Respiratory [] Slurred Speech    [] Shortness of breath ENT   Cardiovascular [] Difficulty swallowing   [] Chest Pain Endocrine    [x] Shortness of breath with exertion [] Increased Thirst   Gastrointestinal    [] Abdominal Pain    [] Melena   [] Hematochezia         Past Medical History:   Diagnosis Date    (HFpEF) heart failure with preserved ejection fraction ([facility] 07/2020    Diagnosed by cardiology    Arthritis     Bilateral carotid artery stenosis 12/31/2020    Bilateral carotid bruits 12/31/2020    Blood circulation, collateral     CAD (coronary artery disease)     Carotid bruit 03/27/2013    CH[facility]congest[facility]) [facility]     Chronic kidney disease     CKD (chronic kidney disease) stage 3, GFR 30-59 ml/min ([facility]) 03/20/2016    COPD (chronic obstructive pulmonary disease) ([facility]     Follows with pulmonary    Diabetes mellitus ([facility]     Diabetic neuropathy associated with type 2 diabetes mellitus (HCC)     Dyspnea on exertion     History of blood transfusion     Hyperlipidemia     Hypertension     Mild mitral regurgitation     Moderate tricuspid regurgitation by prior echocardiography 2018    Movement disorder     NSTEMI, initial episode of care ([facility] 01/2018    Obesity     Pulmonary hypertension ([facility] 2018    P[facility]ication [facility] 03/27/2014    S/P carotid endarterectomy 03/27/2013    Sleep apnea     SOB (shortness of breath)     Thyroid disease     Unspecified cerebral artery occlusion with cerebral infarction     Vision decreased 12/31/2020        Past Surgical History:   Procedure Laterality Date    BIOPSY / LIGATION TEMPORAL ARTERY Bilateral 01/05/2021    BILATERAL TEMPORAL ARTERY BIOPSY performed by Jordyn Nagy MD at [address]      5 years ago   [address] GRAFT  2008    1 vessel and aortic valve repair    DIAGNOSTIC CARDIAC CATH LAB PROCEDURE  10/14/2008    DIAGNOSTIC CARDIAC CATH LAB PROCEDURE  10/21/2010    JOINT REPLACEMENT      R knee replacement    KNEE SURGERY      OTHER SURGICAL HISTORY Right 02/14/2017    debridement,bone bx foot    PACEMAKER INSERTION  11/12/2014    D-PPM   ()    Dr. Jaqui johnson     Current Medications:    dextrose      sodium chloride        albuterol, HYDROcodone-acetaminophen, glucose, dextrose, glucagon (rDNA), dextrose, sodium chloride flush, sodium chloride, ondansetron **OR** ondansetron, polyethylene glycol, acetaminophen **OR** acetaminophen, labetalol, perflutren lipid microspheres    allopurinol  300 mg Oral Daily    aspirin EC  81 mg Oral Daily    DULoxetine  60 mg Oral Daily    ferrous sulfate  325 mg Oral BID WC    gabapentin  200 mg Oral TID    isosorbide mononitrate  120 mg Oral Daily    lactulose  20 g Oral TID    levothyroxine  25 mcg Oral Daily    metoprolol succinate  50 mg Oral Daily    sodium chloride flush  5-40 mL IntraVENous 2 times per day    enoxaparin  40 mg SubCUTAneous Intimate Partner Violence:     Fear of Current or Ex-Partner:     Emotionally Abused:     Physically Abused:     Sexually Abused:      Family History   Problem Relation Age of Onset    Heart Disease Mother     Heart Failure Mother     Heart Surgery Father     Heart Disease Father     Heart Failure Father     High Blood Pressure Sister     Cancer Sister      PHYSICAL EXAM:    BP (!) 160/68   Pulse 81   Temp 98.7 °F (37.1 °C) (Oral)   Resp 18   Ht 5' 7\" (1.702 m)   Wt 243 lb (110.2 kg)   SpO2 96%   BMI 38.06 kg/m²   CONSTITUTIONAL:   Awake, alert, cooperative  PSYCHIATRIC :  Oriented  place and person      Some insight to disease process  EYES: Lids and lashes normal  ENT:  External ears and nose without lesions   Hearing deficits noted  NECK: Supple, symmetrical, trachea midline   Thyroid goiter not appreciated  LUNGS:  No increased work of breathing                 Good respiratory excursion  CARDIOVASCULAR:  regular rate and rhythm   ABDOMEN:  soft, non-distended, non-tender  EXTREMITIES:   R UE 5/5 strength   No cyanosis noted in nail beds  L UE 5/5 strength   No cyanosis noted in nail beds  R LE Edema present   Open wounds present   Gangrene of toes  L LE Edema present   Open wound present  R femoral 1+ L femoral 1+   R posterior tibial No signal L posterior tibial monophasic   R dorsalis pedis No signal L dorsalis pedis monophasic     LABS:    Lab Results   Component Value Date    WBC 10.8 09/23/2021    HGB 11.9 (L) 09/23/2021    HCT 36.2 (L) 09/23/2021     09/23/2021    PROTIME 11.7 01/05/2021    INR 1.0 01/05/2021    K 4.7 09/23/2021    BUN 55 (H) 09/23/2021    CREATININE 2.2 (H) 09/23/2021     Lab Results   Component Value Date    LABA1C 6.5 (H) 01/21/2021     No results found for: EAG  Lab Results   Component Value Date    LABALBU 3.1 (L) 09/23/2021        Radiology pertinent to vascular surgery evaluation reviewed    Assesment/Plan  Bilateral LE venous stasis ulcerations  R toes 2-4 gangrene  PVD with ulceration  · Continue Medical management with ASA  · Cholesterol panel was ordered  · HgbA1C check    · optimization per PCP  · Prelabumin check  · Nutrition will need optimized, consult nutrition  · Discussed with pt tobacco use and relationship to PVD   pt currently is not a smoker   Pt understands tobacco use can contribute to worsening of pvd and development of  limb loss   · Emphasized importance of foot care and to call if develops any wounds, ulcerations, changes in appearance, claudication and/or symptoms  · local wound care  · Offloading  · He is at risk for limb loss  · He does have evidence of significant arterial occlusive disease on exam and on arterial studies  · Would recommend angiogram ideally - but patient does have significant CKD  · Renal to optimize renal function  · Plan for angiogram next week on Monday with Dr Rebeca Vang MD

## 2021-09-23 NOTE — PROGRESS NOTES
Attempted Vascular exam on Mr Kary. While attempting dopplers of right right foot, patient  told me to stop the exam and is refusing the rest of the test at this time. Explained the importance of this testing to him. He still states that,  \"we are done\". Nursing staff notified.

## 2021-09-23 NOTE — H&P
[address] [facility]  History and Physical      CHIEF COMPLAINT:    Chief Complaint   Patient presents with    Leg Pain     Bilateral leg pain with wounds that are getting worse per patient. Sent in from [facility]        Patient of Gerson Collado MD presents with: Worsening bilateral leg cellulitis      History of Present Illness:   Patient is a [de-identified]year old male with a PMH of HFpEF, CAD, COPD, CKD, DM, Thryoid diseases, NSTEMI, Pulmonary HTN,  HTN, HLD, and PVD that presented for chronic wounds and cellulitis. He was sent in from [facility] for necrotic toes of the right foot. These wounds have been chronic for the past many months and been followed by podiatry. He denies any chest pain, shortness of breath, abdominal pain, nausea, vomiting, or diarrhea. He was found to have WBC 10.8 Hgb 11.9, Cr. 2.2 (baseline 1.3-2.5 this year) and a procal of 0.46 in the emergency department. He indicates they became black over the past 1 month or so. On evaluation, his right third and fourth toe black/gangrene/necrotic. He denies any fevers chills or calf pain or tenderness. Swelling is noted in bilateral lower extremities       REVIEW OF SYSTEMS:  Pertinent negatives are above in HPI. 10 point ROS otherwise negative.       Past Medical History:   Diagnosis Date    (HFpEF) heart failure with preserved ejection fraction ([address] 07/2020    Diagnosed by cardiology    Arthritis     Bilateral carotid artery stenosis 12/31/2020    Bilateral carotid bruits 12/31/2020    Blood circulation, collateral     CAD (coronary artery disease)     Carotid bruit 03/27/2013    CHF (congestive heart failure) ([facility])     Chronic kidney disease     CKD (chronic kidney disease) stage 3, GFR 30-59 ml/min ([facility]) 03/20/2016    COPD (chronic obstructive pulmonary disease) ([address]     Follows with pulmonary    Diabetes mellitus ([address]     Diabetic neuropathy associated with type 2 diabetes mellitus ([facility])     Dyspnea on exertion     25 MG tablet, Take 25 mg by mouth 4 times daily   lactulose 20 GM/30ML SOLN, Take 20 g by mouth three times a week *MON-WED-FRI*  metOLazone (ZAROXOLYN) 2.5 MG tablet, Take 2.5 mg by mouth three times a week *TUE-THUR-SAT*  bumetanide (BUMEX) 1 MG tablet, Take 1 mg by mouth daily   potassium chloride (KLOR-CON M) 20 MEQ extended release tablet, Take 40 mEq by mouth daily   ferrous sulfate (IRON 325) 325 (65 Fe) MG tablet, Take 1 tablet by mouth 2 times daily (with meals)  DULoxetine (CYMBALTA) 60 MG extended release capsule, Take 1 capsule by mouth daily  fluticasone-umeclidin-vilant (TRELEGY ELLIPTA) 100-62.5-25 MCG/INH AEPB, Inhale 1 puff into the lungs Daily   aspirin EC 81 MG EC tablet, Take 1 tablet by mouth daily  albuterol (PROVENTIL) (2.5 MG/3ML) 0.083% nebulizer solution, Take 3 mLs by nebulization every 6 hours as needed for Wheezing or Shortness of Breath DX: COPD J44.9 Please bill Medicare Part B  levothyroxine (SYNTHROID) 25 MCG tablet, Take 25 mcg by mouth Daily   gabapentin (NEURONTIN) 400 MG capsule, Take 400 mg by mouth 4 times daily. metoprolol succinate (TOPROL XL) 50 MG extended release tablet, Take 50 mg by mouth daily  isosorbide mononitrate (IMDUR) 120 MG extended release tablet, Take 1 tablet by mouth daily  allopurinol (ZYLOPRIM) 300 MG tablet, Take 300 mg by mouth daily   lovastatin (MEVACOR) 20 MG tablet, Take 20 mg by mouth nightly. Note that the patient's home medications were reviewed and the above list is accurate to the best of my knowledge at the time of the exam.    Allergies:    Patient has no known allergies. Social History:    reports that he quit smoking about 40 years ago. His smoking use included cigarettes. He started smoking about 67 years ago. He has a 50.00 pack-year smoking history. He has never used smokeless tobacco. He reports previous drug use. He reports that he does not drink alcohol. Family History:   family history includes Cancer in his sister;  Heart Disease in his father and mother; Heart Failure in his father and mother; Heart Surgery in his father; High Blood Pressure in his sister. PHYSICAL EXAM:    Vitals:  BP (!) 160/68   Pulse 81   Temp 98.7 °F (37.1 °C) (Oral)   Resp 18   Ht 5' 7\" (1.702 m)   Wt 243 lb (110.2 kg)   SpO2 96%   BMI 38.06 kg/m²       General appearance: NAD, conversant  Eyes: Sclerae anicteric, PERRL  HEENT: AT/NC, MMM  Neck: FROM, supple, no thyromegaly  Lymph: No cervical / supraclavicular lymphadenopathy  Lungs: Clear to auscultation, WOB normal  CV: RRR, no MRGs, bilateral 2+ non-pitting edema   Abdomen: Soft, non-tender; no masses or HSM, +BS  Extremities: FROM without synovitis. No clubbing or cyanosis of the hands. Skin:           Psych: Calm and cooperative. Normal judgement and insight. Normal mood and affect. Neuro: Alert and interactive, face symmetric, speech fluent. LABS:  All labs reviewed.   Of note:  CBC with Differential:    Lab Results   Component Value Date    WBC 10.8 09/23/2021    RBC 3.74 09/23/2021    HGB 11.9 09/23/2021    HCT 36.2 09/23/2021     09/23/2021    MCV 96.8 09/23/2021    MCH 31.8 09/23/2021    MCHC 32.9 09/23/2021    RDW 14.9 09/23/2021    NRBC 0.0 04/22/2018    SEGSPCT 76 03/16/2014    BANDSPCT 1 09/02/2016    LYMPHOPCT 8.4 09/23/2021    MONOPCT 7.0 09/23/2021    BASOPCT 0.5 09/23/2021    MONOSABS 0.76 09/23/2021    LYMPHSABS 0.91 09/23/2021    EOSABS 0.58 09/23/2021    BASOSABS 0.05 09/23/2021     CMP:    Lab Results   Component Value Date     09/23/2021    K 4.7 09/23/2021    CL 97 09/23/2021    CO2 27 09/23/2021    BUN 55 09/23/2021    CREATININE 2.2 09/23/2021    GFRAA 35 09/23/2021    LABGLOM 29 09/23/2021    GLUCOSE 145 09/23/2021    GLUCOSE 111 07/16/2011    PROT 7.6 09/23/2021    LABALBU 3.1 09/23/2021    CALCIUM 10.1 09/23/2021    BILITOT 0.3 09/23/2021    ALKPHOS 128 09/23/2021    AST 64 09/23/2021    ALT 48 09/23/2021       Imaging:  XR R Foot- negaitve  VL SÁNCHEZ Bilateral Limited-      EKG:  EKG not preformed in ER, previous EKG displayed A-paced     Telemetry:  Not on tele. ASSESSMENT/PLAN:  Active Problems:    Cellulitis  Resolved Problems:    * No resolved hospital problems. *    Cellulitis   -IV abx- Ancef q8h-to be managed by ID  -ID following  -Monitor WBC/Fever curve    Necrotic Toes of R Foot  -Podiatry following- local wound care per them  -Vascular Surgery following  -ABIs pending    DM Type 2  -Monitor labs, check hemoglobin A1c  -ISS glucose control, resume home medications and titrate as needed with introduction of insulin  -Hypoglycemia protocol initiated  -Diabetes education  -Discuss diet modification     Resume home medications, monitor for fulminant sepsis  Uncontrolled blood pressure-as needed BP meds, resume home regimen    Code status: Full  Requires inpatient level of care  MADDI Pal CNP    2:34 PM  9/23/2021     Resting comfortably on my evaluation but falling asleep as he recently received some pain medication  Infectious disease following for ongoing antibiotic therapy  Await OR tomorrow with podiatry for amputation  Vascular to follow for peripheral arterial disease    Above note edited to reflect my thoughts     I personally saw, examined and provided care for the patient. Radiographs, labs and medication list were reviewed by me independently. The case was discussed in detail and plans for care were established. Review of FABIO Pal   , documentation was conducted and revisions were made as appropriate directly by me. I agree with the above documented exam, problem list, and plan of care.      Merritt Perea MD  7:31 PM  9/23/2021

## 2021-09-23 NOTE — CONSULTS
[address] [place] [address] [facility]  Consultation Note     Admit Date: 9/23/2021 12:37 AM    Reason for Consult:   Toe necrosis on right foot, bilateral lower leg cellulitis    Attending Physician:  Sarina Jimenez MD    HISTORY OF PRESENT ILLNESS:             The history is obtained from extensive review of available past medical records. The patient is a [de-identified] y.o. male who is known to the ID service. The patient was sent to the ED at [facility] from [facility] because of necrotic toes on the right foot. He has chronic wounds on both lower extremities. He was seen by podiatry. They recommended wound dressings and further surgical debridement after vascular studies. He has been afebrile. White count was normal.  Cefazolin was started. No wound cultures were taken.     Past Medical History:        Diagnosis Date    (HFpEF) heart failure with preserved ejection fraction ([address] 07/2020    Diagnosed by cardiology    Arthritis     Bilateral carotid artery stenosis 12/31/2020    Bilateral carotid bruits 12/31/2020    Blood circulation, collateral     CAD (coronary artery disease)     Carotid bruit 03/27/2013    CHF (congestive heart failure) (HCC)     Chronic kidney disease     CKD (chronic kidney disease) stage 3, GFR 30-59 ml/min ([facility]) 03/20/2016    COPD (chronic obstructive pulmonary disease) ([address]     Follows with pulmonary    Diabetes mellitus ([address]     Diabetic neuropathy associated with type 2 diabetes mellitus (HCC)     Dyspnea on exertion     History of blood transfusion     Hyperlipidemia     Hypertension     Mild mitral regurgitation     Moderate tricuspid regurgitation by prior echocardiography 2018    Movement disorder     NSTEMI, initial episode of care ([address] 01/2018    Obesity     Pulmonary hypertension ([address] 2018    PVD (peripheral vascular disease) with claudication ([address] 03/27/2014    S/P carotid endarterectomy 03/27/2013    Sleep apnea     SOB (shortness of breath)     Thyroid disease     Unspecified cerebral artery occlusion with cerebral infarction     Vision decreased 12/31/2020 January 2021. Admitted to [facility]. He had sustained a few falls and had presented to the ED with hypotension and acute kidney injury. Blood cultures turn positive for CoNS and 2 of 4 bottles. Seen by ID for positive blood culture. He received 1 dose of Vancomycin because he had a pacer. Repeat blood cultures, however no growth, therefore antibiotics were discontinued. February 2017. Admitted to [facility] by his podiatrist.  He was being treated for a neuropathic ulcer on the right foot and had received 2 different courses of antibiotics that included Bactrim and Clindamycin. He underwent a biopsy of the first metatarsal.  Treated empirically with Vancomycin and Cefepime. Cultures were negative. Antibiotics were discontinued and he was discharged.     Past Surgical History:        Procedure Laterality Date    BIOPSY / LIGATION TEMPORAL ARTERY Bilateral 01/05/2021    BILATERAL TEMPORAL ARTERY BIOPSY performed by Zack Mercer MD at [address]      5 years ago   [place] [address] CORONARY ARTERY BYPASS GRAFT  2008    1 vessel and aortic valve repair    DIAGNOSTIC CARDIAC CATH LAB PROCEDURE  10/14/2008    DIAGNOSTIC CARDIAC CATH LAB PROCEDURE  10/21/2010    JOINT REPLACEMENT      R knee replacement    KNEE SURGERY      OTHER SURGICAL HISTORY Right 02/14/2017    debridement,bone bx foot    PACEMAKER INSERTION  11/12/2014    D-PPM   ()    Dr. Angie johnson     Current Medications:   Scheduled Meds:   allopurinol  300 mg Oral Daily    aspirin EC  81 mg Oral Daily    DULoxetine  60 mg Oral Daily    ferrous sulfate  325 mg Oral BID WC    gabapentin  200 mg Oral TID    isosorbide mononitrate  120 mg Oral Daily    lactulose  20 g Oral TID    levothyroxine  25 mcg Oral Daily    metoprolol succinate  50 mg Oral Daily    sodium chloride flush  5-40 mL IntraVENous 2 times per day    enoxaparin  40 mg SubCUTAneous Daily    insulin lispro  0-6 Units SubCUTAneous TID WC    insulin lispro  0-3 Units SubCUTAneous Nightly    ceFAZolin  2,000 mg IntraVENous Q8H    budesonide  250 mcg Nebulization BID    ipratropium  0.5 mg Nebulization 4x daily    Arformoterol Tartrate  15 mcg Nebulization BID     Continuous Infusions:   dextrose      sodium chloride      lactated ringers       PRN Meds:albuterol, HYDROcodone-acetaminophen, glucose, dextrose, glucagon (rDNA), dextrose, sodium chloride flush, sodium chloride, ondansetron **OR** ondansetron, polyethylene glycol, acetaminophen **OR** acetaminophen, labetalol, perflutren lipid microspheres    Allergies:  Patient has no known allergies. Social History:   Social History     Socioeconomic History    Marital status:      Spouse name: None    Number of children: None    Years of education: None    Highest education level: None   Occupational History    Occupation: retired- [employer]   Tobacco Use    Smoking status: Former Smoker     Packs/day: 2.00     Years: 25.00     Pack years: 50.00     Types: Cigarettes     Start date: 1953     Quit date: 1981     Years since quittin.2    Smokeless tobacco: Never Used   Vaping Use    Vaping Use: Never used    Passive vaping exposure Yes   Substance and Sexual Activity    Alcohol use: Never     Comment:      Drug use: Not Currently     Comment: no longer eating edibles. states he had a bad episode.      Sexual activity: Not Currently   Other Topics Concern    None   Social History Narrative    1 cup coffee daily     Social Determinants of Health     Financial Resource Strain:     Difficulty of Paying Living Expenses:    Food Insecurity:     Worried About Running Out of Food in the Last Year:     [address] in the Last Year:    Transportation Needs:     Lack of regular. No murmurs appreciated. Abdomen: Positive bowel sounds to auscultation. Benign to palpation. No masses felt. No hepatosplenomegaly. Extremities: He has chronic superficial wounds on both legs. A few wounds noted on feet. The right third toe is necrotic. Partial necrosis of the second and fourth toes noted. No real evidence of cellulitis.   Lines: peripheral      CBC+dif:  Recent Labs     09/23/21  0226   WBC 10.8   HGB 11.9*   HCT 36.2*   MCV 96.8      NEUTROABS 8.43*     Lab Results   Component Value Date    CRP 21.4 (H) 09/23/2021    CRP 2.0 (H) 01/24/2021    CRP 1.9 (H) 01/23/2021      No results found for: CRP  Lab Results   Component Value Date    SEDRATE 115 (H) 09/23/2021    SEDRATE 65 (H) 01/25/2021    SEDRATE 72 (H) 01/24/2021     Lab Results   Component Value Date    ALT 48 (H) 09/23/2021    AST 64 (H) 09/23/2021    ALKPHOS 128 09/23/2021    BILITOT 0.3 09/23/2021     Lab Results   Component Value Date     09/23/2021    K 4.7 09/23/2021    CL 97 09/23/2021    CO2 27 09/23/2021    BUN 55 09/23/2021    CREATININE 2.2 09/23/2021    GFRAA 35 09/23/2021    LABGLOM 29 09/23/2021    GLUCOSE 145 09/23/2021    GLUCOSE 111 07/16/2011    PROT 7.6 09/23/2021    LABALBU 3.1 09/23/2021    CALCIUM 10.1 09/23/2021    BILITOT 0.3 09/23/2021    ALKPHOS 128 09/23/2021    AST 64 09/23/2021    ALT 48 09/23/2021       Lab Results   Component Value Date    PROTIME 11.7 01/05/2021    PROTIME 11.3 06/21/2011    INR 1.0 01/05/2021       Lab Results   Component Value Date    TSH 4.270 08/19/2021       Lab Results   Component Value Date    COLORU Yellow 08/19/2021    PHUR 5.5 08/19/2021    LABCAST RARE 10/11/2017    WBCUA 5-10 08/19/2021    WBCUA NONE 07/12/2011    RBCUA 2-5 08/19/2021    RBCUA 1-3 03/17/2014    BACTERIA RARE 08/19/2021    CLARITYU Clear 08/19/2021    SPECGRAV 1.015 08/19/2021    LEUKOCYTESUR TRACE 08/19/2021    UROBILINOGEN 0.2 08/19/2021    BILIRUBINUR Negative 08/19/2021 BILIRUBINUR NEGATIVE 07/12/2011    BLOODU TRACE-INTACT 08/19/2021    GLUCOSEU Negative 08/19/2021    GLUCOSEU NEGATIVE 07/12/2011       Lab Results   Component Value Date    HCO3 23.4 06/03/2018    BE -1.2 06/03/2018    O2SAT 96.7 06/03/2018    PH 7.396 06/03/2018    PH 7.453 09/06/2012    PCO2 39.0 06/03/2018    PO2 92.8 06/03/2018     Radiology:  Noted    Microbiology:  Pending  No results for input(s): BC in the last 72 hours. No results for input(s): ORG in the last 72 hours. No results for input(s): [place]gi [place] in the last 72 hours. No results for input(s): STREPNEUMAGU in the last 72 hours. No results for input(s): LP1UAG in the last 72 hours. No results for input(s): ASO in the last 72 hours. No results for input(s): CULTRESP in the last 72 hours. Recent Labs     09/23/21  0226   PROCAL 0.46*       Assessment:  · Peripheral arterial disease  · Dry gangrene right second, third and fourth toes  · Chronic ischemic ulcers bilateral lower extremities. These do not appear to be infected. He was placed on Cefazolin, however, no wound cultures were taken    Plan:    · Antibiotics per hospitalist service  · Wound care per podiatry    Thank you for having us see this patient in consultation. I will be discussing this case with the treating physicians.     Irasema Harry MD  11:29 AM  9/23/2021

## 2021-09-23 NOTE — PROGRESS NOTES
Nephrology consult completed via [employer] to Dr. Alicia Huff, on call for Dr. Chavez Son.     Electronically signed by Corry Hickman RN on 9/23/2021 at 7:35 PM

## 2021-09-23 NOTE — LETTER
[address] [place] [address] Dr Department Medicaid  CERTIFICATION OF NECESSITY  FOR NON-EMERGENCY TRANSPORTATION   BY GROUND AMBULANCE      Individual Information   1. Name: Ced Bernabe 2. [address] [place] [address] Dr Medicaid Billing Number:    3. Address: [place] [address]      Transportation Provider Information   4. Provider Name:    5. [address] [place] [address] Dr Medicaid Provider Number:  National Provider Identifier (NPI):      Certification  7. Criteria:  During transport, this individual requires:  [x] Medical treatment or continuous     supervision by an EMT. [] The administration or regulation of oxygen by another person. [] Supervised protective restraint. 8. Period Beginning Date: 2021   9. Length  [x] Not more than 10 day(s)  [] One Year     Additional Information Relevant to Certification   10. Comments or Explanations, If Necessary or Appropriate   Acute hospital to hospital transfer for services unavailable at [address] [place] [address]   11. Name of Practitioner: Dr. Jackeline Hwang   12. [address] [place] [address]  Medicaid Provider Number, If Applicable:  [address] Provider Identifier (NPI):      Signature Information   14. Date of Signature: 2021 15. Name of Person Signing: Vita Dawson RN   16. Signature and Professional Designation: Stacey Fishman RN discharge planner     [facility] [id number]  Rev. 2015  [address] Encounter Date/Time: 2021 [facility] Account: [de-identified]    MRN: [id number]    Patient: [place] Serial #: [id number]      ENCOUNTER          Patient Class: I Private Enc? No Unit RM BD: [address] Service: Med/Surg   Encounter DX: Cellulitis [L03.90]   ADM Provider: Jackeline Hwang MD   Procedure:     ATT Provider: Jcakeline Hwang MD   REF Provider:        Admission DX: Cellulitis and DX codes: L03.90      PATIENT                 Name: Ced Bernabe : 1940 (81 yrs)   Address:  [facility] Sex: Male   [facility]: [facility] [address]         Marital Status:    Employer: RETIRED         Scientologist: Latter-day   Primary Care Provider: Danni Cardoso MD         Primary Phone: [phone] [address]   Contact Name Legal Guardian? Relationship to Patient Home Phone Work Phone   1. ABEL ABRAHAM  2. *No Contact Specified*      Brother/Sister    [phone]                 GUARANTOR            Lucy Ray     : 1940   Address: [address] [facility];[address] Sex: Male     [place] [address]     Relation to Patient: Self       Home Phone: [phone]   Guarantor ID: [id number]       Work Phone:     Guarantor Employer: RETIRED         Status: RETIRED      COVERAGE        PRIMARY INSURANCE   Payor: Jonathon Herrera MEDICARE Plan: [facility]*   Payor Address: [facility] [id number], [place] [address]       Group Number: [id number] Insurance Type: [address] Name: Claudia Lazaro Subscriber : 1940   Subscriber ID: [id number] Pat. Rel. to Sub: Self   SECONDARY INSURANCE   Payor:   Plan:     Payor Address:  ,           Group Number:   Insurance Type:     Subscriber Name:   Subscriber :     Subscriber ID:   Pat.  Rel. to Sub:

## 2021-09-23 NOTE — Clinical Note
Patient Class: Inpatient [101]   REQUIRED: Diagnosis: Cellulitis [426296]   Estimated Length of Stay: Estimated stay of more than 2 midnights   Telemetry/Cardiac Monitoring Required?: No

## 2021-09-23 NOTE — ED PROVIDER NOTES
67-year-old male presenting to the emergency department with chronic leg pain on right side that has worsened today. Patient is a resident at [facility]. Patient states that he has had wounds on his legs bilaterally for the past few months, and is being seen by a doctor. Patient reports pain that shoots down his leg from his hip towards his toes, worsens with movement and palpation, 10 out of 10 pain, constant, associated difficulty moving, improves with rest.             Review of Systems   Constitutional: Negative for chills, fatigue and fever. HENT: Positive for ear pain and rhinorrhea. Negative for sore throat and tinnitus. Eyes: Positive for pain. Negative for discharge and redness. Respiratory: Negative for cough and shortness of breath. Cardiovascular: Positive for leg swelling. Negative for chest pain. Gastrointestinal: Negative for abdominal pain, diarrhea, nausea and vomiting. Genitourinary: Negative for flank pain, hematuria and urgency. Musculoskeletal: Positive for back pain. Negative for neck pain. Skin: Positive for wound. Negative for pallor. Neurological: Negative for dizziness, syncope, weakness and numbness. Psychiatric/Behavioral: Negative for behavioral problems and confusion. Physical Exam  Constitutional:       Appearance: Normal appearance. He is obese. HENT:      Head: Normocephalic. Right Ear: External ear normal.      Left Ear: External ear normal.      Nose: Nose normal.      Mouth/Throat:      Mouth: Mucous membranes are moist.      Pharynx: Oropharynx is clear. Eyes:      General:         Right eye: No discharge. Left eye: No discharge. Conjunctiva/sclera: Conjunctivae normal.   Cardiovascular:      Rate and Rhythm: Normal rate and regular rhythm. Heart sounds: No friction rub. No gallop. Pulmonary:      Effort: No respiratory distress. Breath sounds: No wheezing or rhonchi.    Abdominal:      General: Abdomen is flat. There is no distension. Tenderness: There is no abdominal tenderness. There is no guarding or rebound. Musculoskeletal:         General: Swelling and tenderness present. Cervical back: Normal range of motion. No rigidity. Skin:     General: Skin is dry. Findings: Erythema and wound present. Neurological:      Mental Status: He is alert and oriented to person, place, and time. Psychiatric:         Mood and Affect: Mood normal.         Behavior: Behavior normal.          Procedures     MDM  Number of Diagnoses or Management Options  Diagnosis management comments: 20-year-old man presenting to the emergency department with pain in his leg with bilateral chronic wounds that are worsening per patient,. Patient was previously seen by Dr. Stacy Lyons when he was last admitted to the hospital.  Patient's right toe shows signs of necrosis on exam.  Patient's procalcitonin was 0.46 patient's imaging of the right foot showed no acute osseous abnormalities hemoglobin was 11.9 red blood cells was 3.4 proBNP was 2070 BUN was 55 creatinine was 2.2. His patient showed signs of cellulitis and possible necrosis, will admit for further management of cellulitis.   Spoke to Dr. Ceferino Toro who is agreed to admit patient                  --------------------------------------------- PAST HISTORY ---------------------------------------------  Past Medical History:  has a past medical history of (HFpEF) heart failure with preserved ejection fraction ([facility] Arthritis, Bilateral carotid artery stenosis, Bilateral carotid bruits, Blood circulation, collateral, CAD (coronary artery disease), Carotid bruit, CHF (congestive heart failure) ([address] Chronic kidney disease, CKD (chronic kidney disease) stage 3, GFR 30-59 ml/min ([address] COPD (chronic obstructive pulmonary disease) ([facility] Diabetes mellitus ([facility] Diabetic neuropathy associated with type 2 diabetes mellitus ([facility] Dyspnea on exertion, History of blood transfusion, Hyperlipidemia, Hypertension, Mild mitral regurgitation, Moderate tricuspid regurgitation by prior echocardiography, Movement disorder, NSTEMI, initial episode of care ([facility] Obesity, Pulmonary hypertension [facility] PVD (peripheral vascular disease) with claudication ([facility] S/P carotid endarterectomy, Sleep apnea, SOB (shortness of breath), Thyroid disease, Unspecified cerebral artery occlusion with cerebral infarction, and Vision decreased. Past Surgical History:  has a past surgical history that includes Cardiac surgery; vascular surgery; joint replacement; Diagnostic Cardiac Cath Lab Procedure (10/14/2008); Diagnostic Cardiac Cath Lab Procedure (10/21/2010); knee surgery; Cardiac valuve replacement; Coronary artery bypass graft (2008); Tonsillectomy; Pacemaker insertion (11/12/2014); other surgical history (Right, 02/14/2017); and BIOPSY / LIGATION TEMPORAL ARTERY (Bilateral, 01/05/2021). Social History:  reports that he quit smoking about 40 years ago. His smoking use included cigarettes. He started smoking about 67 years ago. He has a 50.00 pack-year smoking history. He has never used smokeless tobacco. He reports previous drug use. He reports that he does not drink alcohol. Family History: family history includes Cancer in his sister; Heart Disease in his father and mother; Heart Failure in his father and mother; Heart Surgery in his father; High Blood Pressure in his sister. The patients home medications have been reviewed. Allergies: Patient has no known allergies.     -------------------------------------------------- RESULTS -------------------------------------------------    LABS:  Results for orders placed or performed during the hospital encounter of 09/23/21   CBC Auto Differential   Result Value Ref Range    WBC 10.8 4.5 - 11.5 E9/L    RBC 3.74 (L) 3.80 - 5.80 E12/L    Hemoglobin 11.9 (L) 12.5 - 16.5 g/dL    Hematocrit 36.2 (L) 37.0 - 54.0 %    MCV 96.8 80.0 - 99.9 fL MCH 31.8 26.0 - 35.0 pg    MCHC 32.9 32.0 - 34.5 %    RDW 14.9 11.5 - 15.0 fL    Platelets 764 469 - 417 E9/L    MPV 9.2 7.0 - 12.0 fL    Neutrophils % 78.1 43.0 - 80.0 %    Immature Granulocytes % 0.6 0.0 - 5.0 %    Lymphocytes % 8.4 (L) 20.0 - 42.0 %    Monocytes % 7.0 2.0 - 12.0 %    Eosinophils % 5.4 0.0 - 6.0 %    Basophils % 0.5 0.0 - 2.0 %    Neutrophils Absolute 8.43 (H) 1.80 - 7.30 E9/L    Immature Granulocytes # 0.06 E9/L    Lymphocytes Absolute 0.91 (L) 1.50 - 4.00 E9/L    Monocytes Absolute 0.76 0.10 - 0.95 E9/L    Eosinophils Absolute 0.58 (H) 0.05 - 0.50 E9/L    Basophils Absolute 0.05 0.00 - 0.20 E9/L   Comprehensive Metabolic Panel w/ Reflex to MG   Result Value Ref Range    Sodium 134 132 - 146 mmol/L    Potassium reflex Magnesium 4.7 3.5 - 5.0 mmol/L    Chloride 97 (L) 98 - 107 mmol/L    CO2 27 22 - 29 mmol/L    Anion Gap 10 7 - 16 mmol/L    Glucose 145 (H) 74 - 99 mg/dL    BUN 55 (H) 6 - 23 mg/dL    CREATININE 2.2 (H) 0.7 - 1.2 mg/dL    GFR Non-African American 29 >=60 mL/min/1.73    GFR African American 35     Calcium 10.1 8.6 - 10.2 mg/dL    Total Protein 7.6 6.4 - 8.3 g/dL    Albumin 3.1 (L) 3.5 - 5.2 g/dL    Total Bilirubin 0.3 0.0 - 1.2 mg/dL    Alkaline Phosphatase 128 40 - 129 U/L    ALT 48 (H) 0 - 40 U/L    AST 64 (H) 0 - 39 U/L   Lactic Acid, Plasma   Result Value Ref Range    Lactic Acid 1.0 0.5 - 2.2 mmol/L   Brain Natriuretic Peptide   Result Value Ref Range    Pro-BNP 2,070 (H) 0 - 450 pg/mL   C-Reactive Protein   Result Value Ref Range    CRP 21.4 (H) 0.0 - 0.4 mg/dL   Sedimentation Rate   Result Value Ref Range    Sed Rate 115 (H) 0 - 15 mm/Hr   Procalcitonin   Result Value Ref Range    Procalcitonin 0.46 (H) 0.00 - 0.08 ng/mL   POCT Glucose   Result Value Ref Range    Meter Glucose 143 (H) 74 - 99 mg/dL       RADIOLOGY:  XR FOOT RIGHT (MIN 3 VIEWS)   Final Result   No acute osseous abnormality.          VL SÁNCHEZ BILATERAL LIMITED 1-2 LEVELS    (Results Pending)

## 2021-09-23 NOTE — PROGRESS NOTES
Occupational Therapy   OT orders receieved and chart reviewed. Pt with tele-sitter present denied wanting to participate in evaluation despite numerous explanations as to benefits of OOB activity. Pt indicating he just wanted to go home and was attempting to climb out of bed over the rail. Nursing notified. Will attempt evaluation at a later time.   Renzo Bains, OTR/L [id number]

## 2021-09-24 NOTE — CONSULTS
Nephrology Consult Note  Patient's Name: Ramno Chapin  10:37 AM  9/24/2021    Nephrologist: Luisa Tyler    Reason for Consult:  CKD  Requesting Physician:  Graham Elliott MD    Chief Complaint:  RLE pain    History Obtained From:  patient and past medical records    History of Present Ilness:    Ramon Chapin is a [de-identified] y.o. male with prior history of  Of CKD G3B with a baseline serum cr 1.5-2.1mg/dl with 31-43ml/min  In the setting of DM2, HTN, Diffuse Atherosclerotic Vasc disease. Pt was admitted 8/19/21 to 8/22/21 with CHF. He was D/Eder on bumetanide and metolazone. No ACEI/ARB or Entresto. He presented back to the ED 9/23/21 with increased pain in the RLE. His cr 8/21/21 2.1mg/dl and his cr on admission 2.2mg/dl.  He is somewhat confused and has a TeleMonitor    Past Medical History:   Diagnosis Date    (HFpEF) heart failure with preserved ejection fraction ([address] 07/2020    Diagnosed by cardiology    Arthritis     Bilateral carotid artery stenosis 12/31/2020    Bilateral carotid bruits 12/31/2020    Blood circulation, collateral     CAD (coronary artery disease)     Carotid bruit 03/27/2013    CHF (congestive heart failure) (HCC)     Chronic kidney disease     CKD (chronic kidney disease) stage 3, GFR 30-59 ml/min ([facility]) 03/20/2016    COPD (chronic obstructive pulmonary disease) ([address]     Follows with pulmonary    Diabetes mellitus ([address]     Diabetic neuropathy associated with type 2 diabetes mellitus (HCC)     Dyspnea on exertion     History of blood transfusion     Hyperlipidemia     Hypertension     Mild mitral regurgitation     Moderate tricuspid regurgitation by prior echocardiography 2018    Movement disorder     NSTEMI, initial episode of care ([address] 01/2018    Obesity     Pulmonary hypertension ([address] 2018    PVD (peripheral vascular disease) with claudication ([address] 03/27/2014    S/P carotid endarterectomy 03/27/2013    Sleep apnea     SOB (shortness of breath)     Thyroid disease     Unspecified cerebral artery occlusion with cerebral infarction     Vision decreased 12/31/2020       Past Surgical History:   Procedure Laterality Date    BIOPSY / LIGATION TEMPORAL ARTERY Bilateral 01/05/2021    BILATERAL TEMPORAL ARTERY BIOPSY performed by Rose Silva MD at [address]      5 years ago   [address] GRAFT  2008    1 vessel and aortic valve repair    DIAGNOSTIC CARDIAC CATH LAB PROCEDURE  10/14/2008    DIAGNOSTIC CARDIAC CATH LAB PROCEDURE  10/21/2010    JOINT REPLACEMENT      R knee replacement    KNEE SURGERY      OTHER SURGICAL HISTORY Right 02/14/2017    debridement,bone bx foot    PACEMAKER INSERTION  11/12/2014    D-PPM   ()    Dr. Alicia Machado      carotids       Family History   Problem Relation Age of Onset    Heart Disease Mother     Heart Failure Mother     Heart Surgery Father     Heart Disease Father     Heart Failure Father     High Blood Pressure Sister     Cancer Sister         reports that he quit smoking about 40 years ago. His smoking use included cigarettes. He started smoking about 67 years ago. He has a 50.00 pack-year smoking history. He has never used smokeless tobacco. He reports previous drug use. He reports that he does not drink alcohol. Allergies:  Patient has no known allergies.     Current Medications:    albuterol (PROVENTIL) nebulizer solution 2.5 mg, Q6H PRN  allopurinol (ZYLOPRIM) tablet 300 mg, Daily  aspirin EC tablet 81 mg, Daily  DULoxetine (CYMBALTA) extended release capsule 60 mg, Daily  ferrous sulfate (IRON 325) tablet 325 mg, BID WC  HYDROcodone-acetaminophen 7.5-325 MG per 15ML solution 5 mL, Q6H PRN  gabapentin (NEURONTIN) capsule 200 mg, TID  isosorbide mononitrate (IMDUR) extended release tablet 120 mg, Daily  lactulose (CHRONULAC) 10 GM/15ML solution 20 g, TID  levothyroxine (SYNTHROID) tablet 25 mcg, Daily  metoprolol succinate (TOPROL XL) extended release tablet 50 mg, Daily  glucose (GLUTOSE) 40 % oral gel 15 g, PRN  dextrose 50 % IV solution, PRN  glucagon (rDNA) injection 1 mg, PRN  dextrose 5 % solution, PRN  sodium chloride flush 0.9 % injection 5-40 mL, 2 times per day  sodium chloride flush 0.9 % injection 5-40 mL, PRN  0.9 % sodium chloride infusion, PRN  enoxaparin (LOVENOX) injection 40 mg, Daily  ondansetron (ZOFRAN-ODT) disintegrating tablet 4 mg, Q8H PRN   Or  ondansetron (ZOFRAN) injection 4 mg, Q6H PRN  polyethylene glycol (GLYCOLAX) packet 17 g, Daily PRN  acetaminophen (TYLENOL) tablet 650 mg, Q6H PRN   Or  acetaminophen (TYLENOL) suppository 650 mg, Q6H PRN  insulin lispro (HUMALOG) injection vial 0-6 Units, TID WC  insulin lispro (HUMALOG) injection vial 0-3 Units, Nightly  ceFAZolin (ANCEF) 2000 mg in sterile water 20 mL IV syringe, Q8H  labetalol (NORMODYNE;TRANDATE) injection 10 mg, Q4H PRN  perflutren lipid microspheres (DEFINITY) injection 1.65 mg, ONCE PRN  budesonide (PULMICORT) nebulizer suspension 250 mcg, BID  ipratropium (ATROVENT) 0.02 % nebulizer solution 0.5 mg, 4x daily  Arformoterol Tartrate (BROVANA) nebulizer solution 15 mcg, BID  hydrALAZINE (APRESOLINE) injection 10 mg, Q6H PRN        Review of Systems:   Pertinent items are noted in HPI.     Physical exam:   Constitutional:  Elderly male in NAD  Vitals:   VITALS:  BP (!) 172/77   Pulse 86   Temp 98.9 °F (37.2 °C) (Oral)   Resp 16   Ht 5' 7\" (1.702 m)   Wt 236 lb (107 kg)   SpO2 96%   BMI 36.96 kg/m²   24HR INTAKE/OUTPUT:    Intake/Output Summary (Last 24 hours) at 9/24/2021 1038  Last data filed at 9/24/2021 0601  Gross per 24 hour   Intake --   Output 900 ml   Net -900 ml     URINARY CATHETER OUTPUT (Rodriguez):  External Urinary Catheter-Output (mL): 500 mL  DRAIN/TUBE OUTPUT:     VENT SETTINGS:  Vent Information  SpO2: 96 %  Additional Respiratory  Assessments  Pulse: 86  Resp: 16  SpO2: 96 %    Skin: erythema and ulcerations in the bilat lower ext  Heent:  eomi, mmm  Neck: no bruits or jvd noted  Cardiovascular: PMI lat displaced  S1, S2 without S3 or rub  Respiratory: CTA B without w/r/r  Abdomen:  +bs, soft, nt, nd  Ext: 1+bilat  lower extremity edema with erythema scaling and serous drainage of the bilat pretibial areas, dry gangrene of 2nd, 3rd, 4th R toes  Psychiatric: Oriented to person and place confused to time    Data:   Labs:  CBC:   Lab Results   Component Value Date    WBC 8.8 09/24/2021    RBC 3.25 09/24/2021    HGB 10.2 09/24/2021    HCT 30.6 09/24/2021    MCV 94.2 09/24/2021    MCH 31.4 09/24/2021    MCHC 33.3 09/24/2021    RDW 14.9 09/24/2021     09/24/2021    MPV 10.0 09/24/2021     CBC with Differential:    Lab Results   Component Value Date    WBC 8.8 09/24/2021    RBC 3.25 09/24/2021    HGB 10.2 09/24/2021    HCT 30.6 09/24/2021     09/24/2021    MCV 94.2 09/24/2021    MCH 31.4 09/24/2021    MCHC 33.3 09/24/2021    RDW 14.9 09/24/2021    NRBC 0.0 04/22/2018    SEGSPCT 76 03/16/2014    BANDSPCT 1 09/02/2016    LYMPHOPCT 9.9 09/24/2021    MONOPCT 8.5 09/24/2021    BASOPCT 0.5 09/24/2021    MONOSABS 0.74 09/24/2021    LYMPHSABS 0.87 09/24/2021    EOSABS 0.19 09/24/2021    BASOSABS 0.04 09/24/2021     Hemoglobin/Hematocrit:    Lab Results   Component Value Date    HGB 10.2 09/24/2021    HCT 30.6 09/24/2021     CMP:    Lab Results   Component Value Date     09/24/2021    K 3.9 09/24/2021    CL 99 09/24/2021    CO2 23 09/24/2021    BUN 37 09/24/2021    CREATININE 1.6 09/24/2021    GFRAA 51 09/24/2021    LABGLOM 42 09/24/2021    GLUCOSE 139 09/24/2021    GLUCOSE 111 07/16/2011    PROT 6.6 09/24/2021    LABALBU 2.7 09/24/2021    CALCIUM 9.5 09/24/2021    BILITOT 0.3 09/24/2021    ALKPHOS 119 09/24/2021    AST 46 09/24/2021    ALT 25 09/24/2021     BMP:    Lab Results   Component Value Date     09/24/2021    K 3.9 09/24/2021    CL 99 09/24/2021    CO2 23 09/24/2021    BUN 37 09/24/2021    LABALBU 2.7 09/24/2021    CREATININE 1.6 09/24/2021    CALCIUM 9.5 09/24/2021    GFRAA 51 09/24/2021    LABGLOM 42 09/24/2021    GLUCOSE 139 09/24/2021    GLUCOSE 111 07/16/2011     BUN/Creatinine:    Lab Results   Component Value Date    BUN 37 09/24/2021    CREATININE 1.6 09/24/2021     Hepatic Function Panel:    Lab Results   Component Value Date    ALKPHOS 119 09/24/2021    ALT 25 09/24/2021    AST 46 09/24/2021    PROT 6.6 09/24/2021    BILITOT 0.3 09/24/2021    BILIDIR <0.2 01/25/2021    IBILI see below 01/25/2021    LABALBU 2.7 09/24/2021     Albumin:    Lab Results   Component Value Date    LABALBU 2.7 09/24/2021     Calcium:    Lab Results   Component Value Date    CALCIUM 9.5 09/24/2021     Ionized Calcium:  No results found for: IONCA  Magnesium:    Lab Results   Component Value Date    MG 1.7 08/22/2021     Phosphorus:    Lab Results   Component Value Date    PHOS 3.7 01/25/2021     Uric Acid:    Lab Results   Component Value Date    LABURIC 3.5 01/21/2021     PT/INR:    Lab Results   Component Value Date    PROTIME 11.7 01/05/2021    PROTIME 11.3 06/21/2011    INR 1.0 01/05/2021     PTT:    Lab Results   Component Value Date    APTT 25.5 01/05/2021   [APTT}  Troponin:    Lab Results   Component Value Date    TROPONINI 0.05 01/19/2021     U/A:    Lab Results   Component Value Date    COLORU Yellow 08/19/2021    PROTEINU TRACE 08/19/2021    PHUR 5.5 08/19/2021    LABCAST RARE 10/11/2017    WBCUA 5-10 08/19/2021    WBCUA NONE 07/12/2011    RBCUA 2-5 08/19/2021    RBCUA 1-3 03/17/2014    BACTERIA RARE 08/19/2021    CLARITYU Clear 08/19/2021    SPECGRAV 1.015 08/19/2021    LEUKOCYTESUR TRACE 08/19/2021    UROBILINOGEN 0.2 08/19/2021    BILIRUBINUR Negative 08/19/2021    BILIRUBINUR NEGATIVE 07/12/2011    BLOODU TRACE-INTACT 08/19/2021    GLUCOSEU Negative 08/19/2021    GLUCOSEU NEGATIVE 07/12/2011     ABG:    Lab Results   Component Value Date    PH 7.396 06/03/2018    PH 7.453 09/06/2012    PCO2 39.0 06/03/2018    PO2 92.8 2018    HCO3 23.4 2018    BE -1.2 2018    O2SAT 96.7 2018     HgBA1c:    Lab Results   Component Value Date    LABA1C 7.1 2021     Microalbumen/Creatinine ratio:  No components found for: RUCREAT  FLP:    Lab Results   Component Value Date    TRIG 84 2021    HDL 33 2021    LDLCALC 49 2021    LABVLDL 17 2021     TSH:    Lab Results   Component Value Date    TSH 4.270 2021     VITAMIN B12: No components found for: B12  FOLATE:    Lab Results   Component Value Date    FOLATE >20.0 2021     Iron Saturation:  No components found for: PERCENTFE  FERRITIN:    Lab Results   Component Value Date    FERRITIN 68 2021     CHECO:  No results found for: ANATITER, CHECO  AMYLASE:    Lab Results   Component Value Date    AMYLASE 79 2016     LIPASE:    Lab Results   Component Value Date    LIPASE 14 2021     Fibrinogen Level:  No components found for: FIB  24 Hour Urine for Protein:  No components found for: RAWUPRO, UHRS3, JNIP59PA, UTV3  24 Hour Urine for Creatinine Clearance:  No components found for: CREAT4, UHRS10, UTV10  PSA: No results found for: PSA     Imaging:  CXR results:  Patient MRN: [id number]   : 1940   Age:  80 years   Gender: Male   Order Date: 2020 1:07 PM   Exam: US RETROPERITONEAL BIPIN, US DUP ABD PEL RETRO SCROT COMPLETE   Number of Images: 42 views   Indication:  N18.3 Chronic kidney disease, stage III (moderate) ([facility]        Comparison: Retroperitoneal ultrasound dated 2019.       Technique: Sonographic interrogation of the retroperitoneum with   attention to the kidneys. Duplex sonography of the abdominal aorta and   bilateral renal arteries.       Findings:       Right kidney 11.7 cm in long axis. The left kidney measures 12.2 cm in long axis. There is mild   parenchymal thinning on the left.    There is no evidence of hydronephrosis.       Doppler findings:    Abdominal aorta peak systolic velocity is 104.2 cm/s. Right renal artery peak systolic velocity 356.1 cm/s. RAR equals 1.7. Left renal artery peak systolic velocity 269.7 cm/s. RAR equals 1.7.           Impression   1. Increased flow velocity in both renal arteries which is fairly   symmetric. The amount of increased flow velocity is considered   borderline suspicious for clinically significant renal artery   stenosis. 2. Mild renal parenchymal thinning on the left. Assessment  1-CKD G3B with a baseline serum cr 1.5-2.1mg/dl with 31-43ml/min  In the setting of DM2, HTN, Diffuse Atherosclerotic Vasc disease  PLAN:  1. CHeck UA and Urine microalb: Cr   2. CHeck Duplex Renal US and Bladder Scan for PVR  3. Avoid NSAID's    2-Anemia in CKD  HgB above goal >/=10 No SWATI indicated  PLAN:  1. Check Fe++, B12, folate, SPEP and UPEP    3-Sec HPTH of Renal Origin  Ca++ WNL  OPLAN:L  1. Check PO4, PTH, Vit D    4- HTN with CKD I-IV  BP above goal <120/80  PLAN:  1.  Recheck Duplex Renal US for BIPIN      Thank you Dr. Karlie Lund MD for allowing us to participate in care of Martin Hollingsworth MD  10:37 AM  9/24/2021

## 2021-09-24 NOTE — PROGRESS NOTES
I spoke with my partner as well as the PA regarding this patient. The vascular studies are very limited. Monophasic flow. Most likely he will need arteriogram with possible intervention for limb salvage. We will also notify my partner Dr. Esperanza Alvarez with regards to his vascular studies of his carotid.

## 2021-09-24 NOTE — PROGRESS NOTES
Physical Therapy    Facility/Department: [facility] SUR[facility]    NAME: Mónica Root  : 1940  MRN: [id number]    Date of Service: 2021    PT eval was attempted and pt is having a procedure performed in the room. Will re-attempt another time. Niecy Saini, P.T.   License Number: [id number]

## 2021-09-24 NOTE — PATIENT CARE CONFERENCE
P Quality Flow/Interdisciplinary Rounds Progress Note        Quality Flow Rounds held on September 24, 2021    Disciplines Attending:  Bedside Nurse, ,  and Nursing Unit Leadership    Marian Arvizu was admitted on 9/23/2021 12:37 AM    Anticipated Discharge Date:  Expected Discharge Date: 09/27/21    Disposition:    Devante Score:  Devante Scale Score: 15    Readmission Risk              Risk of Unplanned Readmission:  25           Discussed patient goal for the day, patient clinical progression, and barriers to discharge.   The following Goal(s) of the Day/Commitment(s) have been identified:  Labs - Report Results      Faye Rae RN  September 24, 2021

## 2021-09-24 NOTE — PROGRESS NOTES
OCCUPATIONAL THERAPY INITIAL EVALUATION    [facility] [address] [address] [address]    Date:2021                                                  Patient Name: Jesus Sandoval    MRN: [id number]    : 1940    Room: [address] [place] [address]      Evaluating OT: MARY Ugarte/TIFFANY, [id number]    Referring Provider: Onofre Amaya MD  Specific Provider Orders/Date: OT eval and treat 21    Diagnosis: Cellulitis [L03.90]   Surgery: NA     Pertinent Medical History:      Past Medical History:   Diagnosis Date    (HFpEF) heart failure with preserved ejection fraction ([address] 2020    Diagnosed by cardiology    Arthritis     Bilateral carotid artery stenosis 2020    Bilateral carotid bruits 2020    Blood circulation, collateral     CAD (coronary artery disease)     Carotid bruit 2013    CHF (congestive heart failure) ([address]     Chronic kidney disease     CKD (chronic kidney disease) stage 3, GFR 30-59 ml/min ([address] 2016    COPD (chronic obstructive pulmonary disease) ([address]     Follows with pulmonary    Diabetes mellitus ([address]     Diabetic neuropathy associated with type 2 diabetes mellitus ([address]     Dyspnea on exertion     History of blood transfusion     Hyperlipidemia     Hypertension     Mild mitral regurgitation     Moderate tricuspid regurgitation by prior echocardiography 2018    Movement disorder     NSTEMI, initial episode of care ([address] 2018    Obesity     Pulmonary hypertension ([address] 2018    PVD (peripheral vascular disease) with claudication ([address] 2014    S/P carotid endarterectomy 2013    Sleep apnea     SOB (shortness of breath)     Thyroid disease     Unspecified cerebral artery occlusion with cerebral infarction     Vision decreased 2020         Past Surgical History:   Procedure Laterality Date    BIOPSY / LIGATION TEMPORAL ARTERY Bilateral 2021    BILATERAL TEMPORAL ARTERY BIOPSY performed by Crystal Benitez MD at [address]      5 years ago   [address]  2008    1 vessel and aortic valve repair    DIAGNOSTIC CARDIAC CATH LAB PROCEDURE  10/14/2008    DIAGNOSTIC CARDIAC CATH LAB PROCEDURE  10/21/2010    JOINT REPLACEMENT      R knee replacement    KNEE SURGERY      OTHER SURGICAL HISTORY Right 02/14/2017    debridement,bone bx foot    PACEMAKER INSERTION  11/12/2014    D-PPM   ()    Dr. Jose Montoya      carotids     Precautions:  Fall Risk, sitter    Assessment of current deficits    [x] Functional mobility  [x]ADLs  [x] Strength               [x]Cognition    [x] Functional transfers   [x] IADLs         [x] Safety Awareness   [x]Endurance    [] Fine Coordination              [x] Balance      [x] Vision/perception   [x]Sensation     []Gross Motor Coordination  [] ROM  [] Delirium                   [] Motor Control     OT PLAN OF CARE   OT POC based on physician orders, patient diagnosis and results of clinical assessment    Frequency/Duration 1-3 days/wk for 2 weeks PRN   Specific OT Treatment Interventions to include:   * Instruction/training on adapted ADL techniques and AE recommendations to increase functional independence within precautions       * Training on energy conservation strategies, correct breathing pattern and techniques to improve independence/tolerance for self-care routine  * Functional transfer/mobility training/DME recommendations for increased independence, safety, and fall prevention  * Patient/Family education to increase follow through with safety techniques and functional independence  * Recommendation of environmental modifications for increased safety with functional transfers/mobility and ADLs  * Cognitive retraining/development of therapeutic activities to improve problem solving, judgement, memory, and attention for increased safety/participation in ADL/IADL tasks  * Visual-perceptual training to improve environmental scanning, visual attention/focus, and oculomotor skills for increased safety/independence with functional transfers/mobility and ADLs  * Therapeutic exercise to improve motor endurance, ROM, and functional strength for ADLs/functional transfers  * Therapeutic activities to facilitate/challenge dynamic balance, stand tolerance for increased safety and independence with ADLs  * Therapeutic activities to facilitate gross/fine motor skills for increased independence with ADLs  * Positioning to improve skin integrity, interaction with environment and functional independence  * Delirium prevention/treatment    Recommended Adaptive Equipment:  TBD    Comments: Based on patient's functional performance as stated below and level of assistance needed prior to admission, this therapist believes that the patient would benefit from further skilled OT following hospital stay in an effort to increase safety, functional independence, and quality of life. Home Living: [facility]    Prior Level of Function: Poor historian- Assistance with ADLs and with IADLs; using ? for ambulation. Driving: no  Occupation: retired GM    Pain Level: reports 10/10 pain \"all over\", addressed with repositioning  Cognition: A&O: 3-4/4 to all but location; Follows 1-2 step directions. Patient agitated throughout session, mod cues for safety throughout session, required encouragement for participation.    Memory: F   Sequencing: P   Problem solving: P   Judgement/safety: P     Functional Assessment: AM-PAC Daily Activity Raw Score: 15/24   Initial Eval Status  Date: 9/24/21 Treatment Status  Date: STGs = LTGs  Time frame: 10-14 days   Feeding Set up A        Grooming Min A seated  simulated    SBA while standing at sink    UB Dressing Min A  Simulated, declining to wear gown at this time    Set up A   LB Dressing Mod A  Simulated, declining to complete at this time. Decreased functional reach d/t habitus    Mod I   Bathing Max A  Overall; d/t cognition and impaired mobility/safety    Min A   Toileting Max A  simulated    Min A   Bed Mobility  Rolling: SBA  Supine to sit: NT  Sit to supine: Min A     Functional Transfers Sit to stand: SBA  Stand to sit: SBA     Functional Mobility Min A with HHA  For short distance at EOB, cues for safety     Balance Sitting:     Static:  supervision    Dynamic: Min A  Standing: Min A     Endurance/Activity Tolerance fair  good   Visual/  Perceptual Glasses: yes, declines to elo              Hand Dominance R   AROM (PROM) Strength Additional Info:    RUE  WFL 3-/5 good  and wfl FMC/dexterity noted during ADL tasks       LUE WFL 3-/5 good  and wfl FMC/dexterity noted during ADL tasks       Hearing: WFL  Sensation:  No c/o numbness or tingling  Tone: WNL  Edema: unremarkable                            Comments: Upon arrival patient seated EOB at foot of bed with sitter in room. Required encouragement for participation, agreeable to only limited intervention this date. Functional transfers, bed mobility, functional mobility addressed. Patient naked with only a blanket covering groin at start of session, declines to elo gown. After session, patient long sitting with all devices within reach, all lines and tubes intact. Pt required cues and education as noted above for safe facilitation and completion of tasks. Therapist provided skilled monitoring of patient's response during treatment session. Prior to and at the end of session, environmental modifications/line management completed for patients safety and efficiency of treatment session. Overall, patient demonstrates moderate difficulties with completion of BADLs and IADLs. Factors contributing to these difficulties include impaired cognition, impaired vision, decreased insight into situation, decreased endurance, and generalized weakness.  As noted above, patient likely to benefit from further OT intervention to increase independence, safety, and overall quality of life. Eval Complexity:   · Low Complexity    Treatment:   · Bed mobility: Facilitated bed mobility with cues for proper body mechanics and sequencing to prepare for ADL completion. · Functional transfers: Facilitated transfers from various surfaces with cues for body alignment, safety and hand placement. Rehab Potential:  Good for established goals     Patient / Family Goal: None stated      Patient and/or family were instructed on functional diagnosis, prognosis/goals and OT plan of care. Demonstrated fair understanding. Eval Complexity: Low      Time In: 1100  Time Out: 1111  Total Time: 11 minutes    Min Units   OT Eval Low 97165  X 1    OT Eval Medium 59564      OT Eval High 30594       OT Re-Eval [id number]       Therapeutic Ex 51529       Therapeutic Activities 22735       ADL/Self Care 25451       Orthotic Management 14538       Neuro Re-Ed 85672       Non-Billable Time          Evaluation Time additionally includes thorough review of current medical information, gathering information on past medical history/social history and prior level of function, completion of standardized testing/informal observation of tasks, assessment of data and education on plan of care and goals.     Estefany Stewart, OTR/L  [id number]

## 2021-09-24 NOTE — PROGRESS NOTES
Subjective: The patient is awake and alert. Sitter at bedside  No acute events overnight. Denies chest pain, angina, SOB     Objective:    BP (!) 172/77   Pulse 86   Temp 98.9 °F (37.2 °C) (Oral)   Resp 16   Ht 5' 7\" (1.702 m)   Wt 236 lb (107 kg)   SpO2 96%   BMI 36.96 kg/m²     In: -   Out: 600   In: -   Out: 600 [Urine:600]    General appearance: NAD, conversant  HEENT: AT/NC, MMM  Neck: FROM, supple  Lungs: Clear to auscultation  CV: RRR, no MRGs  Vasc: Radial pulses 2+  Abdomen: Soft, non-tender; no masses or HSM  Extremities: No peripheral edema or digital cyanosis  Skin  :               Psych: Alert and oriented to person, place and time  Neuro: Alert and interactive     Recent Labs     09/23/21 0226 09/24/21  0630   WBC 10.8 8.8   HGB 11.9* 10.2*   HCT 36.2* 30.6*    247       Recent Labs     09/23/21 0226 09/24/21  0630    135   K 4.7 3.9   CL 97* 99   CO2 27 23   BUN 55* 37*   CREATININE 2.2* 1.6*   CALCIUM 10.1 9.5       Assessment:    Active Problems:    Cellulitis  Resolved Problems:    * No resolved hospital problems.  *      Plan:    Cellulitis   -IV abx-discontinued secondary to noninfected gangrened necrotic toes  -ID following  -Monitor WBC >>8.8 today, watch for increasing leukocytosis or fevers     Necrotic Toes of R Foot  -Podiatry following- local wound care per them  -Vascular Surgery following  -Angiogram Monday 9/27   -SÁNCHEZ's- patient refused  -Possible debridement/amputation but wont be until after angiogram.     DM Type 2  -Monitor labs, check hemoglobin A1c  -ISS glucose control, resume home medications and titrate as needed with introduction of insulin  -Hypoglycemia protocol initiated  -Diabetes education  -Discuss diet modification     HARVEY  -monitor labs BUN/Creat 55/2.2 >> 37/1.6  -Nephrology consulted     Resume home medications, monitor for fulminant sepsis  Uncontrolled blood pressure-as needed BP meds, resume home regimen     DVT Prophylaxis   PT/OT /15  Discharge planning- back to [facility] when medically stable    MADDI Lau CNP, APRN-CNP  3:22 PM  9/24/2021     Above note edited to reflect my thoughts     He is more alert on my exam today although requiring a bedside sitter and does not really make sense when he answers questions  Asking for ongoing pain control although he was quite groggy yesterday after receiving pain meds  Await amputation/vascular work-up with angiogram    I personally saw, examined and provided care for the patient. Radiographs, labs and medication list were reviewed by me independently. The case was discussed in detail and plans for care were established. Review of FABIO Lau   , documentation was conducted and revisions were made as appropriate directly by me. I agree with the above documented exam, problem list, and plan of care.      Karthikeyan Ricketts MD  8:19 PM  9/24/2021

## 2021-09-24 NOTE — PROGRESS NOTES
[address] [place] [address] [facility]  BRIDGETTIDA  Progress Note    SUBJECTIVE:  Chief Complaint   Patient presents with    Leg Pain     Bilateral leg pain with wounds that are getting worse per patient. Sent in from [facility]     Patient is lying in bed. Sitter present - has been pulling things, spitting & urinating on floor. No fevers. Pain in legs is no better or worse and not relieved by anything     Review of systems:  As stated above in the chief complaint, otherwise negative. Medications:  Scheduled Meds:   allopurinol  300 mg Oral Daily    aspirin EC  81 mg Oral Daily    DULoxetine  60 mg Oral Daily    ferrous sulfate  325 mg Oral BID WC    gabapentin  200 mg Oral TID    isosorbide mononitrate  120 mg Oral Daily    lactulose  20 g Oral TID    levothyroxine  25 mcg Oral Daily    metoprolol succinate  50 mg Oral Daily    sodium chloride flush  5-40 mL IntraVENous 2 times per day    enoxaparin  40 mg SubCUTAneous Daily    insulin lispro  0-6 Units SubCUTAneous TID WC    insulin lispro  0-3 Units SubCUTAneous Nightly    ceFAZolin  2,000 mg IntraVENous Q8H    budesonide  250 mcg Nebulization BID    ipratropium  0.5 mg Nebulization 4x daily    Arformoterol Tartrate  15 mcg Nebulization BID     Continuous Infusions:   dextrose      sodium chloride       PRN Meds:albuterol, HYDROcodone-acetaminophen, glucose, dextrose, glucagon (rDNA), dextrose, sodium chloride flush, sodium chloride, ondansetron **OR** ondansetron, polyethylene glycol, acetaminophen **OR** acetaminophen, labetalol, perflutren lipid microspheres, hydrALAZINE    OBJECTIVE:  BP (!) 172/77   Pulse 86   Temp 98.9 °F (37.2 °C) (Oral)   Resp 16   Ht 5' 7\" (1.702 m)   Wt 236 lb (107 kg)   SpO2 96%   BMI 36.96 kg/m²   Temp  Av.5 °F (36.9 °C)  Min: 97.9 °F (36.6 °C)  Max: 99.1 °F (37.3 °C)  Constitutional: The patient is awake, alert, and paritally oriented. compulsive  Skin: Warm and dry. No rashes were noted. HEENT: Round and reactive pupils. Moist mucous membranes. No ulcerations or thrush. Neck: Supple to movements. Chest: No use of accessory muscles to breathe. Symmetrical expansion. No wheezing, crackles or rhonchi. Cardiovascular: S1 and S2 are rhythmic and regular. No murmurs appreciated. Abdomen: Positive bowel sounds to auscultation. Benign to palpation. No masses felt. No hepatosplenomegaly. Extremities: bilateral lower extremities dressed in dry dressings, no surrounding erythema. Right 3rd toe is necrotic. Second & fourth toes are partially necrotic.    Lines: peripheral    Laboratory and Tests Review:  Lab Results   Component Value Date    WBC 8.8 09/24/2021    WBC 10.8 09/23/2021    WBC 8.5 08/19/2021    HGB 10.2 (L) 09/24/2021    HCT 30.6 (L) 09/24/2021    MCV 94.2 09/24/2021     09/24/2021     Lab Results   Component Value Date    NEUTROABS 6.85 09/24/2021    NEUTROABS 8.43 (H) 09/23/2021    NEUTROABS 6.20 08/19/2021     No results found for: [facility]  Lab Results   Component Value Date    ALT 25 09/24/2021    AST 46 (H) 09/24/2021    ALKPHOS 119 09/24/2021    BILITOT 0.3 09/24/2021     Lab Results   Component Value Date     09/24/2021    K 3.9 09/24/2021    CL 99 09/24/2021    CO2 23 09/24/2021    BUN 37 09/24/2021    CREATININE 1.6 09/24/2021    CREATININE 2.2 09/23/2021    CREATININE 1.9 08/27/2021    GFRAA 51 09/24/2021    LABGLOM 42 09/24/2021    GLUCOSE 139 09/24/2021    GLUCOSE 111 07/16/2011    PROT 6.6 09/24/2021    LABALBU 2.7 09/24/2021    CALCIUM 9.5 09/24/2021    BILITOT 0.3 09/24/2021    ALKPHOS 119 09/24/2021    AST 46 09/24/2021    ALT 25 09/24/2021     Lab Results   Component Value Date    CRP 21.4 (H) 09/23/2021    CRP 2.0 (H) 01/24/2021    CRP 1.9 (H) 01/23/2021     Lab Results   Component Value Date    SEDRATE 115 (H) 09/23/2021    SEDRATE 65 (H) 01/25/2021    SEDRATE 72 (H) 01/24/2021     Radiology:  Noted     Microbiology:   Blood cultures: negative so far    Recent Labs     09/23/21  0226   PROCAL 0.46*       ASSESSMENT:  · Peripheral arterial disease  · Dry gangrene right second, third and fourth toes  · Chronic ischemic ulcers bilateral lower extremities. These do not appear to be infected. PLAN:  · Continue Cefazolin per hospital service  · Patient refusing vascular studies & other care   · Monitor labs    MADDI Queen CNP  12:53 PM  9/24/2021     Patient seen and examined. I had a face to face encounter with the patient. Agree with exam.  Assessment and plan as outlined above and directed by me. Addition and corrections were done as deemed appropriate. My exam and plan include: Patient remains somewhat confused. Complaining of pain. No major changes with the right foot and will be necrotic toes. I do not think antibiotics are warranted at this time. Case discussed with Dr. Mingo Mcdaniels. Cefazolin can be discontinued.     Marsha Lovell MD  9/24/2021  2:24 PM

## 2021-09-24 NOTE — PROGRESS NOTES
Vascular Surgery Progress Note    CC: PVD w ulceration    HISTORY:  The patient is awake, alert. Appears uncomfortable. He states he is having pain in the bilateral lower extremities at this time and due to this is unable to keep his legs still. Denies any new strokelike symptoms since his admission in 8/2021. Patient follows with Dr. Castro White for carotid disease. A carotid US was performed in 7/2021 showing increased velocities of the R ICA so CTA neck was being arranged for further evaluation but patient was admitted in the interim. Patient Active Problem List   Diagnosis Code    Essential hypertension I10    PVD (peripheral vascular disease) with claudication ([facility]) I73.9    Pacemaker Z95.0    S/P AVR Z95.2    CKD (chronic kidney disease) stage 3, GFR 30-59 ml/min N18.30    Type 2 diabetes mellitus with stage 3 chronic kidney disease, with long-term current use of insulin ([facility]) E11.22, N18.30, Z79.4    Pulmonary nodule R91.1    Diabetes mellitus type 2, uncontrolled ([facility] [address] E11.65    Hyperlipidemia LDL goal <100 E78.5    Acquired hypothyroidism E03.9    CAD (coronary artery disease), native coronary artery I25.10    Chronic pain G89.29    History of CVA (cerebrovascular accident) Z80.78    Obesity (BMI 30-39. 9) E66.9    Anemia D64.9    Elevated sed rate R70.0    Vision decreased H54.7    Bilateral carotid bruits R09.89    Bilateral carotid artery stenosis I65.23    Mild mitral regurgitation I34.0    Pulmonary hypertension ([facility]) I27.20    Moderate tricuspid regurgitation by prior echocardiography I07.1    (HFpEF) heart failure with preserved ejection fraction ([facility]) I50.30    COPD (chronic obstructive pulmonary disease) ([facility]) J44.9    Diabetic neuropathy associated with type 2 diabetes mellitus ([facility]) E11.40    Congestive heart failure ([facility]) I50.9    Cellulitis L03.90       Current Medications:    dextrose      sodium chloride        albuterol, HYDROcodone-acetaminophen, glucose, dextrose, glucagon (rDNA), dextrose, sodium chloride flush, sodium chloride, ondansetron **OR** ondansetron, polyethylene glycol, acetaminophen **OR** acetaminophen, labetalol, perflutren lipid microspheres, hydrALAZINE    allopurinol  300 mg Oral Daily    aspirin EC  81 mg Oral Daily    DULoxetine  60 mg Oral Daily    ferrous sulfate  325 mg Oral BID WC    gabapentin  200 mg Oral TID    isosorbide mononitrate  120 mg Oral Daily    lactulose  20 g Oral TID    levothyroxine  25 mcg Oral Daily    metoprolol succinate  50 mg Oral Daily    sodium chloride flush  5-40 mL IntraVENous 2 times per day    enoxaparin  40 mg SubCUTAneous Daily    insulin lispro  0-6 Units SubCUTAneous TID WC    insulin lispro  0-3 Units SubCUTAneous Nightly    ceFAZolin  2,000 mg IntraVENous Q8H    budesonide  250 mcg Nebulization BID    ipratropium  0.5 mg Nebulization 4x daily    Arformoterol Tartrate  15 mcg Nebulization BID          PHYSICAL EXAM:    Vitals:    09/23/21 1945   BP: (!) 183/73   Pulse: 77   Resp: 18   Temp: 99.1 °F (37.3 °C)   SpO2: 95%     CONSTITUTIONAL:  awake, alert, cooperative, appears uncomfortable  LUNGS:  no increased work of breathing, good air exchange  CARDIOVASCULAR:  regular rate and rhythm  ABDOMEN:  soft, non-distended  R LE    Edema present              Open wounds present              Gangrene of toes   Foot warm, positive motor and sensation  L LE     Edema present              Open wound present   Foot warm, positive motor and sensation    LABS:    Lab Results   Component Value Date    WBC 10.8 09/23/2021    HGB 11.9 (L) 09/23/2021    HCT 36.2 (L) 09/23/2021     09/23/2021    PROTIME 11.7 01/05/2021    INR 1.0 01/05/2021    APTT 25.5 01/05/2021    K 4.7 09/23/2021    BUN 55 (H) 09/23/2021    CREATININE 2.2 (H) 09/23/2021       RADIOLOGY:  ABIs reviewed but limited    Assessment/Plan:  Bilateral LE venous stasis ulcerations  R toes 2-4 gangrene  PVD with ulceration  · Continue Medical management with ASA  · Cholesterol panel was ordered  · HgbA1C check    · optimization per PCP  · Prealbumin 8  ?  Nutrition will need optimized, consult nutrition  · Discussed with pt tobacco use and relationship to PVD  · pt currently is not a smoker  · Pt understands tobacco use can contribute to worsening of pvd and development of  limb loss   · Emphasized importance of foot care and to call if develops any wounds, ulcerations, changes in appearance, claudication and/or symptoms  · local wound care  · Offloading  · He is at risk for limb loss  · He does have evidence of significant arterial occlusive disease on exam and on arterial studies  · Would recommend angiogram ideally - but patient does have significant CKD  · Renal to optimize renal function  · Plan for angiogram next week on Monday with Dr Vinita Massey    Carotid artery stenosis  · Patient currently not having any new strokelike symptoms since his admission in 8/2021 but at that time was having frequent falls due to unknown reason  · We will plan for carotid evaluation during Monday's angiogram.    Plan discussed with Dr Cody Mayfield PA-C

## 2021-09-24 NOTE — CARE COORDINATION
Social Work discharge planning   Pt is from [facility], where bed is held per Mo's. Bruce Olsen advised pt's insurance is waived presently. Thus, precert NOT needed. N17 started.  Liudmila Rebolledo forms in folder. ALSO, Pt will need an updated Covid test at discharge per Bruce Olsen.    Electronically signed by Marina Etienne on 9/24/2021 at 4:07 PM

## 2021-09-24 NOTE — PROGRESS NOTES
Comprehensive Nutrition Assessment    Type and Reason for Visit:  Positive Nutrition Screen, Initial, Wound, Consult    Nutrition Recommendations/Plan: Continue current diet and ONS to help with wound healing. Nutrition Assessment:  PMH of HFpEF, CAD, COPD, CKD, DM, Thryoid diseases, NSTEMI, Pulmonary HTN,  HTN, HLD, and PVD that presented for chronic wounds and Worsening bilateral leg cellulitis. Pt adm d/t wounds and at risk d/t wounds. Will start ONS for wound helaing. Malnutrition Assessment:  Malnutrition Status:  No malnutrition    Context:  Chronic Illness     Findings of the 6 clinical characteristics of malnutrition:  Energy Intake:  No significant decrease in energy intake  Weight Loss:  1 - Mild weight loss (specify amount and time period) (9% over 5months)     Body Fat Loss:  No significant body fat loss     Muscle Mass Loss:  No significant muscle mass loss    Fluid Accumulation:  No significant fluid accumulation     Strength:  Not Performed    Estimated Daily Nutrient Needs:  Energy (kcal):  16-1900kcal (15-18kcal/kg CBW); Weight Used for Energy Requirements:  Current     Protein (g):  130-150g (2.0-2.3g/kg IBW); Weight Used for Protein Requirements:  Ideal        Fluid (ml/day):  ; Method Used for Fluid Requirements:  1 ml/kcal      Nutrition Related Findings:  A&Ox4, BS active, Abd distention, -I/Os, Edema +2 nonpitting      Wounds:  Multiple       Current Nutrition Therapies:    ADULT DIET;  Regular; 4 carb choices (60 gm/meal)    Anthropometric Measures:  · Height: 5' 7\" (170.2 cm)  · Current Body Weight: 236 lb (107 kg) (9/24)       · Usual Body Weight: 257 lb (116.6 kg) (3/10/21 standing)     · Ideal Body Weight: 148 lbs; % Ideal Body Weight 159.5 %   · BMI: 37  · Adjusted Body Weight:  ; No Adjustment       · BMI Categories: Obese Class 2 (BMI 35.0 -39.9)       Nutrition Diagnosis:   · Increased nutrient needs related to increase demand for energy/nutrients as evidenced by wounds    Nutrition Interventions:   Food and/or Nutrient Delivery:  Continue Current Diet, Start Oral Nutrition Supplement (Jose Rafael BID)  Nutrition Education/Counseling:  Education initiated (discussed importance of ONS)   Coordination of Nutrition Care:  Continue to monitor while inpatient    Goals:  >75% of meals and ONS consumed       Nutrition Monitoring and Evaluation:   Behavioral-Environmental Outcomes:  None Identified   Food/Nutrient Intake Outcomes:  Food and Nutrient Intake, Supplement Intake  Physical Signs/Symptoms Outcomes:  Biochemical Data, Fluid Status or Edema, Nutrition Focused Physical Findings, Skin, Weight     Discharge Planning:     Too soon to determine     Electronically signed by Bernard Tolentino RD, LD on 9/24/21 at 11:43 AM EDT    Contact: [phone]

## 2021-09-24 NOTE — PROGRESS NOTES
Department of Podiatry  Resident Progress Note    Subjective  Patient seen bedside for b/l leg wounds and gangrenous toes on the right foot. No acute events overnight. Patient denies any N/V/D/F/C/SOB/CP and has no other complaints at this time.      Objective: Dressings are disheveled on the right lower extremity and removed on the left    Past Medical History:   Diagnosis Date    (HFpEF) heart failure with preserved ejection fraction ([facility] [address] 07/2020    Diagnosed by cardiology    Arthritis     Bilateral carotid artery stenosis 12/31/2020    Bilateral carotid bruits 12/31/2020    Blood circulation, collateral     CAD (coronary artery disease)     Carotid bruit 03/27/2013    CHF (congestive heart failure) [facility]     Chronic kidney disease     CKD (chronic kidney disease) stage 3, GFR 30-59 ml/min ([facility]) 03/20/2016    COPD (chronic obstructive pulmonary disease) ([facility] [address]     Follows with pulmonary    Diabetes mellitus ([facility] [address]     Diabetic neuropathy associated with type 2 diabetes mellitus ([facility])     Dyspnea on exertion     History of blood transfusion     Hyperlipidemia     Hypertension     Mild mitral regurgitation     Moderate tricuspid regurgitation by prior echocardiography 2018    Movement disorder     NSTEMI, initial episode of care ([facility] [address] 01/2018    Obesity     Pulmonary hypertension ([facility] [address] 2018    PVD (peripheral vascular disease) with claudication ([facility] [address] 03/27/2014    S/P carotid endarterectomy 03/27/2013    Sleep apnea     SOB (shortness of breath)     Thyroid disease     Unspecified cerebral artery occlusion with cerebral infarction     Vision decreased 12/31/2020       Past Surgical History:   Procedure Laterality Date    BIOPSY / LIGATION TEMPORAL ARTERY Bilateral 01/05/2021    BILATERAL TEMPORAL ARTERY BIOPSY performed by Jarad Wayne MD at [address]      5 years ago   [address]  2008    1 vessel and aortic valve repair    DIAGNOSTIC CARDIAC CATH LAB PROCEDURE  10/14/2008    DIAGNOSTIC CARDIAC CATH LAB PROCEDURE  10/21/2010    JOINT REPLACEMENT      R knee replacement    KNEE SURGERY      OTHER SURGICAL HISTORY Right 02/14/2017    debridement,bone bx foot    PACEMAKER INSERTION  11/12/2014    D-PPM   ()    Dr. Jorge Read      carotids       Family History   Problem Relation Age of Onset    Heart Disease Mother     Heart Failure Mother     Heart Surgery Father     Heart Disease Father     Heart Failure Father     High Blood Pressure Sister     Cancer Sister        No Known Allergies    VASCULAR:  DP and PT pulses are non-palpable. CFT less than 3 seconds. Warm to cool from the tibial tuberosity to the distal aspect of the digits dorsally. Hair growth absent to the lower extremity. Diffuse edema noted   NEUROLOGIC:  Gross sensation intact to the lower extremity  DERM:  Venous stasis dermatitis noted to the b/l lower. Partial thickness wounds are noted to the anterior surface of the b/l lower extremities. Diffuse erythema is noted to the lower extremities b/l. Toes 2 and 4 on the right foot show gangrenous changes, with the 3rd toe being completely gangrenous in appearance. There are two eschars noted the lateral aspect of the right foot and the level of the 5th MPJ.   MUSCULOSKELETAL: 5/5 for all pedal muscle groups. Pain is noted at the area of the the wounds.                   CBC with Differential:    Lab Results   Component Value Date    WBC 8.8 09/24/2021    RBC 3.25 09/24/2021    HGB 10.2 09/24/2021    HCT 30.6 09/24/2021     09/24/2021    MCV 94.2 09/24/2021    MCH 31.4 09/24/2021    MCHC 33.3 09/24/2021    RDW 14.9 09/24/2021    NRBC 0.0 04/22/2018    SEGSPCT 76 03/16/2014    BANDSPCT 1 09/02/2016    LYMPHOPCT 9.9 09/24/2021    MONOPCT 8.5 09/24/2021    BASOPCT 0.5 09/24/2021    MONOSABS 0.74 09/24/2021    LYMPHSABS 0.87 09/24/2021    EOSABS 0.19 09/24/2021 DUNIA 0.04 09/24/2021     Hemoglobin/Hematocrit:    Lab Results   Component Value Date    HGB 10.2 09/24/2021    HCT 30.6 09/24/2021       Scheduled Meds:   allopurinol  300 mg Oral Daily    aspirin EC  81 mg Oral Daily    DULoxetine  60 mg Oral Daily    ferrous sulfate  325 mg Oral BID WC    gabapentin  200 mg Oral TID    isosorbide mononitrate  120 mg Oral Daily    lactulose  20 g Oral TID    levothyroxine  25 mcg Oral Daily    metoprolol succinate  50 mg Oral Daily    sodium chloride flush  5-40 mL IntraVENous 2 times per day    enoxaparin  40 mg SubCUTAneous Daily    insulin lispro  0-6 Units SubCUTAneous TID     insulin lispro  0-3 Units SubCUTAneous Nightly    ceFAZolin  2,000 mg IntraVENous Q8H    budesonide  250 mcg Nebulization BID    ipratropium  0.5 mg Nebulization 4x daily    Arformoterol Tartrate  15 mcg Nebulization BID     Continuous Infusions:   dextrose      sodium chloride       PRN Meds:.albuterol, HYDROcodone-acetaminophen, glucose, dextrose, glucagon (rDNA), dextrose, sodium chloride flush, sodium chloride, ondansetron **OR** ondansetron, polyethylene glycol, acetaminophen **OR** acetaminophen, labetalol, perflutren lipid microspheres, hydrALAZINE    Diagnostics      Assessment  1. Chronic leg wounds  2. Gangrenous toes 2-4, right foot  3. PVD     Plan  - Patient was examined and evaluated. - Reviewed patient's recent lab results and pertinent diagnostic imaging.  - Reviewed ancillary service notes. - NIVS: Monophasic RLE PT doppler signal like reflecting underlying PAD.  Evaluation is limited due to patient request to terminate the procedure  - Vascular surgery: Angiogram next week with Dr. Natasha Montoya  - Wound dressings applied consisting of xeroform and DSD  - Surgical debridement will be held off until angiogram   - Will continue to follow patient while they are in-house.

## 2021-09-25 NOTE — PROGRESS NOTES
Nephrology Progress Note  Patient's Name: Ramon Chapin  2:21 PM  9/25/2021    Nephrologist: Paola Hollingsworth MD    Reason for Consult:  CKD      History of Present Ilness from the 9/24/21 note:    Ramon Chapin is a [de-identified] y.o. male with prior history of  Of CKD G3B with a baseline serum cr 1.5-2.1mg/dl with 31-43ml/min  In the setting of DM2, HTN, Diffuse Atherosclerotic Vasc disease. Pt was admitted 8/19/21 to 8/22/21 with CHF. He was D/Eder on bumetanide and metolazone. No ACEI/ARB or Entresto. He presented back to the ED 9/23/21 with increased pain in the RLE. His cr 8/21/21 2.1mg/dl and his cr on admission 2.2mg/dl.  He is somewhat confused and has a TeleMonitor    9/25/21: Pt awake alert has a sitter at bedside as the pt remains agitated as he states he cannot get comfortable and he cannot sleep, additionally he is , per the sister, not eating    Past Medical History:   Diagnosis Date    (HFpEF) heart failure with preserved ejection fraction ([address] 07/2020    Diagnosed by cardiology    Arthritis     Bilateral carotid artery stenosis 12/31/2020    Bilateral carotid bruits 12/31/2020    Blood circulation, collateral     CAD (coronary artery disease)     Carotid bruit 03/27/2013    CHF (congestive heart failure) ([address]     Chronic kidney disease     CKD (chronic kidney disease) stage 3, GFR 30-59 ml/min ([address] 03/20/2016    COPD (chronic obstructive pulmonary disease) ([address]     Follows with pulmonary    Diabetes mellitus ([address]     Diabetic neuropathy associated with type 2 diabetes mellitus ([address]     Dyspnea on exertion     History of blood transfusion     Hyperlipidemia     Hypertension     Mild mitral regurgitation     Moderate tricuspid regurgitation by prior echocardiography 2018    Movement disorder     NSTEMI, initial episode of care ([address] 01/2018    Obesity     Pulmonary hypertension ([address] 2018    PVD (peripheral vascular disease) with claudication ([address] 03/27/2014    S/P carotid endarterectomy 03/27/2013    Sleep apnea     SOB (shortness of breath)     Thyroid disease     Unspecified cerebral artery occlusion with cerebral infarction     Vision decreased 12/31/2020       Past Surgical History:   Procedure Laterality Date    BIOPSY / LIGATION TEMPORAL ARTERY Bilateral 01/05/2021    BILATERAL TEMPORAL ARTERY BIOPSY performed by Reyes Ag MD at [address]      5 years ago   [address] GRAFT  2008    1 vessel and aortic valve repair    DIAGNOSTIC CARDIAC CATH LAB PROCEDURE  10/14/2008    DIAGNOSTIC CARDIAC CATH LAB PROCEDURE  10/21/2010    JOINT REPLACEMENT      R knee replacement    KNEE SURGERY      OTHER SURGICAL HISTORY Right 02/14/2017    debridement,bone bx foot    PACEMAKER INSERTION  11/12/2014    D-PPM   ([facility])    Dr. Hebert johnson       Family History   Problem Relation Age of Onset    Heart Disease Mother     Heart Failure Mother     Heart Surgery Father     Heart Disease Father     Heart Failure Father     High Blood Pressure Sister     Cancer Sister         reports that he quit smoking about 40 years ago. His smoking use included cigarettes. He started smoking about 67 years ago. He has a 50.00 pack-year smoking history. He has never used smokeless tobacco. He reports previous drug use. He reports that he does not drink alcohol. Allergies:  Patient has no known allergies.     Current Medications:    albuterol (PROVENTIL) nebulizer solution 2.5 mg, Q6H PRN  allopurinol (ZYLOPRIM) tablet 300 mg, Daily  aspirin EC tablet 81 mg, Daily  DULoxetine (CYMBALTA) extended release capsule 60 mg, Daily  ferrous sulfate (IRON 325) tablet 325 mg, BID WC  HYDROcodone-acetaminophen 7.5-325 MG per 15ML solution 5 mL, Q6H PRN  gabapentin (NEURONTIN) capsule 200 mg, TID  isosorbide mononitrate (IMDUR) extended release tablet 120 mg, Daily  lactulose (CHRONULAC) 10 GM/15ML solution 20 g, TID  levothyroxine (SYNTHROID) tablet 25 mcg, Daily  metoprolol succinate (TOPROL XL) extended release tablet 50 mg, Daily  glucose (GLUTOSE) 40 % oral gel 15 g, PRN  dextrose 50 % IV solution, PRN  glucagon (rDNA) injection 1 mg, PRN  dextrose 5 % solution, PRN  sodium chloride flush 0.9 % injection 5-40 mL, 2 times per day  sodium chloride flush 0.9 % injection 5-40 mL, PRN  0.9 % sodium chloride infusion, PRN  enoxaparin (LOVENOX) injection 40 mg, Daily  ondansetron (ZOFRAN-ODT) disintegrating tablet 4 mg, Q8H PRN   Or  ondansetron (ZOFRAN) injection 4 mg, Q6H PRN  polyethylene glycol (GLYCOLAX) packet 17 g, Daily PRN  acetaminophen (TYLENOL) tablet 650 mg, Q6H PRN   Or  acetaminophen (TYLENOL) suppository 650 mg, Q6H PRN  insulin lispro (HUMALOG) injection vial 0-6 Units, TID WC  insulin lispro (HUMALOG) injection vial 0-3 Units, Nightly  labetalol (NORMODYNE;TRANDATE) injection 10 mg, Q4H PRN  perflutren lipid microspheres (DEFINITY) injection 1.65 mg, ONCE PRN  budesonide (PULMICORT) nebulizer suspension 250 mcg, BID  ipratropium (ATROVENT) 0.02 % nebulizer solution 0.5 mg, 4x daily  Arformoterol Tartrate (BROVANA) nebulizer solution 15 mcg, BID  hydrALAZINE (APRESOLINE) injection 10 mg, Q6H PRN        Review of Systems:   Pertinent items are noted in HPI.     Physical exam:   Constitutional:  Elderly male in NAD  Vitals:   VITALS:  BP (!) 169/92   Pulse 98   Temp 98.1 °F (36.7 °C) (Oral)   Resp 18   Ht 5' 7\" (1.702 m)   Wt 244 lb (110.7 kg)   SpO2 96%   BMI 38.22 kg/m²   24HR INTAKE/OUTPUT:      Intake/Output Summary (Last 24 hours) at 9/25/2021 1421  Last data filed at 9/25/2021 0438  Gross per 24 hour   Intake 120 ml   Output 200 ml   Net -80 ml     URINARY CATHETER OUTPUT (Rodriguez):  External Urinary Catheter-Output (mL): 500 mL  DRAIN/TUBE OUTPUT:     VENT SETTINGS:  Vent Information  SpO2: 96 %  Additional Respiratory  Assessments  Pulse: 98  Resp: 18  SpO2: 96 %    Skin: erythema and ulcerations in the bilat lower ext  Heent:  eomi, mmm  Neck: no bruits or jvd noted  Cardiovascular: PMI lat displaced  S1, S2 without S3 or rub  Respiratory: CTA B without w/r/r  Abdomen:  +bs, soft, nt, nd  Ext: 1+bilat  lower extremity edema with erythema scaling and serous drainage of the bilat pretibial areas, dry gangrene of 2nd, 3rd, 4th R toes  Psychiatric: Oriented to person and place confused to time    Data:   Labs:  CBC:   Lab Results   Component Value Date    WBC 10.9 09/25/2021    RBC 3.50 09/25/2021    HGB 11.0 09/25/2021    HCT 32.7 09/25/2021    MCV 93.4 09/25/2021    MCH 31.4 09/25/2021    MCHC 33.6 09/25/2021    RDW 14.9 09/25/2021     09/25/2021    MPV 9.4 09/25/2021     CBC with Differential:    Lab Results   Component Value Date    WBC 10.9 09/25/2021    RBC 3.50 09/25/2021    HGB 11.0 09/25/2021    HCT 32.7 09/25/2021     09/25/2021    MCV 93.4 09/25/2021    MCH 31.4 09/25/2021    MCHC 33.6 09/25/2021    RDW 14.9 09/25/2021    NRBC 0.0 04/22/2018    SEGSPCT 76 03/16/2014    BANDSPCT 1 09/02/2016    LYMPHOPCT 9.9 09/24/2021    MONOPCT 8.5 09/24/2021    BASOPCT 0.5 09/24/2021    MONOSABS 0.74 09/24/2021    LYMPHSABS 0.87 09/24/2021    EOSABS 0.19 09/24/2021    BASOSABS 0.04 09/24/2021     Hemoglobin/Hematocrit:    Lab Results   Component Value Date    HGB 11.0 09/25/2021    HCT 32.7 09/25/2021     CMP:    Lab Results   Component Value Date     09/25/2021    K 4.1 09/25/2021    K 3.9 09/24/2021    CL 99 09/25/2021    CO2 23 09/25/2021    BUN 30 09/25/2021    CREATININE 1.4 09/25/2021    GFRAA 59 09/25/2021    LABGLOM 49 09/25/2021    GLUCOSE 169 09/25/2021    GLUCOSE 111 07/16/2011    PROT 6.6 09/24/2021    LABALBU 2.8 09/25/2021    CALCIUM 9.4 09/25/2021    BILITOT 0.4 09/25/2021    ALKPHOS 130 09/25/2021    AST 62 09/25/2021    ALT 13 09/25/2021     BMP:    Lab Results   Component Value Date     09/25/2021    K 4.1 09/25/2021    K 3.9 09/24/2021    CL 99 09/25/2021    CO2 23 09/25/2021    BUN 30 09/25/2021    LABALBU 2.8 09/25/2021    CREATININE 1.4 09/25/2021    CALCIUM 9.4 09/25/2021    GFRAA 59 09/25/2021    LABGLOM 49 09/25/2021    GLUCOSE 169 09/25/2021    GLUCOSE 111 07/16/2011     BUN/Creatinine:    Lab Results   Component Value Date    BUN 30 09/25/2021    CREATININE 1.4 09/25/2021     Hepatic Function Panel:    Lab Results   Component Value Date    ALKPHOS 130 09/25/2021    ALT 13 09/25/2021    AST 62 09/25/2021    PROT 6.6 09/24/2021    BILITOT 0.4 09/25/2021    BILIDIR <0.2 01/25/2021    IBILI see below 01/25/2021    LABALBU 2.8 09/25/2021     Albumin:    Lab Results   Component Value Date    LABALBU 2.8 09/25/2021     Calcium:    Lab Results   Component Value Date    CALCIUM 9.4 09/25/2021     Ionized Calcium:  No results found for: IONCA  Magnesium:    Lab Results   Component Value Date    MG 1.5 09/25/2021     Phosphorus:    Lab Results   Component Value Date    PHOS 2.4 09/25/2021     Uric Acid:    Lab Results   Component Value Date    LABURIC 3.5 01/21/2021     PT/INR:    Lab Results   Component Value Date    PROTIME 11.7 01/05/2021    PROTIME 11.3 06/21/2011    INR 1.0 01/05/2021     PTT:    Lab Results   Component Value Date    APTT 25.5 01/05/2021   [APTT}  Troponin:    Lab Results   Component Value Date    TROPONINI 0.05 01/19/2021     U/A:    Lab Results   Component Value Date    COLORU Yellow 09/24/2021    PROTEINU 100 09/24/2021    PHUR 6.0 09/24/2021    LABCAST RARE 10/11/2017    WBCUA NONE 09/24/2021    WBCUA NONE 07/12/2011    RBCUA 1-3 09/24/2021    RBCUA 1-3 03/17/2014    BACTERIA NONE SEEN 09/24/2021    CLARITYU Clear 09/24/2021    SPECGRAV 1.025 09/24/2021    LEUKOCYTESUR Negative 09/24/2021    UROBILINOGEN 0.2 09/24/2021    BILIRUBINUR Negative 09/24/2021    BILIRUBINUR NEGATIVE 07/12/2011    BLOODU TRACE-INTACT 09/24/2021    GLUCOSEU Negative 09/24/2021    GLUCOSEU NEGATIVE 07/12/2011     ABG:    Lab Results   Component Value Date PH 7.396 2018    PH 7.453 2012    PCO2 39.0 2018    PO2 92.8 2018    HCO3 23.4 2018    BE -1.2 2018    O2SAT 96.7 2018     HgBA1c:    Lab Results   Component Value Date    LABA1C 7.2 2021     Microalbumen/Creatinine ratio:  No components found for: RUCREAT  FLP:    Lab Results   Component Value Date    TRIG 84 2021    HDL 33 2021    LDLCALC 49 2021    LABVLDL 17 2021     TSH:    Lab Results   Component Value Date    TSH 4.270 2021     VITAMIN B12: No components found for: B12  FOLATE:    Lab Results   Component Value Date    FOLATE 11.5 2021     Iron Saturation:  No components found for: PERCENTFE  FERRITIN:    Lab Results   Component Value Date    FERRITIN 534 2021     CHECO:  No results found for: ANATITER, CHECO  AMYLASE:    Lab Results   Component Value Date    AMYLASE 79 2016     LIPASE:    Lab Results   Component Value Date    LIPASE 14 2021     Fibrinogen Level:  No components found for: FIB  24 Hour Urine for Protein:  No components found for: RAWUPRO, UHRS3, PTPY60HD, UTV3  24 Hour Urine for Creatinine Clearance:  No components found for: CREAT4, UHRS10, UTV10  PSA: No results found for: PSA     Imaging:  CXR results:  Patient MRN: [id number]   : 1940   Age:  80 years   Gender: Male   Order Date: 2020 1:07 PM   Exam: US RETROPERITONEAL BIPIN, US DUP ABD PEL RETRO SCROT COMPLETE   Number of Images: 42 views   Indication:  N18.3 Chronic kidney disease, stage III (moderate) ([facility]        Comparison: Retroperitoneal ultrasound dated 2019.       Technique: Sonographic interrogation of the retroperitoneum with   attention to the kidneys. Duplex sonography of the abdominal aorta and   bilateral renal arteries.       Findings:       Right kidney 11.7 cm in long axis. The left kidney measures 12.2 cm in long axis. There is mild   parenchymal thinning on the left.    There is no evidence of hydronephrosis.       Doppler findings:    Abdominal aorta peak systolic velocity is 921.5 cm/s. Right renal artery peak systolic velocity 523.6 cm/s. RAR equals 1.7. Left renal artery peak systolic velocity 989.1 cm/s. RAR equals 1.7.           Impression   1. Increased flow velocity in both renal arteries which is fairly   symmetric. The amount of increased flow velocity is considered   borderline suspicious for clinically significant renal artery   stenosis. 2. Mild renal parenchymal thinning on the left. EXAMINATION:   RETROPERITONEAL ULTRASOUND OF THE KIDNEYS AND URINARY BLADDER.  Renal artery   ultrasound.       9/24/2021       COMPARISON:   None       HISTORY:   ORDERING SYSTEM PROVIDED HISTORY: HTN   TECHNOLOGIST PROVIDED HISTORY:   Reason for exam:->HTN   What reading provider will be dictating this exam?->CRC       FINDINGS:   Examination was reportedly a difficult examination per the sonographer due to   patient's inability to remain still for exam and complaints of pain   throughout the exam.  The abdominal aorta was not well visualized.  Renal   arteries were also not adequately evaluated and this examination is   nondiagnostic in evaluation for possible renal artery stenosis.         Kidneys:       The right kidney measures 10.1 cm in length and the left kidney measures 11.0   cm in length.       Kidneys demonstrate normal cortical echogenicity.  No evidence of   hydronephrosis or intrarenal stones.           Bladder:       Urinary bladder is incompletely distended measuring 80 mL.  Allowing for   this, no obvious bladder wall thickening or intraluminal filling defect.           Impression   No acute renal abnormality.          Assessment  1-CKD G3B with a baseline serum cr 1.5-2.1mg/dl with 31-43ml/min  In the setting of DM2, HTN, Diffuse Atherosclerotic Vasc disease with presumed DM2 macroalbuminuria  UA Sg 1.025, blood tr, protein 100mg/dl, Ni (-), LE (-)  Microalb: cr 504  Renal US unremarkable  Cr slowly trending down  PLAN:  1. For angiogram on 9/27/21-will start IVF 9/26pm  2. Hold ARB in the setting of the decreasing cr and in anticipation of the angiogram    2-Anemia in CKD  HgB above goal >/=10 No SWATI indicated  Ferritin 534 and iron sat 22%  Folate 11.5, B12 1146  PLAN:  1. Await  SPEP and UPEP  2. Follow H/H    3-Sec HPTH of Renal Origin with Unspecified Vit D Def with Hypophosphatemia  Ca++ WNL  PO4 2.4  PTH 30  Vit D 27  PLAN:  1. Start neutraphos  2. Start Vit D  3. Follow Ca++ and PO4    4- HTN with CKD I-IV  BP above goal <120/80  Duplex Renal US-indeterminant  PLAN:  1.  Start hydralazine 25mg tid      Thank you Dr. Karlie Lund MD for allowing us to participate in care of Martin Hollingsworth MD  2:21 PM  9/25/2021

## 2021-09-25 NOTE — PROGRESS NOTES
[address] [place] [address] [facility]  BOO  Progress Note    SUBJECTIVE:  Chief Complaint   Patient presents with    Leg Pain     Bilateral leg pain with wounds that are getting worse per patient. Sent in from [facility]     Patient remains confused, agitated. Sitter at bedside. +pain in the legs   No fevers. Review of systems:  As stated above in the chief complaint, otherwise negative. Medications:  Scheduled Meds:   allopurinol  300 mg Oral Daily    aspirin EC  81 mg Oral Daily    DULoxetine  60 mg Oral Daily    ferrous sulfate  325 mg Oral BID WC    gabapentin  200 mg Oral TID    isosorbide mononitrate  120 mg Oral Daily    lactulose  20 g Oral TID    levothyroxine  25 mcg Oral Daily    metoprolol succinate  50 mg Oral Daily    sodium chloride flush  5-40 mL IntraVENous 2 times per day    enoxaparin  40 mg SubCUTAneous Daily    insulin lispro  0-6 Units SubCUTAneous TID WC    insulin lispro  0-3 Units SubCUTAneous Nightly    budesonide  250 mcg Nebulization BID    ipratropium  0.5 mg Nebulization 4x daily    Arformoterol Tartrate  15 mcg Nebulization BID     Continuous Infusions:   dextrose      sodium chloride       PRN Meds:albuterol, HYDROcodone-acetaminophen, glucose, dextrose, glucagon (rDNA), dextrose, sodium chloride flush, sodium chloride, ondansetron **OR** ondansetron, polyethylene glycol, acetaminophen **OR** acetaminophen, labetalol, perflutren lipid microspheres, hydrALAZINE    OBJECTIVE:  BP (!) 169/92   Pulse 98   Temp 98.1 °F (36.7 °C) (Oral)   Resp 18   Ht 5' 7\" (1.702 m)   Wt 244 lb (110.7 kg)   SpO2 96%   BMI 38.22 kg/m²   Temp  Av.3 °F (36.8 °C)  Min: 98.1 °F (36.7 °C)  Max: 98.6 °F (37 °C)  Constitutional: The patient is awake, alert. Compulsive, agitated. Sitter present  Skin: Warm and dry. No rashes were noted. HEENT: Round and reactive pupils. Moist mucous membranes. No ulcerations or thrush. Neck: Supple to movements.    Chest: No respiratory distress. Room air. Symmetrical expansion. No wheezing, crackles or rhonchi. Cardiovascular: S1 and S2 are rhythmic and regular. No murmurs appreciated. Abdomen: Positive bowel sounds to auscultation. Benign to palpation. No masses felt. Extremities: bilateral lower extremities dressed in dry dressings, no surrounding erythema. Right 3rd toe is necrotic. Right second & fourth toes are partially necrotic.    Lines: peripheral    Laboratory and Tests Review:  Lab Results   Component Value Date    WBC 10.9 09/25/2021    WBC 8.8 09/24/2021    WBC 10.8 09/23/2021    HGB 11.0 (L) 09/25/2021    HCT 32.7 (L) 09/25/2021    MCV 93.4 09/25/2021     09/25/2021     Lab Results   Component Value Date    NEUTROABS 6.85 09/24/2021    NEUTROABS 8.43 (H) 09/23/2021    NEUTROABS 6.20 08/19/2021     No results found for: [facility]  Lab Results   Component Value Date    ALT 13 09/25/2021    AST 62 (H) 09/25/2021    ALKPHOS 130 (H) 09/25/2021    BILITOT 0.4 09/25/2021     Lab Results   Component Value Date     09/25/2021    K 4.1 09/25/2021    K 3.9 09/24/2021    CL 99 09/25/2021    CO2 23 09/25/2021    BUN 30 09/25/2021    CREATININE 1.4 09/25/2021    CREATININE 1.6 09/24/2021    CREATININE 2.2 09/23/2021    GFRAA 59 09/25/2021    LABGLOM 49 09/25/2021    GLUCOSE 169 09/25/2021    GLUCOSE 111 07/16/2011    PROT 6.6 09/24/2021    LABALBU 2.8 09/25/2021    CALCIUM 9.4 09/25/2021    BILITOT 0.4 09/25/2021    ALKPHOS 130 09/25/2021    AST 62 09/25/2021    ALT 13 09/25/2021     Lab Results   Component Value Date    CRP 21.4 (H) 09/23/2021    CRP 2.0 (H) 01/24/2021    CRP 1.9 (H) 01/23/2021     Lab Results   Component Value Date    SEDRATE 115 (H) 09/23/2021    SEDRATE 65 (H) 01/25/2021    SEDRATE 72 (H) 01/24/2021     Radiology:  Noted     Microbiology:   Blood cultures: negative so far    Recent Labs     09/23/21  0226 09/25/21  0355   PROCAL 0.46* 0.36*       ASSESSMENT:  · Peripheral arterial disease  · Dry gangrene right second, third and fourth toes  · Chronic ischemic ulcers bilateral lower extremities. These do not appear to be infected. PLAN:  · Antibiotics not warranted at this time  · For Angiogram sometime next week per vasucular  · Monitor labs - reviewed     MADDI Matt - CNP  12:31 PM  9/25/2021     Patient seen and examined. I had a face to face encounter with the patient. Agree with exam.  Assessment and plan as outlined above and directed by me. Addition and corrections were done as deemed appropriate. My exam and plan include: The patient remains confused. Antibiotics have been discontinued. Thus far, no evidence of ongoing infection. ID will sign off. Call if needed.     Vasquez Nuno MD  9/25/2021  2:11 PM

## 2021-09-25 NOTE — PROGRESS NOTES
Subjective: The patient is awake and alert. Sitter at bedside. Patient also moved closer to the nurses station. No acute events overnight. Denies chest pain, angina, SOB     Objective:    BP (!) 175/88   Pulse 80   Temp 98.2 °F (36.8 °C) (Oral)   Resp 17   Ht 5' 7\" (1.702 m)   Wt 244 lb (110.7 kg)   SpO2 96%   BMI 38.22 kg/m²     In: 300 [P.O.:300]  Out: 400   In: 300   Out: 400 [Urine:400]    General appearance: NAD, conversant  HEENT: AT/NC, MMM  Neck: FROM, supple  Lungs: Clear to auscultation  CV: RRR, no MRGs  Vasc: Radial pulses 2+  Abdomen: Soft, non-tender; no masses or HSM  Extremities: No peripheral edema or digital cyanosis  Skin  :               Psych: Alert and oriented to person, place and time  Neuro: Alert and interactive     Recent Labs     09/23/21 0226 09/24/21  0630 09/25/21  0355   WBC 10.8 8.8 10.9   HGB 11.9* 10.2* 11.0*   HCT 36.2* 30.6* 32.7*    247 277       Recent Labs     09/23/21 0226 09/24/21  0630 09/25/21  0355    135 137   K 4.7 3.9 4.1   CL 97* 99 99   CO2 27 23 23   BUN 55* 37* 30*   CREATININE 2.2* 1.6* 1.4*   CALCIUM 10.1 9.5 9.4       Assessment:    Active Problems:    Cellulitis  Resolved Problems:    * No resolved hospital problems.  *      Plan:    Cellulitis   -IV abx-discontinued secondary to noninfected gangrened necrotic toes  -ID following  -Monitor WBC >> 10.9 today, watch for increasing leukocytosis or fevers     Necrotic Toes of R Foot  -Podiatry following- local wound care per them  -Vascular Surgery following  -Angiogram Monday 9/27   -SÁNCHEZ's- patient refused  -Possible debridement/amputation but wont be until after angiogram.     DM Type 2  -Monitor labs, check hemoglobin A1c  -ISS glucose control, resume home medications and titrate as needed with introduction of insulin  -Hypoglycemia protocol initiated  -Diabetes education  -Discuss diet modification     HARVEY  -monitor labs BUN/Creat 55/2.2 >> 30/1.4  -Nephrology consulted    Confused and agitated  This appears to be at his normal baseline currently with sitter at bedside  Will remove sitter and institute telesitter in preparation for discharge  Haldol as needed     Resume home medications, monitor for fulminant sepsis  Uncontrolled blood pressure-as needed BP meds, resume home regimen     DVT Prophylaxis   PT/OT 14 /15  Discharge planning- back to [facility] when medically stable    MADDI Hurley CNP  5:56 PM  9/25/2021     Above note edited to reflect my thoughts     I personally saw, examined and provided care for the patient. Radiographs, labs and medication list were reviewed by me independently. The case was discussed in detail and plans for care were established. Review of FABIO Hurley   , documentation was conducted and revisions were made as appropriate directly by me. I agree with the above documented exam, problem list, and plan of care.      Fauzia Ramirez MD  7:00 PM  9/25/2021

## 2021-09-25 NOTE — PROGRESS NOTES
unable to report prior level of function, per notes from last admission pt wheelchair level and was able to transfer self to w/c     Initial Evaluation  Date: 9/25/21 Treatment      Short Term/ Long Term   Goals   Was pt agreeable to Eval/treatment? yes     Does pt have pain? B LE's     Bed Mobility  Rolling: NT  Supine to sit: mod assist  Sit to supine: mod assist  Scooting: mod assist  supervision   Transfers Sit to stand: SBA  Stand to sit: SBA  Stand pivot: NT  supervison   Ambulation    3  feet with ww SBA sidestepping along bed, limited by B LE pain  15 feet with ww with supervison   Stair Negotiation  Ascended and descended  NT      LE strength     3+/5    4-/5   balance      fair     AM-PAC Raw score               14/24         Pt is alert, confused, easily agiated  LE ROM: WFL  Endurance: fair       ASSESSMENT:    Pt displays functional ability as noted in the objective portion of this evaluation. Patient education  Pt educated on importance of mobility    Patient response to education:   Pt verbalized understanding Pt demonstrated skill Pt requires further education in this area   yes           Comments:  Pt easily agitated, confusion with rambling speech during session. Pt did not want touched during transfers or when taking a few steps with ww. Pt limited by B LE pain during mobility    Pt's/ family goals   1. Pt unable to state    Conditions Requiring Skilled Therapeutic Intervention:    [x]Decreased strength     []Decreased ROM  [x]Decreased functional mobility  [x]Decreased balance   [x]Decreased endurance   []Decreased posture  []Decreased sensation  []Decreased coordination   []Decreased vision  [x]Decreased safety awareness   []Increased pain       Patient and or family understand(s) diagnosis, prognosis, and plan of care.   no  Prognosis is fair for reaching above PT goals    PHYSICAL THERAPY PLAN OF CARE:    PT POC is established based on physician order and patient diagnosis     Referring

## 2021-09-25 NOTE — PROGRESS NOTES
Pt becoming more agitated through shift, becoming more pale. New irregular HR heard, RN tried to obtain EKG but pt is extremely uncooperative and would not allow RN to obtain. Jesus Webb, covering for Dr. Leigh Steele, called and notified of change in status. 1x dose vistaril ordered to try and calm pt. Instructed to try to obtain new EKG once pt calms down if possible and call with results. [address] [facility] called again with results of EKG. No new orders given.

## 2021-09-25 NOTE — DISCHARGE INSTR - COC
Continuity of Care Form    Patient Name: Alexander Causey   :  1940  MRN:  [id number]    Admit date:  2021  Discharge date:  ***    Code Status Order: Full Code   Advance Directives:     Admitting Physician:  Joaquin Rico MD  PCP: Miranda Adkins MD    Discharging Nurse: [facility] Unit/Room#: 4548/4218-Q  Discharging Unit Phone Number: [phone]    Emergency Contact:   Extended Emergency Contact Information  Primary Emergency Contact: ABEL ABRAHAM  Home Phone: [phone]  Mobile Phone: [phone]  Relation: Brother/Sister    Past Surgical History:  Past Surgical History:   Procedure Laterality Date    BIOPSY / LIGATION TEMPORAL ARTERY Bilateral 2021    BILATERAL TEMPORAL ARTERY BIOPSY performed by Yumiko Rogers MD at [address]      5 years ago   [address] GRAFT      1 vessel and aortic valve repair    DIAGNOSTIC CARDIAC CATH LAB PROCEDURE  10/14/2008    DIAGNOSTIC CARDIAC CATH LAB PROCEDURE  10/21/2010    JOINT REPLACEMENT      R knee replacement    KNEE SURGERY      OTHER SURGICAL HISTORY Right 2017    debridement,bone bx foot    PACEMAKER INSERTION  2014    D-PPM   ()    Dr. Tennille Adkins      carotids       Immunization History:   Immunization History   Administered Date(s) Administered    Influenza Virus Vaccine 2016    Pneumococcal Conjugate 13-valent (Kjxonrr16) 2016    Pneumococcal Conjugate Vaccine 2016       Active Problems:  Patient Active Problem List   Diagnosis Code    Essential hypertension I10    PVD (peripheral vascular disease) with claudication ([facility] I73.9    Pacemaker Z95.0    S/P AVR Z95.2    CKD (chronic kidney disease) stage 3, GFR 30-59 ml/min N18.30    Type 2 diabetes mellitus with stage 3 chronic kidney disease, with long-term current use of insulin (HCC) E11.22, N18.30, Z79.4    Pulmonary nodule R91.1    Diabetes mellitus type 2, uncontrolled (HCC) E11.65    Hyperlipidemia LDL goal <100 E78.5    Acquired hypothyroidism E03.9    CAD (coronary artery disease), native coronary artery I25.10    Chronic pain G89.29    History of CVA (cerebrovascular accident) Z80.78    Obesity (BMI 30-39. 9) E66.9    Anemia D64.9    Elevated sed rate R70.0    Vision decreased H54.7    Bilateral carotid bruits R09.89    Bilateral carotid artery stenosis I65.23    Mild mitral regurgitation I34.0    Pulmonary hypertension (HCC) I27.20    Moderate tricuspid regurgitation by prior echocardiography I07.1    (HFpEF) heart failure with preserved ejection fraction ([facility]) I50.30    COPD (chronic obstructive pulmonary disease) ([facility] [address] J44.9    Diabetic neuropathy associated with type 2 diabetes mellitus (HCC) E11.40    Congestive heart failure (HCC) I50.9    Cellulitis L03.90       Isolation/Infection:   Isolation          No Isolation        Patient Infection Status     Infection Onset Added Last Indicated Last Indicated By Review Planned Expiration Resolved Resolved By    None active    Resolved    COVID-19 Rule Out 01/19/21 01/19/21 01/19/21 COVID-19 (Ordered)   01/19/21 Rule-Out Test Resulted    COVID-19 Rule Out 12/29/20 12/29/20 12/29/20 Covid-19 Ambulatory (Ordered)   12/31/20 Rule-Out Test Resulted          Nurse Assessment:  Last Vital Signs: BP (!) 169/92   Pulse 98   Temp 98.1 °F (36.7 °C) (Oral)   Resp 18   Ht 5' 7\" (1.702 m)   Wt 244 lb (110.7 kg)   SpO2 96%   BMI 38.22 kg/m²     Last documented pain score (0-10 scale): Pain Level: 7  Last Weight:   Wt Readings from Last 1 Encounters:   09/25/21 244 lb (110.7 kg)     Mental Status:  {IP PT MENTAL STATUS:20030}    IV Access:  {[facility] IV ACCESS:838996742}    Nursing Mobility/ADLs:  Walking   {[facility] DME GOLZ:103136979}  Transfer  {[facility] DME VJLH:402485982}  Bathing  {[facility] DME MLVZ:798476794}  Dressing  {[facility] DME SKHD:448723762}  Toileting  {[facility] DME [facility]:207680056}  Feeding  {[facility] DME BSQB:736600978}  Med Admin  {[facility] DME GDXA:532714751}  Med Delivery   { PAUL MED Delivery:900880552}    Wound Care Documentation and Therapy:  Wound 21 Pretibial Right; Anterior (Active)   Number of days: 35       Wound 21 Toe (Comment  which one) Anterior;Right (Active)   Number of days: 35       Wound 21 Toe (Comment  which one) Anterior;Right (Active)   Number of days: 35       Wound 21 Toe (Comment  which one) Anterior;Right (Active)   Number of days: 35       Wound 21 Pretibial Left (Active)   Number of days: 35       Wound 21 Toe (Comment  which one) Anterior; Left (Active)   Number of days: 35        Elimination:  Continence:   · Bowel: {YES / DY:17203}  · Bladder: {YES / SD:42844}  Urinary Catheter: {Urinary Catheter:579613147}   Colostomy/Ileostomy/Ileal Conduit: {YES / FT:88625}       Date of Last BM: ***    Intake/Output Summary (Last 24 hours) at 2021 0949  Last data filed at 2021 0438  Gross per 24 hour   Intake 120 ml   Output 200 ml   Net -80 ml     I/O last 3 completed shifts:   In: 120 [P.O.:120]  Out: 200 [Urine:200]    Safety Concerns:     [address] Safety Concerns:872783614}    Impairments/Disabilities:      [address] Impairments/Disabilities:032021384}    Nutrition Therapy:  Current Nutrition Therapy:   [address] Diet List:734894973}    Routes of Feeding: {[facility] DME Other Feedings:029824932}  Liquids: {Slp liquid thickness:79448}  Daily Fluid Restriction: {[facility] DME Yes amt example:162372111}  Last Modified Barium Swallow with Video (Video Swallowing Test): {Done Not Done GVXO:506588396}    Treatments at the Time of Hospital Discharge:   Respiratory Treatments: ***  Oxygen Therapy:  {Therapy; copd oxygen:48036}  Ventilator:    { CC Vent UVZN:474172382}    Rehab Therapies: Physical Therapy and Occupational Therapy  Weight Bearing Status/Restrictions: [address] CC Weight Bearin}  Other Medical Equipment (for information only, NOT a DME order):  {EQUIPMENT:470433889}  Other Treatments: ***    Patient's personal belongings (please select all that are sent with patient):  {[facility] DME Belongings:996774895}    RN SIGNATURE:  {Esignature:530751807}    CASE MANAGEMENT/SOCIAL WORK SECTION    Inpatient Status Date: ***    Readmission Risk Assessment Score:  Readmission Risk              Risk of Unplanned Readmission:  27           Discharging to Facility/ Agency   · Name: [facility] [address], [address] [place] [address]         Phone: [phone]       Fax: [phone]        Dialysis Facility (if applicable)   · Name:  · Address:  · Dialysis Schedule:  · Phone:  · Fax:    / signature: Electronically signed by Tiffani Villanueva on 9/25/21 at 9:49 AM EDT    PHYSICIAN SECTION    Prognosis: {Prognosis:4069772337}    Condition at Discharge: Stable    Rehab Potential (if transferring to Rehab): {Prognosis:9064799261}    Recommended Labs or Other Treatments After Discharge: ***    Physician Certification: I certify the above information and transfer of Jesus Sandoval  is necessary for the continuing treatment of the diagnosis listed and that he requires Tam Brayan for greater 30 days.      Update Admission H&P: {[facility]P DME Changes in JDU:343022829}    PHYSICIAN SIGNATURE:  {Esignature:513672370}

## 2021-09-26 NOTE — PROGRESS NOTES
Pt too anxious and uncooperative, refusing bedside nurse to check his sugar at this time. Explained to the patient as to why blood sugar needed to be checked. He again refused the blood sugar check. Will re-attempt later. Pt is also refusing to eat lunch stated that \"It's disgusting\". Offered to give him something else or order his something else. Pt still refusing.

## 2021-09-26 NOTE — PROGRESS NOTES
>> 33/1.4  -Nephrology consulted    Confused and agitated  -This appears to be at his normal baseline currently with sitter at bedside  -Will need to remove sitter at some point and institute telesitter in preparation for discharge  -Haldol as needed  -Patient refusing nursing care     Resume home medications, monitor for fulminant sepsis  Uncontrolled blood pressure-as needed BP meds, resume home regimen      DVT Prophylaxis   PT/OT 14 /15  Discharge planning- back to [facility] [place] when medically stable    MADDI Foster CNP  12:23 PM  9/26/2021     Above note edited to reflect my thoughts     I personally saw, examined and provided care for the patient. Radiographs, labs and medication list were reviewed by me independently. The case was discussed in detail and plans for care were established. Review of FABIO Foster   , documentation was conducted and revisions were made as appropriate directly by me. I agree with the above documented exam, problem list, and plan of care.      Enrico Serna MD  [phone]

## 2021-09-26 NOTE — PLAN OF CARE
Problem: Skin Integrity:  Goal: Will show no infection signs and symptoms  Description: Will show no infection signs and symptoms  9/25/2021 2309 by Jarad Mccarthy  Outcome: Met This Shift  9/25/2021 0955 by Gilmar Montoya RN  Outcome: Met This Shift  Goal: Absence of new skin breakdown  Description: Absence of new skin breakdown  9/25/2021 2309 by Jarad Mccarthy  Outcome: Met This Shift  9/25/2021 0955 by Gilmar Montoya RN  Outcome: Met This Shift     Problem: Falls - Risk of:  Goal: Will remain free from falls  Description: Will remain free from falls  9/25/2021 2309 by Jarad Mccarthy  Outcome: Met This Shift  9/25/2021 0955 by Gilmar Montoya RN  Outcome: Met This Shift  Goal: Absence of physical injury  Description: Absence of physical injury  9/25/2021 2309 by Jarad Mccarthy  Outcome: Met This Shift  9/25/2021 0955 by Gilmar Montoya RN  Outcome: Met This Shift     Problem: Pain:  Goal: Pain level will decrease  Description: Pain level will decrease  9/25/2021 2309 by Jarad Mccarthy  Outcome: Ongoing  9/25/2021 0955 by Gilmar Montoya RN  Outcome: Met This Shift  Goal: Control of acute pain  Description: Control of acute pain  9/25/2021 2309 by Jarad Mccarthy  Outcome: Ongoing  9/25/2021 0955 by Gilmar Montoya RN  Outcome: Met This Shift  Goal: Control of chronic pain  Description: Control of chronic pain  9/25/2021 2309 by Jarad Mccarthy  Outcome: Ongoing  9/25/2021 0955 by Gilmar Montoya RN  Outcome: Met This Shift

## 2021-09-26 NOTE — PROGRESS NOTES
Department of Podiatry  Progress Note    SUBJECTIVE:  Mr. Jc Crespo was evaluated at bedside this morning for B/L LE wounds and RLE gangrenous changes to digits. No acute events overnight. Patient denies any N/V/D/F/C/SOB/CP and has no other pedal complaints at this time. OBJECTIVE:    Scheduled Meds:   hydrALAZINE  25 mg Oral 3 times per day    potassium & sodium phosphates  1 packet Oral BID    vitamin D  2,000 Units Oral Daily    allopurinol  300 mg Oral Daily    aspirin EC  81 mg Oral Daily    DULoxetine  60 mg Oral Daily    ferrous sulfate  325 mg Oral BID WC    gabapentin  200 mg Oral TID    isosorbide mononitrate  120 mg Oral Daily    lactulose  20 g Oral TID    levothyroxine  25 mcg Oral Daily    metoprolol succinate  50 mg Oral Daily    sodium chloride flush  5-40 mL IntraVENous 2 times per day    enoxaparin  40 mg SubCUTAneous Daily    insulin lispro  0-6 Units SubCUTAneous TID WC    insulin lispro  0-3 Units SubCUTAneous Nightly    budesonide  250 mcg Nebulization BID    ipratropium  0.5 mg Nebulization 4x daily    Arformoterol Tartrate  15 mcg Nebulization BID     Continuous Infusions:   dextrose      sodium chloride       PRN Meds:.haloperidol lactate, albuterol, HYDROcodone-acetaminophen, glucose, dextrose, glucagon (rDNA), dextrose, sodium chloride flush, sodium chloride, ondansetron **OR** ondansetron, polyethylene glycol, acetaminophen **OR** acetaminophen, labetalol, perflutren lipid microspheres, hydrALAZINE    No Known Allergies    BP (!) 154/84   Pulse 102   Temp 98.1 °F (36.7 °C) (Oral)   Resp 18   Ht 5' 7\" (1.702 m)   Wt 244 lb (110.7 kg)   SpO2 93%   BMI 38.22 kg/m²       EXAM:    VASCULAR:  DP and PT pulses are non- palpable. CFT delayed B/L. Warm to cool from the tibial tuberosity to the distal aspect of the digits dorsally on RLE and warm to warm on LLE. No hair growth noted to the distal aspects dorsally.     NEUROLOGIC:  Protective sensation is diminished b/l    DERM:  Diffuse skin changes noted to the LEs:   #1. RLE: Ulcer noted to the distal half of the leg measuring about 10.0x12.0x0.2cm. Wound bed is about 50% granular and 50 % fibrotic and necrotic in nature. Serous drainage noted. Periwound erythema noted. Mild edema noted. Chronic skin changes indicating chronic venous stasis noted, including hyperpigmentation, discoloration and skin thickening. Right foot digits 2-5 are mummified, dried, shrunken and discolored indicative of dry gangrene. #2. LLE: Wound noted to the distal half of the leg measuring about 12.0x11.0x0.2cm. Wound bed is fibro-granular in nature. Serous drainage noted. Periwound erythema and edema noted. Hyperpigmentation noted. Small dried, and resolved abrasion noted across the 2nd and 3rd digit MPJS. Skin changes indicative of venous stasis noted    Both as pictrued below:    MUSCULOSKELETAL:  MMT deferred. No posterior calf pain on palpation b/l.                                Scheduled Meds:   hydrALAZINE  25 mg Oral 3 times per day    potassium & sodium phosphates  1 packet Oral BID    vitamin D  2,000 Units Oral Daily    allopurinol  300 mg Oral Daily    aspirin EC  81 mg Oral Daily    DULoxetine  60 mg Oral Daily    ferrous sulfate  325 mg Oral BID WC    gabapentin  200 mg Oral TID    isosorbide mononitrate  120 mg Oral Daily    lactulose  20 g Oral TID    levothyroxine  25 mcg Oral Daily    metoprolol succinate  50 mg Oral Daily    sodium chloride flush  5-40 mL IntraVENous 2 times per day    enoxaparin  40 mg SubCUTAneous Daily    insulin lispro  0-6 Units SubCUTAneous TID     insulin lispro  0-3 Units SubCUTAneous Nightly    budesonide  250 mcg Nebulization BID    ipratropium  0.5 mg Nebulization 4x daily    Arformoterol Tartrate  15 mcg Nebulization BID     Continuous Infusions:   dextrose      sodium chloride       PRN Meds:.haloperidol lactate, albuterol, HYDROcodone-acetaminophen, glucose, dextrose, glucagon (rDNA), dextrose, sodium chloride flush, sodium chloride, ondansetron **OR** ondansetron, polyethylene glycol, acetaminophen **OR** acetaminophen, labetalol, perflutren lipid microspheres, hydrALAZINE    RADIOLOGY:  US RETROPERITONEAL BIPIN   Final Result   No acute renal abnormality. Nondiagnostic evaluation of the renal arteries as detailed above. VL SÁNCHEZ BILATERAL LIMITED 1-2 LEVELS   Final Result   Monophasic right lower extremity posterior tibial Doppler signal, likely   reflects underlying peripheral arterial disease. Evaluation limited due to patient request to terminate the procedure. XR FOOT RIGHT (MIN 3 VIEWS)   Final Result   No acute osseous abnormality. BP (!) 154/84   Pulse 102   Temp 98.1 °F (36.7 °C) (Oral)   Resp 18   Ht 5' 7\" (1.702 m)   Wt 244 lb (110.7 kg)   SpO2 93%   BMI 38.22 kg/m²     LABS:    Recent Labs     09/25/21  0355 09/26/21  0425   WBC 10.9 10.8   HGB 11.0* 11.4*   HCT 32.7* 34.1*    258        Recent Labs     09/26/21  0425      K 4.3   CL 99   CO2 25   PHOS 2.9   BUN 33*   CREATININE 1.4*        Recent Labs     09/24/21  0630 09/26/21  0425   PROT 6.6 7.2         ASSESSMENT:  1.B/L LE ulcers- POA  2. Gangrenous RLE  3. B/L LE cellulitis  4. Chronic venous stasis   5. PVD/PAD  6. DM with neuropathic changes     Cellulitis        PLAN:  - Patient was examined and evaluated. Reviewed patient's recent lab results and pertinent diagnostic imaging. Reviewed ancillary service notes. - Dressings changed this morning: Xeroform DSD. Daily dressing changes   - Vascular: Planned angiogram next week. Pending vascular intervention, the level of Podiatric surgical intervention will be determined. Appreciate Vascular recs. - Vascular studies:   1. PAD  - Pain Control: IV and PO  - Antibiotics as per ID: Not warranted   - Discussed patient with    - Will continue to follow patient while they are in-house.

## 2021-09-26 NOTE — PROGRESS NOTES
Nephrology Progress Note  Patient's Name: Marquita Plascencia  2:21 PM  9/26/2021    Nephrologist: Yelitza Hollingsworth MD    Reason for Consult:  CKD      History of Present Ilness from the 9/24/21 note:    Marquita Plascencia is a [de-identified] y.o. male with prior history of  Of CKD G3B with a baseline serum cr 1.5-2.1mg/dl with 31-43ml/min  In the setting of DM2, HTN, Diffuse Atherosclerotic Vasc disease. Pt was admitted 8/19/21 to 8/22/21 with CHF. He was D/Eder on bumetanide and metolazone. No ACEI/ARB or Entresto. He presented back to the ED 9/23/21 with increased pain in the RLE. His cr 8/21/21 2.1mg/dl and his cr on admission 2.2mg/dl. He is somewhat confused and has a TeleMonitor    9/26/21: Pt awake alert has a sitter at bedside. Will not keep a gown on. Not overly agitated this afternoon.      Past Medical History:   Diagnosis Date    (HFpEF) heart failure with preserved ejection fraction ([address] 07/2020    Diagnosed by cardiology    Arthritis     Bilateral carotid artery stenosis 12/31/2020    Bilateral carotid bruits 12/31/2020    Blood circulation, collateral     CAD (coronary artery disease)     Carotid bruit 03/27/2013    CHF (congestive heart failure) (HCC)     Chronic kidney disease     CKD (chronic kidney disease) stage 3, GFR 30-59 ml/min ([facility]) 03/20/2016    COPD (chronic obstructive pulmonary disease) ([address]     Follows with pulmonary    Diabetes mellitus ([address]     Diabetic neuropathy associated with type 2 diabetes mellitus (HCC)     Dyspnea on exertion     History of blood transfusion     Hyperlipidemia     Hypertension     Mild mitral regurgitation     Moderate tricuspid regurgitation by prior echocardiography 2018    Movement disorder     NSTEMI, initial episode of care ([address] 01/2018    Obesity     Pulmonary hypertension ([address] 2018    PVD (peripheral vascular disease) with claudication ([address] 03/27/2014    S/P carotid endarterectomy 03/27/2013    Sleep apnea     SOB (shortness of breath)     Thyroid disease     Unspecified cerebral artery occlusion with cerebral infarction     Vision decreased 12/31/2020       Past Surgical History:   Procedure Laterality Date    BIOPSY / LIGATION TEMPORAL ARTERY Bilateral 01/05/2021    BILATERAL TEMPORAL ARTERY BIOPSY performed by Lauro Ybarra MD at [address]      5 years ago   [address] GRAFT  2008    1 vessel and aortic valve repair    DIAGNOSTIC CARDIAC CATH LAB PROCEDURE  10/14/2008    DIAGNOSTIC CARDIAC CATH LAB PROCEDURE  10/21/2010    JOINT REPLACEMENT      R knee replacement    KNEE SURGERY      OTHER SURGICAL HISTORY Right 02/14/2017    debridement,bone bx foot    PACEMAKER INSERTION  11/12/2014    D-PPM   ()    Dr. Etienne Gamez      carotids       Family History   Problem Relation Age of Onset    Heart Disease Mother     Heart Failure Mother     Heart Surgery Father     Heart Disease Father     Heart Failure Father     High Blood Pressure Sister     Cancer Sister         reports that he quit smoking about 40 years ago. His smoking use included cigarettes. He started smoking about 67 years ago. He has a 50.00 pack-year smoking history. He has never used smokeless tobacco. He reports previous drug use. He reports that he does not drink alcohol. Allergies:  Patient has no known allergies.     Current Medications:    hydrALAZINE (APRESOLINE) tablet 25 mg, 3 times per day  potassium & sodium phosphates (PHOS-NAK) 280-160-250 MG packet 250 mg, BID  Vitamin D (CHOLECALCIFEROL) tablet 2,000 Units, Daily  haloperidol lactate (HALDOL) injection 2 mg, Q6H PRN  albuterol (PROVENTIL) nebulizer solution 2.5 mg, Q6H PRN  allopurinol (ZYLOPRIM) tablet 300 mg, Daily  aspirin EC tablet 81 mg, Daily  DULoxetine (CYMBALTA) extended release capsule 60 mg, Daily  ferrous sulfate (IRON 325) tablet 325 mg, BID WC  HYDROcodone-acetaminophen 7.5-325 MG per 15ML solution 5 mL, Q6H PRN  gabapentin (NEURONTIN) capsule 200 mg, TID  isosorbide mononitrate (IMDUR) extended release tablet 120 mg, Daily  lactulose (CHRONULAC) 10 GM/15ML solution 20 g, TID  levothyroxine (SYNTHROID) tablet 25 mcg, Daily  metoprolol succinate (TOPROL XL) extended release tablet 50 mg, Daily  glucose (GLUTOSE) 40 % oral gel 15 g, PRN  dextrose 50 % IV solution, PRN  glucagon (rDNA) injection 1 mg, PRN  dextrose 5 % solution, PRN  sodium chloride flush 0.9 % injection 5-40 mL, 2 times per day  sodium chloride flush 0.9 % injection 5-40 mL, PRN  0.9 % sodium chloride infusion, PRN  enoxaparin (LOVENOX) injection 40 mg, Daily  ondansetron (ZOFRAN-ODT) disintegrating tablet 4 mg, Q8H PRN   Or  ondansetron (ZOFRAN) injection 4 mg, Q6H PRN  polyethylene glycol (GLYCOLAX) packet 17 g, Daily PRN  acetaminophen (TYLENOL) tablet 650 mg, Q6H PRN   Or  acetaminophen (TYLENOL) suppository 650 mg, Q6H PRN  insulin lispro (HUMALOG) injection vial 0-6 Units, TID WC  insulin lispro (HUMALOG) injection vial 0-3 Units, Nightly  labetalol (NORMODYNE;TRANDATE) injection 10 mg, Q4H PRN  perflutren lipid microspheres (DEFINITY) injection 1.65 mg, ONCE PRN  budesonide (PULMICORT) nebulizer suspension 250 mcg, BID  ipratropium (ATROVENT) 0.02 % nebulizer solution 0.5 mg, 4x daily  Arformoterol Tartrate (BROVANA) nebulizer solution 15 mcg, BID  hydrALAZINE (APRESOLINE) injection 10 mg, Q6H PRN        Review of Systems:   Pertinent items are noted in HPI.     Physical exam:   Constitutional:  Elderly male in NAD  Vitals:   VITALS:  BP (!) 176/87   Pulse 100   Temp 98.2 °F (36.8 °C) (Oral)   Resp 18   Ht 5' 7\" (1.702 m)   Wt 244 lb (110.7 kg)   SpO2 93%   BMI 38.22 kg/m²   24HR INTAKE/OUTPUT:      Intake/Output Summary (Last 24 hours) at 9/26/2021 1421  Last data filed at 9/25/2021 1641  Gross per 24 hour   Intake 0 ml   Output 200 ml   Net -200 ml     URINARY CATHETER OUTPUT (Rodriguez):  External Urinary Catheter-Output (mL): 500 mL  DRAIN/TUBE OUTPUT:     VENT SETTINGS:  Vent Information  SpO2: 93 %  Additional Respiratory  Assessments  Pulse: 100  Resp: 18  SpO2: 93 %    Skin: erythema and ulcerations in the bilat lower ext  Heent:  eomi, mmm  Neck: no bruits or jvd noted  Cardiovascular: PMI lat displaced  S1, S2 without S3 or rub  Respiratory: CTA B without w/r/r  Abdomen:  +bs, soft, nt, nd  Ext: 1+bilat  lower extremity edema with erythema scaling and serous drainage of the bilat pretibial areas, dry gangrene of 2nd, 3rd, 4th R toes  Psychiatric: Oriented to person and place confused to time    Data:   Labs:  CBC:   Lab Results   Component Value Date    WBC 10.8 09/26/2021    RBC 3.64 09/26/2021    HGB 11.4 09/26/2021    HCT 34.1 09/26/2021    MCV 93.7 09/26/2021    MCH 31.3 09/26/2021    MCHC 33.4 09/26/2021    RDW 14.8 09/26/2021     09/26/2021    MPV 8.9 09/26/2021     CBC with Differential:    Lab Results   Component Value Date    WBC 10.8 09/26/2021    RBC 3.64 09/26/2021    HGB 11.4 09/26/2021    HCT 34.1 09/26/2021     09/26/2021    MCV 93.7 09/26/2021    MCH 31.3 09/26/2021    MCHC 33.4 09/26/2021    RDW 14.8 09/26/2021    NRBC 0.0 04/22/2018    SEGSPCT 76 03/16/2014    BANDSPCT 1 09/02/2016    LYMPHOPCT 9.9 09/24/2021    MONOPCT 8.5 09/24/2021    BASOPCT 0.5 09/24/2021    MONOSABS 0.74 09/24/2021    LYMPHSABS 0.87 09/24/2021    EOSABS 0.19 09/24/2021    BASOSABS 0.04 09/24/2021     Hemoglobin/Hematocrit:    Lab Results   Component Value Date    HGB 11.4 09/26/2021    HCT 34.1 09/26/2021     CMP:    Lab Results   Component Value Date     09/26/2021    K 4.3 09/26/2021    K 3.9 09/24/2021    CL 99 09/26/2021    CO2 25 09/26/2021    BUN 33 09/26/2021    CREATININE 1.4 09/26/2021    GFRAA 59 09/26/2021    LABGLOM 49 09/26/2021    GLUCOSE 146 09/26/2021    GLUCOSE 111 07/16/2011    PROT 7.2 09/26/2021    LABALBU 2.8 09/26/2021    CALCIUM 9.7 09/26/2021    BILITOT 0.4 09/26/2021    ALKPHOS 116 09/26/2021    AST 70 09/26/2021    ALT 15 09/26/2021     BMP:    Lab Results   Component Value Date     09/26/2021    K 4.3 09/26/2021    K 3.9 09/24/2021    CL 99 09/26/2021    CO2 25 09/26/2021    BUN 33 09/26/2021    LABALBU 2.8 09/26/2021    CREATININE 1.4 09/26/2021    CALCIUM 9.7 09/26/2021    GFRAA 59 09/26/2021    LABGLOM 49 09/26/2021    GLUCOSE 146 09/26/2021    GLUCOSE 111 07/16/2011     BUN/Creatinine:    Lab Results   Component Value Date    BUN 33 09/26/2021    CREATININE 1.4 09/26/2021     Hepatic Function Panel:    Lab Results   Component Value Date    ALKPHOS 116 09/26/2021    ALT 15 09/26/2021    AST 70 09/26/2021    PROT 7.2 09/26/2021    BILITOT 0.4 09/26/2021    BILIDIR <0.2 01/25/2021    IBILI see below 01/25/2021    LABALBU 2.8 09/26/2021     Albumin:    Lab Results   Component Value Date    LABALBU 2.8 09/26/2021     Calcium:    Lab Results   Component Value Date    CALCIUM 9.7 09/26/2021     Ionized Calcium:  No results found for: IONCA  Magnesium:    Lab Results   Component Value Date    MG 1.6 09/26/2021     Phosphorus:    Lab Results   Component Value Date    PHOS 2.9 09/26/2021     Uric Acid:    Lab Results   Component Value Date    LABURIC 3.5 01/21/2021     PT/INR:    Lab Results   Component Value Date    PROTIME 11.7 01/05/2021    PROTIME 11.3 06/21/2011    INR 1.0 01/05/2021     PTT:    Lab Results   Component Value Date    APTT 25.5 01/05/2021   [APTT}  Troponin:    Lab Results   Component Value Date    TROPONINI 0.05 01/19/2021     U/A:    Lab Results   Component Value Date    COLORU Yellow 09/24/2021    PROTEINU 100 09/24/2021    PHUR 6.0 09/24/2021    LABCAST RARE 10/11/2017    WBCUA NONE 09/24/2021    WBCUA NONE 07/12/2011    RBCUA 1-3 09/24/2021    RBCUA 1-3 03/17/2014    BACTERIA NONE SEEN 09/24/2021    CLARITYU Clear 09/24/2021    SPECGRAV 1.025 09/24/2021    LEUKOCYTESUR Negative 09/24/2021    UROBILINOGEN 0.2 09/24/2021    BILIRUBINUR Negative 09/24/2021    BILIRUBINUR NEGATIVE kidney 11.7 cm in long axis. The left kidney measures 12.2 cm in long axis. There is mild   parenchymal thinning on the left. There is no evidence of hydronephrosis.       Doppler findings:    Abdominal aorta peak systolic velocity is 424.8 cm/s. Right renal artery peak systolic velocity 550.9 cm/s. RAR equals 1.7. Left renal artery peak systolic velocity 603.2 cm/s. RAR equals 1.7.           Impression   1. Increased flow velocity in both renal arteries which is fairly   symmetric. The amount of increased flow velocity is considered   borderline suspicious for clinically significant renal artery   stenosis. 2. Mild renal parenchymal thinning on the left. EXAMINATION:   RETROPERITONEAL ULTRASOUND OF THE KIDNEYS AND URINARY BLADDER.  Renal artery   ultrasound.       9/24/2021       COMPARISON:   None       HISTORY:   ORDERING SYSTEM PROVIDED HISTORY: HTN   TECHNOLOGIST PROVIDED HISTORY:   Reason for exam:->HTN   What reading provider will be dictating this exam?->CRC       FINDINGS:   Examination was reportedly a difficult examination per the sonographer due to   patient's inability to remain still for exam and complaints of pain   throughout the exam.  The abdominal aorta was not well visualized.  Renal   arteries were also not adequately evaluated and this examination is   nondiagnostic in evaluation for possible renal artery stenosis.         Kidneys:       The right kidney measures 10.1 cm in length and the left kidney measures 11.0   cm in length.       Kidneys demonstrate normal cortical echogenicity.  No evidence of   hydronephrosis or intrarenal stones.           Bladder:       Urinary bladder is incompletely distended measuring 80 mL.  Allowing for   this, no obvious bladder wall thickening or intraluminal filling defect.           Impression   No acute renal abnormality.          Assessment  1-CKD G3B with a baseline serum cr 1.5-2.1mg/dl with 31-43ml/min  In the setting of DM2, HTN, Diffuse Atherosclerotic Vasc disease with presumed DM2 macroalbuminuria  UA Sg 1.025, blood tr, protein 100mg/dl, Ni (-), LE (-)  Microalb: cr 504  Renal US unremarkable  Cr slowly trending down 2.2-->1.6-->1.4 mg/dl  PLAN:  1. For angiogram on 9/27/21-will start IVF 9/26pm  2. Hold ARB in the setting of the decreasing cr and in anticipation of the angiogram    2-Anemia in CKD  HgB above goal >/=10 No SWATI indicated  Ferritin 534 and iron sat 22%  Folate 11.5, B12 1146  PLAN:  1. Await  SPEP and UPEP  2. Follow H/H    3-Sec HPTH of Renal Origin with Unspecified Vit D Def with Hypophosphatemia  Ca++ WNL  PO4 2.4-->2.9  PTH 30  Vit D 27  PLAN:  1. S/P neutraphos  2. Started Vit D  3. Follow Ca++ and PO4    4- HTN with CKD I-IV  BP above goal <120/80  Duplex Renal US-indeterminant  PLAN:  1. Started hydralazine 25mg tid- will up titrate to 50 mg tid and follow       Thank you Dr. Kaykay Saucedo MD for allowing us to participate in care of [address] [place] [address]  2:21 PM  9/26/2021   Patient seen and examined. I had a face to face encounter with the patient. He remains confused and will not wear any clothes  Agree with exam.    Agree with  formulation, assessment and plan as outlined above and directed by me. Addition and corrections were done as deemed appropriate. My exam and plan include:    A/P:  1.  CKD G3A-cr stable at 1.4mg/dl- will start IVF  This PM in anticipation of the angiogram      NIR Hollingsworth MD

## 2021-09-27 PROBLEM — I73.9 PERIPHERAL ARTERY DISEASE (HCC): Status: ACTIVE | Noted: 2021-01-01

## 2021-09-27 PROBLEM — I70.229 CRITICAL LOWER LIMB ISCHEMIA (HCC): Status: ACTIVE | Noted: 2021-01-01

## 2021-09-27 NOTE — PROGRESS NOTES
DR Myesha Doll (Podiatry resident) on unit and notified him of new consult for Dr Kneny Barrientos.   Dr Kenya Tapia will be following patient

## 2021-09-27 NOTE — PLAN OF CARE
Problem: Skin Integrity:  Goal: Will show no infection signs and symptoms  Description: Will show no infection signs and symptoms  9/27/2021 0140 by Pastor Trinity RN  Outcome: Ongoing  9/26/2021 1506 by Jareth Watson RN  Outcome: Met This Shift  Goal: Absence of new skin breakdown  Description: Absence of new skin breakdown  9/27/2021 0140 by Pastor Trinity RN  Outcome: Ongoing  9/26/2021 1506 by Jareth Watson RN  Outcome: Met This Shift     Problem: Falls - Risk of:  Goal: Will remain free from falls  Description: Will remain free from falls  9/27/2021 0140 by Pastor Trinity RN  Outcome: Ongoing  9/26/2021 1506 by Jareth Watson RN  Outcome: Met This Shift  Goal: Absence of physical injury  Description: Absence of physical injury  9/27/2021 0140 by Pastor Trinity RN  Outcome: Ongoing  9/26/2021 1506 by Jareth Watson RN  Outcome: Met This Shift     Problem: Pain:  Goal: Pain level will decrease  Description: Pain level will decrease  Outcome: Ongoing  Goal: Control of acute pain  Description: Control of acute pain  Outcome: Ongoing  Goal: Control of chronic pain  Description: Control of chronic pain  Outcome: Ongoing

## 2021-09-27 NOTE — LETTER
[address] Encounter Date/Time: [address] Account: [de-identified]    MRN: [id number]    Patient: Jonathan Frankel Serial #: [id number]      ENCOUNTER          Patient Class: Observation Private Enc? No Unit CAREY BDShchandrakant Webb [address] [address] Service: Intermediate   Encounter DX: Peripheral artery diseas*   ADM Provider:     Procedure:     ATT Provider: Leticia El MD   REF Provider:        Admission DX: Peripheral artery disease ([address] and DX codes: I73.9      PATIENT   [place] Department Medicaid  CERTIFICATION OF NECESSITY  FOR TRANSPORTATION   BY WHEELCHAIR VAN     Individual Information   1. Name: Brynn Cross 2. [place] Medicaid Billing Number:    3. Address: [place] [address]      Transportation Provider Information   4. Provider Name:    5. [place] Medicaid Provider Number:  National Provider Identifier (NPI):      Certification  7. Criteria:  By signing this document, the practitioner certifies that two statements are true:  A. This individual must be accompanied by a mobility-related assistive device from the point of pick-up to the point of drop-off. B. Transport of this individual by standard passenger vehicle or common carrier is precluded or contraindicated. 8. Period Beginning Date: 21   9. Length  [x] Not more than 1 day(s)  [] One Year     Additional Information Relevant to Certification   10. Comments or Explanations, If Necessary or Appropriate     Hx stroke, wheelchair bound     Certifying Practitioner Information   11. Name of Practitioner:    12. [place] Medicaid Provider Number, If Applicable:  [address] Provider Identifier (NPI):      Signature Information   14. Date of Signature: 21 15. Name of Person Signing: Em Carbajalr JOSE   16. Signature and Professional Designation: Em Liner LSW     [place] [id number]  Rev. 2015               Name: Brynn Cross : 1940 (81 yrs)   Address:  [address] [facility]* Sex: Male   City: [place] [facility] [facility] [place] [address]         Marital Status:    Employer: RETIRED         Restorationist: [employer]   Primary Care Provider: Brooke Jaramillo MD         Primary Phone: [phone] [address]   Contact Name Legal Guardian? Relationship to Patient Home Phone Work Phone   1. ABEL ABRAHAM  2. *No Contact Specified*      Brother/Sister    [phone]                 GUARANTOR            GuarantorRadhamar Ceasar     : 1940   Address: [address]* Sex: Male     [place] [address]     Relation to Patient: Self       Home Phone: [phone]   Guarantor ID: [id number]       Work Phone:     Guarantor Employer: RETIRED         Status: RETIRED      COVERAGE        PRIMARY INSURANCE   Payor: Vicki Aleman MEDICARE Plan: [facility]*   Payor Address: [facility] [id number] [place] [id number]       Group Number: [id number] Insurance Type: [address] Name: Kris Cisse Subscriber : 1940   Subscriber ID: [id number] Pat. Rel. to Sub: Self   SECONDARY INSURANCE   Payor:   Plan:     Payor Address:  ,           Group Number:   Insurance Type:     Subscriber Name:   Subscriber :     Subscriber ID:   Pat.  Rel. to Sub:             CSN: [id number]

## 2021-09-27 NOTE — LETTER
[place] Department Medicaid  CERTIFICATION OF NECESSITY  FOR NON-EMERGENCY TRANSPORTATION   BY GROUND AMBULANCE      Individual Information   1. Name: Christie Arias 2. [place] Medicaid Billing Number: ***   3. Address: [place] [address]      Transportation Provider Information   4. Provider Name: ***   5. [place] Medicaid Provider Number: *** National Provider Identifier (NPI): ***     Certification  7. Criteria:  During transport, this individual requires:  [x] Medical treatment or continuous     supervision by an EMT. [] The administration or regulation of oxygen by another person. [] Supervised protective restraint. 8. Period Beginning Date:10/3/2021     9. Length  [x] Not more than 2 day(s)  [] One Year     Additional Information Relevant to Certification   10. Comments or Explanations, If Necessary or Appropriate   right femoral artery exposure,right ileofemoral endarterectomy, proximal superficial femoral endarterectomy     Certifying Practitioner Information   11. Name of Practitioner: Karthikeyan Ricketts MD   12. [place] Medicaid Provider Number, If Applicable: *** 13. National Provider Identifier (NPI): ***     Signature Information   14. Date of Signature: 10/3/2021   15. Name of Person Signing: Julia Hardy RN     16.  Signature and Professional Designation: Julia Hardy RN       [facility] [id number]  Rev. 7/2015

## 2021-09-27 NOTE — PROGRESS NOTES
Physical Therapy  Facility/Department: [address] MED SURG  Daily Treatment Note  NAME: Henry Payne  : 1940  MRN: [id number]    Date of Service: 2021    PT treatment was attempted this am and pt is out of his room at cath lab at [facility].  Will re-attempt another time. Hiwot Franks., P.T.   License Number: [id number]

## 2021-09-27 NOTE — PROGRESS NOTES
[facility] Quality Flow/Interdisciplinary Rounds Progress Note        Quality Flow Rounds held on September 27, 2021    Disciplines Attending:  Bedside Nurse, ,  and Nursing Unit Leadership    Marian Arvizu was admitted on 9/23/2021 12:37 AM    Anticipated Discharge Date:  Expected Discharge Date: 09/27/21    Disposition:    Devante Score:  Devante Scale Score: 17    Readmission Risk              Risk of Unplanned Readmission:  32           Discussed patient goal for the day, patient clinical progression, and barriers to discharge.   The following Goal(s) of the Day/Commitment(s) have been identified:  Diagnostics - Report Results and Labs - Report Results, aortogram 9/27      Ricco Garcia RN  September 27, 2021

## 2021-09-27 NOTE — OP NOTE
Operative Note      Patient: Demetra Villareal  YOB: 1940  MRN: [id number]    Date of procedure: 9/27/2021    Preoperative diagnosis: Critical limb ischemia of the bilateral lower extremities    Postoperative diagnosis: Same    Findings: The abdominal aorta is severely calcified. He has multiple areas of ulceration. The renals were difficult to see. The iliac bifurcation is patent. On the right side he has a 20 to 30% common iliac and external iliac artery stenosis. This was also evaluated with pullback gradients of approximately 10 to 15 mmHg bilateral hypogastrics are diseased bilateral external iliacs are diseased but patent. Bilateral common femorals are disease. Right extremity demonstrates a common femoral that appears to be occluded with reconstitution at the level of the profunda and proximal superficial femoral vessel. The profunda appears to be diseased the superficial femoral artery is patent all the way down to the intra-articular prosthesis. I could not get a good angle in the matter how hard I tried in the right or left and he did not bend his knee very well to accommodate the imaging. Therefore I could not tell if there was stenosis beyond or behind the level of the knee. There did appear to be some mild stenosis at least at the end for articular portion of the joint. He has an anterior tibial is occluded a posterior tibials occluded and is dominant runoff all the way down to the foot is the peroneal vessel. The foot vessels themselves were difficult to visualize    The left extremity was imaged via this sheath common femorals patent profundus patent superficial femorals patent popliteals patent intra-articular knee is noted. There is brisk flow all the way down through this segment. Again we could not visualize the posterior portion of the segment. The peroneal is the dominant runoff all the way to the foot.   The anterior tibial occludes in the proximal one third then reconstitutes at the mid one third and is patent onto the foot. The posterior tibials not seen    Surgeon: Lela Zendejas    Assistant: None    Anesthesia: Local lidocaine    Estimated blood loss: Less than 20 cc    Estimated contrast: Approximately 90 cc    Procedure:  #1 ultrasound-guided access of the left common femoral vessel  #2 abdominal aortogram with bilateral lower extremity runoff catheter positioning at the contralateral common femoral vessel    Description of the procedure: After risk benefits and alternatives were discussed with the patient. We also discussed the case with is brother who is listed. He has multiple children some of which he talks to and some of which he does not. Therefore I was told to call his brother Sue Mccoy. The day the procedure he was brought to the Cath Lab and placed in supine position. Is prepped and draped in standard sterile fashion. Timeout was taken verify the person the operative site as well as the procedure. Lidocaine was used infiltrate the skin and subcutaneous tissue a micropuncture needle was inserted into the common femoral which was severely calcified. A Glidewire advantage was used to traverse into the abdominal aorta. This was very tortuous in particular the takeoff of the iliacs. We then upsized to a 4 Western Magda sheath/flushed with heparinized saline solution followed by a pigtail catheter. Images were taken through the pigtail catheter demonstrating severe atherosclerotic disease see above. We are then able to access the right common iliac with an advantage wire and a pigtail catheter this was very difficult but able to be done. We placed the wire wire in the common femoral vessel at the most proximal segment this was then followed by the placement of the pigtail catheter. Images were taken via the most proximal common femoral vessel demonstrating occlusion of the mid and distal common femoral reconstituting the profunda and SFA.   The SFA appeared to be the dominant runoff. The profunda was diseased additional images were taken of the right leg see above. We then pulled all the catheters wire out and image the left side. We initially talked about doing a arch aortogram with carotid imaging secondary to elevated carotid velocities however has severe atherosclerotic disease throughout most of his vasculature and I was concerned about the contrast secondary to his renal failure as well as possible embolization.   He will be optimized and will get a formal CT scan with limited contrast.  Once his blood pressure is under control the sheath will be pulled and pressure held      Drains:   [REMOVED] External Urinary Catheter (Removed)   Output (mL) 500 mL 09/24/21 0601         Electronically signed by Ian Solis MD on 9/27/2021 at 12:57 PM

## 2021-09-27 NOTE — PROGRESS NOTES
Risk, benefits, and alternatives, were discussed with the patient including but not limited to bleeding, infection, arteriovenous nerve injury, myocardial infarction, respiratory failure, contrast reaction, contrast reaction leading to kidney failure and/or hemodialysis, injury to the good leg, injury to the bed late, distal embolization, emergency surgery, limb loss of either extremity, sensory or motor disturbance. All questions were answered according to their satisfaction and they wish to proceed. I also discussed the possibility of imaging his carotids depending on how much contrast we use. Also talked about the possibility of multilevel disease and the inability to do anything for his tissue loss of his lower extremity which possibly could and in limb loss.

## 2021-09-27 NOTE — CONSULTS
(peripheral vascular disease) with claudication ([facility] [address] 2014    S/P carotid endarterectomy 2013    Sleep apnea     SOB (shortness of breath)     Thyroid disease     Unspecified cerebral artery occlusion with cerebral infarction     Vision decreased 2020        Past Surgical History:   Procedure Laterality Date    BIOPSY / LIGATION TEMPORAL ARTERY Bilateral 2021    BILATERAL TEMPORAL ARTERY BIOPSY performed by Sade Majano MD at [address]      5 years ago   [address] GRA[address]      1 vessel and aortic valve repair    DIAGNOSTIC CARDIAC CATH LAB PROCEDURE  10/14/2008    DIAGNOSTIC CARDIAC CATH LAB PROCEDURE  10/21/2010    JOINT REPLACEMENT      R knee replacement    KNEE SURGERY      OTHER SURGICAL HISTORY Right 2017    debridement,bone bx foot    PACEMAKER INSERTION  2014    D-PPM   ()    Dr. Laron ayoubs         Family History   Problem Relation Age of Onset    Heart Disease Mother     Heart Failure Mother     Heart Surgery Father     Heart Disease Father     Heart Failure Father     High Blood Pressure Sister     Cancer Sister         Social History     Tobacco Use    Smoking status: Former Smoker     Packs/day: 2.00     Years: 25.00     Pack years: 50.00     Types: Cigarettes     Start date: 1953     Quit date: 1981     Years since quittin.2    Smokeless tobacco: Never Used   Substance Use Topics    Alcohol use: Never     Comment:          Prior to Admission medications    Medication Sig Start Date End Date Taking? Authorizing Provider   HYDROcodone-acetaminophen (NORCO)  MG per tablet Take 1 tablet by mouth every 6 hours as needed for Pain.     Historical Provider, MD   magnesium 200 MG TABS tablet Take 200 mg by mouth daily    Historical Provider, MD   diphenhydrAMINE (BENADRYL) 25 MG capsule Take 25 mg by mouth daily as needed for Itching    Historical Provider, MD   insulin lispro, 1 Unit Dial, (HUMALOG KWIKPEN) 100 UNIT/ML SOPN Inject 0-6 Units into the skin 3 times daily (before meals) PER SLIDING SCALE: 0-139=0U, 140-199=1U, 200-249=2U, 250-299=3U, 300-349=4U, 350-399=5U, 400-450=6U    Historical Provider, MD   morphine (THIERRY) 10 MG extended release capsule Take 10 mg by mouth 2 times daily. Historical Provider, MD   hydrOXYzine (ATARAX) 25 MG tablet Take 25 mg by mouth 4 times daily     Historical Provider, MD   lactulose 20 GM/30ML SOLN Take 20 g by mouth three times a week *MON-WED-FRI*    Historical Provider, MD   metOLazone (ZAROXOLYN) 2.5 MG tablet Take 2.5 mg by mouth three times a week *TUE-THUR-SAT*    Historical Provider, MD   bumetanide (BUMEX) 1 MG tablet Take 1 mg by mouth daily     Historical Provider, MD   potassium chloride (KLOR-CON M) 20 MEQ extended release tablet Take 40 mEq by mouth daily     Historical Provider, MD   ferrous sulfate (IRON 325) 325 (65 Fe) MG tablet Take 1 tablet by mouth 2 times daily (with meals) 1/25/21   Ana Martino,    DULoxetine (CYMBALTA) 60 MG extended release capsule Take 1 capsule by mouth daily 1/26/21   Ana Martino, DO   fluticasone-umeclidin-vilant (TRELEGY ELLIPTA) 100-62.5-25 MCG/INH AEPB Inhale 1 puff into the lungs Daily     Historical Provider, MD   aspirin EC 81 MG EC tablet Take 1 tablet by mouth daily 8/7/20   Rivera Stuart MD   albuterol (PROVENTIL) (2.5 MG/3ML) 0.083% nebulizer solution Take 3 mLs by nebulization every 6 hours as needed for Wheezing or Shortness of Breath DX: COPD J44.9 Please bill Medicare Part B 1/6/20   Sabrina Taylor MD   levothyroxine (SYNTHROID) 25 MCG tablet Take 25 mcg by mouth Daily  7/29/19   Historical Provider, MD   gabapentin (NEURONTIN) 400 MG capsule Take 400 mg by mouth 4 times daily.   5/19/19   Historical Provider, MD   metoprolol succinate (TOPROL XL) 50 MG extended release tablet Take 50 mg by mouth daily Historical Provider, MD   isosorbide mononitrate (IMDUR) 120 MG extended release tablet Take 1 tablet by mouth daily 1/23/18   Maryam Felder MD   allopurinol (ZYLOPRIM) 300 MG tablet Take 300 mg by mouth daily  2/27/15   Historical Provider, MD   lovastatin (MEVACOR) 20 MG tablet Take 20 mg by mouth nightly. 11/11/13   Historical Provider, MD        Patient has no known allergies. OBJECTIVE:        Vitals:    09/27/21 1020   BP: (!) 211/98   Pulse: 96   Resp: 17   Temp: 97.6 °F (36.4 °C)   SpO2: 97%                      EXAM:        Pt is AAOx3, NAD    VASCULAR:  DP and PT pulses are non- palpable. CFT delayed B/L greater than 5 seconds. Dry gangrenous changes right foot digits 2, 3, 4. Warm to cool from the tibial tuberosity to the distal aspect of the digits dorsally on RLE and warm to warm on LLE. No hair growth noted to the distal aspects dorsally.     NEUROLOGIC:  Protective sensation is diminished b/l, gross sensation intact     DERM:  Diffuse skin changes noted to the LEs:   #1. RLE: Ulcer noted to the distal half of the leg measuring about 10.0x12.0x0.2cm circumferentially. Wound bed is about fibronecrotic in nature with mild serous drainage noted, periwound erythema, no edema, no malodor, no crepitus or fluctuance, no proximal streaking. Hyperpigmented thickened skin lower legs consistent with chronic venous stasis and hemosiderin deposition. Right foot digits 2-4 with dry gangrenous changes. Cyanotic changes right fifth digit, early stages of mummification     #2. LLE: Wound noted to the distal half of the leg measuring about 12.0x11.0x0.2cm circumferentially around the leg. Wound bed is fibro-granular with mild serous drainage and periwound erythema. No edema. Hyperpigmentation to anterior lower leg consistent with hemosiderin deposition secondary to chronic venous stasis. Small dried, abrasion noted across the 2nd and 3rd digit MPJS.   No malodor, no crepitus, no fluctuance, no proximal streaking noted.     MUSCULOSKELETAL:   Patient has intention tremors with 3-5 muscle strength all pedal muscle groups bilaterally. Tenderness on palpation to periwound bilaterally more significant on the left and right.     Current Facility-Administered Medications   Medication Dose Route Frequency Provider Last Rate Last Admin    [START ON 9/28/2021] enoxaparin (LOVENOX) injection 40 mg  40 mg SubCUTAneous Daily Lety Manning MD        ondansetron (ZOFRAN-ODT) disintegrating tablet 4 mg  4 mg Oral Q8H PRN Toshia Delvalle MD        Or    ondansetron [facility]) injection 4 mg  4 mg IntraVENous Q6H PRN Toshia Delvalle MD        insulin lispro (HUMALOG) injection vial 0-3 Units  0-3 Units SubCUTAneous Nightly MD Jeni Carney ON 9/28/2021] insulin lispro (HUMALOG) injection vial 0-6 Units  0-6 Units SubCUTAneous TID  Lety Manning MD        albuterol (PROVENTIL) nebulizer solution 2.5 mg  2.5 mg Nebulization Q6H PRN Toshia Delvalle MD        [START ON 9/28/2021] allopurinol (ZYLOPRIM) tablet 300 mg  300 mg Oral Daily Lety Manning MD        Arformoterol Tartrate [facility]) nebulizer solution 15 mcg  15 mcg Nebulization BID MD Jeni Carney Breeding ON 9/28/2021] aspirin EC tablet 81 mg  81 mg Oral Daily Lety Manning MD        budesonide (PULMICORT) nebulizer suspension 250 mcg  250 mcg Nebulization BID MD Jeni Carney ON 9/28/2021] DULoxetine (CYMBALTA) extended release capsule 60 mg  60 mg Oral Daily MD Jeni Carney ON 9/28/2021] ferrous sulfate (IRON 325) tablet 325 mg  325 mg Oral BID  Lety Manning MD        gabapentin (NEURONTIN) capsule 200 mg  200 mg Oral TID Toshia Delvalle MD        hydrALAZINE (APRESOLINE) tablet 50 mg  50 mg Oral 3 times per day Toshia Delvalle MD        HYDROcodone-acetaminophen ([facility] Chuy Salazar) 7.5-325 MG per tablet 1 tablet  1 tablet Oral Q6H PRN Toshia Delvalle MD        [START ON 9/28/2021] ipratropium (ATROVENT) 0.02 % nebulizer solution 0.5 mg  0.5 mg Nebulization 4x daily Kami Manzanares MD        [START ON 9/28/2021] isosorbide mononitrate (IMDUR) extended release tablet 120 mg  120 mg Oral Daily Kami Manzanares MD        lactulose (CHRONULAC) 10 GM/15ML solution 20 g  20 g Oral TID Kami Manzanares MD        Nash Chetan ON 9/28/2021] levothyroxine (SYNTHROID) tablet 25 mcg  25 mcg Oral Daily Kami Manzanares MD        [START ON 9/28/2021] metoprolol succinate (TOPROL XL) extended release tablet 50 mg  50 mg Oral Daily Kami Manzanares MD        labetalol (NORMODYNE;TRANDATE) injection 10 mg  10 mg IntraVENous Q4H PRN Kami Manzanares MD            Lab Results   Component Value Date    WBC 9.6 09/27/2021    HCT 33.4 (L) 09/27/2021    HGB 11.0 (L) 09/27/2021     09/27/2021     09/27/2021    K 4.4 09/27/2021    CL 99 09/27/2021    CO2 24 09/27/2021    BUN 34 (H) 09/27/2021    CREATININE 1.3 (H) 09/27/2021    GLUCOSE 137 (H) 09/27/2021    CRP 21.4 (H) 09/23/2021         Radiographs:    ASSESSMENT:  - B/L LE full thickness ulcers down to level of subcutaneous tissue- POA  - Dry gangrenous RLE  - B/L LE cellulitis, POA  - Chronic lower extremity edema with venous stasis   - PAD, SP angiogram 9/27/2021  - DM with neuropathic changes     Cellulitis     PLAN:  - Examined and evaluated  - All labs, imaging, and charts reviewed  - WBC: 9.6  -S/p angiogram 9/27/2021 with only peroneal vessel runoff right lower extremity, peroneal and some anterior tibial runoff left lower extremity  -ABX per ID, no ABX from inpatient stay at [facility]. ID signed off 9/25/2021  -Blood cultures 9/23 no growth, no wound cultures, would likely just be contaminated. Wounds do not look infected at this time. Stable dry gangrene right foot digits 2 through 4.  -X-rays from 9/23/2021 reveal no osseous fractures, no gas, no foreign substances  - Daily dressing changes Xeroform DSD.  -No podiatric surgical invention indicated at this time.   It appears the patient has significant peripheral arterial disease with limb ischemia bilateral legs. A TMA to the right foot was considered however at this time it does not appear infected and therefore there is no urgency to amputate.  -We will continue to monitor    D/W:  LINDA Andrea  Fellowship-Trained Foot and Ankle Surgeon  Diplomate, [employer] Board [employer] Foot [employer] Ankle Sur[employer]  [phone]       Thank you for involving podiatry in this patients care. Please do not hesitate to call with any questions or concerns.      [address] [place] [address] PGY2  On Call Number: [phone]  9/27/2021   5:33 PM

## 2021-09-27 NOTE — PROGRESS NOTES
Cardiology consult placed via perfect serve to Dr Ismael Gonzales and will be handled in the morning since it is not an emergent consult

## 2021-09-27 NOTE — PROGRESS NOTES
Nephrology Progress Note  Patient's Name: Yareli Romo  1:18 PM  9/27/2021    Nephrologist: Felipa Cowden    Reason for Consult:  CKD      History of Present Ilness from the 9/24/21 note:    Yareli Romo is a [de-identified] y.o. male with prior history of  Of CKD G3B with a baseline serum cr 1.5-2.1mg/dl with 31-43ml/min  In the setting of DM2, HTN, Diffuse Atherosclerotic Vasc disease. Pt was admitted 8/19/21 to 8/22/21 with CHF. He was D/Eder on bumetanide and metolazone. No ACEI/ARB or Entresto. He presented back to the ED 9/23/21 with increased pain in the RLE. His cr 8/21/21 2.1mg/dl and his cr on admission 2.2mg/dl. He is somewhat confused and has a TeleMonitor    9/26/21: Pt awake alert has a sitter at bedside. Will not keep a gown on. Not overly agitated this afternoon.      9/27: pt boris galvez today    Past Medical History:   Diagnosis Date    (HFpEF) heart failure with preserved ejection fraction ([address] 07/2020    Diagnosed by cardiology    Arthritis     Bilateral carotid artery stenosis 12/31/2020    Bilateral carotid bruits 12/31/2020    Blood circulation, collateral     CAD (coronary artery disease)     Carotid bruit 03/27/2013    CHF (congestive heart failure) (HCC)     Chronic kidney disease     CKD (chronic kidney disease) stage 3, GFR 30-59 ml/min ([facility]) 03/20/2016    COPD (chronic obstructive pulmonary disease) ([address]     Follows with pulmonary    Diabetes mellitus ([address]     Diabetic neuropathy associated with type 2 diabetes mellitus (HCC)     Dyspnea on exertion     History of blood transfusion     Hyperlipidemia     Hypertension     Mild mitral regurgitation     Moderate tricuspid regurgitation by prior echocardiography 2018    Movement disorder     NSTEMI, initial episode of care ([address] 01/2018    Obesity     Pulmonary hypertension ([address] 2018    PVD (peripheral vascular disease) with claudication ([address] 03/27/2014    S/P carotid endarterectomy 03/27/2013    Sleep apnea     SOB (shortness of breath)     Thyroid disease     Unspecified cerebral artery occlusion with cerebral infarction     Vision decreased 12/31/2020       Past Surgical History:   Procedure Laterality Date    BIOPSY / LIGATION TEMPORAL ARTERY Bilateral 01/05/2021    BILATERAL TEMPORAL ARTERY BIOPSY performed by Raoul Elliott MD at [address]      5 years ago   [address]RA[address]  2008    1 vessel and aortic valve repair    DIAGNOSTIC CARDIAC CATH LAB PROCEDURE  10/14/2008    DIAGNOSTIC CARDIAC CATH LAB PROCEDURE  10/21/2010    JOINT REPLACEMENT      R knee replacement    KNEE SURGERY      OTHER SURGICAL HISTORY Right 02/14/2017    debridement,bone bx foot    PACEMAKER INSERTION  11/12/2014    D-PPM   ()    Dr. Katherine johnson       Family History   Problem Relation Age of Onset    Heart Disease Mother     Heart Failure Mother     Heart Surgery Father     Heart Disease Father     Heart Failure Father     High Blood Pressure Sister     Cancer Sister         reports that he quit smoking about 40 years ago. His smoking use included cigarettes. He started smoking about 67 years ago. He has a 50.00 pack-year smoking history. He has never used smokeless tobacco. He reports previous drug use. He reports that he does not drink alcohol. Allergies:  Patient has no known allergies.     Current Medications:    hydrALAZINE (APRESOLINE) tablet 50 mg, 3 times per day  lactated ringers infusion, Continuous  potassium & sodium phosphates (PHOS-NAK) 280-160-250 MG packet 250 mg, BID  Vitamin D (CHOLECALCIFEROL) tablet 2,000 Units, Daily  haloperidol lactate (HALDOL) injection 2 mg, Q6H PRN  albuterol (PROVENTIL) nebulizer solution 2.5 mg, Q6H PRN  allopurinol (ZYLOPRIM) tablet 300 mg, Daily  aspirin EC tablet 81 mg, Daily  DULoxetine (CYMBALTA) extended release capsule 60 mg, Daily  ferrous sulfate (IRON 325) tablet 325 mg, BID WC  HYDROcodone-acetaminophen 7.5-325 MG per 15ML solution 5 mL, Q6H PRN  gabapentin (NEURONTIN) capsule 200 mg, TID  isosorbide mononitrate (IMDUR) extended release tablet 120 mg, Daily  lactulose (CHRONULAC) 10 GM/15ML solution 20 g, TID  levothyroxine (SYNTHROID) tablet 25 mcg, Daily  metoprolol succinate (TOPROL XL) extended release tablet 50 mg, Daily  glucose (GLUTOSE) 40 % oral gel 15 g, PRN  dextrose 50 % IV solution, PRN  glucagon (rDNA) injection 1 mg, PRN  dextrose 5 % solution, PRN  sodium chloride flush 0.9 % injection 5-40 mL, 2 times per day  sodium chloride flush 0.9 % injection 5-40 mL, PRN  0.9 % sodium chloride infusion, PRN  enoxaparin (LOVENOX) injection 40 mg, Daily  ondansetron (ZOFRAN-ODT) disintegrating tablet 4 mg, Q8H PRN   Or  ondansetron (ZOFRAN) injection 4 mg, Q6H PRN  polyethylene glycol (GLYCOLAX) packet 17 g, Daily PRN  acetaminophen (TYLENOL) tablet 650 mg, Q6H PRN   Or  acetaminophen (TYLENOL) suppository 650 mg, Q6H PRN  insulin lispro (HUMALOG) injection vial 0-6 Units, TID WC  insulin lispro (HUMALOG) injection vial 0-3 Units, Nightly  labetalol (NORMODYNE;TRANDATE) injection 10 mg, Q4H PRN  perflutren lipid microspheres (DEFINITY) injection 1.65 mg, ONCE PRN  budesonide (PULMICORT) nebulizer suspension 250 mcg, BID  ipratropium (ATROVENT) 0.02 % nebulizer solution 0.5 mg, 4x daily  Arformoterol Tartrate (BROVANA) nebulizer solution 15 mcg, BID  hydrALAZINE (APRESOLINE) injection 10 mg, Q6H PRN        Review of Systems:   Pertinent items are noted in HPI.     Physical exam:   Constitutional:  Elderly male in NAD  Vitals:   VITALS:  BP (!) 140/66   Pulse 93   Temp 98.5 °F (36.9 °C) (Oral)   Resp 16   Ht 5' 7\" (1.702 m)   Wt 226 lb 11.2 oz (102.8 kg)   SpO2 95%   BMI 35.51 kg/m²   24HR INTAKE/OUTPUT:      Intake/Output Summary (Last 24 hours) at 9/27/2021 1318  Last data filed at 9/27/2021 0457  Gross per 24 hour   Intake 489 ml   Output --   Net 489 ml URINARY CATHETER OUTPUT (Rodriguez):  [REMOVED] External Urinary Catheter-Output (mL): 500 mL  DRAIN/TUBE OUTPUT:     VENT SETTINGS:  Vent Information  SpO2: 95 %  Additional Respiratory  Assessments  Pulse: 93  Resp: 16  SpO2: 95 %    Skin: erythema and ulcerations in the bilat lower ext  Heent:  eomi, mmm  Neck: no bruits or jvd noted  Cardiovascular: PMI lat displaced  S1, S2 without S3 or rub  Respiratory: CTA B without w/r/r  Abdomen:  +bs, soft, nt, nd  Ext: 1+bilat  lower extremity edema with erythema scaling and serous drainage of the bilat pretibial areas, dry gangrene of 2nd, 3rd, 4th R toes  Psychiatric: Oriented to person and place confused to time    Data:   Labs:  CBC:   Lab Results   Component Value Date    WBC 9.6 09/27/2021    RBC 3.55 09/27/2021    HGB 11.0 09/27/2021    HCT 33.4 09/27/2021    MCV 94.1 09/27/2021    MCH 31.0 09/27/2021    MCHC 32.9 09/27/2021    RDW 15.0 09/27/2021     09/27/2021    MPV 9.8 09/27/2021     CBC with Differential:    Lab Results   Component Value Date    WBC 9.6 09/27/2021    RBC 3.55 09/27/2021    HGB 11.0 09/27/2021    HCT 33.4 09/27/2021     09/27/2021    MCV 94.1 09/27/2021    MCH 31.0 09/27/2021    MCHC 32.9 09/27/2021    RDW 15.0 09/27/2021    NRBC 0.0 04/22/2018    SEGSPCT 76 03/16/2014    BANDSPCT 1 09/02/2016    LYMPHOPCT 9.9 09/24/2021    MONOPCT 8.5 09/24/2021    BASOPCT 0.5 09/24/2021    MONOSABS 0.74 09/24/2021    LYMPHSABS 0.87 09/24/2021    EOSABS 0.19 09/24/2021    BASOSABS 0.04 09/24/2021     Hemoglobin/Hematocrit:    Lab Results   Component Value Date    HGB 11.0 09/27/2021    HCT 33.4 09/27/2021     CMP:    Lab Results   Component Value Date     09/27/2021    K 4.4 09/27/2021    K 3.9 09/24/2021    CL 99 09/27/2021    CO2 24 09/27/2021    BUN 34 09/27/2021    CREATININE 1.3 09/27/2021    GFRAA >60 09/27/2021    LABGLOM 53 09/27/2021    GLUCOSE 137 09/27/2021    GLUCOSE 111 07/16/2011    PROT 7.1 09/27/2021    LABALBU 2.7 09/27/2021    CALCIUM 9.3 09/27/2021    BILITOT 0.4 09/27/2021    ALKPHOS 108 09/27/2021    AST 66 09/27/2021    ALT 18 09/27/2021     BMP:    Lab Results   Component Value Date     09/27/2021    K 4.4 09/27/2021    K 3.9 09/24/2021    CL 99 09/27/2021    CO2 24 09/27/2021    BUN 34 09/27/2021    LABALBU 2.7 09/27/2021    CREATININE 1.3 09/27/2021    CALCIUM 9.3 09/27/2021    GFRAA >60 09/27/2021    LABGLOM 53 09/27/2021    GLUCOSE 137 09/27/2021    GLUCOSE 111 07/16/2011     BUN/Creatinine:    Lab Results   Component Value Date    BUN 34 09/27/2021    CREATININE 1.3 09/27/2021     Hepatic Function Panel:    Lab Results   Component Value Date    ALKPHOS 108 09/27/2021    ALT 18 09/27/2021    AST 66 09/27/2021    PROT 7.1 09/27/2021    BILITOT 0.4 09/27/2021    BILIDIR <0.2 01/25/2021    IBILI see below 01/25/2021    LABALBU 2.7 09/27/2021     Albumin:    Lab Results   Component Value Date    LABALBU 2.7 09/27/2021     Calcium:    Lab Results   Component Value Date    CALCIUM 9.3 09/27/2021     Ionized Calcium:  No results found for: IONCA  Magnesium:    Lab Results   Component Value Date    MG 1.7 09/27/2021     Phosphorus:    Lab Results   Component Value Date    PHOS 3.5 09/27/2021     Uric Acid:    Lab Results   Component Value Date    LABURIC 3.5 01/21/2021     PT/INR:    Lab Results   Component Value Date    PROTIME 11.7 01/05/2021    PROTIME 11.3 06/21/2011    INR 1.0 01/05/2021     PTT:    Lab Results   Component Value Date    APTT 25.5 01/05/2021   [APTT}  Troponin:    Lab Results   Component Value Date    TROPONINI 0.05 01/19/2021     U/A:    Lab Results   Component Value Date    COLORU Yellow 09/24/2021    PROTEINU 100 09/24/2021    PHUR 6.0 09/24/2021    LABCAST RARE 10/11/2017    WBCUA NONE 09/24/2021    WBCUA NONE 07/12/2011    RBCUA 1-3 09/24/2021    RBCUA 1-3 03/17/2014    BACTERIA NONE SEEN 09/24/2021    CLARITYU Clear 09/24/2021    SPECGRAV 1.025 09/24/2021    LEUKOCYTESUR Negative 09/24/2021 UROBILINOGEN 0.2 2021    BILIRUBINUR Negative 2021    BILIRUBINUR NEGATIVE 2011    BLOODU TRACE-INTACT 2021    GLUCOSEU Negative 2021    GLUCOSEU NEGATIVE 2011     ABG:    Lab Results   Component Value Date    PH 7.396 2018    PH 7.453 2012    PCO2 39.0 2018    PO2 92.8 2018    HCO3 23.4 2018    BE -1.2 2018    O2SAT 96.7 2018     HgBA1c:    Lab Results   Component Value Date    LABA1C 7.2 2021     Microalbumen/Creatinine ratio:  No components found for: RUCREAT  FLP:    Lab Results   Component Value Date    TRIG 84 2021    HDL 33 2021    LDLCALC 49 2021    LABVLDL 17 2021     TSH:    Lab Results   Component Value Date    TSH 4.270 2021     VITAMIN B12: No components found for: B12  FOLATE:    Lab Results   Component Value Date    FOLATE 11.5 2021     Iron Saturation:  No components found for: PERCENTFE  FERRITIN:    Lab Results   Component Value Date    FERRITIN 534 2021     CHECO:  No results found for: ANATITER, CHECO  AMYLASE:    Lab Results   Component Value Date    AMYLASE 79 2016     LIPASE:    Lab Results   Component Value Date    LIPASE 14 2021     Fibrinogen Level:  No components found for: FIB  24 Hour Urine for Protein:  No components found for: RAWUPRO, UHRS3, JNQI89MS, UTV3  24 Hour Urine for Creatinine Clearance:  No components found for: CREAT4, UHRS10, UTV10  PSA: No results found for: PSA     Imaging:  CXR results:  Patient MRN: [id number]   : 1940   Age:  80 years   Gender: Male   Order Date: 2020 1:07 PM   Exam: US RETROPERITONEAL BIPIN, US DUP ABD PEL RETRO SCROT COMPLETE   Number of Images: 42 views   Indication:  N18.3 Chronic kidney disease, stage III (moderate) ([address]        Comparison: Retroperitoneal ultrasound dated 2019.       Technique: Sonographic interrogation of the retroperitoneum with   attention to the kidneys.  Duplex sonography of the abdominal aorta and   bilateral renal arteries.       Findings:       Right kidney 11.7 cm in long axis. The left kidney measures 12.2 cm in long axis. There is mild   parenchymal thinning on the left. There is no evidence of hydronephrosis.       Doppler findings:    Abdominal aorta peak systolic velocity is 401.5 cm/s. Right renal artery peak systolic velocity 203.3 cm/s. RAR equals 1.7. Left renal artery peak systolic velocity 886.1 cm/s. RAR equals 1.7.           Impression   1. Increased flow velocity in both renal arteries which is fairly   symmetric. The amount of increased flow velocity is considered   borderline suspicious for clinically significant renal artery   stenosis. 2. Mild renal parenchymal thinning on the left. EXAMINATION:   RETROPERITONEAL ULTRASOUND OF THE KIDNEYS AND URINARY BLADDER.  Renal artery   ultrasound.       9/24/2021       COMPARISON:   None       HISTORY:   ORDERING SYSTEM PROVIDED HISTORY: HTN   TECHNOLOGIST PROVIDED HISTORY:   Reason for exam:->HTN   What reading provider will be dictating this exam?->CRC       FINDINGS:   Examination was reportedly a difficult examination per the sonographer due to   patient's inability to remain still for exam and complaints of pain   throughout the exam.  The abdominal aorta was not well visualized.  Renal   arteries were also not adequately evaluated and this examination is   nondiagnostic in evaluation for possible renal artery stenosis.         Kidneys:       The right kidney measures 10.1 cm in length and the left kidney measures 11.0   cm in length.       Kidneys demonstrate normal cortical echogenicity.  No evidence of   hydronephrosis or intrarenal stones.           Bladder:       Urinary bladder is incompletely distended measuring 80 mL.  Allowing for   this, no obvious bladder wall thickening or intraluminal filling defect.           Impression   No acute renal abnormality.          Assessment  1-CKD G3B  baseline serum cr 1.5-2.1mg/dl with 31-43ml/min  setting of DM2, HTN, Diffuse Atherosclerotic Vasc disease  UA Sg 1.025, blood tr, protein 100mg/dl, Ni (-), LE (-)  Microalb: cr 504  Renal US unremarkable  Cr slowly trending down 2.2-->1.6-->1.3mg/dl  For angiogram on 9/27/21-startedt IVF 9/26pm  Hold ARB in the setting of the decreasing cr and in anticipation of the angiogram    2-Anemia in CKD  HgB above goal >/=10 No SWATI indicated  Ferritin 534 and iron sat 22%  Folate 11.5, B12 1146  await  SPEP and UPEP  Follow H/H    3-Sec HPTH of Renal Origin with Unspecified Vit D Def with Hypophosphatemia  Ca++ WNL  PO4 2.4-->2.9  PTH 30  Vit D 27  PLAN:  1. S/P neutraphos  2. Started Vit D  3. Follow Ca++ and PO4    4- HTN with CKD I-IV  BP above goal <120/80  Duplex Renal US-indeterminant  PLAN:  1.  Started hydralazine 25mg tid- will up titrate to 50 mg tid and follow       Thank you Dr. Niko Martell MD for allowing us to participate in care of Morales Ramos MD  1:18 PM  9/27/2021

## 2021-09-27 NOTE — PROGRESS NOTES
Cath lab [facility] called at [phone] to question consent for pt's operation today. Cath lab stated they do their own consents d/t being at a different facility. They will handle it when he arrives.

## 2021-09-28 NOTE — H&P
[address] [facility]  History and Physical      CHIEF COMPLAINT:    No chief complaint on file. Patient of Luz Talamantes MD presents with: Worsening bilateral leg cellulitis      History of Present Illness:   Patient is a [de-identified]year old male with a PMH of HFpEF, CAD, COPD, CKD, DM, Thryoid diseases, NSTEMI, Pulmonary HTN,  HTN, HLD, and PVD that presented for chronic wounds and cellulitis. He was sent in from [place] [address] for necrotic toes of the right foot. These wounds have been chronic for the past many months and been followed by podiatry. He denies any chest pain, shortness of breath, abdominal pain, nausea, vomiting, or diarrhea. He was found to have WBC 10.8 Hgb 11.9, Cr. 2.2 (baseline 1.3-2.5 this year) and a procal of 0.46 in the emergency department. He indicates they became black over the past 1 month or so. On evaluation, his right third and fourth toe black/gangrene/necrotic. He denies any fevers chills or calf pain or tenderness. Swelling is noted in bilateral lower extremities. Patient was transferred to [facility] to have an aortogram with carotid imaging. [facility]. REVIEW OF SYSTEMS:  Pertinent negatives are above in HPI. 10 point ROS otherwise negative.       Past Medical History:   Diagnosis Date    (HFpEF) heart failure with preserved ejection fraction ([facility] 07/2020    Diagnosed by cardiology    Arthritis     Bilateral carotid artery stenosis 12/31/2020    Bilateral carotid bruits 12/31/2020    Blood circulation, collateral     CAD (coronary artery disease)     Carotid bruit 03/27/2013    CHF (congestive heart failure) ([facility]     Chronic kidney disease     CKD (chronic kidney disease) stage 3, GFR 30-59 ml/min ([facility]) 03/20/2016    COPD (chronic obstructive pulmonary disease) ([address]     Follows with pulmonary    Diabetes mellitus ([address]     Diabetic neuropathy associated with type 2 diabetes mellitus ([address]     Dyspnea on exertion     History of blood transfusion     Hyperlipidemia     Hypertension     Mild mitral regurgitation     Moderate tricuspid regurgitation by prior echocardiography 2018    Movement disorder     [facility]STEMI, [facility] of [facility] 01/2018    Obesity     Pulmonary hypertension ([facility] 2018    PVD (peripheral vascular disease) with claudication ([facility] 03/27/2014    S/P carotid endarterectomy 03/27/2013    Sleep apnea     SOB (shortness of breath)     Thyroid disease     Unspecified cerebral artery occlusion with cerebral infarction     Vision decreased 12/31/2020         Past Surgical History:   Procedure Laterality Date    BIOPSY / LIGATION TEMPORAL ARTERY Bilateral 01/05/2021    BILATERAL TEMPORAL ARTERY BIOPSY performed by Camron Bates MD at [address]      5 years ago   [address] GRAFT  2008    1 vessel and aortic valve repair    DIAGNOSTIC CARDIAC CATH LAB PROCEDURE  10/14/2008    DIAGNOSTIC CARDIAC CATH LAB PROCEDURE  10/21/2010    JOINT REPLACEMENT      R knee replacement    KNEE SURGERY      OTHER SURGICAL HISTORY Right 02/14/2017    debridement,bone bx foot    PACEMAKER INSERTION  11/12/2014    D-PPM   ()    Dr. Jeffrey Friday      carotids       Medications Prior to Admission:    Medications Prior to Admission: HYDROcodone-acetaminophen (NORCO)  MG per tablet, Take 1 tablet by mouth every 6 hours as needed for Pain.  magnesium 200 MG TABS tablet, Take 200 mg by mouth daily  diphenhydrAMINE (BENADRYL) 25 MG capsule, Take 25 mg by mouth daily as needed for Itching  insulin lispro, 1 Unit Dial, (HUMALOG KWIKPEN) 100 UNIT/ML SOPN, Inject 0-6 Units into the skin 3 times daily (before meals) PER SLIDING SCALE: 0-139=0U, 140-199=1U, 200-249=2U, 250-299=3U, 300-349=4U, 350-399=5U, 400-450=6U  morphine (THIERRY) 10 MG extended release capsule, Take 10 mg by mouth 2 times daily.  hydrOXYzine (ATARAX) 25 MG tablet, Take 25 mg by mouth 4 times daily   lactulose 20 GM/30ML SOLN, Take 20 g by mouth three times a week *MON-WED-FRI*  metOLazone (ZAROXOLYN) 2.5 MG tablet, Take 2.5 mg by mouth three times a week *TUE-THUR-SAT*  bumetanide (BUMEX) 1 MG tablet, Take 1 mg by mouth daily   potassium chloride (KLOR-CON M) 20 MEQ extended release tablet, Take 40 mEq by mouth daily   ferrous sulfate (IRON 325) 325 (65 Fe) MG tablet, Take 1 tablet by mouth 2 times daily (with meals)  DULoxetine (CYMBALTA) 60 MG extended release capsule, Take 1 capsule by mouth daily  fluticasone-umeclidin-vilant (TRELEGY ELLIPTA) 100-62.5-25 MCG/INH AEPB, Inhale 1 puff into the lungs Daily   aspirin EC 81 MG EC tablet, Take 1 tablet by mouth daily  albuterol (PROVENTIL) (2.5 MG/3ML) 0.083% nebulizer solution, Take 3 mLs by nebulization every 6 hours as needed for Wheezing or Shortness of Breath DX: COPD J44.9 Please bill Medicare Part B  levothyroxine (SYNTHROID) 25 MCG tablet, Take 25 mcg by mouth Daily   gabapentin (NEURONTIN) 400 MG capsule, Take 400 mg by mouth 4 times daily. metoprolol succinate (TOPROL XL) 50 MG extended release tablet, Take 50 mg by mouth daily  isosorbide mononitrate (IMDUR) 120 MG extended release tablet, Take 1 tablet by mouth daily  allopurinol (ZYLOPRIM) 300 MG tablet, Take 300 mg by mouth daily   lovastatin (MEVACOR) 20 MG tablet, Take 20 mg by mouth nightly. Note that the patient's home medications were reviewed and the above list is accurate to the best of my knowledge at the time of the exam.    Allergies:    Patient has no known allergies. Social History:    reports that he quit smoking about 40 years ago. His smoking use included cigarettes. He started smoking about 67 years ago. He has a 50.00 pack-year smoking history. He has never used smokeless tobacco. He reports previous drug use. He reports that he does not drink alcohol.     Family History:   family history includes Cancer in his sister; Heart Disease in his father and mother; Heart Failure in his father and mother; Heart Surgery in his father; High Blood Pressure in his sister. PHYSICAL EXAM:    Vitals:  /68   Pulse 93   Temp 97.6 °F (36.4 °C) (Temporal)   Resp 18   Ht 5' 7\" (1.702 m)   Wt 226 lb (102.5 kg)   SpO2 95%   BMI 35.40 kg/m²       General appearance: NAD, conversant  Eyes: Sclerae anicteric, PERRL  HEENT: AT/NC, MMM  Neck: FROM, supple, no thyromegaly  Lymph: No cervical / supraclavicular lymphadenopathy  Lungs: Clear to auscultation, WOB normal  CV: RRR, no MRGs, bilateral 2+ non-pitting edema   Abdomen: Soft, non-tender; no masses or HSM, +BS  Extremities: FROM without synovitis. No clubbing or cyanosis of the hands. Skin:           Psych: Calm and cooperative. Normal judgement and insight. Normal mood and affect. Neuro: Alert and interactive, face symmetric, speech fluent. LABS:  All labs reviewed.   Of note:  CBC with Differential:    Lab Results   Component Value Date    WBC 10.2 09/28/2021    RBC 3.49 09/28/2021    HGB 10.8 09/28/2021    HCT 31.8 09/28/2021     09/28/2021    MCV 91.1 09/28/2021    MCH 30.9 09/28/2021    MCHC 34.0 09/28/2021    RDW 15.2 09/28/2021    NRBC 0.0 04/22/2018    SEGSPCT 76 03/16/2014    BANDSPCT 1 09/02/2016    LYMPHOPCT 11.5 09/28/2021    MONOPCT 8.1 09/28/2021    BASOPCT 0.6 09/28/2021    MONOSABS 0.83 09/28/2021    LYMPHSABS 1.17 09/28/2021    EOSABS 0.19 09/28/2021    BASOSABS 0.06 09/28/2021     CMP:    Lab Results   Component Value Date     09/28/2021    K 3.6 09/28/2021    K 3.9 09/24/2021    CL 98 09/28/2021    CO2 24 09/28/2021    BUN 31 09/28/2021    CREATININE 1.1 09/28/2021    GFRAA >60 09/28/2021    LABGLOM >60 09/28/2021    GLUCOSE 166 09/28/2021    GLUCOSE 111 07/16/2011    PROT 6.4 09/28/2021    LABALBU 2.7 09/28/2021    CALCIUM 9.0 09/28/2021    BILITOT 0.4 09/28/2021    ALKPHOS 97 09/28/2021    AST 47 09/28/2021    ALT 21 09/28/2021       Imaging:  XR R Foot- negaitve  VL SNÁCHEZ Bilateral Limited-      EKG:  EKG not preformed in ER, previous EKG displayed A-paced     Telemetry:  Not on tele. ASSESSMENT/PLAN:  Principal Problem:    Critical lower limb ischemia (HCC)  Active Problems:    CKD (chronic kidney disease) stage 3, GFR 30-59 ml/min    Diabetes mellitus type 2, uncontrolled (HCC)    CAD (coronary artery disease), native coronary artery    History of CVA (cerebrovascular accident)    COPD (chronic obstructive pulmonary disease) (HCC)    Peripheral artery disease (HCC)  Resolved Problems:    * No resolved hospital problems. *    Cellulitis   -IV abx- Ancef q8h-to be managed by ID  -ID following - appreciate input  -Monitor WBC/Fever    Necrotic Toes of R Foot  -Podiatry following-   -Vascular Surgery following  -Patient to have right femoral endarterectomy this week. -Obtain cardiology clearance-completed-stress test with global hypokinesis but no perfusion defects or reversible ischemia  -Nephrology following for renal function optimization, need for IV contrast    DM Type 2  -Monitor labs - well controlled in hospital setting. -ISS glucose control, resume home medications  -Hypoglycemia protocol initiated  -Discuss diet modification     Severe agitation requiring restraints when he was at [facility]  Since transfer to [facility], he actually appears much more alert and responsive to questions  Try to keep off sedative agents      Code status: Full  Requires inpatient level of care  Hernandez Salinas, MADDI - CNP    3:34 PM  9/28/2021     Above note edited to reflect my thoughts     I personally saw, examined and provided care for the patient. Radiographs, labs and medication list were reviewed by me independently. The case was discussed in detail and plans for care were established. Review of [address] [place] [address], documentation was conducted and revisions were made as appropriate directly by me.  I agree with the above documented exam, problem list, and plan of care.      Arnold Webster MD  6:31 PM  9/28/2021

## 2021-09-28 NOTE — PROGRESS NOTES
Attempted to see patient but currently donnie. Plan for R iliofemoral endarterectomy later this week. Cardiology risk assessment pending.     Plan reviewed with Dr Klarissa Al PA-C

## 2021-09-28 NOTE — DISCHARGE INSTR - COC
Continuity of Care Form    Patient Name: Lashell Nguyen   :  1940  MRN:  [id number]    Admit date:  2021  Discharge date:  10/3/2021    Code Status Order: Full Code   Advance Directives:      Admitting Physician:  No admitting provider for patient encounter.   PCP: Luz Talamantes MD    Discharging Nurse: Carlos Rollins Unit/Room#: 9176L  Discharging Unit Phone Number: [phone]    Emergency Contact:   Extended Emergency Contact Information  Primary Emergency Contact: ABEL ABRAHAM  Home Phone: [phone]  Mobile Phone: [phone]  Relation: Brother/Sister    Past Surgical History:  Past Surgical History:   Procedure Laterality Date    BIOPSY / LIGATION TEMPORAL ARTERY Bilateral 2021    BILATERAL TEMPORAL ARTERY BIOPSY performed by Marsha Savage MD at [address]      5 years ago   [address]      1 vessel and aortic valve repair    DIAGNOSTIC CARDIAC CATH LAB PROCEDURE  10/14/2008    DIAGNOSTIC CARDIAC CATH LAB PROCEDURE  10/21/2010    JOINT REPLACEMENT      R knee replacement    KNEE SURGERY      OTHER SURGICAL HISTORY Right 2017    debridement,bone bx foot    PACEMAKER INSERTION  2014    D-PPM   ()    Dr. Connor George      carotids       Immunization History:   Immunization History   Administered Date(s) Administered    Influenza Virus Vaccine 2016    Pneumococcal Conjugate 13-valent (Pcoxwht92) 2016    Pneumococcal Conjugate Vaccine 2016       Active Problems:  Patient Active Problem List   Diagnosis Code    Essential hypertension I10    PVD (peripheral vascular disease) with claudication ([facility] I73.9    Pacemaker Z95.0    S/P AVR Z95.2    CKD (chronic kidney disease) stage 3, GFR 30-59 ml/min N18.30    Type 2 diabetes mellitus with stage 3 chronic kidney disease, with long-term current use of insulin (HCC) E11.22, N18.30, Z79.4    Pulmonary nodule R91.1    Diabetes mellitus type 2, uncontrolled ([facility]) E11.65    Hyperlipidemia LDL goal <100 E78.5    Acquired hypothyroidism E03.9    CAD (coronary artery disease), native coronary artery I25.10    Chronic pain G89.29    History of CVA (cerebrovascular accident) Z80.78    Obesity (BMI 30-39. 9) E66.9    Anemia D64.9    Elevated sed rate R70.0    Vision decreased H54.7    Bilateral carotid bruits R09.89    Bilateral carotid artery stenosis I65.23    Mild mitral regurgitation I34.0    Pulmonary hypertension ([facility]) I27.20    Moderate tricuspid regurgitation by prior echocardiography I07.1    (HFpEF) heart failure with preserved ejection fraction ([facility]) I50.30    COPD (chronic obstructive pulmonary disease) ([facility]) J44.9    Diabetic neuropathy associated with type 2 diabetes mellitus ([facility]) E11.40    Congestive heart failure ([facility]) I50.9    Cellulitis L03.90    Peripheral artery disease ([facility]) I73.9    Critical lower limb ischemia ([facility]) I70.229       Isolation/Infection:   Isolation            No Isolation          Patient Infection Status       Infection Onset Added Last Indicated Last Indicated By Review Planned Expiration Resolved Resolved By    None active    Resolved    COVID-19 Rule Out 01/19/21 01/19/21 01/19/21 COVID-19 (Ordered)   01/19/21 Rule-Out Test Resulted    COVID-19 Rule Out 12/29/20 12/29/20 12/29/20 Covid-19 Ambulatory (Ordered)   12/31/20 Rule-Out Test Resulted            Nurse Assessment:  Last Vital Signs: /68   Pulse 93   Temp 97.6 °F (36.4 °C) (Temporal)   Resp 18   Ht 5' 7\" (1.702 m)   Wt 226 lb (102.5 kg)   SpO2 95%   BMI 35.40 kg/m²     Last documented pain score (0-10 scale): Pain Level: 10  Last Weight:   Wt Readings from Last 1 Encounters:   09/27/21 226 lb (102.5 kg)     Mental Status:  oriented and alert-occasional confusion    IV Access:  - None    Nursing Mobility/ADLs:  Walking   non-weight bearing  Transfer  Dependent  Bathing Dependent  Dressing  Dependent  Toileting  Dependent  Feeding  Assisted  Med Admin  Assisted  Med Delivery   whole    Wound Care Documentation and Therapy:  Wound 08/21/21 Pretibial Right; Anterior (Active)   Number of days: 38       Wound 08/21/21 Toe (Comment  which one) Anterior;Right (Active)   Number of days: 38       Wound 08/21/21 Toe (Comment  which one) Anterior;Right (Active)   Number of days: 38       Wound 08/21/21 Toe (Comment  which one) Anterior;Right (Active)   Number of days: 38       Wound 08/21/21 Pretibial Left (Active)   Number of days: 38       Wound 08/21/21 Toe (Comment  which one) Anterior; Left (Active)   Number of days: 38        Elimination:  Continence:   · Bowel: No  · Bladder: Has indwelling sandy catheter  Urinary Catheter: Insertion Date: 9/30/2021   Colostomy/Ileostomy/Ileal Conduit: No       Date of Last BM: 10/1/2021    Intake/Output Summary (Last 24 hours) at 9/28/2021 0959  Last data filed at 9/28/2021 0937  Gross per 24 hour   Intake 0 ml   Output --   Net 0 ml     No intake/output data recorded. Safety Concerns:      At Risk for Falls    Impairments/Disabilities:      generalized weakness    Nutrition Therapy:  Current Nutrition Therapy:   - Oral Diet:  Carb Control 4 carbs/meal (1800kcals/day)    Routes of Feeding: Oral  Liquids: No Restrictions  Daily Fluid Restriction: no  Last Modified Barium Swallow with Video (Video Swallowing Test): not done    Treatments at the Time of Hospital Discharge:   Respiratory Treatments: See discharge med-rec  Oxygen Therapy:  Room air  Ventilator:    - No ventilator support    Rehab Therapies: Physical Therapy and Occupational Therapy  Weight Bearing Status/Restrictions: Non-weight bearing on right leg  Other Medical Equipment (for information only, NOT a DME order):  hospital bed  Other Treatments: daily dressing changes to legs with Xeroform, DSD & ACE wrap, turn & reposition every 2 hours, blood sugar checks AC & HS    Patient's personal belongings (please select all that are sent with patient):  None    RN SIGNATURE:  Electronically signed by Ashely Jc RN on 10/3/21 at 4:36 PM EDT    CASE MANAGEMENT/SOCIAL WORK SECTION    Inpatient Status Date: ***    Readmission Risk Assessment Score:  Readmission Risk              Risk of Unplanned Readmission:  0           Discharging to Facility/ Agency   · Name:   · Address:  · Phone:  · Fax:    Dialysis Facility (if applicable)   · Name:  · Address:  · Dialysis Schedule:  · Phone:  · Fax:    / signature: {Esignature:[id number]}    PHYSICIAN SECTION    Prognosis: Fair    Condition at Discharge: Stable    Rehab Potential (if transferring to Rehab): Fair    Recommended Labs or Other Treatments After Discharge: cbc and bmp      Physician Certification: I certify the above information and transfer of Ramon Chapin  is necessary for the continuing treatment of the diagnosis listed and that he requires [place] for less 30 days. Update Admission H&P: No change in H&P    PHYSICIAN SIGNATURE:  Electronically signed by Svetlana Odom MD on 10/3/21 at 10:57 AM EDT     ***HEART FAILURE - CONGESTIVE HEART FAILURE***  DISCHARGE INSTRUCTIONS:  GUIDELINES TO FOLLOW AT [facility]/SNF/ Assisted Living    Future Appointments   Date Time Provider Jonathon Sierra   10/19/2021  9:45 AM [facility]F ROOM 2 [facility]   11/3/2021  8:25 AM SCHEDULE, REMOTE CHECK [facility]   1/20/2022  1:00 PM Jarad Wayne MD [facility]          MEDICATIONS:  · Please notify the doctor if patient is not able to take their medications or if medications are being held for any reasons (such as low blood pressure ect.)  · Do not give the patient ibuprofen (Advil or Motrin), naproxen (Aleve) without talking to the doctor first. This could make their heart failure worse.          WEIGHT MONITORING:  ·  Weigh patient every day in the morning after they void (If patient is able to stand, please get a standing weight.)  ·  Notify the doctor of a weight gain of 3 pounds or more in 1 day   OR  a total of 5 pounds or more in 1 week             DIET   Cardiac heart healthy diet:  Low sodium diet: no  more than 2,000mg (2 grams) of salt / sodium per day (which equals to a little less than  a teaspoon of salt)/ Cardiac Diet: Low saturated / low trans fat, no added salt, caffeine restricted    · If patient is there for rehab and will be returning home in the near future; reinforce with the patient and the family to follow a low sodium diet (2,000 mg)- avoid using salt at the table, avoid / limit use of canned soups, processed / packaged foods, salted snacks, olives and pickles. Do not use a salt substitute without checking with the doctor. (Mrs. Sb Shook is safe to use).        NOTIFY THE DOCTOR THE FIRST DAY OF ONSET OF ANY OF THESE   SYMPTOMS:  ·  Weight gain of 3 pounds or more in 1 day         OR 5 pounds or more in one week  · More shortness of breath  · More swelling in stomach, legs, ankles or feet  · Feeling more tired, No energy  · Dry hacky cough  · Dizziness  · More chest pain / discomfort  · Hard time breathing laying down

## 2021-09-28 NOTE — PROGRESS NOTES
Dr Precious Ornelas scheduling need you to log in under physician group , and place referral  order under future order  Will not allow to schedule if the order is under referral   Please advise!! Thank you! Podiatry Progress Note  9/28/2021   Jesús Preston       SUBJECTIVE: Didi Moya is a [de-identified] y.o. male seen bedside for follow up of diabetic foot and leg ulcers. Patient stated he was a little \"foggy\" this AM, discussed with nurse who stated he was mentally a little slower than normal but is still AOx3. Patient scheduled for CTA this AM, seen alongside cardiology this morning. Has some pain with dressing changes but denies any constitutional symptoms at this time.         Past Medical History:   Diagnosis Date    (HFpEF) heart failure with preserved ejection fraction ([address] 07/2020    Diagnosed by cardiology    Arthritis     Bilateral carotid artery stenosis 12/31/2020    Bilateral carotid bruits 12/31/2020    Blood circulation, collateral     CAD (coronary artery disease)     Carotid bruit 03/27/2013    [facility]     Chronic kidney disease     CKD (chronic kidney disease) stage 3, GFR 30-59 ml/min ([facility]) 03/20/2016    COPD (chronic obstructive pulmonary disease) ([address]     Follows with pulmonary    Diabetes mellitus ([address]     Diabetic neuropathy associated with type 2 diabetes mellitus (HCC)     Dyspnea on exertion     History of blood transfusion     Hyperlipidemia     Hypertension     Mild mitral regurgitation     Moderate tricuspid regurgitation by prior echocardiography 2018    Movement disorder     NSTEMI, initial episode of care ([address] 01/2018    Obesity     Pulmonary hypertension ([address] 2018    PVD (peripheral vascular disease) with claudication ([address] 03/27/2014    S/P carotid endarterectomy 03/27/2013    Sleep apnea     SOB (shortness of breath)     Thyroid disease     Unspecified cerebral artery occlusion with cerebral infarction     Vision decreased 12/31/2020        Past Surgical History:   Procedure Laterality Date    BIOPSY / LIGATION TEMPORAL ARTERY Bilateral 01/05/2021    BILATERAL TEMPORAL ARTERY BIOPSY performed by Oksana Clay MD at [facility]    CARDIAC SURGERY      5 years ago   [address] CORONARY ARTERY BYPASS GRAFT      1 vessel and aortic valve repair    DIAGNOSTIC CARDIAC CATH LAB PROCEDURE  10/14/2008    DIAGNOSTIC CARDIAC CATH LAB PROCEDURE  10/21/2010    JOINT REPLACEMENT      R knee replacement    KNEE SURGERY      OTHER SURGICAL HISTORY Right 2017    debridement,bone bx foot    PACEMAKER INSERTION  2014    D-PPM   ()    Dr. Lalo johnson         Family History   Problem Relation Age of Onset    Heart Disease Mother     Heart Failure Mother     Heart Surgery Father     Heart Disease Father     Heart Failure Father     High Blood Pressure Sister     Cancer Sister         Social History     Tobacco Use    Smoking status: Former Smoker     Packs/day: 2.00     Years: 25.00     Pack years: 50.00     Types: Cigarettes     Start date: 1953     Quit date: 1981     Years since quittin.2    Smokeless tobacco: Never Used   Substance Use Topics    Alcohol use: Never     Comment:          Prior to Admission medications    Medication Sig Start Date End Date Taking? Authorizing Provider   HYDROcodone-acetaminophen (NORCO)  MG per tablet Take 1 tablet by mouth every 6 hours as needed for Pain. Historical Provider, MD   magnesium 200 MG TABS tablet Take 200 mg by mouth daily    Historical Provider, MD   diphenhydrAMINE (BENADRYL) 25 MG capsule Take 25 mg by mouth daily as needed for Itching    Historical Provider, MD   insulin lispro, 1 Unit Dial, (HUMALOG KWIKPEN) 100 UNIT/ML SOPN Inject 0-6 Units into the skin 3 times daily (before meals) PER SLIDING SCALE: 0-139=0U, 140-199=1U, 200-249=2U, 250-299=3U, 300-349=4U, 350-399=5U, 400-450=6U    Historical Provider, MD   morphine (THIERRY) 10 MG extended release capsule Take 10 mg by mouth 2 times daily.     Historical Provider, MD   hydrOXYzine (ATARAX) 25 MG tablet Take 25 mg by mouth 4 times daily     Historical Provider, MD   lactulose 20 GM/30ML SOLN Take 20 g by mouth three times a week *MON-WED-FRI*    Historical Provider, MD   metOLazone (ZAROXOLYN) 2.5 MG tablet Take 2.5 mg by mouth three times a week *TUE-THUR-SAT*    Historical Provider, MD   bumetanide (BUMEX) 1 MG tablet Take 1 mg by mouth daily     Historical Provider, MD   potassium chloride (KLOR-CON M) 20 MEQ extended release tablet Take 40 mEq by mouth daily     Historical Provider, MD   ferrous sulfate (IRON 325) 325 (65 Fe) MG tablet Take 1 tablet by mouth 2 times daily (with meals) 1/25/21   Yobany Martino DO   DULoxetine (CYMBALTA) 60 MG extended release capsule Take 1 capsule by mouth daily 1/26/21   Yobany Martino DO   fluticasone-umeclidin-vilant (TRELEGY ELLIPTA) 100-62.5-25 MCG/INH AEPB Inhale 1 puff into the lungs Daily     Historical Provider, MD   aspirin EC 81 MG EC tablet Take 1 tablet by mouth daily 8/7/20   Ana Mark MD   albuterol (PROVENTIL) (2.5 MG/3ML) 0.083% nebulizer solution Take 3 mLs by nebulization every 6 hours as needed for Wheezing or Shortness of Breath DX: COPD J44.9 Please bill Medicare Part B 1/6/20   Carlos Mittal MD   levothyroxine (SYNTHROID) 25 MCG tablet Take 25 mcg by mouth Daily  7/29/19   Historical Provider, MD   gabapentin (NEURONTIN) 400 MG capsule Take 400 mg by mouth 4 times daily. 5/19/19   Historical Provider, MD   metoprolol succinate (TOPROL XL) 50 MG extended release tablet Take 50 mg by mouth daily    Historical Provider, MD   isosorbide mononitrate (IMDUR) 120 MG extended release tablet Take 1 tablet by mouth daily 1/23/18   Ingris Jacques MD   allopurinol (ZYLOPRIM) 300 MG tablet Take 300 mg by mouth daily  2/27/15   Historical Provider, MD   lovastatin (MEVACOR) 20 MG tablet Take 20 mg by mouth nightly. 11/11/13   Historical Provider, MD        Patient has no known allergies.          OBJECTIVE:        Vitals:    09/28/21 0942   BP:    Pulse:    Resp:    Temp: SpO2: 95%                      EXAM:        Pt is AAOx3, NAD    VASCULAR:  DP and PT pulses are non- palpable. CFT delayed B/L greater than 5 seconds. Dry gangrenous changes right foot digits 2, 3, 4.  Warm to cool from the tibial tuberosity to the distal aspect of the digits dorsally on RLE and warm to warm on LLE. No hair growth noted to the distal aspects dorsally.     NEUROLOGIC:  Protective sensation is diminished b/l, gross sensation intact     DERM:  Diffuse skin changes noted to the LEs:   #1. RLE: Ulcer noted to the distal half of the leg measuring about 10.0x12.0x0.2cm circumferentially. Wound bed is about fibronecrotic in nature with mild serous drainage noted, periwound erythema, no edema, no malodor, no crepitus or fluctuance, no proximal streaking. Hyperpigmented thickened skin lower legs consistent with chronic venous stasis and hemosiderin deposition. Right foot digits 2-4 with dry gangrenous changes. Cyanotic changes right fifth digit, early stages of mummification     #2. LLE: Wound noted to the distal half of the leg measuring about 12.0x11.0x0.2cm circumferentially around the leg. Wound bed is fibro-granular with mild serous drainage and periwound erythema. No edema. Hyperpigmentation to anterior lower leg consistent with hemosiderin deposition secondary to chronic venous stasis. Small dried, abrasion noted across the 2nd and 3rd digit MPJS. No malodor, no crepitus, no fluctuance, no proximal streaking noted.     MUSCULOSKELETAL:   Patient has intention tremors with 3-5 muscle strength all pedal muscle groups bilaterally.   Tenderness on palpation to periwound bilaterally more significant on the left and right.       Current Facility-Administered Medications   Medication Dose Route Frequency Provider Last Rate Last Admin    iopamidol (ISOVUE-370) 76 % injection 60 mL  60 mL IntraVENous ONCE PRN Nikolai Tanner,         sodium chloride flush 0.9 % injection 10 mL  10 mL IntraVENous PRN Nikolai Tanner DO        0.9 % sodium chloride infusion  25 mL IntraVENous PRN Nikolai Tanner DO        LORazepam (ATIVAN) injection 1 mg  1 mg IntraVENous Once PRN Holden Jean MD        enoxaparin (LOVENOX) injection 40 mg  40 mg SubCUTAneous Daily Baron Jae MD   40 mg at 09/28/21 1018    insulin lispro (HUMALOG) injection vial 0-3 Units  0-3 Units SubCUTAneous Nightly Baron Jae MD   1 Units at 09/27/21 2142    insulin lispro (HUMALOG) injection vial 0-6 Units  0-6 Units SubCUTAneous TID  Baron Jae MD   1 Units at 09/28/21 0603    albuterol (PROVENTIL) nebulizer solution 2.5 mg  2.5 mg Nebulization Q6H PRN Baron Jae MD        allopurinol (ZYLOPRIM) tablet 300 mg  300 mg Oral Daily Baron Jae MD   300 mg at 09/28/21 0957    Arformoterol Tartrate (BROVANA) nebulizer solution 15 mcg  15 mcg Nebulization BID Baron Jae MD   15 mcg at 09/28/21 0942    aspirin EC tablet 81 mg  81 mg Oral Daily Baron Jae MD   81 mg at 09/28/21 0957    budesonide (PULMICORT) nebulizer suspension 250 mcg  250 mcg Nebulization BID Baron Jae MD   250 mcg at 09/28/21 0942    DULoxetine (CYMBALTA) extended release capsule 60 mg  60 mg Oral Daily Baron Jae MD   60 mg at 09/28/21 0956    ferrous sulfate (IRON 325) tablet 325 mg  325 mg Oral BID  Baron Jae MD   325 mg at 09/28/21 0956    gabapentin (NEURONTIN) capsule 200 mg  200 mg Oral TID Baron Jae MD   200 mg at 09/28/21 0956    hydrALAZINE (APRESOLINE) tablet 50 mg  50 mg Oral 3 times per day Baron Jae MD   50 mg at 09/28/21 0604    HYDROcodone-acetaminophen (Jannis Ales) 7.5-325 MG per tablet 1 tablet  1 tablet Oral Q6H PRN Baron Jae MD   1 tablet at 09/28/21 4328    ipratropium (ATROVENT) 0.02 % nebulizer solution 0.5 mg  0.5 mg Nebulization 4x daily Baron Jae MD   0.5 mg at 09/28/21 8442    isosorbide mononitrate (IMDUR) extended release tablet 120 mg  120 mg Oral Daily Baron Jae MD   120 mg at 09/28/21 0972    lactulose (CHRONULAC) 10 GM/15ML solution 20 g  20 g Oral TID Claudette Ramos MD   20 g at 09/28/21 1000    levothyroxine (SYNTHROID) tablet 25 mcg  25 mcg Oral Daily Claudette Ramos MD   25 mcg at 09/28/21 0957    metoprolol succinate (TOPROL XL) extended release tablet 50 mg  50 mg Oral Daily Claudette Ramos MD   50 mg at 09/28/21 0957    labetalol (NORMODYNE;TRANDATE) injection 10 mg  10 mg IntraVENous Q4H PRN Claudette Ramos MD   10 mg at 09/27/21 2121        Lab Results   Component Value Date    WBC 10.2 09/28/2021    HCT 31.8 (L) 09/28/2021    HGB 10.8 (L) 09/28/2021     09/28/2021     09/28/2021    K 3.6 09/28/2021    CL 98 09/28/2021    CO2 24 09/28/2021    BUN 31 (H) 09/28/2021    CREATININE 1.1 09/28/2021    GLUCOSE 166 (H) 09/28/2021    CRP 21.4 (H) 09/23/2021         Radiographs:    ASSESSMENT:  - Full thickness ulcers down to level of subcutaneous tissue B/L Lower extremity- POA  - Cellulitis B/L Lower extremity, POA  - Dry gangrenous RLE  - Chronic lower extremity edema with venous stasis   - PAD, SP angiogram 9/27/2021  - DM with neuropathic changes        PLAN:  - Examined and evaluated  - All labs, imaging, and charts reviewed   - WBC: 10.2  -S/p angiogram 9/27/2021 with only peroneal vessel runoff right lower extremity, peroneal and some anterior tibial runoff left lower extremity  -ABX per ID, no ABX from inpatient stay at [facility]. ID signed off 9/25/2021  -Blood cultures 9/23 no growth, no wound cultures, would likely just be contaminated. Wounds do not look infected at this time. Stable dry gangrene right foot digits 2 through 4.  -X-rays from 9/23/2021 reveal no osseous fractures, no gas, no foreign substances  - Daily dressing changes Xeroform DSD. Changed today prior to CTA  -No podiatric surgical invention indicated at this time. It appears the patient has significant peripheral arterial disease with limb ischemia bilateral legs.   A TMA to the right foot was considered however at this time it does not appear infected and therefore there is no urgency to amputate.  -We will continue to monitor    D/W:   Bettie Beltran DPM FACKENDAL  Fellowship-Trained Foot and Ankle Surgeon  Diplomate, [employer] Board of Foot [employer] Ankle Sur[employer]  [phone]       Thank you for involving podiatry in this patients care. Please do not hesitate to call with any questions or concerns.      [address] [place] [address] PGY2  On Call Number: [phone]  9/28/2021   1:48 PM

## 2021-09-28 NOTE — PROGRESS NOTES
Ct called patient reported anxiety during ct. Perfect serve message sent to surgical resident, Breezy Castro, requesting one time dose of ativan per patient claustrophobia. Awaiting response.  Electronically signed by José Miguel Feliz RN on 9/28/2021 at 1:35 PM

## 2021-09-28 NOTE — PROGRESS NOTES
Lexiscan Stress EKG Report: Indication: Atypical angina. Baseline EKG: Normal sinus rhythm. Non specific ST-T changes. Stress EKG: No ST-T changes. Arrhythmias: None. Symptoms: None. Summary:  Unremarkable lexiscan stress EKG. See separate report for stress perfusion results.      Arley Jacques MD  Cardiologist  Cardiology, [facility]) Physicians

## 2021-09-28 NOTE — CARE COORDINATION
Met with pt to discuss discharge planning/transition of care. Pt is from [facility] and has been there for almost a year, per pt his plan is to return to [facility] at discharge. Spoke with Cristopher) pt is a bed hold, will need PT/OT evals(will not need to wait for precert), and a COVID test. Per pt, he is in a wheelchair at the facility, will transport via ambullette. Envelope and ambullete form in soft chart. Alfonso Patel, MSW, LSW

## 2021-09-28 NOTE — PLAN OF CARE
Patient's chart updated to reflect:      . - HF care plan, HF education points and HF discharge instructions.  -Orders: 2 gram sodium diet, daily weights, I/O.  -PCP and/or Cardiologist appointment to be scheduled within 7 days of hospital discharge. Nechama Severance, RN RN, BSN  Heart Failure Navigator      FLAGGED AS 30 DAY READMISSION     8 18 2021 CHF dx. [facility] for DR Eusebia Aguilar. 9-23 presents with pain RLE. Gangrenous  Right toes. Pending lexiscan. NPO  From [place].

## 2021-09-28 NOTE — CONSULTS
Nephrology Consult Note  Patient's Name: Ced Bernabe  4:39 PM  9/28/2021        Reason for Consult: HARVEY  Requesting Physician:  Kari Xiao MD    Chief Complaint: Worsening diabetic foot ulcers  History Obtained From:  Patient; EHR    History of Present Ilness:    Ced Bernabe is a [de-identified] y.o. male with a history of CKD stage IIIa(baseline creatinine 1.3-1.6), hypertension, hyperlipidemia, PVD and T2DM. He also has a history of chronic foot ulcers. He is generally followed by podiatry for this. He was sent to ED from nursing home facility for concern about worsening of the foot ulcers and cellulitis. He denies any fever or chills. No trauma to the feet. In the  initial vital signs show elevated blood pressure of 211/98. Patient was afebrile. . Patient was afebrile. His laboratory data was significant for a creatinine of 2.2, WBC 10.8, hemoglobin 11.9. An abdominal aortogram with bilateral lower extremity runoff was performed on 9/27. Based on findings a right femoral endarterectomy has been recommended by vascular surgery.     Past Medical History:   Diagnosis Date    (HFpEF) heart failure with preserved ejection fraction ([address] 07/2020    Diagnosed by cardiology    Arthritis     Bilateral carotid artery stenosis 12/31/2020    Bilateral carotid bruits 12/31/2020    Blood circulation, collateral     CAD (coronary artery disease)     Carotid bruit 03/27/2013    CHF (congestive heart failure) (HCC)     Chronic kidney disease     CKD (chronic kidney disease) stage 3, GFR 30-59 ml/min ([facility]) 03/20/2016    COPD (chronic obstructive pulmonary disease) ([address]     Follows with pulmonary    Diabetes mellitus ([address]     Diabetic neuropathy associated with type 2 diabetes mellitus (HCC)     Dyspnea on exertion     History of blood transfusion     Hyperlipidemia     Hypertension     Mild mitral regurgitation     Moderate tricuspid regurgitation by prior echocardiography 2018    Movement disorder     NSTEMI, initial episode of care [facility]) 01/2018    Obesity     Pulmonary hypertension ([facility] 2018    PVD (peripheral vascular disease) with claudication ([facility] 03/27/2014    S/P carotid endarterectomy 03/27/2013    Sleep apnea     SOB (shortness of breath)     Thyroid disease     Unspecified cerebral artery occlusion with cerebral infarction     Vision decreased 12/31/2020       Past Surgical History:   Procedure Laterality Date    BIOPSY / LIGATION TEMPORAL ARTERY Bilateral 01/05/2021    BILATERAL TEMPORAL ARTERY BIOPSY performed by Endy Casillas MD at [address]      5 years ago   [address] GRAFT  2008    1 vessel and aortic valve repair    DIAGNOSTIC CARDIAC CATH LAB PROCEDURE  10/14/2008    DIAGNOSTIC CARDIAC CATH LAB PROCEDURE  10/21/2010    JOINT REPLACEMENT      R knee replacement    KNEE SURGERY      OTHER SURGICAL HISTORY Right 02/14/2017    debridement,bone bx foot    PACEMAKER INSERTION  11/12/2014    D-PPM   ()    Dr. Wiliam johnson       Family History   Problem Relation Age of Onset    Heart Disease Mother     Heart Failure Mother     Heart Surgery Father     Heart Disease Father     Heart Failure Father     High Blood Pressure Sister     Cancer Sister         reports that he quit smoking about 40 years ago. His smoking use included cigarettes. He started smoking about 67 years ago. He has a 50.00 pack-year smoking history. He has never used smokeless tobacco. He reports previous drug use. He reports that he does not drink alcohol. Allergies:  Patient has no known allergies.     Current Medications:    sodium chloride flush 0.9 % injection 10 mL, PRN  0.9 % sodium chloride infusion, PRN  enoxaparin (LOVENOX) injection 40 mg, Daily  insulin lispro (HUMALOG) injection vial 0-3 Units, Nightly  insulin lispro (HUMALOG) injection vial 0-6 Units, TID WC  albuterol (PROVENTIL) nebulizer solution 2.5 mg, Q6H PRN  allopurinol (ZYLOPRIM) tablet 300 mg, Daily  Arformoterol Tartrate (BROVANA) nebulizer solution 15 mcg, BID  aspirin EC tablet 81 mg, Daily  budesonide (PULMICORT) nebulizer suspension 250 mcg, BID  DULoxetine (CYMBALTA) extended release capsule 60 mg, Daily  ferrous sulfate (IRON 325) tablet 325 mg, BID WC  gabapentin (NEURONTIN) capsule 200 mg, TID  hydrALAZINE (APRESOLINE) tablet 50 mg, 3 times per day  HYDROcodone-acetaminophen (NORCO) 7.5-325 MG per tablet 1 tablet, Q6H PRN  ipratropium (ATROVENT) 0.02 % nebulizer solution 0.5 mg, 4x daily  isosorbide mononitrate (IMDUR) extended release tablet 120 mg, Daily  lactulose (CHRONULAC) 10 GM/15ML solution 20 g, TID  levothyroxine (SYNTHROID) tablet 25 mcg, Daily  metoprolol succinate (TOPROL XL) extended release tablet 50 mg, Daily  labetalol (NORMODYNE;TRANDATE) injection 10 mg, Q4H PRN        Review of Systems:   Pertinent items are noted in HPI. Physical exam:  Vitals:    09/28/21 1555   BP: (!) 162/78   Pulse:    Resp:    Temp:    SpO2:            General: No distress. Alert. Eyes: PERRL. No sclera icterus. No conjunctival injection. ENT: No discharge. Pharynx clear. Neck: Trachea midline. Normal thyroid. Lungs: No accessory muscle use. No crackles. No wheezing. No rhonchi. CV: Regular rate. Regular rhythm. No murmur or rub. .   Abd: Non-tender. Non-distended. No masses. No organmegaly. Normal bowel sounds. Skin: Warm and dry. No nodule on exposed extremities. No rash on exposed extremities. Ext: No cyanosis, clubbing; several necrotic digits both feet; increasing redness and warmth both distal legs  Neuro: Awake. Follows commands. Positive pupils/gag/corneals. Normal pain response.       Data:   Labs:  Lab Results   Component Value Date     09/28/2021     09/27/2021     09/26/2021    K 3.6 09/28/2021    K 4.4 09/27/2021    K 4.3 09/26/2021    CL 98 09/28/2021    CO2 24 09/28/2021    CO2 24 09/27/2021    CO2 25 09/26/2021    CREATININE 1.1 09/28/2021    CREATININE 1.3 (H) 09/27/2021    CREATININE 1.4 (H) 09/26/2021    BUN 31 (H) 09/28/2021    BUN 34 (H) 09/27/2021    BUN 33 (H) 09/26/2021    GLUCOSE 166 (H) 09/28/2021    GLUCOSE 137 (H) 09/27/2021    GLUCOSE 146 (H) 09/26/2021    PHOS 3.5 09/27/2021    PHOS 2.9 09/26/2021    PHOS 2.4 (L) 09/25/2021    WBC 10.2 09/28/2021    WBC 9.6 09/27/2021    WBC 10.8 09/26/2021    HGB 10.8 (L) 09/28/2021    HGB 11.0 (L) 09/27/2021    HGB 11.4 (L) 09/26/2021    HCT 31.8 (L) 09/28/2021    HCT 33.4 (L) 09/27/2021    HCT 34.1 (L) 09/26/2021    MCV 91.1 09/28/2021     09/28/2021         Imaging:  CTA NECK W CONTRAST    Result Date: 9/28/2021  EXAMINATION: CTA OF THE NECK 9/28/2021 1:26 pm TECHNIQUE: CTA of the neck was performed with the administration of intravenous contrast. Multiplanar reformatted images are provided for review. MIP images are provided for review. Stenosis of the internal carotid arteries measured using NASCET criteria. Dose modulation, iterative reconstruction, and/or weight based adjustment of the mA/kV was utilized to reduce the radiation dose to as low as reasonably achievable. COMPARISON: CTA neck March 2, 2010 HISTORY: ORDERING SYSTEM PROVIDED HISTORY: evaluate carotid artery disease, use limited contrast if able TECHNOLOGIST PROVIDED HISTORY: Reason for exam:->evaluate carotid artery disease, use limited contrast if able Has a \"code stroke\" or \"stroke alert\" been called? ->No What reading provider will be dictating this exam?->CRC FINDINGS: Limited due to motion artifact. AORTIC ARCH/ARCH VESSELS: No dissection or arterial injury. No significant stenosis of the brachiocephalic or subclavian arteries. CAROTID ARTERIES: There is severe stenosis in the proximal left internal carotid artery with moderate soft and calcified plaque. There is severe stenosis in the distal right common carotid artery.   There is fusiform dilatation at the origin of the right internal carotid artery measuring up to 1.4 cm in diameter. There is question of mild to moderate stenosis in the mid right internal carotid artery. VERTEBRAL ARTERIES: There is moderate to severe stenosis in the intracranial left vertebral artery. There is 50% stenosis at the origin of the left vertebral artery. There is mild stenosis at the origin of the right vertebral artery. SOFT TISSUES: The patient is status post median sternotomy. The lung apices are clear. No cervical or superior mediastinal lymphadenopathy. The larynx and pharynx are unremarkable. No acute abnormality of the salivary and thyroid glands. BONES: No acute osseous abnormality. There are moderate degenerative changes in the cervical spine. Limited due to motion artifacts, particularly in the region of the bilateral carotid bulbs. Repeat study is recommended. Severe stenosis in the proximal left internal carotid artery with moderate soft and calcified plaque. Severe stenosis in the distal right common carotid artery. Fusiform dilatation at the origin of the right internal carotid artery measuring up to 1.4 cm in diameter. Question of mild to moderate stenosis in the mid right internal carotid artery. Moderate to severe stenosis in the intracranial left vertebral artery. 50% stenosis at the origin of the left vertebral artery. NM Cardiac Stress Test Nuclear Imaging    Result Date: 9/28/2021  Pharmacologic myocardial perfusion stress test: Patient was injected with 11 mCi 99 technetium sestamibi at rest.  Patient was given 0.4 mg of Lexiscan and subsequently 35 mCi 99 technetium sestamibi was administered. Raw spin images: Attenuation correction could not be performed as patient could not elevate arms. There appears to be a lateral attenuation artifact. PERFUSION IMAGES REVEALED: No focal perfusion deficits to suggest the presence of ischemia or infarction.  Left ventricular end-diastolic volume 616 ml Left ventricular end-systolic volume 52 ml EJECTION FRACTION: 49%. There is mild global hypokinesis. 1: This is an abnormal myocardial perfusion study due to the presence of mild global hypokinesis. There is no evidence of perfusion deficits to suggest ischemia or infarction, however. 2  The left ventricle is normal in size with an ejection fraction of 49%. 3: Prognostically, this is a low to intermediate risk study. 4: Compared to the prior study from 1/20/2018, The LVEF appears to have improved slightly. Assessment/Plans    1. Chronic kidney disease stage IIIa on the basis of hypertension. Baseline creatinine 1.3-1.6. Risk of worsening with IV contrast exposure. Postprocedure creatinine shows no worsening  -Hydration with IV fluids  -Monitor labs  -Monitor renal function    2. 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 Hypertension with CKD I suspect some of the blood pressure elevated elevation may be pain related  -Resume BP meds and monitor visit    4.   Type II DM      Will follow  Thanks          Irasema Nelson MD  4:39 PM  9/28/2021

## 2021-09-28 NOTE — PROGRESS NOTES
Call and spoke with Jesus Webb NP that is covering for Dr. Fountain Most regarding pt wanting something different for his pain and that the norco is not helping him. Per Jesus Webb NP give pt 1 time dose of Morphine 2mg IV.     Concepción Andres RN  12:52 AM

## 2021-09-28 NOTE — PLAN OF CARE
Problem: Skin Integrity:  Goal: Will show no infection signs and symptoms  Description: Will show no infection signs and symptoms  9/28/2021 0120 by Mina Mancilla RN  Outcome: Met This Shift  9/27/2021 1806 by José Miguel Feliz RN  Outcome: Met This Shift  Goal: Absence of new skin breakdown  Description: Absence of new skin breakdown  9/28/2021 0120 by Mina Mancilla RN  Outcome: Met This Shift  9/27/2021 1806 by José Miguel Feliz RN  Outcome: Met This Shift     Problem: Falls - Risk of:  Goal: Will remain free from falls  Description: Will remain free from falls  9/28/2021 0120 by Mina Mancilla RN  Outcome: Met This Shift  9/27/2021 1806 by José Miguel Feliz RN  Outcome: Met This Shift  Goal: Absence of physical injury  Description: Absence of physical injury  9/28/2021 0120 by Mina Mancilla RN  Outcome: Met This Shift  9/27/2021 1806 by José Miguel Feliz RN  Outcome: Met This Shift

## 2021-09-28 NOTE — CONSULTS
Inpatient Cardiology Consultation      Reason for Consult: Preop evaluation    Consulting Physician: Desiree Antunez MD    Requesting Physician:  Ric Perez MD    Date of Consultation: 9/28/2021    HISTORY OF PRESENT ILLNESS OF Dickson Kim located in  room [id number]/[id number]:     Dickson Kim is a [de-identified] y.o. male known to MD Dr. Prashanth Carias Led consult 8/20/21    Patient has h/o CAD, s/p CABG x1and bioprosthetic AVR (and showed #23) in 2008, ischemic cardiomyopathy, EF45-50% (ECHO 07/31/2020), sinus node dysfunction s/p PPM, paroxysmal flutter-on PPM interrogation (Xarelto held 10/20/2020 for GI bleeding), mild mitral stenosis, Mild mitral regurgitation,HTN, HLD, DM, CKD, severe PVD, CAROTID DISEASE, COPD, probably TREVOR, obesity. Patient was admitted for severe peripheral vascular disease with tissue loss. He underwent on 9/27/21  #1 ultrasound-guided access of the left common femoral vessel  #2 abdominal aortogram with bilateral lower extremity runoff catheter positioning at the contralateral common femoral vessel  It was recommended  right femoral endarterectomy, potentially 9/30 or 10/1 this week and cardiology was consulted for risk factor stratification. Patient is a poor historian since he is sleepy due to pain medications. He complains of legs pains. Denies having CP, shortness of breath at rest, palpitations. He is not ambulating since February 2021 because of balance problems/falls. Past Medical History:    PAST MEDICAL HISTORY:  1. Obesity.  BMI: 38.7 on 7/30/2020  2. No known drug allergies. 3. Former smoker.  40 pack year smoking history.  Quit in 1981.    4. T2DM insulin requiring with neuropathy, nephropathy   5. Hypertension.    6. Hyperlipidemia.    7. COPD  8. 2003 syncope with R sdied weakness--> resolved  9. 2004 R CEA  10. 11/2008 CABG x 1.  SVG to RCA and AVR with bioprosthetic [facility] #23 in 2008.  NS  11. Cardiac catheterization, 10/2010, mild diffuse disease in the left system, CT of the RCA.  Patent SVG to RCA. EF 60%.    12. Lexiscan MPS 09/05/2014, fixed lateral wall perfusion defect consistent with scarring.  No reversible defects.  EF 45%.    13. Echocardiogram, 06/2016, EF 85-36%, stage 1 diastolic dysfunction, moderate left atrial enlargement, abnormal aortic valve function with mean gradient of 30 and a V max of 4.3 m/sec, moderate mitral stenosis (mean gradient 7.6 mmHg, RVSP 35).    14. SCOOTER, 6/2016, bioprosthetic aortic valve, peak velocity 2.7 with a mean gradient of 16, mean transmitral gradient of 3.5 mmHg, aortic valve gradient thought secondary to hyperdynamic ventricle.    15. SCOOTER, [facility], 3/2017, EF 60-65%, mild mitral stenosis, 1-2+ MR.  Normal bioprosthetic AVR with preserved leaflet motion with mean transgastric gradient of 28 mmHg.  Overall findings support normal AVR with no prosthetic aortic stenosis.    16. Echocardiogram, 08/2017, EF 60%. Stage 1 diastolic dysfunction, severely dilated left atrium, S/P AVR (peak velocity 2.8, mean gradient 13.7).    17. Sinus node dysfunction, S/P dual chamber pacemaker [facility]), 11/2014.    18. Carotid stenosis, 60% HILARIO and 70% left ICA stenosis.    19. Obstructive sleep apnea, noncompliant with CPAP therapy.   20. 2/2017 diabetic foot ulcer  21. [place]  22. Bilteral TKA's  23. Spinal stenosis s/p injections  24. Hospitalized, 10/2017, with acute on chronic diastolic heart failure.  Pro BNP 2840. 25. Admitted, 1/19/2018, with acute on chronic diastolic heart failure.  H&H 11.3/33.2, creatinine 1.3, troponin peaked at .14, pro BNP 1657.  Diuresed and discharged.    26. Lexiscan MPS, 1/21/2018, fixed lateral defect with no ischemia.  EF 36%.    27. Echocardiogram, 1/21/2018, Dr. Birdie Ruiz, EF 60%. Hypokinesis of the basal inferior wall, reduced RV function, moderate to severe left atrial enlargement, mild MR, RVSP 43 mmHg, bioprosthetic AVR with a mean gradient of 12 mmHg.    28.  CKD, stage 3.  Baseline creatinine between 1.3 and 1.5.    29. Admission 05/12/2018 with chest pain and shortness of breath.  EKG showed no acute changes.  Troponin elevation not consistent with NSTEMI (0.05, 0.06, 0.08).  Thought secondary to COPD exacerbation.  No cardiac workup performed. 30. Admission 11/1/2019-11/5/2019 for fall from wheelchair. Dehydrated with HARVEY. Demadex stopped  31. 5/2020 PAFL noted on PPM interrogation placed on Xarelto 20 mg QD  32. TTE (7/31/2020) Summary: Severe left ventricle hypertrophy. Visually estimated LVEF is 45-50 %. Paradoxical septal wall motion. Severely dilated left atrium. Right ventricle not well visualized. Grossly normal. Severe mitral annular calcification. Mild mitral stenosis. Mild mitral valve regurgitation. Bioprosthetic aortic valve leaflets not well seen. No evidence of obstruction with similar transvalvular pressure gradient to the prior echo in 2018. No aortic regurgitation. Compared to prior echo in 1/21/2018, the paradoxical septal wall motion abnormality likely due to interventricular conduction delay is new. 35. Per Cardiology note (8/17/2020): Since 2018, his need for ventricular pacing has increased slowly from 25% to 95% on the last interrogation.  Also, his echocardiogram showed worsening ejection fraction from 60% to 45% with dyssynchrony between the lateral wall and the septal wall on the echocardiogram.  According to the brother, patient has been worsening over the last 2 years with decreasing energy.  We will refer to electrophysiology for possible upgrade to biventricular pacer.   29. Per office note patient cancelled appointment with EP on 9/9/2020    Past Medical History:   Diagnosis Date    (HFpEF) heart failure with preserved ejection fraction ([address] 07/2020    Diagnosed by cardiology    Arthritis     Bilateral carotid artery stenosis 12/31/2020    Bilateral carotid bruits 12/31/2020    Blood circulation, collateral     CAD (coronary artery disease)     Carotid bruit 03/27/2013  CHF (congestive [facility] failure) [facility]     Chronic kidney disease     CKD (chronic kidney disease) stage 3, GFR 30-59 ml/min ([facility]) 03/20/2016    COPD (chronic obstructive pulmonary disease) [facility]     Follows with pulmonary    Diabetes mellitus [facility]     Diabetic neuropathy associated with type 2 diabetes mellitus ([facility])     Dyspnea on exertion     History of blood transfusion     Hyperlipidemia     Hypertension     Mild mitral regurgitation     Moderate tricuspid regurgitation by prior echocardiography 2018    Movement disorder     NSTEMI, initial episode of care ([facility] 01/2018    Obesity     Pulmonary hypertension ([facility] 2018    PVD (peripheral vascular disease) with claudication ([facility] 03/27/2014    S/P carotid endarterectomy 03/27/2013    Sleep apnea     SOB (shortness of breath)     Thyroid disease     Unspecified cerebral artery occlusion with cerebral infarction     Vision decreased 12/31/2020       Past Surgical History:    Past Surgical History:   Procedure Laterality Date    BIOPSY / LIGATION TEMPORAL ARTERY Bilateral 01/05/2021    BILATERAL TEMPORAL ARTERY BI[address]SY [address] Suz[facility] Valadez MD [address]      5 years ago   [address]  2008    1 vessel and aortic valve repair    DIAGNOSTIC CARDIAC CATH LAB PROCEDURE  10/14/2008    DIAGNOSTIC CARDIAC CATH LAB PROCEDURE  10/21/2010    JOINT REPLACEMENT      R knee replacement    KNEE SURGERY      OTHER SURGICAL HISTORY Right 02/14/2017    debridement,bone bx foot    PACEMAKER INSERTION  11/12/2014    D-PPM   ()    Dr. Jorge Read      carotids       Medications Prior to admit:  Prior to Admission medications    Medication Sig Start Date End Date Taking? Authorizing Provider   HYDROcodone-acetaminophen (NORCO)  MG per tablet Take 1 tablet by mouth every 6 hours as needed for Pain.     Historical Provider, MD   magnesium 200 MG TABS tablet Take 200 mg by mouth daily    Historical Provider, MD   diphenhydrAMINE (BENADRYL) 25 MG capsule Take 25 mg by mouth daily as needed for Itching    Historical Provider, MD   insulin lispro, 1 Unit Dial, (HUMALOG KWIKPEN) 100 UNIT/ML SOPN Inject 0-6 Units into the skin 3 times daily (before meals) PER SLIDING SCALE: 0-139=0U, 140-199=1U, 200-249=2U, 250-299=3U, 300-349=4U, 350-399=5U, 400-450=6U    Historical Provider, MD   morphine (THIERRY) 10 MG extended release capsule Take 10 mg by mouth 2 times daily.     Historical Provider, MD   hydrOXYzine (ATARAX) 25 MG tablet Take 25 mg by mouth 4 times daily     Historical Provider, MD   lactulose 20 GM/30ML SOLN Take 20 g by mouth three times a week *MON-WED-FRI*    Historical Provider, MD   metOLazone (ZAROXOLYN) 2.5 MG tablet Take 2.5 mg by mouth three times a week *TUE-THUR-SAT*    Historical Provider, MD   bumetanide (BUMEX) 1 MG tablet Take 1 mg by mouth daily     Historical Provider, MD   potassium chloride (KLOR-CON M) 20 MEQ extended release tablet Take 40 mEq by mouth daily     Historical Provider, MD   ferrous sulfate (IRON 325) 325 (65 Fe) MG tablet Take 1 tablet by mouth 2 times daily (with meals) 1/25/21   Fozia Martino DO   DULoxetine (CYMBALTA) 60 MG extended release capsule Take 1 capsule by mouth daily 1/26/21   Fozia Martino DO   fluticasone-umeclidin-vilant (TRELEGY ELLIPTA) 100-62.5-25 MCG/INH AEPB Inhale 1 puff into the lungs Daily     Historical Provider, MD   aspirin EC 81 MG EC tablet Take 1 tablet by mouth daily 8/7/20   Brandy Chau MD   albuterol (PROVENTIL) (2.5 MG/3ML) 0.083% nebulizer solution Take 3 mLs by nebulization every 6 hours as needed for Wheezing or Shortness of Breath DX: COPD J44.9 Please bill Medicare Part B 1/6/20   Cheri Mccain MD   levothyroxine (SYNTHROID) 25 MCG tablet Take 25 mcg by mouth Daily  7/29/19   Historical Provider, MD   gabapentin (NEURONTIN) 400 MG capsule Take 400 mg by mouth 4 times daily.  5/19/19   Historical Provider, MD   metoprolol succinate (TOPROL XL) 50 MG extended release tablet Take 50 mg by mouth daily    Historical Provider, MD   isosorbide mononitrate (IMDUR) 120 MG extended release tablet Take 1 tablet by mouth daily 1/23/18   Chin Dixon MD   allopurinol (ZYLOPRIM) 300 MG tablet Take 300 mg by mouth daily  2/27/15   Historical Provider, MD   lovastatin (MEVACOR) 20 MG tablet Take 20 mg by mouth nightly.  11/11/13   Historical Provider, MD       Current Medications:    Current Facility-Administered Medications: enoxaparin (LOVENOX) injection 40 mg, 40 mg, SubCUTAneous, Daily  insulin lispro (HUMALOG) injection vial 0-3 Units, 0-3 Units, SubCUTAneous, Nightly  insulin lispro (HUMALOG) injection vial 0-6 Units, 0-6 Units, SubCUTAneous, TID WC  albuterol (PROVENTIL) nebulizer solution 2.5 mg, 2.5 mg, Nebulization, Q6H PRN  allopurinol (ZYLOPRIM) tablet 300 mg, 300 mg, Oral, Daily  Arformoterol Tartrate (BROVANA) nebulizer solution 15 mcg, 15 mcg, Nebulization, BID  aspirin EC tablet 81 mg, 81 mg, Oral, Daily  budesonide (PULMICORT) nebulizer suspension 250 mcg, 250 mcg, Nebulization, BID  DULoxetine (CYMBALTA) extended release capsule 60 mg, 60 mg, Oral, Daily  ferrous sulfate (IRON 325) tablet 325 mg, 325 mg, Oral, BID   gabapentin (NEURONTIN) capsule 200 mg, 200 mg, Oral, TID  hydrALAZINE (APRESOLINE) tablet 50 mg, 50 mg, Oral, 3 times per day  HYDROcodone-acetaminophen (NORCO) 7.5-325 MG per tablet 1 tablet, 1 tablet, Oral, Q6H PRN  ipratropium (ATROVENT) 0.02 % nebulizer solution 0.5 mg, 0.5 mg, Nebulization, 4x daily  isosorbide mononitrate (IMDUR) extended release tablet 120 mg, 120 mg, Oral, Daily  lactulose (CHRONULAC) 10 GM/15ML solution 20 g, 20 g, Oral, TID  levothyroxine (SYNTHROID) tablet 25 mcg, 25 mcg, Oral, Daily  metoprolol succinate (TOPROL XL) extended release tablet 50 mg, 50 mg, Oral, Daily  labetalol (NORMODYNE;TRANDATE) injection 10 mg, 10 mg, IntraVENous, Q4H PRN    Allergies:  Patient has no known allergies. Social History:   Social History     Socioeconomic History    Marital status:      Spouse name: Not on file    Number of children: Not on file    Years of education: Not on file    Highest education level: Not on file   Occupational History    Occupation: retired- [employer]   Tobacco Use    Smoking status: Former Smoker     Packs/day: 2.00     Years: 25.00     Pack years: 50.00     Types: Cigarettes     Start date: 1953     Quit date: 1981     Years since quittin.2    Smokeless tobacco: Never Used   Vaping Use    Vaping Use: Never used    Passive vaping exposure Yes   Substance and Sexual Activity    Alcohol use: Never     Comment:      Drug use: Not Currently     Comment: no longer eating edibles. states he had a bad episode.  Sexual activity: Not Currently   Other Topics Concern    Not on file   Social History Narrative    1 cup coffee daily     Social Determinants of Health     Financial Resource Strain:     Difficulty of Paying Living Expenses:    Food Insecurity:     Worried About Running Out of Food in the Last Year:     Ran Out of Food in the Last Year:    Transportation Needs:     Lack of Transportation (Medical):      Lack of Transportation (Non-Medical):    Physical Activity:     Days of Exercise per Week:     Minutes of Exercise per Session:    Stress:     Feeling of Stress :    Social Connections:     Frequency of Communication with Friends and Family:     Frequency of Social Gatherings with Friends and Family:     Attends Cheondoism Services:     Active Member of Clubs or Organizations:     Attends Club or Organization Meetings:     Marital Status:    Intimate Partner Violence:     Fear of Current or Ex-Partner:     Emotionally Abused:     Physically Abused:     Sexually Abused:        Family History:  Family History   Problem Relation Age of Onset    Heart Disease Mother     Heart Failure Mother  Heart Surgery Father     Heart Disease Father     Heart Failure Father     High Blood Pressure Sister     Cancer Sister          REVIEW OF SYSTEMS:     Constitutional: Has fatigue. Denies, fevers, chills, night sweats. HEENT: Has episodic headaches. Denies nose bleeds. Neurological: Has pain, numbness and tingling in his feet. States that has history of stroke. Denies dizziness and lightheadedness. Cardiovascular: Denies chest pain, SOB,  palpitations, and feelings of heart racing. Respiratory: Denies cough, wheezing,  use of supplementary oxygen, CPAP/BiPAP. Gastrointestinal: Denies  nausea, vomiting, abdominal pains. Genitourinary: Has trouble starting urination, need to strain on urination. Denies pain on  urination,   urinary incontinence. Hematologic: Denies history of easy bruising, prolonged bleeding. Lymphatic: Denies lumps and bumps to neck, axilla, breast, and groin  Endocrine: Denies excessive thirst. Denies intolerance to heat or cold  Musculoskeletal: Nonambulatory since February 2021 due to balance problems and falls. Psychiatric: Has anxiety and depression. PHYSICAL EXAM:   /68   Pulse 93   Temp 97.6 °F (36.4 °C) (Temporal)   Resp 18   Ht 5' 7\" (1.702 m)   Wt 226 lb (102.5 kg)   SpO2 97%   BMI 70.38 kg/m²   Systolic (27IGP), OZC:931 , Min:125 , RGU:141    Diastolic (22OLS), HOJ:38, Min:66, Max:111    CONST: Obese. Sleepy due to pain medication, not cooperative due to sleepiness, no apparent distress. HEENT:   Head- Normocephalic, atraumatic   Eyes- Conjunctivae pink, anicteric  Throat- Oral mucosa pink and moist  Neck-  No stridor, trachea midline, obese neck with no visible jugular venous distention. No adenopathy   CHEST: Chest symmetrical and non-tender to palpation. No accessory muscle use or intercostal retractions  RESPIRATORY:  Lung sounds -vesicular breathing with singular wheezes.   CARDIOVASCULAR:     Heart Inspection- shows no noted pulsations  Heart Ausculation- Regular   rate and rhythm, systolic murmur with maximum right upper sternal border. No s3, s4 or rub   PV: Feet and legs below-knee wrapped with Ace wrap. Right toes gangrene. ABDOMEN: Obese, soft, non-tender to light palpation. Bowel sounds present. MS: Moves all extremities. Intentional tremor of hands. : Deferred  SKIN: Warm and dry. NEURO / PSYCH: Oriented to person, place and time. Sleepy and slurred speech due to pain medications. DATA:    ECG reviewed with Dr. Sachi Tompkins strips: Paced rhythm  Diagnostic:  ECHO 07/31/2020 Start: 11:05 AM     Study Location: Portable  Technical Quality: Adequate visualization     Indications:Dyspnea on exertion.     Patient Status: Routine     Height: 68 inches Weight: 255 pounds BSA: 2.27 m^2 BMI: 38.77 kg/m^2     HR: 60 bpm BP: 153/74 mmHg      Findings      Left Ventricle   Severe left ventricle hypertrophy. Mildly reduced left ventricular systolic function. Visually estimated LVEF is 45-50 %. Paradoxical septal wall motion. Right Ventricle   Not well visualized. Grossly normal.      Left Atrium   Severely dilated left atrium. Right Atrium   Mildly dilated right atrium. Mitral Valve   Severe mitral annular calcification. Mild mitral stenosis. Mild mitral valve regurgitation. Tricuspid Valve   Normal tricuspid valve structure and function. Mild to moderate tricuspid valve regurgitation. Aortic Valve   Bioprosthetic aortic valve leaflets not well seen. No evidence of   obstruction with similar transvalvular pressure gradient to the prior echo   in 2018. No aortic regurgitation. Pericardial Effusion   No pericardial effusion. Pleural Effusion   No evidence of pleural effusion. Aorta   Normal aortic root and ascending aorta. Miscellaneous   The inferior vena cava diameter is normal with normal respiratory   variation. Estimated right atrial pressure is 8 mmHg. Conclusions      Summary   Severe left ventricle hypertrophy. Visually estimated LVEF is 45-50 %. Paradoxical septal wall motion. Severely dilated left atrium. Right ventricle not well visualized. Grossly normal.   Severe mitral annular calcification. Mild mitral stenosis. Mild mitral   valve regurgitation. Bioprosthetic aortic valve leaflets not well seen. No evidence of   obstruction with similar transvalvular pressure gradient to the prior echo   in 2018. No aortic regurgitation. Compared to prior echo in 1/21/2018, the paradoxical septal wall motion   abnormality likely due to interventricular conduction delay is new. STRESS TEST 1/21/18  1. There appears to be a fixed lateral wall perfusion defect. No  reversible perfusion defect is identified  2. Ejection fraction is 36% . 3. Wall motion appears to be hypokinetic      XR FOOT RIGHT (MIN 3 VIEWS)    Result Date: 9/23/2021  EXAMINATION: THREE XRAY VIEWS OF THE RIGHT FOOT 9/23/2021 1:27 am COMPARISON: 07/31/2012 HISTORY: ORDERING SYSTEM PROVIDED HISTORY: Right foot pain, necrotic toe TECHNOLOGIST PROVIDED HISTORY: Reason for exam:->Right foot pain, necrotic toe FINDINGS: There is no evidence of acute fracture. There is normal alignment of the tarsometatarsal joints. No acute joint abnormality. Small dorsal and plantar calcaneal spurs. No focal soft tissue abnormality. No acute osseous abnormality. VL SÁNCHEZ BILATERAL LIMITED 1-2 LEVELS    Result Date: 9/24/2021  EXAMINATION: SÁNCHEZ OF THE BILATERAL LOWER EXTREMITIES 9/23/2021 3:49 pm TECHNIQUE: SÁNCHEZ and PVR evaluation of bilateral lower extremities. COMPARISON: None. HISTORY: ORDERING SYSTEM PROVIDED HISTORY: r toe necrosis TECHNOLOGIST PROVIDED HISTORY: Reason for exam:->r toe necrosis What reading provider will be dictating this exam?->CRC FINDINGS: Evaluation limited due to patient request to terminate  the examination. The SÁNCHEZ on the right is unobtainable.   Monophasic right posterior tibial artery  doppler signal.     Monophasic right lower extremity posterior tibial Doppler signal, likely reflects underlying peripheral arterial disease. Evaluation limited due to patient request to terminate the procedure. US RETROPERITONEAL BIPIN    Result Date: 9/24/2021  EXAMINATION: RETROPERITONEAL ULTRASOUND OF THE KIDNEYS AND URINARY BLADDER. Renal artery ultrasound. 9/24/2021 COMPARISON: None HISTORY: ORDERING SYSTEM PROVIDED HISTORY: HTN TECHNOLOGIST PROVIDED HISTORY: Reason for exam:->HTN What reading provider will be dictating this exam?->CRC FINDINGS: Examination was reportedly a difficult examination per the sonographer due to patient's inability to remain still for exam and complaints of pain throughout the exam.  The abdominal aorta was not well visualized. Renal arteries were also not adequately evaluated and this examination is nondiagnostic in evaluation for possible renal artery stenosis. Kidneys: The right kidney measures 10.1 cm in length and the left kidney measures 11.0 cm in length. Kidneys demonstrate normal cortical echogenicity. No evidence of hydronephrosis or intrarenal stones. Bladder: Urinary bladder is incompletely distended measuring 80 mL. Allowing for this, no obvious bladder wall thickening or intraluminal filling defect. No acute renal abnormality. Nondiagnostic evaluation of the renal arteries as detailed above. Labs:   CARDIAC ENZYMES:  No results for input(s): CKTOTAL, CKMB, CKMBINDEX, TROPHS in the last 72 hours.   H/O TROPONIN levels    Lab Results   Component Value Date    TROPHS 84 08/19/2021    TROPHS 114 08/19/2021    TROPONINI 0.05 01/19/2021    TROPONINI 0.06 01/19/2021    TROPONINI 0.09 01/19/2021    TROPONINI 0.03 07/30/2020    TROPONINI 0.02 07/30/2020    TROPONINI 0.05 07/30/2020    TROPONINI 0.06 11/01/2019    TROPONINI 0.04 06/03/2018    TROPONINI 0.05 05/13/2018    TROPONINI 0.08 05/13/2018     No results for input(s): PROBNP in the last 72 hours. Lab Results   Component Value Date    PROBNP 2,070 09/23/2021    PROBNP 1,405 08/27/2021    PROBNP 3,357 08/21/2021    PROBNP 3,571 08/19/2021    PROBNP 1,037 07/20/2021    PROBNP 1,628 06/23/2021    PROBNP 2,438 05/25/2021    PROBNP 2,364 04/27/2021    PROBNP 4,105 04/07/2021    PROBNP 3,999 03/24/2021    PROBNP 8,214 03/10/2021    PROBNP 3,534 01/21/2021    PROBNP 2,952 01/19/2021    PROBNP 3,353 07/30/2020    PROBNP 2,218 06/03/2018    PROBNP 4,350 05/13/2018    PROBNP 1,657 01/19/2018    PROBNP 2,840 10/11/2017    PROBNP 980 03/19/2016    PROBNP 852 09/05/2014     BMP:   Recent Labs     09/27/21  0113 09/28/21  0424    135   K 4.4 3.6   CO2 24 24   BUN 34* 31*   CREATININE 1.3* 1.1   LABGLOM 53 >60   CALCIUM 9.3 9.0     Lab Results   Component Value Date    CREATININE 1.1 09/28/2021    CREATININE 1.3 09/27/2021    CREATININE 1.4 09/26/2021    CREATININE 1.4 09/25/2021    CREATININE 1.6 09/24/2021    CREATININE 2.2 09/23/2021    CREATININE 1.9 08/27/2021    CREATININE 1.6 08/22/2021    CREATININE 2.1 08/21/2021    CREATININE 2.1 08/20/2021    CREATININE 2.5 08/19/2021    CREATININE 1.8 07/20/2021    CREATININE 1.8 06/23/2021    CREATININE 1.7 05/25/2021    CREATININE <0.1 04/27/2021    CREATININE 1.7 04/07/2021    CREATININE 1.4 03/24/2021    CREATININE 1.4 03/10/2021    CREATININE 1.3 01/25/2021    CREATININE 1.5 01/24/2021     CBC:   Recent Labs     09/27/21  0113 09/28/21  0424   WBC 9.6 10.2   HGB 11.0* 10.8*   HCT 33.4* 31.8*    269     Mag:   Recent Labs     09/27/21  0113 09/28/21  0424   MG 1.7 1.6     Phos:   Recent Labs     09/26/21  0425 09/27/21  0113   PHOS 2.9 3.5     TSH: No results for input(s): TSH in the last 72 hours. HgA1c:   Lab Results   Component Value Date    LABA1C 7.2 (H) 09/24/2021     No results found for: EAG  BNP: No results for input(s): BNP in the last 72 hours. PT/INR: No results for input(s): PROTIME, INR in the last 72 hours.   APTT:No results for input(s): APTT in the last 72 hours. FASTING LIPID PANEL:  Lab Results   Component Value Date    CHOL 99 09/24/2021    HDL 33 09/24/2021    LDLCALC 49 09/24/2021    TRIG 84 09/24/2021     LIVER PROFILE:  Recent Labs     09/27/21  0113 09/28/21  0424   AST 66* 47*   ALT 18 21   LABALBU 2.7* 2.7*       COVID-19 Labs:  Lab Results   Component Value Date    COVID19 Not Detected 08/20/2021    COVID19 Not Detected 12/29/2020     No results for input(s): COVID19 in the last 72 hours. ASSESSMENT:  CAD, s/p CABG x1 2008   S/p bioprosthetic AVR ([facility] #23) in 2008, last ECHO eval  on         07/31/2020   Ischemic cardiomyopathy, EF45-50%-Echo 7/31/2020,   Sinus node dysfunction s/p PPM, paroxysmal flutter-on PPM interrogation     (oral anticoagulation held 10/20/2020 for GI bleeding),   Mild mitral stenosis,   Mild mitral regurgitation,  HTN, poorly controlled on admission  HLD,   DM,   HARVEY/CKD,   Severe PVD with right toes gangrene, plans for right femoral          endarterectomy, potentially 9/30/21 or 10/1/21  CAROTID DISEASE, hemodynamically significant-CTa neck pending  COPD,   Probably TREVOR,   Nonambulatory, since February 2021   Bilateral lower leg cellulitis with ulcerations  Obesity      PLAN:  Continue Telemetry  Continue current therapy including ASA and BB  Electrolytes check and correction    Renal function check   I&O, weights  BP control   Stress test for cardiac risk stratification  Further cardiac recommendations will be forthcoming pending patient clinical course and stress test findings. Assessment and plan discussed with Dr. Rosalia Cruz MD    Assessment and Plan to follow as per Dr. Rosalia Cruz MD    Electronically signed by LILLIANA Schwartz on 9/28/2021 at 7:41 AM     [facility] Cardiology Consult       Georgi Height    I have personally participated in a face-to-face and personally obtained history and performed physical exam on the date of service. I reviewed chart, vitals, labs and radiologic studies. I also participated in medical decision making with LILLIANA Suarez on the date of service All of the assessments and recommendations are from me and I agree with all of the pertinent clinical information, assessment and treatment plan. I have reviewed and edited the note above based on my findings during my history, exam, and decision making. Please see my additional contributions to the history, physical exam, assessment, and recommendations below. HISTORY OF PRESENT ILLNESS:     Reviewed, as above      Past medical history:  Reviewed, as above. Past surgical history:  Reviewed, as above. Current medications:  Reviewed, as above    Allergies:  Reviewed, as above    Social history:  Reviewed, as above    Family medical history:  Reviewed, as above. REVIEW OF SYSTEMS:   Reviewed, as above. PHYSICAL EXAM:   CONSTITUTIONAL:  awake, alert, cooperative, no apparent distress, and appears stated age  EYES:  lids and lashes normal and pupils equal, round and reactive to light, anicteric sclerae  HEAD:  normocepalic, without obvious abnormality, atraumatic, pink, moist mucous membranes. NECK:  Supple, symmetrical, trachea midline, no adenopathy, thyroid symmetric, not enlarged and no tenderness, skin normal  HEMATOLOGIC/LYMPHATICS:  no cervical lymphadenopathy and no supraclavicular lymphadenopathy  LUNGS:  No increased work of breathing, good air exchange, clear to auscultation bilaterally, no crackles or wheezing  CARDIOVASCULAR:  Normal apical impulse, regular rate and rhythm, normal S1 and S2, no S3 or S4, 3/systolic murmur at the left lower sternal border no JVD, no carotid bruit, no pedal edema, good carotid upstroke bilaterally. ABDOMEN:  Soft, nontender, no masses, no hepatomegaly or splenomegaly, BS+  CHEST: nontender to palpation, expands symmetrically  MUSCULOSKELETAL:  No clubbing no cyanosis. NEUROLOGIC:  Alert, awake.   SKIN:  no bruising or bleeding, normal skin color, texture, turgor and no redness, warmth, or swelling    /60   Pulse 71   Temp 97.9 °F (36.6 °C) (Temporal)   Resp 18   Ht 5' 7\" (1.702 m)   Wt 226 lb (102.5 kg)   SpO2 94%   BMI 35.40 kg/m²       I/O last 3 completed shifts: In: 240 [P.O.:240]  Out: 80 [Urine:100]  I/O this shift:  In: 300 [P.O.:300]  Out: 600 [Urine:600]      DATA:   I personally reviewed the admission EKG with the following interpretation: Paced rhythm    ECHO: 7/31/2020,Summary   Severe left ventricle hypertrophy. Visually estimated LVEF is 45-50 %. Paradoxical septal wall motion. Severely dilated left atrium. Right ventricle not well visualized. Grossly normal.   Severe mitral annular calcification. Mild mitral stenosis. Mild mitral   valve regurgitation. Bioprosthetic aortic valve leaflets not well seen. No evidence of   obstruction with similar transvalvular pressure gradient to the prior echo   in 2018. No aortic regurgitation. Compared to prior echo in 1/21/2018, the paradoxical septal wall motion   abnormality likely due to interventricular conduction delay is new.        Stress Test: Reviewed  Angiography: Not performed to date  Cardiology Labs:   BMP:    Lab Results   Component Value Date     09/29/2021    K 3.9 09/29/2021    K 3.9 09/24/2021     09/29/2021    CO2 21 09/29/2021    BUN 33 09/29/2021     CMP:    Lab Results   Component Value Date     09/29/2021    K 3.9 09/29/2021    K 3.9 09/24/2021     09/29/2021    CO2 21 09/29/2021    BUN 33 09/29/2021    PROT 6.5 09/29/2021     CBC:    Lab Results   Component Value Date    WBC 10.8 09/29/2021    RBC 3.13 09/29/2021    HGB 9.8 09/29/2021    HCT 30.7 09/29/2021    MCV 98.1 09/29/2021    RDW 15.5 09/29/2021     09/29/2021     PT/INR:  No results found for: PTINR  PT/INR Warfarin:  No components found for: PTPATWAR, PTINRWAR  PTT:    Lab Results   Component Value Date    APTT 25.5 01/05/2021     PTT Heparin:  No components found for: APTTHEP  Magnesium:    Lab Results   Component Value Date    MG 2.0 09/29/2021     TSH:    Lab Results   Component Value Date    TSH 4.270 08/19/2021     TROPONIN:  No components found for: TROP  BNP:  No results found for: BNP  FASTING LIPID PANEL:    Lab Results   Component Value Date    CHOL 99 09/24/2021    HDL 33 09/24/2021    TRIG 84 09/24/2021     CTA NECK W CONTRAST   Final Result   Limited due to motion artifacts, particularly in the region of the bilateral   carotid bulbs. Repeat study is recommended. Severe stenosis in the proximal left internal carotid artery with moderate   soft and calcified plaque. Severe stenosis in the distal right common carotid artery. Fusiform dilatation at the origin of the right internal carotid artery   measuring up to 1.4 cm in diameter. Question of mild to moderate stenosis in the mid right internal carotid   artery. Moderate to severe stenosis in the intracranial left vertebral artery. 50% stenosis at the origin of the left vertebral artery. I have personally reviewed the laboratory, cardiac diagnostic and radiographic testing as outlined above:    IMPRESSION:  1. Preoperative cardiac evaluation prior to vascular surgery  2. CABG: S/p SVG to RCA  3. S/p aortic valve replacement using #23 mm [place] bioprosthetic valve  4. Paroxysmal atrial flutter: Now in paced rhythm, not on anticoagulation due to GI bleed  5. Pacemaker in situ to treat sinus node dysfunction  6. Mitral valve regurgitation: Mild  7. Mitral valve stenosis: Mild  8. Cardiomyopathy: Low normal ejection fraction  9. Abnormal echocardiogram with severe LVH  10. Peripheral vascular disease  11. Carotid artery disease  12. Immobility      RECOMMENDATIONS:   1. Lexiscan stress test for further evaluation and risk stratification prior to surgery  2. Continue current treatment  3.  Basic metabolic panel and CBC in a.m.  4. Further cardiac recommendations will be forthcoming pending his clinical course and diagnostic test findings    I have reviewed my findings and recommendations with patient    Thank you for the consult  Electronically signed by Ronal Pedersen MD on 9/29/2021 at 8:09 PM  NOTE: This report was transcribed using voice recognition software.  Every effort was made to ensure accuracy; however, inadvertent computerized transcription errors may be present

## 2021-09-28 NOTE — PROGRESS NOTES
in renal function optimization, given the needs for IV contrast  - appreciate cardiology's aid in risk assessment and cardiac optimization    Electronically signed by Carol Resendiz MD on 9/28/2021 at 6:24 AM

## 2021-09-29 NOTE — PROGRESS NOTES
Podiatry Progress Note  9/29/2021   Steve Preston       SUBJECTIVE: Jayden Judd is a [de-identified] y.o. male seen bedside for follow up of diabetic foot and leg ulcers. Awoken from sleep, states he is cold. No acute events overnight, no new complaints this AM  Has some pain with dressing changes but denies any constitutional symptoms at this time.         Past Medical History:   Diagnosis Date    (HFpEF) heart failure with preserved ejection fraction ([facility] 07/2020    Diagnosed by cardiology    Arthritis     Bilateral carotid artery stenosis 12/31/2020    Bilateral carotid bruits 12/31/2020    Blood circulation, collateral     CAD (coronary artery disease)     Carotid bruit 03/27/2013    CH[facility] (congestive heart failure) [facility]     Chronic kidney disease     CKD (chronic kidney disease) stage 3, GFR 30-59 ml/min ([facility]) 03/20/2016    COPD (chronic obstructive pulmonary disease) ([facility]     Follows with pulmonary    Diabetes mellitus ([address]     Diabetic neuropathy associated with type 2 diabetes mellitus ([facility])     Dyspnea on exertion     History of blood transfusion     Hyperlipidemia     Hypertension     Mild mitral regurgitation     Moderate tricuspid regurgitation by prior echocardiography 2018    Movement disorder     NSTEMI, initial episode of care ([address] 01/2018    Obesity     Pulmonary hypertension ([address] 2018    PVD (peripheral vascular disease) with claudication ([address] 03/27/2014    S/P carotid endarterectomy 03/27/2013    Sleep apnea     SOB (shortness of breath)     Thyroid disease     Unspecified cerebral artery occlusion with cerebral infarction     Vision decreased 12/31/2020        Past Surgical History:   Procedure Laterality Date    BIOPSY / LIGATION TEMPORAL ARTERY Bilateral 01/05/2021    BILATERAL TEMPORAL ARTERY BIOPSY performed by Abida Estes MD at [address]      5 years ago   [address]RA[address]  2008    1 vessel and aortic valve repair    DIAGNOSTIC CARDIAC CATH LAB PROCEDURE  10/14/2008    DIAGNOSTIC CARDIAC CATH LAB PROCEDURE  10/21/2010    JOINT REPLACEMENT      R knee replacement    KNEE SURGERY      OTHER SURGICAL HISTORY Right 2017    debridement,bone bx foot    PACEMAKER INSERTION  2014    D-PPM   ([facility])    Dr. Katherine Chakrabroty      carotids         Family History   Problem Relation Age of Onset    Heart Disease Mother     Heart Failure Mother     Heart Surgery Father     Heart Disease Father     Heart Failure Father     High Blood Pressure Sister     Cancer Sister         Social History     Tobacco Use    Smoking status: Former Smoker     Packs/day: 2.00     Years: 25.00     Pack years: 50.00     Types: Cigarettes     Start date: 1953     Quit date: 1981     Years since quittin.2    Smokeless tobacco: Never Used   Substance Use Topics    Alcohol use: Never     Comment:          Prior to Admission medications    Medication Sig Start Date End Date Taking? Authorizing Provider   HYDROcodone-acetaminophen (NORCO)  MG per tablet Take 1 tablet by mouth every 6 hours as needed for Pain. Historical Provider, MD   magnesium 200 MG TABS tablet Take 200 mg by mouth daily    Historical Provider, MD   diphenhydrAMINE (BENADRYL) 25 MG capsule Take 25 mg by mouth daily as needed for Itching    Historical Provider, MD   insulin lispro, 1 Unit Dial, (HUMALOG KWIKPEN) 100 UNIT/ML SOPN Inject 0-6 Units into the skin 3 times daily (before meals) PER SLIDING SCALE: 0-139=0U, 140-199=1U, 200-249=2U, 250-299=3U, 300-349=4U, 350-399=5U, 400-450=6U    Historical Provider, MD   morphine (THIERRY) 10 MG extended release capsule Take 10 mg by mouth 2 times daily.     Historical Provider, MD   hydrOXYzine (ATARAX) 25 MG tablet Take 25 mg by mouth 4 times daily     Historical Provider, MD   lactulose 20 GM/30ML SOLN Take 20 g by mouth three times a week *MON-WED-FRI* Historical Provider, MD   metOLazone (ZAROXOLYN) 2.5 MG tablet Take 2.5 mg by mouth three times a week *TUE-THUR-SAT*    Historical Provider, MD   bumetanide (BUMEX) 1 MG tablet Take 1 mg by mouth daily     Historical Provider, MD   potassium chloride (KLOR-CON M) 20 MEQ extended release tablet Take 40 mEq by mouth daily     Historical Provider, MD   ferrous sulfate (IRON 325) 325 (65 Fe) MG tablet Take 1 tablet by mouth 2 times daily (with meals) 1/25/21   Latricia Martino DO   DULoxetine (CYMBALTA) 60 MG extended release capsule Take 1 capsule by mouth daily 1/26/21   Latricia Martino DO   fluticasone-umeclidin-vilant (TRELEGY ELLIPTA) 100-62.5-25 MCG/INH AEPB Inhale 1 puff into the lungs Daily     Historical Provider, MD   aspirin EC 81 MG EC tablet Take 1 tablet by mouth daily 8/7/20   Todd El MD   albuterol (PROVENTIL) (2.5 MG/3ML) 0.083% nebulizer solution Take 3 mLs by nebulization every 6 hours as needed for Wheezing or Shortness of Breath DX: COPD J44.9 Please bill Medicare Part B 1/6/20   Rich Alford MD   levothyroxine (SYNTHROID) 25 MCG tablet Take 25 mcg by mouth Daily  7/29/19   Historical Provider, MD   gabapentin (NEURONTIN) 400 MG capsule Take 400 mg by mouth 4 times daily. 5/19/19   Historical Provider, MD   metoprolol succinate (TOPROL XL) 50 MG extended release tablet Take 50 mg by mouth daily    Historical Provider, MD   isosorbide mononitrate (IMDUR) 120 MG extended release tablet Take 1 tablet by mouth daily 1/23/18   Jesús Rivera MD   allopurinol (ZYLOPRIM) 300 MG tablet Take 300 mg by mouth daily  2/27/15   Historical Provider, MD   lovastatin (MEVACOR) 20 MG tablet Take 20 mg by mouth nightly. 11/11/13   Historical Provider, MD        Patient has no known allergies.          OBJECTIVE:        Vitals:    09/29/21 0800   BP: (!) 198/79   Pulse: 81   Resp: 16   Temp: 97.6 °F (36.4 °C)   SpO2: 95%                      EXAM:        Pt is AAOx3, NAD    VASCULAR:  DP and PT pulses are non- palpable. CFT delayed B/L greater than 5 seconds. Dry gangrenous changes right foot digits 2, 3, 4.  Warm to cool from the tibial tuberosity to the distal aspect of the digits dorsally on RLE and warm to warm on LLE. No hair growth noted to the distal aspects dorsally.     NEUROLOGIC:  Protective sensation is diminished b/l, gross sensation intact     DERM:  Diffuse skin changes noted to the LEs:   #1. RLE: Ulcer noted to the distal half of the leg circumferentially. Wound bed is about fibronecrotic in nature with mild serous drainage noted, periwound erythema, no edema, no malodor, no crepitus or fluctuance, no proximal streaking. Hyperpigmented thickened skin lower legs consistent with chronic venous stasis and hemosiderin deposition. Right foot digits 2-4 with dry gangrenous changes. Cyanotic changes right fifth digit, early stages of mummification     #2. LLE: Wound noted to the distal half of the leg circumferentially around the leg. Wound bed is fibro-granular with mild serous drainage and periwound erythema. No edema. Hyperpigmentation to anterior lower leg consistent with hemosiderin deposition secondary to chronic venous stasis. Small dried, abrasion noted across the 2nd and 3rd digit MPJS. No malodor, no crepitus, no fluctuance, no proximal streaking noted.     MUSCULOSKELETAL:   Patient has intention tremors with 3-5 muscle strength all pedal muscle groups bilaterally.   Tenderness on palpation to periwound bilaterally more significant on the left and right.       Current Facility-Administered Medications   Medication Dose Route Frequency Provider Last Rate Last Admin    sodium chloride flush 0.9 % injection 10 mL  10 mL IntraVENous PRN Nikolai Tanner DO   10 mL at 04/85/55 0809    0.9 % sodium chloride infusion  25 mL IntraVENous PRN Nikolai Tanner DO        enoxaparin (LOVENOX) injection 40 mg  40 mg SubCUTAneous Daily Andrey Pendleton MD   40 mg at 09/29/21 0809    insulin lispro (HUMALOG) injection vial 0-3 Units  0-3 Units SubCUTAneous Nightly Amber Gee MD   1 Units at 09/28/21 2250    insulin lispro (HUMALOG) injection vial 0-6 Units  0-6 Units SubCUTAneous TID  Amber Gee MD   1 Units at 09/29/21 0638    albuterol (PROVENTIL) nebulizer solution 2.5 mg  2.5 mg Nebulization Q6H PRN Amber Gee MD        allopurinol (ZYLOPRIM) tablet 300 mg  300 mg Oral Daily Amber Gee MD   300 mg at 09/29/21 0809    Arformoterol Tartrate (BROVANA) nebulizer solution 15 mcg  15 mcg Nebulization BID Amber Gee MD   15 mcg at 09/29/21 0907    aspirin EC tablet 81 mg  81 mg Oral Daily Amber Gee MD   81 mg at 09/29/21 0809    budesonide (PULMICORT) nebulizer suspension 250 mcg  250 mcg Nebulization BID Amber Gee MD   250 mcg at 09/29/21 7226    DULoxetine (CYMBALTA) extended release capsule 60 mg  60 mg Oral Daily Amber Gee MD   60 mg at 09/29/21 0809    ferrous sulfate (IRON 325) tablet 325 mg  325 mg Oral BID  Amber Gee MD   325 mg at 09/29/21 0809    gabapentin (NEURONTIN) capsule 200 mg  200 mg Oral TID Amber Gee MD   200 mg at 09/29/21 0809    hydrALAZINE (APRESOLINE) tablet 50 mg  50 mg Oral 3 times per day Amber Gee MD   50 mg at 09/29/21 0635    HYDROcodone-acetaminophen (NORCO) 7.5-325 MG per tablet 1 tablet  1 tablet Oral Q6H PRN Amber Gee MD   1 tablet at 09/29/21 0244    ipratropium (ATROVENT) 0.02 % nebulizer solution 0.5 mg  0.5 mg Nebulization 4x daily Amber Gee MD   0.5 mg at 09/29/21 4025    isosorbide mononitrate (IMDUR) extended release tablet 120 mg  120 mg Oral Daily Amber Gee MD   120 mg at 09/29/21 0809    lactulose (CHRONULAC) 10 GM/15ML solution 20 g  20 g Oral TID Amber Gee MD   20 g at 09/29/21 0809    levothyroxine (SYNTHROID) tablet 25 mcg  25 mcg Oral Daily Amber Gee MD   25 mcg at 09/29/21 0809    metoprolol succinate (TOPROL XL) extended release tablet 50 mg  50 mg Oral Daily Amber Gee MD   50 mg at 09/29/21 0809    labetalol (NORMODYNE;TRANDATE) injection 10 mg  10 mg IntraVENous Q4H PRN Cole Barajas MD   10 mg at 09/29/21 0809        Lab Results   Component Value Date    WBC 10.8 09/29/2021    HCT 30.7 (L) 09/29/2021    HGB 9.8 (L) 09/29/2021     09/29/2021     09/29/2021    K 3.9 09/29/2021     09/29/2021    CO2 21 (L) 09/29/2021    BUN 33 (H) 09/29/2021    CREATININE 1.3 (H) 09/29/2021    GLUCOSE 133 (H) 09/29/2021    CRP 21.4 (H) 09/23/2021         Radiographs:    ASSESSMENT:  - Full thickness ulcers down to level of subcutaneous tissue B/L Lower extremity- POA  - Cellulitis B/L Lower extremity, POA  - Dry gangrenous RLE  - Chronic lower extremity edema with venous stasis   - PAD, SP angiogram 9/27/2021  - DM with neuropathic changes        PLAN:  - Examined and evaluated  - All labs, imaging, and charts reviewed   - WBC: 10.8  -S/p angiogram 9/27/2021 with only peroneal vessel runoff right lower extremity, peroneal and some anterior tibial runoff left lower extremity. Scheduled femoral R endarterectomy 9/30  -No ABX  -Blood cultures 9/23 no growth. Wounds do not look infected at this time Pictures in subjective. Stable dry gangrene right foot digits 2 through 4.  -X-rays from 9/23/2021 reveal no osseous fractures, no gas, no foreign substances  - Daily dressing changes Xeroform DSD.   -No podiatric surgical invention indicated at this time. It appears the patient has significant peripheral arterial disease with limb ischemia bilateral legs. A TMA to the right foot was considered however at this time it does not appear infected and therefore there is no urgency to amputate. Will reassess after vascular intervention tomorrow 9/30  -We will continue to monitor    D/W:   Laly Crowder DPM FACFAS  Fellowship-Trained Foot and Ankle Surgeon  Diplomate, American Board of Foot and Ankle Surgeons  [phone]       Thank you for involving podiatry in this patients care.  Please do not hesitate to call with any questions or concerns.      [facility] PGY2  On Call Number: [phone]  9/29/2021   9:56 AM

## 2021-09-29 NOTE — PROGRESS NOTES
VASCULAR SURGERY  DAILY PROGRESS NOTE    Date:2021       Room:[address] [place], [place] [address]  Patient Name:Axel Preston     YOB: 1940     Age:81 y.o. Chief Complaint:  Nonhealing foot wounds/gangrene    Subjective:  Denies lower extremity pain or any new issues overnight. Stress EKG yesterday was unremarkable, and ejection fraction was estimated at 49%. CTA neck confirmed severe stenosis in L ICA and R common carotid artery    Objective:  BP (!) 161/68   Pulse 92   Temp 97.6 °F (36.4 °C) (Temporal)   Resp 18   Ht 5' 7\" (1.702 m)   Wt 226 lb (102.5 kg)   SpO2 94%   BMI 35.40 kg/m²   Temp (24hrs), Av.6 °F (36.4 °C), Min:97.4 °F (36.3 °C), Max:97.9 °F (36.6 °C)      I/O (24Hr):  I/O last 3 completed shifts: In: 0   Out: 100 [Urine:100]     GENERAL:  No acute distress. Alert and interactive. Naked as usual.  LUNGS:  No cough. Nonlabored breathing on room air. CARDIOVASC: Mildly tachycardic rate, no cyanosis. ABDOMEN:  Soft, obese but non-distended, non-tender. No guarding / rigidity / rebound.    EXTREMITIES:  BLE with ace wraps, R middle toes dry gangrene, moves his bilateral toes (otherwise paralyzed), left femoral puncture site with intact dressing without saturation, no left groin hematoma    Assessment:  [de-identified] y.o. male with:  BLE venous stasis ulcers  Critical limb ischemia with ulcers and R toes 2-4 dry gangrene  S/p left femoral access angiography   - severely calcified aorta breakfast with multiple areas of ulceration  - R 20-30% common iliac and external iliac artery stenosis  - R common femoral occlusion, reconstitutes at profunda (diseased) & proximal SFA (open)  - unable to visualize behind knee prosthetic  - R occluded PT and AT (dominant flow is his peroneal), foot vessels unclear  - L patent common, profundus, SFA  - unable to visualize behind knee prosthetic  - L PT not visualized  - L AT occluded in proximal 1/3 then reconstitutes (dominant flow is peroneal), foot vessels patent    Stress EKG 9/28 was unremarkable, and ejection fraction was estimated at 49%    Plan:  - Recommend right femoral endarterectomy, tentatively scheduled for 9/30 (tomorrow)  - monitor for signs/symptoms of stroke given severe carotid stenosis bilaterally; increases his stroke risk for leg surgery  - appreciate nephrology's aid in renal function optimization, given the multiple IV contrast  - appreciate cardiology's aid in risk assessment and cardiac optimization (echo/nuclear test was completed)    Electronically signed by Jenny Balderrama MD on 9/29/2021 at 6:15 AM

## 2021-09-29 NOTE — PROGRESS NOTES
Subjective: The patient is awake and alert. No acute events overnight. Denies chest pain, angina, SOB     Objective:    BP (!) 198/79   Pulse 81   Temp 97.6 °F (36.4 °C) (Temporal)   Resp 16   Ht 5' 7\" (1.702 m)   Wt 226 lb (102.5 kg)   SpO2 95%   BMI 35.40 kg/m²     In: 240 [P.O.:240]  Out: 100   In: 240   Out: 100 [Urine:100]    General appearance: NAD, conversant  HEENT: AT/NC, MMM  Neck: FROM, supple  Lungs: Clear to auscultation  CV: RRR, no MRGs  Vasc: Radial pulses 2+  Abdomen: Soft, non-tender; no masses or HSM  Extremities: No peripheral edema or digital cyanosis  Skin: no rash, lesions or ulcers  Psych: Alert and oriented to person, place and time  Neuro: Alert and interactive     Recent Labs     09/27/21  0113 09/28/21  0424 09/29/21  0445   WBC 9.6 10.2 10.8   HGB 11.0* 10.8* 9.8*   HCT 33.4* 31.8* 30.7*    269 253       Recent Labs     09/27/21  0113 09/28/21  0424 09/29/21  0445    135 137   K 4.4 3.6 3.9   CL 99 98 101   CO2 24 24 21*   BUN 34* 31* 33*   CREATININE 1.3* 1.1 1.3*   CALCIUM 9.3 9.0 8.7       Assessment:    Principal Problem:    Critical lower limb ischemia (HCC)  Active Problems:    CKD (chronic kidney disease) stage 3, GFR 30-59 ml/min    Diabetes mellitus type 2, uncontrolled (HCC)    CAD (coronary artery disease), native coronary artery    History of CVA (cerebrovascular accident)    COPD (chronic obstructive pulmonary disease) (HCC)    Peripheral artery disease (HCC)  Resolved Problems:    * No resolved hospital problems.  *      Plan:  [de-identified]year old male with an extensive past medical history of CKD, PVD, and DM transferred from [address] [place] [address] for vascualr testing and possible intervention      Cellulitis   -IV abx-discontinued secondary to noninfected gangrened necrotic toes  -ID following - appreciate input  -Monitor WBC/Fever - WBC 10.8 today     Necrotic Toes of R Foot  -Podiatry following-   -Vascular Surgery following  -Patient to have right femoral endarterectomy this week. 9/30/2021  -Obtain cardiology clearance-completed-stress test with global hypokinesis but no perfusion defects or reversible ischemia  -Nephrology following for renal function optimization, need for IV contrast     DM Type 2  -Monitor labs - well controlled in hospital setting. -ISS glucose control, resume home medications  -Hypoglycemia protocol initiated  -Discuss diet modification      Severe agitation requiring restraints when he was at [facility]  Since transfer to [facility], he actually appears much more alert and responsive to questions  Try to keep off sedative agents       DVT Prophylaxis   PT/OT  Discharge planning           Bernabe Wilkinson, MADDI - CNP  10:44 AM  9/29/2021     Above note edited to reflect my thoughts     I personally saw, examined and provided care for the patient. Radiographs, labs and medication list were reviewed by me independently. The case was discussed in detail and plans for care were established. Review of [address] [place] [address], documentation was conducted and revisions were made as appropriate directly by me. I agree with the above documented exam, problem list, and plan of care.      Joelle Alvarez MD  11:02 PM  9/29/2021

## 2021-09-29 NOTE — PROGRESS NOTES
Nephrology Progress Note  Patient's Name: Christie Arias  1:43 PM  9/29/2021        Reason for Consult: HARVEY  Requesting Physician:  Melvin Espana MD    Chief Complaint: Worsening diabetic foot ulcers  History Obtained From:  Patient; EHR    History of Present Ilness:    Christie Arias is a [de-identified] y.o. male with a history of CKD stage IIIa(baseline creatinine 1.3-1.6), hypertension, hyperlipidemia, PVD and T2DM. He also has a history of chronic foot ulcers. He is generally followed by podiatry for this. He was sent to ED from nursing home facility for concern about worsening of the foot ulcers and cellulitis. He denies any fever or chills. No trauma to the feet. In the  initial vital signs show elevated blood pressure of 211/98. Patient was afebrile. . Patient was afebrile. His laboratory data was significant for a creatinine of 2.2, WBC 10.8, hemoglobin 11.9. An abdominal aortogram with bilateral lower extremity runoff was performed on 9/27. Based on findings a right femoral endarterectomy has been recommended by vascular surgery. 9/29:patient denies SOB; no nausea or emesis          Allergies:  Patient has no known allergies.     Current Medications:    sodium chloride flush 0.9 % injection 10 mL, PRN  0.9 % sodium chloride infusion, PRN  enoxaparin (LOVENOX) injection 40 mg, Daily  insulin lispro (HUMALOG) injection vial 0-3 Units, Nightly  insulin lispro (HUMALOG) injection vial 0-6 Units, TID WC  albuterol (PROVENTIL) nebulizer solution 2.5 mg, Q6H PRN  allopurinol (ZYLOPRIM) tablet 300 mg, Daily  Arformoterol Tartrate (BROVANA) nebulizer solution 15 mcg, BID  aspirin EC tablet 81 mg, Daily  budesonide (PULMICORT) nebulizer suspension 250 mcg, BID  DULoxetine (CYMBALTA) extended release capsule 60 mg, Daily  ferrous sulfate (IRON 325) tablet 325 mg, BID WC  gabapentin (NEURONTIN) capsule 200 mg, TID  hydrALAZINE (APRESOLINE) tablet 50 mg, 3 times per day  HYDROcodone-acetaminophen (NORCO) 7.5-325 MG per tablet 1 tablet, Q6H PRN  ipratropium (ATROVENT) 0.02 % nebulizer solution 0.5 mg, 4x daily  isosorbide mononitrate (IMDUR) extended release tablet 120 mg, Daily  lactulose (CHRONULAC) 10 GM/15ML solution 20 g, TID  levothyroxine (SYNTHROID) tablet 25 mcg, Daily  metoprolol succinate (TOPROL XL) extended release tablet 50 mg, Daily  labetalol (NORMODYNE;TRANDATE) injection 10 mg, Q4H PRN        Review of Systems:   Pertinent items are noted in HPI. Physical exam:  Vitals:    09/29/21 1319   BP:    Pulse:    Resp:    Temp:    SpO2: 92%           General: No distress. Alert. Eyes: PERRL. No sclera icterus. No conjunctival injection. ENT: No discharge. Pharynx clear. Neck: Trachea midline. Normal thyroid. Lungs: No accessory muscle use. No crackles. No wheezing. No rhonchi. CV: Regular rate. Regular rhythm. No murmur or rub. .   Abd: Non-tender. Non-distended. No masses. No organmegaly. Normal bowel sounds. Skin: Warm and dry. No nodule on exposed extremities. No rash on exposed extremities. Ext: No cyanosis, clubbing; several necrotic digits both feet; increasing redness and warmth both distal legs  Neuro: Awake. Follows commands. Positive pupils/gag/corneals. Normal pain response.       Data:   Labs:  Lab Results   Component Value Date     09/29/2021     09/28/2021     09/27/2021    K 3.9 09/29/2021    K 3.6 09/28/2021    K 4.4 09/27/2021     09/29/2021    CO2 21 (L) 09/29/2021    CO2 24 09/28/2021    CO2 24 09/27/2021    CREATININE 1.3 (H) 09/29/2021    CREATININE 1.1 09/28/2021    CREATININE 1.3 (H) 09/27/2021    BUN 33 (H) 09/29/2021    BUN 31 (H) 09/28/2021    BUN 34 (H) 09/27/2021    GLUCOSE 133 (H) 09/29/2021    GLUCOSE 166 (H) 09/28/2021    GLUCOSE 137 (H) 09/27/2021    PHOS 3.5 09/27/2021    PHOS 2.9 09/26/2021    PHOS 2.4 (L) 09/25/2021    WBC 10.8 09/29/2021    WBC 10.2 09/28/2021    WBC 9.6 09/27/2021    HGB 9.8 (L) 09/29/2021    HGB 10.8 (L) 09/28/2021    HGB 11.0 (L) 09/27/2021 HCT 30.7 (L) 09/29/2021    HCT 31.8 (L) 09/28/2021    HCT 33.4 (L) 09/27/2021    MCV 98.1 09/29/2021     09/29/2021         Imaging:  CTA NECK W CONTRAST    Result Date: 9/28/2021  EXAMINATION: CTA OF THE NECK 9/28/2021 1:26 pm TECHNIQUE: CTA of the neck was performed with the administration of intravenous contrast. Multiplanar reformatted images are provided for review. MIP images are provided for review. Stenosis of the internal carotid arteries measured using NASCET criteria. Dose modulation, iterative reconstruction, and/or weight based adjustment of the mA/kV was utilized to reduce the radiation dose to as low as reasonably achievable. COMPARISON: CTA neck March 2, 2010 HISTORY: ORDERING SYSTEM PROVIDED HISTORY: evaluate carotid artery disease, use limited contrast if able TECHNOLOGIST PROVIDED HISTORY: Reason for exam:->evaluate carotid artery disease, use limited contrast if able Has a \"code stroke\" or \"stroke alert\" been called? ->No What reading provider will be dictating this exam?->CRC FINDINGS: Limited due to motion artifact. AORTIC ARCH/ARCH VESSELS: No dissection or arterial injury. No significant stenosis of the brachiocephalic or subclavian arteries. CAROTID ARTERIES: There is severe stenosis in the proximal left internal carotid artery with moderate soft and calcified plaque. There is severe stenosis in the distal right common carotid artery. There is fusiform dilatation at the origin of the right internal carotid artery measuring up to 1.4 cm in diameter. There is question of mild to moderate stenosis in the mid right internal carotid artery. VERTEBRAL ARTERIES: There is moderate to severe stenosis in the intracranial left vertebral artery. There is 50% stenosis at the origin of the left vertebral artery. There is mild stenosis at the origin of the right vertebral artery. SOFT TISSUES: The patient is status post median sternotomy. The lung apices are clear.   No cervical or superior mediastinal lymphadenopathy. The larynx and pharynx are unremarkable. No acute abnormality of the salivary and thyroid glands. BONES: No acute osseous abnormality. There are moderate degenerative changes in the cervical spine. Limited due to motion artifacts, particularly in the region of the bilateral carotid bulbs. Repeat study is recommended. Severe stenosis in the proximal left internal carotid artery with moderate soft and calcified plaque. Severe stenosis in the distal right common carotid artery. Fusiform dilatation at the origin of the right internal carotid artery measuring up to 1.4 cm in diameter. Question of mild to moderate stenosis in the mid right internal carotid artery. Moderate to severe stenosis in the intracranial left vertebral artery. 50% stenosis at the origin of the left vertebral artery. NM Cardiac Stress Test Nuclear Imaging    Result Date: 9/28/2021  Pharmacologic myocardial perfusion stress test: Patient was injected with 11 mCi 99 technetium sestamibi at rest.  Patient was given 0.4 mg of Lexiscan and subsequently 35 mCi 99 technetium sestamibi was administered. Raw spin images: Attenuation correction could not be performed as patient could not elevate arms. There appears to be a lateral attenuation artifact. PERFUSION IMAGES REVEALED: No focal perfusion deficits to suggest the presence of ischemia or infarction. Left ventricular end-diastolic volume 442 ml Left ventricular end-systolic volume 52 ml EJECTION FRACTION: 49%. There is mild global hypokinesis. 1: This is an abnormal myocardial perfusion study due to the presence of mild global hypokinesis. There is no evidence of perfusion deficits to suggest ischemia or infarction, however. 2  The left ventricle is normal in size with an ejection fraction of 49%. 3: Prognostically, this is a low to intermediate risk study. 4: Compared to the prior study from 1/20/2018, The LVEF appears to have improved slightly. Assessment/Plans    1. Chronic kidney disease stage IIIa on the basis of hypertension. Baseline creatinine 1.3-1.6. Risk of worsening with IV contrast exposure. Postprocedure creatinine shows no worsening  -continue to monitor    2. 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 Hypertension with CKD I suspect some of the blood pressure elevated  may be pain related  -Resumed BP meds and monitor     4.   Type II DM              Preet Torrez MD  1:43 PM  9/29/2021

## 2021-09-29 NOTE — CARE COORDINATION
Pt is scheduled for a right femoral Endareterectomy on 09/30. Pt is receiving IV fluids. Discharge Plan is to return to [facility] when medically sable. Envelope with ambulette form on soft chart. MARY/REBECA to follow for discharge needs.    SHIVAM Amador.  [phone]

## 2021-09-30 PROBLEM — L97.909 PERIPHERAL VASCULAR DISEASE OF LOWER EXTREMITY WITH ULCERATION (HCC): Status: ACTIVE | Noted: 2021-01-01

## 2021-09-30 NOTE — PROGRESS NOTES
After several attempts to receive patient meds from pharmacy held 2pm doses due to administrations times being too close to next dose. Patient currently comfortable and stable and not currently experiencing adverse effects from not receiving missed doses.

## 2021-09-30 NOTE — ANESTHESIA PRE PROCEDURE
Department of Anesthesiology  Preprocedure Note       Name:  Christal Aguilar   Age:  [de-identified] y.o.  :  1940                                          MRN:  [id number]         Date:  2021      Surgeon: Carolina Sheehan):  Kia Cuellar MD    Procedure: Procedure(s):  RIGHT FEMORAL ENDARTERECTOMY    Medications prior to admission:   Prior to Admission medications    Medication Sig Start Date End Date Taking? Authorizing Provider   HYDROcodone-acetaminophen (NORCO)  MG per tablet Take 1 tablet by mouth every 6 hours as needed for Pain. Historical Provider, MD   magnesium 200 MG TABS tablet Take 200 mg by mouth daily    Historical Provider, MD   diphenhydrAMINE (BENADRYL) 25 MG capsule Take 25 mg by mouth daily as needed for Itching    Historical Provider, MD   insulin lispro, 1 Unit Dial, (HUMALOG KWIKPEN) 100 UNIT/ML SOPN Inject 0-6 Units into the skin 3 times daily (before meals) PER SLIDING SCALE: 0-139=0U, 140-199=1U, 200-249=2U, 250-299=3U, 300-349=4U, 350-399=5U, 400-450=6U    Historical Provider, MD   morphine (THIERRY) 10 MG extended release capsule Take 10 mg by mouth 2 times daily.     Historical Provider, MD   hydrOXYzine (ATARAX) 25 MG tablet Take 25 mg by mouth 4 times daily     Historical Provider, MD   lactulose 20 GM/30ML SOLN Take 20 g by mouth three times a week *MON-WED-FRI*    Historical Provider, MD   metOLazone (ZAROXOLYN) 2.5 MG tablet Take 2.5 mg by mouth three times a week *-SAT*    Historical Provider, MD   bumetanide (BUMEX) 1 MG tablet Take 1 mg by mouth daily     Historical Provider, MD   potassium chloride (KLOR-CON M) 20 MEQ extended release tablet Take 40 mEq by mouth daily     Historical Provider, MD   ferrous sulfate (IRON 325) 325 (65 Fe) MG tablet Take 1 tablet by mouth 2 times daily (with meals) 21   Warren Najjar Mihok, DO   DULoxetine (CYMBALTA) 60 MG extended release capsule Take 1 capsule by mouth daily 21   Erik Najjar Mihok, DO   fluticasone-umeclidin-vilant (TRELEGY ELLIPTA) 100-62.5-25 MCG/INH AEPB Inhale 1 puff into the lungs Daily     Historical Provider, MD   aspirin EC 81 MG EC tablet Take 1 tablet by mouth daily 8/7/20   Leopold Blamer, MD   albuterol (PROVENTIL) (2.5 MG/3ML) 0.083% nebulizer solution Take 3 mLs by nebulization every 6 hours as needed for Wheezing or Shortness of Breath DX: COPD J44.9 Please bill Medicare Part B 1/6/20   Alban Camargo MD   levothyroxine (SYNTHROID) 25 MCG tablet Take 25 mcg by mouth Daily  7/29/19   Historical Provider, MD   gabapentin (NEURONTIN) 400 MG capsule Take 400 mg by mouth 4 times daily. 5/19/19   Historical Provider, MD   metoprolol succinate (TOPROL XL) 50 MG extended release tablet Take 50 mg by mouth daily    Historical Provider, MD   isosorbide mononitrate (IMDUR) 120 MG extended release tablet Take 1 tablet by mouth daily 1/23/18   Kevin Bonilla MD   allopurinol (ZYLOPRIM) 300 MG tablet Take 300 mg by mouth daily  2/27/15   Historical Provider, MD   lovastatin (MEVACOR) 20 MG tablet Take 20 mg by mouth nightly.  11/11/13   Historical Provider, MD       Current medications:    Current Facility-Administered Medications   Medication Dose Route Frequency Provider Last Rate Last Admin    ceFAZolin (ANCEF) 2000 mg in sterile water 20 mL IV syringe  2,000 mg IntraVENous On Call to Phoebe Brown MD        sodium chloride flush 0.9 % injection 10 mL  10 mL IntraVENous PRN Nikolai Tanner DO   10 mL at 99/72/74 0809    0.9 % sodium chloride infusion  25 mL IntraVENous PRN Nikolai Tanner DO        enoxaparin (LOVENOX) injection 40 mg  40 mg SubCUTAneous Daily Lauren Hodge MD   40 mg at 09/29/21 0809    insulin lispro (HUMALOG) injection vial 0-3 Units  0-3 Units SubCUTAneous Nightly Lauren Hodge MD   1 Units at 09/28/21 2250    insulin lispro (HUMALOG) injection vial 0-6 Units  0-6 Units SubCUTAneous TID WC Lauren Hodge MD   1 Units at 09/29/21 1615    albuterol (PROVENTIL) nebulizer solution 2.5 mg  2.5 mg Nebulization Q6H PRN Jacob Aggarwal MD        allopurinol (ZYLOPRIM) tablet 300 mg  300 mg Oral Daily Jacob Aggarwal MD   300 mg at 09/30/21 0738    Arformoterol Tartrate (BROVANA) nebulizer solution 15 mcg  15 mcg Nebulization BID Jacob Aggarwal MD   15 mcg at 09/29/21 2037    aspirin EC tablet 81 mg  81 mg Oral Daily Jacob Aggarwal MD   81 mg at 09/30/21 0738    budesonide (PULMICORT) nebulizer suspension 250 mcg  250 mcg Nebulization BID Jacob Aggarwal MD   250 mcg at 09/29/21 2037    DULoxetine (CYMBALTA) extended release capsule 60 mg  60 mg Oral Daily Jacob Aggarwal MD   60 mg at 09/30/21 7019    ferrous sulfate (IRON 325) tablet 325 mg  325 mg Oral BID WC Jacob Aggarwal MD   325 mg at 09/30/21 7576    gabapentin (NEURONTIN) capsule 200 mg  200 mg Oral TID Jacob Aggarwal MD   200 mg at 09/30/21 4225    hydrALAZINE (APRESOLINE) tablet 50 mg  50 mg Oral 3 times per day aJcob Aggarwal MD   50 mg at 09/30/21 0524    HYDROcodone-acetaminophen ([facility]) 7.5-325 MG per tablet 1 tablet  1 tablet Oral Q6H PRN Jacob Aggarwal MD   1 tablet at 09/29/21 2324    ipratropium (ATROVENT) 0.02 % nebulizer solution 0.5 mg  0.5 mg Nebulization 4x daily Jacob Aggarwal MD   0.5 mg at 09/29/21 2037    isosorbide mononitrate (IMDUR) extended release tablet 120 mg  120 mg Oral Daily Jacbo Aggarwal MD   120 mg at 09/30/21 0738    lactulose (CHRONULAC) 10 GM/15ML solution 20 g  20 g Oral TID Jacob Aggarwal MD   20 g at 09/29/21 1613    levothyroxine (SYNTHROID) tablet 25 mcg  25 mcg Oral Daily Jacob Aggarwal MD   25 mcg at 09/30/21 9523    metoprolol succinate (TOPROL XL) extended release tablet 50 mg  50 mg Oral Daily Jcaob Aggarwal MD   50 mg at 09/30/21 3412    labetalol (NORMODYNE;TRANDATE) injection 10 mg  10 mg IntraVENous Q4H PRN Jacob Aggarwal MD   10 mg at 09/29/21 0809       Allergies:  No Known Allergies    Problem List:    Patient Active Problem List   Diagnosis Code    Essential hypertension I10    Peripheral vascular disease ([facility] I73.9    Pacemaker Z95.0    H/O aortic valve replacement Z95.2    CKD (chronic kidney disease) stage 3, GFR 30-59 ml/min N18.30    Type 2 diabetes mellitus with stage 3 chronic kidney disease, with long-term current use of insulin ([facility]) E11.22, N18.30, Z79.4    Pulmonary nodule R91.1    Diabetes mellitus type 2, uncontrolled ([facility]) E11.65    Hyperlipidemia LDL goal <100 E78.5    Acquired hypothyroidism E03.9    CAD (coronary artery disease), native coronary artery I25.10    Status post coronary artery bypass graft Z95.1    Chronic pain G89.29    History of CVA (cerebrovascular accident) Z80.78    Obesity (BMI 30-39. 9) E66.9    Anemia D64.9    Elevated sed rate R70.0    Vision decreased H54.7    Bilateral carotid bruits R09.89    Bilateral carotid artery stenosis I65.23    Nonrheumatic mitral valve regurgitation I34.0    Pulmonary hypertension ([facility]) I27.20    Moderate tricuspid regurgitation by prior echocardiography I07.1    (HFpEF) heart failure with preserved ejection fraction ([facility]) I50.30    COPD (chronic obstructive pulmonary disease) ([facility]) J44.9    Diabetic neuropathy associated with type 2 diabetes mellitus ([facility]) E11.40    Congestive heart failure ([facility]) I50.9    Cellulitis L03.90    Peripheral artery disease ([facility]) I73.9    Critical lower limb ischemia ([facility]) I70.229    Nonrheumatic mitral valve stenosis I34.2    Severe left ventricular hypertrophy I51.7    Cardiomyopathy ([facility]) I42.9       Past Medical History:        Diagnosis Date    (HFpEF) heart failure with preserved ejection fraction ([facility] 07/2020    Diagnosed by cardiology    Arthritis     Bilateral carotid artery stenosis 12/31/2020    Bilateral carotid bruits 12/31/2020    Blood circulation, collateral     CAD (coronary artery disease)     Carotid bruit 03/27/2013    CHF (congestive heart failure) ([facility])     Chronic kidney disease     CKD (chronic kidney disease) stage 3, GFR 30-59 ml/min ([facility]) 2016    COPD (chronic obstructive pulmonary disease) ([facility]     Follows with pulmonary    [facility]     Diabetic neuropathy associated with type 2 diabetes mellitus (HCC)     Dyspnea on exertion     History of blood transfusion     Hyperlipidemia     Hypertension     Mild mitral regurgitation     Moderate tricuspid regurgitation by prior echocardiography 2018    Movement disorder     NSTEMI, initial episode of care ([facility] 2018    Obesity     Pulmonary hypertension ([facility] 2018    PVD (peripheral vascular disease) with claudication ([facility] 2014    S/P carotid endarterectomy 2013    Sleep apnea     SOB (shortness of breath)     Thyroid disease     Unspecified cerebral artery occlusion with cerebral infarction     Vision decreased 2020       Past Surgical History:        Procedure Laterality Date    BIOPSY / LIGATION TEMPORAL ARTERY Bilateral 2021    BILATERAL TEMPORAL ARTERY [address]SY performed [address] Sean Linda MD [address]      5 years ago   [address] GRAFT      1 vessel and aortic valve repair    DIAGNOSTIC CARDIAC CATH LAB PROCEDURE  10/14/2008    DIAGNOSTIC CARDIAC CATH LAB PROCEDURE  10/21/2010    JOINT REPLACEMENT      R knee replacement    KNEE SURGERY      OTHER SURGICAL HISTORY Right 2017    debridement,bone bx foot    PACEMAKER INSERTION  2014    D-PPM   ()    Dr. Jana Brooks      carotids       Social History:    Social History     Tobacco Use    Smoking status: Former Smoker     Packs/day: 2.00     Years: 25.00     Pack years: 50.00     Types: Cigarettes     Start date: 1953     Quit date: 1981     Years since quittin.2    Smokeless tobacco: Never Used   Substance Use Topics    Alcohol use: Never     Comment:                                  Counseling given: Not Answered      Vital Signs (Current): Vitals:    09/29/21 2039 09/29/21 2315 09/30/21 0515 09/30/21 0730   BP:  128/60 (!) 144/63 (!) 153/63   Pulse:  87 77 85   Resp: 18 18  16   Temp:  37 °C (98.6 °F)  36.6 °C (97.9 °F)   TempSrc:  Temporal  Temporal   SpO2: 95% 97%  93%   Weight:   229 lb 4.5 oz (104 kg)    Height:                                                  BP Readings from Last 3 Encounters:   09/30/21 (!) 153/63   09/27/21 (!) 140/66   09/16/21 122/68       NPO Status:                                                                                 BMI:   Wt Readings from Last 3 Encounters:   09/30/21 229 lb 4.5 oz (104 kg)   09/27/21 226 lb 11.2 oz (102.8 kg)   09/16/21 243 lb (110.2 kg)     Body mass index is 35.91 kg/m². CBC:   Lab Results   Component Value Date    WBC 10.1 09/30/2021    RBC 3.18 09/30/2021    HGB 9.9 09/30/2021    HCT 30.3 09/30/2021    MCV 95.3 09/30/2021    RDW 15.4 09/30/2021     09/30/2021       CMP:   Lab Results   Component Value Date     09/30/2021    K 3.5 09/30/2021    K 3.9 09/24/2021    CL 98 09/30/2021    CO2 24 09/30/2021    BUN 38 09/30/2021    CREATININE 1.7 09/30/2021    GFRAA 47 09/30/2021    LABGLOM 39 09/30/2021    GLUCOSE 115 09/30/2021    GLUCOSE 111 07/16/2011    PROT 6.4 09/30/2021    CALCIUM 8.9 09/30/2021    BILITOT 0.3 09/30/2021    ALKPHOS 101 09/30/2021    AST 53 09/30/2021    ALT 26 09/30/2021       POC Tests: No results for input(s): POCGLU, POCNA, POCK, POCCL, POCBUN, POCHEMO, POCHCT in the last 72 hours.     Coags:   Lab Results   Component Value Date    PROTIME 11.7 01/05/2021    PROTIME 11.3 06/21/2011    INR 1.0 01/05/2021    APTT 25.5 01/05/2021       HCG (If Applicable): No results found for: PREGTESTUR, PREGSERUM, HCG, HCGQUANT     ABGs:   Lab Results   Component Value Date    I9KLDBEF 96.7 09/06/2012        Type & Screen (If Applicable):  No results found for: LABABO, LABRH    Drug/Infectious Status (If Applicable):  No results found for: HIV, HEPCAB    COVID-19 Screening (If Applicable):   Lab Results   Component Value Date    COVID19 Not Detected 08/20/2021    COVID19 Not Detected 12/29/2020           Anesthesia Evaluation  Patient summary reviewed and Nursing notes reviewed  Airway: Mallampati: III  TM distance: <3 FB   Neck ROM: limited  Mouth opening: > = 3 FB Dental:    (+) edentulous      Pulmonary:   (+) COPD: mild,  shortness of breath: chronic,  sleep apnea: on noncompliant,  decreased breath sounds,      Smoker: last smoked in 1981. Patient did not smoke on day of surgery. Cardiovascular:  Exercise tolerance: poor (<4 METS),   (+) hypertension: moderate, valvular problems/murmurs: MR, pacemaker (sinus node dysfunction s/p PPM, paroxysmal flutteron PPM interrogation): pacemaker, past MI (In 2018 NSTEMI):, CAD: obstructive, CABG/stent (2008 AVR):, CHF (EF 36%): systolic, RAMOS: after ambulating 1 flight of stairs, pulmonary hypertension:, hyperlipidemia    (-)  angina    ECG reviewed  Rhythm: regular  Rate: normal  Echocardiogram reviewed         Beta Blocker:  Dose within 24 Hrs         Neuro/Psych:   (+) CVA (IN 2003): no interval change,              ROS comment: Diabetic neuropathy to L hand and tremor   GI/Hepatic/Renal:   (+) GERD: well controlled, renal disease: CRI, morbid obesity          Endo/Other:    (+) Diabetes (A1C 7.2)Type II DM, well controlled, using insulin, hypothyroidism, blood dyscrasia: anemia, arthritis: OA., .                 Abdominal:   (+) obese,     Abdomen: soft. Vascular:   + PVD, aortic or cerebral, . ROS comment: Pt states R carotid bypass in 2013  . Other Findings:             Anesthesia Plan      general     ASA 4     (L hand 22g)  Induction: intravenous. arterial line  MIPS: Postoperative opioids intended and Prophylactic antiemetics administered. Anesthetic plan and risks discussed with patient. Use of blood products discussed with patient whom consented to blood products.    Plan hypokinesis. There is no evidence of perfusion deficits   to suggest ischemia or infarction, however. 2  The left ventricle is normal in size with an ejection fraction of   49%. 3: Prognostically, this is a low to intermediate risk study. 4: Compared to the prior study from 1/20/2018, The LVEF appears to   have improved slightly. CTA NECK W CONTRAST 9/28/21  Limited due to motion artifacts, particularly in the region of the bilateral   carotid bulbs.  Repeat study is recommended.       Severe stenosis in the proximal left internal carotid artery with moderate   soft and calcified plaque.       Severe stenosis in the distal right common carotid artery.       Fusiform dilatation at the origin of the right internal carotid artery   measuring up to 1.4 cm in diameter.       Question of mild to moderate stenosis in the mid right internal carotid   artery.       Moderate to severe stenosis in the intracranial left vertebral artery.       50% stenosis at the origin of the left vertebral artery. 8/19/21 CXR  Impression   1. Limited due to patient rotation. 2. Interstitial and hazy opacities bilaterally suggestive of pulmonary   vascular congestion or pneumonia with left basilar atelectasis or pleural   effusion.         [facility], THE 9/23/21  Impression   Monophasic right lower extremity posterior tibial Doppler signal, likely   reflects underlying peripheral arterial disease.       Evaluation limited due to patient request to terminate the procedure.

## 2021-09-30 NOTE — OP NOTE
Operative Note      Patient: Will Barcenas  YOB: 1940  MRN: [id number]    Date of procedure: 9/30/2021    Preoperative diagnosis: Critical limb ischemia of the right lower extremity    Postoperative diagnosis: Same    Surgeon: Mona Lucas    Assistant: Joao Burris    Anesthesia: General endotracheal anesthesia    Estimated blood loss: Less than 100 cc    Procedure:  #1 right femoral artery exposure  #2 right ileofemoral endarterectomy  #3 proximal superficial femoral endarterectomy    Description of procedure: After risk benefits and alternatives were discussed with the patient consent was achieved. The day the procedure he was brought to the operating room and placed in supine position. He was prepped and draped in the standard sterile fashion a timeout was taken verify the person the operative site as well as the procedure. At this point an incision was made in a longitudinal style fashion overlying the common femoral vessel. This was done with #15 blade dissection was performed with sharp dissection and Bovie cautery. We are then able to identify the vessel which was calcified and extremely diseased. We dissected further up underneath the inguinal ligament dividing the crossing veins. The inguinal ligament was able to be mobilized and retracted anteriorly and cephalad. The vessel was then able to be controlled above the circumflex vessels with a vessel loop. Dissection was performed distally identifying several profunda branches all of which were controlled encircled in Vesseloops. The disease extended all the way down to the profunda divisions. We then dissected further down on the superficial femoral vessel which was inline from the common femoral this also was severely diseased we were able to find a soft spot which we are able to get control with a vessel loop and also a bulldog. The patient was then heparinized.   We waited approximately 3 minutes followed by controlling the vessel with her Vesseloops and a C-clamp in the proximal portion of bulldog and a vessel loop in the superficial femoral vessel we also had to use a profunda femoris clamp for the profunda secondary to his severe disease. The arteriotomy was made with #11 blade extending with Torres scissors. We could not extend the arteriotomy with the Torres scissors therefore we incised along the anterior wall with a #15 blade all the way up to the external iliac vessel. An endarterectomy was performed with a freer elevator. Distally at the superficial femoral vessel we able to identify a good endpoint. This was tacked in multiple segments with a 6-0 Prolene suture. The profunda was also tacked. There was good backbleeding from the profunda we then sutured a bovine pericardial patch in place. The total endarterectomy site was about 11 cm. This was sutured into place in the standard fashion with 6-0 Prolene suture in a bidirectional fashion everything is backbled and forward flushed. Flow was then reestablished into the profunda followed by the superficial femoral vessel. There is good multiphasic signals by Doppler and excellent pulse in all vessels. Copious irrigation was performed. Hemostatic agent/snow was placed. The patient was reversed with protamine thrombin Gelfoam was also placed copious irrigation was performed after the protamine was reversed we examine signals in the groin and we had multiphasic signals. We then closed the wound in a multilayer closure consisting of 2030 and running subcuticular Vicryl closures. At the end of the procedure everything was accounted for the patient tolerated procedure well    Specimens:   ID Type Source Tests Collected by Time Destination   A : RIGHT FEMORAL ARTERY PLAQUE Tissue Tissue SURGICAL PATHOLOGY Dav Sanchez MD 9/30/2021 1126        Implants:  Implant Name Type Inv.  Item Serial No.  Lot No. LRB No. Used Action   PATCH [employer] SURG 10CM [id number] L Alma Olson 55MM THICKNESSXENOSURE  PATCH BIOLOGIC SURG 10CM [id number] L .55MM THICKNESSXENOSURE  [facility]- [id number] Right 1 Implanted         Drains:   Urethral Catheter Straight-tip; Non-latex 16 fr (Active)   Catheter Indications Need for fluid volume management of the critically ill patient in a critical care setting 09/30/21 1400   Urine Color Roseanna 09/30/21 1400   Urine Appearance Clear 09/30/21 1400   Output (mL) 100 mL 09/30/21 1434       [REMOVED] External Urinary Catheter (Removed)   Output (mL) 500 mL 09/24/21 0601         Electronically signed by Cuca Espitia MD on 9/30/2021 at 2:49 PM

## 2021-09-30 NOTE — ANESTHESIA PROCEDURE NOTES
Arterial Line:    An arterial line was placed using surface landmarks, in the OR for the following indication(s): continuous blood pressure monitoring and blood sampling needed. A 20 gauge (size), 1 and 3/8 inch (length), Arrow (type) catheter was placed, Seldinger technique not used, into the right radial artery, secured by tape and Tegaderm. Anesthesia type: Local  Local infiltration: Injection    Events:  patient tolerated procedure well with no complications. 9/30/2021 8:35 AM  Anesthesiologist: Favian Olsen MD  Resident/CRNA: Lauren Gregg APRN - CRNA  Other anesthesia staff: Niyah Shah RN  Performed:  Other anesthesia staff   Preanesthetic Checklist  Completed: patient identified, IV checked, site marked, risks and benefits discussed, surgical consent, monitors and equipment checked, pre-op evaluation, timeout performed, anesthesia consent given, oxygen available and patient being monitored

## 2021-09-30 NOTE — PROGRESS NOTES
Podiatry Progress Note  9/30/2021   Hellendebi Huntbud [place]       SUBJECTIVE: Christie Arias is a [de-identified] y.o. male seen bedside for follow up of diabetic foot and leg ulcers. Seen in the PACU following femoral endarterectomy this morning with Dr Tevin Slade. No acute events overnight, no new complaints this AM.  Tired during dressing change. .        Past Medical History:   Diagnosis Date    (HFpEF) heart failure with preserved ejection fraction ([address] 07/2020    Diagnosed by cardiology    Arthritis     Bilateral carotid artery stenosis 12/31/2020    Bilateral carotid bruits 12/31/2020    Blood circulation, collateral     CAD (coronary artery disease)     Carotid bruit 03/27/2013    CHF (congestive heart failure) ([facility]     Chronic kidney disease     CKD (chronic kidney disease) stage 3, GFR 30-59 ml/min ([facility]) 03/20/2016    COPD (chronic obstructive pulmonary disease) ([address]     Follows with pulmonary    Diabetes mellitus ([address]     Diabetic neuropathy associated with type 2 diabetes mellitus ([facility])     Dyspnea on exertion     History of blood transfusion     Hyperlipidemia     Hypertension     Mild mitral regurgitation     Moderate tricuspid regurgitation by prior echocardiography 2018    Movement disorder     NSTEMI, initial episode of care ([address] 01/2018    Obesity     Pulmonary hypertension ([address] 2018    PVD (peripheral vascular disease) with claudication ([address] 03/27/2014    S/P carotid endarterectomy 03/27/2013    Sleep apnea     SOB (shortness of breath)     Thyroid disease     Unspecified cerebral artery occlusion with cerebral infarction     Vision decreased 12/31/2020        Past Surgical History:   Procedure Laterality Date    BIOPSY / LIGATION TEMPORAL ARTERY Bilateral 01/05/2021    BILATERAL TEMPORAL ARTERY BIOPSY performed by Zoe Lilly MD at [address]      5 years ago   [address] GRAFT  2008    1 vessel and aortic valve repair    DIAGNOSTIC CARDIAC CATH LAB PROCEDURE  10/14/2008    DIAGNOSTIC CARDIAC CATH LAB PROCEDURE  10/21/2010    JOINT REPLACEMENT      R knee replacement    KNEE SURGERY      OTHER SURGICAL HISTORY Right 2017    debridement,bone bx foot    PACEMAKER INSERTION  2014    D-PPM   ([facility])    Dr. Lisa johnson         Family History   Problem Relation Age of Onset    Heart Disease Mother     Heart Failure Mother     Heart Surgery Father     Heart Disease Father     Heart Failure Father     High Blood Pressure Sister     Cancer Sister         Social History     Tobacco Use    Smoking status: Former Smoker     Packs/day: 2.00     Years: 25.00     Pack years: 50.00     Types: Cigarettes     Start date: 1953     Quit date: 1981     Years since quittin.2    Smokeless tobacco: Never Used   Substance Use Topics    Alcohol use: Never     Comment:          Prior to Admission medications    Medication Sig Start Date End Date Taking? Authorizing Provider   HYDROcodone-acetaminophen (NORCO)  MG per tablet Take 1 tablet by mouth every 6 hours as needed for Pain. Historical Provider, MD   magnesium 200 MG TABS tablet Take 200 mg by mouth daily    Historical Provider, MD   diphenhydrAMINE (BENADRYL) 25 MG capsule Take 25 mg by mouth daily as needed for Itching    Historical Provider, MD   insulin lispro, 1 Unit Dial, (HUMALOG KWIKPEN) 100 UNIT/ML SOPN Inject 0-6 Units into the skin 3 times daily (before meals) PER SLIDING SCALE: 0-139=0U, 140-199=1U, 200-249=2U, 250-299=3U, 300-349=4U, 350-399=5U, 400-450=6U    Historical Provider, MD   morphine (THIERRY) 10 MG extended release capsule Take 10 mg by mouth 2 times daily.     Historical Provider, MD   hydrOXYzine (ATARAX) 25 MG tablet Take 25 mg by mouth 4 times daily     Historical Provider, MD   lactulose 20 GM/30ML SOLN Take 20 g by mouth three times a week *MON-WED-FRI*    Historical Provider, MD   metOLazone (ZAROXOLYN) 2.5 MG tablet Take 2.5 mg by mouth three times a week *TUE-THUR-SAT*    Historical Provider, MD   bumetanide (BUMEX) 1 MG tablet Take 1 mg by mouth daily     Historical Provider, MD   potassium chloride (KLOR-CON M) 20 MEQ extended release tablet Take 40 mEq by mouth daily     Historical Provider, MD   ferrous sulfate (IRON 325) 325 (65 Fe) MG tablet Take 1 tablet by mouth 2 times daily (with meals) 1/25/21   Ayla Martino DO   DULoxetine (CYMBALTA) 60 MG extended release capsule Take 1 capsule by mouth daily 1/26/21   Ayla Martino DO   fluticasone-umeclidin-vilant (TRELEGY ELLIPTA) 100-62.5-25 MCG/INH AEPB Inhale 1 puff into the lungs Daily     Historical Provider, MD   aspirin EC 81 MG EC tablet Take 1 tablet by mouth daily 8/7/20   Bebe Poe MD   albuterol (PROVENTIL) (2.5 MG/3ML) 0.083% nebulizer solution Take 3 mLs by nebulization every 6 hours as needed for Wheezing or Shortness of Breath DX: COPD J44.9 Please bill Medicare Part B 1/6/20   Ban Bustamante MD   levothyroxine (SYNTHROID) 25 MCG tablet Take 25 mcg by mouth Daily  7/29/19   Historical Provider, MD   gabapentin (NEURONTIN) 400 MG capsule Take 400 mg by mouth 4 times daily. 5/19/19   Historical Provider, MD   metoprolol succinate (TOPROL XL) 50 MG extended release tablet Take 50 mg by mouth daily    Historical Provider, MD   isosorbide mononitrate (IMDUR) 120 MG extended release tablet Take 1 tablet by mouth daily 1/23/18   Harley Montoya MD   allopurinol (ZYLOPRIM) 300 MG tablet Take 300 mg by mouth daily  2/27/15   Historical Provider, MD   lovastatin (MEVACOR) 20 MG tablet Take 20 mg by mouth nightly. 11/11/13   Historical Provider, MD        Patient has no known allergies. OBJECTIVE:        Vitals:    09/30/21 1500   BP:    Pulse: 81   Resp: 12   Temp:    SpO2: 98%                      EXAM:        Pt is AAOx3, NAD    VASCULAR:  DP and PT pulses are non- palpable.  CFT delayed B/L greater than 5 seconds. Dry gangrenous changes right foot digits 2, 3, 4.  Warm to cool from the tibial tuberosity to the distal aspect of the digits dorsally on RLE and warm to warm on LLE. No hair growth noted to the distal aspects dorsally.     NEUROLOGIC:  Protective sensation is diminished b/l, gross sensation intact     DERM:  Diffuse skin changes noted to the LEs:   #1. RLE: Ulcer noted to the distal half of the leg circumferentially. Wound bed is about fibronecrotic in nature with mild serous drainage noted, periwound erythema, no edema, no malodor, no crepitus or fluctuance, no proximal streaking. Hyperpigmented thickened skin lower legs consistent with chronic venous stasis and hemosiderin deposition. Right foot digits 2-4 with dry gangrenous changes. Cyanotic changes right fifth digit, early stages of mummification     #2. LLE: Wound noted to the distal half of the leg circumferentially around the leg. Wound bed is fibro-granular with mild serous drainage and periwound erythema. No edema. Hyperpigmentation to anterior lower leg consistent with hemosiderin deposition secondary to chronic venous stasis. Small dried, abrasion noted across the 2nd and 3rd digit MPJS. No malodor, no crepitus, no fluctuance, no proximal streaking noted.     MUSCULOSKELETAL:   Patient has intention tremors with 3-5 muscle strength all pedal muscle groups bilaterally.   Tenderness on palpation to periwound bilaterally more significant on the left and right.       Current Facility-Administered Medications   Medication Dose Route Frequency Provider Last Rate Last Admin    0.9 % sodium chloride infusion   IntraVENous Continuous MADDI Gibbs  mL/hr at 09/30/21 1435 New Bag at 09/30/21 1435    sodium chloride flush 0.9 % injection 10 mL  10 mL IntraVENous 2 times per day OrMADDI Nunez CNP        sodium chloride flush 0.9 % injection 10 mL  10 mL IntraVENous PRN MADDI Gibbs CNP        0.9 % sodium chloride infusion  25 mL IntraVENous PRN Margarita Khris, APRN - CNP        niCARdipine (CARDENE) 50 mg in sodium chloride 0.9 % 250 mL infusion  3-15 mg/hr IntraVENous Continuous Margarita Khris, APRN - CNP        acetaminophen (TYLENOL) tablet 650 mg  650 mg Oral Q4H PRN Margarita Khris, APRN - CNP        morphine (PF) injection 1 mg  1 mg IntraVENous Q3H PRN Margarita Khris, APRN - CNP        Or    morphine (PF) injection 2 mg  2 mg IntraVENous Q3H PRN Margarita Khris, APRN - CNP        ceFAZolin (ANCEF) 2000 mg in sterile water 20 mL IV syringe  2,000 mg IntraVENous Q8H Margarita Khris, APRN - CNP        ondansetron (ZOFRAN) injection 4 mg  4 mg IntraVENous Q6H PRN Margarita Khris, APRN - CNP        sodium chloride flush 0.9 % injection 10 mL  10 mL IntraVENous PRN Margarita Khris, APRN - CNP   10 mL at 09/29/21 0809    0.9 % sodium chloride infusion  25 mL IntraVENous PRN Margarita Khris, APRN - CNP        enoxaparin (LOVENOX) injection 40 mg  40 mg SubCUTAneous Daily Margarita Khris, APRN - CNP   40 mg at 09/29/21 0809    insulin lispro (HUMALOG) injection vial 0-3 Units  0-3 Units SubCUTAneous Nightly Margarita Khris, APRN - CNP   1 Units at 09/28/21 2250    insulin lispro (HUMALOG) injection vial 0-6 Units  0-6 Units SubCUTAneous TID WC Margarita Khris, APRN - CNP   1 Units at 09/30/21 1259    albuterol (PROVENTIL) nebulizer solution 2.5 mg  2.5 mg Nebulization Q6H PRN Ronnald Sprang, APRN - CNP        allopurinol (ZYLOPRIM) tablet 300 mg  300 mg Oral Daily Margarita Khris, APRN - CNP   300 mg at 09/30/21 0738    Arformoterol Tartrate (BROVANA) nebulizer solution 15 mcg  15 mcg Nebulization BID Margarita Khris, APRN - CNP   15 mcg at 09/29/21 2037    aspirin EC tablet 81 mg  81 mg Oral Daily Margarita Khris, APRN - CNP   81 mg at 09/30/21 0738    budesonide (PULMICORT) nebulizer suspension 250 mcg  250 mcg Nebulization BID MADDI Damian CNP   250 mcg at 09/29/21 2037    DULoxetine (CYMBALTA) extended release capsule 60 mg  60 mg Oral Daily MADDI Beasley - CNP   60 mg at 09/30/21 0738    ferrous sulfate (IRON 325) tablet 325 mg  325 mg Oral BID WC MADDI Gibbs - CNP   325 mg at 09/30/21 0738    gabapentin (NEURONTIN) capsule 200 mg  200 mg Oral TID MADDI Beasley - CNP   200 mg at 09/30/21 0738    hydrALAZINE (APRESOLINE) tablet 50 mg  50 mg Oral 3 times per day MADDI Beasley - CNP   50 mg at 09/30/21 0524    HYDROcodone-acetaminophen ([facility]) 7.5-325 MG per tablet 1 tablet  1 tablet Oral Q6H PRN MADDI Beasley - CNP   1 tablet at 09/29/21 2324    ipratropium (ATROVENT) 0.02 % nebulizer solution 0.5 mg  0.5 mg Nebulization 4x daily MADDI Beasley - CNP   0.5 mg at 09/30/21 1434    isosorbide mononitrate (IMDUR) extended release tablet 120 mg  120 mg Oral Daily Margarita Pinedo APRN - CNP   120 mg at 09/30/21 0738    lactulose (CHRONULAC) 10 GM/15ML solution 20 g  20 g Oral TID Vinnie Childs APRN - CNP   20 g at 09/29/21 1613    levothyroxine (SYNTHROID) tablet 25 mcg  25 mcg Oral Daily MADDI Beasley - CNP   25 mcg at 09/30/21 0738    metoprolol succinate (TOPROL XL) extended release tablet 50 mg  50 mg Oral Daily Margarita Pinedo APRN - CNP   50 mg at 09/30/21 0738    labetalol (NORMODYNE;TRANDATE) injection 10 mg  10 mg IntraVENous Q4H PRN MADDI Beasley - CNP   10 mg at 09/29/21 0809        Lab Results   Component Value Date    WBC 10.1 09/30/2021    HCT 30.3 (L) 09/30/2021    HGB 9.9 (L) 09/30/2021     09/30/2021     09/30/2021    K 3.5 09/30/2021    CL 98 09/30/2021    CO2 24 09/30/2021    BUN 38 (H) 09/30/2021    CREATININE 1.5 (H) 09/30/2021    GLUCOSE 115 (H) 09/30/2021    CRP 21.4 (H) 09/23/2021         Radiographs:    ASSESSMENT:  - Full thickness ulcers down to level of subcutaneous tissue B/L Lower extremity- POA  - Cellulitis B/L Lower extremity, POA  - Dry gangrenous RLE  - Chronic lower extremity edema with venous

## 2021-09-30 NOTE — PROGRESS NOTES
Patient is seen in follow-up for preoperative cardiac evaluation    Subjective:     Mr. Shagufta Sanchez feels okay  Denies any chest pain or shortness of breath  ROS:  Limited since patient is sleepy, denies any nausea, no vomiting, no abdominal pain, no lightheadedness or dizziness, no fever no chills    Medication side effects:  none    Scheduled Meds:   enoxaparin  40 mg SubCUTAneous Daily    insulin lispro  0-3 Units SubCUTAneous Nightly    insulin lispro  0-6 Units SubCUTAneous TID WC    allopurinol  300 mg Oral Daily    Arformoterol Tartrate  15 mcg Nebulization BID    aspirin EC  81 mg Oral Daily    budesonide  250 mcg Nebulization BID    DULoxetine  60 mg Oral Daily    ferrous sulfate  325 mg Oral BID WC    gabapentin  200 mg Oral TID    hydrALAZINE  50 mg Oral 3 times per day    ipratropium  0.5 mg Nebulization 4x daily    isosorbide mononitrate  120 mg Oral Daily    lactulose  20 g Oral TID    levothyroxine  25 mcg Oral Daily    metoprolol succinate  50 mg Oral Daily     Continuous Infusions:   sodium chloride       PRN Meds:sodium chloride flush, sodium chloride, albuterol, HYDROcodone-acetaminophen, labetalol    I/O last 3 completed shifts:   In: 240 [P.O.:240]  Out: 100 [Urine:100]  I/O this shift:  In: 300 [P.O.:300]  Out: 600 [Urine:600]      Objective:      Physical Exam:   /60   Pulse 71   Temp 97.9 °F (36.6 °C) (Temporal)   Resp 18   Ht 5' 7\" (1.702 m)   Wt 226 lb (102.5 kg)   SpO2 94%   BMI 35.40 kg/m²   CONSTITUTIONAL: no apparent distress, and appears stated age  HEAD:  normocepalic, without obvious abnormality, atraumatic  NECK:  Supple, symmetrical, trachea midline, no adenopathy, thyroid symmetric, not enlarged and no tenderness, skin normal  LUNGS:  No increased work of breathing, No accessory muscle use or intercostal retractions, good air exchange, clear to auscultation bilaterally, no crackles or wheezing  CARDIOVASCULAR:  Normal apical impulse, irregular, normal S1 and S2, no S3, 3/systolic murmur at the apex, no edema, no JVD, no carotid bruit. ABDOMEN:  Soft, nontender, no masses, no hepatomegaly, no splenomegaly, BS+  MUSCULOSKELETAL:  No clubbing no cyanosis. NEUROLOGIC: Sleepy, easily awakened  SKIN:  no bruising or bleeding, normal skin color, texture, turgor and no redness, warmth, or swelling      Cardiographics  I personally reviewed the telemetry monitor strips with the following interpretation:    Echocardiogram: 7/31/2020,Summary   Severe left ventricle hypertrophy. Visually estimated LVEF is 45-50 %. Paradoxical septal wall motion. Severely dilated left atrium. Right ventricle not well visualized. Grossly normal.   Severe mitral annular calcification. Mild mitral stenosis. Mild mitral   valve regurgitation. Bioprosthetic aortic valve leaflets not well seen. No evidence of   obstruction with similar transvalvular pressure gradient to the prior echo   in 2018. No aortic regurgitation. Compared to prior echo in 1/21/2018, the paradoxical septal wall motion   abnormality likely due to interventricular conduction delay is new. Imaging  CTA NECK W CONTRAST   Final Result   Limited due to motion artifacts, particularly in the region of the bilateral   carotid bulbs. Repeat study is recommended. Severe stenosis in the proximal left internal carotid artery with moderate   soft and calcified plaque. Severe stenosis in the distal right common carotid artery. Fusiform dilatation at the origin of the right internal carotid artery   measuring up to 1.4 cm in diameter. Question of mild to moderate stenosis in the mid right internal carotid   artery. Moderate to severe stenosis in the intracranial left vertebral artery. 50% stenosis at the origin of the left vertebral artery.          NM Cardiac Stress Test Nuclear Imaging   Final Result   1: This is an abnormal myocardial perfusion study due to the presence   of mild global course and diagnostic test findings    No family at bedside  Electronically signed by Mello Rankin MD on 9/29/2021 at 8:22 PM  NOTE: This report was transcribed using voice recognition software.  Every effort was made to ensure accuracy; however, inadvertent computerized transcription errors may be present

## 2021-09-30 NOTE — PROGRESS NOTES
Cardiology recommends no further intervention preoperatively and states patient is at acceptable risk for surgery today. I answered all of the patient's questions for surgery today.     Electronically signed by Holden Jean MD on 9/30/2021 at 6:28 AM

## 2021-09-30 NOTE — PROGRESS NOTES
I had a long discussion with the the patient regarding his right lower extremity. He has severe calcific atherosclerotic disease involving the common femoral.  He has distal tibial vessel disease with inline flow down the peroneal.  It is very difficult to see secondary to his prosthesis. He has motor and sensation in his lower extremities he is able to wiggle and move his toes. He is not paralyzed as it says in the chart. He was ambulating about 7 to 8 months ago. Currently he has told a couple people he was paralyzed in the chart. He is not paralyzed I spoke with him extensively. I have gone over risk benefits and alternatives with him regarding revascularization of his right lower extremity with an iliofemoral endarterectomy include but not limited to bleeding, infection, arteriovenous nerve injury, myocardial infarction, respiratory failure, anesthetic reaction, pain swelling or tenderness, limb loss, and/or death. He understands and wishes to proceed. We also talked about the possibility of a femorofemoral bypass and were on table arteriogram and stenting if needed. Right now he has flow down to the common femoral he has a short segment occlusion versus near occlusive common femoral lesion with reconstitution of the profunda and SFA. There is severe calcific atherosclerotic disease. It is difficult to see his flow behind his prosthesis but it looks brisk. I did tell him even with revascularization he will lose at least the toes secondary to dry gangrenous changes. I also told him if we do not perform the procedure there may be a chance he may need more proximal amputation and may not heal.  I also told him again even if the procedure is successful this still may not prevent long-term amputation. He talk with his brother Thuan Perdue I spoke with Thuan Perdue on the phone in the past regarding his arteriogram.  He has several children but does not wish for me to call any of them.       Jaqui Conte

## 2021-09-30 NOTE — ANESTHESIA POSTPROCEDURE EVALUATION
Department of Anesthesiology  Postprocedure Note    Patient: Rosas Ballesteros  MRN: [id number]  YOB: 1940  Date of evaluation: 9/30/2021  Time:  12:32 PM     Procedure Summary     Date: 09/30/21 Room / Location: [address] [place] [address] / [facility]    Anesthesia Start: 7637 Anesthesia Stop: 7669    Procedure: RIGHT FEMORAL ENDARTERECTOMY (Right Groin) Diagnosis: (/)    Surgeons: Bee Mckenzie MD Responsible Provider: Ryland Ho MD    Anesthesia Type: general ASA Status: 4          Anesthesia Type: general    Mayela Phase I: Mayela Score: 8    Mayela Phase II:      Last vitals: Reviewed and per EMR flowsheets.        Anesthesia Post Evaluation    Patient location during evaluation: PACU  Patient participation: complete - patient participated  Level of consciousness: awake  Pain score: 3  Airway patency: patent  Nausea & Vomiting: no nausea  Complications: no  Cardiovascular status: hemodynamically stable  Respiratory status: acceptable  Hydration status: stable Problem: Adult Inpatient Plan of Care  Goal: Plan of Care Review  Denise Mosher is a 20 y.o. female admitted with a medical diagnosis of Patellar instability of right knee, underwent R knee arthroscopy with MPFL reconstruction, femur osteotomy. Tolerated evaluation well this morning. This is her 4th knee surgery so she is aware of best ways to mobilize post-op. Educated on RLE PWB (partial weightbearing) status, best way to use walker. She was able to ambulate ~40 ft with walker and stand-by assistance today. She uses more of a RLE TTWB approach today to control her pain while walking. She has all necessary DME (walkers, commode, wheelchair with elevating leg rests) at home from previous surgeries. Spoke with mom, Denise, and ortho resident that from PT standpoint, ok to d/c home today just depends on if Denise feels her pain is being controlled sufficiently to discharge. Should initiate outpatient PT once cleared by ortho, I'll administer a HEP to start working on RLE supine (in HKB) therex. Will keep on my schedule for Saturday PT in case discharge is delayed for any reason.    Wilton Bernard, PT  12/14/2018

## 2021-10-01 PROBLEM — I70.25 ISCHEMIC FOOT ULCER DUE TO ATHEROSCLEROSIS OF NATIVE ARTERY OF LIMB (HCC): Status: ACTIVE | Noted: 2021-01-01

## 2021-10-01 PROBLEM — L97.509 ISCHEMIC FOOT ULCER DUE TO ATHEROSCLEROSIS OF NATIVE ARTERY OF LIMB (HCC): Status: ACTIVE | Noted: 2021-01-01

## 2021-10-01 NOTE — PLAN OF CARE
Problem: Skin Integrity:  Goal: Will show no infection signs and symptoms  Description: Will show no infection signs and symptoms  Outcome: Met This Shift  Goal: Absence of new skin breakdown  Description: Absence of new skin breakdown  Outcome: Met This Shift     Problem: Falls - Risk of:  Goal: Will remain free from falls  Description: Will remain free from falls  Outcome: Met This Shift  Goal: Absence of physical injury  Description: Absence of physical injury  Outcome: Met This Shift     Problem: Pain:  Goal: Pain level will decrease  Description: Pain level will decrease  Outcome: Ongoing  Goal: Control of acute pain  Description: Control of acute pain  Outcome: Ongoing  Goal: Control of chronic pain  Description: Control of chronic pain  Outcome: Ongoing     Problem: OXYGENATION/RESPIRATORY FUNCTION  Goal: Patient will maintain patent airway  Outcome: Met This Shift     Problem: HEMODYNAMIC STATUS  Goal: Patient has stable vital signs and fluid balance  Outcome: Met This Shift

## 2021-10-01 NOTE — PROGRESS NOTES
Vascular Surgery Progress Note    CC: R LE ischemia, nonhealing B LE wounds     HISTORY:  The patient is awake, alert, and oriented. Complaining of some pain in his bilateral lower extremities. Wants to eat. IMPRESSION:  POD # 1 s/p right iliofemoral endarterectomy    PLAN:  Hemodynamically stable. Pt is on carmelita at home. Will start MS contin while in the hospital. Start diet. Transfer out of ICU. Podiatry following for B LE wounds. Patient Active Problem List   Diagnosis Code    Essential hypertension I10    Peripheral vascular disease ([facility] [address] I73.9    Pacemaker Z95.0   Verl Raw H/O aortic valve replacement Z95.2    CKD (chronic kidney disease) stage 3, GFR 30-59 ml/min N18.30    Type 2 diabetes mellitus with stage 3 chronic kidney disease, with long-term current use of insulin ([facility]) E11.22, N18.30, Z79.4    Pulmonary nodule R91.1    Diabetes mellitus type 2, uncontrolled ([facility]) E11.65    Hyperlipidemia LDL goal <100 E78.5    Acquired hypothyroidism E03.9    CAD (coronary artery disease), native coronary artery I25.10    Status post coronary artery bypass graft Z95.1    Chronic pain G89.29    History of CVA (cerebrovascular accident) Z80.78    Obesity (BMI 30-39. 9) E66.9    Anemia D64.9    Elevated sed rate R70.0    Vision decreased H54.7    Bilateral carotid bruits R09.89    Bilateral carotid artery stenosis I65.23    Nonrheumatic mitral valve regurgitation I34.0    Pulmonary hypertension ([facility]) I27.20    Moderate tricuspid regurgitation by prior echocardiography I07.1    (HFpEF) heart failure with preserved ejection fraction ([facility]) I50.30    COPD (chronic obstructive pulmonary disease) ([facility]) J44.9    Diabetic neuropathy associated with type 2 diabetes mellitus ([facility]) E11.40    Congestive heart failure ([facility]) I50.9    Cellulitis L03.90    Peripheral vascular disease of lower extremity with ulceration ([facility]) I73.9, L97.909    Critical lower limb ischemia ([facility]) I70.229    Nonrheumatic mitral valve stenosis I34.2    Severe left ventricular hypertrophy I51.7    Cardiomyopathy (HCC) I42.9       Current Medications:    sodium chloride 125 mL/hr at 10/01/21 0600    sodium chloride      niCARdipine      sodium chloride        sodium chloride flush, sodium chloride, morphine **OR** morphine, HYDROmorphone, sodium chloride flush, sodium chloride, albuterol, labetalol    morphine  15 mg Oral 2 times per day    sodium chloride flush  10 mL IntraVENous 2 times per day    acetaminophen  1,000 mg Oral Q6H    enoxaparin  40 mg SubCUTAneous Daily    insulin lispro  0-3 Units SubCUTAneous Nightly    insulin lispro  0-6 Units SubCUTAneous TID WC    allopurinol  300 mg Oral Daily    Arformoterol Tartrate  15 mcg Nebulization BID    aspirin EC  81 mg Oral Daily    budesonide  250 mcg Nebulization BID    DULoxetine  60 mg Oral Daily    ferrous sulfate  325 mg Oral BID WC    gabapentin  200 mg Oral TID    hydrALAZINE  50 mg Oral 3 times per day    ipratropium  0.5 mg Nebulization 4x daily    isosorbide mononitrate  120 mg Oral Daily    lactulose  20 g Oral TID    levothyroxine  25 mcg Oral Daily    metoprolol succinate  50 mg Oral Daily          PHYSICAL EXAM:    Vitals:    10/01/21 0800   BP: (!) 153/65   Pulse: 86   Resp: 15   Temp: 97.9 °F (36.6 °C)   SpO2: 98%     CONSTITUTIONAL:  awake, alert, cooperative, no apparent distress  LUNGS:  no increased work of breathing, good air exchange  CARDIOVASCULAR:  regular rate and rhythm  ABDOMEN:  soft, non-distended and non-tender  R LE: groin incision soft, no hematoma or ecchymosis. Wounds present, gangrenous changes to toes.    L LE: wounds present     LABS:    Lab Results   Component Value Date    WBC 12.1 (H) 10/01/2021    HGB 7.7 (L) 10/01/2021    HCT 23.4 (L) 10/01/2021     10/01/2021    PROTIME 11.7 01/05/2021    INR 1.0 01/05/2021    APTT 25.5 01/05/2021    K 4.0 10/01/2021    BUN 42 (H) 10/01/2021    CREATININE 1.6 (H) 10/01/2021

## 2021-10-01 NOTE — ANESTHESIA PRE PROCEDURE
Department of Anesthesiology  Preprocedure Note       Name:  Ramon Chapin   Age:  [de-identified] y.o.  :  1940                                          MRN:  [id number]         Date:  10/1/2021      Surgeon: Bala Daley):  Ramakrishna Kam , LINDA    Procedure: Procedure(s):  RIGHT FOOT TRANSMITTAL AMPUTATION    Medications prior to admission:   Prior to Admission medications    Medication Sig Start Date End Date Taking? Authorizing Provider   HYDROcodone-acetaminophen (NORCO)  MG per tablet Take 1 tablet by mouth every 6 hours as needed for Pain. Historical Provider, MD   magnesium 200 MG TABS tablet Take 200 mg by mouth daily    Historical Provider, MD   diphenhydrAMINE (BENADRYL) 25 MG capsule Take 25 mg by mouth daily as needed for Itching    Historical Provider, MD   insulin lispro, 1 Unit Dial, (HUMALOG KWIKPEN) 100 UNIT/ML SOPN Inject 0-6 Units into the skin 3 times daily (before meals) PER SLIDING SCALE: 0-139=0U, 140-199=1U, 200-249=2U, 250-299=3U, 300-349=4U, 350-399=5U, 400-450=6U    Historical Provider, MD   morphine (THIERRY) 10 MG extended release capsule Take 10 mg by mouth 2 times daily.     Historical Provider, MD   hydrOXYzine (ATARAX) 25 MG tablet Take 25 mg by mouth 4 times daily     Historical Provider, MD   lactulose 20 GM/30ML SOLN Take 20 g by mouth three times a week *MON-WED-FRI*    Historical Provider, MD   metOLazone (ZAROXOLYN) 2.5 MG tablet Take 2.5 mg by mouth three times a week *TUE-THUR-SAT*    Historical Provider, MD   bumetanide (BUMEX) 1 MG tablet Take 1 mg by mouth daily     Historical Provider, MD   potassium chloride (KLOR-CON M) 20 MEQ extended release tablet Take 40 mEq by mouth daily     Historical Provider, MD   ferrous sulfate (IRON 325) 325 (65 Fe) MG tablet Take 1 tablet by mouth 2 times daily (with meals) 21   Valri Clos Mihok, DO   DULoxetine (CYMBALTA) 60 MG extended release capsule Take 1 capsule by mouth daily 21   Valri Clos Mihok, DO   fluticasone-umeclidin-vilant (TRELEGY ELLIPTA) 100-62.5-25 MCG/INH AEPB Inhale 1 puff into the lungs Daily     Historical Provider, MD   aspirin EC 81 MG EC tablet Take 1 tablet by mouth daily 8/7/20   Lj Villalta MD   albuterol (PROVENTIL) (2.5 MG/3ML) 0.083% nebulizer solution Take 3 mLs by nebulization every 6 hours as needed for Wheezing or Shortness of Breath DX: COPD J44.9 Please bill Medicare Part B 1/6/20   Shin Mon MD   levothyroxine (SYNTHROID) 25 MCG tablet Take 25 mcg by mouth Daily  7/29/19   Historical Provider, MD   gabapentin (NEURONTIN) 400 MG capsule Take 400 mg by mouth 4 times daily. 5/19/19   Historical Provider, MD   metoprolol succinate (TOPROL XL) 50 MG extended release tablet Take 50 mg by mouth daily    Historical Provider, MD   isosorbide mononitrate (IMDUR) 120 MG extended release tablet Take 1 tablet by mouth daily 1/23/18   Alexis Morgan MD   allopurinol (ZYLOPRIM) 300 MG tablet Take 300 mg by mouth daily  2/27/15   Historical Provider, MD   lovastatin (MEVACOR) 20 MG tablet Take 20 mg by mouth nightly. 11/11/13   Historical Provider, MD       Current medications:    No current facility-administered medications for this visit. No current outpatient medications on file.      Facility-Administered Medications Ordered in Other Visits   Medication Dose Route Frequency Provider Last Rate Last Admin    morphine (MS CONTIN) extended release tablet 15 mg  15 mg Oral 2 times per day MADDI Luna CNP   15 mg at 10/01/21 1019    0.9 % sodium chloride infusion   IntraVENous Continuous MADDI Gibbs  mL/hr at 10/01/21 0600 Rate Verify at 10/01/21 0600    sodium chloride flush 0.9 % injection 10 mL  10 mL IntraVENous 2 times per day MADDI Rivero CNP   10 mL at 09/30/21 2000    sodium chloride flush 0.9 % injection 10 mL  10 mL IntraVENous PRN MADDI Gibbs CNP        0.9 % sodium chloride infusion  25 mL IntraVENous PRN MADDI Rivero - CNP        niCARdipine (CARDENE) 50 mg in sodium chloride 0.9 % 250 mL infusion  3-15 mg/hr IntraVENous Continuous Margarita Khris, APRN - CNP        morphine (PF) injection 1 mg  1 mg IntraVENous Q3H PRN Gwinda Check, APRN - CNP        Or    morphine (PF) injection 2 mg  2 mg IntraVENous Q3H PRN Gwinda Check, APRN - CNP   2 mg at 10/01/21 1144    acetaminophen (TYLENOL) tablet 1,000 mg  1,000 mg Oral Q6H Milton Myrick MD   1,000 mg at 10/01/21 0839    HYDROmorphone (DILAUDID) injection 1 mg  1 mg IntraVENous Q3H PRN Milton Myrick MD   1 mg at 10/01/21 0003    sodium chloride flush 0.9 % injection 10 mL  10 mL IntraVENous PRN Gwinda Check, APRN - CNP   10 mL at 10/01/21 1144    0.9 % sodium chloride infusion  25 mL IntraVENous PRN Margarita Khris, APRN - CNP        enoxaparin (LOVENOX) injection 40 mg  40 mg SubCUTAneous Daily Margarita Khris, APRN - CNP   40 mg at 09/30/21 2039    insulin lispro (HUMALOG) injection vial 0-3 Units  0-3 Units SubCUTAneous Nightly Margarita Khris, APRN - CNP   1 Units at 09/30/21 2040    insulin lispro (HUMALOG) injection vial 0-6 Units  0-6 Units SubCUTAneous TID WC Margarita Khris, APRN - CNP   2 Units at 10/01/21 1153    albuterol (PROVENTIL) nebulizer solution 2.5 mg  2.5 mg Nebulization Q6H PRN Gwinda Check, APRN - CNP        allopurinol (ZYLOPRIM) tablet 300 mg  300 mg Oral Daily Margarita Khris, APRN - CNP   300 mg at 10/01/21 0839    Arformoterol Tartrate (BROVANA) nebulizer solution 15 mcg  15 mcg Nebulization BID Margarita Khris, APRN - CNP   15 mcg at 09/29/21 2037    aspirin EC tablet 81 mg  81 mg Oral Daily Margarita Khris, APRN - CNP   81 mg at 10/01/21 0839    budesonide (PULMICORT) nebulizer suspension 250 mcg  250 mcg Nebulization BID Gwinda Check, APRN - CNP   250 mcg at 09/29/21 2037    DULoxetine (CYMBALTA) extended release capsule 60 mg  60 mg Oral Daily MADDI Gibbs CNP   60 mg at 10/01/21 0839    ferrous sulfate (IRON 325) tablet 325 mg 325 mg Oral BID WC Mack Kumar, APRN - CNP   325 mg at 10/01/21 0840    gabapentin (NEURONTIN) capsule 200 mg  200 mg Oral TID Mack [place], APRN - CNP   200 mg at 10/01/21 1020    hydrALAZINE (APRESOLINE) tablet 50 mg  50 mg Oral 3 times per day Mack Kumar, APRN - CNP   50 mg at 10/01/21 5026    ipratropium (ATROVENT) 0.02 % nebulizer solution 0.5 mg  0.5 mg Nebulization 4x daily Mack Kumar, APRN - CNP   0.5 mg at 10/01/21 1235    isosorbide mononitrate (IMDUR) extended release tablet 120 mg  120 mg Oral Daily Mack Kumar, APRN - CNP   120 mg at 10/01/21 0839    lactulose (CHRONULAC) 10 GM/15ML solution 20 g  20 g Oral TID Mack Kumar, APRN - CNP   20 g at 09/29/21 1613    levothyroxine (SYNTHROID) tablet 25 mcg  25 mcg Oral Daily Mack Kumar, APRN - CNP   25 mcg at 10/01/21 5016    metoprolol succinate (TOPROL XL) extended release tablet 50 mg  50 mg Oral Daily Maragrita Pinedo, APRN - CNP   50 mg at 10/01/21 0839    labetalol (NORMODYNE;TRANDATE) injection 10 mg  10 mg IntraVENous Q4H PRN Mack Kumar, APRN - CNP   10 mg at 09/29/21 0809       Allergies:  No Known Allergies    Problem List:    Patient Active Problem List   Diagnosis Code    Essential hypertension I10    Peripheral vascular disease ([facility] [address] I73.9    Pacemaker Z95.0    H/O aortic valve replacement Z95.2    CKD (chronic kidney disease) stage 3, GFR 30-59 ml/min N18.30    Type 2 diabetes mellitus with stage 3 chronic kidney disease, with long-term current use of insulin ([facility]) E11.22, N18.30, Z79.4    Pulmonary nodule R91.1    Diabetes mellitus type 2, uncontrolled ([facility]) E11.65    Hyperlipidemia LDL goal <100 E78.5    Acquired hypothyroidism E03.9    CAD (coronary artery disease), native coronary artery I25.10    Status post coronary artery bypass graft Z95.1    Chronic pain G89.29    History of CVA (cerebrovascular accident) Z80.78    Obesity (BMI 30-39. 9) E66.9    Anemia D64.9    Elevated sed rate R70.0  Vision decreased H54.7    Bilateral carotid bruits R09.89    Bilateral carotid artery stenosis I65.23    Nonrheumatic mitral valve regurgitation I34.0    Pulmonary hypertension ([facility]) I27.20    Moderate tricuspid regurgitation by prior echocardiography I07.1    (HFpEF) heart failure with preserved ejection fraction ([facility]) I50.30    COPD (chronic obstructive pulmonary disease) ([facility]) J44.9    Diabetic neuropathy associated with type 2 diabetes mellitus ([facility]) E11.40    Congestive heart failure ([facility]) I50.9    Cellulitis L03.90    Peripheral vascular disease of lower extremity with ulceration ([facility]) I73.9, L97.909    Critical lower limb ischemia ([facility]) I70.229    Nonrheumatic mitral valve stenosis I34.2    Severe left ventricular hypertrophy I51.7    Cardiomyopathy ([facility]) I42.9       Past Medical History:        Diagnosis Date    (HFpEF) heart failure with preserved ejection fraction ([facility] [address] 07/2020    Diagnosed by cardiology    Arthritis     Bilateral carotid artery stenosis 12/31/2020    Bilateral carotid bruits 12/31/2020    Blood circulation, collateral     CAD (coronary artery disease)     Carotid bruit 03/27/2013    CHF (congestive heart failure) ([facility])     Chronic kidney disease     CKD (chronic kidney disease) stage 3, GFR 30-59 ml/min ([facility]) 03/20/2016    COPD (chronic obstructive pulmonary disease) ([facility] [address]     Follows with pulmonary    Diabetes mellitus ([facility] [address]     Diabetic neuropathy associated with type 2 diabetes mellitus ([facility])     Dyspnea on exertion     History of blood transfusion     Hyperlipidemia     Hypertension     Mild mitral regurgitation     Moderate tricuspid regurgitation by prior echocardiography 2018    Movement disorder     NSTEMI, initial episode of care ([facility] [address] 01/2018    Obesity     Pulmonary hypertension ([facility] [address] 2018    PVD (peripheral vascular disease) with claudication ([facility] [address] 03/27/2014    S/P carotid endarterectomy 03/27/2013    Sleep apnea     SOB (shortness of breath)     Thyroid disease     Unspecified cerebral artery occlusion with cerebral infarction     Vision decreased 2020       Past Surgical History:        Procedure Laterality Date    BIOPSY / LIGATION TEMPORAL ARTERY Bilateral 2021    BILATERAL TEMPORAL ARTERY BIOPSY performed by Raoul Elliott MD at [address]      5 years ago   [address]RA[address]      1 vessel and aortic valve repair    DIAGNOSTIC CARDIAC CATH LAB PROCEDURE  10/14/2008    DIAGNOSTIC CARDIAC CATH LAB PROCEDURE  10/21/2010    JOINT REPLACEMENT      R knee replacement    KNEE SURGERY      OTHER SURGICAL HISTORY Right 2017    debridement,bone bx foot    PACEMAKER INSERTION  2014    D-PPM   ()    Dr. Katherine Chakraborty      carotids       Social History:    Social History     Tobacco Use    Smoking status: Former Smoker     Packs/day: 2.00     Years: 25.00     Pack years: 50.00     Types: Cigarettes     Start date: 1953     Quit date: 1981     Years since quittin.2    Smokeless tobacco: Never Used   Substance Use Topics    Alcohol use: Never     Comment:                                  Counseling given: Not Answered      Vital Signs (Current): There were no vitals filed for this visit.                                            BP Readings from Last 3 Encounters:   10/01/21 (!) 153/65   21 (!) 140/66   21 122/68       NPO Status:  greater than 8 hours                                                                               BMI:   Wt Readings from Last 3 Encounters:   10/01/21 230 lb 2.2 oz (104.4 kg)   21 226 lb 11.2 oz (102.8 kg)   21 243 lb (110.2 kg)     There is no height or weight on file to calculate BMI.    CBC:   Lab Results   Component Value Date    WBC 12.1 10/01/2021    RBC 2.49 10/01/2021    HGB 7.7 10/01/2021    HCT 23.4 10/01/2021    MCV 94.0 10/01/2021    RDW 15.4 10/01/2021     10/01/2021       CMP:   Lab Results   Component Value Date     10/01/2021    K 4.0 10/01/2021    K 3.9 09/24/2021     10/01/2021    CO2 21 10/01/2021    BUN 42 10/01/2021    CREATININE 1.6 10/01/2021    GFRAA 51 10/01/2021    LABGLOM 42 10/01/2021    GLUCOSE 177 10/01/2021    GLUCOSE 111 07/16/2011    PROT 5.3 10/01/2021    CALCIUM 8.1 10/01/2021    BILITOT <0.2 10/01/2021    ALKPHOS 68 10/01/2021    AST 22 10/01/2021    ALT 13 10/01/2021       POC Tests:   Recent Labs     09/30/21  1135   POCGLU 153.0*   POCNA 136.0   POCCL 104.0   POCBUN 33.0*       Coags:   Lab Results   Component Value Date    PROTIME 11.7 01/05/2021    PROTIME 11.3 06/21/2011    INR 1.0 01/05/2021    APTT 25.5 01/05/2021       HCG (If Applicable): No results found for: PREGTESTUR, PREGSERUM, HCG, HCGQUANT     ABGs:   Lab Results   Component Value Date    PO2ART 157.4 09/30/2021    UGB0KXJ 42.9 09/30/2021    JRV3XNL 21.8 09/30/2021    R1AYIHOR 96.7 09/06/2012        Type & Screen (If Applicable):  No results found for: LABABO, LABRH    Drug/Infectious Status (If Applicable):  No results found for: HIV, HEPCAB    COVID-19 Screening (If Applicable):   Lab Results   Component Value Date    COVID19 Not Detected 08/20/2021    COVID19 Not Detected 12/29/2020           Anesthesia Evaluation  Patient summary reviewed and Nursing notes reviewed  Airway: Mallampati: I  TM distance: >3 FB   Neck ROM: full  Mouth opening: > = 3 FB Dental:    (+) edentulous      Pulmonary: breath sounds clear to auscultation  (+) COPD:  shortness of breath: chronic,  sleep apnea: on noncompliant,      Smoker: last smoked in 1981. Patient did not smoke on day of surgery.                  Cardiovascular:  Exercise tolerance: poor (<4 METS),   (+) hypertension:, valvular problems/murmurs: MR, pacemaker (sinus node dysfunction s/p PPM, paroxysmal flutteron PPM interrogation): pacemaker, past MI (In 2018 NSTEMI):, CAD: obstructive, CABG/stent (2008 AVR):, dysrhythmias (paroxysmal Afib): atrial fibrillation, CHF (EF 43%): systolic, RAMOS: after ambulating 1 flight of stairs, pulmonary hypertension:, hyperlipidemia    (-)  angina      Rhythm: regular  Rate: normal  Echocardiogram reviewed                  Neuro/Psych:   (+) CVA (IN 2003): no interval change,              ROS comment: Diabetic neuropathy to L hand and tremor   GI/Hepatic/Renal:   (+) GERD:, renal disease: CRI, morbid obesity          Endo/Other:    (+) DiabetesType II DM, using insulin, hypothyroidism, blood dyscrasia: anemia, arthritis: OA., .                  ROS comment: Chronic opioids Abdominal:   (+) obese,     Abdomen: soft. Vascular:   + PVD, aortic or cerebral, . ROS comment: Pt states R carotid bypass in 2013  . Other Findings:             Anesthesia Plan      MAC     ASA 4     (Backup GA if needed)  Induction: intravenous. MIPS: Postoperative opioids intended and Prophylactic antiemetics administered. Anesthetic plan and risks discussed with patient. Use of blood products discussed with patient whom consented to blood products. Plan discussed with attending and CRNA. Placido Gonzalez RN   10/1/2021      7/31/20 ECHO   Left Ventricle   Severe left ventricle hypertrophy. Mildly reduced left ventricular systolic function. Visually estimated LVEF is 45-50 %. Paradoxical septal wall motion. Right Ventricle   Not well visualized. Grossly normal.      Left Atrium   Severely dilated left atrium. Right Atrium   Mildly dilated right atrium. Mitral Valve   Severe mitral annular calcification. Mild mitral stenosis. Mild mitral valve regurgitation. Tricuspid Valve   Normal tricuspid valve structure and function. Mild to moderate tricuspid valve regurgitation. Aortic Valve   Bioprosthetic aortic valve leaflets not well seen.  No evidence of   obstruction with similar transvalvular pressure gradient to the prior echo   in 2018. No aortic regurgitation. Pericardial Effusion   No pericardial effusion. Pleural Effusion   No evidence of pleural effusion. Aorta   Normal aortic root and ascending aorta. Miscellaneous   The inferior vena cava diameter is normal with normal respiratory   variation. Estimated right atrial pressure is 8 mmHg. Conclusions      Summary   Severe left ventricle hypertrophy. Visually estimated LVEF is 45-50 %. Paradoxical septal wall motion. Severely dilated left atrium. Right ventricle not well visualized. Grossly normal.   Severe mitral annular calcification. Mild mitral stenosis. Mild mitral   valve regurgitation. Bioprosthetic aortic valve leaflets not well seen. No evidence of   obstruction with similar transvalvular pressure gradient to the prior echo   in 2018. No aortic regurgitation. Compared to prior echo in 1/21/2018, the paradoxical septal wall motion   abnormality likely due to interventricular conduction delay is new. NM MYOCARDIAL SPEC 9/28/21  mpression   1: This is an abnormal myocardial perfusion study due to the presence   of mild global hypokinesis. There is no evidence of perfusion deficits   to suggest ischemia or infarction, however. 2  The left ventricle is normal in size with an ejection fraction of   49%. 3: Prognostically, this is a low to intermediate risk study. 4: Compared to the prior study from 1/20/2018, The LVEF appears to   have improved slightly.      CTA NECK W CONTRAST 9/28/21  Limited due to motion artifacts, particularly in the region of the bilateral   carotid bulbs.  Repeat study is recommended.       Severe stenosis in the proximal left internal carotid artery with moderate   soft and calcified plaque.       Severe stenosis in the distal right common carotid artery.       Fusiform dilatation at the origin of the right internal carotid artery   measuring up to 1.4 cm in diameter.       Question of mild to moderate stenosis in the mid right internal carotid   artery.       Moderate to severe stenosis in the intracranial left vertebral artery.       50% stenosis at the origin of the left vertebral artery. 8/19/21 CXR  Impression   1. Limited due to patient rotation. 2. Interstitial and hazy opacities bilaterally suggestive of pulmonary   vascular congestion or pneumonia with left basilar atelectasis or pleural   effusion.         [facility]  Impression   Monophasic right lower extremity posterior tibial Doppler signal, likely   reflects underlying peripheral arterial disease.       Evaluation limited due to patient request to terminate the procedure. Agree with above assessment. Physical exam unchanged. Spoke to patient about anesthetic plan. Patient understands and wishes to proceed.

## 2021-10-01 NOTE — PROGRESS NOTES
Podiatry Progress Note  10/1/2021   [facility] Ifft     Planned procedure: Right foot transmetatarsal amputation to be performed on tomorrow 10/2/2021 at after 10 AM by Dr. Denise FAGANPLINDSAY  N.p.o. after midnight    SUBJECTIVE: Moo Babin is a [de-identified] y.o. male seen bedside for follow up of diabetic foot and leg ulcers. Discussed with vascular, cleared for right foot surgery. Seen in room this p.m.,  Dressings changed, discussed surgery for tomorrow regarding right transmetatarsal amputation. Discussed that given his poor arterial flow there is a risk of dehiscence at the amputation site. Patient acknowledged and understood the risks involved and would like to proceed forward procedure at this time. no acute events overnight, no new complaints this AM.  Tired during dressing change. .        Past Medical History:   Diagnosis Date    (HFpEF) heart failure with preserved ejection fraction ([address] 07/2020    Diagnosed by cardiology    Arthritis     Bilateral carotid artery stenosis 12/31/2020    Bilateral carotid bruits 12/31/2020    Blood circulation, collateral     CAD (coronary artery disease)     Carotid bruit 03/27/2013    CHF (congestive heart failure) (formerly [facility])     Chronic kidney disease     CKD (chronic kidney disease) stage 3, GFR 30-59 ml/min (formerly [facility]) 03/20/2016    COPD (chronic obstructive pulmonary disease) ([address]     Follows with pulmonary    Diabetes mellitus ([address]     Diabetic neuropathy associated with type 2 diabetes mellitus (HCC)     Dyspnea on exertion     History of blood transfusion     Hyperlipidemia     Hypertension     Mild mitral regurgitation     Moderate tricuspid regurgitation by prior echocardiography 2018    Movement disorder     NSTEMI, initial episode of care ([address] 01/2018    Obesity     Pulmonary hypertension ([address] 2018    PVD (peripheral vascular disease) with claudication ([address] 03/27/2014    S/P carotid endarterectomy 03/27/2013    Sleep apnea     SOB (shortness of breath)     Thyroid disease     Unspecified cerebral artery occlusion with cerebral infarction     Vision decreased 2020        Past Surgical History:   Procedure Laterality Date    BIOPSY / LIGATION TEMPORAL ARTERY Bilateral 2021    BILATERAL TEMPORAL ARTERY BIOPSY performed by Inderjit Newton MD at [address]      5 years ago   [address]      1 vessel and aortic valve repair    DIAGNOSTIC CARDIAC CATH LAB PROCEDURE  10/14/2008    DIAGNOSTIC CARDIAC CATH LAB PROCEDURE  10/21/2010    JOINT REPLACEMENT      R knee replacement    KNEE SURGERY      OTHER SURGICAL HISTORY Right 2017    debridement,bone bx foot    PACEMAKER INSERTION  2014    D-PPM   ([facility])    Dr. Leena johnson         Family History   Problem Relation Age of Onset    Heart Disease Mother     Heart Failure Mother     Heart Surgery Father     Heart Disease Father     Heart Failure Father     High Blood Pressure Sister     Cancer Sister         Social History     Tobacco Use    Smoking status: Former Smoker     Packs/day: 2.00     Years: 25.00     Pack years: 50.00     Types: Cigarettes     Start date: 1953     Quit date: 1981     Years since quittin.2    Smokeless tobacco: Never Used   Substance Use Topics    Alcohol use: Never     Comment:          Prior to Admission medications    Medication Sig Start Date End Date Taking? Authorizing Provider   HYDROcodone-acetaminophen (NORCO)  MG per tablet Take 1 tablet by mouth every 6 hours as needed for Pain.    Yes Historical Provider, MD   magnesium 200 MG TABS tablet Take 200 mg by mouth daily    Historical Provider, MD   diphenhydrAMINE (BENADRYL) 25 MG capsule Take 25 mg by mouth daily as needed for Itching    Historical Provider, MD   insulin lispro, 1 Unit Dial, (HUMALOG KWIKPEN) 100 UNIT/ML SOPN Inject 0-6 Units into the skin allopurinol (ZYLOPRIM) 300 MG tablet Take 300 mg by mouth daily  2/27/15   Historical Provider, MD   lovastatin (MEVACOR) 20 MG tablet Take 20 mg by mouth nightly. 11/11/13   Historical Provider, MD        Patient has no known allergies. OBJECTIVE:        Vitals:    10/01/21 0800   BP: (!) 153/65   Pulse: 86   Resp: 15   Temp: 97.9 °F (36.6 °C)   SpO2: 98%                          EXAM:        Pt is AAOx3, NAD    VASCULAR:  DP and PT pulses are non- palpable. CFT delayed B/L greater than 5 seconds. Dry gangrenous changes right foot digits 2, 3, 4.  Warm to cool from the tibial tuberosity to the distal aspect of the digits dorsally on RLE and warm to warm on LLE. No hair growth noted to the distal aspects dorsally.     NEUROLOGIC:  Protective sensation is diminished b/l, gross sensation intact     DERM:  Diffuse skin changes noted to the LEs:   #1. RLE: Ulcer noted to the distal half of the leg circumferentially. Wound bed is about fibronecrotic in nature with mild serous drainage noted, periwound erythema, no edema, no malodor, no crepitus or fluctuance, no proximal streaking. Hyperpigmented thickened skin lower legs consistent with chronic venous stasis and hemosiderin deposition. Right foot digits 2-4 with dry gangrenous changes. Cyanotic changes right fifth digit, early stages of mummification     #2. LLE: Wound noted to the distal half of the leg circumferentially around the leg. Wound bed is fibro-granular with mild serous drainage and periwound erythema. No edema. Hyperpigmentation to anterior lower leg consistent with hemosiderin deposition secondary to chronic venous stasis. Small dried, abrasion noted across the 2nd and 3rd digit MPJS. No malodor, no crepitus, no fluctuance, no proximal streaking noted.     MUSCULOSKELETAL:   Patient has intention tremors with 3-5 muscle strength all pedal muscle groups bilaterally.   Tenderness on palpation to periwound bilaterally more significant on the left and right.       Current Facility-Administered Medications   Medication Dose Route Frequency Provider Last Rate Last Admin    morphine (MS CONTIN) extended release tablet 15 mg  15 mg Oral 2 times per day MADDI Pillai Cera, CNP   15 mg at 10/01/21 1019    0.9 % sodium chloride infusion   IntraVENous Continuous Margarita Pinedo APRN -  mL/hr at 10/01/21 0600 Rate Verify at 10/01/21 0600    sodium chloride flush 0.9 % injection 10 mL  10 mL IntraVENous 2 times per day MADDI Ross - CNP   10 mL at 09/30/21 2000    sodium chloride flush 0.9 % injection 10 mL  10 mL IntraVENous PRN Margarita Khris, APRN - CNP        0.9 % sodium chloride infusion  25 mL IntraVENous PRN Margarita Pinedo, APRN - CNP        niCARdipine (CARDENE) 50 mg in sodium chloride 0.9 % 250 mL infusion  3-15 mg/hr IntraVENous Continuous Margarita Pinedo, APRN - CNP        morphine (PF) injection 1 mg  1 mg IntraVENous Q3H PRN MADDI Ross - CNP        Or    morphine (PF) injection 2 mg  2 mg IntraVENous Q3H PRN MADDI Ross - CNP   2 mg at 10/01/21 1144    acetaminophen (TYLENOL) tablet 1,000 mg  1,000 mg Oral Q6H Roberto Trinidad MD   1,000 mg at 10/01/21 0839    HYDROmorphone (DILAUDID) injection 1 mg  1 mg IntraVENous Q3H PRN Roberto Trinidad MD   1 mg at 10/01/21 0003    sodium chloride flush 0.9 % injection 10 mL  10 mL IntraVENous PRN Margarita Pinedo APRN - CNP   10 mL at 10/01/21 1144    0.9 % sodium chloride infusion  25 mL IntraVENous PRN Margarita Pinedo, APRN - CNP        enoxaparin (LOVENOX) injection 40 mg  40 mg SubCUTAneous Daily Margarita Pinedo APRN - CNP   40 mg at 09/30/21 2039    insulin lispro (HUMALOG) injection vial 0-3 Units  0-3 Units SubCUTAneous Nightly Margarita Pinedo APRN - CNP   1 Units at 09/30/21 2040    insulin lispro (HUMALOG) injection vial 0-6 Units  0-6 Units SubCUTAneous TID WC Margarita Pinedo APRN - CNP   2 Units at 10/01/21 1153    albuterol (PROVENTIL) nebulizer solution 2.5 mg  2.5 mg Nebulization Q6H PRN Tiffanie Mendez, APRN - CNP        allopurinol (ZYLOPRIM) tablet 300 mg  300 mg Oral Daily Margaritamahesh Dovertle, APRN - CNP   300 mg at 10/01/21 0839    Arformoterol Tartrate (BROVANA) nebulizer solution 15 mcg  15 mcg Nebulization BID Tiffanie Jobs APRN - CNP   15 mcg at 09/29/21 2037    aspirin EC tablet 81 mg  81 mg Oral Daily Margaritamahesh Dovertle, APRN - CNP   81 mg at 10/01/21 0839    budesonide (PULMICORT) nebulizer suspension 250 mcg  250 mcg Nebulization BID Tiffanie Jobs, APRN - CNP   250 mcg at 09/29/21 2037    DULoxetine (CYMBALTA) extended release capsule 60 mg  60 mg Oral Daily Margarita Pinedo, APRN - CNP   60 mg at 10/01/21 0839    ferrous sulfate (IRON 325) tablet 325 mg  325 mg Oral BID WC Margaritamahesh Dovergisela APRN - CNP   325 mg at 10/01/21 0840    gabapentin (NEURONTIN) capsule 200 mg  200 mg Oral TID Tiffanie Andrea, APRN - CNP   200 mg at 10/01/21 1020    hydrALAZINE (APRESOLINE) tablet 50 mg  50 mg Oral 3 times per day Tiffanie Mendez APRN - CNP   50 mg at 10/01/21 2276    ipratropium (ATROVENT) 0.02 % nebulizer solution 0.5 mg  0.5 mg Nebulization 4x daily Tiffanie Mendez APRN - CNP   0.5 mg at 10/01/21 1235    isosorbide mononitrate (IMDUR) extended release tablet 120 mg  120 mg Oral Daily Margaritamahesh Pinedo, APRN - CNP   120 mg at 10/01/21 0839    lactulose (CHRONULAC) 10 GM/15ML solution 20 g  20 g Oral TID Tiffanie Mendez APRN - CNP   20 g at 09/29/21 1613    levothyroxine (SYNTHROID) tablet 25 mcg  25 mcg Oral Daily Margarita Dovertle, APRN - CNP   25 mcg at 10/01/21 0839    metoprolol succinate (TOPROL XL) extended release tablet 50 mg  50 mg Oral Daily Margarita Dovertle, APRN - CNP   50 mg at 10/01/21 0839    labetalol (NORMODYNE;TRANDATE) injection 10 mg  10 mg IntraVENous Q4H PRN MADDI Sanders CNP   10 mg at 09/29/21 0809        Lab Results   Component Value Date    WBC 12.1 (H) 10/01/2021    HCT 23.4 (L) 10/01/2021    HGB 7.7 (L) 10/01/2021  10/01/2021     10/01/2021    K 4.0 10/01/2021     10/01/2021    CO2 21 (L) 10/01/2021    BUN 42 (H) 10/01/2021    CREATININE 1.6 (H) 10/01/2021    GLUCOSE 177 (H) 10/01/2021    CRP 21.4 (H) 09/23/2021         Radiographs:    ASSESSMENT:  - Full thickness ulcers down to level of subcutaneous tissue B/L Lower extremity- POA  - Cellulitis B/L Lower extremity, POA  - Dry gangrenous RLE  - Chronic lower extremity edema with venous stasis   - PAD, SP angiogram 9/27/2021  - DM with neuropathic changes        PLAN:  - Examined and evaluated  - All labs, imaging, and charts reviewed   - WBC: 12.1  -S/p angiogram 9/27/2021 with only peroneal vessel runoff right lower extremity, peroneal and some anterior tibial runoff left lower extremity.   - S/p Right ileofemoral and superficial femoral endarterectomy DOS 9/30/21  -No ABX  -Blood cultures 9/23 no growth final.  All bilateral wounds are granular and or with dry gangrene no clinical signs of infection. -X-rays from 9/23/2021 reveal no osseous fractures, no gas, no foreign substances  - Daily dressing changes Xeroform DSD.   -Discussed with  who cleared right transmetatarsal amputation for tomorrow.  -Planned procedure: Right foot transmetatarsal amputation to be performed on tomorrow 10/2/2021 at after 10 AM by Dr. Emiliano Mancia D.P.M.  N.p.o. after midnight  -We will continue to monitor    D/W:   Sosa Zavaleta DPM Pod Nathan Godwin9  Fellowship-Trained Foot and Ankle Surgeon  Diplomate, [employer] Board of Foot and Ankle Surgeons  [phone]       Thank you for involving podiatry in this patients care. Please do not hesitate to call with any questions or concerns.      [address] [place] [address] PGY2  On Call Number: [phone]  10/1/2021   1:40 PM

## 2021-10-01 NOTE — PROGRESS NOTES
VASCULAR SURGERY  DAILY PROGRESS NOTE    Date:10/1/2021       Room:[place] [address]  Patient Name:Axel Preston     YOB: 1940     Age:81 y.o. Chief Complaint:  Nonhealing foot wounds/gangrene    Subjective:  Doing well after surgery yesterday. Minimal soreness in right groin. No changes in his lower extremity sensation. Objective:  BP (!) 149/51   Pulse 88   Temp 97.8 °F (36.6 °C) (Temporal)   Resp 27   Ht 5' 7\" (1.702 m)   Wt 230 lb 2.2 oz (104.4 kg)   SpO2 97%   BMI 36.04 kg/m²   Temp (24hrs), Av.8 °F (37.1 °C), Min:97 °F (36.1 °C), Max:99.7 °F (37.6 °C)      I/O (24Hr):  I/O last 3 completed shifts: In: 3653.1 [P.O.:240; I.V.:2913.1; IV Piggyback:500]  Out: 5703 [Urine:970; Blood:100]     GENERAL:  No acute distress. Alert and conversational.   LUNGS:  No cough. Nonlabored breathing on [facility]   CARDIOVASC: Normal rate, no cyanosis. ABDOMEN:  Soft, obese but non-distended, non-tender. No guarding / rigidity / rebound.    EXTREMITIES:  BLE with ace wraps, R middle toes dry gangrene, moves BLE weakly (baseline), right groin with mild bruising and incision clean dry and intact with biphasic Doppler signal    Assessment:  [de-identified] y.o. male with:  BLE venous stasis ulcers  Critical limb ischemia with ulcers and R toes 2-4 dry gangrene  S/p left femoral access angiography   S/p R femoral endarterectomy   - severely calcified aorta with multiple areas of ulceration  - R 20-30% common iliac and external iliac artery stenosis  - R common femoral occlusion, reconstitutes at profunda (diseased) & proximal SFA (open)  - unable to visualize behind knee prosthetic  - R occluded PT and AT (dominant flow is his peroneal), foot vessels unclear  - L patent common, profundus, SFA  - unable to visualize behind knee prosthetic  - L PT not visualized  - L AT occluded in proximal 1/3 then reconstitutes (dominant flow is peroneal), foot vessels patent    Stress EKG  was unremarkable, and ejection fraction was estimated at 49%    Plan:  -Okay to discharge from vascular surgery standpoint    Electronically signed by Kaylyn Vaughn MD on 10/1/2021 at 6:52 AM    Patient seen and examined. Agree with above. Status post right iliofemoral endarterectomy. Overall he is doing well. Podiatry can proceed with amputation of the gangrenous toes. If this does not heal we will revisit the issue with possible consideration for a more proximal amputation if needed.     Hassel Simmonds

## 2021-10-01 NOTE — PROGRESS NOTES
Nephrology Progress Note  Patient's Name: Mio Newman  6:34 PM  10/1/2021        Reason for Consult: HARVEY  Requesting Physician:  Nallely Fontanez MD    Chief Complaint: Worsening diabetic foot ulcers  History Obtained From:  Patient; EHR    History of Present Ilness:    Mio Newman is a [de-identified] y.o. male with a history of CKD stage IIIa(baseline creatinine 1.3-1.6), hypertension, hyperlipidemia, PVD and T2DM. He also has a history of chronic foot ulcers. He is generally followed by podiatry for this. He was sent to ED from nursing home facility for concern about worsening of the foot ulcers and cellulitis. He denies any fever or chills. No trauma to the feet. In the  initial vital signs show elevated blood pressure of 211/98. Patient was afebrile. . Patient was afebrile. His laboratory data was significant for a creatinine of 2.2, WBC 10.8, hemoglobin 11.9. An abdominal aortogram with bilateral lower extremity runoff was performed on 9/27. Based on findings a right femoral endarterectomy has been recommended by vascular surgery. 9/29:patient denies SOB; no nausea or emesis  9/30: No new c/o; denies SOB  10/1: Femoral endarterectomy 9/30; for right TMA tomorrow 10/2          Allergies:  Patient has no known allergies.     Current Medications:    morphine (MS CONTIN) extended release tablet 15 mg, 2 times per day  0.9 % sodium chloride infusion, Continuous  sodium chloride flush 0.9 % injection 10 mL, 2 times per day  sodium chloride flush 0.9 % injection 10 mL, PRN  0.9 % sodium chloride infusion, PRN  niCARdipine (CARDENE) 50 mg in sodium chloride 0.9 % 250 mL infusion, Continuous  morphine (PF) injection 1 mg, Q3H PRN   Or  morphine (PF) injection 2 mg, Q3H PRN  acetaminophen (TYLENOL) tablet 1,000 mg, Q6H  HYDROmorphone (DILAUDID) injection 1 mg, Q3H PRN  sodium chloride flush 0.9 % injection 10 mL, PRN  0.9 % sodium chloride infusion, PRN  enoxaparin (LOVENOX) injection 40 mg, Daily  insulin lispro (HUMALOG) injection vial 0-3 Units, Nightly  insulin lispro (HUMALOG) injection vial 0-6 Units, TID WC  albuterol (PROVENTIL) nebulizer solution 2.5 mg, Q6H PRN  allopurinol (ZYLOPRIM) tablet 300 mg, Daily  Arformoterol Tartrate (BROVANA) nebulizer solution 15 mcg, BID  aspirin EC tablet 81 mg, Daily  budesonide (PULMICORT) nebulizer suspension 250 mcg, BID  DULoxetine (CYMBALTA) extended release capsule 60 mg, Daily  ferrous sulfate (IRON 325) tablet 325 mg, BID WC  gabapentin (NEURONTIN) capsule 200 mg, TID  hydrALAZINE (APRESOLINE) tablet 50 mg, 3 times per day  ipratropium (ATROVENT) 0.02 % nebulizer solution 0.5 mg, 4x daily  isosorbide mononitrate (IMDUR) extended release tablet 120 mg, Daily  lactulose (CHRONULAC) 10 GM/15ML solution 20 g, TID  levothyroxine (SYNTHROID) tablet 25 mcg, Daily  metoprolol succinate (TOPROL XL) extended release tablet 50 mg, Daily  labetalol (NORMODYNE;TRANDATE) injection 10 mg, Q4H PRN        Review of Systems:   Pertinent items are noted in HPI. Physical exam:  Vitals:    10/01/21 1636   BP:    Pulse:    Resp:    Temp:    SpO2: 97%           General: No distress. Alert. Eyes: PERRL. No sclera icterus. No conjunctival injection. ENT: No discharge. Pharynx clear. Neck: Trachea midline. Normal thyroid. Lungs: No accessory muscle use. No crackles. No wheezing. No rhonchi. CV: Regular rate. Regular rhythm. No murmur or rub. .   Abd: Non-tender. Non-distended. No masses. No organmegaly. Normal bowel sounds. Skin: Warm and dry. No nodule on exposed extremities. No rash on exposed extremities. Ext: No cyanosis, clubbing; several necrotic digits both feet; increasing redness and warmth both distal legs  Neuro: Awake. Follows commands. Positive pupils/gag/corneals. Normal pain response.       Data:   Labs:  Lab Results   Component Value Date     10/01/2021     09/30/2021     09/29/2021    K 4.0 10/01/2021    K 3.5 09/30/2021    K 3.2 (L) 09/30/2021     10/01/2021    CO2 21 (L) 10/01/2021    CO2 24 09/30/2021    CO2 21 (L) 09/29/2021    CREATININE 1.6 (H) 10/01/2021    CREATININE 1.5 (H) 09/30/2021    CREATININE 1.5 (H) 09/30/2021    BUN 42 (H) 10/01/2021    BUN 38 (H) 09/30/2021    BUN 33 (H) 09/29/2021    GLUCOSE 177 (H) 10/01/2021    GLUCOSE 115 (H) 09/30/2021    GLUCOSE 133 (H) 09/29/2021    PHOS 3.5 09/27/2021    PHOS 2.9 09/26/2021    PHOS 2.4 (L) 09/25/2021    WBC 12.1 (H) 10/01/2021    WBC 10.1 09/30/2021    WBC 10.8 09/29/2021    HGB 7.7 (L) 10/01/2021    HGB 9.9 (L) 09/30/2021    HGB 9.8 (L) 09/29/2021    HCT 23.4 (L) 10/01/2021    HCT 30.3 (L) 09/30/2021    HCT 30.7 (L) 09/29/2021    MCV 94.0 10/01/2021     10/01/2021         Imaging:  CTA NECK W CONTRAST    Result Date: 9/28/2021  EXAMINATION: CTA OF THE NECK 9/28/2021 1:26 pm TECHNIQUE: CTA of the neck was performed with the administration of intravenous contrast. Multiplanar reformatted images are provided for review. MIP images are provided for review. Stenosis of the internal carotid arteries measured using NASCET criteria. Dose modulation, iterative reconstruction, and/or weight based adjustment of the mA/kV was utilized to reduce the radiation dose to as low as reasonably achievable. COMPARISON: CTA neck March 2, 2010 HISTORY: ORDERING SYSTEM PROVIDED HISTORY: evaluate carotid artery disease, use limited contrast if able TECHNOLOGIST PROVIDED HISTORY: Reason for exam:->evaluate carotid artery disease, use limited contrast if able Has a \"code stroke\" or \"stroke alert\" been called? ->No What reading provider will be dictating this exam?->CRC FINDINGS: Limited due to motion artifact. AORTIC ARCH/ARCH VESSELS: No dissection or arterial injury. No significant stenosis of the brachiocephalic or subclavian arteries. CAROTID ARTERIES: There is severe stenosis in the proximal left internal carotid artery with moderate soft and calcified plaque.   There is severe stenosis in the distal right common carotid artery. There is fusiform dilatation at the origin of the right internal carotid artery measuring up to 1.4 cm in diameter. There is question of mild to moderate stenosis in the mid right internal carotid artery. VERTEBRAL ARTERIES: There is moderate to severe stenosis in the intracranial left vertebral artery. There is 50% stenosis at the origin of the left vertebral artery. There is mild stenosis at the origin of the right vertebral artery. SOFT TISSUES: The patient is status post median sternotomy. The lung apices are clear. No cervical or superior mediastinal lymphadenopathy. The larynx and pharynx are unremarkable. No acute abnormality of the salivary and thyroid glands. BONES: No acute osseous abnormality. There are moderate degenerative changes in the cervical spine. Limited due to motion artifacts, particularly in the region of the bilateral carotid bulbs. Repeat study is recommended. Severe stenosis in the proximal left internal carotid artery with moderate soft and calcified plaque. Severe stenosis in the distal right common carotid artery. Fusiform dilatation at the origin of the right internal carotid artery measuring up to 1.4 cm in diameter. Question of mild to moderate stenosis in the mid right internal carotid artery. Moderate to severe stenosis in the intracranial left vertebral artery. 50% stenosis at the origin of the left vertebral artery. NM Cardiac Stress Test Nuclear Imaging    Result Date: 9/28/2021  Pharmacologic myocardial perfusion stress test: Patient was injected with 11 mCi 99 technetium sestamibi at rest.  Patient was given 0.4 mg of Lexiscan and subsequently 35 mCi 99 technetium sestamibi was administered. Raw spin images: Attenuation correction could not be performed as patient could not elevate arms. There appears to be a lateral attenuation artifact. PERFUSION IMAGES REVEALED: No focal perfusion deficits to suggest the presence of ischemia or infarction. Left ventricular end-diastolic volume 868 ml Left ventricular end-systolic volume 52 ml EJECTION FRACTION: 49%. There is mild global hypokinesis. 1: This is an abnormal myocardial perfusion study due to the presence of mild global hypokinesis. There is no evidence of perfusion deficits to suggest ischemia or infarction, however. 2  The left ventricle is normal in size with an ejection fraction of 49%. 3: Prognostically, this is a low to intermediate risk study. 4: Compared to the prior study from 1/20/2018, The LVEF appears to have improved slightly. Assessment/Plans    1. Chronic kidney disease stage IIIa on the basis of hypertension. Baseline creatinine 1.3-1.6. Risk of worsening with IV contrast exposure. Postprocedure creatinine shows no significant worsening  -continue to monitor    2. Peripheral vascular disease with dry gangrene of several digits of both feet  -s/p aortogram with bilateral leg runoff; plan is for surgical intervention 10/2    3. Hypertension with CKD I suspect some of the blood pressure elevated  may be pain related  -Resumed BP meds and monitor     4.   Type II DM  -Continue insulin regimen              Ronald Campbell MD  6:34 PM  10/1/2021

## 2021-10-01 NOTE — PROGRESS NOTES
Report called to MICHELLE Katz. Patient placed in transport request system for transfer to [facility].

## 2021-10-01 NOTE — PROGRESS NOTES
Ced Bernabe was ordered Ivon capsules which are a nonformulary medication. The patient's home supply of this medication must be brought in to the hospital for inpatient use. If the medication has not been administered by 1400 on the following day from the time the order was placed, a pharmacist will follow-up with the nurse of the patient to assess the capability of the patient to bring in the medication. If it is determined that the patient cannot supply the medication and it is not available to be dispensed from the pharmacy, a call will be placed to the ordering provider to discuss alternative options.

## 2021-10-01 NOTE — PROGRESS NOTES
Comprehensive Nutrition Assessment    Type and Reason for Visit:  Initial, Positive Nutrition Screen    Nutrition Recommendations/Plan: Continue current diet, Start Ensure HP BID, Jose Rafael BID    Nutrition Assessment:  Pt admit from [facility] w/ critical limb ischemia/dry gangrene to RLE, s/p endarterectomy. Noted pending TMA. Hx DM, CHF, CKD, COPD, CVA. Will add ONS and continue to monitor. Malnutrition Assessment:  Malnutrition Status:  No malnutrition    Context:  Chronic Illness     Findings of the 6 clinical characteristics of malnutrition:  Energy Intake:  No significant decrease in energy intake  Weight Loss:  Unable to assess (2/2 fluids w/ CHF)     Body Fat Loss:  No significant body fat loss     Muscle Mass Loss:  No significant muscle mass loss    Fluid Accumulation:  No significant fluid accumulation     Strength:  Not Performed    Estimated Daily Nutrient Needs:  Energy (kcal):  MSJ 1712 x 1.2 SF = 8234-1796; Weight Used for Energy Requirements:  Current     Protein (g):  100-135; Weight Used for Protein Requirements:  Ideal (1.5-2.0)        Fluid (ml/day):  7075-5927; Method Used for Fluid Requirements:  1 ml/kcal      Nutrition Related Findings:  A&Ox4, active BS, abd distention, +1 edema, +I/Os      Wounds:  Multiple, Diabetic Ulcer, Full Thickness, Surgical Incision       Current Nutrition Therapies:    ADULT DIET;  Regular; 3 carb choices (45 gm/meal)  Diet NPO Exceptions are: Sips of Water with Meds    Anthropometric Measures:  · Height: 5' 7\" (170.2 cm)  · Current Body Weight: 230 lb (104.3 kg) (10/1 bed)   · Admission Body Weight: 229 lb (103.9 kg) (9/30 bed)    · Usual Body Weight: 259 lb (117.5 kg) (1/2021 bed scale, per EMR w/ noted fluctuations)     · Ideal Body Weight: 148 lbs; % Ideal Body Weight 155.4 %   · BMI: 36  · BMI Categories: Obese Class 2 (BMI 35.0 -39.9)       Nutrition Diagnosis:   · Increased nutrient needs related to increase demand for energy/nutrients as evidenced by wounds    Nutrition Interventions:   Food and/or Nutrient Delivery:  Continue Current Diet, Start Oral Nutrition Supplement (Ensure HP BID, Jose Rafael BID)  Nutrition Education/Counseling:  Education not indicated   Coordination of Nutrition Care:  Continue to monitor while inpatient    Goals:  pt to consume >75% meals/ONS       Nutrition Monitoring and Evaluation:   Food/Nutrient Intake Outcomes:  Food and Nutrient Intake, Supplement Intake  Physical Signs/Symptoms Outcomes:  Biochemical Data, GI Status, Fluid Status or Edema, Nutrition Focused Physical Findings, Skin, Weight     Discharge Planning:     Too soon to determine     Electronically signed by Ward Mueller MS, RD, LD on 10/1/21 at 2:18 PM EDT    Contact: [phone]

## 2021-10-01 NOTE — CARE COORDINATION
In PACU- POD#1 -right femoral artery exposure,right ileofemoral endarterectomy, proximal superficial femoral endarterectomy. Per prior SW note-Pt is from [facility] and has been there for almostyear, per pt his plan is to return to [facility] at discharge. SW spoke with Cristopher) pt is a bed hold, will need PT/OT evals(will not need to wait for precert), and a COVID test. -Discharge Plan is to return to [facility] when medically sable. Envelope with ambulette form on soft chart. Covid test to be done in soft chart. MARY/REBECA to follow.  Electronically signed by Rosalina Olivares RN on 10/1/2021 at 8:55 AM

## 2021-10-02 NOTE — PROGRESS NOTES
Progress Note  10/2/2021 9:29 AM  Subjective:   Admit Date: 9/27/2021  PCP: Robert Gary MD  Interval History: Patient examined NPO for surgery this am - possible foot toe amputation as per pt     Diet: Diet NPO Exceptions are: Sips of Water with Meds    Data:   Scheduled Meds:   morphine  15 mg Oral 2 times per day    sodium chloride flush  10 mL IntraVENous 2 times per day    acetaminophen  1,000 mg Oral Q6H    enoxaparin  40 mg SubCUTAneous Daily    insulin lispro  0-3 Units SubCUTAneous Nightly    insulin lispro  0-6 Units SubCUTAneous TID WC    allopurinol  300 mg Oral Daily    Arformoterol Tartrate  15 mcg Nebulization BID    aspirin EC  81 mg Oral Daily    budesonide  250 mcg Nebulization BID    DULoxetine  60 mg Oral Daily    ferrous sulfate  325 mg Oral BID WC    gabapentin  200 mg Oral TID    hydrALAZINE  50 mg Oral 3 times per day    ipratropium  0.5 mg Nebulization 4x daily    isosorbide mononitrate  120 mg Oral Daily    lactulose  20 g Oral TID    levothyroxine  25 mcg Oral Daily    metoprolol succinate  50 mg Oral Daily     Continuous Infusions:   sodium chloride 125 mL/hr at 10/01/21 0600    sodium chloride      niCARdipine      sodium chloride       PRN Meds:sodium chloride flush, sodium chloride, morphine **OR** morphine, HYDROmorphone, sodium chloride flush, sodium chloride, albuterol, labetalol  I/O last 3 completed shifts: In: 780 [P.O.:780]  Out: 875 [Urine:875]  No intake/output data recorded.     Intake/Output Summary (Last 24 hours) at 10/2/2021 0929  Last data filed at 10/2/2021 0357  Gross per 24 hour   Intake 720 ml   Output 775 ml   Net -55 ml     CBC:   Recent Labs     09/30/21  0544 10/01/21  0450 10/02/21  0848   WBC 10.1 12.1* 11.7*   HGB 9.9* 7.7* 7.6*    225 250     BMP:    Recent Labs     09/30/21  0522 09/30/21  0522 09/30/21  0931 09/30/21  1135 10/01/21  0450     --   --   --  134   K 3.5   < > 3.2* 3.5 4.0   CL 98  --   --   --  103   CO2 24 --   --   --  21*   BUN 38*  --   --   --  42*   CREATININE 1.7*  --  1.5* 1.5* 1.6*   GLUCOSE 115*  --   --   --  177*    < > = values in this interval not displayed. Hepatic:   Recent Labs     09/30/21  0522 10/01/21  0450   AST 53* 22   ALT 26 13   BILITOT 0.3 <0.2   ALKPHOS 101 68     Troponin: No results for input(s): TROPONINI in the last 72 hours. BNP: No results for input(s): BNP in the last 72 hours. Lipids: No results for input(s): CHOL, HDL in the last 72 hours. Invalid input(s): LDLCALCU  ABGs:   Lab Results   Component Value Date    PO2ART 157.4 09/30/2021    ASW5ZEJ 42.9 09/30/2021     INR: No results for input(s): INR in the last 72 hours. -----------------------------------------------------------------  RAD: CTA NECK W CONTRAST    Result Date: 9/28/2021  EXAMINATION: CTA OF THE NECK 9/28/2021 1:26 pm TECHNIQUE: CTA of the neck was performed with the administration of intravenous contrast. Multiplanar reformatted images are provided for review. MIP images are provided for review. Stenosis of the internal carotid arteries measured using NASCET criteria. Dose modulation, iterative reconstruction, and/or weight based adjustment of the mA/kV was utilized to reduce the radiation dose to as low as reasonably achievable. COMPARISON: CTA neck March 2, 2010 HISTORY: ORDERING SYSTEM PROVIDED HISTORY: evaluate carotid artery disease, use limited contrast if able TECHNOLOGIST PROVIDED HISTORY: Reason for exam:->evaluate carotid artery disease, use limited contrast if able Has a \"code stroke\" or \"stroke alert\" been called? ->No What reading provider will be dictating this exam?->CRC FINDINGS: Limited due to motion artifact. AORTIC ARCH/ARCH VESSELS: No dissection or arterial injury. No significant stenosis of the brachiocephalic or subclavian arteries. CAROTID ARTERIES: There is severe stenosis in the proximal left internal carotid artery with moderate soft and calcified plaque.   There is severe stenosis in the distal right common carotid artery. There is fusiform dilatation at the origin of the right internal carotid artery measuring up to 1.4 cm in diameter. There is question of mild to moderate stenosis in the mid right internal carotid artery. VERTEBRAL ARTERIES: There is moderate to severe stenosis in the intracranial left vertebral artery. There is 50% stenosis at the origin of the left vertebral artery. There is mild stenosis at the origin of the right vertebral artery. SOFT TISSUES: The patient is status post median sternotomy. The lung apices are clear. No cervical or superior mediastinal lymphadenopathy. The larynx and pharynx are unremarkable. No acute abnormality of the salivary and thyroid glands. BONES: No acute osseous abnormality. There are moderate degenerative changes in the cervical spine. Limited due to motion artifacts, particularly in the region of the bilateral carotid bulbs. Repeat study is recommended. Severe stenosis in the proximal left internal carotid artery with moderate soft and calcified plaque. Severe stenosis in the distal right common carotid artery. Fusiform dilatation at the origin of the right internal carotid artery measuring up to 1.4 cm in diameter. Question of mild to moderate stenosis in the mid right internal carotid artery. Moderate to severe stenosis in the intracranial left vertebral artery. 50% stenosis at the origin of the left vertebral artery. NM Cardiac Stress Test Nuclear Imaging    Result Date: 9/28/2021  Pharmacologic myocardial perfusion stress test: Patient was injected with 11 mCi 99 technetium sestamibi at rest.  Patient was given 0.4 mg of Lexiscan and subsequently 35 mCi 99 technetium sestamibi was administered. Raw spin images: Attenuation correction could not be performed as patient could not elevate arms. There appears to be a lateral attenuation artifact.  PERFUSION IMAGES REVEALED: No focal perfusion deficits to suggest the presence of ischemia or infarction. Left ventricular end-diastolic volume 288 ml Left ventricular end-systolic volume 52 ml EJECTION FRACTION: 49%. There is mild global hypokinesis. 1: This is an abnormal myocardial perfusion study due to the presence of mild global hypokinesis. There is no evidence of perfusion deficits to suggest ischemia or infarction, however. 2  The left ventricle is normal in size with an ejection fraction of 49%. 3: Prognostically, this is a low to intermediate risk study. 4: Compared to the prior study from 1/20/2018, The LVEF appears to have improved slightly. Objective:   Vitals: BP (!) 140/68   Pulse 78   Temp 97.8 °F (36.6 °C) (Oral)   Resp 13   Ht 5' 7\" (1.702 m)   Wt 230 lb 2.2 oz (104.4 kg)   SpO2 96%   BMI 36.04 kg/m²   General appearance: appears stated age   Skin:  No rashes or lesions  HEENT: Head: Normocephalic, no lesions, without obvious abnormality.   Neck: no adenopathy, no carotid bruit, no JVD, supple, symmetrical, trachea midline and thyroid not enlarged, symmetric, no tenderness/mass/nodules  Lungs: clear to auscultation bilaterally  Heart: regular rate and rhythm, S1, S2 normal, no murmur, click, rub or gallop  Abdomen: soft, non-tender; bowel sounds normal; no masses,  no organomegaly  Extremities: extremities normal, atraumatic, no cyanosis or edema  Neurologic: Mental status: Alert, oriented, thought content appropriate    Assessment:   Patient Active Problem List:     Essential hypertension     Peripheral vascular disease ([address]     Pacemaker     H/O aortic valve replacement     CKD (chronic kidney disease) stage 3, GFR 30-59 ml/min     Type 2 diabetes mellitus with stage 3 chronic kidney disease, with long-term current use of insulin (HCC)     Pulmonary nodule     Diabetes mellitus type 2, uncontrolled (HCC)     Hyperlipidemia LDL goal <100     Acquired hypothyroidism     CAD (coronary artery disease), native coronary artery     Status post coronary artery bypass graft     Chronic pain     History of CVA (cerebrovascular accident)     Obesity (BMI 30-39. 9)     Anemia     Elevated sed rate     Vision decreased     Bilateral carotid bruits     Bilateral carotid artery stenosis     Nonrheumatic mitral valve regurgitation     Pulmonary hypertension (HCC)     Moderate tricuspid regurgitation by prior echocardiography     (HFpEF) heart failure with preserved ejection fraction (HCC)     COPD (chronic obstructive pulmonary disease) (HCC)     Diabetic neuropathy associated with type 2 diabetes mellitus (HCC)     Congestive heart failure (HCC)     Cellulitis     Peripheral vascular disease of lower extremity with ulceration (HCC)     Critical lower limb ischemia (HCC)     Nonrheumatic mitral valve stenosis     Severe left ventricular hypertrophy     Cardiomyopathy ([address]     Ischemic foot ulcer due to atherosclerosis of native artery of limb ([facility] [address]    Plan:     Assessment/Plans     1. Chronic kidney disease stage IIIa on the basis of hypertension. Baseline creatinine 1.3-1.6. Risk of worsening with IV contrast exposure. Postprocedure creatinine shows no significant worsening  -continue to monitor     2. 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 Hypertension with CKD I suspect some of the blood pressure elevated  may be pain related  -Resumed BP meds and monitor - BP better      4.   Type II DM  -Continue insulin regimen    5) Anemia - multifactorial , check iron stores , may transfuse if drops further     6) Hypoalbuminemia   Hasit True MD [place]

## 2021-10-02 NOTE — PROGRESS NOTES
Subjective: The patient is awake and alert. Status post femoral endarterectomy on 9/30/2021  No acute complaints, resting comfortably  Going for [facility] today 10-20    Objective:    BP (!) 140/68   Pulse 78   Temp 97.8 °F (36.6 °C) (Oral)   Resp 13   Ht 5' 7\" (1.702 m)   Wt 230 lb 2.2 oz (104.4 kg)   SpO2 96%   BMI 36.04 kg/m²     In: -   Out: 475   In: -   Out: 475 [Urine:475]    General appearance: NAD, conversant-clear mentation  HEENT: AT/NC, MMM  Neck: FROM, supple  Lungs: Clear to auscultation  CV: RRR, no MRGs  Vasc: Radial pulses 2+  Abdomen: Soft, non-tender; no masses or HSM  Extremities: Bilateral lower extremities and dressings post femoral endarterectomy  Skin: no rash, lesions or ulcers  Psych: Alert and oriented to person, place and time  Neuro: Alert and interactive     Recent Labs     09/30/21  0544 10/01/21  0450 10/02/21  0848   WBC 10.1 12.1* 11.7*   HGB 9.9* 7.7* 7.6*   HCT 30.3* 23.4* 22.6*    225 250       Recent Labs     09/30/21  0522 09/30/21  0522 09/30/21  0931 09/30/21  1135 10/01/21  0450     --   --   --  134   K 3.5   < > 3.2* 3.5 4.0   CL 98  --   --   --  103   CO2 24  --   --   --  21*   BUN 38*  --   --   --  42*   CREATININE 1.7*  --  1.5* 1.5* 1.6*   CALCIUM 8.9  --   --   --  8.1*    < > = values in this interval not displayed.        Assessment:    Principal Problem:    Critical lower limb ischemia (HCC)  Active Problems:    Peripheral vascular disease ([facility] [address]    H/O aortic valve replacement    CKD (chronic kidney disease) stage 3, GFR 30-59 ml/min    Diabetes mellitus type 2, uncontrolled ([facility] [address]    CAD (coronary artery disease), native coronary artery    Status post coronary artery bypass graft    History of CVA (cerebrovascular accident)    Nonrheumatic mitral valve regurgitation    COPD (chronic obstructive pulmonary disease) (HCC)    Peripheral vascular disease of lower extremity with ulceration (HCC)    Nonrheumatic mitral valve stenosis    Severe left ventricular hypertrophy    Cardiomyopathy ([facility] [address]    Ischemic foot ulcer due to atherosclerosis of native artery of limb ([facility] [address]  Resolved Problems:    * No resolved hospital problems. *      Plan:  [de-identified]year old male with an extensive past medical history of CKD, PVD, and DM transferred from [address] [place] [address] for vascualr testing and possible intervention      Cellulitis   -IV abx-discontinued secondary to noninfected gangrened necrotic toes  -ID following - appreciate input  -Monitor WBC/Fever - WBC 10.8 today     Necrotic Toes of R Foot  -Podiatry following-   -Vascular Surgery following  -ight femoral endarterectomy - .  9/30/2021  -For TMA right foot this morning 10-21  -Obtain cardiology clearance-completed-stress test with global hypokinesis but no perfusion defects or reversible ischemia  -Nephrology following for renal function optimization, need for IV contrast     DM Type 2  -Monitor labs - well controlled in hospital setting.   -ISS glucose control, resume home medications  -Hypoglycemia protocol initiated  -Discuss diet modification      Severe agitation requiring restraints when he was at [facility]  Since transfer to [facility], he actually appears much more alert and responsive to questions  Try to keep off sedative agents, except long-acting pain control-on MS Contin twice daily       DVT Prophylaxis   PT/OT  Discharge planning hopefully this weekend after TMA completed          Karlos Skinner MD  11:17 AM  10/2/2021

## 2021-10-02 NOTE — PROGRESS NOTES
VASCULAR SURGERY  DAILY PROGRESS NOTE    Date:10/2/2021       [facility] [place]:[id number]  Patient Name:Axel Preston     YOB: 1940     Age:81 y.o. Chief Complaint:  Nonhealing foot wounds/gangrene    Subjective:  Doing well this morning, endorses minimal pain in groin, no bleeding or hematoma. Has been NPO for procedure with podiatry today. Objective:  BP (!) 140/68   Pulse 78   Temp 97.8 °F (36.6 °C) (Oral)   Resp 13   Ht 5' 7\" (1.702 m)   Wt 230 lb 2.2 oz (104.4 kg)   SpO2 96%   BMI 36.04 kg/m²   Temp (24hrs), Av °F (36.7 °C), Min:97.8 °F (36.6 °C), Max:98.3 °F (36.8 °C)      I/O (24Hr):  I/O last 3 completed shifts: In: 80 [P.O.:780]  Out: 875 [Urine:875]     GENERAL:  No acute distress. Alert and conversational.   LUNGS:  No cough. Nonlabored breathing on RA  CARDIOVASC: Normal rate, no cyanosis. ABDOMEN:  Soft, obese but non-distended, non-tender. No guarding / rigidity / rebound. EXTREMITIES:   R middle toes dry gangrene, moves BLE weakly (baseline), right groin with palpable fem pulse, no hematoma or bruising. Minimal tenderness. Assessment:  [de-identified] y.o. male with:  BLE venous stasis ulcers  Critical limb ischemia with ulcers and R toes 2-4 dry gangrene  S/p left femoral access angiography   S/p right iliofemoral endarterectomy     Plan:  - Podiatry for Right foot transmetatarsal [id number]  -Okay to discharge from vascular surgery standpoint    Plan to be discussed with Dr. Vinita Massey     Electronically signed by Brynn Barrett MD on 10/2/2021 at 7:45 AM    Patient was seen and examined. Agree with above. He is good to be on his way to surgery for his digital amputations. I told him they will still be touch and go for limb salvage. He understands.     Vinita Massey

## 2021-10-03 NOTE — PROGRESS NOTES
Podiatry Progress Note  10/3/2021   Elena Garg [de-identified]     SUBJECTIVE:81 y.o. male seen bedside this morning and is now 1 day post-op of a Right Lower Extremity Trans metatarsal amputation. States his foot feels 'a little funny' but denies significant pain. Denies nausea, vomiting, fevers, chills, CP, SOB. Besides surgery no acute overnight events otherwise.       Past Medical History:   Diagnosis Date    (HFpEF) heart failure with preserved ejection fraction ([facility] 07/2020    Diagnosed by cardiology    Arthritis     Bilateral carotid artery stenosis 12/31/2020    Bilateral carotid bruits 12/31/2020    Blood circulation, collateral     CAD (coronary artery disease)     Carotid bruit 03/27/2013    CHF (congestive heart failure) ([facility])     Chronic kidney disease     CKD (chronic kidney disease) stage 3, GFR 30-59 ml/min ([facility]) 03/20/2016    COPD (chronic obstructive pulmonary disease) ([facility]     Follows with pulmonary    Diabetes mellitus ([facility]     Diabetic neuropathy associated with type 2 diabetes mellitus ([facility])     Dyspnea on exertion     History of blood transfusion     Hyperlipidemia     Hypertension     Mild mitral regurgitation     Moderate tricuspid regurgitation by prior echocardiography 2018    Movement disorder     NSTEMI, initial episode of care ([facility] [facility] 01/2018    Obesity     Pulmonary hypertension ([facility] [facility] 2018    PVD (peripheral vascular disease) with claudication ([facility] [facility] 03/27/2014    S/P carotid endarterectomy 03/27/2013    Sleep apnea     SOB (shortness of breath)     Thyroid disease     Unspecified cerebral artery occlusion with cerebral infarction     Vision decreased 12/31/2020        Past Surgical History:   Procedure Laterality Date    BIOPSY / LIGATION TEMPORAL ARTERY Bilateral 01/05/2021    BILATERAL TEMPORAL ARTERY BIOPSY performed by Whitley Long MD at [address]      5 years ago   [address] GRAFT  2008 1 vessel and aortic valve repair    DIAGNOSTIC CARDIAC CATH LAB PROCEDURE  10/14/2008    DIAGNOSTIC CARDIAC CATH LAB PROCEDURE  10/21/2010    JOINT REPLACEMENT      R knee replacement    KNEE SURGERY      OTHER SURGICAL HISTORY Right 2017    debridement,bone bx foot    PACEMAKER INSERTION  2014    D-PPM   ([facility])    Dr. Herbie ayoubs         Family History   Problem Relation Age of Onset    Heart Disease Mother     Heart Failure Mother     Heart Surgery Father     Heart Disease Father     Heart Failure Father     High Blood Pressure Sister     Cancer Sister         Social History     Tobacco Use    Smoking status: Former Smoker     Packs/day: 2.00     Years: 25.00     Pack years: 50.00     Types: Cigarettes     Start date: 1953     Quit date: 1981     Years since quittin.2    Smokeless tobacco: Never Used   Substance Use Topics    Alcohol use: Never     Comment:          Prior to Admission medications    Medication Sig Start Date End Date Taking? Authorizing Provider   HYDROcodone-acetaminophen (NORCO)  MG per tablet Take 1 tablet by mouth every 6 hours as needed for Pain. Yes Historical Provider, MD   magnesium 200 MG TABS tablet Take 200 mg by mouth daily    Historical Provider, MD   diphenhydrAMINE (BENADRYL) 25 MG capsule Take 25 mg by mouth daily as needed for Itching    Historical Provider, MD   insulin lispro, 1 Unit Dial, (HUMALOG KWIKPEN) 100 UNIT/ML SOPN Inject 0-6 Units into the skin 3 times daily (before meals) PER SLIDING SCALE: 0-139=0U, 140-199=1U, 200-249=2U, 250-299=3U, 300-349=4U, 350-399=5U, 400-450=6U    Historical Provider, MD   morphine (THIERRY) 10 MG extended release capsule Take 10 mg by mouth 2 times daily.     Historical Provider, MD   hydrOXYzine (ATARAX) 25 MG tablet Take 25 mg by mouth 4 times daily     Historical Provider, MD   lactulose 20 GM/30ML SOLN Take 20 g by mouth three times a week *MON-WED-FRI*    Historical Provider, MD   metOLazone (ZAROXOLYN) 2.5 MG tablet Take 2.5 mg by mouth three times a week *TUE-THUR-SAT*    Historical Provider, MD   bumetanide (BUMEX) 1 MG tablet Take 1 mg by mouth daily     Historical Provider, MD   potassium chloride (KLOR-CON M) 20 MEQ extended release tablet Take 40 mEq by mouth daily     Historical Provider, MD   ferrous sulfate (IRON 325) 325 (65 Fe) MG tablet Take 1 tablet by mouth 2 times daily (with meals) 1/25/21   Morales Martino DO   DULoxetine (CYMBALTA) 60 MG extended release capsule Take 1 capsule by mouth daily 1/26/21   Morales Martino DO   fluticasone-umeclidin-vilant (TRELEGY ELLIPTA) 100-62.5-25 MCG/INH AEPB Inhale 1 puff into the lungs Daily     Historical Provider, MD   aspirin EC 81 MG EC tablet Take 1 tablet by mouth daily 8/7/20   Terrell Nava MD   albuterol (PROVENTIL) (2.5 MG/3ML) 0.083% nebulizer solution Take 3 mLs by nebulization every 6 hours as needed for Wheezing or Shortness of Breath DX: COPD J44.9 Please bill Medicare Part B 1/6/20   Bogdan Fu MD   levothyroxine (SYNTHROID) 25 MCG tablet Take 25 mcg by mouth Daily  7/29/19   Historical Provider, MD   gabapentin (NEURONTIN) 400 MG capsule Take 400 mg by mouth 4 times daily. 5/19/19   Historical Provider, MD   metoprolol succinate (TOPROL XL) 50 MG extended release tablet Take 50 mg by mouth daily    Historical Provider, MD   isosorbide mononitrate (IMDUR) 120 MG extended release tablet Take 1 tablet by mouth daily 1/23/18   Chaya Cunningham MD   allopurinol (ZYLOPRIM) 300 MG tablet Take 300 mg by mouth daily  2/27/15   Historical Provider, MD   lovastatin (MEVACOR) 20 MG tablet Take 20 mg by mouth nightly. 11/11/13   Historical Provider, MD        Patient has no known allergies.          OBJECTIVE:        Vitals:    10/03/21 0825   BP: (!) 147/72   Pulse: 80   Resp: 18   Temp: 97.6 °F (36.4 °C)   SpO2: 96%        EXAM:        Pt is AAOx3, NAD    VASCULAR:  DP and PT pulses non- palpable. CFT delayed to LLE /greater than 5 seconds. Warm to cool from the tibial tuberosity to the distal aspect of the digits dorsally on RLE and warm to warm on LLE. No hair growth noted to the distal aspects dorsally.     NEUROLOGIC:  Protective sensation is diminished b/l, gross sensation intact     DERM:  Diffuse skin changes noted to the LEs:   RLE TMA site is well approximated and sutures remain intact and well-placed. Synthetic graft is intact and held in place via staples. No clinical signs of infection. Moderate amount of serous sanguineous drainage noted to dressing this morning. No malodor. Pictures below     #1. RLE: Ulcer noted to the distal half of the leg circumferentially. Wound bed is about fibronecrotic in nature with mild serous drainage noted, periwound erythema, no edema, no malodor, no crepitus or fluctuance, no proximal streaking. Hyperpigmented thickened skin lower legs consistent with chronic venous stasis and hemosiderin deposition.     #2. LLE: Wound noted to the distal half of the leg circumferentially around the leg. Wound bed is fibro-granular with mild serous drainage and periwound erythema. No edema. Hyperpigmentation to anterior lower leg consistent with hemosiderin deposition secondary to chronic venous stasis. Small dried, abrasion noted across the 2nd and 3rd digit MPJS. No malodor, no crepitus, no fluctuance, no proximal streaking noted.     MUSCULOSKELETAL:   RLE Transmetatarsal amputation noted. Tenderness on palpation to periwound bilaterally more significant on the left and right.     Media Information     Document Information    Wound      10/03/2021 09:55   Attached To: Hospital Encounter on 9/27/21 with [address], MD  [address]     Media Information     Document Information    Wound      10/03/2021 09:55   Attached To:    Hospital Encounter on 9/27/21 with [facility] Tate Song MD  [facility] [address]         Current Facility-Administered Medications   Medication Dose Route Frequency Provider Last Rate Last Admin    sodium chloride flush 0.9 % injection 10 mL  10 mL IntraVENous 2 times per day Luis Lima MD   10 mL at 10/03/21 0943    sodium chloride flush 0.9 % injection 10 mL  10 mL IntraVENous PRN Luis Lima MD        0.9 % sodium chloride infusion  25 mL IntraVENous PRN Luis Lima MD        enoxaparin (LOVENOX) injection 40 mg  40 mg SubCUTAneous Daily Luis Lima MD   40 mg at 10/03/21 0943    morphine (MS CONTIN) extended release tablet 15 mg  15 mg Oral 2 times per day MADDI Lewis CNP   15 mg at 10/03/21 0950    0.9 % sodium chloride infusion   IntraVENous Continuous MADDI Gibbs  mL/hr at 10/03/21 0401 New Bag at 10/03/21 0401    niCARdipine (CARDENE) 50 mg in sodium chloride 0.9 % 250 mL infusion  3-15 mg/hr IntraVENous Continuous MADDI Gibbs CNP        morphine (PF) injection 1 mg  1 mg IntraVENous Q3H PRN MADDI Rivera CNP        Or    morphine (PF) injection 2 mg  2 mg IntraVENous Q3H PRN MADDI Rivera CNP   2 mg at 10/01/21 1144    acetaminophen (TYLENOL) tablet 1,000 mg  1,000 mg Oral Q6H Gretel Hayden MD   1,000 mg at 10/03/21 0500    HYDROmorphone (DILAUDID) injection 1 mg  1 mg IntraVENous Q3H PRN Gretel Hayden MD   1 mg at 10/03/21 0500    insulin lispro (HUMALOG) injection vial 0-3 Units  0-3 Units SubCUTAneous Nightly MADDI Gibbs CNP   1 Units at 10/02/21 2316    insulin lispro (HUMALOG) injection vial 0-6 Units  0-6 Units SubCUTAneous TID WC MADDI Gibbs CNP   1 Units at 10/03/21 0902    albuterol (PROVENTIL) nebulizer solution 2.5 mg  2.5 mg Nebulization Q6H PRN MADDI Rivera CNP        allopurinol (ZYLOPRIM) tablet 300 mg  300 mg Oral Daily MADDI Gibbs CNP   300 mg at 10/03/21 0944    Arformoterol Tartrate (BROVANA) nebulizer solution 15 mcg 15 mcg Nebulization BID Gilberto Canales APRN - CNP   15 mcg at 10/03/21 0855    aspirin EC tablet 81 mg  81 mg Oral Daily Margarita Pinedo APRN - CNP   81 mg at 10/03/21 0944    budesonide (PULMICORT) nebulizer suspension 250 mcg  250 mcg Nebulization BID Gilberto Canales APRN - CNP   250 mcg at 10/03/21 0855    DULoxetine (CYMBALTA) extended release capsule 60 mg  60 mg Oral Daily Margarita Pinedo, APRN - CNP   60 mg at 10/03/21 0944    ferrous sulfate (IRON 325) tablet 325 mg  325 mg Oral BID WC Margarita Pinedo APRN - CNP   325 mg at 10/03/21 0944    gabapentin (NEURONTIN) capsule 200 mg  200 mg Oral TID MADDI Escalona - CNP   200 mg at 10/03/21 0944    hydrALAZINE (APRESOLINE) tablet 50 mg  50 mg Oral 3 times per day MADDI Escalona - CNP   50 mg at 10/03/21 0630    ipratropium (ATROVENT) 0.02 % nebulizer solution 0.5 mg  0.5 mg Nebulization 4x daily MADDI Escalona - CNP   0.5 mg at 10/03/21 0855    isosorbide mononitrate (IMDUR) extended release tablet 120 mg  120 mg Oral Daily Margarita Pinedo APRN - CNP   120 mg at 10/03/21 0944    lactulose (CHRONULAC) 10 GM/15ML solution 20 g  20 g Oral TID MADDI Escalona - CNP   20 g at 10/02/21 2310    levothyroxine (SYNTHROID) tablet 25 mcg  25 mcg Oral Daily Margarita Khris, APRN - CNP   25 mcg at 10/03/21 0944    metoprolol succinate (TOPROL XL) extended release tablet 50 mg  50 mg Oral Daily Margarita Pinedo, APRN - CNP   50 mg at 10/03/21 0944    labetalol (NORMODYNE;TRANDATE) injection 10 mg  10 mg IntraVENous Q4H PRN Gilberto Canales APRN - CNP   10 mg at 09/29/21 0809        Lab Results   Component Value Date    WBC 9.0 10/03/2021    HCT 22.9 (L) 10/03/2021    HGB 7.4 (L) 10/03/2021     10/03/2021     10/01/2021    K 4.0 10/01/2021     10/01/2021    CO2 21 (L) 10/01/2021    BUN 42 (H) 10/01/2021    CREATININE 1.6 (H) 10/01/2021    GLUCOSE 177 (H) 10/01/2021    CRP 21.4 (H) 09/23/2021 Radiographs:    ASSESSMENT:  - 1 day post-op of Right foot Transmetatarsal amputation. DOS 10/2/21  - Full thickness ulcers down to level of subcutaneous tissue B/L Lower extremity- POA  - Cellulitis B/L Lower extremity, POA  - Dry gangrenous RLE  - Chronic lower extremity edema with venous stasis   - PAD, SP angiogram 9/27/2021  - DM with neuropathic changes        PLAN:  - Examined and evaluated  - All labs, imaging, and charts reviewed   - WBC: 9.0  - Dressings changed this morning with Xeroform DSD. Patient tolerated dressing change well. Pictures above   -S/p angiogram 9/27/2021 with only peroneal vessel runoff right lower extremity, peroneal and some anterior tibial runoff left lower extremity.   - S/p Right ileofemoral and superficial femoral endarterectomy DOS 9/30/21  -No ABX  -Blood cultures 9/23 no growth final.  All bilateral wounds are granular and or with dry gangrene no clinical signs of infection. -X-rays from 9/23/2021 reveal no osseous fractures, no gas, no foreign substances  - Daily dressing changes Xeroform DSD. -We will continue to monitor. Clear for discharge from podiatry perspective once medically stable     Discussed with   Dr. Sylvain Orona, DPM [facility]  Fellowship-Trained Foot and Ankle Surgeon  Diplomate, [employer] Board of Foot and Ankle Surgeons  [phone]       Thank you for involving podiatry in this patients care. Please do not hesitate to call with any questions or concerns.

## 2021-10-03 NOTE — PLAN OF CARE
Problem: Skin Integrity:  Goal: Will show no infection signs and symptoms  Description: Will show no infection signs and symptoms  Outcome: Met This Shift     Problem: Skin Integrity:  Goal: Absence of new skin breakdown  Description: Absence of new skin breakdown  Outcome: Met This Shift     Problem: Falls - Risk of:  Goal: Will remain free from falls  Description: Will remain free from falls  Outcome: Met This Shift     Problem: Falls - Risk of:  Goal: Absence of physical injury  Description: Absence of physical injury  Outcome: Met This Shift     Problem: Pain:  Goal: Pain level will decrease  Description: Pain level will decrease  Outcome: Met This Shift     Problem: Pain:  Goal: Control of acute pain  Description: Control of acute pain  Outcome: Met This Shift     Problem: Pain:  Goal: Control of chronic pain  Description: Control of chronic pain  Outcome: Met This Shift     Problem: OXYGENATION/RESPIRATORY FUNCTION  Goal: Patient will maintain patent airway  Outcome: Met This Shift     Problem: HEMODYNAMIC STATUS  Goal: Patient has stable vital signs and fluid balance  Outcome: Met This Shift

## 2021-10-03 NOTE — PROGRESS NOTES
[facility] here for transport, & notified them that facility has not been given nurse to nurse report, due to no one answering at [facility].

## 2021-10-03 NOTE — PROGRESS NOTES
Attempted again to call nurse to nurse report to [facility]. Still not able to talk to an actual person. Message left again with a call back number for report.

## 2021-10-03 NOTE — PROGRESS NOTES
Subjective: The patient is awake and alert. Resting in bed  Status post femoral endarterectomy on 9/30/2021  No acute complaints, resting comfortably  S/p TMA right foot 10/2       Objective:    BP (!) 147/72   Pulse 80   Temp 97.6 °F (36.4 °C) (Oral)   Resp 18   Ht 5' 7\" (1.702 m)   Wt 249 lb 9 oz (113.2 kg)   SpO2 96%   BMI 39.09 kg/m²     In: 2582 [P.O.:120; I.V.:2462]  Out: 950   In: 2582   Out: 950 [Urine:950]    General appearance: NAD, conversant-clear mentation  HEENT: AT/NC, MMM  Neck: FROM, supple  Lungs: Clear to auscultation  CV: RRR, no MRGs  Vasc: Radial pulses 2+  Abdomen: Soft, non-tender; no masses or HSM  Extremities: Bilateral lower extremities and dressings post femoral endarterectomy- s/p tma   Skin: no rash, lesions or ulcers, Right TMA dressing intact.   Psych: Alert and oriented to person, place and time  Neuro: Alert and interactive     Recent Labs     10/01/21  0450 10/02/21  0848 10/03/21  0441   WBC 12.1* 11.7* 9.0   HGB 7.7* 7.6* 7.4*   HCT 23.4* 22.6* 22.9*    250 245       Recent Labs     09/30/21  0931 09/30/21  1135 10/01/21  0450   NA  --   --  134   K 3.2* 3.5 4.0   CL  --   --  103   CO2  --   --  21*   BUN  --   --  42*   CREATININE 1.5* 1.5* 1.6*   CALCIUM  --   --  8.1*       Assessment:    Principal Problem:    Critical lower limb ischemia (HCC)  Active Problems:    Peripheral vascular disease (HCC)    H/O aortic valve replacement    CKD (chronic kidney disease) stage 3, GFR 30-59 ml/min    Diabetes mellitus type 2, uncontrolled (HCC)    CAD (coronary artery disease), native coronary artery    Status post coronary artery bypass graft    History of CVA (cerebrovascular accident)    Nonrheumatic mitral valve regurgitation    COPD (chronic obstructive pulmonary disease) (HCC)    Peripheral vascular disease of lower extremity with ulceration ([facility])    Nonrheumatic mitral valve stenosis    Severe left ventricular hypertrophy    Cardiomyopathy ([address]    Ischemic foot ulcer due to atherosclerosis of native artery of limb ([facility]  Resolved Problems:    * No resolved hospital problems. *      Plan:  [de-identified]year old male with an extensive past medical history of CKD, PVD, and DM transferred from [address] [place] [address] for vascualr testing and possible intervention      Cellulitis   -IV abx-discontinued secondary to noninfected gangrened necrotic toes  -ID following - appreciate input  -Monitor WBC/Fever - WBC 9.0 today     Necrotic Toes of R Foot  -Podiatry following-   -Vascular Surgery signed off   -Right femoral endarterectomy - .  9/30/2021  -TMA right foot  - 10/2/21 by pod , tolerated well   -Obtain cardiology clearance-completed-stress test with global hypokinesis but no perfusion defects or reversible ischemia  -Nephrology following for renal function optimization, need for IV contrast       DM Type 2  -Monitor labs - well controlled in hospital setting. -ISS glucose control, resume home medications  -Hypoglycemia protocol initiated  -Discuss diet modification      Severe agitation requiring restraints when he was at [facility]  Since transfer to [facility], he actually appears much more alert and responsive to questions  Try to keep off sedative agents, except long-acting pain control-on MS Contin twice daily       DVT Prophylaxis   PT/OT ordered today   Discharge planning from [facility] will return ok missy rodriguez from medicine if ok with podiatry       MADDI Lawson CNP  9:28 AM  10/3/2021     Above note edited to reflect my thoughts     I personally saw, examined and provided care for the patient. Radiographs, labs and medication list were reviewed by me independently. The case was discussed in detail and plans for care were established. Review of FABIO Lawson   , documentation was conducted and revisions were made as appropriate directly by me. I agree with the above documented exam, problem list, and plan of care.      Rach Rodriguez MD  10:51 AM  10/3/2021

## 2021-10-03 NOTE — PROGRESS NOTES
Progress Note  10/3/2021 11:40 AM  Subjective:   Admit Date: 9/27/2021  PCP: Miranda Adkins MD  Interval History: Patient examined doing well s/p rt foot TMA yesterday     Diet: ADULT DIET; Regular; 3 carb choices (45 gm/meal)    Data:   Scheduled Meds:   sodium chloride flush  10 mL IntraVENous 2 times per day    enoxaparin  40 mg SubCUTAneous Daily    morphine  15 mg Oral 2 times per day    acetaminophen  1,000 mg Oral Q6H    insulin lispro  0-3 Units SubCUTAneous Nightly    insulin lispro  0-6 Units SubCUTAneous TID WC    allopurinol  300 mg Oral Daily    Arformoterol Tartrate  15 mcg Nebulization BID    aspirin EC  81 mg Oral Daily    budesonide  250 mcg Nebulization BID    DULoxetine  60 mg Oral Daily    ferrous sulfate  325 mg Oral BID WC    gabapentin  200 mg Oral TID    hydrALAZINE  50 mg Oral 3 times per day    ipratropium  0.5 mg Nebulization 4x daily    isosorbide mononitrate  120 mg Oral Daily    lactulose  20 g Oral TID    levothyroxine  25 mcg Oral Daily    metoprolol succinate  50 mg Oral Daily     Continuous Infusions:   sodium chloride      sodium chloride 125 mL/hr at 10/03/21 0825    niCARdipine       PRN Meds:sodium chloride flush, sodium chloride, morphine **OR** morphine, HYDROmorphone, albuterol, labetalol  I/O last 3 completed shifts:   In: 2462 [I.V.:2462]  Out: 950 [Urine:950]  I/O this shift:  In: 120 [P.O.:120]  Out: -     Intake/Output Summary (Last 24 hours) at 10/3/2021 1140  Last data filed at 10/3/2021 0825  Gross per 24 hour   Intake 2582 ml   Output 950 ml   Net 1632 ml     CBC:   Recent Labs     10/01/21  0450 10/02/21  0848 10/03/21  0441   WBC 12.1* 11.7* 9.0   HGB 7.7* 7.6* 7.4*    250 245     BMP:    Recent Labs     10/01/21  0450      K 4.0      CO2 21*   BUN 42*   CREATININE 1.6*   GLUCOSE 177*     Hepatic:   Recent Labs     10/01/21  0450   AST 22   ALT 13   BILITOT <0.2   ALKPHOS 68     Troponin: No results for input(s): TROPONINI in the last 72 hours. BNP: No results for input(s): BNP in the last 72 hours. Lipids: No results for input(s): CHOL, HDL in the last 72 hours. Invalid input(s): LDLCALCU  ABGs:   Lab Results   Component Value Date    PO2ART 157.4 09/30/2021    AXE2SAQ 42.9 09/30/2021     INR: No results for input(s): INR in the last 72 hours. -----------------------------------------------------------------  RAD: CTA NECK W CONTRAST    Result Date: 9/28/2021  EXAMINATION: CTA OF THE NECK 9/28/2021 1:26 pm TECHNIQUE: CTA of the neck was performed with the administration of intravenous contrast. Multiplanar reformatted images are provided for review. MIP images are provided for review. Stenosis of the internal carotid arteries measured using NASCET criteria. Dose modulation, iterative reconstruction, and/or weight based adjustment of the mA/kV was utilized to reduce the radiation dose to as low as reasonably achievable. COMPARISON: CTA neck March 2, 2010 HISTORY: ORDERING SYSTEM PROVIDED HISTORY: evaluate carotid artery disease, use limited contrast if able TECHNOLOGIST PROVIDED HISTORY: Reason for exam:->evaluate carotid artery disease, use limited contrast if able Has a \"code stroke\" or \"stroke alert\" been called? ->No What reading provider will be dictating this exam?->CRC FINDINGS: Limited due to motion artifact. AORTIC ARCH/ARCH VESSELS: No dissection or arterial injury. No significant stenosis of the brachiocephalic or subclavian arteries. CAROTID ARTERIES: There is severe stenosis in the proximal left internal carotid artery with moderate soft and calcified plaque. There is severe stenosis in the distal right common carotid artery. There is fusiform dilatation at the origin of the right internal carotid artery measuring up to 1.4 cm in diameter. There is question of mild to moderate stenosis in the mid right internal carotid artery.  VERTEBRAL ARTERIES: There is moderate to severe stenosis in the intracranial left vertebral artery. There is 50% stenosis at the origin of the left vertebral artery. There is mild stenosis at the origin of the right vertebral artery. SOFT TISSUES: The patient is status post median sternotomy. The lung apices are clear. No cervical or superior mediastinal lymphadenopathy. The larynx and pharynx are unremarkable. No acute abnormality of the salivary and thyroid glands. BONES: No acute osseous abnormality. There are moderate degenerative changes in the cervical spine. Limited due to motion artifacts, particularly in the region of the bilateral carotid bulbs. Repeat study is recommended. Severe stenosis in the proximal left internal carotid artery with moderate soft and calcified plaque. Severe stenosis in the distal right common carotid artery. Fusiform dilatation at the origin of the right internal carotid artery measuring up to 1.4 cm in diameter. Question of mild to moderate stenosis in the mid right internal carotid artery. Moderate to severe stenosis in the intracranial left vertebral artery. 50% stenosis at the origin of the left vertebral artery. NM Cardiac Stress Test Nuclear Imaging    Result Date: 9/28/2021  Pharmacologic myocardial perfusion stress test: Patient was injected with 11 mCi 99 technetium sestamibi at rest.  Patient was given 0.4 mg of Lexiscan and subsequently 35 mCi 99 technetium sestamibi was administered. Raw spin images: Attenuation correction could not be performed as patient could not elevate arms. There appears to be a lateral attenuation artifact. PERFUSION IMAGES REVEALED: No focal perfusion deficits to suggest the presence of ischemia or infarction. Left ventricular end-diastolic volume 251 ml Left ventricular end-systolic volume 52 ml EJECTION FRACTION: 49%. There is mild global hypokinesis. 1: This is an abnormal myocardial perfusion study due to the presence of mild global hypokinesis.  There is no evidence of perfusion deficits to suggest heart failure with preserved ejection fraction (HCC)     COPD (chronic obstructive pulmonary disease) (HCC)     Diabetic neuropathy associated with type 2 diabetes mellitus (HCC)     Congestive heart failure (HCC)     Cellulitis     Peripheral vascular disease of lower extremity with ulceration (HCC)     Critical lower limb ischemia (HCC)     Nonrheumatic mitral valve stenosis     Severe left ventricular hypertrophy     Cardiomyopathy ([facility] [address]     Ischemic foot ulcer due to atherosclerosis of native artery of limb ([facility] [address] [facility]    Plan:   Assessment/Plans     1.  Chronic kidney disease stage IIIa on the basis of hypertension.  Baseline creatinine 1.3-1.6.  Risk of worsening with IV contrast exposure.  Postprocedure creatinine shows no significant worsening  -continue to monitor     2.  Peripheral vascular disease with dry gangrene of several digits of both feet  -s/p aortogram with bilateral leg runoff; s/p TMA rt foot yesterday      3.  Hypertension with CKD I suspect some of the blood pressure elevated  may be pain related  -Resumed BP meds and monitor - BP better      4.  Type II DM  -Continue insulin regimen     5) Anemia - multifactorial , check iron stores , may transfuse if drops further      6) Hypoalbuminemia     Cinthia Camejo MD

## 2021-10-03 NOTE — OP NOTE
[address]                  [address] [facility]  [facility]                                OPERATIVE REPORT    PATIENT NAME: Valencia Herman                     :        1940  MED REC NO:   [id number]                            ROOM:       [facility]  ACCOUNT NO:   [de-identified]                           ADMIT DATE: 2021  PROVIDER:     Manuela Soliz DPM    DATE OF PROCEDURE:  10/02/2021    PREOPERATIVE DIAGNOSES:  1. Right forefoot gangrene. 2.  Open wound of right foot. 3.  Ulceration of right foot with necrosis of muscle. 4.  Peripheral arterial disease status post recent endovascular  intervention. POSTOPERATIVE DIAGNOSES:  1. Right forefoot gangrene. 2.  Open wound of right foot. 3.  Ulceration of right foot with necrosis of muscle. 4.  Peripheral arterial disease status post recent endovascular  intervention. PROCEDURES:  1. Transmetatarsal amputation of right foot. 2.  Dorsalis pedis adipomyofascial pedicle elevation and transfer. 3.  Application of skin substitute graft specifically Integra Bilayer  graft. SURGEON:  Manuela Soliz DPM    ASSISTANT:  Mine Pichardo, PGY-2    ANESTHESIA:  MAC with local anesthesia infiltration comprising of 20 mL  of 0.5% Marcaine plain. ESTIMATED BLOOD LOSS:  Minimal.    COMPLICATIONS:  None. SPECIMEN OBTAINED:  Right forefoot. INTRAOPERATIVE FINDINGS:  Moderate bleeding throughout the procedure. Excellent closure of the flap without tension. Dopplerable pedal signal  was noted. Capillary refill is brisk to the flap. OTHER MATERIALS USED:  2-0 Vicryl suture, 3-0 nylon suture, skin  staples, and Integra Bilayer graft 2 x 2. INDICATIONS:  This is a pleasant 73-year-old male who presented today  for right transmetatarsal amputation. I counseled the patient the  nature of problem, proposed course of the procedure, potential benefits,  risks, complications, and convalescence in detail.   All questions were  answered to his apparent satisfaction and no guarantees were given as to  the outcome of the procedure. DESCRIPTION OF PROCEDURE:  Under mild sedation, the patient was brought  to the operating room and placed on the operating table in supine  position. The right lower extremity was scrubbed, prepped, and draped  in usual sterile fashion. At this time using a #10 blade, performed a  horizontal incision just proximal to the dorsal toes from medial to  lateral and then extended the incision slightly proximal and medial and  slightly proximal and lateral and then extending the incision plantarly  from medial to lateral just proximal to the toes as well in a  full-thickness manner. At this point, I was able to then disarticulate  the toes from the metatarsophalangeal joints. Next, using sharp  dissection and periosteal elevator, I was able to isolate metatarsals 1,  2, 3, 4, and 5 and with gentle retraction, I was able to then visualize  the metatarsals proximally resecting the resected metatarsal 1 through 5  using bone saw without any complications. Bone was sent off for  pathological microbiologic examination. At this point, I was able to  then with one of my assistant do a careful retraction dorsally, was able  to separate the dorsalis pedis adipomyofascial layer from the  subcutaneous tissue as well as from the periosteum of the metatarsals  and in fact, I was able to mobilize the pedicle from dorsal to the  distal plantar. Rationale behind this was to completely cover the  medullary bones 1 through 5 and provide vasculature to a relatively  avascular region. In doing so I was able to then rotate the plantar  flap from plantar to dorsal and close the skin flap using 3-0 nylon  suture without any tension.   To prevent tension on the lateral side, I  then applied the Integra Bilayer graft, secured it using skin staples  over the muscle and subcutaneous layer at the dorsal lateral segment of  the flap. Remainder of the flap was closed successfully using 3-0 nylon  suture without any tension. At this point, I should also note that I  did introduce Doppler intraoperatively and preoperatively and confirmed  healthy biphasic and triphasic signal to the dorsalis pedis and the  posterior tibial arteries. Postoperative bandage was then applied. The  patient tolerated the procedure and anesthesia well in apparent  satisfactory condition with vital signs stable and vascular status  intact to right lower extremity. The patient was discharged for the  PACU for further monitoring prior to readmission to nursing floor.       Radha Cordvoa DPM    D: 10/02/2021 21:37:15       T: 10/02/2021 21:41:06     [id number]  Job#: [id number]     Doc#: [id number]    CC:

## 2021-10-03 NOTE — PROGRESS NOTES
to his indwelling and the inability for him to move his knee. We will see how he progresses.     Emile Rae

## 2021-10-03 NOTE — CARE COORDINATION
10/3 Care Coordination: CM was called by unit to see if pt can go back to [facility]. Per CM note need PT/OT evals done prior to discharge back to [facility]. Do not need Precert. These have not been done, order placed today for PT/OT. Unit will try to get evals today. CM/SW will continue to follow for discharge planning. Lucy FAIRCHILD,RN-JOSIE-BC  [phone]    10/3 Care Coordination: Talked to Omero Kj from [facility], they can take back WITHOUT PT/OT evals. Will need Neg COVID test. Called unit and spoker with RN . Will set up transport.  by [facility] is at 1700 today. Envelope with ambulette form on soft chart. Covid test to be done in soft chartCM/SW will continue to follow for discharge planning.    Lucy FAIRCHILD,RN-JOSIE-BC  [phone]

## 2021-10-03 NOTE — PROGRESS NOTES
Electronic PAUL completed. Attempted to call nurse to nurse report to [employer]. Unable to speak to a person. Message left on 's answering machine & call back number left for report.

## 2021-10-03 NOTE — PROGRESS NOTES
Patient's brother, Marisela Corcoran, notified of patient's discharge for today, & that ambulance  time was scheduled for 5:00.

## 2021-10-04 NOTE — ANESTHESIA POSTPROCEDURE EVALUATION
Department of Anesthesiology  Postprocedure Note    Patient: Marian Arvizu  MRN: [id number]  YOB: 1940  Date of evaluation: 10/3/2021  Time:  10:01 PM     Procedure Summary     Date: 10/02/21 Room / Location: [facility] 06 / [place] [address]    Anesthesia Start: 2033 Anesthesia Stop: 2130    Procedure: RIGHT FOOT TRANSMITTAL AMPUTATION (Right Foot) Diagnosis: (/)    Surgeons: Donald Schaffer DPM Responsible Provider: Angelique Paris MD    Anesthesia Type: MAC ASA Status: 4          Anesthesia Type: MAC    Mayela Phase I: Mayela Score: 10    Mayela Phase II:      Last vitals: Reviewed and per EMR flowsheets.        Anesthesia Post Evaluation    Patient location during evaluation: PACU  Patient participation: complete - patient participated  Level of consciousness: awake  Airway patency: patent  Nausea & Vomiting: no vomiting and no nausea  Complications: no  Cardiovascular status: hemodynamically stable  Respiratory status: acceptable  Hydration status: stable

## 2021-10-15 PROBLEM — L08.9 WOUND INFECTION: Status: ACTIVE | Noted: 2021-01-01

## 2021-10-15 PROBLEM — T14.8XXA WOUND INFECTION: Status: ACTIVE | Noted: 2021-01-01

## 2021-10-15 NOTE — LETTER
[address] Medicaid  CERTIFICATION OF NECESSITY  FOR TRANSPORTATION   BY WHEELCHAIR VAN     Individual Information   1. Name: Angel Winslow 2. [place] Medicaid Billing Number:    3. Address: [place] [address]      Transportation Provider Information   4. Provider Name: afshan   5. [place] Medicaid Provider Number:  National Provider Identifier (NPI):      Certification  7. Criteria:  By signing this document, the practitioner certifies that two statements are true:  A. This individual must be accompanied by a mobility-related assistive device from the point of pick-up to the point of drop-off. B. Transport of this individual by standard passenger vehicle or common carrier is precluded or contraindicated. 8. Period Beginning Date: 10/18/2021     9. Length  [x] Not more than 10 day(s)  [] One Year     Additional Information Relevant to Certification   10. Comments or Explanations, If Necessary or Appropriate     Osteomyelitis RLE     Certifying Practitioner Information   11. Name of Practitioner: Judd Vela MD   12. [place] Medicaid Provider Number, If Applicable:  [address] Provider Identifier (NPI):      Signature Information   14. Date of Signature: 10/18/2021  15. Name of Person Signing: Lucy Salgado RN    16. Signature and Professional Designation: Lucy Salgado RN 10/18/2021 1517     [facility] [address]  Rev. 7/2015    [address] Encounter Date/Time: 10/15/2021 [address] Account: [de-identified]    MRN: [id number]    Patient: Carmen Nogueira Serial #: [id number]      ENCOUNTER          Patient Class: I Private Enc? No Unit  BD: [address] [facility] Service: Med/Surg   Encounter DX: Wound infection [T14. 0XT*   ADM Provider: Jyoti Yoder MD   Procedure:     ATT Provider: Jyoti Yoder MD   REF Provider:        Admission DX: Wound infection, HARVEY (acute kidney injury) ([facility] [address] Gangrene of right foot ([facility] [address] and DX codes: T14. 8XXA, L08.9, N17.9, [id number]    PATIENT                 Name: Marian Arvizu : 1940 (81 yrs)   Address: [facility]* Sex: Male   City: [place] [address]         Marital Status:    Employer: RETIRED         Orthodox: Taoist   Primary Care Provider: Conchis Renee MD         Primary Phone: [phone] [address]   Contact Name Legal Guardian? Relationship to Patient Home Phone Work Phone   1. ABEL ABRAHAM  2. *No Contact Specified*      Brother/Sister    [phone]                 GUARANTOR            GuarantorRigo Valadez     : 1940   Address: [address]* Sex: [place]     [place] [address]     Relation to Patient: Self       Home Phone: [phone]   Guarantor ID: [id number]       Work Phone:     Guarantor Employer: RETIRED         Status: RETIRED      COVERAGE        PRIMARY INSURANCE   Payor: Shailesh Membreno MEDICARE Plan: [facility]*   Payor Address: [facility] [id number], [place] [address]       Group Number: [id number] Insurance Type: [address] Name: Esmeralda Epley Subscriber : 1940   Subscriber ID: [id number] Pat. Rel. to Sub: Self   SECONDARY INSURANCE   Payor:   Plan:     Payor Address:  ,           Group Number:   Insurance Type:     Subscriber Name:   Subscriber :     Subscriber ID:   Pat.  Rel. to Sub:             CSN: [id number]

## 2021-10-15 NOTE — ED TRIAGE NOTES
FIRST PROVIDER CONTACT ASSESSMENT NOTE                                                                                                Department of Emergency Medicine                                                      First Provider Note  10/15/21  3:17 PM EDT  NAME: Bharti Berman  : 1940  MRN: [id number]    Chief Complaint: Wound Check (Recent amputations of right foot, podiatry sent here for evaluation for necrosis and infection. )      History of Present Illness:   Bharti Berman is a [de-identified] y.o. male who presents to the ED for sent in for admission by podiatry recent I&D of foot by podiatry reports foul-smelling drainage. Pedal pulses intact bilaterally via Doppler. Focused Physical Exam:  VS:    ED Triage Vitals   BP Temp Temp src Pulse Resp SpO2 Height Weight   10/15/21 1506 10/15/21 1506 -- 10/15/21 1458 10/15/21 1506 10/15/21 1458 10/15/21 1506 10/15/21 1506   (!) 145/86 98 °F (36.7 °C)  63 16 93 % 5' 8\" (1.727 m) 235 lb (106.6 kg)        General: Alert and in no apparent distress.     Medical History:  has a past medical history of (HFpEF) heart failure with preserved ejection fraction ([address] Arthritis, Bilateral carotid artery stenosis, Bilateral carotid bruits, Blood circulation, collateral, CAD (coronary artery disease), Carotid bruit, CHF (congestive heart failure) ([address] Chronic kidney disease, CKD (chronic kidney disease) stage 3, GFR 30-59 ml/min ([facility]), COPD (chronic obstructive pulmonary disease) ([address] Diabetes mellitus ([address] Diabetic neuropathy associated with type 2 diabetes mellitus ([address] Dyspnea on exertion, History of blood transfusion, Hyperlipidemia, Hypertension, Mild mitral regurgitation, Moderate tricuspid regurgitation by prior echocardiography, Movement disorder, NSTEMI, initial episode of care ([address] Obesity, Pulmonary hypertension ([address] PVD (peripheral vascular disease) with claudication ([address] S/P carotid endarterectomy, Sleep apnea, SOB (shortness of breath), Thyroid disease, Unspecified cerebral artery occlusion with cerebral infarction, and Vision decreased. Surgical History:  has a past surgical history that includes Cardiac surgery; vascular surgery; joint replacement; Diagnostic Cardiac Cath Lab Procedure (10/14/2008); Diagnostic Cardiac Cath Lab Procedure (10/21/2010); knee surgery; Cardiac valuve replacement; Coronary artery bypass graft (2008); Tonsillectomy; Pacemaker insertion (11/12/2014); other surgical history (Right, 02/14/2017); BIOPSY / LIGATION TEMPORAL ARTERY (Bilateral, 01/05/2021); Foot Amputation (Right, 10/2/2021); and Femoral Endarterectomy (Right, 9/30/2021). Social History:  reports that he quit smoking about 40 years ago. His smoking use included cigarettes. He started smoking about 68 years ago. He has a 50.00 pack-year smoking history. He has never used smokeless tobacco. He reports previous drug use. He reports that he does not drink alcohol. Family History: family history includes Cancer in his sister; Heart Disease in his father and mother; Heart Failure in his father and mother; Heart Surgery in his father; High Blood Pressure in his sister. Allergies: Patient has no known allergies.      Initial Plan of Care:  Initiate Treatment-Testing, Proceed toTreatment Area When Bed Available for ED Attending/MLP to Continue Care    -------------------------------------------------END OF FIRST PROVIDER CONTACT ASSESSMENT NOTE--------------------------------------------------------  Electronically signed by MADDI Tello CNP   DD: 10/15/21

## 2021-10-15 NOTE — ED PROVIDER NOTES
This is an 81-year-old male with complex past medical history including history of diabetes, coronary artery disease, presenting to the emergency department for worsening right foot wound. Patient states he had recent surgery, is unable to say who the surgeon was, is currently in a nursing home and states he saw podiatry and they told him to come to the emergency department. He states he has wound on the right foot from recent surgery and also has chronic left foot wounds. These wounds hurt intermittently, worse with exposure to air or palpation, aching character, moderate severity, improved by wrapping wounds. Patient denies any fevers or chills. Patient follows with vascular surgery Dr. Rosa Lewis, just had transmetatarsal amputation on the right foot by Dr. Oren Garner on 10/2/21, 13 days ago. Wound is now foul-smelling, dehisced. Patient is not sure if he is on any antibiotics. The history is provided by the patient and medical records. The history is limited by the absence of a caregiver and the condition of the patient. Review of Systems   Constitutional: Negative for chills and fever. HENT: Negative for congestion and sore throat. Eyes: Negative for visual disturbance. Respiratory: Negative for cough and shortness of breath. Cardiovascular: Negative for chest pain and leg swelling. Gastrointestinal: Negative for abdominal pain, nausea and vomiting. Genitourinary: Negative for dysuria and flank pain. Musculoskeletal: Negative for back pain and neck pain. Skin: Positive for wound. Negative for rash. Neurological: Negative for syncope and light-headedness. Physical Exam  Vitals and nursing note reviewed. Constitutional:       Appearance: He is not toxic-appearing or diaphoretic. Comments: Chronically ill-appearing elderly male sitting in bed comfortably. HENT:      Head: Normocephalic.       Right Ear: External ear normal.      Left Ear: External ear normal. Eyes:      Extraocular Movements: Extraocular movements intact. Conjunctiva/sclera: Conjunctivae normal.   Cardiovascular:      Rate and Rhythm: Normal rate and regular rhythm. Pulses: Normal pulses. Heart sounds: Normal heart sounds. Comments: Bilateral dorsalis pedis pulses detected by Doppler by nursing. Pulmonary:      Effort: Pulmonary effort is normal.      Breath sounds: Normal breath sounds. Abdominal:      Comments: Obese, soft, nontender   Musculoskeletal:      Cervical back: Normal range of motion and neck supple. Right lower leg: No edema. Left lower leg: No edema. Comments: Small wound on left foot, see photo for details. Large dehisced wound on right foot, it is very foul-smelling, with purulent exudate, see photo for details   Skin:     General: Skin is warm. Capillary Refill: Capillary refill takes less than 2 seconds. Findings: Lesion present. Neurological:      General: No focal deficit present. Mental Status: He is alert. Psychiatric:         Mood and Affect: Mood normal.         Behavior: Behavior normal.         Thought Content: Thought content normal.         Judgment: Judgment normal.                  Procedures     MDM  Number of Diagnoses or Management Options  HARVEY (acute kidney injury) ([address]  Gangrene of right foot ([address]  Diagnosis management comments: This is an 79-year-old male with extensive past medical history including history of diabetes, chronic foot wounds, presents emerged department for worsening right foot wound. Patient had recent transmetatarsal amputation by Dr. Kate Salvador of podiatry, was sent to the emergency department due to worsening smell and appearance of the wound. On exam wound is dehisced, foul-smelling and appears infected with purulent drainage. Patient with mild leukocytosis, elevated inflammatory markers, some evidence of soft tissue gas on plain film x-ray, no overt evidence of osteomyelitis.   Patient with mildly elevated lactate. Blood cultures obtained, patient not overtly septic, is not tachycardic, he is mildly hypertensive. He has intact dorsalis pedis pulses on Doppler. Patient given vancomycin, Zosyn. ED Course as of Oct 16 0055   Sat Oct 16, 2021   0051 Dr. Rubio Francois discussed case with [facility], agreed to admit patient to Dr. Milvia Shanks.     [RH]      ED Course User Index  [RH] [address], DO     ED Course as of Oct 16 0055   Sat Oct 16, 2021   0051 Dr. Rubio Francois discussed case with [facility], agreed to admit patient to Dr. Milvia Shanks.     [RH]      ED Course User Index  [RH] [address], DO       --------------------------------------------- PAST HISTORY ---------------------------------------------  Past Medical History:  has a past medical history of (HFpEF) heart failure with preserved ejection fraction ([address] Arthritis, Bilateral carotid artery stenosis, Bilateral carotid bruits, Blood circulation, collateral, CAD (coronary artery disease), Carotid bruit, CHF (congestive heart failure) ([address] Chronic kidney disease, CKD (chronic kidney disease) stage 3, GFR 30-59 ml/min ([address] COPD (chronic obstructive pulmonary disease) ([address] Diabetes mellitus ([address] Diabetic neuropathy associated with type 2 diabetes mellitus ([address] Dyspnea on exertion, History of blood transfusion, Hyperlipidemia, Hypertension, Mild mitral regurgitation, Moderate tricuspid regurgitation by prior echocardiography, Movement disorder, NSTEMI, initial episode of care ([address] Obesity, Pulmonary hypertension ([address] PVD (peripheral vascular disease) with claudication ([address] S/P carotid endarterectomy, Sleep apnea, SOB (shortness of breath), Thyroid disease, Unspecified cerebral artery occlusion with cerebral infarction, and Vision decreased. Past Surgical History:  has a past surgical history that includes Cardiac surgery; vascular surgery; joint replacement; Diagnostic Cardiac Cath Lab Procedure (10/14/2008);  Diagnostic Cardiac Cath Lab Procedure (10/21/2010); knee surgery; Cardiac valuve replacement; Coronary artery bypass graft (2008); Tonsillectomy; Pacemaker insertion (11/12/2014); other surgical history (Right, 02/14/2017); BIOPSY / LIGATION TEMPORAL ARTERY (Bilateral, 01/05/2021); Foot Amputation (Right, 10/2/2021); and Femoral Endarterectomy (Right, 9/30/2021). Social History:  reports that he quit smoking about 40 years ago. His smoking use included cigarettes. He started smoking about 68 years ago. He has a 50.00 pack-year smoking history. He has never used smokeless tobacco. He reports previous drug use. He reports that he does not drink alcohol. Family History: family history includes Cancer in his sister; Heart Disease in his father and mother; Heart Failure in his father and mother; Heart Surgery in his father; High Blood Pressure in his sister. The patients home medications have been reviewed. Allergies: Patient has no known allergies.     -------------------------------------------------- RESULTS -------------------------------------------------    LABS:  Results for orders placed or performed during the hospital encounter of 10/15/21   CBC auto differential   Result Value Ref Range    WBC 11.8 (H) 4.5 - 11.5 E9/L    RBC 3.03 (L) 3.80 - 5.80 E12/L    Hemoglobin 9.2 (L) 12.5 - 16.5 g/dL    Hematocrit 29.4 (L) 37.0 - 54.0 %    MCV 97.0 80.0 - 99.9 fL    MCH 30.4 26.0 - 35.0 pg    MCHC 31.3 (L) 32.0 - 34.5 %    RDW 16.2 (H) 11.5 - 15.0 fL    Platelets 896 945 - 909 E9/L    MPV 9.6 7.0 - 12.0 fL    Neutrophils % 86.2 (H) 43.0 - 80.0 %    Immature Granulocytes % 0.7 0.0 - 5.0 %    Lymphocytes % 5.7 (L) 20.0 - 42.0 %    Monocytes % 4.0 2.0 - 12.0 %    Eosinophils % 3.1 0.0 - 6.0 %    Basophils % 0.3 0.0 - 2.0 %    Neutrophils Absolute 10.18 (H) 1.80 - 7.30 E9/L    Immature Granulocytes # 0.08 E9/L    Lymphocytes Absolute 0.67 (L) 1.50 - 4.00 E9/L    Monocytes Absolute 0.47 0.10 - 0.95 E9/L    Eosinophils Absolute 0.36 0.05 - 0.50 E9/L    Basophils Absolute 0.04 0.00 - 0.20 E9/L   Comprehensive Metabolic Panel   Result Value Ref Range    Sodium 134 132 - 146 mmol/L    Potassium 4.4 3.5 - 5.0 mmol/L    Chloride 96 (L) 98 - 107 mmol/L    CO2 23 22 - 29 mmol/L    Anion Gap 15 7 - 16 mmol/L    Glucose 157 (H) 74 - 99 mg/dL    BUN 57 (H) 6 - 23 mg/dL    CREATININE 2.0 (H) 0.7 - 1.2 mg/dL    GFR Non-African American 32 >=60 mL/min/1.73    GFR African American 39     Calcium 10.5 (H) 8.6 - 10.2 mg/dL    Total Protein 8.1 6.4 - 8.3 g/dL    Albumin 2.5 (L) 3.5 - 5.2 g/dL    Total Bilirubin 0.3 0.0 - 1.2 mg/dL    Alkaline Phosphatase 217 (H) 40 - 129 U/L    ALT 30 0 - 40 U/L    AST 41 (H) 0 - 39 U/L   Lactate, Sepsis   Result Value Ref Range    Lactic Acid, Sepsis 2.4 (H) 0.5 - 1.9 mmol/L   Lactate, Sepsis   Result Value Ref Range    Lactic Acid, Sepsis 1.5 0.5 - 1.9 mmol/L   C-REACTIVE PROTEIN   Result Value Ref Range    CRP 17.1 (H) 0.0 - 0.4 mg/dL   SPECIMEN REJECTION   Result Value Ref Range    Rejected Test ESR     Reason for Rejection see below    Sedimentation Rate   Result Value Ref Range    Sed Rate 128 (H) 0 - 15 mm/Hr   POCT Glucose   Result Value Ref Range    Meter Glucose 140 (H) 74 - 99 mg/dL       RADIOLOGY:  XR FOOT LEFT (MIN 3 VIEWS)   Final Result   Mild soft tissue edema which could be reactive or due to cellulitis. Chronic osseous changes. MRI would be useful if symptoms persist.         XR FOOT RIGHT (MIN 3 VIEWS)   Final Result   Status post transmetatarsal amputation. Irregular appearance of the overlying soft tissues with multiple lucencies   which may represent soft tissue gas.   Large soft tissue defect superiorly at   the amputation site      No gross lytic or erosive changes to suggest advanced osteomyelitis.               ------------------------- NURSING NOTES AND VITALS REVIEWED ---------------------------  Date / Time Roomed:  10/15/2021  5:49 PM  ED Bed Assignment:  [id number]    The nursing notes within the ED encounter and vital signs as below have been reviewed. Patient Vitals for the past 24 hrs:   BP Temp Temp src Pulse Resp SpO2 Height Weight   10/15/21 2145 (!) 171/79 97.5 °F (36.4 °C) Temporal 72 18 97 % -- --   10/15/21 1506 (!) 145/86 98 °F (36.7 °C) -- -- 16 -- 5' 8\" (1.727 m) 235 lb (106.6 kg)   10/15/21 1458 -- -- -- 63 -- 93 % -- --       Oxygen Saturation Interpretation: Normal    ------------------------------------------ PROGRESS NOTES ------------------------------------------  Re-evaluation(s):  See ED COURSE    Counseling:  I have spoken with the patient and discussed todays results, in addition to providing specific details for the plan of care and counseling regarding the diagnosis and prognosis. Their questions are answered at this time and they are agreeable with the plan of admission.    --------------------------------- ADDITIONAL PROVIDER NOTES ---------------------------------  Consultations:  See ED COURSE  This patient's ED course included: a personal history and physicial examination, re-evaluation prior to disposition, multiple bedside re-evaluations, IV medications, cardiac monitoring, continuous pulse oximetry and complex medical decision making and emergency management    This patient has remained hemodynamically stable during their ED course. Diagnosis:  1. Gangrene of right foot ([facility]    2. HARVEY (acute kidney injury) ([facility]        Disposition:  Patient's disposition: Admit to med/surg floor  Patient's condition is stable.          [address], DO  Resident  10/16/21 1717

## 2021-10-16 NOTE — BRIEF OP NOTE
Brief Postoperative Note      Patient: Alexander Causey  YOB: 1940  MRN: [id number]    Date of Procedure: 10/16/2021    Pre-Op Diagnosis: Right foot wound debridement and application of wound VAC. Incision and drainage of right foot. Amputation left 2nd toe. Post-Op Diagnosis: Same       Procedure(s):  RIGHT FOOT WOUND DEBRIDEMENT; LEFT FOOT AMPUTATION TOES 2,3  (WOULD LIKE TO FOLLOW)    Surgeon(s):  Ramakrishna Anderson DPM    Assistant:  Resident: Rohit Causey DPM    Anesthesia: Monitor Anesthesia Care    Estimated Blood Loss (mL): Minimal    Complications: None    Specimens:   ID Type Source Tests Collected by Time Destination   1 : right foot wound tissue for aerobic, anaerobic, gram stain, acid fast, fungal Body Fluid Foot CULTURE, FUNGUS, GRAM STAIN, CULTURE, BODY FLUID, CULTURE WITH SMEAR, ACID FAST BACILLIUS Ramakrishna Lewis DPM 10/16/2021 1304    2 : right first metatarsal bone for aerobic, anaerobic, gram stain, acid fast, fungal Body Fluid Foot CULTURE, FUNGUS, GRAM STAIN, CULTURE, BODY FLUID, CULTURE WITH SMEAR, ACID FAST BACILLIUS Ramakrishna Lewis DPM 10/16/2021 1319    A : left 2nd toe Tissue Foot SURGICAL PATHOLOGY Ramakrishna Lewis DPM 10/16/2021 1306    B : Right foot 1st metatarsal bone Tissue Foot SURGICAL PATHOLOGY Ramakrishna Lewis DPM 10/16/2021 1321        Implants:  * No implants in log *      Drains:   Urethral Catheter Straight-tip; Non-latex 16 fr (Active)   Catheter Indications Need for fluid volume management of the critically ill patient in a critical care setting 10/03/21 0825   Site Assessment No urethral drainage 10/02/21 2245   Urine Color Yellow 10/03/21 1456   Urine Appearance Hazy 10/03/21 1456   Output (mL) 425 mL 10/03/21 1433       [REMOVED] External Urinary Catheter (Removed)   Output (mL) 500 mL 09/24/21 0601       Findings: Severe necrosis of right foot. Necrosis of left 2nd toe.     Electronically signed by En Torrez DPM on 10/16/2021 at 1:50 PM

## 2021-10-16 NOTE — CONSULTS
Department of Podiatry   Consult Note        Reason for Consult:  Wound check post R TMA    CHIEF COMPLAINT:   Infected R foot    HISTORY OF PRESENT ILLNESS:                The patient is a [de-identified] y.o. male with significant past medical history of poorly controlled type II diabetes, PVD, CAD, and gangrene of R foot with post-TMA. He is known by podiatry service. Patient underwent a Right sided TMA 2 weeks ago tomorrow  (10/2/21) due to gangrenous digits of RLE. Pasha Pleitez Today, he presents to the ED with completely dehisced R TMA site and redness of left toes 2 and 3. Pt unsure how long digits have been red or how long the TMA site has been open. He complained of noticing significant odor along with with drainage on the dressing. Complains of nausea w/o vomiting. Complains of feeling lethargic. No fevers, chills, SOB, CP.      Past Medical History:        Diagnosis Date    (HFpEF) heart failure with preserved ejection fraction ([address] 07/2020    Diagnosed by cardiology    Arthritis     Bilateral carotid artery stenosis 12/31/2020    Bilateral carotid bruits 12/31/2020    Blood circulation, collateral     CAD (coronary artery disease)     Carotid bruit 03/27/2013    CHF [facility]congestive heart failure) ([facility])     Chronic kidney disease     CKD (chronic kidney disease) stage 3, GFR 30-59 ml/min ([facility]) 03/20/2016    COPD (chronic obstructive pulmonary disease) ([address]     Follows with pulmonary    Diabetes mellitus ([address]     Diabetic neuropathy associated with type 2 diabetes mellitus (HCC)     Dyspnea on exertion     History of blood transfusion     Hyperlipidemia     Hypertension     Mild mitral regurgitation     Moderate tricuspid regurgitation by prior echocardiography 2018    Movement disorder     NSTEMI, initial episode of care ([address] 01/2018    Obesity     Pulmonary hypertension ([address] 2018    PVD (peripheral vascular disease) with claudication ([address] 03/27/2014    S/P carotid endarterectomy 03/27/2013    Sleep review of systems both positive and negative discussed in HPI    LOWER EXTREMITY EXAMINATION     VASCULAR:  DP and PT pulses nonpalpable, but revealed mono-biphasic waveforms per doppler. Significant swelling appreciated. NEUROLOGIC:  Protective sensation significantly diminished / absent to the bilateral LE distal to the ankle. DERM:  Almost complete dehiscence of the Right TMA site. Sutures not holding the wound together. Significant malodor noted. Positive serous - sanguineous drainage noted. Fibrotic base. Synthetic graft intact, but not taken. Positive periwound erythema. Picture below. Left digits 2 and 3 significant erythema appreciated. Swollen digits. Pictures below. Significant drainage noted. MUSCULOSKELETAL: Prior Right TMA site. Positive Tenderness to palpation of the R foot. Negative TTP of LLE. Muscle strength testing deferred at this time. Media Information     Document Information    Wound      10/15/2021 20:02   Attached To: Hospital Encounter on 10/15/21   [address] [facility]  [facility]     Media Information     Document Information    Wound      10/15/2021 20:02   Attached To: Hospital Encounter on 10/15/21   Source [address] MD Rosey  [facility]         CONSULTS:  IP CONSULT TO PODIATRY  IP CONSULT TO INFECTIOUS DISEASES  PHARMACY TO DOSE VANCOMYCIN  IP CONSULT TO INFECTIOUS DISEASES    MEDICATION:  Scheduled Meds:   vancomycin  2,000 mg IntraVENous Once    piperacillin-tazobactam  4,500 mg IntraVENous Once     Continuous Infusions:  PRN Meds:.     RADIOLOGY:  XR FOOT RIGHT (MIN 3 VIEWS)    (Results Pending)   XR FOOT LEFT (MIN 3 VIEWS)    (Results Pending)       Vitals:    BP (!) 145/86   Pulse 63   Temp 98 °F (36.7 °C)   Resp 16   Ht 5' 8\" (1.727 m)   Wt 235 lb (106.6 kg)   SpO2 93%   BMI 35.73 kg/m²     LABS:   Recent Labs     10/15/21  1603   WBC 11.8*   HGB 9.2*   HCT 29.4*        Recent Labs 10/15/21  1603      K 4.4   CL 96*   CO2 23   BUN 57*   CREATININE 2.0*     Recent Labs     10/15/21  1603   PROT 8.1       ASSESSMENTS:   Active Problems:  - 13 days post-op of Right foot TMA. DOS 10/2/21.    - Wound infection R TMA site with significant malodor present  -  Left digits 2 and 3 Cellulitis with strongly suspected underlying osteomyelitis  - Elevated WBC at 11.8         PLAN:  - Patient seen and evaluated bedside in the ED  - Charts and labs reviewed   - Plan for surgical Debridement of Right TMA and amputation of digits 2 and 3 of Left foot tomorrow 10/16/21  - NPO tonight at midnight tonight   - ABX: Vanc and Zosyn given in the ED  - Vascular: dopplerable pulses   - X-rays: Ordered and pending of bilateral feet  - Dressing: Betadine DSD applied to the bilateral LE    - Discussed patient with Dr. Jasmine Black   - Will continue to follow patient while they are in-house. Thank you for the opportunity to take part in the patient's care. Please do not hesitate to call for any questions or concerns.

## 2021-10-16 NOTE — H&P
[address] [facility]  History and Physical      CHIEF COMPLAINT:    Chief Complaint   Patient presents with    Wound Check     Recent amputations of right foot, podiatry sent here for evaluation for necrosis and infection. Patient of Ileana Finnegan MD presents with:  Wound infection    History of Present Illness:   Patient is an 27-year-old male with past medical history of CHF, CAD, COPD, CKD, DM, thyroid disease, and STEMI, hypertension, and PVD for chronic wounds and cellulitis. Patient presents to the ED via EMS from facility with wound infection. Patient was recently discharged on 10/3, and during that stay patient had transmetatarsal amputation of the right foot. Podiatry seen patient at facility and had them bring the patient to the ED. Patient denies any pain in foot, chest pain, shortness of breath, fever, chills, headache, abdominal pain, dizziness, and blurred vision. ER work-up reveals BUN/creatinine 57/2.0, lactic acid 2.4, WBC 11.8. Patient was admitted to [place] [address] unit for podiatry consult and for further testing and treatment. REVIEW OF SYSTEMS:  Pertinent negatives are above in HPI. 10 point ROS otherwise negative.       Past Medical History:   Diagnosis Date    (HFpEF) heart failure with preserved ejection fraction ([facility] 07/2020    Diagnosed by cardiology    Arthritis     Bilateral carotid artery stenosis 12/31/2020    Bilateral carotid bruits 12/31/2020    Blood circulation, collateral     CAD (coronary artery disease)     Carotid bruit 03/27/2013    CHF (congestive heart failure) (HCC)     Chronic kidney disease     CKD (chronic kidney disease) stage 3, GFR 30-59 ml/min ([facility]) 03/20/2016    COPD (chronic obstructive pulmonary disease) ([address]     Follows with pulmonary    Diabetes mellitus ([address]     Diabetic neuropathy associated with type 2 diabetes mellitus (HCC)     Dyspnea on exertion     History of blood transfusion     Hyperlipidemia     Hypertension     Mild mitral regurgitation     Moderate tricuspid regurgitation by prior echocardiography 2018    Movement disorder     NSTEMI, initial episode of care ([facility] 01/2018    Obesity     [facility] 2018    PVD (peripheral vascular disease) with claudication ([facility] 03/27/2014    S/P carotid endarterectomy 03/27/2013    Sleep apnea     SOB (shortness of breath)     Thyroid disease     Unspecified cerebral artery occlusion with cerebral infarction     Vision decreased 12/31/2020         Past Surgical History:   Procedure Laterality Date    BIOPSY / LIGATION TEMPORAL ARTERY Bilateral 01/05/2021    BILATERAL TEMPORAL ARTERY BIOPSY performed by Sunita Banda MD at [address]      5 years ago   [address] GRAFT  2008    1 vessel and aortic valve repair    DIAGNOSTIC CARDIAC CATH LAB PROCEDURE  10/14/2008    DIAGNOSTIC CARDIAC CATH LAB PROCEDURE  10/21/2010    FEMORAL ENDARTERECTOMY Right 9/30/2021    RIGHT FEMORAL ENDARTERECTOMY performed by Jung Woodruff MD at [facility] Right 10/2/2021    RIGHT FOOT TRANSMITTAL AMPUTATION performed by Lisy Soto DPM at [facility]      R knee replacement    KNEE SURGERY      OTHER SURGICAL HISTORY Right 02/14/2017    debridement,bone bx foot    PACEMAKER INSERTION  11/12/2014    D-PPM   ()    Dr. Samina Flores      carotids       Medications Prior to Admission:    Medications Prior to Admission: mirtazapine (REMERON) 7.5 MG tablet, Take 7.5 mg by mouth nightly  HYDROcodone-acetaminophen (NORCO)  MG per tablet, Take 1 tablet by mouth every 6 hours as needed for Pain.  hydrALAZINE (APRESOLINE) 50 MG tablet, Take 1 tablet by mouth every 8 hours  magnesium 200 MG TABS tablet, Take 200 mg by mouth daily  diphenhydrAMINE (BENADRYL) 25 MG capsule, Take 25 mg by mouth daily as needed for Itching  morphine (THIERRY) 10 MG extended release capsule, Take 10 mg by mouth 2 times daily. hydrOXYzine (ATARAX) 25 MG tablet, Take 25 mg by mouth 4 times daily   lactulose 20 GM/30ML SOLN, Take 20 g by mouth three times a week *MON-WED-FRI*  metOLazone (ZAROXOLYN) 2.5 MG tablet, Take 2.5 mg by mouth three times a week *TUE-THUR-SAT*  bumetanide (BUMEX) 1 MG tablet, Take 1 mg by mouth daily   potassium chloride (KLOR-CON M) 20 MEQ extended release tablet, Take 40 mEq by mouth daily   ferrous sulfate (IRON 325) 325 (65 Fe) MG tablet, Take 1 tablet by mouth 2 times daily (with meals)  DULoxetine (CYMBALTA) 60 MG extended release capsule, Take 1 capsule by mouth daily  fluticasone-umeclidin-vilant (TRELEGY ELLIPTA) 100-62.5-25 MCG/INH AEPB, Inhale 1 puff into the lungs Daily   aspirin EC 81 MG EC tablet, Take 1 tablet by mouth daily  albuterol (PROVENTIL) (2.5 MG/3ML) 0.083% nebulizer solution, Take 3 mLs by nebulization every 6 hours as needed for Wheezing or Shortness of Breath DX: COPD J44.9 Please bill Medicare Part B  levothyroxine (SYNTHROID) 25 MCG tablet, Take 25 mcg by mouth Daily   gabapentin (NEURONTIN) 400 MG capsule, Take 400 mg by mouth 4 times daily. metoprolol succinate (TOPROL XL) 50 MG extended release tablet, Take 50 mg by mouth daily  isosorbide mononitrate (IMDUR) 120 MG extended release tablet, Take 1 tablet by mouth daily  allopurinol (ZYLOPRIM) 300 MG tablet, Take 300 mg by mouth daily   lovastatin (MEVACOR) 20 MG tablet, Take 20 mg by mouth nightly. insulin lispro, 1 Unit Dial, (HUMALOG KWIKPEN) 100 UNIT/ML SOPN, Inject 0-6 Units into the skin 3 times daily (before meals) PER SLIDING SCALE: 0-139=0U, 140-199=1U, 200-249=2U, 250-299=3U, 300-349=4U, 350-399=5U, 400-450=6U    Note that the patient's home medications were reviewed and the above list is accurate to the best of my knowledge at the time of the exam.    Allergies:    Patient has no known allergies.     Social History:    reports that he quit smoking about 40 years ago. His smoking use included cigarettes. He started smoking about 68 years ago. He has a 50.00 pack-year smoking history. He has never used smokeless tobacco. He reports previous drug use. He reports that he does not drink alcohol. Family History:   family history includes Cancer in his sister; Heart Disease in his father and mother; Heart Failure in his father and mother; Heart Surgery in his father; High Blood Pressure in his sister. PHYSICAL EXAM:    Vitals:  BP (!) 147/64   Pulse 65   Temp 97.1 °F (36.2 °C) (Temporal)   Resp 18   Ht 5' 8\" (1.727 m)   Wt 235 lb (106.6 kg)   SpO2 96%   BMI 35.73 kg/m²       General appearance: NAD, conversant, ill appearing  Eyes: Sclerae anicteric, PERRLA  HEENT: AT/NC, MMM  Neck: FROM, supple, no thyromegaly  Lymph: No cervical / supraclavicular lymphadenopathy  Lungs: Clear to auscultation, WOB normal  CV: RRR, no MRGs, no lower extremity edema, pacemaker  Abdomen: Soft, non-tender; no masses or HSM, +BS  Extremities: FROM without synovitis. No clubbing or cyanosis of the hands. Wounds to bilateral lower extremities  Skin: no rash, induration, lesions, or ulcers  Psych: Calm and cooperative. Normal judgement and insight. Normal mood and affect. Neuro: Alert and interactive, face symmetric, speech fluent. LABS:  All labs reviewed.   Of note:  CBC with Differential:    Lab Results   Component Value Date    WBC 9.3 10/16/2021    RBC 2.83 10/16/2021    HGB 8.8 10/16/2021    HCT 27.6 10/16/2021     10/16/2021    MCV 97.5 10/16/2021    MCH 31.1 10/16/2021    MCHC 31.9 10/16/2021    RDW 16.1 10/16/2021    NRBC 0.9 10/01/2021    SEGSPCT 76 03/16/2014    BANDSPCT 1 09/02/2016    METASPCT 0.9 10/01/2021    LYMPHOPCT 5.7 10/15/2021    MONOPCT 4.0 10/15/2021    BASOPCT 0.3 10/15/2021    MONOSABS 0.47 10/15/2021    LYMPHSABS 0.67 10/15/2021    EOSABS 0.36 10/15/2021    BASOSABS 0.04 10/15/2021     CMP:    Lab Results Component Value Date     10/16/2021    K 4.3 10/16/2021    CL 97 10/16/2021    CO2 24 10/16/2021    BUN 53 10/16/2021    CREATININE 1.7 10/16/2021    GFRAA 47 10/16/2021    LABGLOM 39 10/16/2021    GLUCOSE 166 10/16/2021    GLUCOSE 111 07/16/2011    PROT 8.1 10/15/2021    LABALBU 2.5 10/15/2021    CALCIUM 10.0 10/16/2021    BILITOT 0.3 10/15/2021    ALKPHOS 217 10/15/2021    AST 41 10/15/2021    ALT 30 10/15/2021       Imaging:  Right foot x-ray: Status post transmetatarsal amputation. Irregular appearance of the overlying soft tissues with multiple lucencies which may represent soft tissue gas. Large soft tissue defect superiorly at the amputation site. No gross lytic or erosive changes to suggest advanced osteomyelitis. X-ray left foot: Mild soft tissue edema which could be reactive or due to cellulitis. Chronic osseous changes. EKG:  I've personally reviewed the patient's EKG:  AV paced      ASSESSMENT/PLAN:  Principal Problem:    Wound infection  Active Problems:    CKD (chronic kidney disease) stage 3, GFR 30-59 ml/min  Resolved Problems:    * No resolved hospital problems. *    30-year-old male with a significant past medical history recently discharged 1 week ago with amputation is admitted to [place] [address] unit with    Wound infection  -IV antibiotics in the form of Zosyn/Vanco pharmacy to dose Vanco  -IV hydration/n.p.o.  -Podiatry following -today right foot wound debridement, left foot amputation toes 2, 3.  -Consult ID  -Pain management    CKD  -Monitor labs-BUN/creatinine on admission 57/2.0, baseline 631/1.1  -Hold all nephrotoxins  -IV hydration    Medication for other comorbidities continue as appropriate dose adjustment as necessary. DVT prophylaxis  PT OT  Discharge planning  Case discussed with attending and agreed upon plan of care.     Code status: Full  Requires inpatient level of care  MADDI Hemphill CNP    9:26 AM  10/16/2021     Above note edited to reflect my thoughts     I personally saw, examined and provided care for the patient. Radiographs, labs and medication list were reviewed by me independently. The case was discussed in detail and plans for care were established. Review of [address] [place] [address], documentation was conducted and revisions were made as appropriate directly by me. I agree with the above documented exam, problem list, and plan of care.      Fausto Corbin MD

## 2021-10-16 NOTE — PROGRESS NOTES
Pharmacy Consultation Note  (Antibiotic Dosing and Monitoring)    Initial consult date: 10-  Consulting physician: Francine Grullon APRN-CNP  Drug: Vancomycin  Indication: Skin/Soft Tissue Infection, diabetic foot    Age/  Gender Height Weight IBW Dosing weight  Allergy Information   81 y.o./male 5' 8\" (172.7 cm) 235 lb (106.6 kg)     Ideal body weight: 68.4 kg (150 lb 12.7 oz)  Adjusted ideal body weight: 83.7 kg (184 lb 7.6 oz)  106.6 kg  Patient has no known allergies. Temp (24hrs), Av °F (36.7 °C), Min:98 °F (36.7 °C), Max:98 °F (36.7 °C)          Date  WBC BUN SCr CrCl  (mL/min) Drug/Dose Time   Given Level(s)   (Time) Comments   10-15 11.8 57 2  35   Vancomycin  mg IV                                            No intake or output data in the 24 hours ending 10/15/21 2142    Historical Cultures:  Organism   Date Value Ref Range Status   2021 Staphylococcus hominis ssp hominis (A)  Final   2021 Staphylococcus hominis ssp hominis (A)  Final     No results for input(s): BC in the last 72 hours. Cultures:  available culture and sensitivity results were reviewed in [facility]    Assessment:  · [de-identified] y.o. male has been initiated Vancomycin.   · Estimated CrCl = 35 mL/min  · Goal trough level = 15-20 mcg/mL    Plan:  · Will initiate vancomycin at a dose of 2000 mg once followed by 1250 mg every 24 hours  · Monitor renal function   · Clinical pharmacy to follow      Cinda Fontenot [facility] 10/15/2021 9:42 PM

## 2021-10-16 NOTE — CONSULTS
Department of Internal Medicine  Infectious Diseases   Consult Note      Reason for Consult:  TMA wound infection, cellulitis       Requesting Physician: Dr Zofia Solano:               This is an [de-identified] yrs old male with hx of DM, CAD, HTN, CHF , PAD who underwent right TMA on 10/2/2021 presented to the ER with worsening right foot stump wound - pt was seen by the podiatrist at the nursing home and sent pt to the ER .  Pt reported pain in the foot , denied fever and chills   WBC was 11.8, Sed rate 118 , CRP 17.1   Pt was started on  Vancomycin and zosyn     Past Medical History:      Past Medical History:   Diagnosis Date    (HFpEF) heart failure with preserved ejection fraction ([address] 07/2020    Diagnosed by cardiology    Arthritis     Bilateral carotid artery stenosis 12/31/2020    Bilateral carotid bruits 12/31/2020    Blood circulation, collateral     CAD (coronary artery disease)     Carotid bruit 03/27/2013    CHF (congestive heart failure) (HCC)     Chronic kidney disease     CKD (chronic kidney disease) stage 3, GFR 30-59 ml/min ([facility]) 03/20/2016    COPD (chronic obstructive pulmonary disease) ([address]     Follows with pulmonary    Diabetes mellitus ([address]     Diabetic neuropathy associated with type 2 diabetes mellitus (HCC)     Dyspnea on exertion     History of blood transfusion     Hyperlipidemia     Hypertension     Mild mitral regurgitation     Moderate tricuspid regurgitation by prior echocardiography 2018    Movement disorder     NSTEMI, initial episode of care ([address] 01/2018    Obesity     Pulmonary hypertension ([address] 2018    PVD (peripheral vascular disease) with claudication ([address] 03/27/2014    S/P carotid endarterectomy 03/27/2013    Sleep apnea     SOB (shortness of breath)     Thyroid disease     Unspecified cerebral artery occlusion with cerebral infarction     Vision decreased 12/31/2020         Past Surgical History:      Past Surgical History: Procedure Laterality Date    BIOPSY / LIGATION TEMPORAL ARTERY Bilateral 01/05/2021    BILATERAL TEMPORAL ARTERY BIOPSY performed by Sunita Banda MD at [place] [address]      5 years ago   [address] CORONARY ARTERY BYPASS GRAFT  2008    1 vessel and aortic valve repair    DIAGNOSTIC CARDIAC CATH LAB PROCEDURE  10/14/2008    DIAGNOSTIC CARDIAC CATH LAB PROCEDURE  10/21/2010    FEMORAL ENDARTERECTOMY Right 9/30/2021    RIGHT FEMORAL ENDARTERECTOMY performed by Jung Woodruff MD at [facility] Right 10/2/2021    RIGHT FOOT TRANSMITTAL AMPUTATION performed by Lisy Soto DPM at [facility]      R knee replacement    KNEE SURGERY      OTHER SURGICAL HISTORY Right 02/14/2017    debridement,bone bx foot    PACEMAKER INSERTION  11/12/2014    D-PPM   ()    Dr. Samina johnson       Current Medications:      Current Facility-Administered Medications   Medication Dose Route Frequency Provider Last Rate Last Admin    meperidine (DEMEROL) injection 12.5 mg  12.5 mg IntraVENous Q5 Min PRN Rachel Todd MD        promethazine (PHENERGAN) injection 25 mg  25 mg IntraVENous PRN Rachel Todd MD        labetalol (NORMODYNE;TRANDATE) injection 5 mg  5 mg IntraVENous Q10 Min PRN Rachel Todd MD        HYDROmorphone (DILAUDID) injection 0.25 mg  0.25 mg IntraVENous Q5 Min PRN Rachel Todd MD        HYDROmorphone (DILAUDID) injection 0.5 mg  0.5 mg IntraVENous Q5 Min PRN Rachel Todd MD        albuterol (PROVENTIL) nebulizer solution 2.5 mg  2.5 mg Nebulization Q6H PRN Marlyce Staff Learn, APRN - CNP        allopurinol (ZYLOPRIM) tablet 300 mg  300 mg Oral Daily Marlyce Staff Learn, APRN - CNP        bumetanide (BUMEX) tablet 1 mg  1 mg Oral Daily Marlyce Staff Learn, APRN - CNP        diphenhydrAMINE (BENADRYL) tablet 25 mg  25 mg Oral Daily PRN Marlyce Staff Learn, APRN - 10 mL  10 mL IntraVENous 2 times per day Stephen Carr Learn, APRN - CNP   10 mL at 10/16/21 0923    sodium chloride flush 0.9 % injection 10 mL  10 mL IntraVENous PRN Stephen Carr Learn, APRN - CNP        0.9 % sodium chloride infusion  25 mL IntraVENous PRN Saqibrel Lilly Learn, APRN - CNP        magnesium hydroxide (MILK OF MAGNESIA) 400 MG/5ML suspension 30 mL  30 mL Oral Daily PRN Stephen Carr Learn, APRN - CNP        acetaminophen (TYLENOL) tablet 650 mg  650 mg Oral Q6H PRN Saqibrel Lilly Learn, APRN - CNP        Or    acetaminophen (TYLENOL) suppository 650 mg  650 mg Rectal Q6H PRN Stephen Rodriguezine Learn, APRN - CNP        piperacillin-tazobactam (ZOSYN) 3,375 mg in dextrose 5 % 100 mL IVPB extended infusion (mini-bag)  3,375 mg IntraVENous Q8H Stephen Carr Learn, APRN - CNP 25 mL/hr at 10/16/21 0640 3,375 mg at 10/16/21 0640    morphine (PF) injection 2 mg  2 mg IntraVENous Q4H PRN Stephen Carr Learn, APRN - CNP   2 mg at 10/16/21 0055    HYDROcodone-acetaminophen ([address]) 7.5-325 MG per tablet 1 tablet  1 tablet Oral Q6H PRN Stephen Carr Learn, APRN - CNP   1 tablet at 10/16/21 0639    insulin lispro (HUMALOG) injection vial 0-12 Units  0-12 Units SubCUTAneous TID WC Stephen Grullon, APRN - CNP        insulin lispro (HUMALOG) injection vial 0-6 Units  0-6 Units SubCUTAneous Nightly Stephen Carr Learn, APRN - CNP   1 Units at 10/16/21 0053    glucose (GLUTOSE) 40 % oral gel 15 g  15 g Oral PRN Stephen Carr Learn, APRN - CNP        dextrose 50 % IV solution  12.5 g IntraVENous PRN Stephen Carr Learn, APRN - CNP        glucagon (rDNA) injection 1 mg  1 mg IntraMUSCular PRN Stephen Carr Learn, APRN - CNP        dextrose 5 % solution  100 mL/hr IntraVENous PRN Stephen Carr Learn, APRN - CNP        budesonide (PULMICORT) nebulizer suspension 250 mcg  0.25 mg Nebulization BID MADDI Ulloa CNP   250 mcg at 10/16/21 0948    And    ipratropium (ATROVENT) 0.02 % nebulizer solution 0.5 mg  0.5 mg Nebulization 4x daily Mireya Basia Learn, APRN - CNP   0.5 mg at 10/16/21 6211    And    Arformoterol Tartrate (BROVANA) nebulizer solution 15 mcg  15 mcg Nebulization BID Mireya Basia Learn, APRN - CNP   15 mcg at 10/16/21 0948    vancomycin (VANCOCIN) 1,250 mg in dextrose 5 % 250 mL IVPB  1,250 mg IntraVENous Q24H Mireya Basia Learn, APRN - CNP           Allergies:  Patient has no known allergies. Social History:      Social History     Socioeconomic History    Marital status:      Spouse name: Not on file    Number of children: Not on file    Years of education: Not on file    Highest education level: Not on file   Occupational History    Occupation: retired- [employer]   Tobacco Use    Smoking status: Former Smoker     Packs/day: 2.00     Years: 25.00     Pack years: 50.00     Types: Cigarettes     Start date: 1953     Quit date: 1981     Years since quittin.2    Smokeless tobacco: Never Used   Vaping Use    Vaping Use: Never used    Passive vaping exposure Yes   Substance and Sexual Activity    Alcohol use: Never     Comment:      Drug use: Not Currently     Comment: no longer eating edibles. states he had a bad episode.  Sexual activity: Not Currently   Other Topics Concern    Not on file   Social History Narrative    1 cup coffee daily     Social Determinants of Health     Financial Resource Strain:     Difficulty of Paying Living Expenses:    Food Insecurity:     Worried About Running Out of Food in the Last Year:     Ran Out of Food in the Last Year:    Transportation Needs:     Lack of Transportation (Medical):      Lack of Transportation (Non-Medical):    Physical Activity:     Days of Exercise per Week:     Minutes of Exercise per Session:    Stress:     Feeling of Stress :    Social Connections:     Frequency of Communication with Friends and Family:     Frequency of Social Gatherings with Friends and Family:     Attends [employer] Services:     Active Member of Clubs or Organizations:     Attends Club or Organization Meetings:     Marital Status:    Intimate Partner Violence:     Fear of Current or Ex-Partner:     Emotionally Abused:     Physically Abused:     Sexually Abused:          Family History:     Family History   Problem Relation Age of Onset    Heart Disease Mother     Heart Failure Mother     Heart Surgery Father     Heart Disease Father     Heart Failure Father     High Blood Pressure Sister     Cancer Sister        REVIEW OF SYSTEMS:    CONSTITUTIONAL:  Denies fever, chill or rigors. HEENT: denies blurring of vision or double vision, denies hearing problem  RESPIRATORY: denies cough, shortness of breath, sputum expectoration  CARDIOVASCULAR:  Denies palpitation or chest pain   GASTROINTESTINAL:  Denies abdomen pain, diarrhea or constipation. GENITOURINARY:  Denies burning urination or frequency of urination  INTEGUMENT: wounds   HEMATOLOGIC/LYMPHATIC:  Denies lymph node swelling, gum bleeding or easy bruising. MUSCULOSKELETAL:  Right foot wound   NEUROLOGICAL:  Weakness     PHYSICAL EXAM:      Vitals:     Vitals:    10/16/21 0800   BP: (!) 147/64   Pulse: 65   Resp: 18   Temp: 97.1 °F (36.2 °C)   SpO2: 96%       General Appearance:    Awake, alert , no acute distress. Head:    Normocephalic, atraumatic   Eyes:    No pallor, no icterus,   Ears:    No obvious deformity or drainage.    Nose:   No nasal drainage   Throat:   Mucosa dry, no thrush    Neck:   Supple, no lymphadenopathy   Lungs:     Clear to auscultation bilaterally, no wheeze    Heart:    Regular rate and rhythm, systolic murmur    Abdomen:     Soft, non-tender, bowel sounds present    Extremities:    + edema              Pulses:   Dorsalis pedis diminished    Skin:   Mod drainage from the stump wound, tender    CBC with Differential:      Lab Results   Component Value Date    WBC 9.3 10/16/2021    RBC 2.83 10/16/2021    HGB 8.8 10/16/2021    HCT 27.6 10/16/2021     10/16/2021    MCV 97.5 10/16/2021    MCH 31.1 10/16/2021    MCHC 31.9 10/16/2021    RDW 16.1 10/16/2021    NRBC 0.9 10/01/2021    SEGSPCT 76 03/16/2014    BANDSPCT 1 09/02/2016    METASPCT 0.9 10/01/2021    LYMPHOPCT 5.7 10/15/2021    MONOPCT 4.0 10/15/2021    BASOPCT 0.3 10/15/2021    MONOSABS 0.47 10/15/2021    LYMPHSABS 0.67 10/15/2021    EOSABS 0.36 10/15/2021    BASOSABS 0.04 10/15/2021       CMP     Lab Results   Component Value Date     10/16/2021    K 4.3 10/16/2021    CL 97 10/16/2021    CO2 24 10/16/2021    BUN 53 10/16/2021    CREATININE 1.7 10/16/2021    GFRAA 47 10/16/2021    LABGLOM 39 10/16/2021    GLUCOSE 166 10/16/2021    GLUCOSE 111 07/16/2011    PROT 8.1 10/15/2021    LABALBU 2.5 10/15/2021    CALCIUM 10.0 10/16/2021    BILITOT 0.3 10/15/2021    ALKPHOS 217 10/15/2021    AST 41 10/15/2021    ALT 30 10/15/2021         Hepatic Function Panel:    Lab Results   Component Value Date    ALKPHOS 217 10/15/2021    ALT 30 10/15/2021    AST 41 10/15/2021    PROT 8.1 10/15/2021    BILITOT 0.3 10/15/2021    BILIDIR <0.2 01/25/2021    IBILI see below 01/25/2021    LABALBU 2.5 10/15/2021       PT/INR:    Lab Results   Component Value Date    PROTIME 11.7 01/05/2021    PROTIME 11.3 06/21/2011    INR 1.0 01/05/2021       TSH:    Lab Results   Component Value Date    TSH 4.270 08/19/2021       U/A:    Lab Results   Component Value Date    COLORU Yellow 09/24/2021    PHUR 6.0 09/24/2021    LABCAST RARE 10/11/2017    WBCUA NONE 09/24/2021    WBCUA NONE 07/12/2011    RBCUA 1-3 09/24/2021    RBCUA 1-3 03/17/2014    BACTERIA NONE SEEN 09/24/2021    CLARITYU Clear 09/24/2021    SPECGRAV 1.025 09/24/2021    LEUKOCYTESUR Negative 09/24/2021    UROBILINOGEN 0.2 09/24/2021    BILIRUBINUR Negative 09/24/2021    BILIRUBINUR NEGATIVE 07/12/2011    BLOODU TRACE-INTACT 09/24/2021    GLUCOSEU Negative 09/24/2021    GLUCOSEU NEGATIVE 07/12/2011       ABG:    Lab Results   Component Value Date    FQU9EBL 21.8 09/30/2021    F0SJLIWT 96.7 09/06/2012    UJI3WTQ 42.9 09/30/2021    PO2ART 157.4 09/30/2021       MICROBIOLOGY:    Blood culture - neg     Radiology :    Left foot x ray -  Mild soft tissue edema which could be reactive or due to cellulitis. Right foot x ray -    Irregular appearance of the overlying soft tissues with multiple lucencies   which may represent soft tissue gas.  Large soft tissue defect superiorly at   the amputation site     IMPRESSION:     1. Right TMA stump wound infection   2.  PAD - left 2, 3 toes infection ,cellulitis     RECOMMENDATIONS:      1. Zosyn 3.375 grams IV q 8 hrs , Vancomycin 1250 mg IV q 24 hrs, wound cx, I & D of wounds (right ) , and amputation of the toes ( left )     Thank you Dr Ethle Yadav for the consult

## 2021-10-16 NOTE — ANESTHESIA PRE PROCEDURE
Department of Anesthesiology  Preprocedure Note       Name:  Jyoti Liao   Age:  [de-identified] y.o.  :  1940                                          MRN:  [id number]         Date:  10/16/2021      Surgeon: Evy Clark):  Ramakrishna Vazquez DPM    Procedure: RIGHT FOOT WOUND DEBRIDEMENT; LEFT FOOT AMPUTATION TOES 2,3  (WOULD LIKE TO FOLLOW) (Bilateral )    Medications prior to admission:   Prior to Admission medications    Medication Sig Start Date End Date Taking? Authorizing Provider   mirtazapine (REMERON) 7.5 MG tablet Take 7.5 mg by mouth nightly    Historical Provider, MD   HYDROcodone-acetaminophen (NORCO)  MG per tablet Take 1 tablet by mouth every 6 hours as needed for Pain. Historical Provider, MD   hydrALAZINE (APRESOLINE) 50 MG tablet Take 1 tablet by mouth every 8 hours 10/3/21   Alkamarco Duncan MD   magnesium 200 MG TABS tablet Take 200 mg by mouth daily    Historical Provider, MD   diphenhydrAMINE (BENADRYL) 25 MG capsule Take 25 mg by mouth daily as needed for Itching    Historical Provider, MD   insulin lispro, 1 Unit Dial, (HUMALOG KWIKPEN) 100 UNIT/ML SOPN Inject 0-6 Units into the skin 3 times daily (before meals) PER SLIDING SCALE: 0-139=0U, 140-199=1U, 200-249=2U, 250-299=3U, 300-349=4U, 350-399=5U, 400-450=6U    Historical Provider, MD   morphine (THIERRY) 10 MG extended release capsule Take 10 mg by mouth 2 times daily.     Historical Provider, MD   hydrOXYzine (ATARAX) 25 MG tablet Take 25 mg by mouth 4 times daily     Historical Provider, MD   lactulose 20 GM/30ML SOLN Take 20 g by mouth three times a week *MON-WED-FRI*    Historical Provider, MD   metOLazone (ZAROXOLYN) 2.5 MG tablet Take 2.5 mg by mouth three times a week *TUE-THUR-SAT*    Historical Provider, MD   bumetanide (BUMEX) 1 MG tablet Take 1 mg by mouth daily     Historical Provider, MD   potassium chloride (KLOR-CON M) 20 MEQ extended release tablet Take 40 mEq by mouth daily     Historical Provider, MD   ferrous sulfate (IRON 325) 325 (65 Fe) MG tablet Take 1 tablet by mouth 2 times daily (with meals) 1/25/21   Kathleen Adan Martino DO   DULoxetine (CYMBALTA) 60 MG extended release capsule Take 1 capsule by mouth daily 1/26/21   Kathleen Adan Martino, DO   fluticasone-umeclidin-vilant (TRELEGY ELLIPTA) 100-62.5-25 MCG/INH AEPB Inhale 1 puff into the lungs Daily     Historical Provider, MD   aspirin EC 81 MG EC tablet Take 1 tablet by mouth daily 8/7/20   Carmen Davidson MD   albuterol (PROVENTIL) (2.5 MG/3ML) 0.083% nebulizer solution Take 3 mLs by nebulization every 6 hours as needed for Wheezing or Shortness of Breath DX: COPD J44.9 Please bill Medicare Part B 1/6/20   Maureen Torrez MD   levothyroxine (SYNTHROID) 25 MCG tablet Take 25 mcg by mouth Daily  7/29/19   Historical Provider, MD   gabapentin (NEURONTIN) 400 MG capsule Take 400 mg by mouth 4 times daily. 5/19/19   Historical Provider, MD   metoprolol succinate (TOPROL XL) 50 MG extended release tablet Take 50 mg by mouth daily    Historical Provider, MD   isosorbide mononitrate (IMDUR) 120 MG extended release tablet Take 1 tablet by mouth daily 1/23/18   Chin Dixon MD   allopurinol (ZYLOPRIM) 300 MG tablet Take 300 mg by mouth daily  2/27/15   Historical Provider, MD   lovastatin (MEVACOR) 20 MG tablet Take 20 mg by mouth nightly. 11/11/13   Historical Provider, MD       Current medications:    No current facility-administered medications for this visit. No current outpatient medications on file.      Facility-Administered Medications Ordered in Other Visits   Medication Dose Route Frequency Provider Last Rate Last Admin    albuterol (PROVENTIL) nebulizer solution 2.5 mg  2.5 mg Nebulization Q6H PRN MADDI Israel CNP        allopurinol (ZYLOPRIM) tablet 300 mg  300 mg Oral Daily MADDI Israel CNP        bumetanide (BUMEX) tablet 1 mg  1 mg Oral Daily MADDI Israel CNP        diphenhydrAMINE (BENADRYL) tablet 25 mg  25 mg Oral Daily PRN Bib Miller Ematine January, APRN - CNP        DULoxetine (CYMBALTA) extended release capsule 60 mg  60 mg Oral Daily Charbel Conte Learn, APRN - CNP        ferrous sulfate (IRON 325) tablet 325 mg  325 mg Oral BID  Charbel Conte Learn, APRN - CNP        gabapentin (NEURONTIN) capsule 400 mg  400 mg Oral 4x Daily Charbel Conte Learn, APRN - CNP   400 mg at 10/16/21 0054    hydrALAZINE (APRESOLINE) tablet 50 mg  50 mg Oral 3 times per day Charbel Conte Learn, APRN - CNP   50 mg at 10/16/21 3616    hydrOXYzine (VISTARIL) capsule 25 mg  25 mg Oral 4x Daily Charbel Conte Learn, APRN - CNP   25 mg at 10/16/21 0053    isosorbide mononitrate (IMDUR) extended release tablet 120 mg  120 mg Oral Daily Charbel Conte Learn, APRN - CNP        lactulose (CHRONULAC) 10 GM/15ML solution 20 g  20 g Oral Once per day on Mon Wed Fri Charbel Conte Learn, APRN - CNP   20 g at 10/16/21 0053    levothyroxine (SYNTHROID) tablet 25 mcg  25 mcg Oral Daily [place] Dg Learn, APRN - CNP   25 mcg at 10/16/21 0287    atorvastatin (LIPITOR) tablet 10 mg  10 mg Oral Daily [place] Dg Learn, APRN - CNP        magnesium oxide (MAG-OX) tablet 200 mg  200 mg Oral Daily Charbel Conte Learn, APRN - CNP        metOLazone (ZAROXOLYN) tablet 2.5 mg  2.5 mg Oral Once per day on Mon Wed Fri Charbel Conte Learn, APRN - CNP   2.5 mg at 10/16/21 0053    metoprolol succinate (TOPROL XL) extended release tablet 50 mg  50 mg Oral Daily Charbel Conte Learn, APRN - CNP        mirtazapine (REMERON) tablet 7.5 mg  7.5 mg Oral Nightly Charbel Conte Learn, APRN - CNP   7.5 mg at 10/16/21 0053    morphine (THIERRY) extended release capsule 10 mg  10 mg Oral BID Charbel Grullon, APRN - CNP        potassium chloride (KLOR-CON M) extended release tablet 40 mEq  40 mEq Oral Daily MADDI Auguste CNP        0.9 % sodium chloride infusion   IntraVENous Continuous MADDI Auguste CNP 75 mL/hr at 10/16/21 0051 New Bag at 10/16/21 0051    sodium chloride flush 0.9 % injection 10 mL  10 mL IntraVENous 2 times per day Corinne Coyer Learn, APRN - CNP   10 mL at 10/16/21 0051    sodium chloride flush 0.9 % injection 10 mL  10 mL IntraVENous PRN Corinne Coyer Learn, APRN - CNP        0.9 % sodium chloride infusion  25 mL IntraVENous PRN Corinne Coyer Learn, APRN - CNP        magnesium hydroxide (MILK OF MAGNESIA) 400 MG/5ML suspension 30 mL  30 mL Oral Daily PRN Corinne Coyer Learn, APRN - CNP        acetaminophen (TYLENOL) tablet 650 mg  650 mg Oral Q6H PRN Corinne Coyer Learn, APRN - CNP        Or    acetaminophen (TYLENOL) suppository 650 mg  650 mg Rectal Q6H PRN Corinne Coyer Learn, APRN - CNP        piperacillin-tazobactam (ZOSYN) 3,375 mg in dextrose 5 % 100 mL IVPB extended infusion (mini-bag)  3,375 mg IntraVENous Q8H Corinne Coyer Learn, APRN - CNP 25 mL/hr at 10/16/21 0640 3,375 mg at 10/16/21 0640    morphine (PF) injection 2 mg  2 mg IntraVENous Q4H PRN Corinne Coyer Learn, APRN - CNP   2 mg at 10/16/21 0055    HYDROcodone-acetaminophen (Burns Balsam) 7.5-325 MG per tablet 1 tablet  1 tablet Oral Q6H PRN Corinne Coyer Learn, APRN - CNP   1 tablet at 10/16/21 0639    insulin lispro (HUMALOG) injection vial 0-12 Units  0-12 Units SubCUTAneous TID WC Corinne Coyer Learn, APRN - CNP        insulin lispro (HUMALOG) injection vial 0-6 Units  0-6 Units SubCUTAneous Nightly Corinne Coyer Learn, APRN - CNP   1 Units at 10/16/21 0053    glucose (GLUTOSE) 40 % oral gel 15 g  15 g Oral PRN Corinne Coyer Learn, APRN - CNP        dextrose 50 % IV solution  12.5 g IntraVENous PRN Corinne Coyer Learn, APRN - CNP        glucagon (rDNA) injection 1 mg  1 mg IntraMUSCular PRN Corinne Coyer Learn, APRN - CNP        dextrose 5 % solution  100 mL/hr IntraVENous PRN Corinne Coyer Learn, APRN - CNP        budesonide (PULMICORT) nebulizer suspension 250 mcg  0.25 mg Nebulization BID Corinne Coyer Learn, APRN - CNP   250 mcg at 10/15/21 2158    And    ipratropium (ATROVENT) 0.02 % nebulizer solution 0.5 mg  0.5 mg Nebulization 4x daily Abel Grullon, APRN - CNP   0.5 mg at 10/15/21 2158    And    Arformoterol Tartrate (BROVANA) nebulizer solution 15 mcg  15 mcg Nebulization BID Abel Grullon, APRN - CNP   15 mcg at 10/15/21 2158    vancomycin (VANCOCIN) 1,250 mg in dextrose 5 % 250 mL IVPB  1,250 mg IntraVENous Q24H Abel Grullon, APRN - CNP           Allergies:  No Known Allergies    Problem List:    Patient Active Problem List   Diagnosis Code    Essential hypertension I10    Peripheral vascular disease ([facility] [address] I73.9    Pacemaker Z95.0    H/O aortic valve replacement Z95.2    CKD (chronic kidney disease) stage 3, GFR 30-59 ml/min N18.30    Type 2 diabetes mellitus with stage 3 chronic kidney disease, with long-term current use of insulin ([facility]) E11.22, N18.30, Z79.4    Pulmonary nodule R91.1    Diabetes mellitus type 2, uncontrolled ([facility] [address] E11.65    Hyperlipidemia LDL goal <100 E78.5    Acquired hypothyroidism E03.9    CAD (coronary artery disease), native coronary artery I25.10    Status post coronary artery bypass graft Z95.1    Chronic pain G89.29    History of CVA (cerebrovascular accident) Z80.78    Obesity (BMI 30-39. 9) E66.9    Anemia D64.9    Elevated sed rate R70.0    Vision decreased H54.7    Bilateral carotid bruits R09.89    Bilateral carotid artery stenosis I65.23    Nonrheumatic mitral valve regurgitation I34.0    Pulmonary hypertension (HCC) I27.20    Moderate tricuspid regurgitation by prior echocardiography I07.1    (HFpEF) heart failure with preserved ejection fraction (HCC) I50.30    COPD (chronic obstructive pulmonary disease) ([facility]) J44.9    Diabetic neuropathy associated with type 2 diabetes mellitus ([facility]) E11.40    Congestive heart failure ([facility]) I50.9    Cellulitis L03.90    Peripheral vascular disease of lower extremity with ulceration ([facility]) I73.9, L97.909    Critical lower limb ischemia ([facility]) I70.229    Nonrheumatic mitral valve stenosis I34.2    Severe left ventricular hypertrophy I51.7    Cardiomyopathy ([facility] [address] I42.9    Ischemic foot ulcer due to atherosclerosis of native artery of limb ([facility]) I70.25, L97.509    Wound infection T14. 8XXA, L08.9       Past Medical History:        Diagnosis Date    (HFpEF) heart failure with preserved ejection fraction ([facility] [address] 07/2020    Diagnosed by cardiology    Arthritis     Bilateral carotid artery stenosis 12/31/2020    Bilateral carotid bruits 12/31/2020    Blood circulation, collateral     CAD (coronary artery disease)     Carotid bruit 03/27/2013    CHF (congestive heart failure) ([facility])     Chronic kidney disease     CKD (chronic kidney disease) stage 3, GFR 30-59 ml/min ([facility]) 03/20/2016    COPD (chronic obstructive pulmonary disease) ([facility] [address]     Follows with pulmonary    Diabetes mellitus ([facility] [address]     Diabetic neuropathy associated with type 2 diabetes mellitus ([facility])     Dyspnea on exertion     History of blood transfusion     Hyperlipidemia     Hypertension     Mild mitral regurgitation     Moderate tricuspid regurgitation by prior echocardiography 2018    Movement disorder     NSTEMI, initial episode of care ([facility] [address] 01/2018    Obesity     Pulmonary hypertension ([facility] [address] 2018    PVD (peripheral vascular disease) with claudication ([facility] [address] 03/27/2014    S/P carotid endarterectomy 03/27/2013    Sleep apnea     SOB (shortness of breath)     Thyroid disease     Unspecified cerebral artery occlusion with cerebral infarction     Vision decreased 12/31/2020       Past Surgical History:        Procedure Laterality Date    BIOPSY / LIGATION TEMPORAL ARTERY Bilateral 01/05/2021    BILATERAL TEMPORAL ARTERY BIOPSY performed by Crystal Benitez MD at [address]      5 years ago   [address] GRAFT  2008    1 vessel and aortic valve repair    DIAGNOSTIC CARDIAC CATH LAB PROCEDURE  10/14/2008    DIAGNOSTIC CARDIAC CATH LAB PROCEDURE  10/21/2010    FEMORAL ENDARTERECTOMY Right 2021    RIGHT FEMORAL ENDARTERECTOMY performed by Cuca Espitia MD at [facility] Right 10/2/2021    RIGHT FOOT TRANSMITTAL AMPUTATION performed by Cat Santiago DPM at [place] [id number]      R knee replacement    KNEE SURGERY      OTHER SURGICAL HISTORY Right 2017    debridement,bone bx foot    PACEMAKER INSERTION  2014    D-PPM   ([facility])    Dr. Connor George      carotids       Social History:    Social History     Tobacco Use    Smoking status: Former Smoker     Packs/day: 2.00     Years: 25.00     Pack years: 50.00     Types: Cigarettes     Start date: 1953     Quit date: 1981     Years since quittin.2    Smokeless tobacco: Never Used   Substance Use Topics    Alcohol use: Never     Comment:                                  Counseling given: Not Answered      Vital Signs (Current): There were no vitals filed for this visit.                                            BP Readings from Last 3 Encounters:   10/15/21 (!) 171/79   10/03/21 130/80   10/02/21 (!) 103/45       NPO Status:  greater than 8 hours                                                                               BMI:   Wt Readings from Last 3 Encounters:   10/15/21 235 lb (106.6 kg)   10/03/21 249 lb 9 oz (113.2 kg)   21 226 lb 11.2 oz (102.8 kg)     There is no height or weight on file to calculate BMI.    CBC:   Lab Results   Component Value Date    WBC 9.3 10/16/2021    RBC 2.83 10/16/2021    HGB 8.8 10/16/2021    HCT 27.6 10/16/2021    MCV 97.5 10/16/2021    RDW 16.1 10/16/2021     10/16/2021       CMP:   Lab Results   Component Value Date     10/16/2021    K 4.3 10/16/2021    CL 97 10/16/2021    CO2 24 10/16/2021    BUN 53 10/16/2021    CREATININE 1.7 10/16/2021    GFRAA 47 10/16/2021    LABGLOM 39 10/16/2021    GLUCOSE 166 10/16/2021    GLUCOSE 111 2011    PROT 8.1 10/15/2021    CALCIUM 10.0 10/16/2021 BILITOT 0.3 10/15/2021    ALKPHOS 217 10/15/2021    AST 41 10/15/2021    ALT 30 10/15/2021       POC Tests:   No results for input(s): POCGLU, POCNA, POCK, POCCL, POCBUN, POCHEMO, POCHCT in the last 72 hours. Coags:   Lab Results   Component Value Date    PROTIME 11.7 01/05/2021    PROTIME 11.3 06/21/2011    INR 1.0 01/05/2021    APTT 25.5 01/05/2021       HCG (If Applicable): No results found for: PREGTESTUR, PREGSERUM, HCG, HCGQUANT     ABGs:   Lab Results   Component Value Date    PO2ART 157.4 09/30/2021    BMN3XBO 42.9 09/30/2021    QCA1EPE 21.8 09/30/2021    U3YJZCHV 96.7 09/06/2012        Type & Screen (If Applicable):  No results found for: LABABO, [address]    Drug/Infectious Status (If Applicable):  No results found for: HIV, HEPCAB    COVID-19 Screening (If Applicable):   Lab Results   Component Value Date    COVID19 Not Detected 10/03/2021    COVID19 Not Detected 12/29/2020           Anesthesia Evaluation  Patient summary reviewed and Nursing notes reviewed  Airway: Mallampati: I  TM distance: >3 FB   Neck ROM: full  Mouth opening: > = 3 FB Dental:    (+) edentulous      Pulmonary: breath sounds clear to auscultation  (+) COPD:  shortness of breath: chronic,  sleep apnea: on noncompliant,      Smoker: last smoked in 1981. Patient did not smoke on day of surgery.                  Cardiovascular:  Exercise tolerance: poor (<4 METS),   (+) hypertension:, valvular problems/murmurs: MR, pacemaker (sinus node dysfunction s/p PPM, paroxysmal flutteron PPM interrogation): pacemaker, past MI (In 2018 NSTEMI):, CAD: obstructive, CABG/stent (2008 AVR):, dysrhythmias (paroxysmal Afib): atrial fibrillation, CHF (EF 47%): systolic, RAMOS: after ambulating 1 flight of stairs, pulmonary hypertension:, hyperlipidemia    (-)  angina      Rhythm: regular  Rate: normal  Echocardiogram reviewed                  Neuro/Psych:   (+) CVA (IN 2003): no interval change,              ROS comment: Diabetic neuropathy to L hand and tremor   GI/Hepatic/Renal:   (+) GERD:, renal disease: CRI, morbid obesity          Endo/Other:    (+) DiabetesType II DM, using insulin, hypothyroidism, blood dyscrasia: anemia, arthritis: OA., .                  ROS comment: Chronic opioids Abdominal:   (+) obese,           Vascular:   + PVD, aortic or cerebral, . ROS comment: Pt states R carotid bypass in 2013  . Other Findings:             Anesthesia Plan      MAC     ASA 4       Induction: intravenous. Anesthetic plan and risks discussed with patient. Plan discussed with CRNA. Nicolle García MD   10/16/2021      7/31/20 ECHO   Left Ventricle   Severe left ventricle hypertrophy. Mildly reduced left ventricular systolic function. Visually estimated LVEF is 45-50 %. Paradoxical septal wall motion. Right Ventricle   Not well visualized. Grossly normal.      Left Atrium   Severely dilated left atrium. Right Atrium   Mildly dilated right atrium. Mitral Valve   Severe mitral annular calcification. Mild mitral stenosis. Mild mitral valve regurgitation. Tricuspid Valve   Normal tricuspid valve structure and function. Mild to moderate tricuspid valve regurgitation. Aortic Valve   Bioprosthetic aortic valve leaflets not well seen. No evidence of   obstruction with similar transvalvular pressure gradient to the prior echo   in 2018. No aortic regurgitation. Pericardial Effusion   No pericardial effusion. Pleural Effusion   No evidence of pleural effusion. Aorta   Normal aortic root and ascending aorta. Miscellaneous   The inferior vena cava diameter is normal with normal respiratory   variation. Estimated right atrial pressure is 8 mmHg. Conclusions      Summary   Severe left ventricle hypertrophy. Visually estimated LVEF is 45-50 %. Paradoxical septal wall motion. Severely dilated left atrium. Right ventricle not well visualized.  Grossly normal.   Severe mitral annular calcification. Mild mitral stenosis. Mild mitral   valve regurgitation. Bioprosthetic aortic valve leaflets not well seen. No evidence of   obstruction with similar transvalvular pressure gradient to the prior echo   in 2018. No aortic regurgitation. Compared to prior echo in 1/21/2018, the paradoxical septal wall motion   abnormality likely due to interventricular conduction delay is new. NM MYOCARDIAL SPEC 9/28/21  mpression   1: This is an abnormal myocardial perfusion study due to the presence   of mild global hypokinesis. There is no evidence of perfusion deficits   to suggest ischemia or infarction, however. 2  The left ventricle is normal in size with an ejection fraction of   49%. 3: Prognostically, this is a low to intermediate risk study. 4: Compared to the prior study from 1/20/2018, The LVEF appears to   have improved slightly. CTA NECK W CONTRAST 9/28/21  Limited due to motion artifacts, particularly in the region of the bilateral   carotid bulbs.  Repeat study is recommended.       Severe stenosis in the proximal left internal carotid artery with moderate   soft and calcified plaque.       Severe stenosis in the distal right common carotid artery.       Fusiform dilatation at the origin of the right internal carotid artery   measuring up to 1.4 cm in diameter.       Question of mild to moderate stenosis in the mid right internal carotid   artery.       Moderate to severe stenosis in the intracranial left vertebral artery.       50% stenosis at the origin of the left vertebral artery. 8/19/21 CXR  Impression   1. Limited due to patient rotation.    2. Interstitial and hazy opacities bilaterally suggestive of pulmonary   vascular congestion or pneumonia with left basilar atelectasis or pleural   effusion.         [facility] 9/23/21  Impression   Monophasic right lower extremity posterior tibial Doppler signal, likely   reflects underlying peripheral arterial disease.       Evaluation limited due to patient request to terminate the procedure. Pt seen, examined, chart reviewed, plan discussed.   Nayeli Hager MD  10/16/2021  7:26 AM

## 2021-10-16 NOTE — PROGRESS NOTES
Pharmacy Consultation Note  (Antibiotic Dosing and Monitoring)    Initial consult date: 10-  Consulting physician: Jose Maria Grullon APRN-CNP  Drug: Vancomycin  Indication: Skin/Soft Tissue Infection, dehisced TMA site (right foot), redness of 2nd and 3rd toes (left foot). Age/  Gender Height Weight IBW  Allergy Information   81 y.o./male 5' 8\" (172.7 cm) 235 lb (106.6 kg)     Ideal body weight: 68.4 kg (150 lb 12.7 oz)  Adjusted ideal body weight: 83.7 kg (184 lb 7.6 oz)   Patient has no known allergies. Renal Function:  Recent Labs     10/15/21  1603 10/16/21  0519   BUN 57* 53*   CREATININE 2.0* 1.7*     No intake or output data in the 24 hours ending 10/16/21 0734    Vancomycin Monitoring:  Trough:  No results for input(s): VANCOTROUGH in the last 72 hours. Random:  No results for input(s): VANCORANDOM in the last 72 hours. Vancomycin Administration Times:  Recent vancomycin administrations                   vancomycin (VANCOCIN) 2,000 mg in dextrose 5 % 500 mL IVPB (mg) 2,000 mg New Bag 10/16/21 0051                Assessment:  · Patient is a [de-identified] y.o. male who has been initiated on vancomycin for skin and soft tissue infection of open TMA site on right foot, and redness of digits 2 and 3 of left foot. · Patient is post-op day 13 from right TMA on 10/02/2021. Presented to ED 10/15 with wound dehiscence. · Estimated Creatinine Clearance: 41 mL/min (A) (based on SCr of 1.7 mg/dL (H)). · To dose vancomycin, pharmacy will be utilizing [employer] calculation software for goal AUC/WINNIE 400-600 mg/L-hr   · 10/16: Patient planned for right TMA debridement and amputation of digits 2 and 3 on left foot today with podiatry. ID consulted. Received vancomycin 2000 mg IV x 1. Scr 1.7 today, WBC 9.3.      Plan:  · Will continue vancomycin 1250 IV every 24 hours (precicted AUC/WINNIE 521, Tr = 17)   · Will check vancomycin levels when appropriate  · Will continue to monitor renal function   · Clinical pharmacy to follow      Thank you for this consult,     Zak Valentin, PharmD   Pharmacy Resident   Phone: [phone]  10/16/2021 7:37 AM

## 2021-10-16 NOTE — OP NOTE
[address]                  [address] [facility],  [facility]                                OPERATIVE REPORT    PATIENT NAME: Fariba Strauss                     :        1940  MED REC NO:   [id number]                            ROOM:       5412  ACCOUNT NO:   [de-identified]                           ADMIT DATE: 10/15/2021  PROVIDER:     Sosa Zavaleta DPM    DATE OF PROCEDURE:  10/16/2021    SURGEON:  Sosa Zavaleta DPM    ASSISTANT:  Radames Romero, PGY-3    PREOPERATIVE DIAGNOSES:  1. Flap necrosis, right foot. 2.  Cellulitis with abscess, right foot. 3.  Acute osteomyelitis, right foot. 4.  Left second toe osteomyelitis. 5.  Left second toe cellulitis. POSTOPERATIVE DIAGNOSES:  1. Flap necrosis, right foot. 2.  Cellulitis with abscess, right foot. 3.  Acute osteomyelitis, right foot. 4.  Left second toe osteomyelitis. 5.  Left second toe cellulitis. PROCEDURE:  1. Left second toe amputation to the level of metatarsophalangeal  joint. 2.  Incision and drainage, right foot multiple compartments. 3.  Excisional debridement of right foot wound through and to the level  of deep fascia, muscle and bone. Total measurement was 3 cm x 11 cm x 2  cm in dimension. 4.  Application of negative-pressure therapy wound VAC,  negative-pressure therapy right foot. ANESTHESIA:  Monitored anesthesia care. INJECTABLES:  20 mL of 0.5% Marcaine plain bilaterally. ESTIMATED BLOOD LOSS:  Minimal.    COMPLICATIONS:  None. SPECIMENS OBTAINED:  Right foot wound and metatarsal bone, right foot  abscess. Left second toe. INTRAOPERATIVE FINDINGS:  Severe necrosis and seropurulent abscess from  the right foot, more than 20 mL. Necrosis of the left second toe also  noted. Necrosis of soft tissue and bone noted to the right foot.     INDICATIONS:  This is a pleasant 52-year-old who presented today for  right foot debridement and left foot second toe amputation. Counseled  the patient nature of the problem, proposed course of procedure,  potential benefits, risks, complications and convalescence in detail. All questions answered to his apparent satisfaction. No guarantees have  been given as to the outcome of the procedure. OPERATIVE PROCEDURE:  Under mild sedation, the patient brought to the  operating room, placed on the operating table in supine position. Left  lower extremity scrubbed, prepped, and draped in usual sterile fashion. At this time, using a #10 blade, a mid incision was made over the dorsal  aspect of the left second toe. The incision was taken through  subcutaneous tissue. Bleeders were ligated as appropriate. Medial and  lateral incisions were made through the medial and lateral aspects of  the toe in a racquet type incision meeting and connecting in the plantar  aspect of the toe, extended the incision down 3 cm and another 2 cm  proximal.  This was able to then disarticulate the toe from the soft  tissue attachments of the metatarsophalangeal joint. This was sent off  for pathologic and microbiologic examination. At this point, I then  irrigated the area with copious amount of normal saline. Post  irrigation, closed that incision with 3-0 nylon suture, excellent  closure noted. Next, attention was directed to the right foot. Using a  #10 blade, we performed a horizontal incision, and dorsal incision and  plantar incision of the right foot to evacuate the seropurulent abscess. Incision was taken through subcutaneous tissue and deep fascia. Severe  necrosis noted at this time. At this point, upon evacuating the  abscess, we then performed sharp excision and debridement of the wound  through and to the level of deep fascia, muscle and bone. This was done  using a #10 blade, rongeur as well as reciprocating saw. At this point,  I used a pulse lavage system, irrigated the areas using copious amount  of normal saline.   Post irrigation, I applied wound VAC negative  pressure therapy set at 125 mmHg continuous. Excellent seal maintained  at this point. Postoperative bandage was then applied. The patient  tolerated the procedure and anesthesia well in apparent satisfactory  condition with vital signs stable, vascular status intact to bilateral  lower extremities. The patient was transferred to PACU for further  monitoring prior to readmission to the nursing floor.           Jude Bernal DPM    D: 10/16/2021 13:59:58       T: 10/16/2021 18:47:29     TAMRA/NAREN_MARIA L_SIL  Job#: [id number]     Doc#: [id number]    CC:

## 2021-10-16 NOTE — CONSULTS
Comprehensive Nutrition Assessment    Type and Reason for Visit:  Initial, Positive Nutrition Screen    Nutrition Recommendations/Plan: Advance nutrition as medically appropriate    Nutrition Assessment:  Pt adm w/ wound infection s/p recent TMA. Hx CHF, CKD, COPD, DM. Noted multiple wounds. Will monitor for nutrition progression    Malnutrition Assessment:  Malnutrition Status: At risk for malnutrition (Comment)    Context:  Chronic Illness     Findings of the 6 clinical characteristics of malnutrition:  Energy Intake:  Mild decrease in energy intake (Comment)  Weight Loss:  Unable to assess (d/t wt fluctuations per EMR)     Body Fat Loss:  No significant body fat loss     Muscle Mass Loss:  No significant muscle mass loss    Fluid Accumulation:  No significant fluid accumulation     Strength:  Not Performed    Estimated Daily Nutrient Needs:  Energy (kcal):   (MSJ 1750 x 1.2 SF);  Weight Used for Energy Requirements:  Current     Protein (g):  105-125; Weight Used for Protein Requirements:  Ideal (1.5-1.8)        Fluid (ml/day):  ; Method Used for Fluid Requirements:  1 ml/kcal      Nutrition Related Findings:  pt alert, soft abd, active BS, generalized +2 edema, fluids WNL      Wounds:  Multiple, Open Wounds, Surgical Incision       Current Nutrition Therapies:    Diet NPO    Anthropometric Measures:  · Height: 5' 8\" (172.7 cm)  · Current Body Weight: 235 lb (106.6 kg) (stated, UTO updated CBW)   · Usual Body Weight:  (226-261# measured per EMR x1 year)     · Ideal Body Weight: 154 lbs; % Ideal Body Weight 152.6 %   · BMI: 35.7  · BMI Categories: Obese Class 2 (BMI 35.0 -39.9)       Nutrition Diagnosis:   · Increased nutrient needs related to increase demand for energy/nutrients as evidenced by wounds      Nutrition Interventions:   Food and/or Nutrient Delivery:  Continue NPO (advance nutrition as medically appropriate)  Nutrition Education/Counseling:  Education not indicated Coordination of Nutrition Care:  Continue to monitor while inpatient    Goals:  Nutrition progression       Nutrition Monitoring and Evaluation:   Food/Nutrient Intake Outcomes:  Diet Advancement/Tolerance  Physical Signs/Symptoms Outcomes:  Biochemical Data, GI Status, Fluid Status or Edema, Nutrition Focused Physical Findings, Skin, Weight     Discharge Planning:     Too soon to determine     Electronically signed by Lynne Rai MS, RD, LD on 10/16/21 at 12:24 PM EDT    Contact: 2080

## 2021-10-17 NOTE — PROGRESS NOTES
Department of Internal Medicine  Infectious Diseases  Progress Note      C/C : TMA wound infection, cellulitis       Pt is awake and alert  Denies fever   Reports pain in the foot   Afebrile       Current Facility-Administered Medications   Medication Dose Route Frequency Provider Last Rate Last Admin    sodium chloride flush 0.9 % injection 10 mL  10 mL IntraVENous 2 times per day Liya Christiansen DPM        sodium chloride flush 0.9 % injection 10 mL  10 mL IntraVENous PRN Liya Christiansen DPM        0.9 % sodium chloride infusion  25 mL IntraVENous PRN Liya Christiansen DPM        morphine injection 2 mg  2 mg IntraMUSCular Q4H PRN Ramakrishna Lewis, DPM   2 mg at 10/16/21 1952    albuterol (PROVENTIL) nebulizer solution 2.5 mg  2.5 mg Nebulization Q6H PRN Liya Christiansen DPM        allopurinol (ZYLOPRIM) tablet 300 mg  300 mg Oral Daily NEO PedrozaM   300 mg at 10/17/21 0810    bumetanide (BUMEX) tablet 1 mg  1 mg Oral Daily NEO PedrozaM   1 mg at 10/17/21 0810    diphenhydrAMINE (BENADRYL) tablet 25 mg  25 mg Oral Daily PRN Liya Christiansen DPM        DULoxetine (CYMBALTA) extended release capsule 60 mg  60 mg Oral Daily NEO PedrozaM   60 mg at 10/17/21 0810    ferrous sulfate (IRON 325) tablet 325 mg  325 mg Oral BID WC NEO PedrozaM   325 mg at 10/17/21 0810    gabapentin (NEURONTIN) capsule 400 mg  400 mg Oral 4x Daily NEO PedrozaM   400 mg at 10/17/21 0809    hydrALAZINE (APRESOLINE) tablet 50 mg  50 mg Oral 3 times per day NEO PedrozaM   50 mg at 10/17/21 0645    hydrOXYzine (VISTARIL) capsule 25 mg  25 mg Oral 4x Daily NEO PedrozaM   25 mg at 10/17/21 0809    isosorbide mononitrate (IMDUR) extended release tablet 120 mg  120 mg Oral Daily NEO PedrozaM   120 mg at 10/17/21 0810    lactulose (CHRONULAC) 10 GM/15ML solution 20 g  20 g Oral Once per day on Mon Wed Reema Sandoval, DPM   20 g at 10/16/21 0053    levothyroxine (SYNTHROID) tablet 25 mcg  25 mcg Oral Daily hSanel Tomlin Jaime, DPM   25 mcg at 10/17/21 0645    atorvastatin (LIPITOR) tablet 10 mg  10 mg Oral Daily Duke Loots, DPM   10 mg at 10/17/21 0810    magnesium oxide (MAG-OX) tablet 200 mg  200 mg Oral Daily Randolph Loots, DPM   200 mg at 10/17/21 0810    metOLazone (ZAROXOLYN) tablet 2.5 mg  2.5 mg Oral Once per day on Mon Wed Fri Lawanda Jaime DPM   2.5 mg at 10/16/21 0053    metoprolol succinate (TOPROL XL) extended release tablet 50 mg  50 mg Oral Daily Duke Loots, DPM   50 mg at 10/17/21 0810    mirtazapine (REMERON) tablet 7.5 mg  7.5 mg Oral Nightly Randolph Loots, DPM   7.5 mg at 10/16/21 2344    morphine (THIERRY) extended release capsule 10 mg  10 mg Oral BID Duke Loots, DPM        potassium chloride (KLOR-CON M) extended release tablet 40 mEq  40 mEq Oral Daily Duke Loots, DPM   40 mEq at 10/17/21 0809    0.9 % sodium chloride infusion   IntraVENous Continuous Randolph Loots, DPM 75 mL/hr at 10/16/21 1502 New Bag at 10/16/21 1502    sodium chloride flush 0.9 % injection 10 mL  10 mL IntraVENous 2 times per day Randolph Loots, DPM   10 mL at 10/16/21 2587    sodium chloride flush 0.9 % injection 10 mL  10 mL IntraVENous PRN Randolph Loots, DPM        0.9 % sodium chloride infusion  25 mL IntraVENous PRN Randolph Loots, DPM        magnesium hydroxide (MILK OF MAGNESIA) 400 MG/5ML suspension 30 mL  30 mL Oral Daily PRN Randolph Loots, DPM        acetaminophen (TYLENOL) tablet 650 mg  650 mg Oral Q6H PRN Randolph Loots, DPM   650 mg at 10/16/21 1952    Or    acetaminophen (TYLENOL) suppository 650 mg  650 mg Rectal Q6H PRN Randolph Loots, DPM        piperacillin-tazobactam (ZOSYN) 3,375 mg in dextrose 5 % 100 mL IVPB extended infusion (mini-bag)  3,375 mg IntraVENous Q8H Randolph Loots, DPM   Stopped at 10/16/21 1854    HYDROcodone-acetaminophen (NORCO) 7.5-325 MG per tablet 1 tablet  1 tablet Oral Q6H PRN Duke Brisenoots, DPM   1 tablet at 10/17/21 0810    insulin lispro (HUMALOG) injection vial 0-12 Units  0-12 Units SubCUTAneous TID WC Charolett Hollow, DPM   2 Units at 10/17/21 0815    insulin lispro (HUMALOG) injection vial 0-6 Units  0-6 Units SubCUTAneous Nightly Charolett Hollow, DPM   1 Units at 10/16/21 2017    glucose (GLUTOSE) 40 % oral gel 15 g  15 g Oral PRN Charolett Hollow, DPM        dextrose 50 % IV solution  12.5 g IntraVENous PRN Charolett Hollow, DPM        glucagon (rDNA) injection 1 mg  1 mg IntraMUSCular PRN Charolett Hollow, DPM        dextrose 5 % solution  100 mL/hr IntraVENous PRN Charolett Hollow, DPM        budesonide (PULMICORT) nebulizer suspension 250 mcg  0.25 mg Nebulization BID Charolett Hollow, DPM   250 mcg at 10/16/21 2048    And    ipratropium (ATROVENT) 0.02 % nebulizer solution 0.5 mg  0.5 mg Nebulization 4x daily Charolett Hollow, DPM   0.5 mg at 10/16/21 2050    And    Arformoterol Tartrate (BROVANA) nebulizer solution 15 mcg  15 mcg Nebulization BID Charolett Hollow, DPM   15 mcg at 10/16/21 2050    vancomycin (VANCOCIN) 1,250 mg in dextrose 5 % 250 mL IVPB  1,250 mg IntraVENous Q24H Charolett Hollow, DPM             REVIEW OF SYSTEMS:    CONSTITUTIONAL:  Denies fever, chill or rigors. HEENT: denies blurring of vision or double vision, denies hearing problem  RESPIRATORY: denies cough, shortness of breath, sputum expectoration  CARDIOVASCULAR:  Denies palpitation or chest pain   GASTROINTESTINAL:  Denies abdomen pain, diarrhea or constipation. GENITOURINARY:  Denies burning urination or frequency of urination  INTEGUMENT: wounds   HEMATOLOGIC/LYMPHATIC:  Denies lymph node swelling, gum bleeding or easy bruising. MUSCULOSKELETAL: pain in the foot    NEUROLOGICAL:  Weakness     PHYSICAL EXAM:      Vitals:     Vitals:    10/17/21 0745   BP: (!) 121/56   Pulse: 78   Resp: 18   Temp: 97 °F (36.1 °C)   SpO2: 96%       General Appearance:    Awake, alert , no acute distress. Head:    Normocephalic, atraumatic   Eyes:    No pallor, no icterus,   Ears:    No obvious deformity or drainage.    Nose:   No nasal drainage   Throat:   Mucosa dry, no thrush    Neck:   Supple, no lymphadenopathy   Lungs:     Clear to auscultation bilaterally, no wheeze    Heart:    Regular rate and rhythm, systolic murmur    Abdomen:     Soft, non-tender, bowel sounds present    Extremities:    + edema    Pulses:   Dorsalis pedis diminished    Skin:  foot wound dressed    CBC with Differential:      Lab Results   Component Value Date    WBC 10.8 10/17/2021    RBC 2.25 10/17/2021    HGB 7.0 10/17/2021    HCT 22.0 10/17/2021     10/17/2021    MCV 97.8 10/17/2021    MCH 31.1 10/17/2021    MCHC 31.8 10/17/2021    RDW 16.2 10/17/2021    NRBC 0.9 10/01/2021    SEGSPCT 76 03/16/2014    BANDSPCT 1 09/02/2016    METASPCT 0.9 10/01/2021    LYMPHOPCT 10.5 10/17/2021    MONOPCT 5.6 10/17/2021    BASOPCT 0.3 10/17/2021    MONOSABS 0.61 10/17/2021    LYMPHSABS 1.14 10/17/2021    EOSABS 0.17 10/17/2021    BASOSABS 0.03 10/17/2021       CMP     Lab Results   Component Value Date     10/17/2021    K 5.3 10/17/2021    K 4.3 10/16/2021    CL 97 10/17/2021    CO2 18 10/17/2021    BUN 50 10/17/2021    CREATININE 1.9 10/17/2021    GFRAA 41 10/17/2021    LABGLOM 34 10/17/2021    GLUCOSE 120 10/17/2021    GLUCOSE 111 07/16/2011    PROT 8.1 10/15/2021    LABALBU 2.5 10/15/2021    CALCIUM 9.6 10/17/2021    BILITOT 0.3 10/15/2021    ALKPHOS 217 10/15/2021    AST 41 10/15/2021    ALT 30 10/15/2021         Hepatic Function Panel:    Lab Results   Component Value Date    ALKPHOS 217 10/15/2021    ALT 30 10/15/2021    AST 41 10/15/2021    PROT 8.1 10/15/2021    BILITOT 0.3 10/15/2021    BILIDIR <0.2 01/25/2021    IBILI see below 01/25/2021    LABALBU 2.5 10/15/2021       PT/INR:    Lab Results   Component Value Date    PROTIME 11.7 01/05/2021    PROTIME 11.3 06/21/2011    INR 1.0 01/05/2021       TSH:    Lab Results   Component Value Date    TSH 4.270 08/19/2021       U/A:    Lab Results   Component Value Date    COLORU Yellow 09/24/2021    PHUR 6.0 09/24/2021 LABCAST RARE 10/11/2017    WBCUA NONE 09/24/2021    WBCUA NONE 07/12/2011    RBCUA 1-3 09/24/2021    RBCUA 1-3 03/17/2014    BACTERIA NONE SEEN 09/24/2021    CLARITYU Clear 09/24/2021    SPECGRAV 1.025 09/24/2021    LEUKOCYTESUR Negative 09/24/2021    UROBILINOGEN 0.2 09/24/2021    BILIRUBINUR Negative 09/24/2021    BILIRUBINUR NEGATIVE 07/12/2011    BLOODU TRACE-INTACT 09/24/2021    GLUCOSEU Negative 09/24/2021    GLUCOSEU NEGATIVE 07/12/2011       ABG:    Lab Results   Component Value Date    HUF9LBJ 21.8 09/30/2021    F8GMXDZU 96.7 09/06/2012    RLB3VJJ 42.9 09/30/2021    PO2ART 157.4 09/30/2021       MICROBIOLOGY:    Blood culture - neg   Wound cx -  Gram Stain [[id number]]    Collected: 10/16/21 1304    Updated: 10/17/21 0726    Specimen Type: Body Fluid    Specimen Source: Foot     Gram Stain Orderable --    Gram stain performed from tissue touch prep   Moderate Gram positive cocci in pairs   Moderate Gram positive cocci   Few Gram positive cocci in clusters   Moderate Gram negative rods    Narrative:     Source: TISSU       Site: RIGHT FOOT WOUND TISSUE                     Radiology :    Left foot x ray -  Mild soft tissue edema which could be reactive or due to cellulitis. Right foot x ray -    Irregular appearance of the overlying soft tissues with multiple lucencies   which may represent soft tissue gas.  Large soft tissue defect superiorly at   the amputation site     IMPRESSION:     1. Right TMA stump wound infection s/p debridement   2. PAD - left 2, 3 toes infection ,cellulitis s/p 2 nd toe amputation ( 10/16)   3. GPC bacteremia     RECOMMENDATIONS:      1.  Oral zyvox and cipro ( no IV access )  pt will need PICC line and long term IV

## 2021-10-17 NOTE — PROGRESS NOTES
Subjective: The patient is awake and alert. Sitting up in bed eating breakfast without complaints  No acute events overnight. Denies chest pain, angina, SOB   Complaining of severe pain in his left foot    Objective:    BP (!) 121/56   Pulse 78   Temp 97 °F (36.1 °C) (Temporal)   Resp 18   Ht 5' 8\" (1.727 m)   Wt 235 lb (106.6 kg)   SpO2 96%   BMI 35.73 kg/m²     In: 300 [P.O.:300]  Out: -   In: 300   Out: -     General appearance: NAD, conversant, ill appearing  HEENT: AT/NC, MMM  Neck: FROM, supple  Lungs: Clear to auscultation  CV: RRR, no MRGs  Vasc: Radial pulses 2+  Abdomen: Soft, non-tender; no masses or HSM  Extremities: No peripheral edema or digital cyanosis  Skin: no rash, lesions or ulcers  Psych: Alert and oriented to person, place and time  Neuro: Alert and interactive     Recent Labs     10/15/21  1603 10/16/21  0519 10/17/21  0543   WBC 11.8* 9.3 10.8   HGB 9.2* 8.8* 7.0*   HCT 29.4* 27.6* 22.0*    293 271       Recent Labs     10/15/21  1603 10/16/21  0519    134   K 4.4 4.3   CL 96* 97*   CO2 23 24   BUN 57* 53*   CREATININE 2.0* 1.7*   CALCIUM 10.5* 10.0       Assessment:    Principal Problem:    Wound infection  Active Problems:    CKD (chronic kidney disease) stage 3, GFR 30-59 ml/min  Resolved Problems:    * No resolved hospital problems.  *      Plan:  81-year-old male with a significant past medical history recently discharged 1 week ago with amputation is admitted to [place] [address] unit with     Wound infection  -IV antibiotics in the form of Zosyn/Vanco pharmacy to dose Vanco-staph species in blood cultures  -IV hydration -   -Podiatry following -POD #1 left toe amputation , I&D right foot multiple compartment, debridement of right foot with wound vac application  .-Needs adequate pain control-IV Dilaudid for now, can de-escalate as his pain improves  -Consult ID - continue zosyn and vanc await cultures  -Hgb 7.0 will continue to monitor would benefit from transfusion  -Pain management - pain well controlled  -Elevated sed rate >150 as of today       CKD  -Monitor labs-BUN/creatinine on admission 50/1.9, today pending  -Hold all nephrotoxins  -IV hydration - continue for now     Medication for other comorbidities continue as appropriate dose adjustment as necessary.     DVT Prophylaxis   PT/OT  Discharge planning     MADDI Isbell - CNP  9:04 AM  10/17/2021

## 2021-10-17 NOTE — ANESTHESIA POSTPROCEDURE EVALUATION
Department of Anesthesiology  Postprocedure Note    Patient: Michele Valdes  MRN: [id number]  YOB: 1940  Date of evaluation: 10/16/2021  Time:  9:43 PM     Procedure Summary     Date: 10/16/21 Room / Location: [facility] OR 08 / [facility]    Anesthesia Start: 1234 Anesthesia Stop: 5195    Procedure: RIGHT FOOT WOUND DEBRIDEMENT WITH APPLICATION WOUND VAC; LEFT FOOT AMPUTATION 2ND TOE (Bilateral Foot) Diagnosis: (.)    Surgeons: Brittany Lopez DPM Responsible Provider: Cortes Barrientos MD    Anesthesia Type: MAC ASA Status: 4          Anesthesia Type: MAC    Mayela Phase I: Mayela Score: 10    Mayela Phase II:      Last vitals: Reviewed and per EMR flowsheets.        Anesthesia Post Evaluation    Patient location during evaluation: PACU  Patient participation: complete - patient participated  Level of consciousness: awake  Airway patency: patent  Nausea & Vomiting: no nausea and no vomiting  Complications: no  Cardiovascular status: hemodynamically stable  Respiratory status: acceptable  Hydration status: stable

## 2021-10-17 NOTE — PLAN OF CARE
Earlier in shift at approx 5p, d/w ID MD the issue / problem of Bill pulling out his L arm periph IV ( at approx 3p, we presume ). In f/u, 2 difft staff attempted restart of an iv, incl member of our IV team.   Not successful x 2. We have not been able to provide Bill his iv ATBs this helio as a result.

## 2021-10-17 NOTE — PROGRESS NOTES
Department of Podiatry  Progress Note    SUBJECTIVE:  Patient seen bedside for S/P left 2nd toe amputation to level of MPJ, right foot incision and drainage, excisional debridement of right foot wound with application of wound vac (DOS: 10/16/21). No acute events overnight. Currently complaining of severe pain in his buttocks region. Relates the pain in his feet and legs is intermittent. Patient currently denies any N/V/D/F/C/SOB/CP and has no other pedal complaints at this time. OBJECTIVE:    Scheduled Meds:   linezolid  600 mg Oral 2 times per day    ciprofloxacin  500 mg Oral 2 times per day    sodium chloride flush  10 mL IntraVENous 2 times per day    allopurinol  300 mg Oral Daily    bumetanide  1 mg Oral Daily    DULoxetine  60 mg Oral Daily    ferrous sulfate  325 mg Oral BID WC    gabapentin  400 mg Oral 4x Daily    hydrALAZINE  50 mg Oral 3 times per day    hydrOXYzine  25 mg Oral 4x Daily    isosorbide mononitrate  120 mg Oral Daily    lactulose  20 g Oral Once per day on Mon Wed Fri    levothyroxine  25 mcg Oral Daily    atorvastatin  10 mg Oral Daily    magnesium oxide  200 mg Oral Daily    metOLazone  2.5 mg Oral Once per day on Mon Wed Fri    metoprolol succinate  50 mg Oral Daily    mirtazapine  7.5 mg Oral Nightly    morphine  10 mg Oral BID    potassium chloride  40 mEq Oral Daily    sodium chloride flush  10 mL IntraVENous 2 times per day    insulin lispro  0-12 Units SubCUTAneous TID     insulin lispro  0-6 Units SubCUTAneous Nightly    budesonide  0.25 mg Nebulization BID    And    ipratropium  0.5 mg Nebulization 4x daily    And    Arformoterol Tartrate  15 mcg Nebulization BID     Continuous Infusions:   sodium chloride      sodium chloride 75 mL/hr at 10/16/21 1502    sodium chloride      dextrose       PRN Meds:. HYDROmorphone, sodium chloride flush, sodium chloride, morphine, albuterol, diphenhydrAMINE, sodium chloride flush, sodium chloride, magnesium hydroxide, acetaminophen **OR** acetaminophen, HYDROcodone-acetaminophen, glucose, dextrose, glucagon (rDNA), dextrose    No Known Allergies    BP (!) 121/56   Pulse 78   Temp 97 °F (36.1 °C) (Temporal)   Resp 18   Ht 5' 8\" (1.727 m)   Wt 235 lb (106.6 kg)   SpO2 96%   BMI 35.73 kg/m²       EXAM:    Wound vac seal is intact at 125 mmHg with minimal drainage noted to canister. Post-operative dressings are CDI bilaterally. No pain on palpation of calves. Mild pain on palpation of feet consistent with surgical sites. Scheduled Meds:   linezolid  600 mg Oral 2 times per day    ciprofloxacin  500 mg Oral 2 times per day    sodium chloride flush  10 mL IntraVENous 2 times per day    allopurinol  300 mg Oral Daily    bumetanide  1 mg Oral Daily    DULoxetine  60 mg Oral Daily    ferrous sulfate  325 mg Oral BID WC    gabapentin  400 mg Oral 4x Daily    hydrALAZINE  50 mg Oral 3 times per day    hydrOXYzine  25 mg Oral 4x Daily    isosorbide mononitrate  120 mg Oral Daily    lactulose  20 g Oral Once per day on Mon Wed Fri    levothyroxine  25 mcg Oral Daily    atorvastatin  10 mg Oral Daily    magnesium oxide  200 mg Oral Daily    metOLazone  2.5 mg Oral Once per day on Mon Wed Fri    metoprolol succinate  50 mg Oral Daily    mirtazapine  7.5 mg Oral Nightly    morphine  10 mg Oral BID    potassium chloride  40 mEq Oral Daily    sodium chloride flush  10 mL IntraVENous 2 times per day    insulin lispro  0-12 Units SubCUTAneous TID     insulin lispro  0-6 Units SubCUTAneous Nightly    budesonide  0.25 mg Nebulization BID    And    ipratropium  0.5 mg Nebulization 4x daily    And    Arformoterol Tartrate  15 mcg Nebulization BID     Continuous Infusions:   sodium chloride      sodium chloride 75 mL/hr at 10/16/21 1502    sodium chloride      dextrose       PRN Meds:. HYDROmorphone, sodium chloride flush, sodium chloride, morphine, albuterol, diphenhydrAMINE, sodium chloride flush, sodium chloride, magnesium hydroxide, acetaminophen **OR** acetaminophen, HYDROcodone-acetaminophen, glucose, dextrose, glucagon (rDNA), dextrose    RADIOLOGY:  XR CHEST PORTABLE   Final Result   1. After mid sternotomy. Moderate cardiomegaly. Presence of left-sided   pacemaker. 2.  Chronic findings in the left base as seen on previous examination which   can represent atelectasis or a mild left-sided pleural effusion. 3.  No superimposed acute cardiopulmonary process. XR FOOT LEFT (MIN 3 VIEWS)   Final Result   Mild soft tissue edema which could be reactive or due to cellulitis. Chronic osseous changes. MRI would be useful if symptoms persist.         XR FOOT RIGHT (MIN 3 VIEWS)   Final Result   Status post transmetatarsal amputation. Irregular appearance of the overlying soft tissues with multiple lucencies   which may represent soft tissue gas. Large soft tissue defect superiorly at   the amputation site      No gross lytic or erosive changes to suggest advanced osteomyelitis.            BP (!) 121/56   Pulse 78   Temp 97 °F (36.1 °C) (Temporal)   Resp 18   Ht 5' 8\" (1.727 m)   Wt 235 lb (106.6 kg)   SpO2 96%   BMI 35.73 kg/m²     LABS:    Recent Labs     10/16/21  0519 10/17/21  0543   WBC 9.3 10.8   HGB 8.8* 7.0*   HCT 27.6* 22.0*    271        Recent Labs     10/17/21  0543      K 5.3*   CL 97*   CO2 18*   BUN 50*   CREATININE 1.9*        Recent Labs     10/15/21  1603   PROT 8.1         ASSESSMENT:  Principal Problem:    Wound infection  Active Problems:    CKD (chronic kidney disease) stage 3, GFR 30-59 ml/min    -S/P left 2nd toe amputation to level of MPJ, right foot incision and drainage, excisional debridement of right foot wound with application of wound vac (DOS: 10/16/21)  -Flap necrosis, right foot (POA)  -Cellulitis with abscess, right foot (POA)  -Acute osteomyelitis, right foot   -Left 2nd toe osteomyelitis   -Left 2nd toe cellulitis PLAN:  - Patient was examined and evaluated. - Reviewed patient's recent lab results and pertinent diagnostic imaging.  - Reviewed ancillary service notes. - Pain Control: IV and PO  - ABX per ID   - Final surgical cultures and pathology pending.   - Post-operative dressing left intact today. Wound vac to right foot to be changed every MWF. Dressings consist of betadine pain, xeroform, DSD, and ACE bandages in a mildly compressive manner.     - Discussed patient with Dr. Aman Alvarado   - Will continue to follow patient while they are in-house.

## 2021-10-17 NOTE — PROGRESS NOTES
Pharmacy Consultation Note  (Antibiotic Dosing and Monitoring)    Initial consult date: 10-  Consulting physician: Carleen Grullon APRN-CNP  Drug: Vancomycin  Indication: Skin/Soft Tissue Infection, dehisced TMA site (right foot), redness of 2nd and 3rd toes (left foot).  Vancomycin has been discontinued by Dr. Frieda Sandoval.  Changed to PO zyvox and cipro due lack of IV access  [address] to [facility] Phys[facility] to re-consult pharmacy if future dosing is needed and vancomycin is resumed.     Thank you for the consult,        Thank you for this consult,     Lorie Sandra, PharmD   Pharmacy Resident   Phone: [phone]  10/17/2021 7:56 AM

## 2021-10-18 NOTE — PROGRESS NOTES
Wound care: Wound Vac dressing removed early today by podiatry, and remains off. Ace wrap to right foot with noted saturation of blood through dressing, spoke with patient nurse and per nurse podiatry aware and monitoring dressing. A dry pad placed under patient foot and foot elevated on pillow. Will follow up with patient tomorrow.  Radha Garcia RN

## 2021-10-18 NOTE — PROGRESS NOTES
Perfect serve sent to Dr. Jose Yanez for new surgery consult also notified resident Dr. Camilo Verma.

## 2021-10-18 NOTE — PROGRESS NOTES
Department of Internal Medicine  Infectious Diseases  Progress Note      C/C : TMA wound infection, cellulitis       Pt is awake and alert  Denies fever   Reports pain in the foot   Afebrile       Current Facility-Administered Medications   Medication Dose Route Frequency Provider Last Rate Last Admin    0.9 % sodium chloride infusion   IntraVENous PRN Jesús Navarro MD        linezolid (ZYVOX) tablet 600 mg  600 mg Oral 2 times per day Isabel Santiago MD   600 mg at 10/18/21 0826    ciprofloxacin (CIPRO) tablet 500 mg  500 mg Oral 2 times per day Isabel Santiago MD   500 mg at 10/18/21 0827    HYDROmorphone (DILAUDID) injection 0.5 mg  0.5 mg IntraVENous Q4H PRN Jesús Navarro MD        sodium chloride flush 0.9 % injection 10 mL  10 mL IntraVENous 2 times per day Michael Min DPM   10 mL at 10/18/21 0829    sodium chloride flush 0.9 % injection 10 mL  10 mL IntraVENous PRN Michael Min DPM        0.9 % sodium chloride infusion  25 mL IntraVENous PRN Michael Min DPM        morphine injection 2 mg  2 mg IntraMUSCular Q4H PRN Ramakrishna Lewis DPM   2 mg at 10/16/21 1952    albuterol (PROVENTIL) nebulizer solution 2.5 mg  2.5 mg Nebulization Q6H PRN Michael Min DPM        allopurinol (ZYLOPRIM) tablet 300 mg  300 mg Oral Daily Michael Min DPM   300 mg at 10/18/21 0825    [Held by provider] bumetanide (BUMEX) tablet 1 mg  1 mg Oral Daily Michael Min DPM   1 mg at 10/18/21 0825    diphenhydrAMINE (BENADRYL) tablet 25 mg  25 mg Oral Daily PRN Michael Min DPM        DULoxetine (CYMBALTA) extended release capsule 60 mg  60 mg Oral Daily Michael Min DPM   60 mg at 10/18/21 0825    ferrous sulfate (IRON 325) tablet 325 mg  325 mg Oral BID WC Michael Min DPM   325 mg at 10/18/21 0826    gabapentin (NEURONTIN) capsule 400 mg  400 mg Oral 4x Daily Michael Min DPM   400 mg at 10/18/21 1309    hydrALAZINE (APRESOLINE) tablet 50 mg  50 mg Oral 3 times per day NEO GonzalezM   50 mg at 10/18/21 0550    hydrOXYzine (VISTARIL) capsule 25 mg  25 mg Oral 4x Daily Tiny Calkin, DPM   25 mg at 10/18/21 1309    isosorbide mononitrate (IMDUR) extended release tablet 120 mg  120 mg Oral Daily Tiny Calkin, DPM   120 mg at 10/18/21 0825    lactulose (CHRONULAC) 10 GM/15ML solution 20 g  20 g Oral Once per day on Mon Wed Fri Lawanda Sandoval, DPM   20 g at 10/18/21 0827    levothyroxine (SYNTHROID) tablet 25 mcg  25 mcg Oral Daily Tiny Calkin, DPM   25 mcg at 10/18/21 0526    atorvastatin (LIPITOR) tablet 10 mg  10 mg Oral Daily Tiny Calkin, DPM   10 mg at 10/18/21 0825    magnesium oxide (MAG-OX) tablet 200 mg  200 mg Oral Daily Tiny Calkin, DPM   200 mg at 10/18/21 0825    [Held by provider] metOLazone (ZAROXOLYN) tablet 2.5 mg  2.5 mg Oral Once per day on Mon Wed Fri Ame Burger MD   2.5 mg at 10/18/21 3832    metoprolol succinate (TOPROL XL) extended release tablet 50 mg  50 mg Oral Daily Tiny Calkin, DPM   50 mg at 10/18/21 2327    mirtazapine (REMERON) tablet 7.5 mg  7.5 mg Oral Nightly Tiny Calkin, DPM   7.5 mg at 10/17/21 1949    morphine (THIERRY) extended release capsule 10 mg  10 mg Oral BID Tiny Calkin, DPM        potassium chloride (KLOR-CON M) extended release tablet 40 mEq  40 mEq Oral Daily Tiny Calkin, DPM        0.9 % sodium chloride infusion   IntraVENous Continuous Tiny Calkin, DPM 75 mL/hr at 10/18/21 0525 New Bag at 10/18/21 0525    sodium chloride flush 0.9 % injection 10 mL  10 mL IntraVENous 2 times per day Tiny Calkin, DPM   10 mL at 10/18/21 0829    sodium chloride flush 0.9 % injection 10 mL  10 mL IntraVENous PRN Tiny Calkin, DPM        0.9 % sodium chloride infusion  25 mL IntraVENous PRN Tiny Calkin, DPM        magnesium hydroxide (MILK OF MAGNESIA) 400 MG/5ML suspension 30 mL  30 mL Oral Daily PRN Tiny Calkin, DPM        acetaminophen (TYLENOL) tablet 650 mg  650 mg Oral Q6H PRN Tiny Calkin, DPM   650 mg at 10/16/21 1952    Or    acetaminophen (TYLENOL) suppository 650 mg  650 mg Rectal Q6H PRN Gonzella Smiles, DPM        HYDROcodone-acetaminophen (NORCO) 7.5-325 MG per tablet 1 tablet  1 tablet Oral Q6H PRN Gonzella Smiles, DPM   1 tablet at 10/17/21 2151    insulin lispro (HUMALOG) injection vial 0-12 Units  0-12 Units SubCUTAneous TID WC Gonzella Smiles, DPM   4 Units at 10/18/21 1150    insulin lispro (HUMALOG) injection vial 0-6 Units  0-6 Units SubCUTAneous Nightly Gonzella Smiles, DPM   1 Units at 10/17/21 1952    glucose (GLUTOSE) 40 % oral gel 15 g  15 g Oral PRN Gonzella Smiles, DPM        dextrose 50 % IV solution  12.5 g IntraVENous PRN Gonzella Smiles, DPM        glucagon (rDNA) injection 1 mg  1 mg IntraMUSCular PRN Gonzella Smiles, DPM        dextrose 5 % solution  100 mL/hr IntraVENous PRN Gonzella Smiles, DPM        budesonide (PULMICORT) nebulizer suspension 250 mcg  0.25 mg Nebulization BID Gonzella Smiles, DPM   250 mcg at 10/18/21 1032    And    ipratropium (ATROVENT) 0.02 % nebulizer solution 0.5 mg  0.5 mg Nebulization 4x daily Gonzella Smiles, DPM   0.5 mg at 10/18/21 1032    And    Arformoterol Tartrate (BROVANA) nebulizer solution 15 mcg  15 mcg Nebulization BID Gonzella Smiles, DPM   15 mcg at 10/18/21 1032         REVIEW OF SYSTEMS:    CONSTITUTIONAL:  Denies fever, chill or rigors. HEENT: denies blurring of vision or double vision, denies hearing problem  RESPIRATORY: denies cough, shortness of breath, sputum expectoration  CARDIOVASCULAR:  Denies palpitation or chest pain   GASTROINTESTINAL:  Denies abdomen pain, diarrhea or constipation. GENITOURINARY:  Denies burning urination or frequency of urination  INTEGUMENT: wounds   HEMATOLOGIC/LYMPHATIC:  Denies lymph node swelling, gum bleeding or easy bruising. MUSCULOSKELETAL: pain in the foot    NEUROLOGICAL:  Weakness     PHYSICAL EXAM:      Vitals:     Vitals:    10/18/21 1335   BP: (!) 103/52   Pulse: 67   Resp: 18   Temp: 98 °F (36.7 °C)   SpO2: 97%       General Appearance:    Awake, alert , no acute distress.    Head: Normocephalic, atraumatic   Eyes:    No pallor, no icterus,   Ears:    No obvious deformity or drainage.    Nose:   No nasal drainage   Throat:   Mucosa dry, no thrush    Neck:   Supple, no lymphadenopathy   Lungs:     Clear to auscultation bilaterally, no wheeze    Heart:    Regular rate and rhythm, systolic murmur    Abdomen:     Soft, non-tender, bowel sounds present    Extremities:    + edema    Pulses:   Dorsalis pedis diminished    Skin:  foot wound dressed    CBC with Differential:      Lab Results   Component Value Date    WBC 12.2 10/18/2021    RBC 1.65 10/18/2021    HGB 5.2 10/18/2021    HCT 16.4 10/18/2021     10/18/2021    MCV 99.4 10/18/2021    MCH 31.5 10/18/2021    MCHC 31.7 10/18/2021    RDW 16.1 10/18/2021    NRBC 0.9 10/01/2021    SEGSPCT 76 03/16/2014    BANDSPCT 1 09/02/2016    METASPCT 0.9 10/01/2021    LYMPHOPCT 10.5 10/17/2021    MONOPCT 5.6 10/17/2021    BASOPCT 0.3 10/17/2021    MONOSABS 0.61 10/17/2021    LYMPHSABS 1.14 10/17/2021    EOSABS 0.17 10/17/2021    BASOSABS 0.03 10/17/2021       CMP     Lab Results   Component Value Date     10/18/2021    K 5.3 10/18/2021    K 4.3 10/16/2021     10/18/2021    CO2 21 10/18/2021    BUN 54 10/18/2021    CREATININE 2.4 10/18/2021    GFRAA 32 10/18/2021    LABGLOM 26 10/18/2021    GLUCOSE 156 10/18/2021    GLUCOSE 111 07/16/2011    PROT 8.1 10/15/2021    LABALBU 2.5 10/15/2021    CALCIUM 8.5 10/18/2021    BILITOT 0.3 10/15/2021    ALKPHOS 217 10/15/2021    AST 41 10/15/2021    ALT 30 10/15/2021         Hepatic Function Panel:    Lab Results   Component Value Date    ALKPHOS 217 10/15/2021    ALT 30 10/15/2021    AST 41 10/15/2021    PROT 8.1 10/15/2021    BILITOT 0.3 10/15/2021    BILIDIR <0.2 01/25/2021    IBILI see below 01/25/2021    LABALBU 2.5 10/15/2021       PT/INR:    Lab Results   Component Value Date    PROTIME 11.7 01/05/2021    PROTIME 11.3 06/21/2011    INR 1.0 01/05/2021       TSH:    Lab Results   Component Value

## 2021-10-18 NOTE — PROGRESS NOTES
Subjective: The patient is awake and alert. No acute events overnight. Complaining of severe pain in right foot  Patient has profuse bleeding of his right TMA foot  Podiatry in room changing dressings  Hemoglobin is down to 5.2  Complains of feeling lightheaded when questioned    Objective:    BP (!) 103/52   Pulse 67   Temp 98 °F (36.7 °C)   Resp 18   Ht 5' 8\" (1.727 m)   Wt 235 lb (106.6 kg)   SpO2 97%   BMI 35.73 kg/m²     In: 2943 [P.O.:200; I.V.:1220; Blood:350]  Out: -   In: 6979   Out: -     General appearance: NAD, conversant, ill appearing  HEENT: AT/NC, MMM  Neck: FROM, supple  Lungs: Clear to auscultation  CV: RRR, no MRGs  Vasc: Radial pulses 2+  Abdomen: Soft, non-tender; no masses or HSM  Extremities: No peripheral edema or digital cyanosis  Skin: no rash, lesions or ulcers  Psych: Alert and oriented to person, place and time  Neuro: Alert and interactive     Recent Labs     10/16/21  0519 10/17/21  0543 10/18/21  0558   WBC 9.3 10.8 12.2*   HGB 8.8* 7.0* 5.2*   HCT 27.6* 22.0* 16.4*    271 177       Recent Labs     10/16/21  0519 10/17/21  0543 10/18/21  0558    132 131*   K 4.3 5.3* 5.3*   CL 97* 97* 100   CO2 24 18* 21*   BUN 53* 50* 54*   CREATININE 1.7* 1.9* 2.4*   CALCIUM 10.0 9.6 8.5*       Assessment:    Principal Problem:    Wound infection  Active Problems:    CKD (chronic kidney disease) stage 3, GFR 30-59 ml/min  Resolved Problems:    * No resolved hospital problems.  *      Plan:  71-year-old male with a significant past medical history recently discharged 1 week ago with amputation is admitted to [place] [address] unit with     Wound infection  -IV antibiotics in the form of Zosyn/Vanco pharmacy to dose Vanco-staph species in blood cultures  -IV hydration -   -Podiatry following -10/16/2021 left toe amputation , I&D right foot multiple compartment, debridement of right foot with wound vac application  continue zosyn and vanc await cultures-ID following  -Hgb down to 5.2

## 2021-10-18 NOTE — PROGRESS NOTES
Department of Podiatry  Progress Note    SUBJECTIVE:  Patient seen bedside for S/P left 2nd toe amputation to level of MPJ, right foot incision and drainage, excisional debridement of right foot wound with application of wound vac (DOS: 10/16/21). Patient with significant anemia, in the process of transfer to telemetry and pRBS transfusion. Patient appears to have AMS with some incoherent speech, but is able to follow commands. States that his R foot feels like it's burning throughout dressing change. <50cc sanguinous drainage in wound vac collection canister upon initial evaluation of patient prior to wound care.      OBJECTIVE:    Scheduled Meds:   linezolid  600 mg Oral 2 times per day    ciprofloxacin  500 mg Oral 2 times per day    sodium chloride flush  10 mL IntraVENous 2 times per day    allopurinol  300 mg Oral Daily    bumetanide  1 mg Oral Daily    DULoxetine  60 mg Oral Daily    ferrous sulfate  325 mg Oral BID WC    gabapentin  400 mg Oral 4x Daily    hydrALAZINE  50 mg Oral 3 times per day    hydrOXYzine  25 mg Oral 4x Daily    isosorbide mononitrate  120 mg Oral Daily    lactulose  20 g Oral Once per day on Mon Wed Fri    levothyroxine  25 mcg Oral Daily    atorvastatin  10 mg Oral Daily    magnesium oxide  200 mg Oral Daily    metOLazone  2.5 mg Oral Once per day on Mon Wed Fri    metoprolol succinate  50 mg Oral Daily    mirtazapine  7.5 mg Oral Nightly    morphine  10 mg Oral BID    potassium chloride  40 mEq Oral Daily    sodium chloride flush  10 mL IntraVENous 2 times per day    insulin lispro  0-12 Units SubCUTAneous TID     insulin lispro  0-6 Units SubCUTAneous Nightly    budesonide  0.25 mg Nebulization BID    And    ipratropium  0.5 mg Nebulization 4x daily    And    Arformoterol Tartrate  15 mcg Nebulization BID     Continuous Infusions:   sodium chloride      sodium chloride      sodium chloride 75 mL/hr at 10/18/21 0525    sodium chloride      dextrose PRN Meds:.sodium chloride, HYDROmorphone, sodium chloride flush, sodium chloride, morphine, albuterol, diphenhydrAMINE, sodium chloride flush, sodium chloride, magnesium hydroxide, acetaminophen **OR** acetaminophen, HYDROcodone-acetaminophen, glucose, dextrose, glucagon (rDNA), dextrose    No Known Allergies    BP (!) 103/52   Pulse 67   Temp 98 °F (36.7 °C)   Resp 18   Ht 5' 8\" (1.727 m)   Wt 235 lb (106.6 kg)   SpO2 97%   BMI 35.73 kg/m²       EXAM:    Wound vac intact at the time of visit, minimal sanguinous drainage in collection canister. VASCULAR:  DP and PT pulses nonpalpable. Skin temperature warm to warm from proximal to distal B/L. No edema noted. Heavy oozing blooding with dressing change to RLE; not arterial spurting. Absent hair growth     NEUROLOGIC:  Epicritic sensation diminished B/L.      DERM:  Open surgical TMA wound with plantar fasciotomy to RLE as pictured below. Gross bone exposure with granular base and extensive soft tissue exposure; bleeding present but no arterial spurting. No malodor or purulence noted. LLE 2nd digit amp site with sutures intact. No drainage, malodor, streaking, or fluctuance noted. LLE calf with skin changes consistent with chronic venous stasis.      MUSCULOSKELETAL: Evidence of previous R TMA, L 2nd digit amp. Mild pain with dressing change to RLE. No pain noted to LLE. Muscle strength testing deferred.                      Scheduled Meds:   linezolid  600 mg Oral 2 times per day    ciprofloxacin  500 mg Oral 2 times per day    sodium chloride flush  10 mL IntraVENous 2 times per day    allopurinol  300 mg Oral Daily    bumetanide  1 mg Oral Daily    DULoxetine  60 mg Oral Daily    ferrous sulfate  325 mg Oral BID WC    gabapentin  400 mg Oral 4x Daily    hydrALAZINE  50 mg Oral 3 times per day    hydrOXYzine  25 mg Oral 4x Daily    isosorbide mononitrate  120 mg Oral Daily    lactulose  20 g Oral Once per day on Mon Wed Fri    levothyroxine  25 mcg Oral Daily    atorvastatin  10 mg Oral Daily    magnesium oxide  200 mg Oral Daily    metOLazone  2.5 mg Oral Once per day on Mon Wed Fri    metoprolol succinate  50 mg Oral Daily    mirtazapine  7.5 mg Oral Nightly    morphine  10 mg Oral BID    potassium chloride  40 mEq Oral Daily    sodium chloride flush  10 mL IntraVENous 2 times per day    insulin lispro  0-12 Units SubCUTAneous TID WC    insulin lispro  0-6 Units SubCUTAneous Nightly    budesonide  0.25 mg Nebulization BID    And    ipratropium  0.5 mg Nebulization 4x daily    And    Arformoterol Tartrate  15 mcg Nebulization BID     Continuous Infusions:   sodium chloride      sodium chloride      sodium chloride 75 mL/hr at 10/18/21 0525    sodium chloride      dextrose       PRN Meds:.sodium chloride, HYDROmorphone, sodium chloride flush, sodium chloride, morphine, albuterol, diphenhydrAMINE, sodium chloride flush, sodium chloride, magnesium hydroxide, acetaminophen **OR** acetaminophen, HYDROcodone-acetaminophen, glucose, dextrose, glucagon (rDNA), dextrose    RADIOLOGY:  XR CHEST PORTABLE   Final Result   1. After mid sternotomy. Moderate cardiomegaly. Presence of left-sided   pacemaker. 2.  Chronic findings in the left base as seen on previous examination which   can represent atelectasis or a mild left-sided pleural effusion. 3.  No superimposed acute cardiopulmonary process. XR FOOT LEFT (MIN 3 VIEWS)   Final Result   Mild soft tissue edema which could be reactive or due to cellulitis. Chronic osseous changes. MRI would be useful if symptoms persist.         XR FOOT RIGHT (MIN 3 VIEWS)   Final Result   Status post transmetatarsal amputation. Irregular appearance of the overlying soft tissues with multiple lucencies   which may represent soft tissue gas.   Large soft tissue defect superiorly at   the amputation site      No gross lytic or erosive changes to suggest advanced osteomyelitis. BP (!) 103/52   Pulse 67   Temp 98 °F (36.7 °C)   Resp 18   Ht 5' 8\" (1.727 m)   Wt 235 lb (106.6 kg)   SpO2 97%   BMI 35.73 kg/m²     LABS:    Recent Labs     10/17/21  0543 10/18/21  0558   WBC 10.8 12.2*   HGB 7.0* 5.2*   HCT 22.0* 16.4*    177        Recent Labs     10/18/21  0558   *   K 5.3*      CO2 21*   BUN 54*   CREATININE 2.4*        Recent Labs     10/15/21  1603   PROT 8.1         ASSESSMENT:  Principal Problem:    Wound infection  Active Problems:    CKD (chronic kidney disease) stage 3, GFR 30-59 ml/min    -S/P left 2nd toe amputation to level of MPJ, right foot incision and drainage, excisional debridement of right foot wound with application of wound vac (DOS: 10/16/21)  -Flap necrosis, right foot (POA)  -Cellulitis with abscess, right foot (POA)  -Acute osteomyelitis, right foot   -Left 2nd toe osteomyelitis   -Left 2nd toe cellulitis     PLAN:  - Patient was examined and evaluated. Chart and labs were reviewed. - Pain Control: IV and PO  - OR cultures: soft tissue with Citrobacter, Staph aureus growth. Bone culture with GNR, Staph aureus growth. Will follow up with final speciation and sensitivities. Path pending.  - Abx per ID - cipro and zyvox ongoing. Will need PICC after negative blood cultures  - RLE with heavy bleeding after removal of wound vac. No significant sanguinous drainage in collection canister of vac prior to change however, so anemia may be from another source. Wound vac held until hgb improves. RLE dressed with surgicell for hemostasis, bulky DSD. Will continue to check for strikethrough drainage/saturation.  - LLE dressed with xeroform, DSD.   - Will continue to follow. - Discussed patient with Dr. Jeri Mcgrath   - Will continue to follow patient while they are in-house.

## 2021-10-18 NOTE — DISCHARGE SUMMARY
Physician Discharge Summary     Patient ID:  Ced Bernabe  [id number]  [de-identified] y.o.  1940    Admit date: 9/23/2021    Discharge date and time: 9/27/2021    Admission Diagnoses:   Patient Active Problem List   Diagnosis    Essential hypertension    Peripheral vascular disease ([address]    Pacemaker    H/O aortic valve replacement    CKD (chronic kidney disease) stage 3, GFR 30-59 ml/min    Type 2 diabetes mellitus with stage 3 chronic kidney disease, with long-term current use of insulin (HCC)    Pulmonary nodule    Diabetes mellitus type 2, uncontrolled ([address]    Hyperlipidemia LDL goal <100    Acquired hypothyroidism    CAD (coronary artery disease), native coronary artery    Status post coronary artery bypass graft    Chronic pain    History of CVA (cerebrovascular accident)    Obesity (BMI 30-39. 9)    Anemia    Elevated sed rate    Vision decreased    Bilateral carotid bruits    Bilateral carotid artery stenosis    Nonrheumatic mitral valve regurgitation    Pulmonary hypertension (HCC)    Moderate tricuspid regurgitation by prior echocardiography    (HFpEF) heart failure with preserved ejection fraction (HCC)    COPD (chronic obstructive pulmonary disease) (HCC)    Diabetic neuropathy associated with type 2 diabetes mellitus (HCC)    Congestive heart failure (HCC)    Cellulitis    Peripheral vascular disease of lower extremity with ulceration (HCC)    Critical lower limb ischemia (HCC)    Nonrheumatic mitral valve stenosis    Severe left ventricular hypertrophy    Cardiomyopathy ([address]    Ischemic foot ulcer due to atherosclerosis of native artery of limb ([address]    Wound infection       Discharge Diagnoses: Osteomyelitis    Consults: ID, nephrology and vascular surgery    Procedures: None    Hospital Course: The patient is a [de-identified] y.o. male of Kari Xiao MD who presents with left lower extremity wound.     Cellulitis   -IV abx-discontinued secondary to noninfected gangrened necrotic toes  -ID following  -Monitor WBC >> 10.8 today, watch for increasing leukocytosis or fevers     Necrotic Toes of R Foot  -Podiatry following- local wound care per them  -Vascular Surgery following  -Angiogram Monday 9/27   -SÁNCHEZ's- patient refused  -Possible debridement/amputation but wont be until after angiogram.     DM Type 2   -Monitor labs, check hemoglobin A1c  -ISS glucose control, resume home medications and titrate as needed with introduction of insulin  -Hypoglycemia protocol initiated  -Diabetes education  -Discuss diet modification      HARVEY  -monitor labs BUN/Creat 55/2.2 >> 33/1.4  -Nephrology consulted     Confused and agitated  -This appears to be at his normal baseline currently with sitter at bedside  -Will need to remove sitter at some point and institute telesitter in preparation for discharge  -Haldol as needed  -Patient refusing nursing care     Resume home medications, monitor for fulminant sepsis  Uncontrolled blood pressure-as needed BP meds, resume home regimen    Patient was transferred to [facility] to undergo further treatment for bilateral lower extremity wounds.        Recent Labs     10/16/21  0519 10/17/21  0543 10/18/21  0558   WBC 9.3 10.8 12.2*   HGB 8.8* 7.0* 5.2*   HCT 27.6* 22.0* 16.4*    271 177       Recent Labs     10/16/21  0519 10/17/21  0543 10/18/21  0558    132 131*   K 4.3 5.3* 5.3*   CL 97* 97* 100   CO2 24 18* 21*   BUN 53* 50* 54*   CREATININE 1.7* 1.9* 2.4*   CALCIUM 10.0 9.6 8.5*       XR FOOT LEFT (MIN 3 VIEWS)    Result Date: 10/15/2021  EXAMINATION: THREE XRAY VIEWS OF THE LEFT FOOT 10/15/2021 8:27 pm COMPARISON: 02/04/2017 HISTORY: ORDERING SYSTEM PROVIDED HISTORY: cellulitis of digits 2 and 3 TECHNOLOGIST PROVIDED HISTORY: Look for signs of osteomyelitis of left digits 2 and 3 Reason for exam:->cellulitis of digits 2 and 3 What reading provider will be dictating this exam?->CRC FINDINGS: Mild diffuse soft tissue edema.   Scattered vascular calcifications. No radiopaque foreign body. Small accessory navicular. Mild diffuse osteopenia. Scattered degenerative changes. No bony destruction, dislocation or acute fracture identified. Mild soft tissue edema which could be reactive or due to cellulitis. Chronic osseous changes. MRI would be useful if symptoms persist.     XR FOOT RIGHT (MIN 3 VIEWS)    Result Date: 10/15/2021  EXAMINATION: THREE XRAY VIEWS OF THE RIGHT FOOT 10/15/2021 7:27 pm COMPARISON: October 2 HISTORY: ORDERING SYSTEM PROVIDED HISTORY: Recent surgery, ray amputation, infection. eval for subcutaneous air/overt osteomyelitis TECHNOLOGIST PROVIDED HISTORY: Reason for exam:->Recent surgery, ray amputation, infection. eval for subcutaneous air/overt osteomyelitis What reading provider will be dictating this exam?->CRC FINDINGS: Status post transmetatarsal amputation. Irregular appearance of the overlying soft tissues with multiple lucencies which may represent soft tissue gas. Large soft tissue defect superiorly at the amputation site Surgical clips laterally. Atherosclerotic calcifications. No gross lytic or erosive changes to suggest advanced osteomyelitis. Status post transmetatarsal amputation. Irregular appearance of the overlying soft tissues with multiple lucencies which may represent soft tissue gas. Large soft tissue defect superiorly at the amputation site No gross lytic or erosive changes to suggest advanced osteomyelitis. XR CHEST PORTABLE    Result Date: 10/16/2021  EXAMINATION: ONE XRAY VIEW OF THE CHEST 10/16/2021 8:39 am COMPARISON: July 30, 2020-August 19, 2021 HISTORY: ORDERING SYSTEM PROVIDED HISTORY: preop TECHNOLOGIST PROVIDED HISTORY: Reason for exam:->preop What reading provider will be dictating this exam?->CRC FINDINGS: Patient had previous mid sternotomy. There is a permanent pacemaker with 2 wires placed left subclavian vein. The heart is enlarged at least in a moderate degree.   There is some areas of atelectasis in both bases. 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 There is no right-sided pleural effusion. There is no perihilar vascular congestion. 1.  After mid sternotomy. Moderate cardiomegaly. Presence of left-sided pacemaker. 2.  Chronic findings in the left base as seen on previous examination which can represent atelectasis or a mild left-sided pleural effusion. 3.  No superimposed acute cardiopulmonary process. Discharge Exam:    HEENT: NCAT,  PERRLA, No JVD  Heart:  RRR, no murmurs, gallops, or rubs.   Lungs:  CTA bilaterally, no wheeze, rales or rhonchi  Abd: bowel sounds present, nontender, nondistended, no masses  Extrem:  No clubbing, cyanosis, or edema    Disposition: [address] [place] [address]    Patient Condition at Discharge: Stable    Patient Instructions:      Medication List      ASK your doctor about these medications    albuterol (2.5 MG/3ML) 0.083% nebulizer solution  Commonly known as: PROVENTIL  Take 3 mLs by nebulization every 6 hours as needed for Wheezing or Shortness of Breath DX: COPD J44.9 Please bill Medicare Part B     allopurinol 300 MG tablet  Commonly known as: ZYLOPRIM     aspirin EC 81 MG EC tablet  Take 1 tablet by mouth daily     bumetanide 1 MG tablet  Commonly known as: BUMEX     diphenhydrAMINE 25 MG capsule  Commonly known as: BENADRYL     DULoxetine 60 MG extended release capsule  Commonly known as: CYMBALTA  Take 1 capsule by mouth daily     ferrous sulfate 325 (65 Fe) MG tablet  Commonly known as: IRON 325  Take 1 tablet by mouth 2 times daily (with meals)     gabapentin 400 MG capsule  Commonly known as: NEURONTIN     HumaLOG KwikPen 100 UNIT/ML Sopn  Generic drug: insulin lispro (1 Unit Dial)     hydrOXYzine 25 MG tablet  Commonly known as: ATARAX     isosorbide mononitrate 120 MG extended release tablet  Commonly known as: IMDUR  Take 1 tablet by mouth daily     lactulose 20 GM/30ML Soln     levothyroxine 25 MCG tablet  Commonly known as: SYNTHROID     lovastatin 20 MG tablet  Commonly known as: MEVACOR     magnesium 200 MG Tabs tablet     metOLazone 2.5 MG tablet  Commonly known as: ZAROXOLYN     metoprolol succinate 50 MG extended release tablet  Commonly known as: TOPROL XL     morphine 10 MG extended release capsule  Commonly known as: THIERRY     potassium chloride 20 MEQ extended release tablet  Commonly known as: KLOR-CON M     Trelegy Ellipta 100-62.5-25 MCG/INH Aepb  Generic drug: fluticasone-umeclidin-vilant          Activity: bedrest  Diet: diabetic diet    Pt has been advised to: Follow-up with Robert Gary MD in 1 week.   Follow-up with consultants as recommended by them    Note that over 30 minutes was spent in preparing discharge papers, discussing discharge with patient, medication review, etc.    Signed:  Fauzia Ramirez MD  10/18/2021  1:20 PM

## 2021-10-19 NOTE — CARE COORDINATION
Discharge plan is to return to [facility] when medically stable.  Holland Fowler [phone]

## 2021-10-19 NOTE — PROGRESS NOTES
Pt is drowsy and unable to sign consent for PICC placement. Attempted to call brother Anthony Ramon twice, voicemail left. Awaiting call back.

## 2021-10-19 NOTE — FLOWSHEET NOTE
Inpatient Wound Care (Initial consult) 5412A    Admit Date: 10/15/2021  5:49 PM    Reason for consult:  Buttock    Significant history: Per H&P     Wound infection     History of Present Illness:   Patient is an 71-year-old male with past medical history of CHF, CAD, COPD, CKD, DM, thyroid disease, and STEMI, hypertension, and PVD for chronic wounds and cellulitis. Patient presents to the ED via EMS from facility with wound infection. Patient was recently discharged on 10/3, and during that stay patient had transmetatarsal amputation of the right foot. Podiatry seen patient at facility and had them bring the patient to the ED. Patient denies any pain in foot, chest pain, shortness of breath, fever, chills, headache, abdominal pain, dizziness, and blurred vision. ER work-up reveals BUN/creatinine 57/2.0, lactic acid 2.4, WBC 11.8. Patient was admitted to [place] [id number] unit for podiatry consult and for further testing and treatment. Findings:     10/19/21 1028   Skin Integrity   Skin Integrity   (redness, dry, flaky)   Location   (RLE)   Skin Integrity Site 2   Skin Integrity Location 2 Bruising   Location 2 BUE   Skin Integrity Site 3   Skin Integrity Location 3   (redness, blanchable)    Location 3   (left buttock)   Wound 08/21/21 Pretibial Left   Date First Assessed/Time First Assessed: 08/21/21 0031   Present on Hospital Admission: Yes  Location: Pretibial  Wound Location Orientation: Left   Dressing Status Intact   Wound 10/16/21 Buttocks Right   Date First Assessed: 10/16/21   Present on Hospital Admission: Yes  Primary Wound Type:  Other (comment)  Location: Buttocks  Wound Location Orientation: Right   Wound Image    Wound Etiology Pressure Unstageable   Dressing/Treatment Pharmaceutical agent (see MAR)   Wound Length (cm) 4 cm   Wound Width (cm) 3.5 cm   Wound Depth (cm) 0.1 cm   Wound Surface Area (cm^2) 14 cm^2   Wound Volume (cm^3) 1.4 cm^3   Wound Assessment Slough;Pink/red   Drainage Amount None Odor None   Wendy-wound Assessment Blanchable erythema       **Informed Consent**    The patient has given verbal consent to have photos taken of wound and inserted into their chart as part of their permanent medical record for purposes of documentation, treatment management and/or medical review. All Images taken on 10/19/21 of patient name: Jenny Mac were transmitted and stored on secured [employer] located within [facility] by a registered [employer] Application Device. Impression:  Right buttock: unstageable    Plan: Aquaphor  TAPS  Heel protectors  Podiatry following bilateral lower extremity wounds and managing dressings.   Patient will need continued preventative care      Estrella Christy RN 10/19/2021 11:09 AM

## 2021-10-19 NOTE — CONSULTS
Nephrology Consult  [facility]    CC:   Metabolic acidosis, worsening renal failure    HPI:   Patient is a [de-identified]year old male who presented to the ED for foul-smelling drainage of right foot by podiatry s/p right transmetatarsal amputation on 10/2. On admission, patient's Cr 2.0. At present, pt on vancomycin and maxipime for TMA wound infection. Cr trending up to 2.6 today. Of note, Hgb trending down Hgb 5.2 on 10/18 received 2 units and Hgb 6.1 on 10/19. He is seen in the room. He is very slow to respond and minimally verbal. Family have been contacted for consent for PICC line placement as he is not able to consent for himself. He has been seen by us in the hospital on multiple occasions the last of which was during his recent admission late last month/earlier this month in September his peak creatinine was 2.2 post IV contrast exposure.  He did recover at that time back to creatinine of 1.6-1.9 mg/dl, prior baseline 1.5-2.1 mg/dl    10/19/2021- he is in bed, no acute distress but not answering questions     PMH:    Past Medical History:   Diagnosis Date    (HFpEF) heart failure with preserved ejection fraction ([address] 07/2020    Diagnosed by cardiology    Arthritis     Bilateral carotid artery stenosis 12/31/2020    Bilateral carotid bruits 12/31/2020    Blood circulation, collateral     CAD (coronary artery disease)     Carotid bruit 03/27/2013    CHF (congestive heart failure) (HCC)     Chronic kidney disease     CKD (chronic kidney disease) stage 3, GFR 30-59 ml/min ([address] 03/20/2016    COPD (chronic obstructive pulmonary disease) ([address]     Follows with pulmonary    Diabetes mellitus ([address]     Diabetic neuropathy associated with type 2 diabetes mellitus (HCC)     Dyspnea on exertion     History of blood transfusion     Hyperlipidemia     Hypertension     Mild mitral regurgitation     Moderate tricuspid regurgitation by prior echocardiography 2018    Movement disorder     NSTEMI, initial episode of care [facility]) 01/2018    Obesity     Pulmonary hypertension ([address] 2018    PVD (peripheral vascular disease) with claudication ([address] 03/27/2014    S/P carotid endarterectomy 03/27/2013    Sleep apnea     SOB (shortness of breath)     Thyroid disease     Unspecified cerebral artery occlusion with cerebral infarction     Vision decreased 12/31/2020       Patient Active Problem List   Diagnosis    Essential hypertension    Peripheral vascular disease ([address]    Pacemaker    H/O aortic valve replacement    CKD (chronic kidney disease) stage 3, GFR 30-59 ml/min    Type 2 diabetes mellitus with stage 3 chronic kidney disease, with long-term current use of insulin (HCC)    Pulmonary nodule    Diabetes mellitus type 2, uncontrolled (HCC)    Hyperlipidemia LDL goal <100    Acquired hypothyroidism    CAD (coronary artery disease), native coronary artery    Status post coronary artery bypass graft    Chronic pain    History of CVA (cerebrovascular accident)    Obesity (BMI 30-39. 9)    Anemia    Elevated sed rate    Vision decreased    Bilateral carotid bruits    Bilateral carotid artery stenosis    Nonrheumatic mitral valve regurgitation    Pulmonary hypertension (HCC)    Moderate tricuspid regurgitation by prior echocardiography    (HFpEF) heart failure with preserved ejection fraction (HCC)    COPD (chronic obstructive pulmonary disease) (HCC)    Diabetic neuropathy associated with type 2 diabetes mellitus (HCC)    Congestive heart failure (HCC)    Cellulitis    Peripheral vascular disease of lower extremity with ulceration (HCC)    Critical lower limb ischemia (HCC)    Nonrheumatic mitral valve stenosis    Severe left ventricular hypertrophy    Cardiomyopathy ([address]    Ischemic foot ulcer due to atherosclerosis of native artery of limb ([address]    Wound infection       Meds:     white petrolatum   Topical BID    cefepime  2,000 mg IntraVENous Q24H    vancomycin  1,000 mg IntraVENous Q36H    lidocaine PF  5 mL IntraDERmal Once    sodium chloride flush  5-40 mL IntraVENous 2 times per day    heparin flush  3 mL IntraVENous 2 times per day    allopurinol  300 mg Oral Daily    [Held by provider] bumetanide  1 mg Oral Daily    DULoxetine  60 mg Oral Daily    ferrous sulfate  325 mg Oral BID     gabapentin  400 mg Oral 4x Daily    hydrALAZINE  50 mg Oral 3 times per day    hydrOXYzine  25 mg Oral 4x Daily    isosorbide mononitrate  120 mg Oral Daily    lactulose  20 g Oral Once per day on Mon Wed Fri    levothyroxine  25 mcg Oral Daily    atorvastatin  10 mg Oral Daily    magnesium oxide  200 mg Oral Daily    [Held by provider] metOLazone  2.5 mg Oral Once per day on Mon Wed Fri    metoprolol succinate  50 mg Oral Daily    mirtazapine  7.5 mg Oral Nightly    morphine  10 mg Oral BID    potassium chloride  40 mEq Oral Daily    insulin lispro  0-12 Units SubCUTAneous TID     insulin lispro  0-6 Units SubCUTAneous Nightly    budesonide  0.25 mg Nebulization BID    And    ipratropium  0.5 mg Nebulization 4x daily    And    Arformoterol Tartrate  15 mcg Nebulization BID        sodium chloride      sodium chloride      sodium chloride      sodium chloride 75 mL/hr at 10/18/21 0525    dextrose         Meds prn:     white petrolatum **AND** white petrolatum, sodium chloride, sodium chloride, sodium chloride flush, sodium chloride, heparin flush, HYDROmorphone, morphine, albuterol, diphenhydrAMINE, magnesium hydroxide, acetaminophen **OR** acetaminophen, HYDROcodone-acetaminophen, glucose, dextrose, glucagon (rDNA), dextrose    Meds prior to admission:     No current facility-administered medications on file prior to encounter.      Current Outpatient Medications on File Prior to Encounter   Medication Sig Dispense Refill    mirtazapine (REMERON) 7.5 MG tablet Take 7.5 mg by mouth nightly      HYDROcodone-acetaminophen (NORCO)  MG per tablet Take 1 tablet by mouth every 6 hours as needed for Pain.  hydrALAZINE (APRESOLINE) 50 MG tablet Take 1 tablet by mouth every 8 hours 90 tablet 3    magnesium 200 MG TABS tablet Take 200 mg by mouth daily      diphenhydrAMINE (BENADRYL) 25 MG capsule Take 25 mg by mouth daily as needed for Itching      morphine (THIERRY) 10 MG extended release capsule Take 10 mg by mouth 2 times daily.  hydrOXYzine (ATARAX) 25 MG tablet Take 25 mg by mouth 4 times daily       lactulose 20 GM/30ML SOLN Take 20 g by mouth three times a week *MON-WED-FRI*      metOLazone (ZAROXOLYN) 2.5 MG tablet Take 2.5 mg by mouth three times a week *TUE-THUR-SAT*      bumetanide (BUMEX) 1 MG tablet Take 1 mg by mouth daily       potassium chloride (KLOR-CON M) 20 MEQ extended release tablet Take 40 mEq by mouth daily       ferrous sulfate (IRON 325) 325 (65 Fe) MG tablet Take 1 tablet by mouth 2 times daily (with meals) 30 tablet 3    DULoxetine (CYMBALTA) 60 MG extended release capsule Take 1 capsule by mouth daily 30 capsule 3    fluticasone-umeclidin-vilant (TRELEGY ELLIPTA) 100-62.5-25 MCG/INH AEPB Inhale 1 puff into the lungs Daily       aspirin EC 81 MG EC tablet Take 1 tablet by mouth daily 90 tablet 1    albuterol (PROVENTIL) (2.5 MG/3ML) 0.083% nebulizer solution Take 3 mLs by nebulization every 6 hours as needed for Wheezing or Shortness of Breath DX: COPD J44.9 Please bill Medicare Part B 120 mL 6    levothyroxine (SYNTHROID) 25 MCG tablet Take 25 mcg by mouth Daily   1    gabapentin (NEURONTIN) 400 MG capsule Take 400 mg by mouth 4 times daily.  metoprolol succinate (TOPROL XL) 50 MG extended release tablet Take 50 mg by mouth daily      isosorbide mononitrate (IMDUR) 120 MG extended release tablet Take 1 tablet by mouth daily 30 tablet 0    allopurinol (ZYLOPRIM) 300 MG tablet Take 300 mg by mouth daily       lovastatin (MEVACOR) 20 MG tablet Take 20 mg by mouth nightly.       insulin lispro, 1 Unit Dial, (HUMALOG KWIKPEN) 100 UNIT/ML SOPN Inject 0-6 Units into the skin 3 times daily (before meals) PER SLIDING SCALE: 0-139=0U, 140-199=1U, 200-249=2U, 250-299=3U, 300-349=4U, 350-399=5U, 400-450=6U         Allergies:    Patient has no known allergies. Social History:     reports that he quit smoking about 40 years ago. His smoking use included cigarettes. He started smoking about 68 years ago. He has a 50.00 pack-year smoking history. He has never used smokeless tobacco. He reports previous drug use. He reports that he does not drink alcohol.     Family History:         Problem Relation Age of Onset    Heart Disease Mother     Heart Failure Mother     Heart Surgery Father     Heart Disease Father     Heart Failure Father     High Blood Pressure Sister     Cancer Sister        ROS:     All pertinent + discussed in HPI  All other sx negative     Physical Exam:      Patient Vitals for the past 24 hrs:   BP Temp Temp src Pulse Resp SpO2   10/19/21 1345 (!) 110/49 96.7 °F (35.9 °C) Temporal 66 20 100 %   10/19/21 1342 (!) 133/59 97.1 °F (36.2 °C) Temporal 69 20 --   10/19/21 0745 (!) 110/50 97.1 °F (36.2 °C) Temporal 57 20 99 %   10/19/21 0611 (!) 113/52 97.1 °F (36.2 °C) Temporal 70 18 --   10/19/21 0000 (!) 107/49 97.7 °F (36.5 °C) Temporal 72 16 100 %   10/18/21 2159 -- -- -- -- -- 96 %   10/18/21 2157 -- -- -- -- -- 96 %   10/18/21 2156 -- -- -- -- -- 96 %   10/18/21 1837 (!) 111/53 99 °F (37.2 °C) -- 69 16 98 %   10/18/21 1823 (!) 120/58 99 °F (37.2 °C) -- 70 16 98 %   10/18/21 1748 (!) 119/55 98.9 °F (37.2 °C) -- 70 16 98 %   10/18/21 1628 (!) 115/57 98.1 °F (36.7 °C) -- 72 16 100 %         Intake/Output Summary (Last 24 hours) at 10/19/2021 1558  Last data filed at 10/19/2021 1158  Gross per 24 hour   Intake 2517.33 ml   Output --   Net 2517.33 ml       Constitutional: Patient in no acute distress   Head: normocephalic, atraumatic   Neck: no jvd  Cardiovascular: S1 S2 no S3, no rub   Respiratory:poor effort, diminished  Gastrointestinal: soft, nontender, nondistended,   Ext: right foot with wound vac post TMA, left foot dressing  Neuro: lethargic    Data:    Recent Labs     10/17/21  0543 10/17/21  0543 10/18/21  0558 10/18/21  0558 10/19/21  0108 10/19/21  0609 10/19/21  1152   WBC 10.8  --  12.2*  --   --  14.0*  --    HGB 7.0*   < > 5.2*   < > 6.6* 6.9* 6.1*   HCT 22.0*   < > 16.4*   < > 20.1* 21.4* 18.8*   MCV 97.8  --  99.4  --   --  96.8  --      --  177  --   --  210  --     < > = values in this interval not displayed. Recent Labs     10/17/21  0543 10/18/21  0558 10/19/21  0609    131* 140   K 5.3* 5.3* 5.7*   CL 97* 100 106   CO2 18* 21* 17*   CREATININE 1.9* 2.4* 2.6*   BUN 50* 54* 57*   LABGLOM 34 26 24   GLUCOSE 120* 156* 164*   CALCIUM 9.6 8.5* 8.8       Vit D, 25-Hydroxy   Date Value Ref Range Status   09/25/2021 27 (L) 30 - 100 ng/mL Final     Comment:     <20 ng/mL. ........... Beth Rast Deficient  20-30 ng/mL. ......... Beth Rast Insufficient   ng/mL. ........ Beth Rast Sufficient  >100 ng/mL. .......... Beth Rast Toxic         PTH   Date Value Ref Range Status   09/25/2021 30 15 - 65 pg/mL Final       No results for input(s): ALT, AST, ALKPHOS, BILITOT, BILIDIR in the last 72 hours. No results for input(s): LABALBU in the last 72 hours.     Ferritin   Date Value Ref Range Status   09/25/2021 534 ng/mL Final     Comment:     FERRITIN Reference Ranges:  Adult Males   20 - 60 years:    27 - 400 ng/mL  Adult females 16 - 61 years:    15 - 150 ng/mL  Adults greater than 60 years:   no established reference range  Pediatrics:                     no established reference range       Iron   Date Value Ref Range Status   09/25/2021 34 (L) 59 - 158 mcg/dL Final     TIBC   Date Value Ref Range Status   09/25/2021 157 (L) 250 - 450 mcg/dL Final       Vitamin B-12   Date Value Ref Range Status   09/25/2021 1146 (H) 211 - 946 pg/mL Final       Folate   Date Value Ref Range Status   09/25/2021 11.5 4.8 - 24.2 ng/mL Final         Lab Results   Component Value Date    COLORU Yellow 09/24/2021    NITRU Negative 09/24/2021    GLUCOSEU Negative 09/24/2021    GLUCOSEU NEGATIVE 07/12/2011    KETUA TRACE 09/24/2021    UROBILINOGEN 0.2 09/24/2021    BILIRUBINUR Negative 09/24/2021    BILIRUBINUR NEGATIVE 07/12/2011       No results found for: MIRYAM, CREURRAN, MACREATRATIO, OSMOU    No components found for: URIC    No results found for: LIPIDPAN      Assessment and Plan:  1. HARVEY-  In the setting of vancomycin for TMA would infection, diuretics  and profound anemia  Holding diuretics and metolazone   Will check urine studies and eos  Check renal US with worsening renal function  Check PVR  Strict I&O as no output has been recorded  Baseline Cr 1.5-2.1 mg/dl  Cr 2.0-->1.7-->-->1.9-->2.4-->2.6    2. CKD  Presumed microvascular in the setting of DM and HTN as well as PVD   Baseline Cr 1.5-2.1 mg/dl    3. Metabolic Acidosis  In the setting of HARVEY  CO2-17  Start po HCO3    4. Hyperkalemia   10/19- K+ 5.7  Was on daily PO supplement-> STOP  1 dose lokelma ordered     5. Anemia  10/19-Hgb -6.1--> For additional 1 unit blood   10/18-S/p 2 units blood for Hgb 5.2  Per primary     6. TMA Wound Infection  Infectious Disease following    Ever Aid, APRN - CNP     Patient seen and examined all key components of the physical performed independently , case discussed with NP, all pertinent labs and radiologic tests personally reviewed agree with above.       Regina Nayak MD

## 2021-10-19 NOTE — PROGRESS NOTES
Department of Podiatry  Progress Note    SUBJECTIVE:  Patient seen bedside for S/P left 2nd toe amputation to level of MPJ, right foot incision and drainage, excisional debridement of right foot wound with application of wound vac (DOS: 10/16/21). Patient with AMS, unable to answer questions at this time. Patient received tranfusion pRBC overnight. Dressing c/d/i at the time of visit to BLEs.      OBJECTIVE:    Scheduled Meds:   white petrolatum   Topical BID    cefepime  2,000 mg IntraVENous Q24H    vancomycin  1,000 mg IntraVENous Q36H    lidocaine PF  5 mL IntraDERmal Once    sodium chloride flush  5-40 mL IntraVENous 2 times per day    heparin flush  3 mL IntraVENous 2 times per day    allopurinol  300 mg Oral Daily    [Held by provider] bumetanide  1 mg Oral Daily    DULoxetine  60 mg Oral Daily    ferrous sulfate  325 mg Oral BID WC    gabapentin  400 mg Oral 4x Daily    hydrALAZINE  50 mg Oral 3 times per day    hydrOXYzine  25 mg Oral 4x Daily    isosorbide mononitrate  120 mg Oral Daily    lactulose  20 g Oral Once per day on Mon Wed Fri    levothyroxine  25 mcg Oral Daily    atorvastatin  10 mg Oral Daily    magnesium oxide  200 mg Oral Daily    [Held by provider] metOLazone  2.5 mg Oral Once per day on Mon Wed Fri    metoprolol succinate  50 mg Oral Daily    mirtazapine  7.5 mg Oral Nightly    morphine  10 mg Oral BID    potassium chloride  40 mEq Oral Daily    insulin lispro  0-12 Units SubCUTAneous TID     insulin lispro  0-6 Units SubCUTAneous Nightly    budesonide  0.25 mg Nebulization BID    And    ipratropium  0.5 mg Nebulization 4x daily    And    Arformoterol Tartrate  15 mcg Nebulization BID     Continuous Infusions:   sodium chloride      sodium chloride      sodium chloride      sodium chloride 75 mL/hr at 10/18/21 0525    dextrose       PRN Meds:.white petrolatum **AND** white petrolatum, sodium chloride, sodium chloride, sodium chloride flush, sodium chloride, heparin flush, HYDROmorphone, morphine, albuterol, diphenhydrAMINE, magnesium hydroxide, acetaminophen **OR** acetaminophen, HYDROcodone-acetaminophen, glucose, dextrose, glucagon (rDNA), dextrose    No Known Allergies    BP (!) 110/49   Pulse 66   Temp 96.7 °F (35.9 °C) (Temporal)   Resp 20   Ht 5' 8\" (1.727 m)   Wt 235 lb (106.6 kg)   SpO2 100%   BMI 35.73 kg/m²       EXAM:    VASCULAR:  DP and PT pulses nonpalpable. Skin temperature warm to warm from proximal to distal B/L. No edema noted. Absent hair growth. CFT delayed to LLE, unobtainable to RLE     NEUROLOGIC:  Epicritic sensation diminished B/L.      DERM:  Open surgical TMA wound with plantar fasciotomy to RLE as pictured below. Gross bone exposure with granular base and extensive soft tissue exposure; mild bleeding during dressing change but significantly less than yesterday's dressing change; hemostasis quickly achieved with pressure. No malodor or purulence noted. Decubitus heel wound present, approx 3cm in diameter with eschar base. No openings, no drainage, no malodor. LLE 2nd digit amp site with sutures intact. No drainage, malodor, streaking, or fluctuance noted. LLE calf with skin changes consistent with chronic venous stasis - superficial ulcerations present, L>R      MUSCULOSKELETAL: Evidence of previous R TMA, L 2nd digit amp. Mild pain with dressing change to RLE. No pain noted to LLE. Muscle strength testing deferred.                              Scheduled Meds:               white petrolatum   Topical BID    cefepime  2,000 mg IntraVENous Q24H    vancomycin  1,000 mg IntraVENous Q36H    lidocaine PF  5 mL IntraDERmal Once    sodium chloride flush  5-40 mL IntraVENous 2 times per day    heparin flush  3 mL IntraVENous 2 times per day    allopurinol  300 mg Oral Daily    [Held by provider] bumetanide  1 mg Oral Daily    DULoxetine  60 mg Oral Daily    ferrous sulfate  325 mg Oral BID WC    gabapentin  400 mg Oral 4x Daily    hydrALAZINE  50 mg Oral 3 times per day    hydrOXYzine  25 mg Oral 4x Daily    isosorbide mononitrate  120 mg Oral Daily    lactulose  20 g Oral Once per day on Mon Wed Fri    levothyroxine  25 mcg Oral Daily    atorvastatin  10 mg Oral Daily    magnesium oxide  200 mg Oral Daily    [Held by provider] metOLazone  2.5 mg Oral Once per day on Mon Wed Fri    metoprolol succinate  50 mg Oral Daily    mirtazapine  7.5 mg Oral Nightly    morphine  10 mg Oral BID    potassium chloride  40 mEq Oral Daily    insulin lispro  0-12 Units SubCUTAneous TID WC    insulin lispro  0-6 Units SubCUTAneous Nightly    budesonide  0.25 mg Nebulization BID    And    ipratropium  0.5 mg Nebulization 4x daily    And    Arformoterol Tartrate  15 mcg Nebulization BID     Continuous Infusions:   sodium chloride      sodium chloride      sodium chloride      sodium chloride 75 mL/hr at 10/18/21 0525    dextrose       PRN Meds:.white petrolatum **AND** white petrolatum, sodium chloride, sodium chloride, sodium chloride flush, sodium chloride, heparin flush, HYDROmorphone, morphine, albuterol, diphenhydrAMINE, magnesium hydroxide, acetaminophen **OR** acetaminophen, HYDROcodone-acetaminophen, glucose, dextrose, glucagon (rDNA), dextrose    RADIOLOGY:  XR CHEST PORTABLE   Final Result   1. After mid sternotomy. Moderate cardiomegaly. Presence of left-sided   pacemaker. 2.  Chronic findings in the left base as seen on previous examination which   can represent atelectasis or a mild left-sided pleural effusion. 3.  No superimposed acute cardiopulmonary process. XR FOOT LEFT (MIN 3 VIEWS)   Final Result   Mild soft tissue edema which could be reactive or due to cellulitis. Chronic osseous changes. MRI would be useful if symptoms persist.         XR FOOT RIGHT (MIN 3 VIEWS)   Final Result   Status post transmetatarsal amputation.       Irregular appearance of the overlying soft tissues with

## 2021-10-19 NOTE — PROGRESS NOTES
Spoke to wife of patients brother, stated that Klarissa Devine is out on the golf course, cell phone unavailable, and wont be home for another hour and a half to verbalize consent for PICC line placement.

## 2021-10-19 NOTE — DISCHARGE INSTR - COC
Continuity of Care Form    Patient Name: Melissa Quinn   :  1940  MRN:  [id number]    Admit date:  10/15/2021  Discharge date:  10/26/21    Code Status Order: Full Code   Advance Directives:      Admitting Physician:  Migel Wills MD  PCP: Sandrine Perkins MD    Discharging Nurse: Nicolas Buitrago RN  [address] Unit/Room#: 3289/0997-O  Discharging Unit Phone Number: [phone]    Emergency Contact:   Extended Emergency Contact Information  Primary Emergency Contact: ABEL ABRAHAM  Home Phone: [phone]  Mobile Phone: [phone]  Relation: Brother/Sister    Past Surgical History:  Past Surgical History:   Procedure Laterality Date    BIOPSY / LIGATION TEMPORAL ARTERY Bilateral 2021    BILATERAL TEMPORAL ARTERY BIOPSY performed by Troy Grant MD at [address]      5 years ago   [address] GRAFT  2008    1 vessel and aortic valve repair    DIAGNOSTIC CARDIAC CATH LAB PROCEDURE  10/14/2008    DIAGNOSTIC CARDIAC CATH LAB PROCEDURE  10/21/2010    FEMORAL ENDARTERECTOMY Right 2021    RIGHT FEMORAL ENDARTERECTOMY performed by Edmond Toledo MD at [facility] Right 10/2/2021    RIGHT FOOT TRANSMITTAL AMPUTATION performed by Josefa Barron DPM at [facility] Bilateral 10/16/2021    RIGHT FOOT WOUND DEBRIDEMENT WITH APPLICATION WOUND VAC; LEFT FOOT AMPUTATION 2ND TOE performed by Josefa Barron DPM at [facility]      R knee replacement    KNEE SURGERY      OTHER SURGICAL HISTORY Right 2017    debridement,bone bx foot    PACEMAKER INSERTION  2014    D-PPM   ()    Dr. Lisa johnson       Immunization History:   Immunization History   Administered Date(s) Administered    Influenza Virus Vaccine 2016    Pneumococcal Conjugate 13-valent ([id number]) 2016    Pneumococcal Conjugate Vaccine 2016 Active Problems:  Patient Active Problem List   Diagnosis Code    Essential hypertension I10    Peripheral vascular disease ([facility] [address] [facility] I73.9    Pacemaker Z95.0   Aryan Mcnamara H/O aortic valve replacement Z95.2    CKD (chronic kidney disease) stage 3, GFR 30-59 ml/min N18.30    Type 2 diabetes mellitus with stage 3 chronic kidney disease, with long-term current use of insulin ([facility]) E11.22, N18.30, Z79.4    Pulmonary nodule R91.1    Diabetes mellitus type 2, uncontrolled ([facility] E11.65    Hyperlipidemia LDL goal <100 E78.5    Acquired hypothyroidism E03.9    CAD (coronary artery disease), native coronary artery I25.10    Status post coronary artery bypass graft Z95.1    Chronic pain G89.29    History of CVA (cerebrovascular accident) Z80.78    Obesity (BMI 30-39. 9) E66.9    Anemia D64.9    Elevated sed rate R70.0    Vision decreased H54.7    Bilateral carotid bruits R09.89    Bilateral carotid artery stenosis I65.23    Nonrheumatic mitral valve regurgitation I34.0    Pulmonary hypertension ([facility]) I27.20    Moderate tricuspid regurgitation by prior echocardiography I07.1    (HFpEF) heart failure with preserved ejection fraction ([facility]) I50.30    COPD (chronic obstructive pulmonary disease) ([facility]) J44.9    Diabetic neuropathy associated with type 2 diabetes mellitus ([facility]) E11.40    Congestive heart failure ([facility]) I50.9    Cellulitis L03.90    Peripheral vascular disease of lower extremity with ulceration ([facility]) I73.9, L97.909    Critical lower limb ischemia ([facility]) I70.229    Nonrheumatic mitral valve stenosis I34.2    Severe left ventricular hypertrophy I51.7    Cardiomyopathy ([facility]) I42.9    Ischemic foot ulcer due to atherosclerosis of native artery of limb ([facility]) I70.25, L97.509    Wound infection T14. 8XXA, L08.9       Isolation/Infection:   Isolation            Contact          Patient Infection Status       Infection Onset Added Last Indicated Last Indicated By Review Planned Expiration Resolved Resolved By    MRSA 10/16/21 10/18/21 10/16/21 Culture, Surgical        Wound-RIGHT FOOT WOUND TISSUE 10/16/21    Resolved    COVID-19 Rule Out 10/03/21 10/03/21 10/03/21 COVID-19, Rapid (Ordered)   10/03/21 Rule-Out Test Resulted    COVID-19 Rule Out 01/19/21 01/19/21 01/19/21 COVID-19 (Ordered)   01/19/21 Rule-Out Test Resulted    COVID-19 Rule Out 12/29/20 12/29/20 12/29/20 Covid-19 Ambulatory (Ordered)   12/31/20 Rule-Out Test Resulted            Nurse Assessment:  Last Vital Signs: BP (!) 110/50   Pulse 57   Temp 97.1 °F (36.2 °C) (Temporal)   Resp 20   Ht 5' 8\" (1.727 m)   Wt 235 lb (106.6 kg)   SpO2 99%   BMI 35.73 kg/m²     Last documented pain score (0-10 scale): Pain Level: 0  Last Weight:   Wt Readings from Last 1 Encounters:   10/15/21 235 lb (106.6 kg)     Mental Status:  disoriented and alert    IV Access:  - PICC - site  R Basilic, insertion date: ***10/2021    Nursing Mobility/ADLs:  Walking   Dependent  Transfer  Dependent  Bathing  Dependent  Dressing  Dependent  Toileting  Dependent  Feeding  Dependent  Med Admin  Assisted  Med Delivery   whole    Wound Care Documentation and Therapy:  Wound 08/21/21 Pretibial Right; Anterior (Active)   Number of days: 59       Wound 08/21/21 Pretibial Left (Active)   Dressing Status Clean;Dry; Intact 10/17/21 0800   Dressing/Treatment Ace wrap 10/03/21 0825   Number of days: 59       Wound 08/21/21 Toe (Comment  which one) Anterior; Left (Active)   Number of days: 59       Wound 10/16/21 Buttocks Right (Active)   Dressing/Treatment Open to air; Pharmaceutical agent (see MAR) 10/19/21 0745   Number of days: 3        Elimination:  Continence:   · Bowel: No  · Bladder: No  Urinary Catheter: Removal Date 10/26/21   Colostomy/Ileostomy/Ileal Conduit: No       Date of Last BM: 10/26/21    Intake/Output Summary (Last 24 hours) at 10/19/2021 1046  Last data filed at 10/19/2021 0629  Gross per 24 hour   Intake 3487.33 ml   Output --   Net 3487.33 ml     I/O last 3 completed shifts: In: 3587.3 [P.O.:580; I.V.:1814; Blood:1193.3]  Out: -     Safety Concerns: At Risk for Falls    Impairments/Disabilities:      None    Nutrition Therapy:  Current Nutrition Therapy:   - Oral Diet:  General    Routes of Feeding: Oral  Liquids: Thin Liquids  Daily Fluid Restriction: no  Last Modified Barium Swallow with Video (Video Swallowing Test): not done    Treatments at the Time of Hospital Discharge:   Respiratory Treatments: ***  Oxygen Therapy:  is on oxygen at 4 L/min per nasal cannula. Ventilator:    - No ventilator support    Rehab Therapies: Physical Therapy and Occupational Therapy  Weight Bearing Status/Restrictions: Non-weight bearing on right leg  Other Medical Equipment (for information only, NOT a DME order):  {EQUIPMENT:[id number]}  Other Treatments: ***    Patient's personal belongings (please select all that are sent with patient):  Glasses    RN SIGNATURE:  Electronically signed by Jodie Garner RN on 10/26/21 at 5:13 PM EDT    CASE MANAGEMENT/SOCIAL WORK SECTION    Inpatient Status Date: ***    Readmission Risk Assessment Score:  Readmission Risk              Risk of Unplanned Readmission:  34           Discharging to Facility/ Agency   · Name:   · Address:  · Phone:  · Fax:    Dialysis Facility (if applicable)   · Name:  · Address:  · Dialysis Schedule:  · Phone:  · Fax:    / signature: {Esignature:[id number]}    PHYSICIAN SECTION    Prognosis: Fair    Condition at Discharge: Stable    Rehab Potential (if transferring to Rehab): Fair    Recommended Labs or Other Treatments After Discharge: CBC and BMP in 3 days    Physician Certification: I certify the above information and transfer of Bhatri Berman  is necessary for the continuing treatment of the diagnosis listed and that he requires [facility] for less 30 days.      Update Admission H&P: No change in H&P    PHYSICIAN SIGNATURE:  Electronically signed by Joelle Alvarez MD on 10/26/21 at 8:54 AM EDT                                                       (History of CHF,  Also readmission )   ***HEART FAILURE - CONGESTIVE HEART FAILURE***  DISCHARGE INSTRUCTIONS:  GUIDELINES TO FOLLOW AT [facility]/SNF/ Assisted Living    Future Appointments   Date Time Provider Jonathon Sierra   10/21/2021  8:30 AM [facility] CHF ROOM 2 [facility] [facility]   11/3/2021  8:25 AM SCHEDULE, REMOTE CHECK [facility]   1/20/2022  1:00 PM Katherine Christine MD [facility]          MEDICATIONS:  · Please notify the doctor if patient is not able to take their medications or if medications are being held for any reasons (such as low blood pressure ect.)  · Do not give the patient ibuprofen (Advil or Motrin), naproxen (Aleve) without talking to the doctor first. This could make their heart failure worse. WEIGHT MONITORING:  ·  Weigh patient every day in the morning after they void (If patient is able to stand, please get a standing weight.)  ·  Notify the doctor of a weight gain of 3 pounds or more in 1 day   OR  a total of 5 pounds or more in 1 week             DIET   Cardiac heart healthy diet:  Low sodium diet: no  more than 2,000mg (2 grams) of salt / sodium per day (which equals to a little less than  a teaspoon of salt)/ Cardiac Diet: Low saturated / low trans fat, no added salt, caffeine restricted    · If patient is there for rehab and will be returning home in the near future; reinforce with the patient and the family to follow a low sodium diet (2,000 mg)- avoid using salt at the table, avoid / limit use of canned soups, processed / packaged foods, salted snacks, olives and pickles. Do not use a salt substitute without checking with the doctor. (Mrs. Jos Lord is safe to use).        NOTIFY THE DOCTOR THE FIRST DAY OF ONSET OF ANY OF THESE   SYMPTOMS:  ·  Weight gain of 3 pounds or more in 1 day         OR 5 pounds or more in one week  · More shortness of breath  · More swelling in stomach, legs, ankles or feet  · Feeling more tired, No energy  · Dry hacky cough  · Dizziness  · More chest pain / discomfort  · Hard time breathing laying down

## 2021-10-19 NOTE — PLAN OF CARE
History of CHF    Flagged in epic as readmission:  8-19-21 CHF ,  9-23-21 Gangrene R Foot  (CKD,  hydrating)     Patient's chart updated to reflect:      . - HF care plan, HF education points and HF discharge instructions for PAUL. HISTORY OF CHF. -Orders:  daily weights, I/O. Diet is  per providers recommendation post surgery. -PCP and/or Cardiologist appointment to be scheduled within 7 days of hospital discharge. DR Mart Norton patient, active with [facility]. Intent to return to [facility]. No cardiology consult this admission. To f/u with PCP post hospital.       Met with patient, provided education book and packet for CHF. He was not interested at present to have review of CHF education. Follows with DR Mart Norton outpatient cardiology. On call back. He is not in with CHF this visit. Intent to return to [facility]     10 min spent with education on Jesus Sandoval . No family present. In isolation (MRSA). 7/31/21 Echo  Summary   Severe left ventricle hypertrophy. Visually estimated LVEF is 45-50 %. Paradoxical septal wall motion. Severely dilated left atrium. Right ventricle not well visualized. Grossly normal.   Severe mitral annular calcification. Mild mitral stenosis. Mild mitral   valve regurgitation. Bioprosthetic aortic valve leaflets not well seen. No evidence of   obstruction with similar transvalvular pressure gradient to the prior echo   in 2018. No aortic regurgitation. Compared to prior echo in 1/21/2018, the paradoxical septal wall motion   abnormality likely due to interventricular conduction delay is new. See nephrology notes, managing diuretics and HARVEY.        Current Facility-Administered Medications   Medication Dose Route Frequency Provider Last Rate Last Admin    0.9 % sodium chloride infusion   IntraVENous PRN Sarina Jimenez MD        lidocaine PF 1 % injection 5 mL  5 mL IntraDERmal Once Kendrick Medina MD        sodium chloride flush 0.9 % injection 5-40 mL  5-40 mL IntraVENous 2 times per day Randy Bruce MD        sodium chloride flush 0.9 % injection 5-40 mL  5-40 mL IntraVENous PRN Kendrick Medina MD        0.9 % sodium chloride infusion  25 mL IntraVENous PRN Kendrick P MD Carol        heparin flush 100 UNIT/ML injection 300 Units  3 mL IntraVENous 2 times per day Randy Bruce MD        heparin flush 100 UNIT/ML injection 300 Units  3 mL IntraCATHeter PRN Kendrick Medina MD        linezolid (ZYVOX) tablet 600 mg  600 mg Oral 2 times per day Randy Bruce MD   600 mg at 10/19/21 1028    ciprofloxacin (CIPRO) tablet 500 mg  500 mg Oral 2 times per day Randy Bruce MD   500 mg at 10/19/21 1028    HYDROmorphone (DILAUDID) injection 0.5 mg  0.5 mg IntraVENous Q4H PRN Enrico Serna MD        morphine injection 2 mg  2 mg IntraMUSCular Q4H PRN Ramakrishna Lewis DPM   2 mg at 10/16/21 1952    albuterol (PROVENTIL) nebulizer solution 2.5 mg  2.5 mg Nebulization Q6H PRN Liya Christiansen DPM        allopurinol (ZYLOPRIM) tablet 300 mg  300 mg Oral Daily Liya Christiansen DPM   300 mg at 10/19/21 1028    [Held by provider] bumetanide (BUMEX) tablet 1 mg  1 mg Oral Daily Liya Christiansen DPM   1 mg at 10/18/21 0825    diphenhydrAMINE (BENADRYL) tablet 25 mg  25 mg Oral Daily PRN Liya Christiansen DPM        DULoxetine (CYMBALTA) extended release capsule 60 mg  60 mg Oral Daily Liya Christiansen DPM   60 mg at 10/19/21 1028    ferrous sulfate (IRON 325) tablet 325 mg  325 mg Oral BID WC NEO PedrozaM   325 mg at 10/19/21 0834    gabapentin (NEURONTIN) capsule 400 mg  400 mg Oral 4x Daily Liya Christiansen DPM   400 mg at 10/19/21 1019    hydrALAZINE (APRESOLINE) tablet 50 mg  50 mg Oral 3 times per day NEO PedrozaM   50 mg at 10/19/21 6520    hydrOXYzine (VISTARIL) capsule 25 mg  25 mg Oral 4x Daily NEO PedrozaM   25 mg at 10/19/21 1000    isosorbide mononitrate (IMDUR) extended release tablet 120 mg  120 mg Oral Daily NEO PedrozaM   120 mg at 10/18/21 0856    lactulose (CHRONULAC) 10 GM/15ML solution 20 g  20 g Oral Once per day on Mon Wed Fri Lawanda Jaime, DPM   20 g at 10/18/21 0827    levothyroxine (SYNTHROID) tablet 25 mcg  25 mcg Oral Daily Charolett Hollow, DPM   25 mcg at 10/19/21 0610    atorvastatin (LIPITOR) tablet 10 mg  10 mg Oral Daily Charolett Hollow, DPM   10 mg at 10/19/21 1028    magnesium oxide (MAG-OX) tablet 200 mg  200 mg Oral Daily Charolett Hollow, DPM   200 mg at 10/19/21 1028    [Held by provider] metOLazone (ZAROXOLYN) tablet 2.5 mg  2.5 mg Oral Once per day on Mon Wed Fri Brooklyn Roberson MD   2.5 mg at 10/18/21 3359    metoprolol succinate (TOPROL XL) extended release tablet 50 mg  50 mg Oral Daily Charolett Hollow, DPM   50 mg at 10/19/21 0834    mirtazapine (REMERON) tablet 7.5 mg  7.5 mg Oral Nightly Charolett Hollow, DPM   7.5 mg at 10/18/21 2332    morphine (THIERRY) extended release capsule 10 mg  10 mg Oral BID Charolett Hollow, DPM        potassium chloride (KLOR-CON M) extended release tablet 40 mEq  40 mEq Oral Daily Charolett Hollow, DPM   40 mEq at 10/19/21 0834    0.9 % sodium chloride infusion   IntraVENous Continuous Charolett Hollow, DPM 75 mL/hr at 10/18/21 0525 New Bag at 10/18/21 0525    magnesium hydroxide (MILK OF MAGNESIA) 400 MG/5ML suspension 30 mL  30 mL Oral Daily PRN Charolett Hollow, DPM        acetaminophen (TYLENOL) tablet 650 mg  650 mg Oral Q6H PRN Charolett Hollow, DPM   650 mg at 10/16/21 1952    Or    acetaminophen (TYLENOL) suppository 650 mg  650 mg Rectal Q6H PRN Charolett Hollow, DPM        HYDROcodone-acetaminophen (NORCO) 7.5-325 MG per tablet 1 tablet  1 tablet Oral Q6H PRN Charolett Hollow, DPM   1 tablet at 10/19/21 0834    insulin lispro (HUMALOG) injection vial 0-12 Units  0-12 Units SubCUTAneous TID WC Charolett Hollow, DPM   2 Units at 10/19/21 0844    insulin lispro (HUMALOG) injection vial 0-6 Units  0-6 Units SubCUTAneous Nightly Charolett Hollow, DPM   1 Units at 10/18/21 4333    glucose (GLUTOSE) 40 % oral gel 15 g  15 g Oral PRN Pete Punter Jaime, DPM        dextrose 50 % IV solution  12.5 g IntraVENous PRN Di Song, DPM        glucagon (rDNA) injection 1 mg  1 mg IntraMUSCular PRN Di Song, DPM        dextrose 5 % solution  100 mL/hr IntraVENous PRN Di Song, DPM        budesonide (PULMICORT) nebulizer suspension 250 mcg  0.25 mg Nebulization BID Di Song, DPM   250 mcg at 10/19/21 5870    And    ipratropium (ATROVENT) 0.02 % nebulizer solution 0.5 mg  0.5 mg Nebulization 4x daily Id Song, DPM   0.5 mg at 10/19/21 1465    And    Arformoterol Tartrate (BROVANA) nebulizer solution 15 mcg  15 mcg Nebulization BID Di Song, DPM   15 mcg at 10/19/21 9039      Teo Henry RN RN, BSN  Heart Failure Navigator

## 2021-10-19 NOTE — PROGRESS NOTES
Department of Internal Medicine  Infectious Diseases  Progress Note      C/C : TMA wound infection, cellulitis       Pt is awake and alert  Denies fever   Reports pain in the foot   Afebrile       Current Facility-Administered Medications   Medication Dose Route Frequency Provider Last Rate Last Admin    white petrolatum ointment   Topical BID Svetlana Odom MD        And    white petrolatum ointment   Topical 4x Daily PRN Svetlana Odom MD        0.9 % sodium chloride infusion   IntraVENous PRN Svetlana Odom MD        0.9 % sodium chloride infusion   IntraVENous PRN Svetlana Odom MD        lidocaine PF 1 % injection 5 mL  5 mL IntraDERmal Once Portia Longoria MD        sodium chloride flush 0.9 % injection 5-40 mL  5-40 mL IntraVENous 2 times per day Portia Longoria MD        sodium chloride flush 0.9 % injection 5-40 mL  5-40 mL IntraVENous PRN Kendrick Medina MD        0.9 % sodium chloride infusion  25 mL IntraVENous PRN Kendrick Medina MD        heparin flush 100 UNIT/ML injection 300 Units  3 mL IntraVENous 2 times per day Portia Longoria MD        heparin flush 100 UNIT/ML injection 300 Units  3 mL IntraCATHeter PRN Kendrick Medina MD        linezolid (ZYVOX) tablet 600 mg  600 mg Oral 2 times per day Portia Longoria MD   600 mg at 10/19/21 1028    ciprofloxacin (CIPRO) tablet 500 mg  500 mg Oral 2 times per day Portia Longoria MD   500 mg at 10/19/21 1028    HYDROmorphone (DILAUDID) injection 0.5 mg  0.5 mg IntraVENous Q4H PRN Svetlana Odom MD        morphine injection 2 mg  2 mg IntraMUSCular Q4H PRN Ramakrishna Lewis, DPM   2 mg at 10/16/21 1952    albuterol (PROVENTIL) nebulizer solution 2.5 mg  2.5 mg Nebulization Q6H PRN Lan Bolaños DPRAISA        allopurinol (ZYLOPRIM) tablet 300 mg  300 mg Oral Daily Lan Bolaños DPM   300 mg at 10/19/21 1028    [Held by provider] bumetanide (BUMEX) tablet 1 mg  1 mg Oral Daily Lan Bolaños DPM   1 mg at 10/18/21 0825    diphenhydrAMINE (BENADRYL) tablet 25 mg  25 mg Oral Daily PRN Danya Haff, DPM        DULoxetine (CYMBALTA) extended release capsule 60 mg  60 mg Oral Daily Danya Haff, DPM   60 mg at 10/19/21 1028    ferrous sulfate (IRON 325) tablet 325 mg  325 mg Oral BID WC Danya Haff, DPM   325 mg at 10/19/21 0834    gabapentin (NEURONTIN) capsule 400 mg  400 mg Oral 4x Daily Danya Haff, DPM   400 mg at 10/19/21 4886    hydrALAZINE (APRESOLINE) tablet 50 mg  50 mg Oral 3 times per day Danya Haff, DPM   50 mg at 10/19/21 1851    hydrOXYzine (VISTARIL) capsule 25 mg  25 mg Oral 4x Daily Danya Haff, DPM   25 mg at 10/19/21 1000    isosorbide mononitrate (IMDUR) extended release tablet 120 mg  120 mg Oral Daily Danya Haff, DPM   120 mg at 10/19/21 1000    lactulose (CHRONULAC) 10 GM/15ML solution 20 g  20 g Oral Once per day on Mon Wed Fri Lawanda Jaime, DPM   20 g at 10/18/21 0827    levothyroxine (SYNTHROID) tablet 25 mcg  25 mcg Oral Daily Danya Hawilliam, DPM   25 mcg at 10/19/21 0610    atorvastatin (LIPITOR) tablet 10 mg  10 mg Oral Daily Danya Haff, DPM   10 mg at 10/19/21 1028    magnesium oxide (MAG-OX) tablet 200 mg  200 mg Oral Daily Danya Diaz, DPM   200 mg at 10/19/21 1028    [Held by provider] metOLazone (ZAROXOLYN) tablet 2.5 mg  2.5 mg Oral Once per day on Mon Wed Fri Fausto Corbin MD   2.5 mg at 10/18/21 1352    metoprolol succinate (TOPROL XL) extended release tablet 50 mg  50 mg Oral Daily Danya Haff, DPM   50 mg at 10/19/21 0834    mirtazapine (REMERON) tablet 7.5 mg  7.5 mg Oral Nightly Danya Haff, DPM   7.5 mg at 10/18/21 2332    morphine (THIERRY) extended release capsule 10 mg  10 mg Oral BID Danya Haff, DPM        potassium chloride (KLOR-CON M) extended release tablet 40 mEq  40 mEq Oral Daily Danya Haff, DPM   40 mEq at 10/19/21 0834    0.9 % sodium chloride infusion   IntraVENous Continuous Danya Haff, DPM 75 mL/hr at 10/18/21 0525 New Bag at 10/18/21 0525    magnesium hydroxide (MILK OF MAGNESIA) 400 MG/5ML suspension 30 mL  30 mL Oral Daily PRN Charyl File, DPM        acetaminophen (TYLENOL) tablet 650 mg  650 mg Oral Q6H PRN Charyl File, DPM   650 mg at 10/16/21 1952    Or    acetaminophen (TYLENOL) suppository 650 mg  650 mg Rectal Q6H PRN Charyl File, DPM        HYDROcodone-acetaminophen (NORCO) 7.5-325 MG per tablet 1 tablet  1 tablet Oral Q6H PRN Charyl File, DPM   1 tablet at 10/19/21 0834    insulin lispro (HUMALOG) injection vial 0-12 Units  0-12 Units SubCUTAneous TID WC Charyl File, DPM   4 Units at 10/19/21 1144    insulin lispro (HUMALOG) injection vial 0-6 Units  0-6 Units SubCUTAneous Nightly Charyl File, DPM   1 Units at 10/18/21 2335    glucose (GLUTOSE) 40 % oral gel 15 g  15 g Oral PRN Charyl File, DPM        dextrose 50 % IV solution  12.5 g IntraVENous PRN Charyl File, DPM        glucagon (rDNA) injection 1 mg  1 mg IntraMUSCular PRN Charyl File, DPM        dextrose 5 % solution  100 mL/hr IntraVENous PRN Charyl File, DPM        budesonide (PULMICORT) nebulizer suspension 250 mcg  0.25 mg Nebulization BID Charyl File, DPM   250 mcg at 10/19/21 5631    And    ipratropium (ATROVENT) 0.02 % nebulizer solution 0.5 mg  0.5 mg Nebulization 4x daily Charyl File, DPM   0.5 mg at 10/19/21 7113    And    Arformoterol Tartrate (BROVANA) nebulizer solution 15 mcg  15 mcg Nebulization BID Charyl File, DPM   15 mcg at 10/19/21 3135         REVIEW OF SYSTEMS:    CONSTITUTIONAL:  Denies fever, chill or rigors. HEENT: denies blurring of vision or double vision, denies hearing problem  RESPIRATORY: denies cough, shortness of breath, sputum expectoration  CARDIOVASCULAR:  Denies palpitation or chest pain   GASTROINTESTINAL:  Denies abdomen pain, diarrhea or constipation. GENITOURINARY:  Denies burning urination or frequency of urination  INTEGUMENT: wounds   HEMATOLOGIC/LYMPHATIC:  Denies lymph node swelling, gum bleeding or easy bruising.   MUSCULOSKELETAL: pain in the foot    NEUROLOGICAL:  Weakness PHYSICAL EXAM:      Vitals:     Vitals:    10/19/21 0745   BP: (!) 110/50   Pulse: 57   Resp: 20   Temp: 97.1 °F (36.2 °C)   SpO2: 99%       General Appearance:    Awake, alert , no acute distress. Head:    Normocephalic, atraumatic   Eyes:    No pallor, no icterus,   Ears:    No obvious deformity or drainage.    Nose:   No nasal drainage   Throat:   Mucosa dry, no thrush    Neck:   Supple, no lymphadenopathy   Lungs:     Clear to auscultation bilaterally, no wheeze    Heart:    Regular rate and rhythm, systolic murmur    Abdomen:     Soft, non-tender, bowel sounds present    Extremities:    + edema    Pulses:   Dorsalis pedis diminished    Skin:  foot wound dressed    CBC with Differential:      Lab Results   Component Value Date    WBC 14.0 10/19/2021    RBC 2.21 10/19/2021    HGB 6.1 10/19/2021    HCT 18.8 10/19/2021     10/19/2021    MCV 96.8 10/19/2021    MCH 31.2 10/19/2021    MCHC 32.2 10/19/2021    RDW 18.5 10/19/2021    NRBC 0.9 10/01/2021    SEGSPCT 76 03/16/2014    BANDSPCT 1 09/02/2016    METASPCT 0.9 10/01/2021    LYMPHOPCT 10.5 10/17/2021    MONOPCT 5.6 10/17/2021    BASOPCT 0.3 10/17/2021    MONOSABS 0.61 10/17/2021    LYMPHSABS 1.14 10/17/2021    EOSABS 0.17 10/17/2021    BASOSABS 0.03 10/17/2021       CMP     Lab Results   Component Value Date     10/19/2021    K 5.7 10/19/2021    K 4.3 10/16/2021     10/19/2021    CO2 17 10/19/2021    BUN 57 10/19/2021    CREATININE 2.6 10/19/2021    GFRAA 29 10/19/2021    LABGLOM 24 10/19/2021    GLUCOSE 164 10/19/2021    GLUCOSE 111 07/16/2011    PROT 8.1 10/15/2021    LABALBU 2.5 10/15/2021    CALCIUM 8.8 10/19/2021    BILITOT 0.3 10/15/2021    ALKPHOS 217 10/15/2021    AST 41 10/15/2021    ALT 30 10/15/2021         Hepatic Function Panel:    Lab Results   Component Value Date    ALKPHOS 217 10/15/2021    ALT 30 10/15/2021    AST 41 10/15/2021    PROT 8.1 10/15/2021    BILITOT 0.3 10/15/2021    BILIDIR <0.2 01/25/2021    IBILI see below 01/25/2021    LABALBU 2.5 10/15/2021       PT/INR:    Lab Results   Component Value Date    PROTIME 11.7 01/05/2021    PROTIME 11.3 06/21/2011    INR 1.0 01/05/2021       TSH:    Lab Results   Component Value Date    TSH 4.270 08/19/2021       U/A:    Lab Results   Component Value Date    COLORU Yellow 09/24/2021    PHUR 6.0 09/24/2021    LABCAST RARE 10/11/2017    WBCUA NONE 09/24/2021    WBCUA NONE 07/12/2011    RBCUA 1-3 09/24/2021    RBCUA 1-3 03/17/2014    BACTERIA NONE SEEN 09/24/2021    CLARITYU Clear 09/24/2021    SPECGRAV 1.025 09/24/2021    LEUKOCYTESUR Negative 09/24/2021    UROBILINOGEN 0.2 09/24/2021    BILIRUBINUR Negative 09/24/2021    BILIRUBINUR NEGATIVE 07/12/2011    BLOODU TRACE-INTACT 09/24/2021    GLUCOSEU Negative 09/24/2021    GLUCOSEU NEGATIVE 07/12/2011       ABG:    Lab Results   Component Value Date    QKG6QSS 21.8 09/30/2021    J9YQBBQA 96.7 09/06/2012    AAH7OKP 42.9 09/30/2021    PO2ART 157.4 09/30/2021       MICROBIOLOGY:    Blood culture -     Staphylococcus coagulase-negativeAbnormal     Wound cx -Susceptibility    Citrobacter freundii (1)    Antibiotic Interpretation WINNIE Status    ceFAZolin Resistant >=^64 mcg/mL     cefepime Sensitive <=^0.12 mcg/mL     cefTRIAXone Sensitive <=^0.25 mcg/mL     ertapenem Sensitive <=^0.12 mcg/mL     gentamicin Sensitive <=^1 mcg/mL     levofloxacin Sensitive <=^0.12 mcg/mL     piperacillin-tazobactam Sensitive <=^4 mcg/mL     trimethoprim-sulfamethoxazole Sensitive <=^20 mcg/mL     Staphylococcus aureus (2)    Antibiotic Interpretation WINNIE Status    clindamycin Resistant >=^4 mcg/mL     DAPTOmycin Sensitive ^0. 25 mcg/mL     doxycycline Sensitive <=^0.5 mcg/mL     erythromycin Resistant >=^8 mcg/mL     gentamicin Sensitive <=^0.5 mcg/mL     oxacillin Resistant >=^4 mcg/mL     trimethoprim-sulfamethoxazole Sensitive <=^10 mcg/mL     vancomycin Sensitive ^1 mcg/mL     Pseudomonas aeruginosa (4)    Antibiotic Interpretation WINNIE Status    cefepime Sensitive ^2 mcg/mL     gentamicin Sensitive <=^1 mcg/mL     levofloxacin Intermediate ^4 mcg/mL     piperacillin-tazobactam Sensitive <=^4 mcg/mL     tobramycin Sensitive <=^1 mcg/mL      Condensed View   Lab and Collection        Radiology :    Left foot x ray -  Mild soft tissue edema which could be reactive or due to cellulitis. Right foot x ray -    Irregular appearance of the overlying soft tissues with multiple lucencies   which may represent soft tissue gas.  Large soft tissue defect superiorly at   the amputation site     IMPRESSION:     1. Right TMA stump wound infection s/p debridement   2. PAD - left 2, 3 toes infection ,cellulitis s/p 2 nd toe amputation ( 10/16)   3. CONS  bacteremia     RECOMMENDATIONS:      1. Vancomycin 1 gram q 36 hrs Cefepime 2 grams IV q 24 hrs   2.  PICC line insertion

## 2021-10-19 NOTE — PROGRESS NOTES
Subjective: The patient is awake and alert. He appears somewhat lethargic today  Though does respond to questions for me but does not appear as alert as he did yesterday  Vital signs stable    Objective:    BP (!) 110/50   Pulse 57   Temp 97.1 °F (36.2 °C) (Temporal)   Resp 20   Ht 5' 8\" (1.727 m)   Wt 235 lb (106.6 kg)   SpO2 99%   BMI 35.73 kg/m²     In: 2517.3 [P.O.:360; I.V.:1314; Blood:843.3]  Out: -   In: 2517.3   Out: -     General appearance: NAD, conversant, ill appearing  HEENT: AT/NC, MMM  Neck: FROM, supple  Lungs: Clear to auscultation  CV: RRR, no MRGs  Vasc: Radial pulses 2+  Abdomen: Soft, non-tender; no masses or HSM  Extremities: No peripheral edema or digital cyanosis  Skin: no rash, lesions or ulcers  Psych: Alert and oriented to person, place and time  Neuro: Alert and interactive     Recent Labs     10/17/21  0543 10/17/21  0543 10/18/21  0558 10/19/21  0108 10/19/21  0609   WBC 10.8  --  12.2*  --  14.0*   HGB 7.0*   < > 5.2* 6.6* 6.9*   HCT 22.0*   < > 16.4* 20.1* 21.4*     --  177  --  210    < > = values in this interval not displayed. Recent Labs     10/17/21  0543 10/18/21  0558 10/19/21  0609    131* 140   K 5.3* 5.3* 5.7*   CL 97* 100 106   CO2 18* 21* 17*   BUN 50* 54* 57*   CREATININE 1.9* 2.4* 2.6*   CALCIUM 9.6 8.5* 8.8       Assessment:    Principal Problem:    Wound infection  Active Problems:    CKD (chronic kidney disease) stage 3, GFR 30-59 ml/min  Resolved Problems:    * No resolved hospital problems.  *      Plan:  30-year-old male with a significant past medical history recently discharged 1 week ago with amputation is admitted to [place] [id number] unit with     Wound infection  -IV antibiotics in the form of Zosyn/Vanco pharmacy to dose Vanco-staph species in blood cultures  -IV hydration -   -Podiatry following -10/16/2021 left toe amputation , I&D right foot multiple compartment, debridement of right foot with wound vac application  continue zosyn and vanc await cultures-ID following  -Hgb down to 5.2 today-transfuse 2 units PRBCs 10/18/2021-transfer to telemetry if hypotension and symptom  -Repeat H&H 5.2-PRBC transfused 10/18/2021, transfuse additional unit 10/19/2021 for hemoglobin of 6.9  -Pain management - pain well controlled  -Elevated sed rate >150   -Hold BP meds and diuretics with parameters for low BP     CKD  -Monitor labs-BUN/creatinine on admission 50/1.9, 54/2.4 today, likely prerenal from acute blood loss anemia  -Hold all nephrotoxins  -IV hydration -change IV fluids to saline with bicarb, and consult renal  -  Medication for other comorbidities continue as appropriate dose adjustment as necessary.   Discussed with nursing staff       DVT Prophylaxis   PT/OT  Discharge planning     Rach Rodriguez MD  12:38 PM  10/19/2021

## 2021-10-19 NOTE — PROGRESS NOTES
Attempted to call Dr Michael Rosado via perfect serve number. Message stated that unable to connect call at this time, please try again later. Wanted to inform Dr Michael Rosado about pt's hemaglobin of 6.6 s/p transfusion of 2 units PRBC's.

## 2021-10-20 NOTE — PROGRESS NOTES
Nephrology Consult  [facility]    CC:   Metabolic acidosis, worsening renal failure    HPI:   Patient is a [de-identified]year old male who presented to the ED for foul-smelling drainage of right foot by podiatry s/p right transmetatarsal amputation on 10/2. On admission, patient's Cr 2.0. At present, pt on vancomycin and maxipime for TMA wound infection. Cr trending up to 2.6 today. Of note, Hgb trending down Hgb 5.2 on 10/18 received 2 units and Hgb 6.1 on 10/19. He is seen in the room. He is very slow to respond and minimally verbal. Family have been contacted for consent for PICC line placement as he is not able to consent for himself. He has been seen by us in the hospital on multiple occasions the last of which was during his recent admission late last month/earlier this month in September his peak creatinine was 2.2 post IV contrast exposure. He did recover at that time back to creatinine of 1.6-1.9 mg/dl, prior baseline 1.5-2.1 mg/dl    10/19/2021- he is in bed, no acute distress but not answering questions  10/20: Potassium high this a.m; . treated medically          Patient Active Problem List   Diagnosis    Essential hypertension    Peripheral vascular disease ([address]    Pacemaker    H/O aortic valve replacement    CKD (chronic kidney disease) stage 3, GFR 30-59 ml/min    Type 2 diabetes mellitus with stage 3 chronic kidney disease, with long-term current use of insulin (HCC)    Pulmonary nodule    Diabetes mellitus type 2, uncontrolled ([address]    Hyperlipidemia LDL goal <100    Acquired hypothyroidism    CAD (coronary artery disease), native coronary artery    Status post coronary artery bypass graft    Chronic pain    History of CVA (cerebrovascular accident)    Obesity (BMI 30-39. 9)    Anemia    Elevated sed rate    Vision decreased    Bilateral carotid bruits    Bilateral carotid artery stenosis    Nonrheumatic mitral valve regurgitation    Pulmonary hypertension (HCC)    Moderate  sodium chloride 75 mL/hr at 10/18/21 0525    dextrose         Meds prn:     white petrolatum **AND** white petrolatum, sodium chloride, sodium chloride, sodium chloride, sodium chloride flush, sodium chloride, heparin flush, [Held by provider] HYDROmorphone, morphine, albuterol, diphenhydrAMINE, magnesium hydroxide, acetaminophen **OR** acetaminophen, HYDROcodone-acetaminophen, glucose, dextrose, glucagon (rDNA), dextrose    Meds prior to admission:     No current facility-administered medications on file prior to encounter. Current Outpatient Medications on File Prior to Encounter   Medication Sig Dispense Refill    mirtazapine (REMERON) 7.5 MG tablet Take 7.5 mg by mouth nightly      HYDROcodone-acetaminophen (NORCO)  MG per tablet Take 1 tablet by mouth every 6 hours as needed for Pain.  hydrALAZINE (APRESOLINE) 50 MG tablet Take 1 tablet by mouth every 8 hours 90 tablet 3    magnesium 200 MG TABS tablet Take 200 mg by mouth daily      diphenhydrAMINE (BENADRYL) 25 MG capsule Take 25 mg by mouth daily as needed for Itching      morphine (THIERRY) 10 MG extended release capsule Take 10 mg by mouth 2 times daily.       hydrOXYzine (ATARAX) 25 MG tablet Take 25 mg by mouth 4 times daily       lactulose 20 GM/30ML SOLN Take 20 g by mouth three times a week *MON-WED-FRI*      metOLazone (ZAROXOLYN) 2.5 MG tablet Take 2.5 mg by mouth three times a week *TUE-THUR-SAT*      bumetanide (BUMEX) 1 MG tablet Take 1 mg by mouth daily       potassium chloride (KLOR-CON M) 20 MEQ extended release tablet Take 40 mEq by mouth daily       ferrous sulfate (IRON 325) 325 (65 Fe) MG tablet Take 1 tablet by mouth 2 times daily (with meals) 30 tablet 3    DULoxetine (CYMBALTA) 60 MG extended release capsule Take 1 capsule by mouth daily 30 capsule 3    fluticasone-umeclidin-vilant (TRELEGY ELLIPTA) 100-62.5-25 MCG/INH AEPB Inhale 1 puff into the lungs Daily       aspirin EC 81 MG EC tablet Take 1 tablet by mouth daily 90 tablet 1    albuterol (PROVENTIL) (2.5 MG/3ML) 0.083% nebulizer solution Take 3 mLs by nebulization every 6 hours as needed for Wheezing or Shortness of Breath DX: COPD J44.9 Please bill Medicare Part B 120 mL 6    levothyroxine (SYNTHROID) 25 MCG tablet Take 25 mcg by mouth Daily   1    gabapentin (NEURONTIN) 400 MG capsule Take 400 mg by mouth 4 times daily.  metoprolol succinate (TOPROL XL) 50 MG extended release tablet Take 50 mg by mouth daily      isosorbide mononitrate (IMDUR) 120 MG extended release tablet Take 1 tablet by mouth daily 30 tablet 0    allopurinol (ZYLOPRIM) 300 MG tablet Take 300 mg by mouth daily       lovastatin (MEVACOR) 20 MG tablet Take 20 mg by mouth nightly.  insulin lispro, 1 Unit Dial, (HUMALOG KWIKPEN) 100 UNIT/ML SOPN Inject 0-6 Units into the skin 3 times daily (before meals) PER SLIDING SCALE: 0-139=0U, 140-199=1U, 200-249=2U, 250-299=3U, 300-349=4U, 350-399=5U, 400-450=6U         Allergies:    Patient has no known allergies. Social History:     reports that he quit smoking about 40 years ago. His smoking use included cigarettes. He started smoking about 68 years ago. He has a 50.00 pack-year smoking history. He has never used smokeless tobacco. He reports previous drug use. He reports that he does not drink alcohol.     Family History:         Problem Relation Age of Onset    Heart Disease Mother     Heart Failure Mother     Heart Surgery Father     Heart Disease Father     Heart Failure Father     High Blood Pressure Sister     Cancer Sister        ROS:     All pertinent + discussed in HPI  All other sx negative     Physical Exam:      Patient Vitals for the past 24 hrs:   BP Temp Temp src Pulse Resp SpO2   10/20/21 1354 131/60 -- -- -- -- --   10/20/21 0804 -- -- -- -- -- 98 %   10/20/21 0803 -- -- -- -- -- 98 %   10/20/21 0802 -- -- -- -- -- 98 %   10/20/21 0745 (!) 146/63 97.7 °F (36.5 °C) Temporal 60 16 100 %   10/20/21 0557 (!) 130/93 -- -- 68 -- --   10/20/21 0109 (!) 146/63 97.7 °F (36.5 °C) Temporal 60 16 100 %   10/19/21 2359 (!) 125/51 97 °F (36.1 °C) Temporal 60 16 --   10/19/21 2240 (!) 111/42 96.7 °F (35.9 °C) Temporal 60 18 92 %   10/19/21 2225 (!) 109/55 97 °F (36.1 °C) Temporal 61 16 91 %   10/19/21 2103 (!) 116/51 97.1 °F (36.2 °C) Temporal 60 18 --         Intake/Output Summary (Last 24 hours) at 10/20/2021 1731  Last data filed at 10/20/2021 0109  Gross per 24 hour   Intake 1698.33 ml   Output --   Net 1698.33 ml       Constitutional: Patient in no acute distress   Head: normocephalic, atraumatic   Neck: no jvd  Cardiovascular: S1 S2 no S3, no rub   Respiratory:poor effort, diminished  Gastrointestinal: soft, nontender, nondistended,   Ext: right foot with wound vac post TMA, left foot dressing  Neuro: Drowsy but arouses easily    Data:    Recent Labs     10/18/21  0558 10/19/21  0108 10/19/21  0609 10/19/21  1152 10/19/21  1737 10/20/21  0533 10/20/21  1202   WBC 12.2*  --  14.0*  --   --  15.0*  --    HGB 5.2*   < > 6.9*   < > 7.1* 7.8* 7.1*   HCT 16.4*   < > 21.4*   < > 22.4* 23.0* 21.0*   MCV 99.4  --  96.8  --   --  92.0  --      --  210  --   --  276  --     < > = values in this interval not displayed. Recent Labs     10/18/21  0558 10/19/21  0609 10/20/21  0533   * 140 138   K 5.3* 5.7* 6.6*    106 103   CO2 21* 17* 20*   CREATININE 2.4* 2.6* 3.2*   BUN 54* 57* 70*   LABGLOM 26 24 19   GLUCOSE 156* 164* 155*   CALCIUM 8.5* 8.8 8.9   PHOS  --   --  5.3*   MG  --   --  2.5       Vit D, 25-Hydroxy   Date Value Ref Range Status   09/25/2021 27 (L) 30 - 100 ng/mL Final     Comment:     <20 ng/mL. ........... Alexia Iqbal Deficient  20-30 ng/mL. ......... Alexia Iqbal Insufficient   ng/mL. ........ Alexia Iqbal Sufficient  >100 ng/mL. .......... Alexia Iqbal Toxic         PTH   Date Value Ref Range Status   09/25/2021 30 15 - 65 pg/mL Final       No results for input(s): ALT, AST, ALKPHOS, BILITOT, BILIDIR in the last 72 hours.     No results for input(s): LABALBU in the last 72 hours. Ferritin   Date Value Ref Range Status   09/25/2021 534 ng/mL Final     Comment:     FERRITIN Reference Ranges:  Adult Males   20 - 60 years:    30 - 400 ng/mL  Adult females 16 - 61 years:    15 - 150 ng/mL  Adults greater than 60 years:   no established reference range  Pediatrics:                     no established reference range       Iron   Date Value Ref Range Status   09/25/2021 34 (L) 59 - 158 mcg/dL Final     TIBC   Date Value Ref Range Status   09/25/2021 157 (L) 250 - 450 mcg/dL Final       Vitamin B-12   Date Value Ref Range Status   10/20/2021 >2000 (H) 211 - 946 pg/mL Final       Folate   Date Value Ref Range Status   10/20/2021 16.1 4.8 - 24.2 ng/mL Final         Lab Results   Component Value Date    COLORU Yellow 09/24/2021    NITRU Negative 09/24/2021    GLUCOSEU Negative 09/24/2021    GLUCOSEU NEGATIVE 07/12/2011    KETUA TRACE 09/24/2021    UROBILINOGEN 0.2 09/24/2021    BILIRUBINUR Negative 09/24/2021    BILIRUBINUR NEGATIVE 07/12/2011       No results found for: MIRYAM, CREURRAN, MACREATRATIO, OSMOU    No components found for: URIC    No results found for: LIPIDPAN      Assessment and Plan:  1. HARVEY-  In the setting of vancomycin for TMA would infection, diuretics  and severe anemia  Holding diuretics and metolazone   Check renal US with worsening renal function  Check PVR  Strict I&O as no output has been recorded  Baseline Cr 1.5-2.1 mg/dl  Cr peaked at 3.2;  Cr 3 today; UO not recorded    2. CKD  Presumed microvascular in the setting of DM and HTN as well as PVD   Baseline Cr 1.5-2.1 mg/dl    3. Metabolic Acidosis  In the setting of HARVEY  CO2-17  Start po HCO3    4. Hyperkalemia   Due to potassium supplements in the presence of HARVEY; and potassium source of blood transfusion  Potassium 6.6 today  Treated medically with good response  Continue to monitor     5.  Anemia  10/19-Hgb -6.1--> For additional 1 unit blood   10/18-S/p 2 units blood for Hgb

## 2021-10-20 NOTE — PROGRESS NOTES
Dr Jose Pelletier left voice message asking for another unit of PRBC and Dr Jose Pelletier returned call an I unit prbc ordered.

## 2021-10-20 NOTE — PROGRESS NOTES
5.2 today-transfuse 2 units PRBCs 10/18/2021-transfer to telemetry if hypotension and symptom  -Repeat H&H 5.2-PRBC transfused 10/18/2021, transfuse additional unit 10/19/2021 for hemoglobin of 6.9  -Pain management - pain well controlled  -Elevated sed rate >150   -Hold BP meds and diuretics with parameters for low BP     CKD  -Monitor labs-BUN/creatinine on admission 50/1.9, 70/3.2 today, likely prerenal from acute blood loss anemia  -Hold all nephrotoxins  -IV hydration -change IV fluids to saline with bicarb  -Potassium is 6.6 on morning labs-repeat stat  -Retroperitoneal ultrasound is unremarkable   -Nephrology input appreciated    Medication for other comorbidities continue as appropriate dose adjustment as necessary.         DVT Prophylaxis   PT/OT  Discharge Alicia Meier MD  1:03 PM  10/20/2021

## 2021-10-20 NOTE — PROGRESS NOTES
Department of Podiatry  Progress Note    SUBJECTIVE:  Patient seen bedside for S/P left 2nd toe amputation to level of MPJ, right foot incision and drainage, excisional debridement of right foot wound with application of wound vac (DOS: 10/16/21). Patient sleeping comfortably at the time of visit. . Dressing c/d/i to B/l LEsand wound vac running without any leaks detected.     OBJECTIVE:    Scheduled Meds:   white petrolatum   Topical BID    cefepime  2,000 mg IntraVENous Q24H    vancomycin  1,000 mg IntraVENous Q36H    sodium bicarbonate  1,300 mg Oral BID    lidocaine PF  5 mL IntraDERmal Once    sodium chloride flush  5-40 mL IntraVENous 2 times per day    heparin flush  3 mL IntraVENous 2 times per day    allopurinol  300 mg Oral Daily    [Held by provider] bumetanide  1 mg Oral Daily    DULoxetine  60 mg Oral Daily    ferrous sulfate  325 mg Oral BID WC    gabapentin  400 mg Oral 4x Daily    hydrALAZINE  50 mg Oral 3 times per day    hydrOXYzine  25 mg Oral 4x Daily    isosorbide mononitrate  120 mg Oral Daily    lactulose  20 g Oral Once per day on Mon Wed Fri    levothyroxine  25 mcg Oral Daily    atorvastatin  10 mg Oral Daily    magnesium oxide  200 mg Oral Daily    [Held by provider] metOLazone  2.5 mg Oral Once per day on Mon Wed Fri    metoprolol succinate  50 mg Oral Daily    mirtazapine  7.5 mg Oral Nightly    morphine  10 mg Oral BID    insulin lispro  0-12 Units SubCUTAneous TID     insulin lispro  0-6 Units SubCUTAneous Nightly    budesonide  0.25 mg Nebulization BID    And    ipratropium  0.5 mg Nebulization 4x daily    And    Arformoterol Tartrate  15 mcg Nebulization BID     Continuous Infusions:   sodium chloride      sodium chloride      sodium chloride      sodium chloride      sodium chloride 75 mL/hr at 10/18/21 0525    dextrose       PRN Meds:.white petrolatum **AND** white petrolatum, sodium chloride, sodium chloride, sodium chloride, sodium chloride flush, sodium chloride, heparin flush, [Held by provider] HYDROmorphone, morphine, albuterol, diphenhydrAMINE, magnesium hydroxide, acetaminophen **OR** acetaminophen, HYDROcodone-acetaminophen, glucose, dextrose, glucagon (rDNA), dextrose    No Known Allergies    /60   Pulse 60   Temp 97.7 °F (36.5 °C) (Temporal)   Resp 16   Ht 5' 8\" (1.727 m)   Wt 235 lb (106.6 kg)   SpO2 98%   BMI 35.73 kg/m²       EXAM:  Dressing cdi and wound vac intact to LE. Prior Physical Findings  VASCULAR:  DP and PT pulses nonpalpable. Skin temperature warm to warm from proximal to distal B/L. No edema noted. Absent hair growth. CFT delayed to LLE, unobtainable to RLE    NEUROLOGIC:  Epicritic sensation diminished B/L.      DERM:  Open surgical TMA wound with plantar fasciotomy to RLE as pictured below. Gross bone exposure with granular base and extensive soft tissue exposure; mild bleeding during dressing change but significantly less than yesterday's dressing change; hemostasis quickly achieved with pressure. No malodor or purulence noted. Decubitus heel wound present, approx 3cm in diameter with eschar base. No openings, no drainage, no malodor. LLE 2nd digit amp site with sutures intact. No drainage, malodor, streaking, or fluctuance noted. LLE calf with skin changes consistent with chronic venous stasis - superficial ulcerations present, L>R      MUSCULOSKELETAL: Evidence of previous R TMA, L 2nd digit amp. Mild pain with dressing change to RLE. No pain noted to LLE. Muscle strength testing deferred.                              Scheduled Meds:   white petrolatum   Topical BID    cefepime  2,000 mg IntraVENous Q24H    vancomycin  1,000 mg IntraVENous Q36H    sodium bicarbonate  1,300 mg Oral BID    lidocaine PF  5 mL IntraDERmal Once    sodium chloride flush  5-40 mL IntraVENous 2 times per day    heparin flush  3 mL IntraVENous 2 times per day    allopurinol  300 mg Oral Daily    [Held by provider] bumetanide  1 mg Oral Daily    DULoxetine  60 mg Oral Daily    ferrous sulfate  325 mg Oral BID     gabapentin  400 mg Oral 4x Daily    hydrALAZINE  50 mg Oral 3 times per day    hydrOXYzine  25 mg Oral 4x Daily    isosorbide mononitrate  120 mg Oral Daily    lactulose  20 g Oral Once per day on Mon Wed Fri    levothyroxine  25 mcg Oral Daily    atorvastatin  10 mg Oral Daily    magnesium oxide  200 mg Oral Daily    [Held by provider] metOLazone  2.5 mg Oral Once per day on Mon Wed Fri    metoprolol succinate  50 mg Oral Daily    mirtazapine  7.5 mg Oral Nightly    morphine  10 mg Oral BID    insulin lispro  0-12 Units SubCUTAneous TID     insulin lispro  0-6 Units SubCUTAneous Nightly    budesonide  0.25 mg Nebulization BID    And    ipratropium  0.5 mg Nebulization 4x daily    And    Arformoterol Tartrate  15 mcg Nebulization BID     Continuous Infusions:   sodium chloride      sodium chloride      sodium chloride      sodium chloride      sodium chloride 75 mL/hr at 10/18/21 0525    dextrose       PRN Meds:.white petrolatum **AND** white petrolatum, sodium chloride, sodium chloride, sodium chloride, sodium chloride flush, sodium chloride, heparin flush, [Held by provider] HYDROmorphone, morphine, albuterol, diphenhydrAMINE, magnesium hydroxide, acetaminophen **OR** acetaminophen, HYDROcodone-acetaminophen, glucose, dextrose, glucagon (rDNA), dextrose    RADIOLOGY:  US RETROPERITONEAL COMPLETE   Final Result   No sonographic evidence of acute renal pathology. XR CHEST PORTABLE   Final Result   1. After mid sternotomy. Moderate cardiomegaly. Presence of left-sided   pacemaker. 2.  Chronic findings in the left base as seen on previous examination which   can represent atelectasis or a mild left-sided pleural effusion. 3.  No superimposed acute cardiopulmonary process.          XR FOOT LEFT (MIN 3 VIEWS)   Final Result   Mild soft tissue edema which could be reactive or due to LLE intact with no drainage noted. - Maintain heel offloading  - Will continue to follow. - Discussed patient with Dr. Irene Arroyo   - Will continue to follow patient while they are in-house.

## 2021-10-20 NOTE — CARE COORDINATION
Discharge plan is [facility] when medically stable.  Ami Mar [phone]

## 2021-10-20 NOTE — PROGRESS NOTES
Power picc line  Placement 10/20/2021    Product number: [id number]   Lot Number: [id number]      Ultrasound: yes   Right Brachial vein:                Upper Arm Circumference: 30cm    Size: 5.5frdl    Exposed Length: 4cm    Internal Length: 38cm   Cut: 13cm   Vein Measurement: 0.59cm    Lakeisha Velazquez RN  10/20/2021  2:49 PM      Power picc line placed with vps ,unable top obtain bullseye ,xray ordered

## 2021-10-21 NOTE — PLAN OF CARE
Christiane's K level down to 5.1.    nephr visiting at bedside. Leeanne Pain continues to turn and trash around and pull at iv equip and take 02 off. During the 10p rounds, it was noted that christiane had torn his wound vac O ring over half way off. Unable to repair it as it was a very large tear. Entire w v dressing removed and NS soaks gauze wrap applied. Am round review for reapplic ( likely ) of same on R foot.

## 2021-10-21 NOTE — PROGRESS NOTES
Nephrology Progress Note  [facility]    CC:   Metabolic acidosis, worsening renal failure    HPI:   Patient is a [de-identified]year old male who presented to the ED for foul-smelling drainage of right foot by podiatry s/p right transmetatarsal amputation on 10/2. On admission, patient's Cr 2.0. At present, pt on vancomycin and maxipime for TMA wound infection. Cr trending up to 2.6 today. Of note, Hgb trending down Hgb 5.2 on 10/18 received 2 units and Hgb 6.1 on 10/19. He is seen in the room. He is very slow to respond and minimally verbal. Family have been contacted for consent for PICC line placement as he is not able to consent for himself. He has been seen by us in the hospital on multiple occasions the last of which was during his recent admission late last month/earlier this month in September his peak creatinine was 2.2 post IV contrast exposure. He did recover at that time back to creatinine of 1.6-1.9 mg/dl, prior baseline 1.5-2.1 mg/dl    10/19/2021- he is in bed, no acute distress but not answering questions  10/20: Potassium high this a.m; . treated medically  10/21: No new complaints reported          Patient Active Problem List   Diagnosis    Essential hypertension    Peripheral vascular disease ([address]    Pacemaker    H/O aortic valve replacement    CKD (chronic kidney disease) stage 3, GFR 30-59 ml/min    Type 2 diabetes mellitus with stage 3 chronic kidney disease, with long-term current use of insulin ([facility])    Pulmonary nodule    Diabetes mellitus type 2, uncontrolled ([address]    Hyperlipidemia LDL goal <100    Acquired hypothyroidism    CAD (coronary artery disease), native coronary artery    Status post coronary artery bypass graft    Chronic pain    History of CVA (cerebrovascular accident)    Obesity (BMI 30-39. 9)    Anemia    Elevated sed rate    Vision decreased    Bilateral carotid bruits    Bilateral carotid artery stenosis    Nonrheumatic mitral valve regurgitation    Pulmonary hypertension (HCC)    Moderate tricuspid regurgitation by prior echocardiography    (HFpEF) heart failure with preserved ejection fraction (HCC)    COPD (chronic obstructive pulmonary disease) (HCC)    Diabetic neuropathy associated with type 2 diabetes mellitus (HCC)    Congestive heart failure (HCC)    Cellulitis    Peripheral vascular disease of lower extremity with ulceration (HCC)    Critical lower limb ischemia (HCC)    Nonrheumatic mitral valve stenosis    Severe left ventricular hypertrophy    Cardiomyopathy [facility]    Ischemic foot ulcer due to atherosclerosis of native artery of limb ([facility]    Wound infection       Meds:     meropenem  1,000 mg IntraVENous Q24H    white petrolatum   Topical BID    sodium bicarbonate  1,300 mg Oral BID    lidocaine PF  5 mL IntraDERmal Once    sodium chloride flush  5-40 mL IntraVENous 2 times per day    heparin flush  3 mL IntraVENous 2 times per day    allopurinol  300 mg Oral Daily    [Held by provider] bumetanide  1 mg Oral Daily    DULoxetine  60 mg Oral Daily    ferrous sulfate  325 mg Oral BID WC    gabapentin  400 mg Oral 4x Daily    hydrALAZINE  50 mg Oral 3 times per day    hydrOXYzine  25 mg Oral 4x Daily    isosorbide mononitrate  120 mg Oral Daily    lactulose  20 g Oral Once per day on Mon Wed Fri    levothyroxine  25 mcg Oral Daily    atorvastatin  10 mg Oral Daily    magnesium oxide  200 mg Oral Daily    [Held by provider] metOLazone  2.5 mg Oral Once per day on Mon Wed Fri    metoprolol succinate  50 mg Oral Daily    mirtazapine  7.5 mg Oral Nightly    morphine  10 mg Oral BID    insulin lispro  0-12 Units SubCUTAneous TID WC    insulin lispro  0-6 Units SubCUTAneous Nightly    budesonide  0.25 mg Nebulization BID    And    ipratropium  0.5 mg Nebulization 4x daily    And    Arformoterol Tartrate  15 mcg Nebulization BID        sodium chloride      sodium chloride      sodium chloride      sodium chloride  sodium chloride 75 mL/hr at 10/18/21 0525    dextrose         Meds prn:     white petrolatum **AND** white petrolatum, sodium chloride, sodium chloride, sodium chloride, sodium chloride flush, sodium chloride, heparin flush, [Held by provider] HYDROmorphone, morphine, albuterol, diphenhydrAMINE, magnesium hydroxide, acetaminophen **OR** acetaminophen, HYDROcodone-acetaminophen, glucose, dextrose, glucagon (rDNA), dextrose    Meds prior to admission:     No current facility-administered medications on file prior to encounter. Current Outpatient Medications on File Prior to Encounter   Medication Sig Dispense Refill    mirtazapine (REMERON) 7.5 MG tablet Take 7.5 mg by mouth nightly      HYDROcodone-acetaminophen (NORCO)  MG per tablet Take 1 tablet by mouth every 6 hours as needed for Pain.  hydrALAZINE (APRESOLINE) 50 MG tablet Take 1 tablet by mouth every 8 hours 90 tablet 3    magnesium 200 MG TABS tablet Take 200 mg by mouth daily      diphenhydrAMINE (BENADRYL) 25 MG capsule Take 25 mg by mouth daily as needed for Itching      morphine (THIERRY) 10 MG extended release capsule Take 10 mg by mouth 2 times daily.       hydrOXYzine (ATARAX) 25 MG tablet Take 25 mg by mouth 4 times daily       lactulose 20 GM/30ML SOLN Take 20 g by mouth three times a week *MON-WED-FRI*      metOLazone (ZAROXOLYN) 2.5 MG tablet Take 2.5 mg by mouth three times a week *TUE-THUR-SAT*      bumetanide (BUMEX) 1 MG tablet Take 1 mg by mouth daily       potassium chloride (KLOR-CON M) 20 MEQ extended release tablet Take 40 mEq by mouth daily       ferrous sulfate (IRON 325) 325 (65 Fe) MG tablet Take 1 tablet by mouth 2 times daily (with meals) 30 tablet 3    DULoxetine (CYMBALTA) 60 MG extended release capsule Take 1 capsule by mouth daily 30 capsule 3    fluticasone-umeclidin-vilant (TRELEGY ELLIPTA) 100-62.5-25 MCG/INH AEPB Inhale 1 puff into the lungs Daily       aspirin EC 81 MG EC tablet Take 1 tablet by mouth daily 90 tablet 1    albuterol (PROVENTIL) (2.5 MG/3ML) 0.083% nebulizer solution Take 3 mLs by nebulization every 6 hours as needed for Wheezing or Shortness of Breath DX: COPD J44.9 Please bill Medicare Part B 120 mL 6    levothyroxine (SYNTHROID) 25 MCG tablet Take 25 mcg by mouth Daily   1    gabapentin (NEURONTIN) 400 MG capsule Take 400 mg by mouth 4 times daily.  metoprolol succinate (TOPROL XL) 50 MG extended release tablet Take 50 mg by mouth daily      isosorbide mononitrate (IMDUR) 120 MG extended release tablet Take 1 tablet by mouth daily 30 tablet 0    allopurinol (ZYLOPRIM) 300 MG tablet Take 300 mg by mouth daily       lovastatin (MEVACOR) 20 MG tablet Take 20 mg by mouth nightly.  insulin lispro, 1 Unit Dial, (HUMALOG KWIKPEN) 100 UNIT/ML SOPN Inject 0-6 Units into the skin 3 times daily (before meals) PER SLIDING SCALE: 0-139=0U, 140-199=1U, 200-249=2U, 250-299=3U, 300-349=4U, 350-399=5U, 400-450=6U         Allergies:    Patient has no known allergies. Social History:     reports that he quit smoking about 40 years ago. His smoking use included cigarettes. He started smoking about 68 years ago. He has a 50.00 pack-year smoking history. He has never used smokeless tobacco. He reports previous drug use. He reports that he does not drink alcohol.     Family History:         Problem Relation Age of Onset    Heart Disease Mother     Heart Failure Mother     Heart Surgery Father     Heart Disease Father     Heart Failure Father     High Blood Pressure Sister     Cancer Sister        ROS:     All pertinent + discussed in HPI  All other sx negative     Physical Exam:      Patient Vitals for the past 24 hrs:   BP Temp Temp src Pulse Resp SpO2 Height Weight   10/21/21 1516 -- -- -- -- -- -- 5' 8\" (1.727 m) --   10/21/21 0730 (!) 118/53 96.4 °F (35.8 °C) Temporal 65 20 100 % -- --   10/21/21 0645 -- -- -- -- -- 98 % -- 235 lb (106.6 kg)   10/21/21 0415 126/65 97.5 °F (36.4 °C) Temporal 74 18 91 % -- --   10/21/21 0005 (!) 122/58 97.1 °F (36.2 °C) -- 62 17 98 % -- --         Intake/Output Summary (Last 24 hours) at 10/21/2021 1802  Last data filed at 10/21/2021 1717  Gross per 24 hour   Intake 930 ml   Output 600 ml   Net 330 ml       Constitutional: Patient in no acute distress   Head: normocephalic, atraumatic   Neck: no jvd  Cardiovascular: S1 S2 no S3, no rub   Respiratory:poor effort, diminished  Gastrointestinal: soft, nontender, nondistended,   Ext: right foot with wound vac post TMA, left foot dressing  Neuro: Drowsy but arouses easily    Data:    Recent Labs     10/19/21  0609 10/19/21  1152 10/20/21  0533 10/20/21  1202 10/21/21  0610   WBC 14.0*  --  15.0*  --  11.9*   HGB 6.9*   < > 7.8* 7.1* 7.1*   HCT 21.4*   < > 23.0* 21.0* 21.8*   MCV 96.8  --  92.0  --  94.4     --  276  --  259    < > = values in this interval not displayed. Recent Labs     10/20/21  0533 10/20/21  1740 10/21/21  0610    135 138   K 6.6* 5.1* 5.2*    105 102   CO2 20* 25 25   CREATININE 3.2* 3.0* 3.2*   BUN 70* 71* 82*   LABGLOM 19 20 19   GLUCOSE 155* 134* 141*   CALCIUM 8.9 8.3* 9.3   PHOS 5.3*  --   --    MG 2.5  --   --        Vit D, 25-Hydroxy   Date Value Ref Range Status   09/25/2021 27 (L) 30 - 100 ng/mL Final     Comment:     <20 ng/mL. ........... Alexandra Dahl Deficient  20-30 ng/mL. ......... Alexandra Dahl Insufficient   ng/mL. ........ Alexandra Dahl Sufficient  >100 ng/mL. .......... Alexandra Dahl Toxic         PTH   Date Value Ref Range Status   09/25/2021 30 15 - 65 pg/mL Final       No results for input(s): ALT, AST, ALKPHOS, BILITOT, BILIDIR in the last 72 hours. No results for input(s): LABALBU in the last 72 hours.     Ferritin   Date Value Ref Range Status   09/25/2021 534 ng/mL Final     Comment:     FERRITIN Reference Ranges:  Adult Males   20 - 60 years:    27 - 400 ng/mL  Adult females 16 - 61 years:    15 - 150 ng/mL  Adults greater than 60 years:   no established reference range  Pediatrics: no established reference range       Iron   Date Value Ref Range Status   09/25/2021 34 (L) 59 - 158 mcg/dL Final     TIBC   Date Value Ref Range Status   09/25/2021 157 (L) 250 - 450 mcg/dL Final       Vitamin B-12   Date Value Ref Range Status   10/20/2021 >2000 (H) 211 - 946 pg/mL Final       Folate   Date Value Ref Range Status   10/20/2021 16.1 4.8 - 24.2 ng/mL Final         Lab Results   Component Value Date    COLORU Yellow 09/24/2021    NITRU Negative 09/24/2021    GLUCOSEU Negative 09/24/2021    GLUCOSEU NEGATIVE 07/12/2011    KETUA TRACE 09/24/2021    UROBILINOGEN 0.2 09/24/2021    BILIRUBINUR Negative 09/24/2021    BILIRUBINUR NEGATIVE 07/12/2011       No results found for: MIRYAM, CREURRAN, MACREATRATIO, OSMOU    No components found for: URIC    No results found for: LIPIDPAN      Assessment and Plan:  1. HARVEY-  In the setting of vancomycin for TMA would infection, diuretics  and severe anemia  Holding diuretics and metolazone     Strict I&O as no output has been recorded  Baseline Cr 1.5-2.1 mg/dl  Cr peaked at 3.2; continue IV fluids  Continue to monitor    2. CKD  Presumed microvascular in the setting of DM and HTN as well as PVD   Baseline Cr 1.5-2.1 mg/dl    3. Metabolic Acidosis  In the setting of HARVEY  CO2-17  On po HCO3    4. Hyperkalemia   Due to potassium supplements in the presence of HARVEY; and potassium source of blood transfusion  Potassium 6.6 today  Treated medically with good response  Continue to monitor     5. Anemia  10/19-Hgb -6.1--> For additional 1 unit blood   10/18-S/p 2 units blood for Hgb 5.2  Per primary     6.  TMA Wound Infection right foot  Infectious Disease following    Camryn Lyons MD

## 2021-10-21 NOTE — PROGRESS NOTES
Department of Internal Medicine  Infectious Diseases  Progress Note      C/C : TMA wound infection, cellulitis       Pt is awake and alert  Denies fever   Reports pain in the foot   Afebrile       Current Facility-Administered Medications   Medication Dose Route Frequency Provider Last Rate Last Admin    white petrolatum ointment   Topical BID Hortencia Jade MD   Given at 10/21/21 2862    And    white petrolatum ointment   Topical 4x Daily PRN Hortencia Jade MD        0.9 % sodium chloride infusion   IntraVENous PRN Hortencia Jade MD        cefepime (MAXIPIME) 2000 mg IVPB minibag  2,000 mg IntraVENous Q24H Kendrick Medina MD 12.5 mL/hr at 10/21/21 1346 2,000 mg at 10/21/21 1346    vancomycin 1000 mg IVPB in 250 mL D5W addavial  1,000 mg IntraVENous Q36H Bhavin Vaughan MD   Stopped at 10/21/21 0233    sodium bicarbonate tablet 1,300 mg  1,300 mg Oral BID Roderic Paget, APRN - CNP   1,300 mg at 10/21/21 0856    0.9 % sodium chloride infusion   IntraVENous PRN Hortencia Jade MD        0.9 % sodium chloride infusion   IntraVENous PRN Hortencia Jade MD        lidocaine PF 1 % injection 5 mL  5 mL IntraDERmal Once Bhavin Vaughan MD        sodium chloride flush 0.9 % injection 5-40 mL  5-40 mL IntraVENous 2 times per day Bhavin Vaughan MD   10 mL at 10/21/21 0857    sodium chloride flush 0.9 % injection 5-40 mL  5-40 mL IntraVENous PRN Kendrick Medina MD   20 mL at 10/21/21 0617    0.9 % sodium chloride infusion  25 mL IntraVENous PRN Kendrick Medina MD        heparin flush 100 UNIT/ML injection 300 Units  3 mL IntraVENous 2 times per day Bhavin Vaughan MD   300 Units at 10/21/21 0858    heparin flush 100 UNIT/ML injection 300 Units  3 mL IntraCATHeter PRN Bhavin Vaughan MD        [Held by provider] HYDROmorphone (DILAUDID) injection 0.5 mg  0.5 mg IntraVENous Q4H PRN Hortencia Jade MD        morphine injection 2 mg  2 mg IntraMUSCular Q4H PRN Ramakrishna Lewis DPM   2 mg at 10/21/21 1148    albuterol (PROVENTIL) nebulizer solution 2.5 mg  2.5 mg Nebulization Q6H PRN Michael Min DPM        allopurinol (ZYLOPRIM) tablet 300 mg  300 mg Oral Daily Michael Min DPM   300 mg at 10/21/21 0857    [Held by provider] bumetanide (BUMEX) tablet 1 mg  1 mg Oral Daily Michael Min DPM   1 mg at 10/18/21 0825    diphenhydrAMINE (BENADRYL) tablet 25 mg  25 mg Oral Daily PRN Michael Min DPM        DULoxetine (CYMBALTA) extended release capsule 60 mg  60 mg Oral Daily Michael Min DPM   60 mg at 10/21/21 0857    ferrous sulfate (IRON 325) tablet 325 mg  325 mg Oral BID WC Michael Min DPM   325 mg at 10/21/21 0856    gabapentin (NEURONTIN) capsule 400 mg  400 mg Oral 4x Daily Michael Min DPM   400 mg at 10/21/21 1227    hydrALAZINE (APRESOLINE) tablet 50 mg  50 mg Oral 3 times per day Michael Min DPM   50 mg at 10/21/21 1343    hydrOXYzine (VISTARIL) capsule 25 mg  25 mg Oral 4x Daily Michael Min DPM   25 mg at 10/21/21 1227    isosorbide mononitrate (IMDUR) extended release tablet 120 mg  120 mg Oral Daily Michael Min DPM   120 mg at 10/21/21 0856    lactulose (CHRONULAC) 10 GM/15ML solution 20 g  20 g Oral Once per day on Mon Wed Fri Lawanda Sandoval DPM   20 g at 10/20/21 3106    levothyroxine (SYNTHROID) tablet 25 mcg  25 mcg Oral Daily Michael Min DPM   25 mcg at 10/21/21 6502    atorvastatin (LIPITOR) tablet 10 mg  10 mg Oral Daily Michael Min DPM   10 mg at 10/21/21 0857    magnesium oxide (MAG-OX) tablet 200 mg  200 mg Oral Daily Michael Min DPM   200 mg at 10/21/21 0857    [Held by provider] metOLazone (ZAROXOLYN) tablet 2.5 mg  2.5 mg Oral Once per day on Mon Wed Fri Jesús Navaror MD   2.5 mg at 10/18/21 7345    metoprolol succinate (TOPROL XL) extended release tablet 50 mg  50 mg Oral Daily Michael Min DPM   50 mg at 10/21/21 0857    mirtazapine (REMERON) tablet 7.5 mg  7.5 mg Oral Nightly Michael Min DPM   7.5 mg at 10/20/21 8975    morphine (THIERRY) extended release capsule 10 mg  10 mg Oral BID Radha Marx Jaime, DPM        0.9 % sodium chloride infusion   IntraVENous Continuous Rohit Hubbardston, DPM 75 mL/hr at 10/18/21 0525 New Bag at 10/18/21 0525    magnesium hydroxide (MILK OF MAGNESIA) 400 MG/5ML suspension 30 mL  30 mL Oral Daily PRN Rohit Hubbardston, DPM        acetaminophen (TYLENOL) tablet 650 mg  650 mg Oral Q6H PRN Rohit Hubbardston, DPM   650 mg at 10/16/21 1952    Or    acetaminophen (TYLENOL) suppository 650 mg  650 mg Rectal Q6H PRN Rohit Hubbardston, DPM        HYDROcodone-acetaminophen (NORCO) 7.5-325 MG per tablet 1 tablet  1 tablet Oral Q6H PRN Rohit Hubbardston, DPM   1 tablet at 10/19/21 0834    insulin lispro (HUMALOG) injection vial 0-12 Units  0-12 Units SubCUTAneous TID WC Rohit Padilla, DPM   2 Units at 10/21/21 0452    insulin lispro (HUMALOG) injection vial 0-6 Units  0-6 Units SubCUTAneous Nightly Rohit Padilla, DPM   1 Units at 10/18/21 2335    glucose (GLUTOSE) 40 % oral gel 15 g  15 g Oral PRN Rohit Padilla, DPM        dextrose 50 % IV solution  12.5 g IntraVENous PRN Rohit Padilla, DPM        glucagon (rDNA) injection 1 mg  1 mg IntraMUSCular PRN Rohit Padilla, DPM        dextrose 5 % solution  100 mL/hr IntraVENous PRN Rohit Hubbardston, DPM        budesonide (PULMICORT) nebulizer suspension 250 mcg  0.25 mg Nebulization BID Rohit Hubbardston, DPM   250 mcg at 10/20/21 2129    And    ipratropium (ATROVENT) 0.02 % nebulizer solution 0.5 mg  0.5 mg Nebulization 4x daily Rohit Padilla, DPM   0.5 mg at 10/21/21 1232    And    Arformoterol Tartrate (BROVANA) nebulizer solution 15 mcg  15 mcg Nebulization BID Rohit Hubbardston, DPM   15 mcg at 10/20/21 2129         REVIEW OF SYSTEMS:    CONSTITUTIONAL:  Denies fever, chill or rigors. HEENT: denies blurring of vision or double vision, denies hearing problem  RESPIRATORY: denies cough, shortness of breath, sputum expectoration  CARDIOVASCULAR:  Denies palpitation or chest pain   GASTROINTESTINAL:  Denies abdomen pain, diarrhea or constipation.   GENITOURINARY:  Denies burning urination or frequency of urination  INTEGUMENT: wounds   HEMATOLOGIC/LYMPHATIC:  Denies lymph node swelling, gum bleeding or easy bruising. MUSCULOSKELETAL: pain in the foot    NEUROLOGICAL:  Weakness     PHYSICAL EXAM:      Vitals:     Vitals:    10/21/21 0730   BP: (!) 118/53   Pulse: 65   Resp: 20   Temp: 96.4 °F (35.8 °C)   SpO2: 100%       General Appearance:    Awake, alert , no acute distress. Head:    Normocephalic, atraumatic   Eyes:    No pallor, no icterus,   Ears:    No obvious deformity or drainage.    Nose:   No nasal drainage   Throat:   Mucosa dry, no thrush    Neck:   Supple, no lymphadenopathy   Lungs:     Clear to auscultation bilaterally, no wheeze    Heart:    Regular rate and rhythm, systolic murmur    Abdomen:     Soft, non-tender, bowel sounds present    Extremities:    + edema    Pulses:   Dorsalis pedis diminished    Skin:  foot wound dressed    CBC with Differential:      Lab Results   Component Value Date    WBC 11.9 10/21/2021    RBC 2.31 10/21/2021    HGB 7.1 10/21/2021    HCT 21.8 10/21/2021     10/21/2021    MCV 94.4 10/21/2021    MCH 30.7 10/21/2021    MCHC 32.6 10/21/2021    RDW 17.2 10/21/2021    NRBC 0.9 10/01/2021    SEGSPCT 76 03/16/2014    BANDSPCT 1 09/02/2016    METASPCT 0.9 10/01/2021    LYMPHOPCT 10.5 10/17/2021    MONOPCT 5.6 10/17/2021    BASOPCT 0.3 10/17/2021    MONOSABS 0.61 10/17/2021    LYMPHSABS 1.14 10/17/2021    EOSABS 0.17 10/17/2021    BASOSABS 0.03 10/17/2021       CMP     Lab Results   Component Value Date     10/21/2021    K 5.2 10/21/2021    K 4.3 10/16/2021     10/21/2021    CO2 25 10/21/2021    BUN 82 10/21/2021    CREATININE 3.2 10/21/2021    GFRAA 23 10/21/2021    LABGLOM 19 10/21/2021    GLUCOSE 141 10/21/2021    GLUCOSE 111 07/16/2011    PROT 8.1 10/15/2021    LABALBU 2.5 10/15/2021    CALCIUM 9.3 10/21/2021    BILITOT 0.3 10/15/2021    ALKPHOS 217 10/15/2021    AST 41 10/15/2021    ALT 30 10/15/2021         Hepatic Function Panel:    Lab Results   Component Value Date    ALKPHOS 217 10/15/2021    ALT 30 10/15/2021    AST 41 10/15/2021    PROT 8.1 10/15/2021    BILITOT 0.3 10/15/2021    BILIDIR <0.2 01/25/2021    IBILI see below 01/25/2021    LABALBU 2.5 10/15/2021       PT/INR:    Lab Results   Component Value Date    PROTIME 11.7 01/05/2021    PROTIME 11.3 06/21/2011    INR 1.0 01/05/2021       TSH:    Lab Results   Component Value Date    TSH 4.270 08/19/2021       U/A:    Lab Results   Component Value Date    COLORU Yellow 09/24/2021    PHUR 6.0 09/24/2021    LABCAST RARE 10/11/2017    WBCUA NONE 09/24/2021    WBCUA NONE 07/12/2011    RBCUA 1-3 09/24/2021    RBCUA 1-3 03/17/2014    BACTERIA NONE SEEN 09/24/2021    CLARITYU Clear 09/24/2021    SPECGRAV 1.025 09/24/2021    LEUKOCYTESUR Negative 09/24/2021    UROBILINOGEN 0.2 09/24/2021    BILIRUBINUR Negative 09/24/2021    BILIRUBINUR NEGATIVE 07/12/2011    BLOODU TRACE-INTACT 09/24/2021    GLUCOSEU Negative 09/24/2021    GLUCOSEU NEGATIVE 07/12/2011       ABG:    Lab Results   Component Value Date    HKT5TJN 21.8 09/30/2021    E9KMKMPK 96.7 09/06/2012    IDH8VYX 42.9 09/30/2021    PO2ART 157.4 09/30/2021       MICROBIOLOGY:    Blood culture -     Staphylococcus coagulase-negativeAbnormal     Wound cx -Susceptibility    Citrobacter freundii (1)    Antibiotic Interpretation WINNIE Status    ceFAZolin Resistant >=^64 mcg/mL     cefepime Sensitive <=^0.12 mcg/mL     cefTRIAXone Sensitive <=^0.25 mcg/mL     ertapenem Sensitive <=^0.12 mcg/mL     gentamicin Sensitive <=^1 mcg/mL     levofloxacin Sensitive <=^0.12 mcg/mL     piperacillin-tazobactam Sensitive <=^4 mcg/mL     trimethoprim-sulfamethoxazole Sensitive <=^20 mcg/mL     Staphylococcus aureus (2)    Antibiotic Interpretation WINNIE Status    clindamycin Resistant >=^4 mcg/mL     DAPTOmycin Sensitive ^0. 25 mcg/mL     doxycycline Sensitive <=^0.5 mcg/mL     erythromycin Resistant >=^8 mcg/mL     gentamicin Sensitive <=^0.5 mcg/mL oxacillin Resistant >=^4 mcg/mL     trimethoprim-sulfamethoxazole Sensitive <=^10 mcg/mL     vancomycin Sensitive ^1 mcg/mL     Pseudomonas aeruginosa (4)    Antibiotic Interpretation WINNIE Status    cefepime Sensitive ^2 mcg/mL     gentamicin Sensitive <=^1 mcg/mL     levofloxacin Intermediate ^4 mcg/mL     piperacillin-tazobactam Sensitive <=^4 mcg/mL     tobramycin Sensitive <=^1 mcg/mL      Condensed View   Lab and Collection    Pathology :     A.  Second toe of left foot, nontraumatic amputation: Ulcerated skin with   marked acute and chronic inflammation and reactive change. Subcutaneous fat necrosis and intravascular fibrin thrombus. Sampled underlying bone negative for osteomyelitis. B.  First metatarsal bone, right foot, excision: Acute osteomyelitis. Radiology :    Left foot x ray -  Mild soft tissue edema which could be reactive or due to cellulitis. Right foot x ray -    Irregular appearance of the overlying soft tissues with multiple lucencies   which may represent soft tissue gas.  Large soft tissue defect superiorly at   the amputation site     IMPRESSION:     1. Right TMA stump wound infection, osteomyelitis  s/p debridement   2. PAD - left 2, 3 toes infection ,cellulitis s/p 2 nd toe amputation ( 10/16)   3. CONS  bacteremia   4. HARVEY     RECOMMENDATIONS:      1. Stop vancomycin and cefepime   2. Vanco random level ,  Meropenem IV   3.  Local wound care

## 2021-10-21 NOTE — PROGRESS NOTES
Attempted to place condom catheter on patient, but patient continuously pulls off condom catheter and nasal cannula. Unable to document accurate output due to patient being incontinent.

## 2021-10-21 NOTE — PROGRESS NOTES
Department of Podiatry  Progress Note    SUBJECTIVE:  Patient seen bedside for S/P left 2nd toe amputation to level of MPJ, right foot incision and drainage, excisional debridement of right foot wound with application of wound vac (DOS: 10/16/21). Patient sleeping comfortably at the time of visit. As per nurse, patient pulled wound vac off last night while he was tossing and turning. Wound vac re-applied during this visit. No additional acute events overnight.      OBJECTIVE:    Scheduled Meds:   meropenem  1,000 mg IntraVENous Q24H    white petrolatum   Topical BID    sodium bicarbonate  1,300 mg Oral BID    lidocaine PF  5 mL IntraDERmal Once    sodium chloride flush  5-40 mL IntraVENous 2 times per day    heparin flush  3 mL IntraVENous 2 times per day    allopurinol  300 mg Oral Daily    [Held by provider] bumetanide  1 mg Oral Daily    DULoxetine  60 mg Oral Daily    ferrous sulfate  325 mg Oral BID WC    gabapentin  400 mg Oral 4x Daily    hydrALAZINE  50 mg Oral 3 times per day    hydrOXYzine  25 mg Oral 4x Daily    isosorbide mononitrate  120 mg Oral Daily    lactulose  20 g Oral Once per day on Mon Wed Fri    levothyroxine  25 mcg Oral Daily    atorvastatin  10 mg Oral Daily    magnesium oxide  200 mg Oral Daily    [Held by provider] metOLazone  2.5 mg Oral Once per day on Mon Wed Fri    metoprolol succinate  50 mg Oral Daily    mirtazapine  7.5 mg Oral Nightly    morphine  10 mg Oral BID    insulin lispro  0-12 Units SubCUTAneous TID     insulin lispro  0-6 Units SubCUTAneous Nightly    budesonide  0.25 mg Nebulization BID    And    ipratropium  0.5 mg Nebulization 4x daily    And    Arformoterol Tartrate  15 mcg Nebulization BID     Continuous Infusions:   sodium chloride      sodium chloride      sodium chloride      sodium chloride      sodium chloride 75 mL/hr at 10/18/21 0525    dextrose       PRN Meds:.white petrolatum **AND** white petrolatum, sodium chloride, sodium chloride, sodium chloride, sodium chloride flush, sodium chloride, heparin flush, [Held by provider] HYDROmorphone, morphine, albuterol, diphenhydrAMINE, magnesium hydroxide, acetaminophen **OR** acetaminophen, HYDROcodone-acetaminophen, glucose, dextrose, glucagon (rDNA), dextrose    No Known Allergies    BP (!) 118/53   Pulse 65   Temp 96.4 °F (35.8 °C) (Temporal)   Resp 20   Ht 5' 8\" (1.727 m)   Wt 235 lb (106.6 kg)   SpO2 100%   BMI 35.73 kg/m²       EXAM:    VASCULAR:  DP and PT pulses nonpalpable. Skin temperature warm to warm from proximal to distal B/L. No edema noted. Absent hair growth. CFT delayed to LLE, unobtainable to RLE    NEUROLOGIC:  Epicritic sensation diminished B/L.      DERM:  Open surgical TMA wound with plantar fasciotomy to RLE as pictured below. Gross bone exposure with granular base and extensive soft tissue exposure; minimal bleeding noted during today's dressing change. No malodor or purulence noted. Decubitus heel wound present, approx 3cm in diameter with eschar base. No openings, no drainage, no malodor. LLE 2nd digit amp site with sutures intact. No drainage, malodor, streaking, or fluctuance noted. LLE calf with skin changes consistent with chronic venous stasis - superficial ulcerations present, L>R      MUSCULOSKELETAL: Evidence of previous R TMA, L 2nd digit amp. Mild pain with dressing change to RLE. No pain noted to LLE. Muscle strength testing deferred.                                            Scheduled Meds:   meropenem  1,000 mg IntraVENous Q24H    white petrolatum   Topical BID    sodium bicarbonate  1,300 mg Oral BID    lidocaine PF  5 mL IntraDERmal Once    sodium chloride flush  5-40 mL IntraVENous 2 times per day    heparin flush  3 mL IntraVENous 2 times per day    allopurinol  300 mg Oral Daily    [Held by provider] bumetanide  1 mg Oral Daily    DULoxetine  60 mg Oral Daily    ferrous sulfate  325 mg Oral BID WC    gabapentin  400 mg Oral 4x Daily    hydrALAZINE  50 mg Oral 3 times per day    hydrOXYzine  25 mg Oral 4x Daily    isosorbide mononitrate  120 mg Oral Daily    lactulose  20 g Oral Once per day on Mon Wed Fri    levothyroxine  25 mcg Oral Daily    atorvastatin  10 mg Oral Daily    magnesium oxide  200 mg Oral Daily    [Held by provider] metOLazone  2.5 mg Oral Once per day on Mon Wed Fri    metoprolol succinate  50 mg Oral Daily    mirtazapine  7.5 mg Oral Nightly    morphine  10 mg Oral BID    insulin lispro  0-12 Units SubCUTAneous TID WC    insulin lispro  0-6 Units SubCUTAneous Nightly    budesonide  0.25 mg Nebulization BID    And    ipratropium  0.5 mg Nebulization 4x daily    And    Arformoterol Tartrate  15 mcg Nebulization BID     Continuous Infusions:   sodium chloride      sodium chloride      sodium chloride      sodium chloride      sodium chloride 75 mL/hr at 10/18/21 0525    dextrose       PRN Meds:.white petrolatum **AND** white petrolatum, sodium chloride, sodium chloride, sodium chloride, sodium chloride flush, sodium chloride, heparin flush, [Held by provider] HYDROmorphone, morphine, albuterol, diphenhydrAMINE, magnesium hydroxide, acetaminophen **OR** acetaminophen, HYDROcodone-acetaminophen, glucose, dextrose, glucagon (rDNA), dextrose    RADIOLOGY:  XR CHEST PORTABLE   Final Result   Right PICC line tip is in the superior vena cava. Stable left pleural effusion, with left lung base atelectasis or infiltrate         US RETROPERITONEAL COMPLETE   Final Result   No sonographic evidence of acute renal pathology. XR CHEST PORTABLE   Final Result   1. After mid sternotomy. Moderate cardiomegaly. Presence of left-sided   pacemaker. 2.  Chronic findings in the left base as seen on previous examination which   can represent atelectasis or a mild left-sided pleural effusion. 3.  No superimposed acute cardiopulmonary process.          XR FOOT LEFT (MIN 3 VIEWS)   Final Result   Mild soft tissue edema which could be reactive or due to cellulitis. Chronic osseous changes. MRI would be useful if symptoms persist.         XR FOOT RIGHT (MIN 3 VIEWS)   Final Result   Status post transmetatarsal amputation. Irregular appearance of the overlying soft tissues with multiple lucencies   which may represent soft tissue gas. Large soft tissue defect superiorly at   the amputation site      No gross lytic or erosive changes to suggest advanced osteomyelitis. CT HEAD WO CONTRAST    (Results Pending)     BP (!) 118/53   Pulse 65   Temp 96.4 °F (35.8 °C) (Temporal)   Resp 20   Ht 5' 8\" (1.727 m)   Wt 235 lb (106.6 kg)   SpO2 100%   BMI 35.73 kg/m²     LABS:    Recent Labs     10/20/21  0533 10/20/21  0533 10/20/21  1202 10/21/21  0610   WBC 15.0*  --   --  11.9*   HGB 7.8*   < > 7.1* 7.1*   HCT 23.0*   < > 21.0* 21.8*     --   --  259    < > = values in this interval not displayed. Recent Labs     10/20/21  0533 10/20/21  1740 10/21/21  0610      < > 138   K 6.6*   < > 5.2*      < > 102   CO2 20*   < > 25   PHOS 5.3*  --   --    BUN 70*   < > 82*   CREATININE 3.2*   < > 3.2*    < > = values in this interval not displayed. No results for input(s): PROT, INR, APTT in the last 72 hours. 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 Chart and labs were reviewed. - Pain Control: IV and PO  - OR cultures: soft tissue with Citrobacter, Staph aureus growth.  Bone culture with pseudomonas, MRSA growth. -Surgical pathology revealing subcutaneous fat necrosis and intravascular fibrin thrombus of 2nd toe on L foot. Negative for OM. First metatarsal bone on R foot is positive for acute OM  - Abx per ID. Recommend to stop Vanco and Cefepime. Check Vanco random level, start Merrem IV   - Wound vac placed today on RLE. Running at 125mmHg low continuous pressure with no leaks detected. Wound vac to remain intact with next change on 10/25.   - Dressing on LLE consists of xeroform DSD.   - Maintain heel offloading  -Possible repeat debridement and grafting of RLE when medically stable  -Monitor Hgb and Hct  - Discussed patient with Dr. Erick Rebolledo   - Will continue to follow patient while they are in-house. Thank you for involving podiatry in this patients care. Please do not hesitate to call with any questions or concerns.

## 2021-10-21 NOTE — PROGRESS NOTES
Comprehensive Nutrition Assessment    Type and Reason for Visit:  Reassess    Nutrition Recommendations/Plan: Continue Current Diet, Continue Oral Nutrition Supplement    Nutrition Assessment:  Pt declining from a nutritional standpoint w/ ongoing poor intake and pt s/p R foot debridement w/ wound VAC and L foot 2nd toe amputation. Pt admitted w/ wound infection s/p recent TMA. PMH of CHF, CKD, COPD, CVA, and DM. Possible repeat RLE debridement w/ grafting. Will continue current ONS regimen and monitor while admitted. Malnutrition Assessment:  Malnutrition Status: At risk for malnutrition (Comment)    Context:  Chronic Illness     Findings of the 6 clinical characteristics of malnutrition:  Energy Intake:  Mild decrease in energy intake (Comment)  Weight Loss:  Unable to assess (d/t wt fluctuations per EMR)     Body Fat Loss:  No significant body fat loss     Muscle Mass Loss:  No significant muscle mass loss    Fluid Accumulation:  No significant fluid accumulation     Strength:  Not Performed    Estimated Daily Nutrient Needs:  Energy (kcal):  2707-2440 (MSJ REE= 1750 x 1.2); Weight Used for Energy Requirements:  Current     Protein (g):   (1.3-1.5 gm/kg d/t renal labs and wounds); Weight Used for Protein Requirements:  Ideal (1.5-1.8)        Fluid (ml/day):  7058-3745; Method Used for Fluid Requirements:  1 ml/kcal      Nutrition Related Findings:  Pt oriented to person, abd WDL, +BS, +7.9L I/O, +2 BLE edema, renal labs elevated      Wounds:  Multiple, Open Wounds, Surgical Incision       Current Nutrition Therapies:    ADULT DIET; Regular  ADULT ORAL NUTRITION SUPPLEMENT; Lunch, Dinner; Low Calorie/High Protein Oral Supplement  ADULT ORAL NUTRITION SUPPLEMENT; Breakfast, Lunch;  Wound Healing Oral Supplement    Anthropometric Measures:  · Height: 5' 8\" (172.7 cm)  · Current Body Weight: 235 lb (106.6 kg) (10/21 bed scale)   · Admission Body Weight: 235 lb (106.6 kg) (10/21 bed scale)    · Usual Body Weight:  (226-261# measured per EMR x1 year)     · Ideal Body Weight: 154 lbs; % Ideal Body Weight 152.6 %   · BMI: 35.7  · Adjusted Body Weight: No Adjustment   · BMI Categories: Obese Class 2 (BMI 35.0 -39.9)       Nutrition Diagnosis:   · Inadequate oral intake related to increase demand for energy/nutrients as evidenced by wounds, intake 0-25%    Nutrition Interventions:   Food and/or Nutrient Delivery:  Continue Current Diet, Continue Oral Nutrition Supplement  Nutrition Education/Counseling:  Education not indicated   Coordination of Nutrition Care:  Continue to monitor while inpatient    Goals:  Pt is to consume >50% of most meals/ONS       Nutrition Monitoring and Evaluation:   Behavioral-Environmental Outcomes:  None Identified   Food/Nutrient Intake Outcomes:  Food and Nutrient Intake, Supplement Intake  Physical Signs/Symptoms Outcomes:  Biochemical Data, GI Status, Fluid Status or Edema, Nutrition Focused Physical Findings, Skin, Weight     Discharge Planning:     Too soon to determine     Electronically signed by Reymundo Alvarez RD, LD on 10/21/21 at 3:24 PM EDT    Contact: [phone]

## 2021-10-21 NOTE — PROGRESS NOTES
Subjective:    Per nursing he has been quite agitated today  On my evaluation he is having significant tremors to the point that he cannot hold a cup of water   He denies any acute complaints and appears to be much more awake and alert today compared to yesterday  Objective:    BP (!) 118/53   Pulse 65   Temp 96.4 °F (35.8 °C) (Temporal)   Resp 20   Ht 5' 8\" (1.727 m)   Wt 235 lb (106.6 kg)   SpO2 100%   BMI 35.73 kg/m²     In: 900 [P.O.:630; I.V.:270]  Out: -   In: 900   Out: -     General appearance: NAD, conversant, ill appearing  HEENT: AT/NC, MMM  Neck: FROM, supple  Lungs: Clear to auscultation  CV: RRR, no MRGs  Vasc: Radial pulses 2+  Abdomen: Soft, non-tender; no masses or HSM  Extremities: TMA right foot  Skin: no rash, lesions or ulcers  Psych: Alert and oriented to person, place and time  Neuro: Alert and interactive     Recent Labs     10/19/21  0609 10/19/21  1152 10/20/21  0533 10/20/21  1202 10/21/21  0610   WBC 14.0*  --  15.0*  --  11.9*   HGB 6.9*   < > 7.8* 7.1* 7.1*   HCT 21.4*   < > 23.0* 21.0* 21.8*     --  276  --  259    < > = values in this interval not displayed. Recent Labs     10/20/21  0533 10/20/21  1740 10/21/21  0610    135 138   K 6.6* 5.1* 5.2*    105 102   CO2 20* 25 25   BUN 70* 71* 82*   CREATININE 3.2* 3.0* 3.2*   CALCIUM 8.9 8.3* 9.3       Assessment:    Principal Problem:    Wound infection  Active Problems:    CKD (chronic kidney disease) stage 3, GFR 30-59 ml/min  Resolved Problems:    * No resolved hospital problems.  *      Plan:  72-year-old male with a significant past medical history recently discharged 1 week ago with amputation is admitted to [place] [address] unit with     Wound infection  -IV antibiotics in the form of Zosyn/Vanco pharmacy to dose Vanco-staph species in blood cultures  -IV hydration -   -Podiatry following -10/16/2021 left toe amputation , I&D right foot multiple compartment, debridement of right foot with wound vac application  continue zosyn and vanc await cultures-ID following  -Hgb down to 5.2 today-transfuse 2 units PRBCs 10/18/2021-transfer to telemetry if hypotension and symptom  -Repeat H&H 5.2-PRBC transfused 10/18/2021, transfuse additional unit 10/19/2021 for hemoglobin of 6.9  -Pain management - pain well controlled  -Elevated sed rate >150   -Hold BP meds and diuretics with parameters for low BP     CKD  -Monitor labs-BUN/creatinine on admission 50/1.9, 70/3.2 today, likely prerenal from acute blood loss anemia-has pretty much stabilized at this point at 82/3.2 today  -Hold all nephrotoxins  -IV hydration -change IV fluids to saline with bicarb  -Potassium is 6.6 on morning labs-repeat stat  -Retroperitoneal ultrasound is unremarkable   -Nephrology input appreciated    Patient is already on his chronic home pain medications which include Ivon and Percocet for breakthrough pain-pain appears to be adequately controlled-I do not want to increase his pain meds narcotics any further    We will check CT head to rule out any acute event due to significant weakness of his upper extremity on the right with tremors    Medication for other comorbidities continue as appropriate dose adjustment as necessary.         DVT Prophylaxis   PT/OT  Discharge Tawana Holter, MD  12:56 PM  10/21/2021

## 2021-10-22 NOTE — PROGRESS NOTES
Department of Podiatry  Progress Note    SUBJECTIVE:  Patient seen bedside S/P left 2nd toe amputation to level of MPJ, right foot incision and drainage, excisional debridement of right foot wound with application of wound vac (DOS: 10/16/21). Patient sleeping comfortably at the time of visit. Wound vac intact to RLE and running at 125 mmHG low continuous. Patient moving b/l lower extremities excessively and has slid down in bed. Continues to bump his feet against end of bed. Dressing on LLE disheveled and with drainage noted. Dressing change performed at bedside today. No acute events overnight.     OBJECTIVE:    Scheduled Meds:   daptomycin (CUBICIN) IVPB  8 mg/kg (Ideal) IntraVENous Q48H    meropenem  1,000 mg IntraVENous Q24H    white petrolatum   Topical BID    sodium bicarbonate  1,300 mg Oral BID    lidocaine PF  5 mL IntraDERmal Once    sodium chloride flush  5-40 mL IntraVENous 2 times per day    heparin flush  3 mL IntraVENous 2 times per day    allopurinol  300 mg Oral Daily    [Held by provider] bumetanide  1 mg Oral Daily    DULoxetine  60 mg Oral Daily    ferrous sulfate  325 mg Oral BID     gabapentin  400 mg Oral 4x Daily    hydrALAZINE  50 mg Oral 3 times per day    hydrOXYzine  25 mg Oral 4x Daily    isosorbide mononitrate  120 mg Oral Daily    lactulose  20 g Oral Once per day on Mon Wed Fri    levothyroxine  25 mcg Oral Daily    atorvastatin  10 mg Oral Daily    magnesium oxide  200 mg Oral Daily    [Held by provider] metOLazone  2.5 mg Oral Once per day on Mon Wed Fri    metoprolol succinate  50 mg Oral Daily    mirtazapine  7.5 mg Oral Nightly    morphine  10 mg Oral BID    insulin lispro  0-12 Units SubCUTAneous TID     insulin lispro  0-6 Units SubCUTAneous Nightly    budesonide  0.25 mg Nebulization BID    And    ipratropium  0.5 mg Nebulization 4x daily    And    Arformoterol Tartrate  15 mcg Nebulization BID     Continuous Infusions:   sodium chloride      sodium chloride      sodium chloride      sodium chloride      sodium chloride 125 mL/hr at 10/22/21 1233    dextrose       PRN Meds:.white petrolatum **AND** white petrolatum, sodium chloride, sodium chloride, sodium chloride, sodium chloride flush, sodium chloride, heparin flush, [Held by provider] HYDROmorphone, morphine, albuterol, diphenhydrAMINE, magnesium hydroxide, acetaminophen **OR** acetaminophen, HYDROcodone-acetaminophen, glucose, dextrose, glucagon (rDNA), dextrose    No Known Allergies    /79   Pulse 71   Temp 96.6 °F (35.9 °C) (Temporal)   Resp 16   Ht 5' 8\" (1.727 m)   Wt 235 lb (106.6 kg)   SpO2 97%   BMI 35.73 kg/m²       EXAM:    VASCULAR:  DP and PT pulses nonpalpable. Skin temperature warm to warm from proximal to distal B/L. No edema noted. Absent hair growth. CFT delayed to LLE, unobtainable to RLE    NEUROLOGIC:  Epicritic sensation diminished B/L.      DERM:  Open surgical TMA wound with plantar fasciotomy to RLE as pictured below. Gross bone exposure with granular base and extensive soft tissue exposure; minimal bleeding noted during today's dressing change. No malodor or purulence noted. Decubitus heel wound present, approx 3cm in diameter with eschar base. No openings, no drainage, no malodor. LLE 2nd digit amp site with mild serosanguinous drainage and gapping of proximal surgical site. Clinical pictures below. No drainage, no malodor, no streaking, no fluctuance noted. LLE calf with skin changes consistent with chronic venous stasis - superficial ulcerations present, L>R      MUSCULOSKELETAL: Evidence of previous R TMA, L 2nd digit amp. Mild pain with dressing change to RLE. No pain noted to LLE. Muscle strength testing deferred.                                                Scheduled Meds:   daptomycin (CUBICIN) IVPB  8 mg/kg (Ideal) IntraVENous Q48H    meropenem  1,000 mg IntraVENous Q24H    white petrolatum   Topical BID    sodium bicarbonate  1,300 mg Oral BID    lidocaine PF  5 mL IntraDERmal Once    sodium chloride flush  5-40 mL IntraVENous 2 times per day    heparin flush  3 mL IntraVENous 2 times per day    allopurinol  300 mg Oral Daily    [Held by provider] bumetanide  1 mg Oral Daily    DULoxetine  60 mg Oral Daily    ferrous sulfate  325 mg Oral BID WC    gabapentin  400 mg Oral 4x Daily    hydrALAZINE  50 mg Oral 3 times per day    hydrOXYzine  25 mg Oral 4x Daily    isosorbide mononitrate  120 mg Oral Daily    lactulose  20 g Oral Once per day on Mon Wed Fri    levothyroxine  25 mcg Oral Daily    atorvastatin  10 mg Oral Daily    magnesium oxide  200 mg Oral Daily    [Held by provider] metOLazone  2.5 mg Oral Once per day on Mon Wed Fri    metoprolol succinate  50 mg Oral Daily    mirtazapine  7.5 mg Oral Nightly    morphine  10 mg Oral BID    insulin lispro  0-12 Units SubCUTAneous TID WC    insulin lispro  0-6 Units SubCUTAneous Nightly    budesonide  0.25 mg Nebulization BID    And    ipratropium  0.5 mg Nebulization 4x daily    And    Arformoterol Tartrate  15 mcg Nebulization BID     Continuous Infusions:   sodium chloride      sodium chloride      sodium chloride      sodium chloride      sodium chloride 125 mL/hr at 10/22/21 1233    dextrose       PRN Meds:.white petrolatum **AND** white petrolatum, sodium chloride, sodium chloride, sodium chloride, sodium chloride flush, sodium chloride, heparin flush, [Held by provider] HYDROmorphone, morphine, albuterol, diphenhydrAMINE, magnesium hydroxide, acetaminophen **OR** acetaminophen, HYDROcodone-acetaminophen, glucose, dextrose, glucagon (rDNA), dextrose    RADIOLOGY:  CT HEAD WO CONTRAST   Final Result   No acute intracranial abnormality. MRI may be obtained if clinical suspicion   for acute stroke is high. Chronic findings as above. XR CHEST PORTABLE   Final Result   Right PICC line tip is in the superior vena cava.       Stable left pleural effusion, with left lung base atelectasis or infiltrate         US RETROPERITONEAL COMPLETE   Final Result   No sonographic evidence of acute renal pathology. XR CHEST PORTABLE   Final Result   1. After mid sternotomy. Moderate cardiomegaly. Presence of left-sided   pacemaker. 2.  Chronic findings in the left base as seen on previous examination which   can represent atelectasis or a mild left-sided pleural effusion. 3.  No superimposed acute cardiopulmonary process. XR FOOT LEFT (MIN 3 VIEWS)   Final Result   Mild soft tissue edema which could be reactive or due to cellulitis. Chronic osseous changes. MRI would be useful if symptoms persist.         XR FOOT RIGHT (MIN 3 VIEWS)   Final Result   Status post transmetatarsal amputation. Irregular appearance of the overlying soft tissues with multiple lucencies   which may represent soft tissue gas. Large soft tissue defect superiorly at   the amputation site      No gross lytic or erosive changes to suggest advanced osteomyelitis. /79   Pulse 71   Temp 96.6 °F (35.9 °C) (Temporal)   Resp 16   Ht 5' 8\" (1.727 m)   Wt 235 lb (106.6 kg)   SpO2 97%   BMI 35.73 kg/m²     LABS:    Recent Labs     10/21/21  0610 10/21/21  0610 10/21/21  1800 10/22/21  0515   WBC 11.9*  --   --  23.4*   HGB 7.1*   < > 7.5* 7.8*   HCT 21.8*   < > 22.4* 23.8*     --   --  241    < > = values in this interval not displayed. Recent Labs     10/20/21  0533 10/20/21  1740 10/22/21  0515      < > 144   K 6.6*   < > 4.8      < > 105   CO2 20*   < > 21*   PHOS 5.3*  --   --    BUN 70*   < > 81*   CREATININE 3.2*   < > 3.1*    < > = values in this interval not displayed. No results for input(s): PROT, INR, APTT in the last 72 hours.       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 Chart and labs were reviewed. - Pain Control: IV and PO  - OR cultures: soft tissue with Citrobacter, Staph aureus growth. Bone culture with pseudomonas, MRSA growth. -Surgical pathology revealing subcutaneous fat necrosis and intravascular fibrin thrombus of 2nd toe on L foot. Negative for OM. First metatarsal bone on R foot is positive for acute OM  - Abx per ID. Recommend Dapto and Merrem. - Wound vac intact to RLE. Running at 125mmHg low continuous pressure with no leaks detected. Wound vac to remain intact with next change on 10/25.   - Dressing changed today on LLE. Consists of xeroform DSD.  - Maintain heel offloading  -Possible repeat debridement and grafting of RLE when medically stable  -Monitor Hgb and Hct  -Will continue to follow patient while they are in-house  -Discussed patient with:    LINDA Blair [id number]  Fellowship-Trained Foot and Ankle Surgeon  Diplomate, [employer] Foot [employer]  [phone]       Thank you for involving podiatry in this patients care. Please do not hesitate to call with any questions or concerns.

## 2021-10-22 NOTE — PLAN OF CARE
Problem: Skin Integrity:  Goal: Will show no infection signs and symptoms  Description: Will show no infection signs and symptoms  10/22/2021 1629 by Ric Perez RN  Outcome: Met This Shift  10/22/2021 1042 by Ric Perez RN  Outcome: Met This Shift  Goal: Absence of new skin breakdown  Description: Absence of new skin breakdown  10/22/2021 1629 by Ric Perez RN  Outcome: Met This Shift  10/22/2021 1042 by Ric Perez RN  Outcome: Met This Shift     Problem: Falls - Risk of:  Goal: Will remain free from falls  Description: Will remain free from falls  10/22/2021 1629 by Ric Perez RN  Outcome: Met This Shift  10/22/2021 1042 by Ric Perez RN  Outcome: Met This Shift  Goal: Absence of physical injury  Description: Absence of physical injury  10/22/2021 1629 by Ric Perez RN  Outcome: Met This Shift  10/22/2021 1042 by Ric Perez RN  Outcome: Met This Shift     Problem: Pain:  Goal: Pain level will decrease  Description: Pain level will decrease  10/22/2021 1629 by Ric Perez RN  Outcome: Met This Shift  10/22/2021 1042 by Ric Perez RN  Outcome: Met This Shift  Goal: Control of acute pain  Description: Control of acute pain  10/22/2021 1629 by Ric Perez RN  Outcome: Met This Shift  10/22/2021 1042 by Ric Perez RN  Outcome: Met This Shift  Goal: Control of chronic pain  Description: Control of chronic pain  10/22/2021 1629 by Ric Perez RN  Outcome: Met This Shift  10/22/2021 1042 by Ric Perez RN  Outcome: Met This Shift     Problem: OXYGENATION/RESPIRATORY FUNCTION  Goal: Patient will maintain patent airway  10/22/2021 1629 by Ric Perez RN  Outcome: Met This Shift  10/22/2021 1042 by Ric Perez RN  Outcome: Met This Shift     Problem: HEMODYNAMIC STATUS  Goal: Patient has stable vital signs and fluid balance  10/22/2021 1629 by Ric Perez RN  Outcome: Met This Shift  10/22/2021 1042 by Ric Perez RN  Outcome: Met This Shift

## 2021-10-22 NOTE — PLAN OF CARE
Problem: Skin Integrity:  Goal: Will show no infection signs and symptoms  Description: Will show no infection signs and symptoms  Outcome: Met This Shift  Goal: Absence of new skin breakdown  Description: Absence of new skin breakdown  Outcome: Met This Shift     Problem: Falls - Risk of:  Goal: Will remain free from falls  Description: Will remain free from falls  Outcome: Met This Shift  Goal: Absence of physical injury  Description: Absence of physical injury  Outcome: Met This Shift     Problem: Pain:  Goal: Pain level will decrease  Description: Pain level will decrease  Outcome: Met This Shift  Goal: Control of acute pain  Description: Control of acute pain  Outcome: Met This Shift  Goal: Control of chronic pain  Description: Control of chronic pain  Outcome: Met This Shift     Problem: OXYGENATION/RESPIRATORY FUNCTION  Goal: Patient will maintain patent airway  Outcome: Met This Shift     Problem: HEMODYNAMIC STATUS  Goal: Patient has stable vital signs and fluid balance  Outcome: Met This Shift

## 2021-10-22 NOTE — PROGRESS NOTES
Department of Internal Medicine  Infectious Diseases  Progress Note      C/C : TMA wound infection, cellulitis       Pt is awake and alert  Denies fever   Reports pain in the foot   Afebrile       Current Facility-Administered Medications   Medication Dose Route Frequency Provider Last Rate Last Admin    meropenem (MERREM) 1,000 mg in sodium chloride 0.9 % 100 mL IVPB (mini-bag)  1,000 mg IntraVENous Q24H Janay Donovan MD   Stopped at 10/21/21 2038    white petrolatum ointment   Topical BID Alka Duncan MD   Given at 10/22/21 1110    And    white petrolatum ointment   Topical 4x Daily PRN Alka Duncan MD        0.9 % sodium chloride infusion   IntraVENous PRN Alka Duncan MD        sodium bicarbonate tablet 1,300 mg  1,300 mg Oral BID MADDI Eisenberg - CNP   1,300 mg at 10/22/21 1108    0.9 % sodium chloride infusion   IntraVENous PRN Alka Duncan MD        0.9 % sodium chloride infusion   IntraVENous PRN Alka Duncan MD        lidocaine PF 1 % injection 5 mL  5 mL IntraDERmal Once Janay Donovan MD        sodium chloride flush 0.9 % injection 5-40 mL  5-40 mL IntraVENous 2 times per day Janay Donovan MD   10 mL at 10/22/21 1104    sodium chloride flush 0.9 % injection 5-40 mL  5-40 mL IntraVENous PRN Kendrick Medina MD   20 mL at 10/21/21 0617    0.9 % sodium chloride infusion  25 mL IntraVENous PRN Kendrick Medina MD        heparin flush 100 UNIT/ML injection 300 Units  3 mL IntraVENous 2 times per day Janay Donovan MD   300 Units at 10/22/21 1103    heparin flush 100 UNIT/ML injection 300 Units  3 mL IntraCATHeter PRN Janay Donovan MD        [Held by provider] HYDROmorphone (DILAUDID) injection 0.5 mg  0.5 mg IntraVENous Q4H PRN Alka Duncan MD        morphine injection 2 mg  2 mg IntraMUSCular Q4H PRN Ramakrishna Lewis DPM   2 mg at 10/21/21 1148    albuterol (PROVENTIL) nebulizer solution 2.5 mg  2.5 mg Nebulization Q6H PRN Nicole Valles DPM        allopurinol (ZYLOPRIM) tablet 300 mg  300 mg Oral Daily Mildred Phenes, DPM   300 mg at 10/22/21 1107    [Held by provider] bumetanide (BUMEX) tablet 1 mg  1 mg Oral Daily Mildred Phenes, DPM   1 mg at 10/18/21 0825    diphenhydrAMINE (BENADRYL) tablet 25 mg  25 mg Oral Daily PRN Mildred Phenes, DPM   25 mg at 10/21/21 2027    DULoxetine (CYMBALTA) extended release capsule 60 mg  60 mg Oral Daily Mildred Phenes, DPM   60 mg at 10/22/21 1108    ferrous sulfate (IRON 325) tablet 325 mg  325 mg Oral BID WC Mildred Phenes, DPM   325 mg at 10/22/21 1104    gabapentin (NEURONTIN) capsule 400 mg  400 mg Oral 4x Daily Mildred Phenes, DPM   400 mg at 10/22/21 1105    hydrALAZINE (APRESOLINE) tablet 50 mg  50 mg Oral 3 times per day Mildred Phenes, DPM   50 mg at 10/21/21 1343    hydrOXYzine (VISTARIL) capsule 25 mg  25 mg Oral 4x Daily Mildred Phenes, DPM   25 mg at 10/22/21 1106    isosorbide mononitrate (IMDUR) extended release tablet 120 mg  120 mg Oral Daily Mildred Phenes, DPM   120 mg at 10/22/21 1109    lactulose (CHRONULAC) 10 GM/15ML solution 20 g  20 g Oral Once per day on Mon Wed Fri Lawanda Jaime, DPM   20 g at 10/20/21 0821    levothyroxine (SYNTHROID) tablet 25 mcg  25 mcg Oral Daily Mildred Phenes, DPM   25 mcg at 10/22/21 1109    atorvastatin (LIPITOR) tablet 10 mg  10 mg Oral Daily Mildred Phenes, DPM   10 mg at 10/22/21 1107    magnesium oxide (MAG-OX) tablet 200 mg  200 mg Oral Daily Mildred Phenes, DPM   200 mg at 10/22/21 1108    [Held by provider] metOLazone (ZAROXOLYN) tablet 2.5 mg  2.5 mg Oral Once per day on Mon Wed Fri Danielle Oden MD   2.5 mg at 10/18/21 7303    metoprolol succinate (TOPROL XL) extended release tablet 50 mg  50 mg Oral Daily Mildred Phenes, DPM   50 mg at 10/22/21 1104    mirtazapine (REMERON) tablet 7.5 mg  7.5 mg Oral Nightly Mildred Phenes, DPM   7.5 mg at 10/21/21 2027    morphine (THIERRY) extended release capsule 10 mg  10 mg Oral BID Mildred Phenes, DPM        0.9 % sodium chloride infusion   IntraVENous Continuous Mildred Phenes, DPM 75 mL/hr at 10/21/21 2029 New Bag at 10/21/21 2029    magnesium hydroxide (MILK OF MAGNESIA) 400 MG/5ML suspension 30 mL  30 mL Oral Daily PRN Flor Copeland DPM        acetaminophen (TYLENOL) tablet 650 mg  650 mg Oral Q6H PRN Flor Copeland, DPM   650 mg at 10/21/21 1721    Or    acetaminophen (TYLENOL) suppository 650 mg  650 mg Rectal Q6H PRN Flor Copeland, DPM        HYDROcodone-acetaminophen (NORCO) 7.5-325 MG per tablet 1 tablet  1 tablet Oral Q6H PRN Flor Copeland DPM   1 tablet at 10/21/21 2027    insulin lispro (HUMALOG) injection vial 0-12 Units  0-12 Units SubCUTAneous TID WC Flor Copeland DPM   4 Units at 10/21/21 1722    insulin lispro (HUMALOG) injection vial 0-6 Units  0-6 Units SubCUTAneous Nightly Flor Copeland DPM   1 Units at 10/18/21 2335    glucose (GLUTOSE) 40 % oral gel 15 g  15 g Oral PRN Flor Copeland, DPM        dextrose 50 % IV solution  12.5 g IntraVENous PRN Flor Copeland DPM   12.5 g at 10/21/21 2033    glucagon (rDNA) injection 1 mg  1 mg IntraMUSCular PRN Flor Copeland, NEOM        dextrose 5 % solution  100 mL/hr IntraVENous PRN Flor Copeladn, DPM        budesonide (PULMICORT) nebulizer suspension 250 mcg  0.25 mg Nebulization BID Flor Copeland DPM   250 mcg at 10/22/21 4694    And    ipratropium (ATROVENT) 0.02 % nebulizer solution 0.5 mg  0.5 mg Nebulization 4x daily Flor Copeland DPM   0.5 mg at 10/22/21 4304    And    Arformoterol Tartrate (BROVANA) nebulizer solution 15 mcg  15 mcg Nebulization BID Flor Copeland, DPM   15 mcg at 10/22/21 0801         REVIEW OF SYSTEMS:    CONSTITUTIONAL:  Denies fever, chill or rigors. HEENT: denies blurring of vision or double vision, denies hearing problem  RESPIRATORY: denies cough, shortness of breath, sputum expectoration  CARDIOVASCULAR:  Denies palpitation or chest pain   GASTROINTESTINAL:  Denies abdomen pain, diarrhea or constipation.   GENITOURINARY:  Denies burning urination or frequency of urination  INTEGUMENT: wounds HEMATOLOGIC/LYMPHATIC:  Denies lymph node swelling, gum bleeding or easy bruising. MUSCULOSKELETAL: pain in the foot    NEUROLOGICAL:  Weakness     PHYSICAL EXAM:      Vitals:     Vitals:    10/22/21 0745   BP: 120/79   Pulse: 71   Resp: 16   Temp: 96.6 °F (35.9 °C)   SpO2: 97%       General Appearance:    Awake, alert , no acute distress. Head:    Normocephalic, atraumatic   Eyes:    No pallor, no icterus,   Ears:    No obvious deformity or drainage.    Nose:   No nasal drainage   Throat:   Mucosa dry, no thrush    Neck:   Supple, no lymphadenopathy   Lungs:     Clear to auscultation bilaterally, no wheeze    Heart:    Regular rate and rhythm, systolic murmur    Abdomen:     Soft, non-tender, bowel sounds present    Extremities:    + edema    Pulses:   Dorsalis pedis diminished    Skin:  foot wound dressed    Right arm PICC Line       CBC with Differential:      Lab Results   Component Value Date    WBC 23.4 10/22/2021    RBC 2.56 10/22/2021    HGB 7.8 10/22/2021    HCT 23.8 10/22/2021     10/22/2021    MCV 93.0 10/22/2021    MCH 30.5 10/22/2021    MCHC 32.8 10/22/2021    RDW 17.2 10/22/2021    NRBC 0.9 10/01/2021    SEGSPCT 76 03/16/2014    BANDSPCT 1 09/02/2016    METASPCT 0.9 10/01/2021    LYMPHOPCT 10.5 10/17/2021    MONOPCT 5.6 10/17/2021    BASOPCT 0.3 10/17/2021    MONOSABS 0.61 10/17/2021    LYMPHSABS 1.14 10/17/2021    EOSABS 0.17 10/17/2021    BASOSABS 0.03 10/17/2021       CMP     Lab Results   Component Value Date     10/22/2021    K 4.8 10/22/2021    K 4.3 10/16/2021     10/22/2021    CO2 21 10/22/2021    BUN 81 10/22/2021    CREATININE 3.1 10/22/2021    GFRAA 24 10/22/2021    LABGLOM 19 10/22/2021    GLUCOSE 116 10/22/2021    GLUCOSE 111 07/16/2011    PROT 8.1 10/15/2021    LABALBU 2.5 10/15/2021    CALCIUM 9.0 10/22/2021    BILITOT 0.3 10/15/2021    ALKPHOS 217 10/15/2021    AST 41 10/15/2021    ALT 30 10/15/2021         Hepatic Function Panel:    Lab Results   Component Value Date    ALKPHOS 217 10/15/2021    ALT 30 10/15/2021    AST 41 10/15/2021    PROT 8.1 10/15/2021    BILITOT 0.3 10/15/2021    BILIDIR <0.2 01/25/2021    IBILI see below 01/25/2021    LABALBU 2.5 10/15/2021       PT/INR:    Lab Results   Component Value Date    PROTIME 11.7 01/05/2021    PROTIME 11.3 06/21/2011    INR 1.0 01/05/2021       TSH:    Lab Results   Component Value Date    TSH 4.270 08/19/2021       U/A:    Lab Results   Component Value Date    COLORU Yellow 09/24/2021    PHUR 6.0 09/24/2021    LABCAST RARE 10/11/2017    WBCUA NONE 09/24/2021    WBCUA NONE 07/12/2011    RBCUA 1-3 09/24/2021    RBCUA 1-3 03/17/2014    BACTERIA NONE SEEN 09/24/2021    CLARITYU Clear 09/24/2021    SPECGRAV 1.025 09/24/2021    LEUKOCYTESUR Negative 09/24/2021    UROBILINOGEN 0.2 09/24/2021    BILIRUBINUR Negative 09/24/2021    BILIRUBINUR NEGATIVE 07/12/2011    BLOODU TRACE-INTACT 09/24/2021    GLUCOSEU Negative 09/24/2021    GLUCOSEU NEGATIVE 07/12/2011       ABG:    Lab Results   Component Value Date    TYX5POU 21.8 09/30/2021    G5CVGQTL 96.7 09/06/2012    GSZ9BPF 42.9 09/30/2021    PO2ART 157.4 09/30/2021       MICROBIOLOGY:    Blood culture -     Staphylococcus coagulase-negativeAbnormal     Wound cx -Susceptibility    Citrobacter freundii (1)    Antibiotic Interpretation WINNIE Status    ceFAZolin Resistant >=^64 mcg/mL     cefepime Sensitive <=^0.12 mcg/mL     cefTRIAXone Sensitive <=^0.25 mcg/mL     ertapenem Sensitive <=^0.12 mcg/mL     gentamicin Sensitive <=^1 mcg/mL     levofloxacin Sensitive <=^0.12 mcg/mL     piperacillin-tazobactam Sensitive <=^4 mcg/mL     trimethoprim-sulfamethoxazole Sensitive <=^20 mcg/mL     Staphylococcus aureus (2)    Antibiotic Interpretation WINNIE Status    clindamycin Resistant >=^4 mcg/mL     DAPTOmycin Sensitive ^0. 25 mcg/mL     doxycycline Sensitive <=^0.5 mcg/mL     erythromycin Resistant >=^8 mcg/mL     gentamicin Sensitive <=^0.5 mcg/mL     oxacillin Resistant >=^4 mcg/mL

## 2021-10-22 NOTE — CONSULTS
GENERAL SURGERY  CONSULT NOTE  10/22/2021    Physician Consulted: Dr. Sven Cardoza  Reason for Consult: Anemia  Referring Physician: Dr. Mikaela Rodriguez    HPI  Mio Newman is a [de-identified] y.o. male with PMHx CHF, CAD, PVD, CKD, COPD, DM, and chronic anemia who was admitted one week ago for right foot infection. He was seen by podiatry and taken for multiple debridements of hir right and left foot. He was found to have anemia with a Hgb of 6.8 (baseline Hgb around 8). He denies any abdominal pain, nausea, vomiting, GERD, melena, hematochezia, fevers, or chills. He denies any NSAID or steroid use. He is a former smoker of 2 PPD. He is on ASA daily and has no prior abdominal surgical history. He has no family or personal hx of UC, Crohn's, or colon cancer. He has never had an EGD/colonoscopy.        Past Medical History:   Diagnosis Date    (HFpEF) heart failure with preserved ejection fraction ([address] 07/2020    Diagnosed by cardiology    Arthritis     Bilateral carotid artery stenosis 12/31/2020    Bilateral carotid bruits 12/31/2020    Blood circulation, collateral     CAD (coronary artery disease)     Carotid bruit 03/27/2013    CH[facility]congestive [facility] failure) [facility]     Chronic kidney disease     CKD (chronic kidney disease) stage 3, GFR 30-59 ml/min ([facility]) 03/20/2016    COPD (chronic obstructive pulmonary disease) ([address]     Follows with pulmonary    Diabetes mellitus ([address]     Diabetic neuropathy associated with type 2 diabetes mellitus (HCC)     Dyspnea on exertion     History of blood transfusion     Hyperlipidemia     Hypertension     Mild mitral regurgitation     Moderate tricuspid regurgitation by prior echocardiography 2018    Movement disorder     NSTEMI, initial episode of care ([address] 01/2018    Obesity     Pulmonary hypertension ([address] 2018    PVD (peripheral vascular disease) with claudication ([address] 03/27/2014    S/P carotid endarterectomy 03/27/2013    Sleep apnea     SOB (shortness of breath)     Thyroid disease     Unspecified cerebral artery occlusion with cerebral infarction     Vision decreased 12/31/2020       Past Surgical History:   Procedure Laterality Date    BIOPSY / LIGATION TEMPORAL ARTERY Bilateral 01/05/2021    BILATERAL TEMPORAL ARTERY BIOPSY performed by Ayla Simpson MD at [address]      5 years ago   [address] GRAFT  2008    1 vessel and aortic valve repair    DIAGNOSTIC CARDIAC CATH LAB PROCEDURE  10/14/2008    DIAGNOSTIC CARDIAC CATH LAB PROCEDURE  10/21/2010    FEMORAL ENDARTERECTOMY Right 9/30/2021    RIGHT FEMORAL ENDARTERECTOMY performed by William Mcfadden MD at [facility] Right 10/2/2021    RIGHT FOOT TRANSMITTAL AMPUTATION performed by Jese Duncan DPM at [facility] Bilateral 10/16/2021    RIGHT FOOT WOUND DEBRIDEMENT WITH APPLICATION WOUND VAC; LEFT FOOT AMPUTATION 2ND TOE performed by Jese Duncan DPM at [address] [place] [address]      R knee replacement    KNEE SURGERY      OTHER SURGICAL HISTORY Right 02/14/2017    debridement,bone bx foot    PACEMAKER INSERTION  11/12/2014    D-PPM   ()    Dr. Eduardo johnson       Medications Prior to Admission:    Prior to Admission medications    Medication Sig Start Date End Date Taking? Authorizing Provider   mirtazapine (REMERON) 7.5 MG tablet Take 7.5 mg by mouth nightly   Yes Historical Provider, MD   HYDROcodone-acetaminophen (NORCO)  MG per tablet Take 1 tablet by mouth every 6 hours as needed for Pain.    Yes Historical Provider, MD   hydrALAZINE (APRESOLINE) 50 MG tablet Take 1 tablet by mouth every 8 hours 10/3/21  Yes Pieter Mckeon MD   magnesium 200 MG TABS tablet Take 200 mg by mouth daily   Yes Historical Provider, MD   diphenhydrAMINE (BENADRYL) 25 MG capsule Take 25 mg by mouth daily as needed for Itching   Yes Historical Provider, MD   morphine (THIERRY) 10 MG extended release capsule Take 10 mg by mouth 2 times daily. Yes Historical Provider, MD   hydrOXYzine (ATARAX) 25 MG tablet Take 25 mg by mouth 4 times daily    Yes Historical Provider, MD   lactulose 20 GM/30ML SOLN Take 20 g by mouth three times a week *MON-WED-FRI*   Yes Historical Provider, MD   metOLazone (ZAROXOLYN) 2.5 MG tablet Take 2.5 mg by mouth three times a week *TUE-THUR-SAT*   Yes Historical Provider, MD   bumetanide (BUMEX) 1 MG tablet Take 1 mg by mouth daily    Yes Historical Provider, MD   potassium chloride (KLOR-CON M) 20 MEQ extended release tablet Take 40 mEq by mouth daily    Yes Historical Provider, MD   ferrous sulfate (IRON 325) 325 (65 Fe) MG tablet Take 1 tablet by mouth 2 times daily (with meals) 1/25/21  Yes Will Martino DO   DULoxetine (CYMBALTA) 60 MG extended release capsule Take 1 capsule by mouth daily 1/26/21  Yes Kashmir Martino DO   fluticasone-umeclidin-vilant (TRELEGY ELLIPTA) 100-62.5-25 MCG/INH AEPB Inhale 1 puff into the lungs Daily    Yes Historical Provider, MD   aspirin EC 81 MG EC tablet Take 1 tablet by mouth daily 8/7/20  Yes Jenni Mcneill MD   albuterol (PROVENTIL) (2.5 MG/3ML) 0.083% nebulizer solution Take 3 mLs by nebulization every 6 hours as needed for Wheezing or Shortness of Breath DX: COPD J44.9 Please bill Medicare Part B 1/6/20  Yes Alissa Tillman MD   levothyroxine (SYNTHROID) 25 MCG tablet Take 25 mcg by mouth Daily  7/29/19  Yes Historical Provider, MD   gabapentin (NEURONTIN) 400 MG capsule Take 400 mg by mouth 4 times daily.   5/19/19  Yes Historical Provider, MD   metoprolol succinate (TOPROL XL) 50 MG extended release tablet Take 50 mg by mouth daily   Yes Historical Provider, MD   isosorbide mononitrate (IMDUR) 120 MG extended release tablet Take 1 tablet by mouth daily 1/23/18  Yes Jen Mcclure MD   allopurinol (ZYLOPRIM) 300 MG tablet Take 300 mg by mouth daily  2/27/15  Yes Historical Provider, MD   lovastatin (MEVACOR) 20 MG tablet HGB 7.8*   < > 6.8*   HCT 23.8*   < > 20.9*     --   --     < > = values in this interval not displayed. BMP  Recent Labs     10/22/21  0515      K 4.8      CO2 21*   BUN 81*   CREATININE 3.1*   CALCIUM 9.0     Liver Function  No results for input(s): AMYLASE, LIPASE, BILITOT, BILIDIR, AST, ALT, ALKPHOS, PROT, LABALBU in the last 72 hours. No results for input(s): LACTATE in the last 72 hours. No results for input(s): INR, PTT in the last 72 hours. Invalid input(s): PT    RADIOLOGY    XR FOOT LEFT (MIN 3 VIEWS)    Result Date: 10/15/2021  EXAMINATION: THREE XRAY VIEWS OF THE LEFT FOOT 10/15/2021 8:27 pm COMPARISON: 02/04/2017 HISTORY: ORDERING SYSTEM PROVIDED HISTORY: cellulitis of digits 2 and 3 TECHNOLOGIST PROVIDED HISTORY: Look for signs of osteomyelitis of left digits 2 and 3 Reason for exam:->cellulitis of digits 2 and 3 What reading provider will be dictating this exam?->CRC FINDINGS: Mild diffuse soft tissue edema. Scattered vascular calcifications. No radiopaque foreign body. Small accessory navicular. Mild diffuse osteopenia. Scattered degenerative changes. No bony destruction, dislocation or acute fracture identified. Mild soft tissue edema which could be reactive or due to cellulitis. Chronic osseous changes. MRI would be useful if symptoms persist.     XR FOOT RIGHT (MIN 3 VIEWS)    Result Date: 10/15/2021  EXAMINATION: THREE XRAY VIEWS OF THE RIGHT FOOT 10/15/2021 7:27 pm COMPARISON: October 2 HISTORY: ORDERING SYSTEM PROVIDED HISTORY: Recent surgery, ray amputation, infection. eval for subcutaneous air/overt osteomyelitis TECHNOLOGIST PROVIDED HISTORY: Reason for exam:->Recent surgery, ray amputation, infection. eval for subcutaneous air/overt osteomyelitis What reading provider will be dictating this exam?->CRC FINDINGS: Status post transmetatarsal amputation.  Irregular appearance of the overlying soft tissues with multiple lucencies which may represent soft tissue gas. Large soft tissue defect superiorly at the amputation site Surgical clips laterally. Atherosclerotic calcifications. No gross lytic or erosive changes to suggest advanced osteomyelitis. Status post transmetatarsal amputation. Irregular appearance of the overlying soft tissues with multiple lucencies which may represent soft tissue gas. Large soft tissue defect superiorly at the amputation site No gross lytic or erosive changes to suggest advanced osteomyelitis. XR CHEST PORTABLE    Result Date: 10/16/2021  EXAMINATION: ONE XRAY VIEW OF THE CHEST 10/16/2021 8:39 am COMPARISON: July 30, 2020-August 19, 2021 HISTORY: ORDERING SYSTEM PROVIDED HISTORY: preop TECHNOLOGIST PROVIDED HISTORY: Reason for exam:->preop What reading provider will be dictating this exam?->CRC FINDINGS: Patient had previous mid sternotomy. There is a permanent pacemaker with 2 wires placed left subclavian vein. The heart is enlarged at least in a moderate degree. There is some areas of atelectasis in both bases. 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 There is no right-sided pleural effusion. There is no perihilar vascular congestion. 1.  After mid sternotomy. Moderate cardiomegaly. Presence of left-sided pacemaker. 2.  Chronic findings in the left base as seen on previous examination which can represent atelectasis or a mild left-sided pleural effusion. 3.  No superimposed acute cardiopulmonary process.        ASSESSMENT:  [de-identified] y.o. male with multifactorial chronic anemia and FOBT negative, unlikely gastrointestinal hemorrhage    PLAN:  - Okay for diet from surgical POV  - Anemia is multifactorial secondary to his recent surgical intervention, infectious process, CKD, hemodilution, and multiple blood draws  - Will empirically start PPI/Carafate  - Transfuse blood products PRN, will defer to primary  - Recommend monitoring H/H Q12 or daily  - No plans for EGD/colonoscopy at this time as patient has high oxygen requirements and is not having any source of GI bleeding  - Discussed with Dr. Rock Mott    Electronically signed by Arcelia Lyon MD on 10/22/21 at 6:58 PM EDT

## 2021-10-22 NOTE — PROGRESS NOTES
Subjective:    He is sonorous  Resting comfortably   hgb improved   Denies any acute complaints  Indicates he feels better      Objective:    /79   Pulse 71   Temp 96.6 °F (35.9 °C) (Temporal)   Resp 16   Ht 5' 8\" (1.727 m)   Wt 235 lb (106.6 kg)   SpO2 97%   BMI 35.73 kg/m²     In: 536 [P.O.:240; I.V.:196]  Out: 1000   In: 536   Out: 1000 [Urine:1000]    General appearance: NAD, conversant, chronically ill appearing  HEENT: AT/NC, MMM  Neck: FROM, supple  Lungs: Clear to auscultation  CV: RRR, no MRGs  Vasc: Radial pulses 2+  Abdomen: Soft, non-tender; no masses or HSM  Extremities: TMA right foot  Skin: no rash, lesions or ulcers  Psych: Alert and oriented to person, place and time  Neuro: Alert and interactive     Recent Labs     10/20/21  0533 10/20/21  1202 10/21/21  0610 10/21/21  1800 10/22/21  0515   WBC 15.0*  --  11.9*  --  23.4*   HGB 7.8*   < > 7.1* 7.5* 7.8*   HCT 23.0*   < > 21.8* 22.4* 23.8*     --  259  --  241    < > = values in this interval not displayed. Recent Labs     10/20/21  1740 10/21/21  0610 10/22/21  0515    138 144   K 5.1* 5.2* 4.8    102 105   CO2 25 25 21*   BUN 71* 82* 81*   CREATININE 3.0* 3.2* 3.1*   CALCIUM 8.3* 9.3 9.0       Assessment:    Principal Problem:    Wound infection  Active Problems:    CKD (chronic kidney disease) stage 3, GFR 30-59 ml/min  Resolved Problems:    * No resolved hospital problems.  *      Plan:  32-year-old male with a significant past medical history recently discharged 1 week ago with amputation is admitted to [place] [address] unit with     Wound infection  -IV antibiotics in the form of Zosyn/Vanco pharmacy to dose Vanco-staph species in blood cultures  -IV hydration -   -Podiatry following -10/16/2021 left toe amputation , I&D right foot multiple compartment, debridement of right foot with wound vac application  continue zosyn and vanc await cultures-ID following  -Hgb down to 5.2 today-transfuse 2 units PRBCs

## 2021-10-22 NOTE — PROGRESS NOTES
Progress Note  10/22/2021 12:17 PM  Subjective:   Admit Date: 10/15/2021  PCP: Graham Elliott MD  Interval History: Patient examined . Lethargic feels weak , wound vac rt foot , po intake marginal     Diet: ADULT DIET; Regular  ADULT ORAL NUTRITION SUPPLEMENT; Lunch, Dinner; Low Calorie/High Protein Oral Supplement  ADULT ORAL NUTRITION SUPPLEMENT; Breakfast, Lunch;  Wound Healing Oral Supplement    Data:   Scheduled Meds:   daptomycin (CUBICIN) IVPB  8 mg/kg (Ideal) IntraVENous Q48H    meropenem  1,000 mg IntraVENous Q24H    white petrolatum   Topical BID    sodium bicarbonate  1,300 mg Oral BID    lidocaine PF  5 mL IntraDERmal Once    sodium chloride flush  5-40 mL IntraVENous 2 times per day    heparin flush  3 mL IntraVENous 2 times per day    allopurinol  300 mg Oral Daily    [Held by provider] bumetanide  1 mg Oral Daily    DULoxetine  60 mg Oral Daily    ferrous sulfate  325 mg Oral BID WC    gabapentin  400 mg Oral 4x Daily    hydrALAZINE  50 mg Oral 3 times per day    hydrOXYzine  25 mg Oral 4x Daily    isosorbide mononitrate  120 mg Oral Daily    lactulose  20 g Oral Once per day on Mon Wed Fri    levothyroxine  25 mcg Oral Daily    atorvastatin  10 mg Oral Daily    magnesium oxide  200 mg Oral Daily    [Held by provider] metOLazone  2.5 mg Oral Once per day on Mon Wed Fri    metoprolol succinate  50 mg Oral Daily    mirtazapine  7.5 mg Oral Nightly    morphine  10 mg Oral BID    insulin lispro  0-12 Units SubCUTAneous TID WC    insulin lispro  0-6 Units SubCUTAneous Nightly    budesonide  0.25 mg Nebulization BID    And    ipratropium  0.5 mg Nebulization 4x daily    And    Arformoterol Tartrate  15 mcg Nebulization BID     Continuous Infusions:   sodium chloride      sodium chloride      sodium chloride      sodium chloride      sodium chloride 75 mL/hr at 10/21/21 2029    dextrose       PRN Meds:white petrolatum **AND** white petrolatum, sodium chloride, sodium chloride, sodium chloride, sodium chloride flush, sodium chloride, heparin flush, [Held by provider] HYDROmorphone, morphine, albuterol, diphenhydrAMINE, magnesium hydroxide, acetaminophen **OR** acetaminophen, HYDROcodone-acetaminophen, glucose, dextrose, glucagon (rDNA), dextrose  I/O last 3 completed shifts: In: 596 [P.O.:300; I.V.:196; IV Piggyback:100]  Out: 1000 [Urine:1000]  No intake/output data recorded. Intake/Output Summary (Last 24 hours) at 10/22/2021 1217  Last data filed at 10/22/2021 0533  Gross per 24 hour   Intake 566 ml   Output 1000 ml   Net -434 ml     CBC:   Recent Labs     10/20/21  0533 10/20/21  1202 10/21/21  0610 10/21/21  1800 10/22/21  0515   WBC 15.0*  --  11.9*  --  23.4*   HGB 7.8*   < > 7.1* 7.5* 7.8*     --  259  --  241    < > = values in this interval not displayed. BMP:    Recent Labs     10/20/21  1740 10/21/21  0610 10/22/21  0515    138 144   K 5.1* 5.2* 4.8    102 105   CO2 25 25 21*   BUN 71* 82* 81*   CREATININE 3.0* 3.2* 3.1*   GLUCOSE 134* 141* 116*     Hepatic: No results for input(s): AST, ALT, ALB, BILITOT, ALKPHOS in the last 72 hours. Troponin: No results for input(s): TROPONINI in the last 72 hours. BNP: No results for input(s): BNP in the last 72 hours. Lipids: No results for input(s): CHOL, HDL in the last 72 hours. Invalid input(s): LDLCALCU  ABGs:   Lab Results   Component Value Date    PO2ART 157.4 09/30/2021    JUN5SPG 42.9 09/30/2021     INR: No results for input(s): INR in the last 72 hours.     -----------------------------------------------------------------  RAD: XR FOOT LEFT (MIN 3 VIEWS)    Result Date: 10/15/2021  EXAMINATION: THREE XRAY VIEWS OF THE LEFT FOOT 10/15/2021 8:27 pm COMPARISON: 02/04/2017 HISTORY: ORDERING SYSTEM PROVIDED HISTORY: cellulitis of digits 2 and 3 TECHNOLOGIST PROVIDED HISTORY: Look for signs of osteomyelitis of left digits 2 and 3 Reason for exam:->cellulitis of digits 2 and 3 What reading provider will be dictating this exam?->CRC FINDINGS: Mild diffuse soft tissue edema. Scattered vascular calcifications. No radiopaque foreign body. Small accessory navicular. Mild diffuse osteopenia. Scattered degenerative changes. No bony destruction, dislocation or acute fracture identified. Mild soft tissue edema which could be reactive or due to cellulitis. Chronic osseous changes. MRI would be useful if symptoms persist.     XR FOOT RIGHT (MIN 3 VIEWS)    Result Date: 10/15/2021  EXAMINATION: THREE XRAY VIEWS OF THE RIGHT FOOT 10/15/2021 7:27 pm COMPARISON: October 2 HISTORY: ORDERING SYSTEM PROVIDED HISTORY: Recent surgery, ray amputation, infection. eval for subcutaneous air/overt osteomyelitis TECHNOLOGIST PROVIDED HISTORY: Reason for exam:->Recent surgery, ray amputation, infection. eval for subcutaneous air/overt osteomyelitis What reading provider will be dictating this exam?->CRC FINDINGS: Status post transmetatarsal amputation. Irregular appearance of the overlying soft tissues with multiple lucencies which may represent soft tissue gas. Large soft tissue defect superiorly at the amputation site Surgical clips laterally. Atherosclerotic calcifications. No gross lytic or erosive changes to suggest advanced osteomyelitis. Status post transmetatarsal amputation. Irregular appearance of the overlying soft tissues with multiple lucencies which may represent soft tissue gas. Large soft tissue defect superiorly at the amputation site No gross lytic or erosive changes to suggest advanced osteomyelitis. XR CHEST PORTABLE    Result Date: 10/16/2021  EXAMINATION: ONE XRAY VIEW OF THE CHEST 10/16/2021 8:39 am COMPARISON: July 30, 2020-August 19, 2021 HISTORY: ORDERING SYSTEM PROVIDED HISTORY: preop TECHNOLOGIST PROVIDED HISTORY: Reason for exam:->preop What reading provider will be dictating this exam?->CRC FINDINGS: Patient had previous mid sternotomy.   There is a permanent pacemaker with 2 wires placed CVA (cerebrovascular accident)     Obesity (BMI 30-39. 9)     Anemia     Elevated sed rate     Vision decreased     Bilateral carotid bruits     Bilateral carotid artery stenosis     Nonrheumatic mitral valve regurgitation     Pulmonary hypertension (HCC)     Moderate tricuspid regurgitation by prior echocardiography     (HFpEF) heart failure with preserved ejection fraction (HCC)     COPD (chronic obstructive pulmonary disease) (HCC)     Diabetic neuropathy associated with type 2 diabetes mellitus (HCC)     Congestive heart failure (HCC)     Cellulitis     Peripheral vascular disease of lower extremity with ulceration (HCC)     Critical lower limb ischemia (HCC)     Nonrheumatic mitral valve stenosis     Severe left ventricular hypertrophy     Cardiomyopathy ([address]     Ischemic foot ulcer due to atherosclerosis of native artery of limb ([facility] [address]     Wound infection    Plan:     Assessment and Plan:  1. HARVEY-  In the setting of vancomycin for TMA would infection, diuretics  and severe anemia  Holding diuretics and metolazone ,      Strict I&O as no output has been recorded  Baseline Cr 1.5-2.1 mg/dl  Cr peaked at 3.2, current 3.1 ; continue IV fluids  Continue to monitor     2. CKD  Presumed microvascular in the setting of DM and HTN as well as PVD   Baseline Cr 1.5-2.1 mg/dl     3. Metabolic Acidosis  In the setting of HARVEY  CO2-17  On po HCO3     4. Hyperkalemia   Due to potassium supplements in the presence of HARVEY; and potassium source of blood transfusion  Potassium 4.8   Treated medically with good response  Continue to monitor      5. Anemia HB better at 7.8   10/19-Hgb -6.1--> For additional 1 unit blood   10/18-S/p 2 units blood for Hgb 5.2  Per primary      6.  TMA Wound Infection right foot  Infectious Disease following    Vijaya Bryant MD

## 2021-10-22 NOTE — CARE COORDINATION
Discharge plan is to return to [facility] when medically stable.  Fredy Meza -[phone]

## 2021-10-23 NOTE — PROGRESS NOTES
Subjective:    He is sonorous  Resting comfortably   hgb improved   Denies any acute complaints  Indicates he feels better      Objective:    BP (!) 166/63   Pulse 69   Temp 97.8 °F (36.6 °C) (Temporal)   Resp 17   Ht 5' 8\" (1.727 m)   Wt 237 lb (107.5 kg)   SpO2 92%   BMI 36.04 kg/m²     In: 1782.2 [P.O.:200; I.V.:1482.2]  Out: 950   In: 1782.2   Out: 950 [Urine:950]    General appearance: NAD, conversant, chronically ill appearing  HEENT: AT/NC, MMM  Neck: FROM, supple  Lungs: Clear to auscultation  CV: RRR, no MRGs  Vasc: Radial pulses 2+  Abdomen: Soft, non-tender; no masses or HSM  Extremities: TMA right foot  Skin: no rash, lesions or ulcers  Psych: Alert and oriented to person, place and time  Neuro: Alert and interactive     Recent Labs     10/21/21  0610 10/21/21  1800 10/22/21  0515 10/22/21  1450 10/23/21  0641   WBC 11.9*  --  23.4*  --  11.2   HGB 7.1*   < > 7.8* 6.8* 6.8*   HCT 21.8*   < > 23.8* 20.9* 21.0*     --  241  --  181    < > = values in this interval not displayed. Recent Labs     10/21/21  0610 10/22/21  0515 10/23/21  0641    144 140   K 5.2* 4.8 3.9    105 106   CO2 25 21* 23   BUN 82* 81* 77*   CREATININE 3.2* 3.1* 2.5*   CALCIUM 9.3 9.0 8.3*       Assessment:    Principal Problem:    Wound infection  Active Problems:    CKD (chronic kidney disease) stage 3, GFR 30-59 ml/min  Resolved Problems:    * No resolved hospital problems.  *      Plan:  43-year-old male with a significant past medical history recently discharged 1 week ago with amputation is admitted to [place] [id number] unit with     Wound infection  -IV antibiotics in the form of Zosyn/Vanco pharmacy to dose Vanco-staph species in blood cultures  -IV hydration -   -Podiatry following -10/16/2021 left toe amputation , I&D right foot multiple compartment, debridement of right foot with wound vac application  continue zosyn and vanc await cultures-ID following  -Hgb down to 5.2 today-transfuse 2 units PRBCs

## 2021-10-23 NOTE — PROGRESS NOTES
Department of Podiatry  Progress Note    SUBJECTIVE:  Patient seen bedside S/P left 2nd toe amputation to level of MPJ, right foot incision and drainage, excisional debridement of right foot wound with application of wound vac (DOS: 10/16/21). Patient alert and awake at time of visit with wound vac intact to RLE and running at 125 mmHG low continuous. Leukocytosis appears to be improving; WBC 11.2 this morning. Will continue to monitor. No acute events overnight.      OBJECTIVE:    Scheduled Meds:   daptomycin (CUBICIN) IVPB  8 mg/kg (Ideal) IntraVENous Q48H    pantoprazole  40 mg Oral BID AC    sucralfate  1 g Oral 4 times per day    meropenem  1,000 mg IntraVENous Q24H    white petrolatum   Topical BID    sodium bicarbonate  1,300 mg Oral BID    lidocaine PF  5 mL IntraDERmal Once    sodium chloride flush  5-40 mL IntraVENous 2 times per day    heparin flush  3 mL IntraVENous 2 times per day    allopurinol  300 mg Oral Daily    [Held by provider] bumetanide  1 mg Oral Daily    DULoxetine  60 mg Oral Daily    ferrous sulfate  325 mg Oral BID     gabapentin  400 mg Oral 4x Daily    hydrALAZINE  50 mg Oral 3 times per day    hydrOXYzine  25 mg Oral 4x Daily    isosorbide mononitrate  120 mg Oral Daily    lactulose  20 g Oral Once per day on Mon Wed Fri    levothyroxine  25 mcg Oral Daily    atorvastatin  10 mg Oral Daily    magnesium oxide  200 mg Oral Daily    [Held by provider] metOLazone  2.5 mg Oral Once per day on Mon Wed Fri    metoprolol succinate  50 mg Oral Daily    mirtazapine  7.5 mg Oral Nightly    morphine  10 mg Oral BID    insulin lispro  0-12 Units SubCUTAneous TID     insulin lispro  0-6 Units SubCUTAneous Nightly    budesonide  0.25 mg Nebulization BID    And    ipratropium  0.5 mg Nebulization 4x daily    And    Arformoterol Tartrate  15 mcg Nebulization BID     Continuous Infusions:   sodium chloride      sodium chloride      sodium chloride      sodium chloride  sodium chloride      sodium chloride 125 mL/hr at 10/23/21 0952    dextrose       PRN Meds:.sodium chloride, white petrolatum **AND** white petrolatum, sodium chloride, sodium chloride, sodium chloride, sodium chloride flush, sodium chloride, heparin flush, [Held by provider] HYDROmorphone, morphine, albuterol, diphenhydrAMINE, magnesium hydroxide, acetaminophen **OR** acetaminophen, HYDROcodone-acetaminophen, glucose, dextrose, glucagon (rDNA), dextrose    No Known Allergies    BP (!) 166/63   Pulse 69   Temp 97.8 °F (36.6 °C) (Temporal)   Resp 19   Ht 5' 8\" (1.727 m)   Wt 237 lb (107.5 kg)   SpO2 93%   BMI 36.04 kg/m²       EXAM:  Dressing and wound vac left intact    Prior physical exam findings:  VASCULAR:  DP and PT pulses nonpalpable. Skin temperature warm to warm from proximal to distal B/L. No edema noted. Absent hair growth. CFT delayed to LLE, unobtainable to RLE    NEUROLOGIC:  Epicritic sensation diminished B/L.      DERM:  Open surgical TMA wound with plantar fasciotomy to RLE as pictured below. Gross bone exposure with granular base and extensive soft tissue exposure; minimal bleeding noted during today's dressing change. No malodor or purulence noted. Decubitus heel wound present, approx 3cm in diameter with eschar base. No openings, no drainage, no malodor. LLE 2nd digit amp site with mild serosanguinous drainage and gapping of proximal surgical site. Clinical pictures below. No drainage, no malodor, no streaking, no fluctuance noted. LLE calf with skin changes consistent with chronic venous stasis - superficial ulcerations present, L>R      MUSCULOSKELETAL: Evidence of previous R TMA, L 2nd digit amp. Mild pain with dressing change to RLE. No pain noted to LLE. Muscle strength testing deferred.                                                Scheduled Meds:   daptomycin (CUBICIN) IVPB  8 mg/kg (Ideal) IntraVENous Q48H    pantoprazole  40 mg Oral BID AC    sucralfate  1 g Oral 4 times per day    meropenem  1,000 mg IntraVENous Q24H    white petrolatum   Topical BID    sodium bicarbonate  1,300 mg Oral BID    lidocaine PF  5 mL IntraDERmal Once    sodium chloride flush  5-40 mL IntraVENous 2 times per day    heparin flush  3 mL IntraVENous 2 times per day    allopurinol  300 mg Oral Daily    [Held by provider] bumetanide  1 mg Oral Daily    DULoxetine  60 mg Oral Daily    ferrous sulfate  325 mg Oral BID WC    gabapentin  400 mg Oral 4x Daily    hydrALAZINE  50 mg Oral 3 times per day    hydrOXYzine  25 mg Oral 4x Daily    isosorbide mononitrate  120 mg Oral Daily    lactulose  20 g Oral Once per day on Mon Wed Fri    levothyroxine  25 mcg Oral Daily    atorvastatin  10 mg Oral Daily    magnesium oxide  200 mg Oral Daily    [Held by provider] metOLazone  2.5 mg Oral Once per day on Mon Wed Fri    metoprolol succinate  50 mg Oral Daily    mirtazapine  7.5 mg Oral Nightly    morphine  10 mg Oral BID    insulin lispro  0-12 Units SubCUTAneous TID     insulin lispro  0-6 Units SubCUTAneous Nightly    budesonide  0.25 mg Nebulization BID    And    ipratropium  0.5 mg Nebulization 4x daily    And    Arformoterol Tartrate  15 mcg Nebulization BID     Continuous Infusions:   sodium chloride      sodium chloride      sodium chloride      sodium chloride      sodium chloride      sodium chloride 125 mL/hr at 10/23/21 0952    dextrose       PRN Meds:.sodium chloride, white petrolatum **AND** white petrolatum, sodium chloride, sodium chloride, sodium chloride, sodium chloride flush, sodium chloride, heparin flush, [Held by provider] HYDROmorphone, morphine, albuterol, diphenhydrAMINE, magnesium hydroxide, acetaminophen **OR** acetaminophen, HYDROcodone-acetaminophen, glucose, dextrose, glucagon (rDNA), dextrose    RADIOLOGY:  CT HEAD WO CONTRAST   Final Result   No acute intracranial abnormality. MRI may be obtained if clinical suspicion   for acute stroke is high. Chronic findings as above. XR CHEST PORTABLE   Final Result   Right PICC line tip is in the superior vena cava. Stable left pleural effusion, with left lung base atelectasis or infiltrate         US RETROPERITONEAL COMPLETE   Final Result   No sonographic evidence of acute renal pathology. XR CHEST PORTABLE   Final Result   1. After mid sternotomy. Moderate cardiomegaly. Presence of left-sided   pacemaker. 2.  Chronic findings in the left base as seen on previous examination which   can represent atelectasis or a mild left-sided pleural effusion. 3.  No superimposed acute cardiopulmonary process. XR FOOT LEFT (MIN 3 VIEWS)   Final Result   Mild soft tissue edema which could be reactive or due to cellulitis. Chronic osseous changes. MRI would be useful if symptoms persist.         XR FOOT RIGHT (MIN 3 VIEWS)   Final Result   Status post transmetatarsal amputation. Irregular appearance of the overlying soft tissues with multiple lucencies   which may represent soft tissue gas. Large soft tissue defect superiorly at   the amputation site      No gross lytic or erosive changes to suggest advanced osteomyelitis. XR CHEST PORTABLE    (Results Pending)     BP (!) 166/63   Pulse 69   Temp 97.8 °F (36.6 °C) (Temporal)   Resp 19   Ht 5' 8\" (1.727 m)   Wt 237 lb (107.5 kg)   SpO2 93%   BMI 36.04 kg/m²     LABS:    Recent Labs     10/22/21  0515 10/22/21  0515 10/22/21  1450 10/23/21  0641   WBC 23.4*  --   --  11.2   HGB 7.8*   < > 6.8* 6.8*   HCT 23.8*   < > 20.9* 21.0*     --   --  181    < > = values in this interval not displayed. Recent Labs     10/23/21  0641      K 3.9      CO2 23   BUN 77*   CREATININE 2.5*        No results for input(s): PROT, INR, APTT in the last 72 hours.       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 Chart and labs were reviewed. - Pain Control: IV and PO  - OR cultures: soft tissue with Citrobacter, Staph aureus growth. Bone culture with pseudomonas, MRSA growth. -Surgical pathology revealing subcutaneous fat necrosis and intravascular fibrin thrombus of 2nd toe on L foot. Negative for OM. First metatarsal bone on R foot is positive for acute OM  - Abx per ID. Recommend Dapto and Merrem. - Wound vac intact to RLE. Running at 125mmHg low continuous pressure with no leaks detected. Wound vac to remain intact with next change on 10/25.   - Dressing on LLE left intact today. Will be changed on 10/25 along with wound vac. - Maintain heel offloading  - Discussed possible repeat debridement and grafting of RLE when medically stable  -Monitor Hgb and Hct  -Will continue to follow patient while they are in-house  -Discussed patient with:    LINDA Paz [id number]  Fellowship-Trained Foot and Ankle Surgeon  Diplomate, [employer] Foot [employer]  [phone]       Thank you for involving podiatry in this patients care. Please do not hesitate to call with any questions or concerns.

## 2021-10-23 NOTE — PROGRESS NOTES
Department of Internal Medicine  Infectious Diseases  Progress Note      C/C : TMA wound infection, cellulitis       Pt is hard to awaken from sleep  Afebrile       Current Facility-Administered Medications   Medication Dose Route Frequency Provider Last Rate Last Admin    0.9 % sodium chloride infusion   IntraVENous PRN Karlos Skinner MD        DAPTOmycin (CUBICIN) 550 mg in sodium chloride 0.9 % 50 mL IVPB  8 mg/kg (Ideal) IntraVENous Q48H Kendrick Medina MD   Stopped at 10/22/21 1500    pantoprazole (PROTONIX) tablet 40 mg  40 mg Oral BID AC Josefa Downs MD   40 mg at 10/23/21 0640    sucralfate (CARAFATE) tablet 1 g  1 g Oral 4 times per day Josefa Downs MD   1 g at 10/23/21 1248    meropenem (MERREM) 1,000 mg in sodium chloride 0.9 % 100 mL IVPB (mini-bag)  1,000 mg IntraVENous Q24H Denys Norwood MD   Stopped at 10/22/21 2014    white petrolatum ointment   Topical BID Karlos Skinner MD   Given at 10/23/21 1014    And    white petrolatum ointment   Topical 4x Daily PRN Karlos Skinner MD        0.9 % sodium chloride infusion   IntraVENous PRN Karlos Skinner MD        sodium bicarbonate tablet 1,300 mg  1,300 mg Oral BID MADDI Pearce - CNP   1,300 mg at 10/23/21 0953    0.9 % sodium chloride infusion   IntraVENous PRN Karlos Skinner MD        0.9 % sodium chloride infusion   IntraVENous PRN Karlos Skinner MD        lidocaine PF 1 % injection 5 mL  5 mL IntraDERmal Once Kendrick Medina MD        sodium chloride flush 0.9 % injection 5-40 mL  5-40 mL IntraVENous 2 times per day Denys Norwood MD   10 mL at 10/22/21 1104    sodium chloride flush 0.9 % injection 5-40 mL  5-40 mL IntraVENous PRN Kendrick Medina MD   20 mL at 10/21/21 0617    0.9 % sodium chloride infusion  25 mL IntraVENous PRN Kendrick P MD Carol        heparin flush 100 UNIT/ML injection 300 Units  3 mL IntraVENous 2 times per day Denys Norwood MD   300 Units at 10/23/21 0957    heparin flush 100 UNIT/ML injection 300 Units  3 mL IntraCATHeter PRN Janay Donovan MD        [Held by provider] HYDROmorphone (DILAUDID) injection 0.5 mg  0.5 mg IntraVENous Q4H PRN Alka Duncan MD        morphine injection 2 mg  2 mg IntraMUSCular Q4H PRN aRmakrishna Lewis, DPM   2 mg at 10/21/21 1148    albuterol (PROVENTIL) nebulizer solution 2.5 mg  2.5 mg Nebulization Q6H PRN Nicole Acres, DPM        allopurinol (ZYLOPRIM) tablet 300 mg  300 mg Oral Daily Nicole Acres, DPM   300 mg at 10/23/21 0954    [Held by provider] bumetanide (BUMEX) tablet 1 mg  1 mg Oral Daily Nicole Acres, DPM   1 mg at 10/18/21 0825    diphenhydrAMINE (BENADRYL) tablet 25 mg  25 mg Oral Daily PRN Nicole Acres, DPM   25 mg at 10/22/21 2035    DULoxetine (CYMBALTA) extended release capsule 60 mg  60 mg Oral Daily Nicole Acres, DPM   60 mg at 10/23/21 0954    ferrous sulfate (IRON 325) tablet 325 mg  325 mg Oral BID WC Nicole Acres, DPM   325 mg at 10/23/21 0954    gabapentin (NEURONTIN) capsule 400 mg  400 mg Oral 4x Daily Nicole Acres, DPM   400 mg at 10/23/21 1247    hydrALAZINE (APRESOLINE) tablet 50 mg  50 mg Oral 3 times per day Nicole Acres, DPM   50 mg at 10/23/21 0954    hydrOXYzine (VISTARIL) capsule 25 mg  25 mg Oral 4x Daily Nicole Acres, DPM   25 mg at 10/23/21 1247    isosorbide mononitrate (IMDUR) extended release tablet 120 mg  120 mg Oral Daily Nicole Acres, DPM   120 mg at 10/23/21 0954    lactulose (CHRONULAC) 10 GM/15ML solution 20 g  20 g Oral Once per day on Mon Wed Fri Lawanda Sandoval, DPM   20 g at 10/20/21 7843    levothyroxine (SYNTHROID) tablet 25 mcg  25 mcg Oral Daily Nicole Acres, DPM   25 mcg at 10/23/21 0640    atorvastatin (LIPITOR) tablet 10 mg  10 mg Oral Daily Nicole Acres, DPM   10 mg at 10/23/21 0954    magnesium oxide (MAG-OX) tablet 200 mg  200 mg Oral Daily Nicole Valles, DPM   200 mg at 10/23/21 1473    [Held by provider] metOLazone (ZAROXOLYN) tablet 2.5 mg  2.5 mg Oral Once per day on Mon Wed Fri Alka Duncan MD   2.5 mg at 10/18/21 7290  metoprolol succinate (TOPROL XL) extended release tablet 50 mg  50 mg Oral Daily Charyl File, DPM   50 mg at 10/23/21 0954    mirtazapine (REMERON) tablet 7.5 mg  7.5 mg Oral Nightly Charyl File, DPM   7.5 mg at 10/22/21 2035    morphine (THIERRY) extended release capsule 10 mg  10 mg Oral BID Charyl File, DPM        0.9 % sodium chloride infusion   IntraVENous Continuous Hasit Darius Back  mL/hr at 10/23/21 0952 New Bag at 10/23/21 0952    magnesium hydroxide (MILK OF MAGNESIA) 400 MG/5ML suspension 30 mL  30 mL Oral Daily PRN Charyl File, DPM        acetaminophen (TYLENOL) tablet 650 mg  650 mg Oral Q6H PRN Charyl File, DPM   650 mg at 10/21/21 1721    Or    acetaminophen (TYLENOL) suppository 650 mg  650 mg Rectal Q6H PRN Charyl File, DPM        HYDROcodone-acetaminophen (NORCO) 7.5-325 MG per tablet 1 tablet  1 tablet Oral Q6H PRN Charyl File, DPM   1 tablet at 10/23/21 0954    insulin lispro (HUMALOG) injection vial 0-12 Units  0-12 Units SubCUTAneous TID WC Charyl File, DPM   2 Units at 10/23/21 1250    insulin lispro (HUMALOG) injection vial 0-6 Units  0-6 Units SubCUTAneous Nightly Charyl File, DPM   1 Units at 10/22/21 2037    glucose (GLUTOSE) 40 % oral gel 15 g  15 g Oral PRN Charyl File, DPM        dextrose 50 % IV solution  12.5 g IntraVENous PRN Charyl File, DPM   12.5 g at 10/21/21 2033    glucagon (rDNA) injection 1 mg  1 mg IntraMUSCular PRN Charyl File, DPM        dextrose 5 % solution  100 mL/hr IntraVENous PRN Charyl File, DPM        budesonide (PULMICORT) nebulizer suspension 250 mcg  0.25 mg Nebulization BID Charyl File, DPM   250 mcg at 10/23/21 0483    And    ipratropium (ATROVENT) 0.02 % nebulizer solution 0.5 mg  0.5 mg Nebulization 4x daily Charyl File, DPM   0.5 mg at 10/23/21 1318    And    Arformoterol Tartrate (BROVANA) nebulizer solution 15 mcg  15 mcg Nebulization BID Sadieyl File, DPM   15 mcg at 10/23/21 5525         PHYSICAL EXAM:      Vitals: Vitals:    10/23/21 0903   BP:    Pulse:    Resp: 19   Temp:    SpO2: 93%       General Appearance:    Asleep, no acute distress. Head:    Normocephalic, atraumatic   Eyes:    No pallor, no icterus,   Ears:    No obvious deformity or drainage.    Nose:   No nasal drainage   Throat:   Mucosa dry, no thrush    Neck:   Supple, no lymphadenopathy   Lungs:     Clear to auscultation bilaterally, no wheeze    Heart:    Regular rate and rhythm, systolic murmur    Abdomen:     Soft, non-tender, bowel sounds present    Extremities:    + edema    Pulses:   Dorsalis pedis diminished    Skin:  foot wound dressed    Right arm PICC Line       CBC with Differential:      Lab Results   Component Value Date    WBC 11.2 10/23/2021    RBC 2.19 10/23/2021    HGB 6.8 10/23/2021    HCT 21.0 10/23/2021     10/23/2021    MCV 95.9 10/23/2021    MCH 31.1 10/23/2021    MCHC 32.4 10/23/2021    RDW 17.2 10/23/2021    NRBC 0.9 10/01/2021    SEGSPCT 76 03/16/2014    BANDSPCT 1 09/02/2016    METASPCT 0.9 10/01/2021    LYMPHOPCT 10.5 10/17/2021    MONOPCT 5.6 10/17/2021    BASOPCT 0.3 10/17/2021    MONOSABS 0.61 10/17/2021    LYMPHSABS 1.14 10/17/2021    EOSABS 0.17 10/17/2021    BASOSABS 0.03 10/17/2021       CMP     Lab Results   Component Value Date     10/23/2021    K 3.9 10/23/2021    K 4.3 10/16/2021     10/23/2021    CO2 23 10/23/2021    BUN 77 10/23/2021    CREATININE 2.5 10/23/2021    GFRAA 30 10/23/2021    LABGLOM 25 10/23/2021    GLUCOSE 143 10/23/2021    GLUCOSE 111 07/16/2011    PROT 8.1 10/15/2021    LABALBU 2.5 10/15/2021    CALCIUM 8.3 10/23/2021    BILITOT 0.3 10/15/2021    ALKPHOS 217 10/15/2021    AST 41 10/15/2021    ALT 30 10/15/2021         Hepatic Function Panel:    Lab Results   Component Value Date    ALKPHOS 217 10/15/2021    ALT 30 10/15/2021    AST 41 10/15/2021    PROT 8.1 10/15/2021    BILITOT 0.3 10/15/2021    BILIDIR <0.2 01/25/2021    IBILI see below 01/25/2021    LABALBU 2.5 10/15/2021 PT/INR:    Lab Results   Component Value Date    PROTIME 11.7 01/05/2021    PROTIME 11.3 06/21/2011    INR 1.0 01/05/2021       TSH:    Lab Results   Component Value Date    TSH 4.270 08/19/2021       U/A:    Lab Results   Component Value Date    COLORU Yellow 09/24/2021    PHUR 6.0 09/24/2021    LABCAST RARE 10/11/2017    WBCUA NONE 09/24/2021    WBCUA NONE 07/12/2011    RBCUA 1-3 09/24/2021    RBCUA 1-3 03/17/2014    BACTERIA NONE SEEN 09/24/2021    CLARITYU Clear 09/24/2021    SPECGRAV 1.025 09/24/2021    LEUKOCYTESUR Negative 09/24/2021    UROBILINOGEN 0.2 09/24/2021    BILIRUBINUR Negative 09/24/2021    BILIRUBINUR NEGATIVE 07/12/2011    BLOODU TRACE-INTACT 09/24/2021    GLUCOSEU Negative 09/24/2021    GLUCOSEU NEGATIVE 07/12/2011       ABG:    Lab Results   Component Value Date    XZV7GOU 21.8 09/30/2021    F3VKNMBC 96.7 09/06/2012    LAM4SQM 42.9 09/30/2021    PO2ART 157.4 09/30/2021       MICROBIOLOGY:    Blood culture -     Staphylococcus coagulase-negativeAbnormal     Wound cx -Susceptibility    Citrobacter freundii (1)    Antibiotic Interpretation WINNIE Status    ceFAZolin Resistant >=^64 mcg/mL     cefepime Sensitive <=^0.12 mcg/mL     cefTRIAXone Sensitive <=^0.25 mcg/mL     ertapenem Sensitive <=^0.12 mcg/mL     gentamicin Sensitive <=^1 mcg/mL     levofloxacin Sensitive <=^0.12 mcg/mL     piperacillin-tazobactam Sensitive <=^4 mcg/mL     trimethoprim-sulfamethoxazole Sensitive <=^20 mcg/mL     Staphylococcus aureus (2)    Antibiotic Interpretation WINNIE Status    clindamycin Resistant >=^4 mcg/mL     DAPTOmycin Sensitive ^0. 25 mcg/mL     doxycycline Sensitive <=^0.5 mcg/mL     erythromycin Resistant >=^8 mcg/mL     gentamicin Sensitive <=^0.5 mcg/mL     oxacillin Resistant >=^4 mcg/mL     trimethoprim-sulfamethoxazole Sensitive <=^10 mcg/mL     vancomycin Sensitive ^1 mcg/mL     Pseudomonas aeruginosa (4)    Antibiotic Interpretation WINNIE Status    cefepime Sensitive ^2 mcg/mL     gentamicin

## 2021-10-24 NOTE — PLAN OF CARE
Problem: Skin Integrity:  Goal: Will show no infection signs and symptoms  Description: Will show no infection signs and symptoms  10/24/2021 1821 by Joelle Alvarez RN  Outcome: Met This Shift  10/24/2021 1044 by Joelle Alvarez RN  Outcome: Met This Shift  Goal: Absence of new skin breakdown  Description: Absence of new skin breakdown  10/24/2021 1821 by Joelle Alvarez RN  Outcome: Met This Shift  10/24/2021 1044 by Joelle Alvarez RN  Outcome: Met This Shift     Problem: Falls - Risk of:  Goal: Will remain free from falls  Description: Will remain free from falls  10/24/2021 1821 by Joelle Alvarez RN  Outcome: Met This Shift  10/24/2021 1044 by Joelle Alvarez RN  Outcome: Met This Shift  Goal: Absence of physical injury  Description: Absence of physical injury  10/24/2021 1821 by Joelle Alvarez RN  Outcome: Met This Shift  10/24/2021 1044 by Joelle Alvarez RN  Outcome: Met This Shift     Problem: Pain:  Goal: Pain level will decrease  Description: Pain level will decrease  10/24/2021 1821 by Joelle Alvarez RN  Outcome: Met This Shift  10/24/2021 1044 by Joelle Alvarez RN  Outcome: Met This Shift  Goal: Control of acute pain  Description: Control of acute pain  10/24/2021 1821 by Joelle Alvarez RN  Outcome: Met This Shift  10/24/2021 1044 by Joelle Alvarez RN  Outcome: Met This Shift  Goal: Control of chronic pain  Description: Control of chronic pain  10/24/2021 1821 by Joelle Alvarez RN  Outcome: Met This Shift  10/24/2021 1044 by Joelle Alvarez RN  Outcome: Met This Shift     Problem: OXYGENATION/RESPIRATORY FUNCTION  Goal: Patient will maintain patent airway  10/24/2021 1821 by Joelle Alvarez RN  Outcome: Met This Shift  10/24/2021 1044 by Joelle Alvarez RN  Outcome: Met This Shift     Problem: HEMODYNAMIC STATUS  Goal: Patient has stable vital signs and fluid balance  10/24/2021 1821 by Joelle Alvarez RN  Outcome: Met This Shift  10/24/2021 1044 by Joelle Saunas, RN  Outcome: Met This Shift

## 2021-10-24 NOTE — PROGRESS NOTES
Department of Internal Medicine  Infectious Diseases  Progress Note      C/C : TMA wound infection, cellulitis       Reports feeling well  Afebrile       Current Facility-Administered Medications   Medication Dose Route Frequency Provider Last Rate Last Admin    0.9 % sodium chloride infusion   IntraVENous PRN Ame Burger MD        DAPTOmycin (CUBICIN) 550 mg in sodium chloride 0.9 % 50 mL IVPB  8 mg/kg (Ideal) IntraVENous Q48H Kendrick Medina MD   Stopped at 10/22/21 1500    pantoprazole (PROTONIX) tablet 40 mg  40 mg Oral BID AC Cynthia De Santiago MD   40 mg at 10/24/21 7752    sucralfate (CARAFATE) tablet 1 g  1 g Oral 4 times per day Cynthia De Santiago MD   1 g at 10/24/21 0638    meropenem (MERREM) 1,000 mg in sodium chloride 0.9 % 100 mL IVPB (mini-bag)  1,000 mg IntraVENous Q24H Mike Silva MD   Stopped at 10/23/21 2331    white petrolatum ointment   Topical BID Ame Burger MD   Given at 10/24/21 0915    And    white petrolatum ointment   Topical 4x Daily PRN Ame Burger MD        0.9 % sodium chloride infusion   IntraVENous PRN Ame Burger MD        sodium bicarbonate tablet 1,300 mg  1,300 mg Oral BID JongMADDI Cervantes - CNP   1,300 mg at 10/24/21 0916    0.9 % sodium chloride infusion   IntraVENous PRN Ame Burger MD        0.9 % sodium chloride infusion   IntraVENous PRN Ame Burger MD        lidocaine PF 1 % injection 5 mL  5 mL IntraDERmal Once Kendrick Medina MD        sodium chloride flush 0.9 % injection 5-40 mL  5-40 mL IntraVENous 2 times per day Mike Silva MD   10 mL at 10/22/21 1104    sodium chloride flush 0.9 % injection 5-40 mL  5-40 mL IntraVENous PRN Kendrick Medina MD   20 mL at 10/21/21 0617    0.9 % sodium chloride infusion  25 mL IntraVENous PRN Kendrick Medina MD        heparin flush 100 UNIT/ML injection 300 Units  3 mL IntraVENous 2 times per day Mike Silva MD   300 Units at 10/23/21 0957    heparin flush 100 UNIT/ML injection 300 Units  3 mL IntraCATHeter PRN Wilma Sosa MD        [Held by provider] HYDROmorphone (DILAUDID) injection 0.5 mg  0.5 mg IntraVENous Q4H PRN Tish Flowers MD        morphine injection 2 mg  2 mg IntraMUSCular Q4H PRN NEO GayM   2 mg at 10/21/21 1148    albuterol (PROVENTIL) nebulizer solution 2.5 mg  2.5 mg Nebulization Q6H PRN Walt Goring, DPM        allopurinol (ZYLOPRIM) tablet 300 mg  300 mg Oral Daily Walt Goring, DPM   300 mg at 10/24/21 0916    [Held by provider] bumetanide (BUMEX) tablet 1 mg  1 mg Oral Daily Walt Goring, DPM   1 mg at 10/18/21 0825    diphenhydrAMINE (BENADRYL) tablet 25 mg  25 mg Oral Daily PRN Walt Goring, DPM   25 mg at 10/22/21 2035    DULoxetine (CYMBALTA) extended release capsule 60 mg  60 mg Oral Daily Walt Goring, DPM   60 mg at 10/24/21 0915    ferrous sulfate (IRON 325) tablet 325 mg  325 mg Oral BID WC Walt Goring, DPM   325 mg at 10/24/21 3079    gabapentin (NEURONTIN) capsule 400 mg  400 mg Oral 4x Daily Walt Goring, DPM   400 mg at 10/24/21 0915    hydrALAZINE (APRESOLINE) tablet 50 mg  50 mg Oral 3 times per day Walt Goring, DPM   50 mg at 10/24/21 4341    hydrOXYzine (VISTARIL) capsule 25 mg  25 mg Oral 4x Daily Walt Goring, DPM   25 mg at 10/24/21 0915    isosorbide mononitrate (IMDUR) extended release tablet 120 mg  120 mg Oral Daily Walt Goring, DPM   120 mg at 10/24/21 0916    lactulose (CHRONULAC) 10 GM/15ML solution 20 g  20 g Oral Once per day on Mon Wed Fri Lawanda Sandoval, DPM   20 g at 10/20/21 0046    levothyroxine (SYNTHROID) tablet 25 mcg  25 mcg Oral Daily Walt Goring, DPM   25 mcg at 10/24/21 5171    atorvastatin (LIPITOR) tablet 10 mg  10 mg Oral Daily Walt Goring, DPM   10 mg at 10/24/21 0915    magnesium oxide (MAG-OX) tablet 200 mg  200 mg Oral Daily NEO DanielsM   200 mg at 10/24/21 0915    [Held by provider] metOLazone (ZAROXOLYN) tablet 2.5 mg  2.5 mg Oral Once per day on Mon Wed Fri Tish Flowers MD   2.5 mg at 10/18/21 6777    metoprolol 10/24/21 0915   BP:    Pulse:    Resp:    Temp:    SpO2: 95%       General Appearance:    Alert, no acute distress. Head:    Normocephalic, atraumatic   Eyes:    No pallor, no icterus,   Ears:    No obvious deformity or drainage.    Nose:   No nasal drainage   Throat:   Mucosa dry, no thrush    Neck:   Supple, no lymphadenopathy   Lungs:     Clear to auscultation bilaterally, no wheeze    Heart:    Regular rate and rhythm, systolic murmur    Abdomen:     Soft, non-tender, bowel sounds present    Extremities:    + edema    Pulses:   Dorsalis pedis diminished    Skin:  foot wound dressed    Right arm PICC Line       CBC with Differential:      Lab Results   Component Value Date    WBC 10.0 10/24/2021    RBC 2.39 10/24/2021    HGB 7.3 10/24/2021    HCT 22.6 10/24/2021     10/24/2021    MCV 94.6 10/24/2021    MCH 30.5 10/24/2021    MCHC 32.3 10/24/2021    RDW 16.6 10/24/2021    NRBC 0.9 10/01/2021    SEGSPCT 76 03/16/2014    BANDSPCT 1 09/02/2016    METASPCT 0.9 10/01/2021    LYMPHOPCT 10.5 10/17/2021    MONOPCT 5.6 10/17/2021    BASOPCT 0.3 10/17/2021    MONOSABS 0.61 10/17/2021    LYMPHSABS 1.14 10/17/2021    EOSABS 0.17 10/17/2021    BASOSABS 0.03 10/17/2021       CMP     Lab Results   Component Value Date     10/24/2021    K 3.4 10/24/2021    K 4.3 10/16/2021     10/24/2021    CO2 27 10/24/2021    BUN 71 10/24/2021    CREATININE 2.1 10/24/2021    GFRAA 37 10/24/2021    LABGLOM 31 10/24/2021    GLUCOSE 135 10/24/2021    GLUCOSE 111 07/16/2011    PROT 8.1 10/15/2021    LABALBU 2.5 10/15/2021    CALCIUM 8.6 10/24/2021    BILITOT 0.3 10/15/2021    ALKPHOS 217 10/15/2021    AST 41 10/15/2021    ALT 30 10/15/2021         Hepatic Function Panel:    Lab Results   Component Value Date    ALKPHOS 217 10/15/2021    ALT 30 10/15/2021    AST 41 10/15/2021    PROT 8.1 10/15/2021    BILITOT 0.3 10/15/2021    BILIDIR <0.2 01/25/2021    IBILI see below 01/25/2021    LABALBU 2.5 10/15/2021       PT/INR:    Lab Results   Component Value Date    PROTIME 11.7 01/05/2021    PROTIME 11.3 06/21/2011    INR 1.0 01/05/2021       TSH:    Lab Results   Component Value Date    TSH 4.270 08/19/2021       U/A:    Lab Results   Component Value Date    COLORU Yellow 09/24/2021    PHUR 6.0 09/24/2021    LABCAST RARE 10/11/2017    WBCUA NONE 09/24/2021    WBCUA NONE 07/12/2011    RBCUA 1-3 09/24/2021    RBCUA 1-3 03/17/2014    BACTERIA NONE SEEN 09/24/2021    CLARITYU Clear 09/24/2021    SPECGRAV 1.025 09/24/2021    LEUKOCYTESUR Negative 09/24/2021    UROBILINOGEN 0.2 09/24/2021    BILIRUBINUR Negative 09/24/2021    BILIRUBINUR NEGATIVE 07/12/2011    BLOODU TRACE-INTACT 09/24/2021    GLUCOSEU Negative 09/24/2021    GLUCOSEU NEGATIVE 07/12/2011       ABG:    Lab Results   Component Value Date    CEJ5RTB 21.8 09/30/2021    B5ZMAKXA 96.7 09/06/2012    UHF9NFW 42.9 09/30/2021    PO2ART 157.4 09/30/2021       MICROBIOLOGY:    Blood culture -     Staphylococcus coagulase-negativeAbnormal     Wound cx -Susceptibility    Citrobacter freundii (1)    Antibiotic Interpretation WINNIE Status    ceFAZolin Resistant >=^64 mcg/mL     cefepime Sensitive <=^0.12 mcg/mL     cefTRIAXone Sensitive <=^0.25 mcg/mL     ertapenem Sensitive <=^0.12 mcg/mL     gentamicin Sensitive <=^1 mcg/mL     levofloxacin Sensitive <=^0.12 mcg/mL     piperacillin-tazobactam Sensitive <=^4 mcg/mL     trimethoprim-sulfamethoxazole Sensitive <=^20 mcg/mL     Staphylococcus aureus (2)    Antibiotic Interpretation WINNIE Status    clindamycin Resistant >=^4 mcg/mL     DAPTOmycin Sensitive ^0. 25 mcg/mL     doxycycline Sensitive <=^0.5 mcg/mL     erythromycin Resistant >=^8 mcg/mL     gentamicin Sensitive <=^0.5 mcg/mL     oxacillin Resistant >=^4 mcg/mL     trimethoprim-sulfamethoxazole Sensitive <=^10 mcg/mL     vancomycin Sensitive ^1 mcg/mL     Pseudomonas aeruginosa (4)    Antibiotic Interpretation WINNIE Status    cefepime Sensitive ^2 mcg/mL     gentamicin Sensitive <=^1 mcg/mL levofloxacin Intermediate ^4 mcg/mL     piperacillin-tazobactam Sensitive <=^4 mcg/mL     tobramycin Sensitive <=^1 mcg/mL      Condensed View   Lab and Collection    Pathology :     A.  Second toe of left foot, nontraumatic amputation: Ulcerated skin with   marked acute and chronic inflammation and reactive change. Subcutaneous fat necrosis and intravascular fibrin thrombus. Sampled underlying bone negative for osteomyelitis. B.  First metatarsal bone, right foot, excision: Acute osteomyelitis. Radiology :    Left foot x ray -  Mild soft tissue edema which could be reactive or due to cellulitis. Right foot x ray -    Irregular appearance of the overlying soft tissues with multiple lucencies   which may represent soft tissue gas.  Large soft tissue defect superiorly at   the amputation site     IMPRESSION:     1. Right TMA stump wound infection, osteomyelitis  s/p debridement   2. PAD - left 2, 3 toes infection ,cellulitis s/p 2 nd toe amputation ( 10/16)   3. CONS  bacteremia   4. HARVEY ( Vanco random level 10)   5. Leukocytosis, resolved    RECOMMENDATIONS:      1. Daptomycin 8 mg / kg IV q 48 hrs   Meropenem IV 1 gram IV q 24 hrs   2. For possible repeat debridement and grafting of RLE when medically stable per Podiatry  3.  Local wound care , CBC with diff, urine eosinophil

## 2021-10-24 NOTE — PROGRESS NOTES
Progress Note  10/24/2021 10:13 AM  Subjective:   Admit Date: 10/15/2021  PCP: No primary care provider on file. Interval History: Patient examined , doing well feels ok , more alert today     Diet: ADULT DIET; Regular  ADULT ORAL NUTRITION SUPPLEMENT; Lunch, Dinner; Low Calorie/High Protein Oral Supplement  ADULT ORAL NUTRITION SUPPLEMENT; Breakfast, Lunch;  Wound Healing Oral Supplement    Data:   Scheduled Meds:   daptomycin (CUBICIN) IVPB  8 mg/kg (Ideal) IntraVENous Q48H    pantoprazole  40 mg Oral BID AC    sucralfate  1 g Oral 4 times per day    meropenem  1,000 mg IntraVENous Q24H    white petrolatum   Topical BID    sodium bicarbonate  1,300 mg Oral BID    lidocaine PF  5 mL IntraDERmal Once    sodium chloride flush  5-40 mL IntraVENous 2 times per day    heparin flush  3 mL IntraVENous 2 times per day    allopurinol  300 mg Oral Daily    [Held by provider] bumetanide  1 mg Oral Daily    DULoxetine  60 mg Oral Daily    ferrous sulfate  325 mg Oral BID WC    gabapentin  400 mg Oral 4x Daily    hydrALAZINE  50 mg Oral 3 times per day    hydrOXYzine  25 mg Oral 4x Daily    isosorbide mononitrate  120 mg Oral Daily    lactulose  20 g Oral Once per day on Mon Wed Fri    levothyroxine  25 mcg Oral Daily    atorvastatin  10 mg Oral Daily    magnesium oxide  200 mg Oral Daily    [Held by provider] metOLazone  2.5 mg Oral Once per day on Mon Wed Fri    metoprolol succinate  50 mg Oral Daily    mirtazapine  7.5 mg Oral Nightly    morphine  10 mg Oral BID    insulin lispro  0-12 Units SubCUTAneous TID     insulin lispro  0-6 Units SubCUTAneous Nightly    budesonide  0.25 mg Nebulization BID    And    ipratropium  0.5 mg Nebulization 4x daily    And    Arformoterol Tartrate  15 mcg Nebulization BID     Continuous Infusions:   sodium chloride      sodium chloride      sodium chloride      sodium chloride      sodium chloride      sodium chloride 125 mL/hr at 10/23/21 0952    dextrose       PRN Meds:sodium chloride, white petrolatum **AND** white petrolatum, sodium chloride, sodium chloride, sodium chloride, sodium chloride flush, sodium chloride, heparin flush, [Held by provider] HYDROmorphone, morphine, albuterol, diphenhydrAMINE, magnesium hydroxide, acetaminophen **OR** acetaminophen, HYDROcodone-acetaminophen, glucose, dextrose, glucagon (rDNA), dextrose  I/O last 3 completed shifts: In: 3155.8 [P.O.:800; I.V.:1729.2; Blood:626.7]  Out: 350 [Urine:350]  No intake/output data recorded. Intake/Output Summary (Last 24 hours) at 10/24/2021 1013  Last data filed at 10/23/2021 1730  Gross per 24 hour   Intake 1616.67 ml   Output 350 ml   Net 1266.67 ml     CBC:   Recent Labs     10/22/21  0515 10/22/21  1450 10/23/21  0641 10/23/21  1825 10/24/21  0630   WBC 23.4*  --  11.2  --  10.0   HGB 7.8*   < > 6.8* 7.3* 7.3*     --  181  --  160    < > = values in this interval not displayed. BMP:    Recent Labs     10/22/21  0515 10/23/21  0641 10/24/21  0630    140 139   K 4.8 3.9 3.4*    106 105   CO2 21* 23 27   BUN 81* 77* 71*   CREATININE 3.1* 2.5* 2.1*   GLUCOSE 116* 143* 135*     Hepatic: No results for input(s): AST, ALT, ALB, BILITOT, ALKPHOS in the last 72 hours. Troponin: No results for input(s): TROPONINI in the last 72 hours. BNP: No results for input(s): BNP in the last 72 hours. Lipids: No results for input(s): CHOL, HDL in the last 72 hours. Invalid input(s): LDLCALCU  ABGs:   Lab Results   Component Value Date    PO2ART 157.4 09/30/2021    TEX4DJA 42.9 09/30/2021     INR: No results for input(s): INR in the last 72 hours.     -----------------------------------------------------------------  RAD: XR FOOT LEFT (MIN 3 VIEWS)    Result Date: 10/15/2021  EXAMINATION: THREE XRAY VIEWS OF THE LEFT FOOT 10/15/2021 8:27 pm COMPARISON: 02/04/2017 HISTORY: ORDERING SYSTEM PROVIDED HISTORY: cellulitis of digits 2 and 3 TECHNOLOGIST PROVIDED HISTORY: Look for signs of osteomyelitis of left digits 2 and 3 Reason for exam:->cellulitis of digits 2 and 3 What reading provider will be dictating this exam?->CRC FINDINGS: Mild diffuse soft tissue edema. Scattered vascular calcifications. No radiopaque foreign body. Small accessory navicular. Mild diffuse osteopenia. Scattered degenerative changes. No bony destruction, dislocation or acute fracture identified. Mild soft tissue edema which could be reactive or due to cellulitis. Chronic osseous changes. MRI would be useful if symptoms persist.     XR FOOT RIGHT (MIN 3 VIEWS)    Result Date: 10/15/2021  EXAMINATION: THREE XRAY VIEWS OF THE RIGHT FOOT 10/15/2021 7:27 pm COMPARISON: October 2 HISTORY: ORDERING SYSTEM PROVIDED HISTORY: Recent surgery, ray amputation, infection. eval for subcutaneous air/overt osteomyelitis TECHNOLOGIST PROVIDED HISTORY: Reason for exam:->Recent surgery, ray amputation, infection. eval for subcutaneous air/overt osteomyelitis What reading provider will be dictating this exam?->CRC FINDINGS: Status post transmetatarsal amputation. Irregular appearance of the overlying soft tissues with multiple lucencies which may represent soft tissue gas. Large soft tissue defect superiorly at the amputation site Surgical clips laterally. Atherosclerotic calcifications. No gross lytic or erosive changes to suggest advanced osteomyelitis. Status post transmetatarsal amputation. Irregular appearance of the overlying soft tissues with multiple lucencies which may represent soft tissue gas. Large soft tissue defect superiorly at the amputation site No gross lytic or erosive changes to suggest advanced osteomyelitis.      XR CHEST PORTABLE    Result Date: 10/16/2021  EXAMINATION: ONE XRAY VIEW OF THE CHEST 10/16/2021 8:39 am COMPARISON: July 30, 2020-August 19, 2021 HISTORY: ORDERING SYSTEM PROVIDED HISTORY: preop TECHNOLOGIST PROVIDED HISTORY: Reason for exam:->preop What reading provider will be dictating this exam?->CRC FINDINGS: Patient had previous mid sternotomy. There is a permanent pacemaker with 2 wires placed left subclavian vein. The heart is enlarged at least in a moderate degree. There is some areas of atelectasis in both bases. 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 There is no right-sided pleural effusion. There is no perihilar vascular congestion. 1.  After mid sternotomy. Moderate cardiomegaly. Presence of left-sided pacemaker. 2.  Chronic findings in the left base as seen on previous examination which can represent atelectasis or a mild left-sided pleural effusion. 3.  No superimposed acute cardiopulmonary process. Objective:   Vitals: BP (!) 152/78   Pulse 72   Temp 98.1 °F (36.7 °C) (Temporal)   Resp 17   Ht 5' 8\" (1.727 m)   Wt 237 lb (107.5 kg)   SpO2 95%   BMI 36.04 kg/m²   General appearance: appears stated age   Skin:  No rashes or lesions  HEENT: Head: Normocephalic, no lesions, without obvious abnormality.   Neck: no adenopathy, no carotid bruit, no JVD, supple, symmetrical, trachea midline and thyroid not enlarged, symmetric, no tenderness/mass/nodules  Lungs: clear to auscultation bilaterally  Heart: regular rate and rhythm, S1, S2 normal, no murmur, click, rub or gallop  Abdomen: soft, non-tender; bowel sounds normal; no masses,  no organomegaly  Extremities: dressing , wound vac   Neurologic: Mental status: Alert, oriented, thought content appropriate    Assessment:   Patient Active Problem List:     Essential hypertension     Peripheral vascular disease ([address]     Pacemaker     H/O aortic valve replacement     CKD (chronic kidney disease) stage 3, GFR 30-59 ml/min     Type 2 diabetes mellitus with stage 3 chronic kidney disease, with long-term current use of insulin (HCC)     Pulmonary nodule     Diabetes mellitus type 2, uncontrolled (HCC)     Hyperlipidemia LDL goal <100     Acquired

## 2021-10-24 NOTE — PROGRESS NOTES
Department of Podiatry  Progress Note    SUBJECTIVE:  Patient seen bedside S/P left 2nd toe amputation to level of MPJ, right foot incision and drainage, excisional debridement of right foot wound with application of wound vac (DOS: 10/16/21). Patient alert and awake at time of visit with wound vac intact to RLE and running at 125 mmHG low continuous. Patient states that his foot is feeling pretty good today. Leukocytosis appears to be improving; WBC 10 this morning. Hgb has improved, 7.3. Will continue to monitor. No acute events overnight.      OBJECTIVE:    Scheduled Meds:   daptomycin (CUBICIN) IVPB  8 mg/kg (Ideal) IntraVENous Q48H    pantoprazole  40 mg Oral BID AC    sucralfate  1 g Oral 4 times per day    meropenem  1,000 mg IntraVENous Q24H    white petrolatum   Topical BID    sodium bicarbonate  1,300 mg Oral BID    lidocaine PF  5 mL IntraDERmal Once    sodium chloride flush  5-40 mL IntraVENous 2 times per day    heparin flush  3 mL IntraVENous 2 times per day    allopurinol  300 mg Oral Daily    [Held by provider] bumetanide  1 mg Oral Daily    DULoxetine  60 mg Oral Daily    ferrous sulfate  325 mg Oral BID     gabapentin  400 mg Oral 4x Daily    hydrALAZINE  50 mg Oral 3 times per day    hydrOXYzine  25 mg Oral 4x Daily    isosorbide mononitrate  120 mg Oral Daily    lactulose  20 g Oral Once per day on Mon Wed Fri    levothyroxine  25 mcg Oral Daily    atorvastatin  10 mg Oral Daily    magnesium oxide  200 mg Oral Daily    [Held by provider] metOLazone  2.5 mg Oral Once per day on Mon Wed Fri    metoprolol succinate  50 mg Oral Daily    mirtazapine  7.5 mg Oral Nightly    morphine  10 mg Oral BID    insulin lispro  0-12 Units SubCUTAneous TID     insulin lispro  0-6 Units SubCUTAneous Nightly    budesonide  0.25 mg Nebulization BID    And    ipratropium  0.5 mg Nebulization 4x daily    And    Arformoterol Tartrate  15 mcg Nebulization BID     Continuous Infusions:   sodium chloride      sodium chloride      sodium chloride      sodium chloride      sodium chloride      sodium chloride 125 mL/hr at 10/23/21 0952    dextrose       PRN Meds:.sodium chloride, white petrolatum **AND** white petrolatum, sodium chloride, sodium chloride, sodium chloride, sodium chloride flush, sodium chloride, heparin flush, [Held by provider] HYDROmorphone, morphine, albuterol, diphenhydrAMINE, magnesium hydroxide, acetaminophen **OR** acetaminophen, HYDROcodone-acetaminophen, glucose, dextrose, glucagon (rDNA), dextrose    No Known Allergies    BP (!) 152/78   Pulse 72   Temp 98.1 °F (36.7 °C) (Temporal)   Resp 17   Ht 5' 8\" (1.727 m)   Wt 237 lb (107.5 kg)   SpO2 95%   BMI 36.04 kg/m²       EXAM:  Dressing and wound vac left intact    Prior physical exam findings:  VASCULAR:  DP and PT pulses nonpalpable. Skin temperature warm to warm from proximal to distal B/L. No edema noted. Absent hair growth. CFT delayed to LLE, unobtainable to RLE    NEUROLOGIC:  Epicritic sensation diminished B/L.      DERM:  Open surgical TMA wound with plantar fasciotomy to RLE as pictured below. Gross bone exposure with granular base and extensive soft tissue exposure; minimal bleeding noted during today's dressing change. No malodor or purulence noted. Decubitus heel wound present, approx 3cm in diameter with eschar base. No openings, no drainage, no malodor. LLE 2nd digit amp site with mild serosanguinous drainage and gapping of proximal surgical site. Clinical pictures below. No drainage, no malodor, no streaking, no fluctuance noted. LLE calf with skin changes consistent with chronic venous stasis - superficial ulcerations present, L>R      MUSCULOSKELETAL: Evidence of previous R TMA, L 2nd digit amp. Mild pain with dressing change to RLE. No pain noted to LLE. Muscle strength testing deferred.                                                Scheduled Meds:   daptomycin (CUBICIN) IVPB  8 mg/kg (Ideal) IntraVENous Q48H    pantoprazole  40 mg Oral BID AC    sucralfate  1 g Oral 4 times per day    meropenem  1,000 mg IntraVENous Q24H    white petrolatum   Topical BID    sodium bicarbonate  1,300 mg Oral BID    lidocaine PF  5 mL IntraDERmal Once    sodium chloride flush  5-40 mL IntraVENous 2 times per day    heparin flush  3 mL IntraVENous 2 times per day    allopurinol  300 mg Oral Daily    [Held by provider] bumetanide  1 mg Oral Daily    DULoxetine  60 mg Oral Daily    ferrous sulfate  325 mg Oral BID     gabapentin  400 mg Oral 4x Daily    hydrALAZINE  50 mg Oral 3 times per day    hydrOXYzine  25 mg Oral 4x Daily    isosorbide mononitrate  120 mg Oral Daily    lactulose  20 g Oral Once per day on Mon Wed Fri    levothyroxine  25 mcg Oral Daily    atorvastatin  10 mg Oral Daily    magnesium oxide  200 mg Oral Daily    [Held by provider] metOLazone  2.5 mg Oral Once per day on Mon Wed Fri    metoprolol succinate  50 mg Oral Daily    mirtazapine  7.5 mg Oral Nightly    morphine  10 mg Oral BID    insulin lispro  0-12 Units SubCUTAneous TID     insulin lispro  0-6 Units SubCUTAneous Nightly    budesonide  0.25 mg Nebulization BID    And    ipratropium  0.5 mg Nebulization 4x daily    And    Arformoterol Tartrate  15 mcg Nebulization BID     Continuous Infusions:   sodium chloride      sodium chloride      sodium chloride      sodium chloride      sodium chloride      sodium chloride 125 mL/hr at 10/23/21 0952    dextrose       PRN Meds:.sodium chloride, white petrolatum **AND** white petrolatum, sodium chloride, sodium chloride, sodium chloride, sodium chloride flush, sodium chloride, heparin flush, [Held by provider] HYDROmorphone, morphine, albuterol, diphenhydrAMINE, magnesium hydroxide, acetaminophen **OR** acetaminophen, HYDROcodone-acetaminophen, glucose, dextrose, glucagon (rDNA), dextrose    RADIOLOGY:  XR CHEST PORTABLE   Final Result   Slight increased opacities in CREATININE 2.1*        No results for input(s): PROT, INR, APTT in the last 72 hours. 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 Chart and labs were reviewed. - Pain Control: IV and PO  - OR cultures: soft tissue with Citrobacter, Staph aureus growth. Bone culture with pseudomonas, MRSA growth. -Surgical pathology revealing subcutaneous fat necrosis and intravascular fibrin thrombus of 2nd toe on L foot. Negative for OM. First metatarsal bone on R foot is positive for acute OM  - Abx per ID. Recommend Dapto and Merrem. - Wound vac intact to RLE. Running at 125mmHg low continuous pressure with no leaks detected. Wound vac to remain intact with next change tomorrow (10/25). - Dressing on LLE left intact today. Will be changed tomorrow(10/25) along with wound vac. - Maintain heel offloading  - Discussed possible repeat debridement and grafting of RLE when medically stable  -Monitor Hgb and Hct/WBC. -Will continue to follow patient while they are in-house  -Discussed patient with:    Tito Nuñez DPM FACFAS  Fellowship-Trained Foot and Ankle Surgeon  Diplomate, [employer] Foot [employer]le Sur[employer]  [phone]       Thank you for involving podiatry in this patients care. Please do not hesitate to call with any questions or concerns.

## 2021-10-24 NOTE — PROGRESS NOTES
Subjective:    He is leaking relatively alert today  Resting comfortably   hgb improved   Denies any acute complaints  Indicates he feels better  Complaining that he has not yet had a bowel movement, when asked when his last bowel movement was he indicates it was last night      Objective:    BP (!) 152/78   Pulse 72   Temp 98.1 °F (36.7 °C) (Temporal)   Resp 17   Ht 5' 8\" (1.727 m)   Wt 237 lb (107.5 kg)   SpO2 95%   BMI 36.04 kg/m²     In: 276.7 [Blood:276.7]  Out: 1350   In: 276.7   Out: 1350 [Urine:1350]    General appearance: NAD, conversant, chronically ill appearing  HEENT: AT/NC, MMM  Neck: FROM, supple  Lungs: Clear to auscultation  CV: RRR, no MRGs  Vasc: Radial pulses 2+  Abdomen: Soft, non-tender; no masses or HSM  Extremities: TMA right foot  Skin: no rash, lesions or ulcers  Psych: Alert and oriented to person, place and time  Neuro: Alert and interactive     Recent Labs     10/22/21  0515 10/22/21  1450 10/23/21  0641 10/23/21  1825 10/24/21  0630   WBC 23.4*  --  11.2  --  10.0   HGB 7.8*   < > 6.8* 7.3* 7.3*   HCT 23.8*   < > 21.0* 22.7* 22.6*     --  181  --  160    < > = values in this interval not displayed. Recent Labs     10/22/21  0515 10/23/21  0641 10/24/21  0630    140 139   K 4.8 3.9 3.4*    106 105   CO2 21* 23 27   BUN 81* 77* 71*   CREATININE 3.1* 2.5* 2.1*   CALCIUM 9.0 8.3* 8.6       Assessment:    Principal Problem:    Wound infection  Active Problems:    CKD (chronic kidney disease) stage 3, GFR 30-59 ml/min  Resolved Problems:    * No resolved hospital problems.  *      Plan:  27-year-old male with a significant past medical history recently discharged 1 week ago with amputation is admitted to [place] [address] unit with     Wound infection  -IV antibiotics in the form of Zosyn/Vanco pharmacy to dose Vanco-staph species in blood cultures  -IV hydration -   -Podiatry following -10/16/2021 left toe amputation , I&D right foot multiple compartment, debridement of right foot with wound vac application  continue  Dapto and meropenem await cultures-ID following  -Hgb had dropped down to 5.2 518 -transfuse 2 units PRBCs 10/18/2021  -Recurrent drop in hemoglobin-appreciate general surgery input-no plans for endoscopy-- Hemoccult stool was positive a few days ago  -Pain management - pain well controlled  -Elevated sed rate >150       CKD with metabolic acidosis  -Monitor labs-BUN/creatinine on admission 50/1.9, likely prerenal from acute blood loss anemia-has pretty much stabilized at this point 71/2.1  -Sodium bicarb with IV fluids  -Hold all nephrotoxins  -IV hydration -change IV fluids to saline with bicarb  -Hyperkalemia -normalized   -Resume blood pressure medications  -Retroperitoneal ultrasound is unremarkable   -Nephrology input appreciated-discussed with Dr. Bren Jonas    Patient is already on his chronic home pain medications which include Ivon and Percocet for breakthrough pain-pain appears to be adequately controlled-I do not want to increase his pain meds narcotics any further    CT head to rule out any acute event due to significant weakness of his upper extremity on the right with tremors-no acute findings    Medication for other comorbidities continue as appropriate dose adjustment as necessary.     DVT Prophylaxis   PT/OT  Discharge planning-initiate discharge planning now that hemoglobin has stabilized  Pieter Mckeon MD  11:55 AM  10/24/2021

## 2021-10-24 NOTE — PROGRESS NOTES
Subjective:    More alert and awake no acute complaint      Objective:    BP (!) 152/78   Pulse 72   Temp 98.1 °F (36.7 °C) (Temporal)   Resp 17   Ht 5' 8\" (1.727 m)   Wt 237 lb (107.5 kg)   SpO2 95%   BMI 36.04 kg/m²     In: 276.7 [Blood:276.7]  Out: 1350   In: 276.7   Out: 1350 [Urine:1350]    General appearance: NAD, conversant, chronically ill appearing  HEENT: AT/NC, MMM  Neck: FROM, supple  Lungs: Clear to auscultation  CV: RRR, no MRGs  Vasc: Radial pulses 2+  Abdomen: Soft, non-tender; no masses or HSM  Extremities: TMA right foot  Skin: no rash, lesions or ulcers  Psych: Alert and oriented to person, place and time  Neuro: Alert and interactive     Recent Labs     10/22/21  0515 10/22/21  1450 10/23/21  0641 10/23/21  1825 10/24/21  0630   WBC 23.4*  --  11.2  --  10.0   HGB 7.8*   < > 6.8* 7.3* 7.3*   HCT 23.8*   < > 21.0* 22.7* 22.6*     --  181  --  160    < > = values in this interval not displayed. Recent Labs     10/22/21  0515 10/23/21  0641 10/24/21  0630    140 139   K 4.8 3.9 3.4*    106 105   CO2 21* 23 27   BUN 81* 77* 71*   CREATININE 3.1* 2.5* 2.1*   CALCIUM 9.0 8.3* 8.6       Assessment:    Principal Problem:    Wound infection  Active Problems:    CKD (chronic kidney disease) stage 3, GFR 30-59 ml/min  Resolved Problems:    * No resolved hospital problems.  *      Plan:  76-year-old male with a significant past medical history recently discharged 1 week ago with amputation is admitted to [place] [address] unit with     Wound infection  -IV antibiotics in the form of Zosyn/Vanco pharmacy to dose Vanco-staph species in blood cultures  -IV hydration -   -Podiatry following -10/16/2021 left toe amputation , I&D right foot multiple compartment, debridement of right foot with wound vac application  continue zosyn and vanc await cultures-ID following    Acute blood loss anemia  -Recurrent drop in hemoglobin-appreciate general surgery input-no plans for endoscopy-- Hemoccult stool was positive a few days ago  -Pain management - pain well controlled  -Elevated sed rate >150   -Hold BP meds and diuretics with parameters for low BP     CKD  -Monitor labs-BUN/creatinine on admission 50/1.9,- likely prerenal from acute blood loss anemia-has pretty much stabilized at this point-improved to 71/two-point  -Hold all nephrotoxins  -IV hydration -change IV fluids to saline with bicarb  -Potassium improved to 3.4  -Retroperitoneal ultrasound is unremarkable   -Nephrology input appreciated    Patient is already on his chronic home pain medications which include Ivon and Percocet for breakthrough pain-pain appears to be adequately controlled-I do not want to increase his pain meds narcotics any further    CT head to rule out any acute event due to significant weakness of his upper extremity on the right with tremors-no acute findings     Medication for other comorbidities continue as appropriate dose adjustment as necessary.         DVT Prophylaxis   PT/OT  Discharge planning     Ame Burger MD  10:50 AM  10/24/2021

## 2021-10-25 NOTE — PROGRESS NOTES
Progress Note  10/25/2021 6:29 PM  Subjective:   Admit Date: 10/15/2021  PCP: No primary care provider on file. Interval History: No new c/o; :Palliative medicine being consulted  Diet: ADULT DIET; Regular  ADULT ORAL NUTRITION SUPPLEMENT; Lunch, Dinner; Low Calorie/High Protein Oral Supplement  ADULT ORAL NUTRITION SUPPLEMENT; Breakfast, Lunch;  Wound Healing Oral Supplement    Data:   Scheduled Meds:   daptomycin (CUBICIN) IVPB  8 mg/kg (Ideal) IntraVENous Q48H    pantoprazole  40 mg Oral BID AC    sucralfate  1 g Oral 4 times per day    meropenem  1,000 mg IntraVENous Q24H    white petrolatum   Topical BID    sodium bicarbonate  1,300 mg Oral BID    lidocaine PF  5 mL IntraDERmal Once    sodium chloride flush  5-40 mL IntraVENous 2 times per day    heparin flush  3 mL IntraVENous 2 times per day    allopurinol  300 mg Oral Daily    [Held by provider] bumetanide  1 mg Oral Daily    DULoxetine  60 mg Oral Daily    ferrous sulfate  325 mg Oral BID WC    gabapentin  400 mg Oral 4x Daily    hydrALAZINE  50 mg Oral 3 times per day    hydrOXYzine  25 mg Oral 4x Daily    isosorbide mononitrate  120 mg Oral Daily    lactulose  20 g Oral Once per day on Mon Wed Fri    levothyroxine  25 mcg Oral Daily    atorvastatin  10 mg Oral Daily    magnesium oxide  200 mg Oral Daily    [Held by provider] metOLazone  2.5 mg Oral Once per day on Mon Wed Fri    metoprolol succinate  50 mg Oral Daily    mirtazapine  7.5 mg Oral Nightly    morphine  10 mg Oral BID    insulin lispro  0-12 Units SubCUTAneous TID     insulin lispro  0-6 Units SubCUTAneous Nightly    budesonide  0.25 mg Nebulization BID    And    ipratropium  0.5 mg Nebulization 4x daily    And    Arformoterol Tartrate  15 mcg Nebulization BID     Continuous Infusions:   sodium chloride      sodium chloride      sodium chloride      sodium chloride      sodium chloride      sodium chloride 125 mL/hr at 10/23/21 0952    dextrose       PRN Meds:sodium chloride, white petrolatum **AND** white petrolatum, sodium chloride, sodium chloride, sodium chloride, sodium chloride flush, sodium chloride, heparin flush, [Held by provider] HYDROmorphone, morphine, albuterol, diphenhydrAMINE, magnesium hydroxide, acetaminophen **OR** acetaminophen, HYDROcodone-acetaminophen, glucose, dextrose, glucagon (rDNA), dextrose  I/O last 3 completed shifts: In: 2035 [P.O.:510; I.V.:1375; IV Piggyback:150]  Out: 1000 [Urine:1000]  I/O this shift:  In: 30 [P.O.:30]  Out: 400 [Urine:400]    Intake/Output Summary (Last 24 hours) at 10/25/2021 1829  Last data filed at 10/25/2021 1502  Gross per 24 hour   Intake 540 ml   Output 1400 ml   Net -860 ml     CBC:   Recent Labs     10/23/21  0641 10/23/21  1825 10/24/21  0630 10/24/21  1450 10/25/21  0540   WBC 11.2  --  10.0  --  21.5*   HGB 6.8*   < > 7.3* 7.3* 7.4*     --  160  --  149    < > = values in this interval not displayed. BMP:    Recent Labs     10/23/21  0641 10/24/21  0630 10/25/21  0540    139 140   K 3.9 3.4* 3.8    105 107   CO2 23 27 22   BUN 77* 71* 62*   CREATININE 2.5* 2.1* 1.9*   GLUCOSE 143* 135* 169*     Hepatic: No results for input(s): AST, ALT, ALB, BILITOT, ALKPHOS in the last 72 hours. Troponin: No results for input(s): TROPONINI in the last 72 hours. BNP: No results for input(s): BNP in the last 72 hours. Lipids: No results for input(s): CHOL, HDL in the last 72 hours. Invalid input(s): LDLCALCU  ABGs:   Lab Results   Component Value Date    PO2ART 157.4 09/30/2021    YYV2EQI 42.9 09/30/2021     INR: No results for input(s): INR in the last 72 hours.     -----------------------------------------------------------------  RAD: XR FOOT LEFT (MIN 3 VIEWS)    Result Date: 10/15/2021  EXAMINATION: THREE XRAY VIEWS OF THE LEFT FOOT 10/15/2021 8:27 pm COMPARISON: 02/04/2017 HISTORY: ORDERING SYSTEM PROVIDED HISTORY: cellulitis of digits 2 and 3 TECHNOLOGIST PROVIDED HISTORY: Look for signs of osteomyelitis of left digits 2 and 3 Reason for exam:->cellulitis of digits 2 and 3 What reading provider will be dictating this exam?->CRC FINDINGS: Mild diffuse soft tissue edema. Scattered vascular calcifications. No radiopaque foreign body. Small accessory navicular. Mild diffuse osteopenia. Scattered degenerative changes. No bony destruction, dislocation or acute fracture identified. Mild soft tissue edema which could be reactive or due to cellulitis. Chronic osseous changes. MRI would be useful if symptoms persist.     XR FOOT RIGHT (MIN 3 VIEWS)    Result Date: 10/15/2021  EXAMINATION: THREE XRAY VIEWS OF THE RIGHT FOOT 10/15/2021 7:27 pm COMPARISON: October 2 HISTORY: ORDERING SYSTEM PROVIDED HISTORY: Recent surgery, ray amputation, infection. eval for subcutaneous air/overt osteomyelitis TECHNOLOGIST PROVIDED HISTORY: Reason for exam:->Recent surgery, ray amputation, infection. eval for subcutaneous air/overt osteomyelitis What reading provider will be dictating this exam?->CRC FINDINGS: Status post transmetatarsal amputation. Irregular appearance of the overlying soft tissues with multiple lucencies which may represent soft tissue gas. Large soft tissue defect superiorly at the amputation site Surgical clips laterally. Atherosclerotic calcifications. No gross lytic or erosive changes to suggest advanced osteomyelitis. Status post transmetatarsal amputation. Irregular appearance of the overlying soft tissues with multiple lucencies which may represent soft tissue gas. Large soft tissue defect superiorly at the amputation site No gross lytic or erosive changes to suggest advanced osteomyelitis.      XR CHEST PORTABLE    Result Date: 10/16/2021  EXAMINATION: ONE XRAY VIEW OF THE CHEST 10/16/2021 8:39 am COMPARISON: July 30, 2020-August 19, 2021 HISTORY: ORDERING SYSTEM PROVIDED HISTORY: preop TECHNOLOGIST PROVIDED HISTORY: Reason for exam:->preop What reading provider will be dictating this exam?->CRC FINDINGS: Patient had previous mid sternotomy. There is a permanent pacemaker with 2 wires placed left subclavian vein. The heart is enlarged at least in a moderate degree. There is some areas of atelectasis in both bases. 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 There is no right-sided pleural effusion. There is no perihilar vascular congestion. 1.  After mid sternotomy. Moderate cardiomegaly. Presence of left-sided pacemaker. 2.  Chronic findings in the left base as seen on previous examination which can represent atelectasis or a mild left-sided pleural effusion. 3.  No superimposed acute cardiopulmonary process. Objective:   Vitals: BP (!) 145/60   Pulse 74   Temp 98 °F (36.7 °C) (Temporal)   Resp 18   Ht 5' 8\" (1.727 m)   Wt 239 lb (108.4 kg)   SpO2 95%   BMI 36.34 kg/m²   General appearance: appears stated age   Skin:  No rashes or lesions  HEENT: Head: Normocephalic, no lesions, without obvious abnormality.   Neck: no adenopathy, no carotid bruit, no JVD, supple, symmetrical, trachea midline and thyroid not enlarged, symmetric, no tenderness/mass/nodules  Lungs: clear to auscultation bilaterally  Heart: regular rate and rhythm, S1, S2 normal, no murmur, click, rub or gallop  Abdomen: soft, non-tender; bowel sounds normal; no masses,  no organomegaly  Extremities: dressing , wound vac   Neurologic: Mental status: Alert, oriented, thought content appropriate    Assessment:   Patient Active Problem List:     Essential hypertension     Peripheral vascular disease ([address]     Pacemaker     H/O aortic valve replacement     CKD (chronic kidney disease) stage 3, GFR 30-59 ml/min     Type 2 diabetes mellitus with stage 3 chronic kidney disease, with long-term current use of insulin (HCC)     Pulmonary nodule     Diabetes mellitus type 2, uncontrolled (HCC)     Hyperlipidemia LDL goal <100     Acquired hypothyroidism     CAD (coronary artery disease), native coronary artery     Status post coronary artery bypass graft     Chronic pain     History of CVA (cerebrovascular accident)     Obesity (BMI 30-39. 9)     Anemia     Elevated sed rate     Vision decreased     Bilateral carotid bruits     Bilateral carotid artery stenosis     Nonrheumatic mitral valve regurgitation     Pulmonary hypertension (HCC)     Moderate tricuspid regurgitation by prior echocardiography     (HFpEF) heart failure with preserved ejection fraction (HCC)     COPD (chronic obstructive pulmonary disease) (HCC)     Diabetic neuropathy associated with type 2 diabetes mellitus (HCC)     Congestive heart failure (HCC)     Cellulitis     Peripheral vascular disease of lower extremity with ulceration (HCC)     Critical lower limb ischemia (HCC)     Nonrheumatic mitral valve stenosis     Severe left ventricular hypertrophy     Cardiomyopathy ([address]     Ischemic foot ulcer due to atherosclerosis of native artery of limb ([address]     Wound infection    Plan:   Assessment and Plan:  1.  HARVEY-  In the setting of vancomycin for TMA would infection, diuretics  and severe anemia  Creatinine with improving trend  Acceptable urine output  Continue to monitor        2. CKD stage IIIa  Due to microvascular in the setting of DM and HTN as well as PVD   Baseline Cr 1.5-2.1 mg/dl     3. Metabolic Acidosis  In the setting of HARVYE  On po HCO3  Improved     4. Hyperkalemia   Due to potassium supplements in the presence of HARVEY; and potassium source of blood transfusion  Now improved  Continue to monitor      5. Anemia HB  at 7.3 , s/p transfusion           6.  TMA Wound Infection right foot with osteomyelitis s/p debridement; wound VAC applied  On daptomycin and meropenem  Infectious Disease following       Vin Hein MD

## 2021-10-25 NOTE — PROGRESS NOTES
Department of Internal Medicine  Infectious Diseases  Progress Note      C/C : TMA wound infection, cellulitis , osteomyelitis       Reports feeling well  Raquel pain   Afebrile       Current Facility-Administered Medications   Medication Dose Route Frequency Provider Last Rate Last Admin    0.9 % sodium chloride infusion   IntraVENous PRN Ame Burger MD        DAPTOmycin (CUBICIN) 550 mg in sodium chloride 0.9 % 50 mL IVPB  8 mg/kg (Ideal) IntraVENous Q48H Kendrick Medina MD   Stopped at 10/24/21 1548    pantoprazole (PROTONIX) tablet 40 mg  40 mg Oral BID AC Cynthia De Santiago MD   40 mg at 10/25/21 0546    sucralfate (CARAFATE) tablet 1 g  1 g Oral 4 times per day Cynthia De Santiago MD   1 g at 10/25/21 0546    meropenem (MERREM) 1,000 mg in sodium chloride 0.9 % 100 mL IVPB (mini-bag)  1,000 mg IntraVENous Q24H Mike Silva MD   Stopped at 10/24/21 2341    white petrolatum ointment   Topical BID Ame Burger MD   Given at 10/25/21 1031    And    white petrolatum ointment   Topical 4x Daily PRN Ame Burger MD        0.9 % sodium chloride infusion   IntraVENous PRN Ame Burger MD        sodium bicarbonate tablet 1,300 mg  1,300 mg Oral BID CusickMADDI Cervantes - CNP   1,300 mg at 10/25/21 1025    0.9 % sodium chloride infusion   IntraVENous PRN Ame Burger MD        0.9 % sodium chloride infusion   IntraVENous PRN Ame Burger MD        lidocaine PF 1 % injection 5 mL  5 mL IntraDERmal Once Kendrick Medina MD        sodium chloride flush 0.9 % injection 5-40 mL  5-40 mL IntraVENous 2 times per day Mike Sivla MD   10 mL at 10/25/21 1029    sodium chloride flush 0.9 % injection 5-40 mL  5-40 mL IntraVENous PRN Kendrick Medina MD   20 mL at 10/21/21 0617    0.9 % sodium chloride infusion  25 mL IntraVENous PRN Kendrick EMILIANO Medina MD        heparin flush 100 UNIT/ML injection 300 Units  3 mL IntraVENous 2 times per day Mike Silva MD   300 Units at 10/25/21 1030    heparin flush 100 UNIT/ML injection 300 Units  3 mL IntraCATHeter PRN Kendrick Medina MD        [Held by provider] HYDROmorphone (DILAUDID) injection 0.5 mg  0.5 mg IntraVENous Q4H PRN Sabrina Cross MD        morphine injection 2 mg  2 mg IntraMUSCular Q4H PRN Ramakrishna Lewis, DPM   2 mg at 10/21/21 1148    albuterol (PROVENTIL) nebulizer solution 2.5 mg  2.5 mg Nebulization Q6H PRN Maggi Kel, DPM        allopurinol (ZYLOPRIM) tablet 300 mg  300 mg Oral Daily Maggi Kel, DPM   300 mg at 10/25/21 1024    [Held by provider] bumetanide (BUMEX) tablet 1 mg  1 mg Oral Daily Maggi Kel, DPM   1 mg at 10/18/21 0825    diphenhydrAMINE (BENADRYL) tablet 25 mg  25 mg Oral Daily PRN Maggi Kel, DPM   25 mg at 10/24/21 2341    DULoxetine (CYMBALTA) extended release capsule 60 mg  60 mg Oral Daily Maggi Kel, DPM   60 mg at 10/25/21 1025    ferrous sulfate (IRON 325) tablet 325 mg  325 mg Oral BID WC Maggi Kel, DPM   325 mg at 10/25/21 1024    gabapentin (NEURONTIN) capsule 400 mg  400 mg Oral 4x Daily Maggi Kel, DPM   400 mg at 10/25/21 1026    hydrALAZINE (APRESOLINE) tablet 50 mg  50 mg Oral 3 times per day Maggi Kel, DPM   50 mg at 10/25/21 0546    hydrOXYzine (VISTARIL) capsule 25 mg  25 mg Oral 4x Daily Maggi Kel, DPM   25 mg at 10/25/21 1028    isosorbide mononitrate (IMDUR) extended release tablet 120 mg  120 mg Oral Daily Maggi Kel, DPM   120 mg at 10/25/21 1024    lactulose (CHRONULAC) 10 GM/15ML solution 20 g  20 g Oral Once per day on Mon Wed Fri Lawanda Jaime, DPM   20 g at 10/25/21 1026    levothyroxine (SYNTHROID) tablet 25 mcg  25 mcg Oral Daily Maggi Kel, DPM   25 mcg at 10/25/21 0546    atorvastatin (LIPITOR) tablet 10 mg  10 mg Oral Daily Maggi Kel, DPM   10 mg at 10/25/21 1025    magnesium oxide (MAG-OX) tablet 200 mg  200 mg Oral Daily Maggi Kel, DPM   200 mg at 10/25/21 1025    [Held by provider] metOLazone (ZAROXOLYN) tablet 2.5 mg  2.5 mg Oral Once per day on Mon Wed Fri Sabrina Cross MD   2.5 mg at 10/18/21 0826    metoprolol succinate (TOPROL XL) extended release tablet 50 mg  50 mg Oral Daily Zoya Daphnie, DPM   50 mg at 10/25/21 1026    mirtazapine (REMERON) tablet 7.5 mg  7.5 mg Oral Nightly Zoya Daphnie, DPM   7.5 mg at 10/24/21 2342    morphine (THIERRY) extended release capsule 10 mg  10 mg Oral BID Zoya Daphnie, DPM        0.9 % sodium chloride infusion   IntraVENous Continuous Hasit Saadia Vernon  mL/hr at 10/23/21 0952 New Bag at 10/23/21 0952    magnesium hydroxide (MILK OF MAGNESIA) 400 MG/5ML suspension 30 mL  30 mL Oral Daily PRN Zoya Daphnie, DPM        acetaminophen (TYLENOL) tablet 650 mg  650 mg Oral Q6H PRN Zoya Daphnie, DPM   650 mg at 10/25/21 1025    Or    acetaminophen (TYLENOL) suppository 650 mg  650 mg Rectal Q6H PRN Zoya Daphnie, DPM        HYDROcodone-acetaminophen (NORCO) 7.5-325 MG per tablet 1 tablet  1 tablet Oral Q6H PRN Zoya Daphnie, DPM   1 tablet at 10/23/21 0954    insulin lispro (HUMALOG) injection vial 0-12 Units  0-12 Units SubCUTAneous TID WC Zoya Daphnie, DPM   4 Units at 10/24/21 1750    insulin lispro (HUMALOG) injection vial 0-6 Units  0-6 Units SubCUTAneous Nightly Zoya Daphnie, DPM   1 Units at 10/24/21 2339    glucose (GLUTOSE) 40 % oral gel 15 g  15 g Oral PRN Zoya Daphnie, DPM        dextrose 50 % IV solution  12.5 g IntraVENous PRN Zoya Daphnie, DPM   12.5 g at 10/21/21 2033    glucagon (rDNA) injection 1 mg  1 mg IntraMUSCular PRN Zoya Daphnie, DPM        dextrose 5 % solution  100 mL/hr IntraVENous PRN Zoya Daphnie, DPM        budesonide (PULMICORT) nebulizer suspension 250 mcg  0.25 mg Nebulization BID Zoya Daphnie, DPM   250 mcg at 10/25/21 9551    And    ipratropium (ATROVENT) 0.02 % nebulizer solution 0.5 mg  0.5 mg Nebulization 4x daily Zoya Daphnie, DPM   0.5 mg at 10/25/21 1109    And    Arformoterol Tartrate (BROVANA) nebulizer solution 15 mcg  15 mcg Nebulization BID Zoya Daphnie, DPM   15 mcg at 10/25/21 8264         PHYSICAL EXAM:      Vitals:     BP (!) 172/86   Pulse 76   Temp 98.1 °F (36.7 °C) (Temporal)   Resp 16   Ht 5' 8\" (1.727 m)   Wt 239 lb (108.4 kg)   SpO2 96%   BMI 36.34 kg/m²     General Appearance:    Alert, no acute distress. Head:    Normocephalic, atraumatic   Eyes:    No pallor, no icterus,   Ears:    No obvious deformity or drainage.    Nose:   No nasal drainage   Throat:   Mucosa dry, no thrush    Neck:   Supple, no lymphadenopathy   Lungs:     Clear to auscultation bilaterally, no wheeze    Heart:    Regular rate and rhythm, systolic murmur    Abdomen:     Soft, non-tender, bowel sounds present    Extremities:    + edema    Pulses:   Dorsalis pedis diminished    Skin:  foot wound dressed    Right arm PICC Line       CBC with Differential:      Lab Results   Component Value Date    WBC 21.5 10/25/2021    RBC 2.38 10/25/2021    HGB 7.4 10/25/2021    HCT 22.6 10/25/2021     10/25/2021    MCV 95.0 10/25/2021    MCH 31.1 10/25/2021    MCHC 32.7 10/25/2021    RDW 16.5 10/25/2021    NRBC 0.9 10/01/2021    SEGSPCT 76 03/16/2014    BANDSPCT 1 09/02/2016    METASPCT 0.9 10/01/2021    LYMPHOPCT 10.5 10/17/2021    MONOPCT 5.6 10/17/2021    BASOPCT 0.3 10/17/2021    MONOSABS 0.61 10/17/2021    LYMPHSABS 1.14 10/17/2021    EOSABS 0.17 10/17/2021    BASOSABS 0.03 10/17/2021       CMP     Lab Results   Component Value Date     10/25/2021    K 3.8 10/25/2021    K 4.3 10/16/2021     10/25/2021    CO2 22 10/25/2021    BUN 62 10/25/2021    CREATININE 1.9 10/25/2021    GFRAA 41 10/25/2021    LABGLOM 34 10/25/2021    GLUCOSE 169 10/25/2021    GLUCOSE 111 07/16/2011    PROT 8.1 10/15/2021    LABALBU 2.5 10/15/2021    CALCIUM 8.0 10/25/2021    BILITOT 0.3 10/15/2021    ALKPHOS 217 10/15/2021    AST 41 10/15/2021    ALT 30 10/15/2021         Hepatic Function Panel:    Lab Results   Component Value Date    ALKPHOS 217 10/15/2021    ALT 30 10/15/2021    AST 41 10/15/2021    PROT 8.1 10/15/2021    BILITOT 0.3 10/15/2021    BILIDIR <0.2 01/25/2021    IBILI see below 01/25/2021    LABALBU 2.5 10/15/2021       PT/INR:    Lab Results   Component Value Date    PROTIME 11.7 01/05/2021    PROTIME 11.3 06/21/2011    INR 1.0 01/05/2021       TSH:    Lab Results   Component Value Date    TSH 4.270 08/19/2021       U/A:    Lab Results   Component Value Date    COLORU Yellow 09/24/2021    PHUR 6.0 09/24/2021    LABCAST RARE 10/11/2017    WBCUA NONE 09/24/2021    WBCUA NONE 07/12/2011    RBCUA 1-3 09/24/2021    RBCUA 1-3 03/17/2014    BACTERIA NONE SEEN 09/24/2021    CLARITYU Clear 09/24/2021    SPECGRAV 1.025 09/24/2021    LEUKOCYTESUR Negative 09/24/2021    UROBILINOGEN 0.2 09/24/2021    BILIRUBINUR Negative 09/24/2021    BILIRUBINUR NEGATIVE 07/12/2011    BLOODU TRACE-INTACT 09/24/2021    GLUCOSEU Negative 09/24/2021    GLUCOSEU NEGATIVE 07/12/2011       ABG:    Lab Results   Component Value Date    WTE8CIZ 21.8 09/30/2021    M2TXNBWH 96.7 09/06/2012    OKE0CII 42.9 09/30/2021    PO2ART 157.4 09/30/2021       MICROBIOLOGY:    Blood culture -     Staphylococcus coagulase-negativeAbnormal     Wound cx -Susceptibility    Citrobacter freundii (1)    Antibiotic Interpretation WINNIE Status    ceFAZolin Resistant >=^64 mcg/mL     cefepime Sensitive <=^0.12 mcg/mL     cefTRIAXone Sensitive <=^0.25 mcg/mL     ertapenem Sensitive <=^0.12 mcg/mL     gentamicin Sensitive <=^1 mcg/mL     levofloxacin Sensitive <=^0.12 mcg/mL     piperacillin-tazobactam Sensitive <=^4 mcg/mL     trimethoprim-sulfamethoxazole Sensitive <=^20 mcg/mL     Staphylococcus aureus (2)    Antibiotic Interpretation WINNIE Status    clindamycin Resistant >=^4 mcg/mL     DAPTOmycin Sensitive ^0. 25 mcg/mL     doxycycline Sensitive <=^0.5 mcg/mL     erythromycin Resistant >=^8 mcg/mL     gentamicin Sensitive <=^0.5 mcg/mL     oxacillin Resistant >=^4 mcg/mL     trimethoprim-sulfamethoxazole Sensitive <=^10 mcg/mL     vancomycin Sensitive ^1 mcg/mL     Pseudomonas aeruginosa (4)    Antibiotic Interpretation WINNIE Status    cefepime Sensitive ^2 mcg/mL     gentamicin Sensitive <=^1 mcg/mL     levofloxacin Intermediate ^4 mcg/mL     piperacillin-tazobactam Sensitive <=^4 mcg/mL     tobramycin Sensitive <=^1 mcg/mL      Condensed View   Lab and Collection    Pathology :     A.  Second toe of left foot, nontraumatic amputation: Ulcerated skin with   marked acute and chronic inflammation and reactive change. Subcutaneous fat necrosis and intravascular fibrin thrombus. Sampled underlying bone negative for osteomyelitis. B.  First metatarsal bone, right foot, excision: Acute osteomyelitis. Radiology :    Left foot x ray -  Mild soft tissue edema which could be reactive or due to cellulitis. Right foot x ray -    Irregular appearance of the overlying soft tissues with multiple lucencies   which may represent soft tissue gas.  Large soft tissue defect superiorly at   the amputation site     IMPRESSION:     1. Right TMA stump wound infection, osteomyelitis  s/p debridement   2. PAD - left 2, 3 toes infection ,cellulitis s/p 2 nd toe amputation ( 10/16)   3. CONS  bacteremia   4. HARVEY (- improving   5. Leukocytosis, resolved    RECOMMENDATIONS:      1. Daptomycin 8 mg / kg IV q 48 hrs   Meropenem IV 1 gram IV q 24 hrs   2. Monitor Cr

## 2021-10-25 NOTE — PROGRESS NOTES
Department of Podiatry  Progress Note    SUBJECTIVE:  Patient seen bedside S/P left 2nd toe amputation to level of MPJ, right foot incision and drainage, excisional debridement of right foot wound with application of wound vac (DOS: 10/16/21). Patient alert and awake at time of visit with wound vac intact to RLE and running at 125 mmHG low continuous. Patient states that he's not having any pain to his foot today. Leukocyte count has doubled since yesterday, at 21.5 this morning. Will monitor daily. Hgb and Hct stable. Wound vac change To R and dressing change to L performed today at bedside. Patient tolerated well. No additional acute events overnight.      OBJECTIVE:    Scheduled Meds:   daptomycin (CUBICIN) IVPB  8 mg/kg (Ideal) IntraVENous Q48H    pantoprazole  40 mg Oral BID AC    sucralfate  1 g Oral 4 times per day    meropenem  1,000 mg IntraVENous Q24H    white petrolatum   Topical BID    sodium bicarbonate  1,300 mg Oral BID    lidocaine PF  5 mL IntraDERmal Once    sodium chloride flush  5-40 mL IntraVENous 2 times per day    heparin flush  3 mL IntraVENous 2 times per day    allopurinol  300 mg Oral Daily    [Held by provider] bumetanide  1 mg Oral Daily    DULoxetine  60 mg Oral Daily    ferrous sulfate  325 mg Oral BID WC    gabapentin  400 mg Oral 4x Daily    hydrALAZINE  50 mg Oral 3 times per day    hydrOXYzine  25 mg Oral 4x Daily    isosorbide mononitrate  120 mg Oral Daily    lactulose  20 g Oral Once per day on Mon Wed Fri    levothyroxine  25 mcg Oral Daily    atorvastatin  10 mg Oral Daily    magnesium oxide  200 mg Oral Daily    [Held by provider] metOLazone  2.5 mg Oral Once per day on Mon Wed Fri    metoprolol succinate  50 mg Oral Daily    mirtazapine  7.5 mg Oral Nightly    morphine  10 mg Oral BID    insulin lispro  0-12 Units SubCUTAneous TID     insulin lispro  0-6 Units SubCUTAneous Nightly    budesonide  0.25 mg Nebulization BID    And    ipratropium  0.5 mg Nebulization 4x daily    And    Arformoterol Tartrate  15 mcg Nebulization BID     Continuous Infusions:   sodium chloride      sodium chloride      sodium chloride      sodium chloride      sodium chloride      sodium chloride 125 mL/hr at 10/23/21 0952    dextrose       PRN Meds:.sodium chloride, white petrolatum **AND** white petrolatum, sodium chloride, sodium chloride, sodium chloride, sodium chloride flush, sodium chloride, heparin flush, [Held by provider] HYDROmorphone, morphine, albuterol, diphenhydrAMINE, magnesium hydroxide, acetaminophen **OR** acetaminophen, HYDROcodone-acetaminophen, glucose, dextrose, glucagon (rDNA), dextrose    No Known Allergies    BP (!) 172/86   Pulse 76   Temp 98.1 °F (36.7 °C) (Temporal)   Resp 16   Ht 5' 8\" (1.727 m)   Wt 239 lb (108.4 kg)   SpO2 96%   BMI 36.34 kg/m²       EXAM:    VASCULAR:  DP and PT pulses nonpalpable. Skin temperature warm to warm from proximal to distal B/L. No edema noted. Absent hair growth. CFT delayed to LLE, unobtainable to RLE    NEUROLOGIC:  Epicritic sensation diminished B/L.      DERM:  Open surgical TMA wound with plantar fasciotomy to RLE as pictured below. Gross bone exposure with granular base and extensive soft tissue exposure; minimal bleeding noted during today's dressing change. No malodor or purulence noted. Decubitus heel wound present, approx 3cm in diameter with eschar base. No openings, no drainage, no malodor. LLE 2nd digit amp site with mild serosanguinous drainage and gapping of proximal surgical site. Clinical pictures below. No drainage, no malodor, no streaking, no fluctuance noted. LLE calf with skin changes consistent with chronic venous stasis - superficial ulcerations present, L>R      MUSCULOSKELETAL: Evidence of previous R TMA, L 2nd digit amp. Mild pain with dressing change to RLE. No pain noted to LLE. Muscle strength testing deferred.                          Scheduled Meds:   daptomycin (CUBICIN) IVPB  8 mg/kg (Ideal) IntraVENous Q48H    pantoprazole  40 mg Oral BID AC    sucralfate  1 g Oral 4 times per day    meropenem  1,000 mg IntraVENous Q24H    white petrolatum   Topical BID    sodium bicarbonate  1,300 mg Oral BID    lidocaine PF  5 mL IntraDERmal Once    sodium chloride flush  5-40 mL IntraVENous 2 times per day    heparin flush  3 mL IntraVENous 2 times per day    allopurinol  300 mg Oral Daily    [Held by provider] bumetanide  1 mg Oral Daily    DULoxetine  60 mg Oral Daily    ferrous sulfate  325 mg Oral BID     gabapentin  400 mg Oral 4x Daily    hydrALAZINE  50 mg Oral 3 times per day    hydrOXYzine  25 mg Oral 4x Daily    isosorbide mononitrate  120 mg Oral Daily    lactulose  20 g Oral Once per day on Mon Wed Fri    levothyroxine  25 mcg Oral Daily    atorvastatin  10 mg Oral Daily    magnesium oxide  200 mg Oral Daily    [Held by provider] metOLazone  2.5 mg Oral Once per day on Mon Wed Fri    metoprolol succinate  50 mg Oral Daily    mirtazapine  7.5 mg Oral Nightly    morphine  10 mg Oral BID    insulin lispro  0-12 Units SubCUTAneous TID     insulin lispro  0-6 Units SubCUTAneous Nightly    budesonide  0.25 mg Nebulization BID    And    ipratropium  0.5 mg Nebulization 4x daily    And    Arformoterol Tartrate  15 mcg Nebulization BID     Continuous Infusions:   sodium chloride      sodium chloride      sodium chloride      sodium chloride      sodium chloride      sodium chloride 125 mL/hr at 10/23/21 0952    dextrose       PRN Meds:.sodium chloride, white petrolatum **AND** white petrolatum, sodium chloride, sodium chloride, sodium chloride, sodium chloride flush, sodium chloride, heparin flush, [Held by provider] HYDROmorphone, morphine, albuterol, diphenhydrAMINE, magnesium hydroxide, acetaminophen **OR** acetaminophen, HYDROcodone-acetaminophen, glucose, dextrose, glucagon (rDNA), dextrose    RADIOLOGY:  XR CHEST PORTABLE   Final Result   Slight CO2 22   BUN 62*   CREATININE 1.9*        No results for input(s): PROT, INR, APTT in the last 72 hours. ASSESSMENT:  Principal Problem:    Wound infection  Active Problems:    CKD (chronic kidney disease) stage 3, GFR 30-59 ml/min    -S/P left 2nd toe amputation to level of MPJ, right foot incision and drainage, excisional debridement of right foot wound with application of wound vac (DOS: 10/16/21)  -Flap necrosis, right foot (POA)  -Cellulitis with abscess, right foot (POA)  -Acute osteomyelitis, right foot   -Left 2nd toe osteomyelitis   -Left 2nd toe cellulitis   -Leukocytosis, WBC 21.5    PLAN:  - Patient was examined and evaluated. Chart and labs were reviewed. - Pain Control: IV and PO  - OR cultures: soft tissue with Citrobacter, Staph aureus growth. Bone culture with pseudomonas, MRSA growth. -Surgical pathology revealing subcutaneous fat necrosis and intravascular fibrin thrombus of 2nd toe on L foot. Negative for OM. First metatarsal bone on R foot is positive for acute OM  - Abx per ID. Recommend Dapto and Merrem. - Wound vac replaced today and intact to RLE. Running at 125mmHg low continuous pressure with no leaks detected. Wound vac to remain intact with next change (10/27)  - Dressing on LLE left changed today. Dressed with xeroform DSD. Next dressing change to be performed with wound vac change (10/27)  - Maintain heel offloading  - Discussed possible repeat debridement and grafting of RLE at some point this week when medically stable  -Monitor Hgb and Hct/WBC. -Will continue to follow patient while they are in-house  -Discussed patient with:    Kortney Kelly DPM FACFAS  Fellowship-Trained Foot and Ankle Surgeon  Diplomate, [employer] Foot [employer]le [employer]  [phone]       Thank you for involving podiatry in this patients care. Please do not hesitate to call with any questions or concerns.

## 2021-10-25 NOTE — PROGRESS NOTES
Department of Internal Medicine  Infectious Diseases  Progress Note      C/C : TMA wound infection, cellulitis       Reports feeling well  Afebrile       Current Facility-Administered Medications   Medication Dose Route Frequency Provider Last Rate Last Admin    0.9 % sodium chloride infusion   IntraVENous PRN Eliceo Begum MD        DAPTOmycin (CUBICIN) 550 mg in sodium chloride 0.9 % 50 mL IVPB  8 mg/kg (Ideal) IntraVENous Q48H Suzanne Zuluaga MD   Stopped at 10/24/21 1548    pantoprazole (PROTONIX) tablet 40 mg  40 mg Oral BID AC Alfonso Wilkerson MD   40 mg at 10/25/21 0546    sucralfate (CARAFATE) tablet 1 g  1 g Oral 4 times per day Alfonso Wilkerson MD   1 g at 10/25/21 0546    meropenem (MERREM) 1,000 mg in sodium chloride 0.9 % 100 mL IVPB (mini-bag)  1,000 mg IntraVENous Q24H Suzanne Zuluaga MD   Stopped at 10/24/21 2341    white petrolatum ointment   Topical BID Eilceo Begum MD   Given at 10/25/21 1031    And    white petrolatum ointment   Topical 4x Daily PRN Eliceo Begum MD        0.9 % sodium chloride infusion   IntraVENous PRN Eliceo Begum MD        sodium bicarbonate tablet 1,300 mg  1,300 mg Oral BID MADDI Moraes CNP   1,300 mg at 10/25/21 1025    0.9 % sodium chloride infusion   IntraVENous PRN Eliceo Begum MD        0.9 % sodium chloride infusion   IntraVENous PRN Eliceo Begum MD        lidocaine PF 1 % injection 5 mL  5 mL IntraDERmal Once Kendrick Medina MD        sodium chloride flush 0.9 % injection 5-40 mL  5-40 mL IntraVENous 2 times per day Suzanne Zuluaga MD   10 mL at 10/25/21 1029    sodium chloride flush 0.9 % injection 5-40 mL  5-40 mL IntraVENous PRN Kendrick Medina MD   20 mL at 10/21/21 0617    0.9 % sodium chloride infusion  25 mL IntraVENous PRN Kendrick EMILIANO Medina MD        heparin flush 100 UNIT/ML injection 300 Units  3 mL IntraVENous 2 times per day Suzanne Zuluaga MD   300 Units at 10/25/21 1030    heparin flush 100 UNIT/ML injection 300 Units  3 mL IntraCATHeter PRN Ruiz Black MD        [Held by provider] HYDROmorphone (DILAUDID) injection 0.5 mg  0.5 mg IntraVENous Q4H PRN Rach Rodriguez MD        morphine injection 2 mg  2 mg IntraMUSCular Q4H PRN Ramakrishna Lewis DPM   2 mg at 10/21/21 1148    albuterol (PROVENTIL) nebulizer solution 2.5 mg  2.5 mg Nebulization Q6H PRN Alfreda Freeman DPM        allopurinol (ZYLOPRIM) tablet 300 mg  300 mg Oral Daily Candacebraydon Freeman DPM   300 mg at 10/25/21 1024    [Held by provider] bumetanide (BUMEX) tablet 1 mg  1 mg Oral Daily Candacebraydon Freeman DPM   1 mg at 10/18/21 0825    diphenhydrAMINE (BENADRYL) tablet 25 mg  25 mg Oral Daily PRN Candacebraydon Freeman DPM   25 mg at 10/24/21 2341    DULoxetine (CYMBALTA) extended release capsule 60 mg  60 mg Oral Daily Candacebraydon Freeman DPM   60 mg at 10/25/21 1025    ferrous sulfate (IRON 325) tablet 325 mg  325 mg Oral BID WC Alfreda Lydia DPM   325 mg at 10/25/21 1024    gabapentin (NEURONTIN) capsule 400 mg  400 mg Oral 4x Daily Candacebraydon Freeman DPM   400 mg at 10/25/21 1026    hydrALAZINE (APRESOLINE) tablet 50 mg  50 mg Oral 3 times per day Candacebraydon Freeman DPM   50 mg at 10/25/21 0546    hydrOXYzine (VISTARIL) capsule 25 mg  25 mg Oral 4x Daily Candacebraydon Freeman DPM   25 mg at 10/25/21 1028    isosorbide mononitrate (IMDUR) extended release tablet 120 mg  120 mg Oral Daily Candacededebi Lydia, DPM   120 mg at 10/25/21 1024    lactulose (CHRONULAC) 10 GM/15ML solution 20 g  20 g Oral Once per day on Mon Wed Fri Lawanda Sandoval, DPM   20 g at 10/25/21 1026    levothyroxine (SYNTHROID) tablet 25 mcg  25 mcg Oral Daily Alfreda Freeman DPM   25 mcg at 10/25/21 0546    atorvastatin (LIPITOR) tablet 10 mg  10 mg Oral Daily Alfreda Freeman, DPM   10 mg at 10/25/21 1025    magnesium oxide (MAG-OX) tablet 200 mg  200 mg Oral Daily Alfreda Freeman, DPM   200 mg at 10/25/21 1025    [Held by provider] metOLazone (ZAROXOLYN) tablet 2.5 mg  2.5 mg Oral Once per day on Mon Wed Fri Rach Rodriguez MD   2.5 mg at 10/18/21 0885    metoprolol succinate (TOPROL XL) extended release tablet 50 mg  50 mg Oral Daily Thu Bolster, DPM   50 mg at 10/25/21 1026    mirtazapine (REMERON) tablet 7.5 mg  7.5 mg Oral Nightly Thu Bolster, DPM   7.5 mg at 10/24/21 2342    morphine (THIERRY) extended release capsule 10 mg  10 mg Oral BID Thu Bolster, DPM        0.9 % sodium chloride infusion   IntraVENous Continuous Hasit Ernie Perkins  mL/hr at 10/23/21 0952 New Bag at 10/23/21 0952    magnesium hydroxide (MILK OF MAGNESIA) 400 MG/5ML suspension 30 mL  30 mL Oral Daily PRN Thu Bolster, DPM        acetaminophen (TYLENOL) tablet 650 mg  650 mg Oral Q6H PRN Thu Bolster, DPM   650 mg at 10/25/21 1025    Or    acetaminophen (TYLENOL) suppository 650 mg  650 mg Rectal Q6H PRN Thu Bolster, DPM        HYDROcodone-acetaminophen (NORCO) 7.5-325 MG per tablet 1 tablet  1 tablet Oral Q6H PRN Thu Bolster, DPM   1 tablet at 10/23/21 0954    insulin lispro (HUMALOG) injection vial 0-12 Units  0-12 Units SubCUTAneous TID WC Thu Bolster, DPM   4 Units at 10/24/21 1750    insulin lispro (HUMALOG) injection vial 0-6 Units  0-6 Units SubCUTAneous Nightly Thu Bolster, DPM   1 Units at 10/24/21 2339    glucose (GLUTOSE) 40 % oral gel 15 g  15 g Oral PRN Thu Bolster, DPM        dextrose 50 % IV solution  12.5 g IntraVENous PRN Thu Bolster, DPM   12.5 g at 10/21/21 2033    glucagon (rDNA) injection 1 mg  1 mg IntraMUSCular PRN Thu Bolster, DPM        dextrose 5 % solution  100 mL/hr IntraVENous PRN Thu Bolster, DPM        budesonide (PULMICORT) nebulizer suspension 250 mcg  0.25 mg Nebulization BID Thu Bolster, DPM   250 mcg at 10/25/21 4338    And    ipratropium (ATROVENT) 0.02 % nebulizer solution 0.5 mg  0.5 mg Nebulization 4x daily Thu Bolster, DPM   0.5 mg at 10/25/21 1109    And    Arformoterol Tartrate (BROVANA) nebulizer solution 15 mcg  15 mcg Nebulization BID Thu Bolster, DPM   15 mcg at 10/25/21 1184         PHYSICAL EXAM:      Vitals:     Vitals: 10/25/21 1109   BP:    Pulse:    Resp:    Temp:    SpO2: 96%       General Appearance:    Alert, no acute distress. Head:    Normocephalic, atraumatic   Eyes:    No pallor, no icterus,   Ears:    No obvious deformity or drainage.    Nose:   No nasal drainage   Throat:   Mucosa dry, no thrush    Neck:   Supple, no lymphadenopathy   Lungs:     Clear to auscultation bilaterally, no wheeze    Heart:    Regular rate and rhythm, systolic murmur    Abdomen:     Soft, non-tender, bowel sounds present    Extremities:    + edema    Pulses:   Dorsalis pedis diminished    Skin:  foot wound dressed    Right arm PICC Line       CBC with Differential:      Lab Results   Component Value Date    WBC 21.5 10/25/2021    RBC 2.38 10/25/2021    HGB 7.4 10/25/2021    HCT 22.6 10/25/2021     10/25/2021    MCV 95.0 10/25/2021    MCH 31.1 10/25/2021    MCHC 32.7 10/25/2021    RDW 16.5 10/25/2021    NRBC 0.9 10/01/2021    SEGSPCT 76 03/16/2014    BANDSPCT 1 09/02/2016    METASPCT 0.9 10/01/2021    LYMPHOPCT 10.5 10/17/2021    MONOPCT 5.6 10/17/2021    BASOPCT 0.3 10/17/2021    MONOSABS 0.61 10/17/2021    LYMPHSABS 1.14 10/17/2021    EOSABS 0.17 10/17/2021    BASOSABS 0.03 10/17/2021       CMP     Lab Results   Component Value Date     10/25/2021    K 3.8 10/25/2021    K 4.3 10/16/2021     10/25/2021    CO2 22 10/25/2021    BUN 62 10/25/2021    CREATININE 1.9 10/25/2021    GFRAA 41 10/25/2021    LABGLOM 34 10/25/2021    GLUCOSE 169 10/25/2021    GLUCOSE 111 07/16/2011    PROT 8.1 10/15/2021    LABALBU 2.5 10/15/2021    CALCIUM 8.0 10/25/2021    BILITOT 0.3 10/15/2021    ALKPHOS 217 10/15/2021    AST 41 10/15/2021    ALT 30 10/15/2021         Hepatic Function Panel:    Lab Results   Component Value Date    ALKPHOS 217 10/15/2021    ALT 30 10/15/2021    AST 41 10/15/2021    PROT 8.1 10/15/2021    BILITOT 0.3 10/15/2021    BILIDIR <0.2 01/25/2021    IBILI see below 01/25/2021    LABALBU 2.5 10/15/2021       PT/INR:    Lab Results   Component Value Date    PROTIME 11.7 01/05/2021    PROTIME 11.3 06/21/2011    INR 1.0 01/05/2021       TSH:    Lab Results   Component Value Date    TSH 4.270 08/19/2021       U/A:    Lab Results   Component Value Date    COLORU Yellow 09/24/2021    PHUR 6.0 09/24/2021    LABCAST RARE 10/11/2017    WBCUA NONE 09/24/2021    WBCUA NONE 07/12/2011    RBCUA 1-3 09/24/2021    RBCUA 1-3 03/17/2014    BACTERIA NONE SEEN 09/24/2021    CLARITYU Clear 09/24/2021    SPECGRAV 1.025 09/24/2021    LEUKOCYTESUR Negative 09/24/2021    UROBILINOGEN 0.2 09/24/2021    BILIRUBINUR Negative 09/24/2021    BILIRUBINUR NEGATIVE 07/12/2011    BLOODU TRACE-INTACT 09/24/2021    GLUCOSEU Negative 09/24/2021    GLUCOSEU NEGATIVE 07/12/2011       ABG:    Lab Results   Component Value Date    WIS0UXP 21.8 09/30/2021    Z5WHSOVK 96.7 09/06/2012    LMN4WHQ 42.9 09/30/2021    PO2ART 157.4 09/30/2021       MICROBIOLOGY:    Blood culture -     Staphylococcus coagulase-negativeAbnormal     Wound cx -Susceptibility    Citrobacter freundii (1)    Antibiotic Interpretation WINNIE Status    ceFAZolin Resistant >=^64 mcg/mL     cefepime Sensitive <=^0.12 mcg/mL     cefTRIAXone Sensitive <=^0.25 mcg/mL     ertapenem Sensitive <=^0.12 mcg/mL     gentamicin Sensitive <=^1 mcg/mL     levofloxacin Sensitive <=^0.12 mcg/mL     piperacillin-tazobactam Sensitive <=^4 mcg/mL     trimethoprim-sulfamethoxazole Sensitive <=^20 mcg/mL     Staphylococcus aureus (2)    Antibiotic Interpretation WINNIE Status    clindamycin Resistant >=^4 mcg/mL     DAPTOmycin Sensitive ^0. 25 mcg/mL     doxycycline Sensitive <=^0.5 mcg/mL     erythromycin Resistant >=^8 mcg/mL     gentamicin Sensitive <=^0.5 mcg/mL     oxacillin Resistant >=^4 mcg/mL     trimethoprim-sulfamethoxazole Sensitive <=^10 mcg/mL     vancomycin Sensitive ^1 mcg/mL     Pseudomonas aeruginosa (4)    Antibiotic Interpretation WINNIE Status    cefepime Sensitive ^2 mcg/mL     gentamicin Sensitive <=^1 mcg/mL levofloxacin Intermediate ^4 mcg/mL     piperacillin-tazobactam Sensitive <=^4 mcg/mL     tobramycin Sensitive <=^1 mcg/mL      Condensed View   Lab and Collection    Pathology :     A.  Second toe of left foot, nontraumatic amputation: Ulcerated skin with   marked acute and chronic inflammation and reactive change. Subcutaneous fat necrosis and intravascular fibrin thrombus. Sampled underlying bone negative for osteomyelitis. B.  First metatarsal bone, right foot, excision: Acute osteomyelitis. Radiology :    Left foot x ray -  Mild soft tissue edema which could be reactive or due to cellulitis. Right foot x ray -    Irregular appearance of the overlying soft tissues with multiple lucencies   which may represent soft tissue gas.  Large soft tissue defect superiorly at   the amputation site     IMPRESSION:     1. Right TMA stump wound infection, osteomyelitis  s/p debridement   2. PAD - left 2, 3 toes infection ,cellulitis s/p 2 nd toe amputation ( 10/16)   3. CONS  bacteremia   4. HARVEY ( Vanco random level 10)   5. Leukocytosis, resolved    RECOMMENDATIONS:      1. Daptomycin 8 mg / kg IV q 48 hrs   Meropenem IV 1 gram IV q 24 hrs   2. For possible repeat debridement and grafting of RLE when medically stable per Podiatry  3.  Local wound care , CBC with diff, urine eosinophil

## 2021-10-25 NOTE — CARE COORDINATION
Discharge plan is to return to [facility] when medically stable.  Cheyenne Barrera -[phone]

## 2021-10-25 NOTE — CONSULTS
Rod Roth for further discussion regarding goals of care and CODE STATUS as patient is not oriented enough to have discussion. No answer on Conor's phone numbers listed-message left. Will await callback for further discussion. OBJECTIVE:   Prognosis: depends upon goals and unknown    Physical Exam:  BP (!) 172/86   Pulse 76   Temp 98.1 °F (36.7 °C) (Temporal)   Resp 16   Ht 5' 8\" (1.727 m)   Wt 239 lb (108.4 kg)   SpO2 96%   BMI 36.34 kg/m²      Constitutional:  NAD, awake, alert  ENMT:  Normocephalic, atraumatic, mucosa moist, EOMI  Lungs:  Nasal cannula, CTA bilaterally, no audible rhonchi or wheezes noted, respirations unlabored, no retractions  Heart:  RRR  Abd:  Soft, non tender, non distended, bowel sounds present  Ext:  Moving all extremities, + edema, pulses present, dressings noted to BLE   Skin:  Warm and dry  Neuro:  PERRL, Alert, grossly nonfocal; following commands, difficulty carrying on meaningful medical conversation     Objective data reviewed: labs, images, records, medication use, vitals and chart    Discussed patient and the plan of care with the other IDT members: Palliative Medicine IDT Team    Time/Communication  Greater than 50% of time spent, total 30 minutes in counseling and coordination of care at the bedside regarding goals of care. Thank you for allowing Palliative Medicine to participate in the care of Marian Arvizu.

## 2021-10-25 NOTE — PROGRESS NOTES
Wound vac came off as patient was moving around quite a bit and ripped off tubing. Wound vac reapplied and running at low continuous, 125 mmHG with no leaks detected.  Next change: Herber(10/27)

## 2021-10-26 NOTE — PROGRESS NOTES
Subjective:    He is leaking relatively alert today  Patient looks and feels great      Objective:    BP (!) 180/76   Pulse 76   Temp 97.2 °F (36.2 °C) (Infrared)   Resp 18   Ht 5' 8\" (1.727 m)   Wt 239 lb (108.4 kg)   SpO2 91%   BMI 36.34 kg/m²     In: 530 [P.O.:530]  Out: 1100   In: 530   Out: 1100 [Urine:1100]    General appearance: NAD, conversant, chronically ill appearing  HEENT: AT/NC, MMM  Neck: FROM, supple  Lungs: Clear to auscultation  CV: RRR, no MRGs  Vasc: Radial pulses 2+  Abdomen: Soft, non-tender; no masses or HSM  Extremities: TMA right foot  Skin: no rash, lesions or ulcers  Psych: Alert and oriented to person, place and time  Neuro: Alert and interactive     Recent Labs     10/24/21  0630 10/24/21  0630 10/24/21  1450 10/25/21  0540 10/26/21  0645   WBC 10.0  --   --  21.5* 11.8*   HGB 7.3*   < > 7.3* 7.4* 7.3*   HCT 22.6*   < > 22.7* 22.6* 22.3*     --   --  149 128*    < > = values in this interval not displayed. Recent Labs     10/24/21  0630 10/25/21  0540 10/26/21  0645    140 138   K 3.4* 3.8 3.2*    107 104   CO2 27 22 23   BUN 71* 62* 48*   CREATININE 2.1* 1.9* 1.5*   CALCIUM 8.6 8.0* 8.1*       Assessment:    Principal Problem:    Wound infection  Active Problems:    CKD (chronic kidney disease) stage 3, GFR 30-59 ml/min  Resolved Problems:    * No resolved hospital problems.  *      Plan:  67-year-old male with a significant past medical history recently discharged 1 week ago with amputation is admitted to [place] [address] unit with     Wound infection  -IV antibiotics in the form of Zosyn/Vanco pharmacy to dose Vanco-staph species in blood cultures  -IV hydration -   -Podiatry following -10/16/2021 left toe amputation , I&D right foot multiple compartment, debridement of right foot with wound vac application  continue  Dapto and meropenem await cultures-ID following  -Hgb had dropped down to 5.2 518 -transfuse 2 units PRBCs 10/18/2021  -Recurrent drop in

## 2021-10-26 NOTE — PROGRESS NOTES
Units  3 mL IntraCATHeter PRN Kendrick Medina MD        [Held by provider] HYDROmorphone (DILAUDID) injection 0.5 mg  0.5 mg IntraVENous Q4H PRN Michael Meeks MD        morphine injection 2 mg  2 mg IntraMUSCular Q4H PRN Ramakrishna Lewis DPM   2 mg at 10/26/21 0207    albuterol (PROVENTIL) nebulizer solution 2.5 mg  2.5 mg Nebulization Q6H PRN Asael Urbina, DPM        allopurinol (ZYLOPRIM) tablet 300 mg  300 mg Oral Daily Karolella Smiles, DPM   300 mg at 10/26/21 1825    [Held by provider] bumetanide (BUMEX) tablet 1 mg  1 mg Oral Daily Asael Urbina, DPM   1 mg at 10/18/21 0825    diphenhydrAMINE (BENADRYL) tablet 25 mg  25 mg Oral Daily PRN Asael Urbina, DPM   25 mg at 10/25/21 2012    DULoxetine (CYMBALTA) extended release capsule 60 mg  60 mg Oral Daily Asael Urbina, DPM   60 mg at 10/26/21 0918    ferrous sulfate (IRON 325) tablet 325 mg  325 mg Oral BID WC Karolgage Carlitos, DPM   325 mg at 10/26/21 2319    gabapentin (NEURONTIN) capsule 400 mg  400 mg Oral 4x Daily Asael Urbina, DPM   400 mg at 10/26/21 3861    hydrALAZINE (APRESOLINE) tablet 50 mg  50 mg Oral 3 times per day Asael Urbina, DPM   50 mg at 10/26/21 8661    hydrOXYzine (VISTARIL) capsule 25 mg  25 mg Oral 4x Daily Karolgage Urbina, DPM   25 mg at 10/26/21 1269    isosorbide mononitrate (IMDUR) extended release tablet 120 mg  120 mg Oral Daily Karolgage Urbina, DPM   120 mg at 10/26/21 0811    lactulose (CHRONULAC) 10 GM/15ML solution 20 g  20 g Oral Once per day on Mon Wed Fri Lawanda Sandoval, DPM   20 g at 10/25/21 1026    levothyroxine (SYNTHROID) tablet 25 mcg  25 mcg Oral Daily Asael Urbina, DPM   25 mcg at 10/26/21 0700    atorvastatin (LIPITOR) tablet 10 mg  10 mg Oral Daily Asael Urbina DPM   10 mg at 10/26/21 5648    magnesium oxide (MAG-OX) tablet 200 mg  200 mg Oral Daily Asael Urbina DPM   200 mg at 10/26/21 0557    [Held by provider] metOLazone (ZAROXOLYN) tablet 2.5 mg  2.5 mg Oral Once per day on Mon Wed Fri Michael Meeks MD   2.5 mg at 10/18/21 0826    metoprolol succinate (TOPROL XL) extended release tablet 50 mg  50 mg Oral Daily Duke Loots, DPM   50 mg at 10/26/21 8839    mirtazapine (REMERON) tablet 7.5 mg  7.5 mg Oral Nightly Duke Loots, DPM   7.5 mg at 10/25/21 2012    morphine (THIERRY) extended release capsule 10 mg  10 mg Oral BID [place] Loots, DPM        magnesium hydroxide (MILK OF MAGNESIA) 400 MG/5ML suspension 30 mL  30 mL Oral Daily PRN Duke Loots, DPM        acetaminophen (TYLENOL) tablet 650 mg  650 mg Oral Q6H PRN Duke Loots, DPM   650 mg at 10/26/21 7343    Or    acetaminophen (TYLENOL) suppository 650 mg  650 mg Rectal Q6H PRN Pacific Loots, DPM        HYDROcodone-acetaminophen (NORCO) 7.5-325 MG per tablet 1 tablet  1 tablet Oral Q6H PRN Pacific Loots, DPM   1 tablet at 10/26/21 9688    insulin lispro (HUMALOG) injection vial 0-12 Units  0-12 Units SubCUTAneous TID WC Duke Loots, DPM   2 Units at 10/26/21 0819    insulin lispro (HUMALOG) injection vial 0-6 Units  0-6 Units SubCUTAneous Nightly Duke Loots, DPM   1 Units at 10/25/21 2019    glucose (GLUTOSE) 40 % oral gel 15 g  15 g Oral PRN Pacific Loots, DPM        dextrose 50 % IV solution  12.5 g IntraVENous PRN [place] Loots, DPM   12.5 g at 10/21/21 2033    glucagon (rDNA) injection 1 mg  1 mg IntraMUSCular PRN Duke Loots, DPM        dextrose 5 % solution  100 mL/hr IntraVENous PRN Duke Loots, DPM        budesonide (PULMICORT) nebulizer suspension 250 mcg  0.25 mg Nebulization BID [place] Loots, DPM   250 mcg at 10/26/21 0407    And    ipratropium (ATROVENT) 0.02 % nebulizer solution 0.5 mg  0.5 mg Nebulization 4x daily Pacific Loots, DPM   0.5 mg at 10/26/21 9045    And    Arformoterol Tartrate (BROVANA) nebulizer solution 15 mcg  15 mcg Nebulization BID [place] Loots, DPM   15 mcg at 10/26/21 0910         PHYSICAL EXAM:      Vitals:     BP (!) 180/76   Pulse 76   Temp 97.2 °F (36.2 °C) (Infrared)   Resp 18   Ht 5' 8\" (1.727 m)   Wt 239 lb (108.4 kg)   SpO2 96%   BMI 36.34 kg/m²     General Appearance:    Alert, no acute distress. Head:    Normocephalic, atraumatic   Eyes:    No pallor, no icterus,   Ears:    No obvious deformity or drainage.    Nose:   No nasal drainage   Throat:   Mucosa dry, no thrush    Neck:   Supple, no lymphadenopathy   Lungs:     Clear to auscultation bilaterally, no wheeze    Heart:    Regular rate and rhythm, systolic murmur    Abdomen:     Soft, non-tender, bowel sounds present    Extremities:    + edema    Pulses:   Dorsalis pedis diminished    Skin:  foot wound dressed    Right arm PICC Line       CBC with Differential:      Lab Results   Component Value Date    WBC 11.8 10/26/2021    RBC 2.35 10/26/2021    HGB 7.3 10/26/2021    HCT 22.3 10/26/2021     10/26/2021    MCV 94.9 10/26/2021    MCH 31.1 10/26/2021    MCHC 32.7 10/26/2021    RDW 16.6 10/26/2021    NRBC 0.9 10/01/2021    SEGSPCT 76 03/16/2014    BANDSPCT 1 09/02/2016    METASPCT 0.9 10/01/2021    LYMPHOPCT 10.5 10/17/2021    MONOPCT 5.6 10/17/2021    BASOPCT 0.3 10/17/2021    MONOSABS 0.61 10/17/2021    LYMPHSABS 1.14 10/17/2021    EOSABS 0.17 10/17/2021    BASOSABS 0.03 10/17/2021       CMP     Lab Results   Component Value Date     10/26/2021    K 3.2 10/26/2021    K 4.3 10/16/2021     10/26/2021    CO2 23 10/26/2021    BUN 48 10/26/2021    CREATININE 1.5 10/26/2021    GFRAA 54 10/26/2021    LABGLOM 45 10/26/2021    GLUCOSE 130 10/26/2021    GLUCOSE 111 07/16/2011    PROT 8.1 10/15/2021    LABALBU 2.5 10/15/2021    CALCIUM 8.1 10/26/2021    BILITOT 0.3 10/15/2021    ALKPHOS 217 10/15/2021    AST 41 10/15/2021    ALT 30 10/15/2021         Hepatic Function Panel:    Lab Results   Component Value Date    ALKPHOS 217 10/15/2021    ALT 30 10/15/2021    AST 41 10/15/2021    PROT 8.1 10/15/2021    BILITOT 0.3 10/15/2021    BILIDIR <0.2 01/25/2021    IBILI see below 01/25/2021    LABALBU 2.5 10/15/2021       PT/INR:    Lab Results   Component Value Date    PROTIME 11.7 01/05/2021    PROTIME 11.3 06/21/2011    INR 1.0 01/05/2021       TSH:    Lab Results   Component Value Date    TSH 4.270 08/19/2021       U/A:    Lab Results   Component Value Date    COLORU Yellow 09/24/2021    PHUR 6.0 09/24/2021    LABCAST RARE 10/11/2017    WBCUA NONE 09/24/2021    WBCUA NONE 07/12/2011    RBCUA 1-3 09/24/2021    RBCUA 1-3 03/17/2014    BACTERIA NONE SEEN 09/24/2021    CLARITYU Clear 09/24/2021    SPECGRAV 1.025 09/24/2021    LEUKOCYTESUR Negative 09/24/2021    UROBILINOGEN 0.2 09/24/2021    BILIRUBINUR Negative 09/24/2021    BILIRUBINUR NEGATIVE 07/12/2011    BLOODU TRACE-INTACT 09/24/2021    GLUCOSEU Negative 09/24/2021    GLUCOSEU NEGATIVE 07/12/2011       ABG:    Lab Results   Component Value Date    LXJ3FQP 21.8 09/30/2021    B6ZRPJDZ 96.7 09/06/2012    YBV1VNM 42.9 09/30/2021    PO2ART 157.4 09/30/2021       MICROBIOLOGY:    Blood culture -     Staphylococcus coagulase-negativeAbnormal     Wound cx -Susceptibility    Citrobacter freundii (1)    Antibiotic Interpretation WINNIE Status    ceFAZolin Resistant >=^64 mcg/mL     cefepime Sensitive <=^0.12 mcg/mL     cefTRIAXone Sensitive <=^0.25 mcg/mL     ertapenem Sensitive <=^0.12 mcg/mL     gentamicin Sensitive <=^1 mcg/mL     levofloxacin Sensitive <=^0.12 mcg/mL     piperacillin-tazobactam Sensitive <=^4 mcg/mL     trimethoprim-sulfamethoxazole Sensitive <=^20 mcg/mL     Staphylococcus aureus (2)    Antibiotic Interpretation WINNIE Status    clindamycin Resistant >=^4 mcg/mL     DAPTOmycin Sensitive ^0. 25 mcg/mL     doxycycline Sensitive <=^0.5 mcg/mL     erythromycin Resistant >=^8 mcg/mL     gentamicin Sensitive <=^0.5 mcg/mL     oxacillin Resistant >=^4 mcg/mL     trimethoprim-sulfamethoxazole Sensitive <=^10 mcg/mL     vancomycin Sensitive ^1 mcg/mL     Pseudomonas aeruginosa (4)    Antibiotic Interpretation WINNIE Status    cefepime Sensitive ^2 mcg/mL     gentamicin Sensitive <=^1 mcg/mL     levofloxacin Intermediate ^4 mcg/mL     piperacillin-tazobactam Sensitive <=^4 mcg/mL     tobramycin Sensitive <=^1 mcg/mL      Condensed View   Lab and Collection    Pathology :     A.  Second toe of left foot, nontraumatic amputation: Ulcerated skin with   marked acute and chronic inflammation and reactive change. Subcutaneous fat necrosis and intravascular fibrin thrombus. Sampled underlying bone negative for osteomyelitis. B.  First metatarsal bone, right foot, excision: Acute osteomyelitis. Radiology :    Left foot x ray -  Mild soft tissue edema which could be reactive or due to cellulitis. Right foot x ray -    Irregular appearance of the overlying soft tissues with multiple lucencies   which may represent soft tissue gas.  Large soft tissue defect superiorly at   the amputation site     IMPRESSION:     1. Right TMA stump wound infection, osteomyelitis  s/p debridement   2. PAD - left 2, 3 toes infection ,cellulitis s/p 2 nd toe amputation ( 10/16)   3. CONS  bacteremia   4. HARVEY - improving   5. Leukocytosis, resolved    RECOMMENDATIONS:      1. Daptomycin 8 mg / kg IV q 48 hrs ( will change to q day when renal functions improves )    Meropenem IV 1 gram IV q 12  hrs x 30 days   2. Monitor Cr

## 2021-10-26 NOTE — PROGRESS NOTES
Progress Note  10/26/2021 3:17 PM  Subjective:   Admit Date: 10/15/2021  PCP: No primary care provider on file. Interval History: No new c/o  Diet: ADULT DIET; Regular  ADULT ORAL NUTRITION SUPPLEMENT; Lunch, Dinner; Low Calorie/High Protein Oral Supplement  ADULT ORAL NUTRITION SUPPLEMENT; Breakfast, Lunch;  Wound Healing Oral Supplement    Data:   Scheduled Meds:   potassium chloride  40 mEq Oral Once    daptomycin (CUBICIN) IVPB  8 mg/kg (Ideal) IntraVENous Q48H    pantoprazole  40 mg Oral BID AC    sucralfate  1 g Oral 4 times per day    meropenem  1,000 mg IntraVENous Q24H    white petrolatum   Topical BID    sodium bicarbonate  1,300 mg Oral BID    lidocaine PF  5 mL IntraDERmal Once    sodium chloride flush  5-40 mL IntraVENous 2 times per day    heparin flush  3 mL IntraVENous 2 times per day    allopurinol  300 mg Oral Daily    [Held by provider] bumetanide  1 mg Oral Daily    DULoxetine  60 mg Oral Daily    ferrous sulfate  325 mg Oral BID WC    gabapentin  400 mg Oral 4x Daily    hydrALAZINE  50 mg Oral 3 times per day    hydrOXYzine  25 mg Oral 4x Daily    isosorbide mononitrate  120 mg Oral Daily    lactulose  20 g Oral Once per day on Mon Wed Fri    levothyroxine  25 mcg Oral Daily    atorvastatin  10 mg Oral Daily    magnesium oxide  200 mg Oral Daily    [Held by provider] metOLazone  2.5 mg Oral Once per day on Mon Wed Fri    metoprolol succinate  50 mg Oral Daily    mirtazapine  7.5 mg Oral Nightly    morphine  10 mg Oral BID    insulin lispro  0-12 Units SubCUTAneous TID WC    insulin lispro  0-6 Units SubCUTAneous Nightly    budesonide  0.25 mg Nebulization BID    And    ipratropium  0.5 mg Nebulization 4x daily    And    Arformoterol Tartrate  15 mcg Nebulization BID     Continuous Infusions:   sodium chloride      sodium chloride      sodium chloride      sodium chloride      sodium chloride      dextrose       PRN Meds:sodium chloride, white petrolatum **AND** white petrolatum, sodium chloride, sodium chloride, sodium chloride, sodium chloride flush, sodium chloride, heparin flush, [Held by provider] HYDROmorphone, morphine, albuterol, diphenhydrAMINE, magnesium hydroxide, acetaminophen **OR** acetaminophen, HYDROcodone-acetaminophen, glucose, dextrose, glucagon (rDNA), dextrose  I/O last 3 completed shifts: In: 630 [P.O.:630]  Out: 1100 [Urine:1100]  No intake/output data recorded. Intake/Output Summary (Last 24 hours) at 10/26/2021 1517  Last data filed at 10/26/2021 0914  Gross per 24 hour   Intake 600 ml   Output 700 ml   Net -100 ml     CBC:   Recent Labs     10/24/21  0630 10/24/21  0630 10/24/21  1450 10/25/21  0540 10/26/21  0645   WBC 10.0  --   --  21.5* 11.8*   HGB 7.3*   < > 7.3* 7.4* 7.3*     --   --  149 128*    < > = values in this interval not displayed. BMP:    Recent Labs     10/24/21  0630 10/25/21  0540 10/26/21  0645    140 138   K 3.4* 3.8 3.2*    107 104   CO2 27 22 23   BUN 71* 62* 48*   CREATININE 2.1* 1.9* 1.5*   GLUCOSE 135* 169* 130*     Hepatic: No results for input(s): AST, ALT, ALB, BILITOT, ALKPHOS in the last 72 hours. Troponin: No results for input(s): TROPONINI in the last 72 hours. BNP: No results for input(s): BNP in the last 72 hours. Lipids: No results for input(s): CHOL, HDL in the last 72 hours. Invalid input(s): LDLCALCU  ABGs:   Lab Results   Component Value Date    PO2ART 157.4 09/30/2021    OPL6JDF 42.9 09/30/2021     INR: No results for input(s): INR in the last 72 hours.     -----------------------------------------------------------------  RAD: XR FOOT LEFT (MIN 3 VIEWS)    Result Date: 10/15/2021  EXAMINATION: THREE XRAY VIEWS OF THE LEFT FOOT 10/15/2021 8:27 pm COMPARISON: 02/04/2017 HISTORY: ORDERING SYSTEM PROVIDED HISTORY: cellulitis of digits 2 and 3 TECHNOLOGIST PROVIDED HISTORY: Look for signs of osteomyelitis of left digits 2 and 3 Reason for exam:->cellulitis of digits 2 and 3 What reading provider will be dictating this exam?->CRC FINDINGS: Mild diffuse soft tissue edema. Scattered vascular calcifications. No radiopaque foreign body. Small accessory navicular. Mild diffuse osteopenia. Scattered degenerative changes. No bony destruction, dislocation or acute fracture identified. Mild soft tissue edema which could be reactive or due to cellulitis. Chronic osseous changes. MRI would be useful if symptoms persist.     XR FOOT RIGHT (MIN 3 VIEWS)    Result Date: 10/15/2021  EXAMINATION: THREE XRAY VIEWS OF THE RIGHT FOOT 10/15/2021 7:27 pm COMPARISON: October 2 HISTORY: ORDERING SYSTEM PROVIDED HISTORY: Recent surgery, ray amputation, infection. eval for subcutaneous air/overt osteomyelitis TECHNOLOGIST PROVIDED HISTORY: Reason for exam:->Recent surgery, ray amputation, infection. eval for subcutaneous air/overt osteomyelitis What reading provider will be dictating this exam?->CRC FINDINGS: Status post transmetatarsal amputation. Irregular appearance of the overlying soft tissues with multiple lucencies which may represent soft tissue gas. Large soft tissue defect superiorly at the amputation site Surgical clips laterally. Atherosclerotic calcifications. No gross lytic or erosive changes to suggest advanced osteomyelitis. Status post transmetatarsal amputation. Irregular appearance of the overlying soft tissues with multiple lucencies which may represent soft tissue gas. Large soft tissue defect superiorly at the amputation site No gross lytic or erosive changes to suggest advanced osteomyelitis. XR CHEST PORTABLE    Result Date: 10/16/2021  EXAMINATION: ONE XRAY VIEW OF THE CHEST 10/16/2021 8:39 am COMPARISON: July 30, 2020-August 19, 2021 HISTORY: ORDERING SYSTEM PROVIDED HISTORY: preop TECHNOLOGIST PROVIDED HISTORY: Reason for exam:->preop What reading provider will be dictating this exam?->CRC FINDINGS: Patient had previous mid sternotomy.   There is a permanent pacemaker with 2 wires placed left subclavian vein. The heart is enlarged at least in a moderate degree. There is some areas of atelectasis in both bases. 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 There is no right-sided pleural effusion. There is no perihilar vascular congestion. 1.  After mid sternotomy. Moderate cardiomegaly. Presence of left-sided pacemaker. 2.  Chronic findings in the left base as seen on previous examination which can represent atelectasis or a mild left-sided pleural effusion. 3.  No superimposed acute cardiopulmonary process. Objective:   Vitals: BP (!) 180/76   Pulse 76   Temp 97.2 °F (36.2 °C) (Infrared)   Resp 20   Ht 5' 8\" (1.727 m)   Wt 239 lb (108.4 kg)   SpO2 98%   BMI 36.34 kg/m²   General appearance: appears stated age   Skin:  No rashes or lesions  HEENT: Head: Normocephalic, no lesions, without obvious abnormality.   Neck: no adenopathy, no carotid bruit, no JVD, supple, symmetrical, trachea midline and thyroid not enlarged, symmetric, no tenderness/mass/nodules  Lungs: clear to auscultation bilaterally  Heart: regular rate and rhythm, S1, S2 normal, no murmur, click, rub or gallop  Abdomen: soft, non-tender; bowel sounds normal; no masses,  no organomegaly  Extremities: dressing , wound vac   Neurologic: Mental status: Alert, oriented, thought content appropriate    Assessment:   Patient Active Problem List:     Essential hypertension     Peripheral vascular disease ([address]     Pacemaker     H/O aortic valve replacement     CKD (chronic kidney disease) stage 3, GFR 30-59 ml/min     Type 2 diabetes mellitus with stage 3 chronic kidney disease, with long-term current use of insulin (HCC)     Pulmonary nodule     Diabetes mellitus type 2, uncontrolled (HCC)     Hyperlipidemia LDL goal <100     Acquired hypothyroidism     CAD (coronary artery disease), native coronary artery     Status post coronary artery bypass graft     Chronic pain     History of CVA (cerebrovascular accident)     Obesity (BMI 30-39. 9)     Anemia     Elevated sed rate     Vision decreased     Bilateral carotid bruits     Bilateral carotid artery stenosis     Nonrheumatic mitral valve regurgitation     Pulmonary hypertension (HCC)     Moderate tricuspid regurgitation by prior echocardiography     (HFpEF) heart failure with preserved ejection fraction (HCC)     COPD (chronic obstructive pulmonary disease) (HCC)     Diabetic neuropathy associated with type 2 diabetes mellitus (HCC)     Congestive heart failure (HCC)     Cellulitis     Peripheral vascular disease of lower extremity with ulceration (HCC)     Critical lower limb ischemia (HCC)     Nonrheumatic mitral valve stenosis     Severe left ventricular hypertrophy     Cardiomyopathy ([address]     Ischemic foot ulcer due to atherosclerosis of native artery of limb ([facility] [address]     Wound infection    Plan:   Assessment and Plan:  1.  HARVEY-  In the setting of vancomycin for TMA would infection, diuretics  and severe anemia  Creatinine with improving trend  Acceptable urine output  Continue to monitor        2. CKD stage IIIa  Due to microvascular in the setting of DM and HTN as well as PVD   Baseline Cr 1.5-2.1 mg/dl     3. Metabolic Acidosis  In the setting of HARVEY  On po HCO3  Improved     4. Hyperkalemia initially, now hypokalemic from diuretics  Due to potassium supplements in the presence of HARVEY; and potassium source of blood transfusion  Now improved  Continue to monitor      5. Anemia HB  at 7.3 , s/p transfusion           6.  TMA Wound Infection right foot with osteomyelitis s/p debridement; wound VAC applied  On daptomycin and meropenem  Infectious Disease following       Sarika Mills MD

## 2021-10-26 NOTE — PROGRESS NOTES
Return message received from pts brother, Palliative Medicine LSW attempted to return call to discuss goals of care, no answer on home phone, cell goes straight to voice mail. LSW spoke with pts brother Lorenza Gibson would like pt to have opportunity to discuss code status on his own. LSW met with pt, he was asleep but did awaken. Pt groggy however and not able to have meaningful conversation. Saira Shah is aware but still would like pt to make this decision.

## 2021-10-26 NOTE — CARE COORDINATION
Transportation has been arranged for 7:00PM with [facility] ambulance. Nursing, facility notified. amanda left on brother's phone.  [facility] [phone]

## 2021-10-26 NOTE — PROGRESS NOTES
This writer and patient's brother were in room together and talked to patient. When patients brother asked patient if he would like to brought back if heart stops he said yes. When asked if he would like a breathing tube inserted to sustain life, he stated 'hell yes\".

## 2021-10-26 NOTE — PROGRESS NOTES
Department of Podiatry  Progress Note    SUBJECTIVE:  Patient seen bedside S/P left 2nd toe amputation to level of MPJ, right foot incision and drainage, excisional debridement of right foot wound with application of wound vac (DOS: 10/16/21). Patient resting comfortably in bed at time of visit with wound vac intact to RLE and running at 125 mmHG low continuous and dressing intact to LLE.        OBJECTIVE:    Scheduled Meds:   daptomycin (CUBICIN) IVPB  8 mg/kg (Ideal) IntraVENous Q48H    pantoprazole  40 mg Oral BID AC    sucralfate  1 g Oral 4 times per day    meropenem  1,000 mg IntraVENous Q24H    white petrolatum   Topical BID    sodium bicarbonate  1,300 mg Oral BID    lidocaine PF  5 mL IntraDERmal Once    sodium chloride flush  5-40 mL IntraVENous 2 times per day    heparin flush  3 mL IntraVENous 2 times per day    allopurinol  300 mg Oral Daily    [Held by provider] bumetanide  1 mg Oral Daily    DULoxetine  60 mg Oral Daily    ferrous sulfate  325 mg Oral BID WC    gabapentin  400 mg Oral 4x Daily    hydrALAZINE  50 mg Oral 3 times per day    hydrOXYzine  25 mg Oral 4x Daily    isosorbide mononitrate  120 mg Oral Daily    lactulose  20 g Oral Once per day on Mon Wed Fri    levothyroxine  25 mcg Oral Daily    atorvastatin  10 mg Oral Daily    magnesium oxide  200 mg Oral Daily    [Held by provider] metOLazone  2.5 mg Oral Once per day on Mon Wed Fri    metoprolol succinate  50 mg Oral Daily    mirtazapine  7.5 mg Oral Nightly    morphine  10 mg Oral BID    insulin lispro  0-12 Units SubCUTAneous TID WC    insulin lispro  0-6 Units SubCUTAneous Nightly    budesonide  0.25 mg Nebulization BID    And    ipratropium  0.5 mg Nebulization 4x daily    And    Arformoterol Tartrate  15 mcg Nebulization BID     Continuous Infusions:   sodium chloride      sodium chloride      sodium chloride      sodium chloride      sodium chloride      dextrose       PRN Meds:.sodium chloride, white petrolatum **AND** white petrolatum, sodium chloride, sodium chloride, sodium chloride, sodium chloride flush, sodium chloride, heparin flush, [Held by provider] HYDROmorphone, morphine, albuterol, diphenhydrAMINE, magnesium hydroxide, acetaminophen **OR** acetaminophen, HYDROcodone-acetaminophen, glucose, dextrose, glucagon (rDNA), dextrose    No Known Allergies    BP (!) 180/76   Pulse 76   Temp 97.2 °F (36.2 °C) (Infrared)   Resp 20   Ht 5' 8\" (1.727 m)   Wt 239 lb (108.4 kg)   SpO2 98%   BMI 36.34 kg/m²       EXAM:  Dressing on LLE and wound vac on RLE left intact. Prior physical exam findings:  VASCULAR:  DP and PT pulses nonpalpable. Skin temperature warm to warm from proximal to distal B/L. No edema noted. Absent hair growth. CFT delayed to LLE, unobtainable to RLE    NEUROLOGIC:  Epicritic sensation diminished B/L.      DERM:  Open surgical TMA wound with plantar fasciotomy to RLE as pictured below. Gross bone exposure with granular base and extensive soft tissue exposure; minimal bleeding noted during today's dressing change. No malodor or purulence noted. Decubitus heel wound present, approx 3cm in diameter with eschar base. No openings, no drainage, no malodor. LLE 2nd digit amp site with mild serosanguinous drainage and gapping of proximal surgical site. Clinical pictures below. No drainage, no malodor, no streaking, no fluctuance noted. LLE calf with skin changes consistent with chronic venous stasis - superficial ulcerations present, L>R      MUSCULOSKELETAL: Evidence of previous R TMA, L 2nd digit amp. Mild pain with dressing change to RLE. No pain noted to LLE. Muscle strength testing deferred.                          Scheduled Meds:   daptomycin (CUBICIN) IVPB  8 mg/kg (Ideal) IntraVENous Q48H    pantoprazole  40 mg Oral BID AC    sucralfate  1 g Oral 4 times per day    meropenem  1,000 mg IntraVENous Q24H    white petrolatum   Topical BID    sodium bicarbonate  1,300 mg Oral BID    lidocaine PF  5 mL IntraDERmal Once    sodium chloride flush  5-40 mL IntraVENous 2 times per day    heparin flush  3 mL IntraVENous 2 times per day    allopurinol  300 mg Oral Daily    [Held by provider] bumetanide  1 mg Oral Daily    DULoxetine  60 mg Oral Daily    ferrous sulfate  325 mg Oral BID WC    gabapentin  400 mg Oral 4x Daily    hydrALAZINE  50 mg Oral 3 times per day    hydrOXYzine  25 mg Oral 4x Daily    isosorbide mononitrate  120 mg Oral Daily    lactulose  20 g Oral Once per day on Mon Wed Fri    levothyroxine  25 mcg Oral Daily    atorvastatin  10 mg Oral Daily    magnesium oxide  200 mg Oral Daily    [Held by provider] metOLazone  2.5 mg Oral Once per day on Mon Wed Fri    metoprolol succinate  50 mg Oral Daily    mirtazapine  7.5 mg Oral Nightly    morphine  10 mg Oral BID    insulin lispro  0-12 Units SubCUTAneous TID WC    insulin lispro  0-6 Units SubCUTAneous Nightly    budesonide  0.25 mg Nebulization BID    And    ipratropium  0.5 mg Nebulization 4x daily    And    Arformoterol Tartrate  15 mcg Nebulization BID     Continuous Infusions:   sodium chloride      sodium chloride      sodium chloride      sodium chloride      sodium chloride      dextrose       PRN Meds:.sodium chloride, white petrolatum **AND** white petrolatum, sodium chloride, sodium chloride, sodium chloride, sodium chloride flush, sodium chloride, heparin flush, [Held by provider] HYDROmorphone, morphine, albuterol, diphenhydrAMINE, magnesium hydroxide, acetaminophen **OR** acetaminophen, HYDROcodone-acetaminophen, glucose, dextrose, glucagon (rDNA), dextrose    RADIOLOGY:  XR CHEST PORTABLE   Final Result   Slight increased opacities in lung bases notable on the left suggesting   bibasilar pneumonia, atelectasis, and likely bilateral pleural effusions. CT HEAD WO CONTRAST   Final Result   No acute intracranial abnormality.   MRI may be obtained if clinical suspicion   for acute stroke is high. Chronic findings as above. XR CHEST PORTABLE   Final Result   Right PICC line tip is in the superior vena cava. Stable left pleural effusion, with left lung base atelectasis or infiltrate         US RETROPERITONEAL COMPLETE   Final Result   No sonographic evidence of acute renal pathology. XR CHEST PORTABLE   Final Result   1. After mid sternotomy. Moderate cardiomegaly. Presence of left-sided   pacemaker. 2.  Chronic findings in the left base as seen on previous examination which   can represent atelectasis or a mild left-sided pleural effusion. 3.  No superimposed acute cardiopulmonary process. XR FOOT LEFT (MIN 3 VIEWS)   Final Result   Mild soft tissue edema which could be reactive or due to cellulitis. Chronic osseous changes. MRI would be useful if symptoms persist.         XR FOOT RIGHT (MIN 3 VIEWS)   Final Result   Status post transmetatarsal amputation. Irregular appearance of the overlying soft tissues with multiple lucencies   which may represent soft tissue gas. Large soft tissue defect superiorly at   the amputation site      No gross lytic or erosive changes to suggest advanced osteomyelitis. BP (!) 180/76   Pulse 76   Temp 97.2 °F (36.2 °C) (Infrared)   Resp 20   Ht 5' 8\" (1.727 m)   Wt 239 lb (108.4 kg)   SpO2 98%   BMI 36.34 kg/m²     LABS:    Recent Labs     10/25/21  0540 10/26/21  0645   WBC 21.5* 11.8*   HGB 7.4* 7.3*   HCT 22.6* 22.3*    128*        Recent Labs     10/26/21  0645      K 3.2*      CO2 23   BUN 48*   CREATININE 1.5*        No results for input(s): PROT, INR, APTT in the last 72 hours.       ASSESSMENT:  Principal Problem:    Wound infection  Active Problems:    CKD (chronic kidney disease) stage 3, GFR 30-59 ml/min    -S/P left 2nd toe amputation to level of MPJ, right foot incision and drainage, excisional debridement of right foot wound with application of wound vac (DOS: 10/16/21)  -Flap necrosis, right foot (POA)  -Cellulitis with abscess, right foot (POA)  -Acute osteomyelitis, right foot   -Left 2nd toe osteomyelitis   -Left 2nd toe cellulitis   -Leukocytosis, WBC 11.8    PLAN:  - Patient was examined and evaluated. Chart and labs were reviewed. - Pain Control: IV and PO  - OR cultures: soft tissue with Citrobacter, Staph aureus growth. Bone culture with pseudomonas, MRSA growth. -Surgical pathology revealing subcutaneous fat necrosis and intravascular fibrin thrombus of 2nd toe on L foot. Negative for OM. First metatarsal bone on R foot is positive for acute OM  - Abx per ID. Recommend Dapto and Merrem. -QOD dressing changes  - Wound vac intact to RLE. Running at 125mmHg low continuous pressure with no leaks detected. Minimal drainage in collection canister. Wound vac to remain intact with next change on 10/27  - Dressing on LLE left c/d/i today with next dressing change on 10/27  - Maintain heel offloading  - Discussed possible repeat debridement and grafting of RLE at some point this week when medically stable  -Monitor Hgb and Hct/WBC.  -D/C plan is to return to [facility] when medically stable  -Will continue to follow patient while they are in-house  -Discussed patient with:    Danika Carrero DPM [facility] [id number]  Fellowship-Trained Foot and Ankle Surgeon  Diplomate, [employer] Board of Foot and Ankle Surgeons  [phone]       Thank you for involving podiatry in this patients care. Please do not hesitate to call with any questions or concerns.

## 2021-10-26 NOTE — DISCHARGE SUMMARY
Physician Discharge Summary     Patient ID:  Bruna Dubose  [id number]  [de-identified] y.o.  1940    Admit date: 10/15/2021    Discharge date and time: 10/26/2021    Admission Diagnoses:   Patient Active Problem List   Diagnosis    Essential hypertension    Peripheral vascular disease ([address]    Pacemaker    H/O aortic valve replacement    CKD (chronic kidney disease) stage 3, GFR 30-59 ml/min    Type 2 diabetes mellitus with stage 3 chronic kidney disease, with long-term current use of insulin (HCC)    Pulmonary nodule    Diabetes mellitus type 2, uncontrolled ([address]    Hyperlipidemia LDL goal <100    Acquired hypothyroidism    CAD (coronary artery disease), native coronary artery    Status post coronary artery bypass graft    Chronic pain    History of CVA (cerebrovascular accident)    Obesity (BMI 30-39. 9)    Anemia    Elevated sed rate    Vision decreased    Bilateral carotid bruits    Bilateral carotid artery stenosis    Nonrheumatic mitral valve regurgitation    Pulmonary hypertension (HCC)    Moderate tricuspid regurgitation by prior echocardiography    (HFpEF) heart failure with preserved ejection fraction (HCC)    COPD (chronic obstructive pulmonary disease) (HCC)    Diabetic neuropathy associated with type 2 diabetes mellitus (HCC)    Congestive heart failure (HCC)    Cellulitis    Peripheral vascular disease of lower extremity with ulceration (HCC)    Critical lower limb ischemia (HCC)    Nonrheumatic mitral valve stenosis    Severe left ventricular hypertrophy    Cardiomyopathy ([address]    Ischemic foot ulcer due to atherosclerosis of native artery of limb ([address]    Wound infection       Discharge Diagnoses: Right foot wound infection, chronic and ischemic, osteomyelitis, peripheral arterial disease, cons bacteremia, HARVEY, anemia-acute blood loss versus chronic    Consults: Podiatry infectious disease renal    Procedures: 10/16/2021-Right foot wound debridement and application of wound VAC.  Incision and drainage of right foot. Amputation left 2nd toe. 10-2-2021 = right foot transmetatarsal amputation. Application of skin-substitute graft.  TMA right foot    Hospital Course: 27-year-old male with a significant past medical history recently discharged 1 week ago with amputation is admitted to [facility] 5 unit with     Wound infection  -IV antibiotics in the form of Zosyn/Vanco pharmacy to dose Vanco-staph species in blood cultures  -IV hydration -   -Podiatry following -10/16/2021 left toe amputation , I&D right foot multiple compartment, debridement of right foot with wound vac application  continue  Dapto and meropenem await cultures-ID following  -Hgb had dropped down to 5.2 518 -transfuse 2 units PRBCs 10/18/2021  -Recurrent drop in hemoglobin-appreciate general surgery input-no plans for endoscopy-- Hemoccult stool was positive a few days ago  -Pain management - pain well controlled  -Elevated sed rate >150 on admission        CKD with metabolic acidosis  -Monitor labs-BUN/creatinine on admission 50/1.9, likely prerenal from acute blood loss anemia-has pretty much stabilized at this point 48/1.5  -Sodium bicarb with IV fluids  -Hold all nephrotoxins  -IV hydration -change IV fluids to saline with bicarb  -Hyperkalemia -normalized   -Resume blood pressure medications  -Retroperitoneal ultrasound is unremarkable     Anemia  Has remained stable over the past several days close to 7  No signs of acute bleeding currently  Hemodynamically stable  Would continue to monitor H&H at facility        Patient is already on his chronic home pain medications which include Ivon and Percocet for breakthrough pain-pain appears to be adequately controlled-I do not want to increase his pain meds narcotics any further     CT head to rule out any acute event due to significant weakness of his upper extremity on the right with tremors-no acute findings     Medication for other comorbidities continue as appropriate dose adjustment as necessary.     DVT Prophylaxis   PT/OT  Discharge planning-initiate discharge planning now that hemoglobin has stabilized     Discussed with him CODE STATUS-he wishes to remain full code at this point, palliative care input appreciated  For discharge from medicine standpoint today if okay with others-ID and renal  Recent Labs     10/24/21  0630 10/24/21  0630 10/24/21  1450 10/25/21  0540 10/26/21  0645   WBC 10.0  --   --  21.5* 11.8*   HGB 7.3*   < > 7.3* 7.4* 7.3*   HCT 22.6*   < > 22.7* 22.6* 22.3*     --   --  149 128*    < > = values in this interval not displayed. Recent Labs     10/24/21  0630 10/25/21  0540 10/26/21  0645    140 138   K 3.4* 3.8 3.2*    107 104   CO2 27 22 23   BUN 71* 62* 48*   CREATININE 2.1* 1.9* 1.5*   CALCIUM 8.6 8.0* 8.1*       XR FOOT LEFT (MIN 3 VIEWS)    Result Date: 10/15/2021  EXAMINATION: THREE XRAY VIEWS OF THE LEFT FOOT 10/15/2021 8:27 pm COMPARISON: 02/04/2017 HISTORY: ORDERING SYSTEM PROVIDED HISTORY: cellulitis of digits 2 and 3 TECHNOLOGIST PROVIDED HISTORY: Look for signs of osteomyelitis of left digits 2 and 3 Reason for exam:->cellulitis of digits 2 and 3 What reading provider will be dictating this exam?->CRC FINDINGS: Mild diffuse soft tissue edema. Scattered vascular calcifications. No radiopaque foreign body. Small accessory navicular. Mild diffuse osteopenia. Scattered degenerative changes. No bony destruction, dislocation or acute fracture identified. Mild soft tissue edema which could be reactive or due to cellulitis. Chronic osseous changes. MRI would be useful if symptoms persist.     XR FOOT RIGHT (MIN 3 VIEWS)    Result Date: 10/15/2021  EXAMINATION: THREE XRAY VIEWS OF THE RIGHT FOOT 10/15/2021 7:27 pm COMPARISON: October 2 HISTORY: ORDERING SYSTEM PROVIDED HISTORY: Recent surgery, ray amputation, infection.  eval for subcutaneous air/overt osteomyelitis TECHNOLOGIST PROVIDED HISTORY: Reason for exam:->Recent surgery, ray amputation, infection. eval for subcutaneous air/overt osteomyelitis What reading provider will be dictating this exam?->CRC FINDINGS: Status post transmetatarsal amputation. Irregular appearance of the overlying soft tissues with multiple lucencies which may represent soft tissue gas. Large soft tissue defect superiorly at the amputation site Surgical clips laterally. Atherosclerotic calcifications. No gross lytic or erosive changes to suggest advanced osteomyelitis. Status post transmetatarsal amputation. Irregular appearance of the overlying soft tissues with multiple lucencies which may represent soft tissue gas. Large soft tissue defect superiorly at the amputation site No gross lytic or erosive changes to suggest advanced osteomyelitis. XR CHEST PORTABLE    Result Date: 10/16/2021  EXAMINATION: ONE XRAY VIEW OF THE CHEST 10/16/2021 8:39 am COMPARISON: July 30, 2020-August 19, 2021 HISTORY: ORDERING SYSTEM PROVIDED HISTORY: preop TECHNOLOGIST PROVIDED HISTORY: Reason for exam:->preop What reading provider will be dictating this exam?->CRC FINDINGS: Patient had previous mid sternotomy. There is a permanent pacemaker with 2 wires placed left subclavian vein. The heart is enlarged at least in a moderate degree. There is some areas of atelectasis in both bases. 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 There is no right-sided pleural effusion. There is no perihilar vascular congestion. 1.  After mid sternotomy. Moderate cardiomegaly. Presence of left-sided pacemaker. 2.  Chronic findings in the left base as seen on previous examination which can represent atelectasis or a mild left-sided pleural effusion. 3.  No superimposed acute cardiopulmonary process. Discharge Exam:    HEENT: NCAT,  PERRLA, No JVD  Heart:  RRR, no murmurs, gallops, or rubs.   Lungs:  CTA bilaterally, no wheeze, rales or rhonchi  Abd: bowel sounds present, nontender, nondistended, no masses  Extrem: Right foot TMA in dressing    Disposition: SNF     Patient Condition at Discharge: Stable    Patient Instructions:      Medication List      START taking these medications    pantoprazole 40 MG tablet  Commonly known as: PROTONIX  Take 1 tablet by mouth 2 times daily (before meals)     sucralfate 1 GM tablet  Commonly known as: CARAFATE  Take 1 tablet by mouth 4 times daily        CONTINUE taking these medications    albuterol (2.5 MG/3ML) 0.083% nebulizer solution  Commonly known as: PROVENTIL  Take 3 mLs by nebulization every 6 hours as needed for Wheezing or Shortness of Breath DX: COPD J44.9 Please bill Medicare Part B     allopurinol 300 MG tablet  Commonly known as: ZYLOPRIM     aspirin EC 81 MG EC tablet  Take 1 tablet by mouth daily     bumetanide 1 MG tablet  Commonly known as: BUMEX     diphenhydrAMINE 25 MG capsule  Commonly known as: BENADRYL     DULoxetine 60 MG extended release capsule  Commonly known as: CYMBALTA  Take 1 capsule by mouth daily     ferrous sulfate 325 (65 Fe) MG tablet  Commonly known as: IRON 325  Take 1 tablet by mouth 2 times daily (with meals)     gabapentin 400 MG capsule  Commonly known as: NEURONTIN     HumaLOG KwikPen 100 UNIT/ML Sopn  Generic drug: insulin lispro (1 Unit Dial)     hydrALAZINE 50 MG tablet  Commonly known as: APRESOLINE  Take 1 tablet by mouth every 8 hours     HYDROcodone-acetaminophen  MG per tablet  Commonly known as: NORCO  Take 1 tablet by mouth every 6 hours as needed for Pain for up to 3 days.      hydrOXYzine 25 MG tablet  Commonly known as: ATARAX     isosorbide mononitrate 120 MG extended release tablet  Commonly known as: IMDUR  Take 1 tablet by mouth daily     lactulose 20 GM/30ML Soln     levothyroxine 25 MCG tablet  Commonly known as: SYNTHROID     lovastatin 20 MG tablet  Commonly known as: MEVACOR     magnesium 200 MG Tabs tablet     metOLazone 2.5 MG tablet  Commonly known as: ZAROXOLYN     metoprolol succinate 50 MG extended release tablet  Commonly known as: TOPROL XL     mirtazapine 7.5 MG tablet  Commonly known as: REMERON     morphine 10 MG extended release capsule  Commonly known as: THIERRY  Take 1 capsule by mouth 2 times daily for 3 days. potassium chloride 20 MEQ extended release tablet  Commonly known as: KLOR-CON M     Trelegy Ellipta 100-62.5-25 MCG/INH Aepb  Generic drug: fluticasone-umeclidin-vilant           Where to Get Your Medications      These medications were sent to [facility], [facility] [facility] [address], [address] [place] [id number]    Phone: [phone]   · pantoprazole 40 MG tablet  · sucralfate 1 GM tablet     You can get these medications from any pharmacy    Bring a paper prescription for each of these medications  · HYDROcodone-acetaminophen  MG per tablet  · morphine 10 MG extended release capsule       Activity: activity as tolerated  Diet: diabetic diet    Pt has been advised to: Follow-up with No primary care provider on file. in 1 week.   Follow-up with consultants as recommended by them    Note that over 30 minutes was spent in preparing discharge papers, discussing discharge with patient, medication review, etc.    Signed:  Svetlana Odom MD  10/26/2021  8:55 AM

## 2021-10-27 NOTE — ADT AUTH CERT
wound pictures by Jenny Hernandez RN       Review Status Review Entered   In Primary 10/27/2021 08:57      Criteria Review                                    Foot: Surgical Wound Care - Care Day 11 (10/25/2021) by Jenny Hernandez RN       Review Status Review Entered   Completed 10/27/2021 08:52      Criteria Review      Care Day: 11 Care Date: 10/25/2021 Level of Care: Inpatient Floor    Guideline Day 2    Level Of Care    (X) Floor    Clinical Status    (X) * Procedure completed    (X) * Hypotension absent    10/27/2021 8:52 AM EDT by Vinh Hammond      172/86    ( ) * Mental status at baseline    10/27/2021 8:52 AM EDT by Sadie Schroeder oriented to person only    (X) * Afebrile or fever improved    10/27/2021 8:52 AM EDT by Vinh Hammond      99.3    (X) * Acidosis absent    (X) * Blood glucose under acceptable control    (X) * Dehydration absent    (X) * Electrolyte abnormalities absent or improved    Activity    ( ) * Assisted ambulation    Routes    (X) * Advance diet    (X) IV or oral hydration    10/27/2021 8:52 AM EDT by Vinh vale same    (X) IV or oral medications    10/27/2021 8:52 AM EDT by Vinh vale  current meds plus  benadryl 25 mg qd prn x1 po    Interventions    (X) WBC    (X) Serum glucose, electrolytes    (X) Bedside glucose monitoring    Medications    (X) * PCA absent    (X) Possible antibiotics    10/27/2021 8:52 AM EDT by Vinh vale iv cubicin and iv merrem    (X) Possible analgesics    10/27/2021 8:52 AM EDT by Vinh Hammond      tylenol 650 mg q6 prn x2 po  iv morphine 2 mg q4 prn x2    (X) Possible subcutaneous insulin    10/27/2021 8:52 AM EDT by Vinh vale SS    * Milestone   Additional Notes   10/25/2021      98 18 74 145/60 95% 4 L nc      Cxr (10/23)      Slight increased opacities in lung bases notable on the left suggesting    bibasilar pneumonia, atelectasis, and likely bilateral pleural effusions. BUN: 62 (H)   Creatinine: 1.9 (H)   GFR Non-: 34   GFR : 41   Glucose: 169 (H)   Calcium: 8.0 (L)      WBC: 21.5 (H)   RBC: 2.38 (L)   Hemoglobin Quant: 7.4 (L)      Meter Glucose: 187 (H)   Meter Glucose: 188 (H)   Meter Glucose: 181 (H)      Per Rn note   Call placed to 's service to see if a resident could come see patients wound vac. Podiatry resident   Wound vac came off as patient was moving around quite a bit and ripped off tubing. Wound vac reapplied and running at low continuous, 125 mmHG with no leaks detected.  Next change: Weds(10/27)         =========================================================      Per IM       Plan:   32-year-old male with a significant past medical history recently discharged 1 week ago with amputation is admitted to [facility] 5 unit with       Wound infection   -IV antibiotics in the form of Zosyn/Vanco pharmacy to dose Vanco-staph species in blood cultures   -IV hydration -    -Podiatry following -10/16/2021 left toe amputation , I&D right foot multiple compartment, debridement of right foot with wound vac application   continue  Dapto and meropenem await cultures-ID following   -Hgb had dropped down to 5.2 518 -transfuse 2 units PRBCs 10/18/2021   -Recurrent drop in hemoglobin-appreciate general surgery input-no plans for endoscopy-- Hemoccult stool was positive a few days ago   -Pain management - pain well controlled   -Elevated sed rate >150 on admission           CKD with metabolic acidosis   -Monitor labs-BUN/creatinine on admission 50/1.9, likely prerenal from acute blood loss anemia-has pretty much stabilized at this point 48/1.5   -Sodium bicarb with IV fluids   -Hold all nephrotoxins   -IV hydration -change IV fluids to saline with bicarb   -Hyperkalemia -normalized    -Resume blood pressure medications   -Retroperitoneal ultrasound is unremarkable            Patient is already on his chronic home pain medications which include Ivon and Percocet for breakthrough pain-pain appears to be adequately controlled-I do not want to increase his pain meds narcotics any further       CT head to rule out any acute event due to significant weakness of his upper extremity on the right with tremors-no acute findings       Medication for other comorbidities continue as appropriate dose adjustment as necessary.       DVT Prophylaxis    PT/OT   Discharge planning-initiate discharge planning now that hemoglobin has stabilized    ___________________________________________________________      Per podiatry   ASSESSMENT:   Principal Problem:     Wound infection   Active Problems:     CKD (chronic kidney disease) stage 3, GFR 30-59 ml/min       -S/P left 2nd toe amputation to level of MPJ, right foot incision and drainage, excisional debridement of right foot wound with application of wound vac (DOS: 10/16/21)   -Flap necrosis, right foot (POA)   -Cellulitis with abscess, right foot (POA)   -Acute osteomyelitis, right foot    -Left 2nd toe osteomyelitis    -Left 2nd toe cellulitis    -Leukocytosis, WBC 21.5       PLAN:   - Patient was examined and evaluated. Chart and labs were reviewed. - Pain Control: IV and PO   - OR cultures: soft tissue with Citrobacter, Staph aureus growth. Bone culture with pseudomonas, MRSA growth. -Surgical pathology revealing subcutaneous fat necrosis and intravascular fibrin thrombus of 2nd toe on L foot. Negative for OM. First metatarsal bone on R foot is positive for acute OM   - Abx per ID. Recommend Dapto and Merrem. - Wound vac replaced today and intact to RLE. Running at 125mmHg low continuous pressure with no leaks detected. Wound vac to remain intact with next change (10/27)   - Dressing on LLE left changed today. Dressed with xeroform DSD.  Next dressing change to be performed with wound vac change (10/27)   - Maintain heel offloading   - Discussed possible repeat debridement and grafting of RLE at some point this week when medically stable   -Monitor Hgb and Hct/WBC. -Will continue to follow patient while they are in-house   _____________________________________________________________      Per ID   IMPRESSION:        1. Right TMA stump wound infection, osteomyelitis  s/p debridement    2. PAD - left 2, 3 toes infection ,cellulitis s/p 2 nd toe amputation ( 10/16)    3. CONS  bacteremia    4. HARVEY (- improving    5. Leukocytosis, resolved       RECOMMENDATIONS:         1. Daptomycin 8 mg / kg IV q 48 hrs   Meropenem IV 1 gram IV q 24 hrs    2. Monitor Cr     _______________________________________________________      Per nephro      Plan:   Assessment and Plan:   1.  HARVEY-   In the setting of vancomycin for TMA would infection, diuretics  and severe anemia   Creatinine with improving trend   Acceptable urine output   Continue to monitor           2. CKD stage IIIa   Due to microvascular in the setting of DM and HTN as well as PVD    Baseline Cr 1.5-2.1 mg/dl       3. Metabolic Acidosis   In the setting of HARVEY   On po HCO3   Improved       4. Hyperkalemia    Due to potassium supplements in the presence of HARVEY; and potassium source of blood transfusion   Now improved   Continue to monitor        5. Anemia HB  at 7.3 , s/p transfusion              6.  TMA Wound Infection right foot with osteomyelitis s/p debridement; wound VAC applied   On daptomycin and meropenem   Infectious Disease following

## 2021-10-27 NOTE — PROGRESS NOTES
Physician Progress Note      PATIENT:               Jb Cortes  [facility] #:                  [id number]  :                       1940  ADMIT DATE:       10/15/2021 5:49 PM  [address] DATE:        10/27/2021 12:06 AM  RESPONDING  PROVIDER #:        STEVE Kruger MD          QUERY TEXT:    Pt admitted with stump infection. Pt noted to have supplemental oxygen use up   to [facility] with SpO2 94-99% and respiratory rate 16-20. If possible, please   document in the progress notes and discharge summary if you are evaluating   and/or treating any of the following: The medical record reflects the following:  Risk Factors: stump infection  Clinical Indicators: VS findings SpO2 93% room air on admission RR 16 96% 6LNC   RR 18 92% 7LHFNC RR 16; per nursing assessments dyspnea with exertion  Treatment: Supplemental oxygen up to [facility], pulse ox monitoring, continued   inpatient monitoring    Thank you  Marcial FAIRCHILD, RN, CCDS  Clinical Documentation Improvement  Options provided:  -- Acute respiratory failure with hypoxia  -- Acute respiratory failure with hypercapnia  -- Other - I will add my own diagnosis  -- Disagree - Not applicable / Not valid  -- Disagree - Clinically unable to determine / Unknown  -- Refer to Clinical Documentation Reviewer    PROVIDER RESPONSE TEXT:    This patient is in acute respiratory failure with hypoxia.     Query created by: Anthony Ross on  6:99 PM      Electronically signed by:  STEVE Kruger MD 10/27/2021 8:39 AM

## 2021-11-03 PROBLEM — R41.82 AMS (ALTERED MENTAL STATUS): Status: ACTIVE | Noted: 2021-01-01

## 2021-11-03 NOTE — ED NOTES
Patient can only go to 4th floor with breathing treatments that are ordered for inpatient.       Contreras Dumont RN  11/03/21 1640

## 2021-11-03 NOTE — H&P
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 Recently s/p right TMA at [facility] (1-RH) with wound culture indicating Pseudomonas and citrobacter. He is on Daptomycin and meropenum. Has wound vacs in place and was discharged back to [facility] 10/26. He was sent to ED last night for increased lethargy/confusion and hypoxia. In ED - 99.0 - 75 - 20 - 100/54 - 93% on 4 LPM.   ABG - 7.4, 47.5, 92, 29  CT chest indicates left mainstem bronchus opacification with left upper lobe volume loss with compensatory hyperaeration of the superior segment of the left lower lobe, with the superior left lower lobe segment occupying the left pulmonary apex. Other findings of multifocal pneumonia. Lab studies - BUN/Cr 29/1.7, near baseline. CRP 16, improved from prior  BNP is 10,571 -  Up from prior 2,070  hsTroponin - 177 ->182  HgB 7.2, stable from prior    No interventions offered in ED, admission requested for \"delirium\" and elevated troponin. He was seen in ED this morning - sleeping soundly, arouses briefly but quickly falls asleep. Has some myoclonic jerking noted while asleep. When aroused, he is aware of being in hospital, argumentative and states that I myself am the one who is confused for asking him questions. He offers no complaints. Denies chest pain or feeling SOB. His oxygen was off and SaO2 77%  Note that he is on Norco 10 and oral extended release MS 10 mg BID at his nursing facility. He was seen by podiatry, no new concerns re: foot wounds, appear clean and noninfected. ID consulted, placed back on Daptomycin and Meropenum  Awaiting pulm consult re: CT findings.     Note all of patient's prior hospitalizations have been to DIAGNOSTIC CARDIAC CATH LAB PROCEDURE  10/14/2008    DIAGNOSTIC CARDIAC CATH LAB PROCEDURE  10/21/2010    FEMORAL ENDARTERECTOMY Right 9/30/2021    RIGHT FEMORAL ENDARTERECTOMY performed by Yonas Shahid MD at [facility] Right 10/2/2021    RIGHT FOOT TRANSMITTAL AMPUTATION performed by Amie Gonzalez DPM at [facility] Bilateral 10/16/2021    RIGHT FOOT WOUND DEBRIDEMENT WITH APPLICATION WOUND VAC; LEFT FOOT AMPUTATION 2ND TOE performed by Amie Gonzalez DPM at [place] [address]      R knee replacement    KNEE SURGERY      OTHER SURGICAL HISTORY Right 02/14/2017    debridement,bone bx foot    PACEMAKER INSERTION  11/12/2014    D-PPM   ([facility])    Dr. Louisa Gorman      carotids       Medications Prior to Admission:    Prior to Admission medications    Medication Sig Start Date End Date Taking?  Authorizing Provider   pantoprazole (PROTONIX) 40 MG tablet Take 1 tablet by mouth 2 times daily (before meals) 10/26/21   Karthikeyan Ricketts MD   sucralfate (CARAFATE) 1 GM tablet Take 1 tablet by mouth 4 times daily 10/26/21   Karthikeyan Ricketts MD   meropenem [facility]) infusion Infuse 1,000 mg intravenously every 12 hours For 30 days 10/26/21 11/25/21  Ileana Rivera MD   DAPTOmycin (CUBICIN) infusion Infuse 675.2 mg intravenously every 24 hours For 30 days 10/26/21 11/25/21  Vita Medina MD   mirtazapine (REMERON) 7.5 MG tablet Take 7.5 mg by mouth nightly    Historical Provider, MD   hydrALAZINE (APRESOLINE) 50 MG tablet Take 1 tablet by mouth every 8 hours 10/3/21   Karthikeyan Ricketts MD   magnesium 200 MG TABS tablet Take 200 mg by mouth daily    Historical Provider, MD   diphenhydrAMINE (BENADRYL) 25 MG capsule Take 25 mg by mouth daily as needed for Itching    Historical Provider, MD   insulin lispro, 1 Unit Dial, (HUMALOG KWIKPEN) 100 UNIT/ML SOPN Inject 0-6 Units into the skin 3 times daily (before meals) PER SLIDING SCALE: 0-139=0U, 140-199=1U, 200-249=2U, 250-299=3U, 300-349=4U, 350-399=5U, 400-450=6U    Historical Provider, MD   hydrOXYzine (ATARAX) 25 MG tablet Take 25 mg by mouth 4 times daily     Historical Provider, MD   lactulose 20 GM/30ML SOLN Take 20 g by mouth three times a week *MON-WED-FRI*    Historical Provider, MD   metOLazone (ZAROXOLYN) 2.5 MG tablet Take 2.5 mg by mouth three times a week *TUE-THUR-SAT*    Historical Provider, MD   bumetanide (BUMEX) 1 MG tablet Take 1 mg by mouth daily     Historical Provider, MD   potassium chloride (KLOR-CON M) 20 MEQ extended release tablet Take 40 mEq by mouth daily     Historical Provider, MD   ferrous sulfate (IRON 325) 325 (65 Fe) MG tablet Take 1 tablet by mouth 2 times daily (with meals) 1/25/21   Gilbert Martino DO   DULoxetine (CYMBALTA) 60 MG extended release capsule Take 1 capsule by mouth daily 1/26/21   Gilbert Martino DO   fluticasone-umeclidin-vilant (TRELEGY ELLIPTA) 100-62.5-25 MCG/INH AEPB Inhale 1 puff into the lungs Daily     Historical Provider, MD   aspirin EC 81 MG EC tablet Take 1 tablet by mouth daily 8/7/20   Nadege Wilson MD   albuterol (PROVENTIL) (2.5 MG/3ML) 0.083% nebulizer solution Take 3 mLs by nebulization every 6 hours as needed for Wheezing or Shortness of Breath DX: COPD J44.9 Please bill Medicare Part B 1/6/20   Dylan Navas MD   levothyroxine (SYNTHROID) 25 MCG tablet Take 25 mcg by mouth Daily  7/29/19   Historical Provider, MD   gabapentin (NEURONTIN) 400 MG capsule Take 400 mg by mouth 4 times daily. 5/19/19   Historical Provider, MD   metoprolol succinate (TOPROL XL) 50 MG extended release tablet Take 50 mg by mouth daily    Historical Provider, MD   isosorbide mononitrate (IMDUR) 120 MG extended release tablet Take 1 tablet by mouth daily 1/23/18   Nataliia Mullen MD   allopurinol (ZYLOPRIM) 300 MG tablet Take 300 mg by mouth daily  2/27/15   Historical Provider, MD   lovastatin (MEVACOR) 20 MG tablet Take 20 mg by mouth nightly.  11/11/13   Historical Provider, MD       Allergies:    Patient has no known allergies. Social History:    reports that he quit smoking about 40 years ago. His smoking use included cigarettes. He started smoking about 68 years ago. He has a 50.00 pack-year smoking history. He has never used smokeless tobacco. He reports previous drug use. He reports that he does not drink alcohol. Family History:   family history includes Cancer in his sister; Heart Disease in his father and mother; Heart Failure in his father and mother; Heart Surgery in his father; High Blood Pressure in his sister. PHYSICAL EXAM:  Vitals:  BP (!) 178/70   Pulse 108   Temp 97.6 °F (36.4 °C) (Oral)   Resp 16   SpO2 91%     General Appearance: sleeping but arouses, falls back asleep mid-sentence. No acute pain or respiratory distress  Skin: warm and dry, dressings intact BLE  Head: normocephalic and atraumatic  Eyes: pupils equal, round, and reactive to light, extraocular eye movements intact, conjunctivae normal  Neck: neck supple and non tender without mass   Pulmonary/Chest: clear on inspiration, very prolonged expiratory phase with wheezing  Cardiovascular: normal rate, normal S1 and S2, soft systolic murmur  Abdomen: obese, soft, non-tender, non-distended, normal bowel sounds, no masses or organomegaly  Extremities: no cyanosis, no clubbing. Neurologic: when awake is oriented x 3, follows all commands, moves all extremities. LABS:  Recent Labs     11/03/21  0208      K 4.8   CL 99   CO2 29   BUN 29*   CREATININE 1.7*   GLUCOSE 144*   CALCIUM 8.6       Recent Labs     11/03/21  0208   WBC 13.6*   RBC 2.37*   HGB 7.2*   HCT 23.0*   MCV 97.0   MCH 30.4   MCHC 31.3*   RDW 17.0*      MPV 9.4       No results for input(s): POCGLU in the last 72 hours. Radiology:   US DUP LOWER EXTREMITIES BILATERAL VENOUS   Final Result   No evidence of DVT in either lower extremity.          CT HEAD WO CONTRAST   Final Result   No acute intracranial abnormality. CT CHEST WO CONTRAST   Final Result   Multifocal pneumonia as described. Small bilateral pleural effusions. Significant opacification of the left mainstem bronchus. Patient may benefit   from bronchoscopy. Left upper lobe volume loss with compensatory   hyperaeration of the superior segment of the left lower lobe, with the   superior left lower lobe segment occupying the left pulmonary apex. Atelectasis versus fibroatelectatic changes within the left lower lobe and   left mid lung, with consolidation and multiple calcified granulomas seen   within this consolidation. 3.5 x 3.0 cm soft tissue density mass within the superficial left upper chest   subcutaneous tissues, potentially retroareolar. This could represent   neoplasia or marcelle asymmetric gynecomastia. Clinical correlation is advised,   with mammographic correlation if appropriate. Increased attenuation within the subcutaneous tissues of the partially imaged   left flank, edema versus hemorrhage, with mild edema seen in the visualized   lower right flank. EKG: v-paced    ASSESSMENT:      Active Problems:    AMS (altered mental status)  Resolved Problems:    * No resolved hospital problems. *      PLAN:  1. Encephalopathy  - multifactorial and seems primarily from hypoxia/mild hypercapnea as well as possible overmedication with norco and morphine. he is morbidly obese, questionable underlying TREVOR and probable obesity hypoventilation syndrome  - CT findings as above, concerning for multifocal pneumonia and significant opacification left mainstem bronchus. Pulmonology consulted. May need bronchoscopy  - ID consulted, he is already on Daptomycin and meropenum  - otherwise continue with supplemental O2, will hold Norco and PO morphine. Reduce dose gabapentin, hold other sedating medications    2. CKD III  - with recent HARVEY last hospital stay, Cr similar to recent discharge value.  He appears

## 2021-11-03 NOTE — CONSULTS
Movement disorder     [facility]STEMI, initial episode of care ([facility] 01/2018    Obesity     Pulmonary hypertension ([facility] 2018    PVD (peripheral vascular disease) with claudication ([facility] 03/27/2014    S/P carotid endarterectomy 03/27/2013    Sleep apnea     SOB (shortness of breath)     Thyroid disease     Unspecified cerebral artery occlusion with cerebral infarction     Vision decreased 12/31/2020       Past Surgical History:   Procedure Laterality Date    BIOPSY / LIGATION TEMPORAL ARTERY Bilateral 01/05/2021    BILATERAL TEMPORAL ARTERY BIOPSY performed by Abida Estes MD at [address] [place] [address]      5 years ago   [address] GRAFT  2008    1 vessel and aortic valve repair    DIAGNOSTIC CARDIAC CATH LAB PROCEDURE  10/14/2008    DIAGNOSTIC CARDIAC CATH LAB PROCEDURE  10/21/2010    FEMORAL ENDARTERECTOMY Right 9/30/2021    RIGHT FEMORAL ENDARTERECTOMY performed by Kieran Hampton MD at [facility] Right 10/2/2021    RIGHT FOOT TRANSMITTAL AMPUTATION performed by Adal Mcguire DPM at [facility] Bilateral 10/16/2021    RIGHT FOOT WOUND DEBRIDEMENT WITH APPLICATION WOUND VAC; LEFT FOOT AMPUTATION 2ND TOE performed by Adal Mcguire DPM at [address] [place] [address] Rd      R knee replacement    KNEE SURGERY      OTHER SURGICAL HISTORY Right 02/14/2017    debridement,bone bx foot    PACEMAKER INSERTION  11/12/2014    D-PPM   ()    Dr. Dinero Never      carotids       Family History   Problem Relation Age of Onset    Heart Disease Mother     Heart Failure Mother     Heart Surgery Father     Heart Disease Father     Heart Failure Father     High Blood Pressure Sister     Cancer Sister        Social History     Socioeconomic History    Marital status:      Spouse name: Not on file    Number of children: Not on file    Years of education: Not on file    Highest education level: Not on file   Occupational History    Occupation: retired- [employer]   Tobacco Use    Smoking status: Former Smoker     Packs/day: 2.00     Years: 25.00     Pack years: 50.00     Types: Cigarettes     Start date: 1953     Quit date: 1981     Years since quittin.3    Smokeless tobacco: Never Used   Vaping Use    Vaping Use: Never used    Passive vaping exposure Yes   Substance and Sexual Activity    Alcohol use: Never     Comment:      Drug use: Not Currently     Comment: no longer eating edibles. states he had a bad episode.  Sexual activity: Not Currently   Other Topics Concern    Not on file   Social History Narrative    1 cup coffee daily     Social Determinants of Health     Financial Resource Strain:     Difficulty of Paying Living Expenses:    Food Insecurity:     Worried About Running Out of Food in the Last Year:     Ran Out of Food in the Last Year:    Transportation Needs:     Lack of Transportation (Medical):      Lack of Transportation (Non-Medical):    Physical Activity:     Days of Exercise per Week:     Minutes of Exercise per Session:    Stress:     Feeling of Stress :    Social Connections:     Frequency of Communication with Friends and Family:     Frequency of Social Gatherings with Friends and Family:     Attends [employer] Services:     Active Member of Clubs or Organizations:     Attends Club or Organization Meetings:     Marital Status:    Intimate Partner Violence:     Fear of Current or Ex-Partner:     Emotionally Abused:     Physically Abused:     Sexually Abused:        Current Facility-Administered Medications   Medication Dose Route Frequency Provider Last Rate Last Admin    DAPTOmycin (CUBICIN) 700 mg in sodium chloride 0.9 % 50 mL IVPB  700 mg IntraVENous Q48H Radha Way MD        meropenem (MERREM) 1,000 mg in sodium chloride 0.9 % 100 mL IVPB (mini-bag)  1,000 mg IntraVENous Q12H Radha Way MD 33.3 mL/hr at 21 1404 1,000 mg at 11/03/21 1404    0.9 % sodium chloride infusion   IntraVENous Q12H Radha Way MD        albuterol (PROVENTIL) nebulizer solution 2.5 mg  2.5 mg Nebulization Q6H PRN MADDI Hoover CNP        allopurinol (ZYLOPRIM) tablet 300 mg  300 mg Oral Daily Ti ChinMADDI CNP        aspirin EC tablet 81 mg  81 mg Oral Daily [facility] MADDI Peralta CNP        DULoxetine (CYMBALTA) extended release capsule 60 mg  60 mg Oral Daily Ti ChinMADDI CNP        ferrous sulfate (IRON 325) tablet 325 mg  325 mg Oral BID WC MADDI Hoover CNP        gabapentin (NEURONTIN) capsule 200 mg  200 mg Oral 4x Daily [facility] MADDI Peralta CNP        hydrALAZINE (APRESOLINE) tablet 50 mg  50 mg Oral 3 times per day Ti ChinMADDI CNP        isosorbide mononitrate (IMDUR) extended release tablet 120 mg  120 mg Oral Daily Ti ChinMADDI CNP        lactulose (CHRONULAC) 10 GM/15ML solution 20 g  20 g Oral Once per day on Mon Wed Fri MADDI Hoover CNP        levothyroxine (SYNTHROID) tablet 25 mcg  25 mcg Oral Daily Ti ChinMADDI CNP        atorvastatin (LIPITOR) tablet 10 mg  10 mg Oral Daily Ti ChinMADDI CNP        magnesium oxide (MAG-OX) tablet 200 mg  200 mg Oral Daily Ti ChinMADDI CNP        [START ON 11/4/2021] metOLazone (ZAROXOLYN) tablet 2.5 mg  2.5 mg Oral Once per day on Tue Thu Sat MADDI Hovoer CNP        metoprolol succinate (TOPROL XL) extended release tablet 50 mg  50 mg Oral Daily Ti ChinMADDI CNP        mirtazapine (REMERON) tablet 7.5 mg  7.5 mg Oral Nightly MADDI Hoover CNP        pantoprazole (PROTONIX) tablet 40 mg  40 mg Oral BID AC MADDI Hoover CNP        potassium chloride (KLOR-CON M) extended release tablet 40 mEq  40 mEq Oral Daily Ti ChinMADDI CNP        sucralfate (CARAFATE) tablet 1 g  1 g Oral 4x Daily MADDI Hoover CNP        sodium chloride flush 0.9 % injection 5-40 mL  5-40 mL IntraVENous 2 times per day Cameron Sailors, APRN - CNP        sodium chloride flush 0.9 % injection 5-40 mL  5-40 mL IntraVENous PRN Cameron Sailors, APRN - CNP        0.9 % sodium chloride infusion  25 mL IntraVENous PRN Cameron Sailors, APRN - CNP        polyethylene glycol (GLYCOLAX) packet 17 g  17 g Oral Daily PRN Cameron Sailors, APRN - CNP        acetaminophen (TYLENOL) tablet 650 mg  650 mg Oral Q6H PRN Cameron Sailors, APRN - CNP        Or    acetaminophen (TYLENOL) suppository 650 mg  650 mg Rectal Q6H PRN Cameron Sailors, APRN - CNP        budesonide (PULMICORT) nebulizer suspension 250 mcg  250 mcg Nebulization BID Cameron Sailors, APRN - CNP   250 mcg at 11/03/21 1257    ipratropium (ATROVENT) 0.02 % nebulizer solution 0.5 mg  0.5 mg Nebulization 4x daily [facility] - C[facility]        Arformoterol Tartrate (BROVANA) nebulizer solution 15 mcg  15 mcg Nebulization BID Cameron Sailors, APRN - CNP   15 mcg at 11/03/21 1257    bumetanide (BUMEX) injection 1 mg  1 mg IntraVENous BID [facility], APRN - CNP        glucose (GLUTOSE) 40 % oral gel 15 g  15 g Oral PRN Cameron Sailors, APRN - CNP        dextrose 50 % IV solution  12.5 g IntraVENous PRN Sundance Sailors, APRN - CNP        glucagon (rDNA) injection 1 mg  1 mg IntraMUSCular PRN Cameron Sailors, APRN - CNP        dextrose 5 % solution  100 mL/hr IntraVENous PRN Cameron Sailors, APRN - CNP        insulin lispro (HUMALOG) injection vial 0-6 Units  0-6 Units SubCUTAneous TID WC Cameron Sailors, APRN - CNP        insulin lispro (HUMALOG) injection vial 0-3 Units  0-3 Units SubCUTAneous Nightly Cameron Sailors, APRN - CNP        heparin (porcine) injection 5,000 Units  5,000 Units SubCUTAneous Q12H [facility], APR[facility] - CNP         Current Outpatient Medications   Medication Sig Dispense Refill    pantoprazole (PROTONIX) 40 MG tablet Take 1 tablet by mouth 2 times daily (before meals) 30 tablet 3  sucralfate (CARAFATE) 1 GM tablet Take 1 tablet by mouth 4 times daily 120 tablet 3    meropenem (MERREM) infusion Infuse 1,000 mg intravenously every 12 hours For 30 days 60 g 0    DAPTOmycin (CUBICIN) infusion Infuse 675.2 mg intravenously every 24 hours For 30 days 21 g 0    mirtazapine (REMERON) 7.5 MG tablet Take 7.5 mg by mouth nightly      hydrALAZINE (APRESOLINE) 50 MG tablet Take 1 tablet by mouth every 8 hours 90 tablet 3    magnesium 200 MG TABS tablet Take 200 mg by mouth daily      diphenhydrAMINE (BENADRYL) 25 MG capsule Take 25 mg by mouth daily as needed for Itching      insulin lispro, 1 Unit Dial, (HUMALOG KWIKPEN) 100 UNIT/ML SOPN Inject 0-6 Units into the skin 3 times daily (before meals) PER SLIDING SCALE: 0-139=0U, 140-199=1U, 200-249=2U, 250-299=3U, 300-349=4U, 350-399=5U, 400-450=6U      hydrOXYzine (ATARAX) 25 MG tablet Take 25 mg by mouth 4 times daily       lactulose 20 GM/30ML SOLN Take 20 g by mouth three times a week *MON-WED-FRI*      metOLazone (ZAROXOLYN) 2.5 MG tablet Take 2.5 mg by mouth three times a week *TUE-THUR-SAT*      bumetanide (BUMEX) 1 MG tablet Take 1 mg by mouth daily       potassium chloride (KLOR-CON M) 20 MEQ extended release tablet Take 40 mEq by mouth daily       ferrous sulfate (IRON 325) 325 (65 Fe) MG tablet Take 1 tablet by mouth 2 times daily (with meals) 30 tablet 3    DULoxetine (CYMBALTA) 60 MG extended release capsule Take 1 capsule by mouth daily 30 capsule 3    fluticasone-umeclidin-vilant (TRELEGY ELLIPTA) 100-62.5-25 MCG/INH AEPB Inhale 1 puff into the lungs Daily       aspirin EC 81 MG EC tablet Take 1 tablet by mouth daily 90 tablet 1    albuterol (PROVENTIL) (2.5 MG/3ML) 0.083% nebulizer solution Take 3 mLs by nebulization every 6 hours as needed for Wheezing or Shortness of Breath DX: COPD J44.9 Please bill Medicare Part B 120 mL 6    levothyroxine (SYNTHROID) 25 MCG tablet Take 25 mcg by mouth Daily   1    gabapentin (NEURONTIN) 400 MG capsule Take 400 mg by mouth 4 times daily.  metoprolol succinate (TOPROL XL) 50 MG extended release tablet Take 50 mg by mouth daily      isosorbide mononitrate (IMDUR) 120 MG extended release tablet Take 1 tablet by mouth daily 30 tablet 0    allopurinol (ZYLOPRIM) 300 MG tablet Take 300 mg by mouth daily       lovastatin (MEVACOR) 20 MG tablet Take 20 mg by mouth nightly.          No Known Allergies    Review of Systems:  Unable to obtain d/t mentation     Physical Exam:  BP (!) 175/88   Pulse 82   Temp 97.6 °F (36.4 °C) (Oral)   Resp 16   SpO2 96%    General: Lying in bed comfortably, no distress, breathing is not labored, appears tired and ill  HEENT: PERRL, EOMI, MM dry  Neck: supple, no adenopathy  CV: RRR without murmur  Lungs: few rhonchi and decreased BS L lung field   Abd: soft, NT, ND, bowel sounds normal  Ext: warm, trace edema, no clubbing, R foot is bandaged  Skin: no rashes  Neuro: PERRL, EOMI, drowsy, responds when called, moves all extremities     Oximetry: 96% on 2L    Imaging personally reviewed:  CT chest  Impression   Multifocal pneumonia as described.       Small bilateral pleural effusions.       Significant opacification of the left mainstem bronchus.  Patient may benefit   from bronchoscopy.  Left upper lobe volume loss with compensatory   hyperaeration of the superior segment of the left lower lobe, with the   superior left lower lobe segment occupying the left pulmonary apex.       Atelectasis versus fibroatelectatic changes within the left lower lobe and   left mid lung, with consolidation and multiple calcified granulomas seen   within this consolidation.       3.5 x 3.0 cm soft tissue density mass within the superficial left upper chest   subcutaneous tissues, potentially retroareolar.  This could represent   neoplasia or marcelle asymmetric gynecomastia.  Clinical correlation is advised,   with mammographic correlation if appropriate.       Increased attenuation within the subcutaneous tissues of the partially imaged   left flank, edema versus hemorrhage, with mild edema seen in the visualized   lower right flank. CT head     Impression   No acute intracranial abnormality.           Stress test 9/27/21     Impression   1: This is an abnormal myocardial perfusion study due to the presence   of mild global hypokinesis. There is no evidence of perfusion deficits   to suggest ischemia or infarction, however. 2  The left ventricle is normal in size with an ejection fraction of   49%. 3: Prognostically, this is a low to intermediate risk study. 4: Compared to the prior study from 1/20/2018, The LVEF appears to   have improved slightly. proBNP [id number]      Labs:  Lab Results   Component Value Date    WBC 13.6 11/03/2021    HGB 7.2 11/03/2021    HCT 23.0 11/03/2021    MCV 97.0 11/03/2021    MCH 30.4 11/03/2021    MCHC 31.3 11/03/2021    RDW 17.0 11/03/2021     11/03/2021    MPV 9.4 11/03/2021     Lab Results   Component Value Date     11/03/2021    K 4.8 11/03/2021    CL 99 11/03/2021    CO2 29 11/03/2021    BUN 29 11/03/2021    CREATININE 1.7 11/03/2021    LABALBU 1.9 11/03/2021    CALCIUM 8.6 11/03/2021    GFRAA 47 11/03/2021    LABGLOM 39 11/03/2021     Lab Results   Component Value Date    PROTIME 15.9 11/03/2021    PROTIME 11.3 06/21/2011    INR 1.4 11/03/2021       Assessment:  1. Acute hypoxic respiratory failure  2. Mucus plugging of bronchi  3. Multifocal pneumonia   4. Recent TMA right foot discharged on Daptomycin and Merrem  5. HFpEF, compensated  6. PAD  7. Obesity  8. COPD not in acute exacerbation   9. Anemia   10. Leukocytosis  11. CKD    Plan:  1. Obtain respiratory and blood cultures  2. Scheduled bronchodilators  3. metaneb if he is on a floor that can provide this  4. Hold sedating medication  5. NPO midnight for possible bronchoscopy tomorrow if he can consent.  Would proceed with airway clean out and also BAL to ensure no eosinophilic pneumonia component while on Dapto  6. Agree with ID consult      Thank you for allowing me to participate in the care of Brynn Cross.        Aryan Childs MD  11/3/2021 3:47 PM

## 2021-11-03 NOTE — ED PROVIDER NOTES
[address]      Pt Name: Bharti Berman  MRN: [id number]  [place] 1940  Date of evaluation: 11/3/2021      CHIEF COMPLAINT       Chief Complaint   Patient presents with    Altered Mental Status     From [facility] for confusion and decreased 02 sats. HPI  Bharti Berman is a [de-identified] y.o. male who presents to the emergency department from [facility] for confusion and hypoxia. Per EMS patient was hypoxic prior to arrival and has been acting confused compared to his baseline. Patient appeared lethargic and was only oriented to person. History was difficult to obtain from the patient. He has had mental status changes for the past day or 2. Unknown relieving or provoking factors. Patient is on 4 L at baseline. Except as noted above the remainder of the review of systems was reviewed and negative. Review of Systems   Unable to perform ROS: Mental status change        Physical Exam  Constitutional:       General: He is not in acute distress. Appearance: Normal appearance. He is obese. He is ill-appearing. He is not toxic-appearing. HENT:      Head: Normocephalic and atraumatic. Right Ear: External ear normal.      Left Ear: External ear normal.      Nose: Nose normal.      Mouth/Throat:      Mouth: Mucous membranes are moist.      Pharynx: No oropharyngeal exudate. Eyes:      Extraocular Movements: Extraocular movements intact. Conjunctiva/sclera: Conjunctivae normal.      Pupils: Pupils are equal, round, and reactive to light. Cardiovascular:      Rate and Rhythm: Normal rate and regular rhythm. Pulses: Normal pulses. Heart sounds: Normal heart sounds. Pulmonary:      Effort: Pulmonary effort is normal. No respiratory distress. Breath sounds: Normal breath sounds. No wheezing. Abdominal:      Tenderness: There is no abdominal tenderness.  There is no guarding or rebound. Musculoskeletal:         General: No deformity or signs of injury. Cervical back: Normal range of motion and neck supple. Skin:     General: Skin is warm and dry. Capillary Refill: Capillary refill takes less than 2 seconds. Findings: Erythema present. Comments: Edema of bilateral lower extremities with scaling. His wounds appear unchanged from 2 weeks ago. Neurological:      General: No focal deficit present. Mental Status: He is lethargic, disoriented and confused. GCS: GCS eye subscore is 4. GCS verbal subscore is 4. GCS motor subscore is 6. Psychiatric:         Mood and Affect: Mood normal.          Procedures                 MDM   Patient is an 80-year-old male who presents from [facility] with altered mental status and hypoxia. Upon arrival patient was satting in the 90s on his baseline 4 L of oxygen. He appeared hemodynamically stable. Patient was oriented to self only. We remove the bandages of his lower extremities which showed similar wounds when compared to 2 weeks ago. We ordered basic labs and imaging. He had an elevated troponin, sed rate, BNP, and PCO2. CT head showed no intracranial abnormality. His CT chest showed pneumonia. Patient remained hemodynamically stable throughout the his ED course. ED Course as of Nov 03 0643   Wed Nov 03, 2021   0235 EKG read and interpreted by me. Rate 75, ventricular paced rhythm, QT interval 472, no STEMI, stable as compared to previous EKG. [TO]   0547 Troponin, High Sensitivity(!): 182 [TO]   [id number] Spoke with Dr. Edward Navarro who agrees to admit the patient.     [TO]      ED Course User Index  [TO] Doreen Gandara DO       --------------------------------------------- PAST HISTORY ---------------------------------------------  Past Medical History:  has a past medical history of (HFpEF) heart failure with preserved ejection fraction ([address] Arthritis, Bilateral carotid artery stenosis, Bilateral carotid bruits, Blood circulation, collateral, CAD (coronary artery disease), Carotid bruit, CHF (congestive heart failure) ([facility] Chronic kidney disease, CKD (chronic kidney disease) stage 3, GFR 30-59 ml/min ([facility]), COPD (chronic obstructive pulmonary disease) ([facility] Diabetes mellitus ([facility] Diabetic neuropathy associated with type 2 diabetes mellitus ([facility] Dyspnea on exertion, History of blood transfusion, Hyperlipidemia, Hypertension, Mild mitral regurgitation, Moderate tricuspid regurgitation by prior echocardiography, Movement disorder, NSTEMI, initial episode of care ([facility] Obesity, Pulmonary hypertension ([facility] PVD (peripheral vascular disease) with claudication ([facility] S/P carotid endarterectomy, Sleep apnea, SOB (shortness of breath), Thyroid disease, Unspecified cerebral artery occlusion with cerebral infarction, and Vision decreased. Past Surgical History:  has a past surgical history that includes Cardiac surgery; vascular surgery; joint replacement; Diagnostic Cardiac Cath Lab Procedure (10/14/2008); Diagnostic Cardiac Cath Lab Procedure (10/21/2010); knee surgery; Cardiac valuve replacement; Coronary artery bypass graft (2008); Tonsillectomy; Pacemaker insertion (11/12/2014); other surgical history (Right, 02/14/2017); BIOPSY / LIGATION TEMPORAL ARTERY (Bilateral, 01/05/2021); Foot Amputation (Right, 10/2/2021); Femoral Endarterectomy (Right, 9/30/2021); and Foot Amputation (Bilateral, 10/16/2021). Social History:  reports that he quit smoking about 40 years ago. His smoking use included cigarettes. He started smoking about 68 years ago. He has a 50.00 pack-year smoking history. He has never used smokeless tobacco. He reports previous drug use. He reports that he does not drink alcohol. Family History: family history includes Cancer in his sister; Heart Disease in his father and mother; Heart Failure in his father and mother; Heart Surgery in his father; High Blood Pressure in his sister.      The patients home medications have been reviewed. Allergies: Patient has no known allergies.     -------------------------------------------------- RESULTS -------------------------------------------------    LABS:  Results for orders placed or performed during the hospital encounter of 11/03/21   COVID-19, Rapid    Specimen: Nasopharyngeal Swab   Result Value Ref Range    SARS-CoV-2, NAAT Not Detected Not Detected   Lactic Acid, Plasma   Result Value Ref Range    Lactic Acid 0.9 0.5 - 2.2 mmol/L   CBC Auto Differential   Result Value Ref Range    WBC 13.6 (H) 4.5 - 11.5 E9/L    RBC 2.37 (L) 3.80 - 5.80 E12/L    Hemoglobin 7.2 (L) 12.5 - 16.5 g/dL    Hematocrit 23.0 (L) 37.0 - 54.0 %    MCV 97.0 80.0 - 99.9 fL    MCH 30.4 26.0 - 35.0 pg    MCHC 31.3 (L) 32.0 - 34.5 %    RDW 17.0 (H) 11.5 - 15.0 fL    Platelets 930 700 - 324 E9/L    MPV 9.4 7.0 - 12.0 fL    Neutrophils % 84.9 (H) 43.0 - 80.0 %    Immature Granulocytes % 0.5 0.0 - 5.0 %    Lymphocytes % 7.9 (L) 20.0 - 42.0 %    Monocytes % 3.9 2.0 - 12.0 %    Eosinophils % 2.5 0.0 - 6.0 %    Basophils % 0.3 0.0 - 2.0 %    Neutrophils Absolute 11.54 (H) 1.80 - 7.30 E9/L    Immature Granulocytes # 0.07 E9/L    Lymphocytes Absolute 1.07 (L) 1.50 - 4.00 E9/L    Monocytes Absolute 0.53 0.10 - 0.95 E9/L    Eosinophils Absolute 0.34 0.05 - 0.50 E9/L    Basophils Absolute 0.04 0.00 - 0.20 E9/L   Comprehensive Metabolic Panel w/ Reflex to MG   Result Value Ref Range    Sodium 136 132 - 146 mmol/L    Potassium reflex Magnesium 4.8 3.5 - 5.0 mmol/L    Chloride 99 98 - 107 mmol/L    CO2 29 22 - 29 mmol/L    Anion Gap 8 7 - 16 mmol/L    Glucose 144 (H) 74 - 99 mg/dL    BUN 29 (H) 6 - 23 mg/dL    CREATININE 1.7 (H) 0.7 - 1.2 mg/dL    GFR Non-African American 39 >=60 mL/min/1.73    GFR African American 47     Calcium 8.6 8.6 - 10.2 mg/dL    Total Protein 5.9 (L) 6.4 - 8.3 g/dL    Albumin 1.9 (L) 3.5 - 5.2 g/dL    Total Bilirubin 0.6 0.0 - 1.2 mg/dL    Alkaline Phosphatase 108 40 - -133 degrees    T Axis 39 degrees       RADIOLOGY:  CT HEAD WO CONTRAST   Final Result   No acute intracranial abnormality. CT CHEST WO CONTRAST   Final Result   Multifocal pneumonia as described. Small bilateral pleural effusions. Significant opacification of the left mainstem bronchus. Patient may benefit   from bronchoscopy. Left upper lobe volume loss with compensatory   hyperaeration of the superior segment of the left lower lobe, with the   superior left lower lobe segment occupying the left pulmonary apex. Atelectasis versus fibroatelectatic changes within the left lower lobe and   left mid lung, with consolidation and multiple calcified granulomas seen   within this consolidation. 3.5 x 3.0 cm soft tissue density mass within the superficial left upper chest   subcutaneous tissues, potentially retroareolar. This could represent   neoplasia or marcelle asymmetric gynecomastia. Clinical correlation is advised,   with mammographic correlation if appropriate. Increased attenuation within the subcutaneous tissues of the partially imaged   left flank, edema versus hemorrhage, with mild edema seen in the visualized   lower right flank.               ------------------------- NURSING NOTES AND VITALS REVIEWED ---------------------------  Date / Time Roomed:  11/3/2021  1:10 AM  ED Bed Assignment:  16/16    The nursing notes within the ED encounter and vital signs as below have been reviewed.      Patient Vitals for the past 24 hrs:   BP Temp Temp src Pulse Resp SpO2   11/03/21 0400 119/61 -- -- 75 -- 99 %   11/03/21 0124 (!) 100/54 99 °F (37.2 °C) Axillary 75 20 93 %       Oxygen Saturation Interpretation: Normal    ------------------------------------------ PROGRESS NOTES ------------------------------------------    Counseling:  I have spoken with the patient and discussed todays results, in addition to providing specific details for the plan of care and counseling regarding the diagnosis and prognosis. Their questions are answered at this time and they are agreeable with the plan of admission.    --------------------------------- ADDITIONAL PROVIDER NOTES ---------------------------------  Consultations:  Time: 3970. Spoke with Dr. Shraddha Chen. Discussed case. They will admit the patient. This patient's ED course included: a personal history and physicial examination and re-evaluation prior to disposition    This patient has remained hemodynamically stable during their ED course. Diagnosis:  1. Acute delirium    2. Elevated troponin        Disposition:  Patient's disposition: Admit to med/surg floor  Patient's condition is stable.         Prabha Montes De Oca DO  Resident  11/03/21 6646

## 2021-11-03 NOTE — CONSULTS
Podiatry Consult H&P  11/3/2021   Gualberto Maegan Preston       SUBJECTIVE: This is a [de-identified] y.o. male seen bedside for BLE wounds. Patient is well known to the podiatry service; recently underwent R TMA debridement and L 2nd digit amputation at [facility] on 10/16. OR cultures from debridements grew MRSA, Citrobacter, and Pseudomonas. He was placed on Dapto and Merrem per ID for 30days. He was found to have altered mental status and hypoxia at nursing facility. Upon evaluation, he is resting comfortably in bed but states that he is not sure why he is in the hospital. Denies any acute complaints and quickly falls back asleep. Remains sleeping throughout exam and dressing change. RLE wound vac was in place, Xeroform DSD to other wounds to BLE. ROS and HPI limited due to patient's mental status.         Past Medical History:   Diagnosis Date    (HFpEF) heart failure with preserved ejection fraction ([address] 07/2020    Diagnosed by cardiology    Arthritis     Bilateral carotid artery stenosis 12/31/2020    Bilateral carotid bruits 12/31/2020    Blood circulation, collateral     CAD (coronary artery disease)     Carotid bruit 03/27/2013    [facility]     Chronic kidney disease     CKD (chronic kidney disease) stage 3, GFR 30-59 ml/min ([facility]) 03/20/2016    COPD (chronic obstructive pulmonary disease) ([address]     Follows with pulmonary    Diabetes mellitus ([address]     Diabetic neuropathy associated with type 2 diabetes mellitus (HCC)     Dyspnea on exertion     History of blood transfusion     Hyperlipidemia     Hypertension     Mild mitral regurgitation     Moderate tricuspid regurgitation by prior echocardiography 2018    Movement disorder     NSTEMI, initial episode of care ([address] 01/2018    Obesity     Pulmonary hypertension ([address] 2018    PVD (peripheral vascular disease) with claudication ([address] 03/27/2014    S/P carotid endarterectomy 03/27/2013    Sleep apnea     SOB (shortness of breath)     Thyroid disease     Unspecified cerebral artery occlusion with cerebral infarction     Vision decreased 2020        Past Surgical History:   Procedure Laterality Date    BIOPSY / LIGATION TEMPORAL ARTERY Bilateral 2021    BILATERAL TEMPORAL ARTERY BIOPSY performed by Artemio Sandoval MD at [address]      5 years ago   [address]RA[address]      1 vessel and aortic valve repair    DIAGNOSTIC CARDIAC CATH LAB PROCEDURE  10/14/2008    DIAGNOSTIC CARDIAC CATH LAB PROCEDURE  10/21/2010    FEMORAL ENDARTERECTOMY Right 2021    RIGHT FEMORAL ENDARTERECTOMY performed by Kuldeep Renee MD at [facility] Right 10/2/2021    RIGHT FOOT TRANSMITTAL AMPUTATION performed by Eddy Hahn DPM at [facility] Bilateral 10/16/2021    RIGHT FOOT WOUND DEBRIDEMENT WITH APPLICATION WOUND VAC; LEFT FOOT AMPUTATION 2ND TOE performed by Eddy Hahn DPM at [facility]      R knee replacement    KNEE SURGERY      OTHER SURGICAL HISTORY Right 2017    debridement,bone bx foot    PACEMAKER INSERTION  2014    D-PPM   ()    Dr. Herbie johnson         Family History   Problem Relation Age of Onset    Heart Disease Mother     Heart Failure Mother     Heart Surgery Father     Heart Disease Father     Heart Failure Father     High Blood Pressure Sister     Cancer Sister         Social History     Tobacco Use    Smoking status: Former Smoker     Packs/day: 2.00     Years: 25.00     Pack years: 50.00     Types: Cigarettes     Start date: 1953     Quit date: 1981     Years since quittin.3    Smokeless tobacco: Never Used   Substance Use Topics    Alcohol use: Never     Comment:          Prior to Admission medications    Medication Sig Start Date End Date Taking?  Authorizing Provider   pantoprazole (PROTONIX) 40 MG tablet Take 1 tablet by mouth 2 times daily (before meals) 10/26/21   Rach Rodriguez MD   sucralfate (CARAFATE) 1 GM tablet Take 1 tablet by mouth 4 times daily 10/26/21   Rach Rodriguez MD   meropenem [facility]) infusion Infuse 1,000 mg intravenously every 12 hours For 30 days 10/26/21 11/25/21  Ruiz Black MD   DAPTOmycin (CUBICIN) infusion Infuse 675.2 mg intravenously every 24 hours For 30 days 10/26/21 11/25/21  Ruiz Black MD   mirtazapine (REMERON) 7.5 MG tablet Take 7.5 mg by mouth nightly    Historical Provider, MD   hydrALAZINE (APRESOLINE) 50 MG tablet Take 1 tablet by mouth every 8 hours 10/3/21   Rach Rodriguez MD   magnesium 200 MG TABS tablet Take 200 mg by mouth daily    Historical Provider, MD   diphenhydrAMINE (BENADRYL) 25 MG capsule Take 25 mg by mouth daily as needed for Itching    Historical Provider, MD   insulin lispro, 1 Unit Dial, (HUMALOG KWIKPEN) 100 UNIT/ML SOPN Inject 0-6 Units into the skin 3 times daily (before meals) PER SLIDING SCALE: 0-139=0U, 140-199=1U, 200-249=2U, 250-299=3U, 300-349=4U, 350-399=5U, 400-450=6U    Historical Provider, MD   hydrOXYzine (ATARAX) 25 MG tablet Take 25 mg by mouth 4 times daily     Historical Provider, MD   lactulose 20 GM/30ML SOLN Take 20 g by mouth three times a week *MON-WED-FRI*    Historical Provider, MD   metOLazone (ZAROXOLYN) 2.5 MG tablet Take 2.5 mg by mouth three times a week *TUE-THUR-SAT*    Historical Provider, MD   bumetanide (BUMEX) 1 MG tablet Take 1 mg by mouth daily     Historical Provider, MD   potassium chloride (KLOR-CON M) 20 MEQ extended release tablet Take 40 mEq by mouth daily     Historical Provider, MD   ferrous sulfate (IRON 325) 325 (65 Fe) MG tablet Take 1 tablet by mouth 2 times daily (with meals) 1/25/21   Lila Martino DO   DULoxetine (CYMBALTA) 60 MG extended release capsule Take 1 capsule by mouth daily 1/26/21   Lila Martino DO   fluticasone-umeclidin-vilant (TRELEGY ELLIPTA) 100-62.5-25 MCG/INH AEPB Inhale 1 puff into the lungs Daily     Historical Provider, MD   aspirin EC 81 MG EC tablet Take 1 tablet by mouth daily 8/7/20   Terrell Nava MD   albuterol (PROVENTIL) (2.5 MG/3ML) 0.083% nebulizer solution Take 3 mLs by nebulization every 6 hours as needed for Wheezing or Shortness of Breath DX: COPD J44.9 Please bill Medicare Part B 1/6/20   Bogdan Fu MD   levothyroxine (SYNTHROID) 25 MCG tablet Take 25 mcg by mouth Daily  7/29/19   Historical Provider, MD   gabapentin (NEURONTIN) 400 MG capsule Take 400 mg by mouth 4 times daily. 5/19/19   Historical Provider, MD   metoprolol succinate (TOPROL XL) 50 MG extended release tablet Take 50 mg by mouth daily    Historical Provider, MD   isosorbide mononitrate (IMDUR) 120 MG extended release tablet Take 1 tablet by mouth daily 1/23/18   Chaya Cunningham MD   allopurinol (ZYLOPRIM) 300 MG tablet Take 300 mg by mouth daily  2/27/15   Historical Provider, MD   lovastatin (MEVACOR) 20 MG tablet Take 20 mg by mouth nightly. 11/11/13   Historical Provider, MD        Patient has no known allergies. OBJECTIVE:        Vitals:    11/03/21 0845   BP: (!) 152/78   Pulse: 77   Resp: 16   Temp: 98.8 °F (37.1 °C)   SpO2: 96%              EXAM:            Vascular Exam:  DP and PT pulses are non-palpable B/L. Skin temperature cool to cool from proximal to distal B/L. CFT to LLE digits delayed. Absent hair growth. Mild edema to lower extremities consistent with chronic venous stasis. Neuro Exam:  Epicritic sensation diminished B/L. Gross sensation intact. Dermatologic Exam:  Open TMA site to R foot with gross bone and muscle exposure. Wound is granular, no malodor, no purulence, minimal serosanguinous drainage with dressing change. Unstageable eschar to R heel wound; eschar is soft, no active drainage, no areas of deep probing/tracking/tunneling. Sutures intact to L 2nd digit amputation site without gapping or dehiscence. Overlying scabbing with eschars to incision. Addition eschar present to L 3rd digit as pictured below. No drainage from these wounds. Diffuse hyperpigmentation and brawny appearance to calves circumferentially consistent with chronic venous stasis with dry, scaling skin. Superficial ulceration to R distal anterior leg, approx 4x3cm with pale granular bed. Mild serous drainage to surface of wound. MSK: Muscle strength testing deferred. Evidence of previous R TMA, L 2nd digit amputation. Limited passive ROM to BLE. Equinus present. Compartments are soft and compressible. R heel eschar very tender to palpation. No current facility-administered medications for this encounter.      Current Outpatient Medications   Medication Sig Dispense Refill    pantoprazole (PROTONIX) 40 MG tablet Take 1 tablet by mouth 2 times daily (before meals) 30 tablet 3    sucralfate (CARAFATE) 1 GM tablet Take 1 tablet by mouth 4 times daily 120 tablet 3    meropenem (MERREM) infusion Infuse 1,000 mg intravenously every 12 hours For 30 days 60 g 0    DAPTOmycin (CUBICIN) infusion Infuse 675.2 mg intravenously every 24 hours For 30 days 21 g 0    mirtazapine (REMERON) 7.5 MG tablet Take 7.5 mg by mouth nightly      hydrALAZINE (APRESOLINE) 50 MG tablet Take 1 tablet by mouth every 8 hours 90 tablet 3    magnesium 200 MG TABS tablet Take 200 mg by mouth daily      diphenhydrAMINE (BENADRYL) 25 MG capsule Take 25 mg by mouth daily as needed for Itching      insulin lispro, 1 Unit Dial, (HUMALOG KWIKPEN) 100 UNIT/ML SOPN Inject 0-6 Units into the skin 3 times daily (before meals) PER SLIDING SCALE: 0-139=0U, 140-199=1U, 200-249=2U, 250-299=3U, 300-349=4U, 350-399=5U, 400-450=6U      hydrOXYzine (ATARAX) 25 MG tablet Take 25 mg by mouth 4 times daily       lactulose 20 GM/30ML SOLN Take 20 g by mouth three times a week *MON-WED-FRI*      metOLazone (ZAROXOLYN) 2.5 MG tablet Take 2.5 mg by mouth three times a week *TUE-THUR-SAT*      bumetanide (BUMEX) 1 MG tablet Take 1 mg by mouth daily       potassium chloride (KLOR-CON M) 20 MEQ extended release tablet Take 40 mEq by mouth daily       ferrous sulfate (IRON 325) 325 (65 Fe) MG tablet Take 1 tablet by mouth 2 times daily (with meals) 30 tablet 3    DULoxetine (CYMBALTA) 60 MG extended release capsule Take 1 capsule by mouth daily 30 capsule 3    fluticasone-umeclidin-vilant (TRELEGY ELLIPTA) 100-62.5-25 MCG/INH AEPB Inhale 1 puff into the lungs Daily       aspirin EC 81 MG EC tablet Take 1 tablet by mouth daily 90 tablet 1    albuterol (PROVENTIL) (2.5 MG/3ML) 0.083% nebulizer solution Take 3 mLs by nebulization every 6 hours as needed for Wheezing or Shortness of Breath DX: COPD J44.9 Please bill Medicare Part B 120 mL 6    levothyroxine (SYNTHROID) 25 MCG tablet Take 25 mcg by mouth Daily   1    gabapentin (NEURONTIN) 400 MG capsule Take 400 mg by mouth 4 times daily.  metoprolol succinate (TOPROL XL) 50 MG extended release tablet Take 50 mg by mouth daily      isosorbide mononitrate (IMDUR) 120 MG extended release tablet Take 1 tablet by mouth daily 30 tablet 0    allopurinol (ZYLOPRIM) 300 MG tablet Take 300 mg by mouth daily       lovastatin (MEVACOR) 20 MG tablet Take 20 mg by mouth nightly. 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  Chart and labs reviewed. - Wounds do not appear to be acutely infected; no plans for urgent surgical intervention from podiatry standpoint at this time. Plans for flap and grafting of RLE at some point in the future pending medical stability, but will continue with wound care for now. - Wound vac orders placed for RLE TMA site; 125mmHg low continuous pressure, MWF changes. Wet-to-dry applied temporarily in ED.  - LLE and RLE above foot dressing changes to consist of Xeroform, DSD.   - Offload heels at all times while in bed.    - Abx per ID  - Will continue to follow for local wound care to BLE    Patient was discussed with Dr. John Rose DPM   11/3/2021   10:01 AM

## 2021-11-03 NOTE — Clinical Note
Patient Class: Inpatient [101]   REQUIRED: Diagnosis: AMS (altered mental status) [[id number]]   Estimated Length of Stay: Estimated stay of more than 2 midnights   Telemetry/Cardiac Monitoring Required?: Yes

## 2021-11-03 NOTE — CONSULTS
[address] [place] [address] [facility]  Consultation Note     Admit Date: 11/3/2021  1:10 AM    Reason for Consult: On Daptomycin. Elevated ESR    Attending Physician:  Barb Mandel DO    HISTORY OF PRESENT ILLNESS:             The history is obtained from extensive review of available past medical records. The patient is a [de-identified] y.o. male who is known to the ID service. The patient was recently admitted to [facility] and underwent an amputation of the left second toe because of gangrene. His right TMA wound was not healing well. He was seen by Dr. Brock Valerio and treated with Daptomycin and Meropenem for a wound culture that grew MRSA, Pseudomonas and Citrobacter. He was discharged to [facility]. The patient comes to the ED at [facility] on 11/3/2021 with altered mentation and dyspnea. He had a CT that showed infiltrates in the right lung field. No previous CT to compare with. The patient did improve. He did not have eosinophilia. He was seen by podiatry.     Past Medical History:        Diagnosis Date    (HFpEF) heart failure with preserved ejection fraction ([address] 07/2020    Diagnosed by cardiology    Arthritis     Bilateral carotid artery stenosis 12/31/2020    Bilateral carotid bruits 12/31/2020    Blood circulation, collateral     CAD (coronary artery disease)     Carotid bruit 03/27/2013    CHF (congestive heart failure) ([facility])     Chronic kidney disease     CKD (chronic kidney disease) stage 3, GFR 30-59 ml/min ([facility]) 03/20/2016    COPD (chronic obstructive pulmonary disease) ([address]     Follows with pulmonary    Diabetes mellitus ([address]     Diabetic neuropathy associated with type 2 diabetes mellitus (HCC)     Dyspnea on exertion     History of blood transfusion     Hyperlipidemia     Hypertension     Mild mitral regurgitation     Moderate tricuspid regurgitation by prior echocardiography 2018    Movement disorder     NSTEMI, initial episode of care ([address] 01/2018    Obesity     Pulmonary hypertension ([facility] 2018    PVD (peripheral vascular disease) with claudication ([facility] 03/27/2014    S/P carotid endarterectomy 03/27/2013    Sleep apnea     SOB (shortness of breath)     Thyroid disease     Unspecified cerebral artery occlusion with cerebral infarction     Vision decreased 12/31/2020 October 2021. Admitted to [facility] with worsening wound of the right foot stump. He had undergone a right TMA. Treated with Vancomycin and Zosyn. Seen by Dr. Veronica Dinh for a transmetatarsal wound infection and cellulitis. He underwent amputation of the left second toe. Zosyn was changed to Cefepime. Cultures grew MRSA, Citrobacter and Pseudomonas. Antibiotics were consolidated to Daptomycin and Meropenem    September 2021. Admitted to [facility] from [place] because of dry gangrene on the second, third toes on the right foot. Treated with Cefazolin. No cultures were taken. Antibiotics were discontinued after a couple of days. January 2021. Admitted to [facility]. He had sustained a few falls and had presented to the ED with hypotension and acute kidney injury. Blood cultures turn positive for CoNS and 2 of 4 bottles. Seen by ID for positive blood culture. He received 1 dose of Vancomycin because he had a pacer. Repeat blood cultures, however no growth, therefore antibiotics were discontinued.     February 2017.  Admitted to [facility] by his podiatrist. [facility] was being treated for a neuropathic ulcer on the right foot and had received 2 different courses of antibiotics that included Bactrim and Clindamycin. [facility] underwent a biopsy of the first metatarsal.  Treated empirically with Vancomycin and Cefepime.  Cultures were negative.  Antibiotics were discontinued and he was discharged.     Past Surgical History:        Procedure Laterality Date    BIOPSY / LIGATION TEMPORAL ARTERY Bilateral 01/05/2021    BILATERAL TEMPORAL ARTERY BIOPSY performed by Jarad Wayne MD at [address]      5 years ago   [address] GRA[address]      1 vessel and aortic valve repair    DIAGNOSTIC CARDIAC CATH LAB PROCEDURE  10/14/2008    DIAGNOSTIC CARDIAC CATH LAB PROCEDURE  10/21/2010    FEMORAL ENDARTERECTOMY Right 2021    RIGHT FEMORAL ENDARTERECTOMY performed by Brittany Beltran MD at [facility] Right 10/2/2021    RIGHT FOOT TRANSMITTAL AMPUTATION performed by Mihcael Aiken DPM at [facility] Bilateral 10/16/2021    RIGHT FOOT WOUND DEBRIDEMENT WITH APPLICATION WOUND VAC; LEFT FOOT AMPUTATION 2ND TOE performed by Michael Aiken DPM at [facility]      R knee replacement    KNEE SURGERY      OTHER SURGICAL HISTORY Right 2017    debridement,bone bx foot    PACEMAKER INSERTION  2014    D-PPM   ()    Dr. Lalo johnson     Current Medications:   Scheduled Meds:  Continuous Infusions:  PRN Meds:    Allergies:  Patient has no known allergies. Social History:   Social History     Socioeconomic History    Marital status:      Spouse name: Not on file    Number of children: Not on file    Years of education: Not on file    Highest education level: Not on file   Occupational History    Occupation: retired- [employer]   Tobacco Use    Smoking status: Former Smoker     Packs/day: 2.00     Years: 25.00     Pack years: 50.00     Types: Cigarettes     Start date: 1953     Quit date: 1981     Years since quittin.3    Smokeless tobacco: Never Used   Vaping Use    Vaping Use: Never used    Passive vaping exposure Yes   Substance and Sexual Activity    Alcohol use: Never     Comment:      Drug use: Not Currently     Comment: no longer eating edibles. states he had a bad episode.      Sexual activity: Not Currently   Other Topics Concern    Not on file   Social History Narrative    1 cup coffee daily     Social Determinants of Health     Financial Resource Strain:     Difficulty of Paying Living Expenses:    Food Insecurity:     Worried About Running Out of Food in the Last Year:     [address] in the Last Year:    Transportation Needs:     Lack of Transportation (Medical):  Lack of Transportation (Non-Medical):    Physical Activity:     Days of Exercise per Week:     Minutes of Exercise per Session:    Stress:     Feeling of Stress :    Social Connections:     Frequency of Communication with Friends and Family:     Frequency of Social Gatherings with Friends and Family:     Attends [employer] Services:     Active Member of Clubs or Organizations:     Attends Club or Organization Meetings:     Marital Status:    Intimate Partner Violence:     Fear of Current or Ex-Partner:     Emotionally Abused:     Physically Abused:     Sexually Abused:       [address] [facility] nursing home resident    Family History:       Problem Relation Age of Onset    Heart Disease Mother     Heart Failure Mother     Heart Surgery Father     Heart Disease Father     Heart Failure Father     High Blood Pressure Sister     Cancer Sister    . Otherwise non-pertinent to the chief complaint. REVIEW OF SYSTEMS:    Constitutional: Negative for fevers, chills, diaphoresis  Neurologic: Negative   Psychiatric: Negative  Rheumatologic: Negative   Endocrine: Negative  Hematologic: Negative  Immunologic: Negative  ENT: Negative  Respiratory: As in the HPI  Cardiovascular: Negative  GI: Negative  : Negative  Musculoskeletal: As in the HPI  Skin: No rashes. PHYSICAL EXAM:    Vitals:   BP (!) 152/78   Pulse 77   Temp 98.8 °F (37.1 °C) (Oral)   Resp 16   SpO2 96%   Constitutional: The patient is awake, alert, and partially oriented. No distress. Lying on a stretcher in the ED. O2 by nasal cannula 4 L. Skin: Warm and dry. No rashes were noted.    HEENT: Eyes show round, and reactive pupils. No jaundice. Moist mucous membranes, no ulcerations, no thrush. Neck: Supple to movements. No lymphadenopathy. Chest: No use of accessory muscles to breathe. Symmetrical expansion. Auscultation reveals no wheezing, crackles, or rhonchi. Cardiovascular: S1 and S2 are rhythmic and regular. No murmurs appreciated. Abdomen: Positive bowel sounds to auscultation. Benign to palpation. No masses felt. No hepatosplenomegaly. Extremities: Bilateral venous stasis dermatitis on legs. No cellulitis. Right TMA wound. Left second toe amputation site with sutures.   Lines: peripheral                  CBC+dif:  Recent Labs     11/03/21  0208   WBC 13.6*   HGB 7.2*   HCT 23.0*   MCV 97.0      NEUTROABS 11.54*     Lab Results   Component Value Date    CRP 16.0 (H) 11/03/2021    CRP 17.1 (H) 10/15/2021    CRP 21.4 (H) 09/23/2021      No results found for: CRPHS  Lab Results   Component Value Date    SEDRATE 122 (H) 11/03/2021    SEDRATE >150 (H) 10/17/2021    SEDRATE 128 (H) 10/15/2021     Lab Results   Component Value Date    ALT 14 11/03/2021    AST 20 11/03/2021    ALKPHOS 108 11/03/2021    BILITOT 0.6 11/03/2021     Lab Results   Component Value Date     11/03/2021    K 4.8 11/03/2021    CL 99 11/03/2021    CO2 29 11/03/2021    BUN 29 11/03/2021    CREATININE 1.7 11/03/2021    GFRAA 47 11/03/2021    LABGLOM 39 11/03/2021    GLUCOSE 144 11/03/2021    GLUCOSE 111 07/16/2011    PROT 5.9 11/03/2021    LABALBU 1.9 11/03/2021    CALCIUM 8.6 11/03/2021    BILITOT 0.6 11/03/2021    ALKPHOS 108 11/03/2021    AST 20 11/03/2021    ALT 14 11/03/2021       Lab Results   Component Value Date    PROTIME 15.9 11/03/2021    PROTIME 11.3 06/21/2011    INR 1.4 11/03/2021       Lab Results   Component Value Date    TSH 4.270 08/19/2021       Lab Results   Component Value Date    COLORU Yellow 11/03/2021    PHUR 5.5 11/03/2021    LABCAST RARE 10/11/2017    WBCUA NONE 09/24/2021    WBCUA NONE 07/12/2011    RBCUA 1-3 09/24/2021    RBCUA 1-3 03/17/2014    BACTERIA NONE SEEN 09/24/2021    CLARITYU Clear 11/03/2021    SPECGRAV 1.015 11/03/2021    LEUKOCYTESUR Negative 11/03/2021    UROBILINOGEN 0.2 11/03/2021    BILIRUBINUR Negative 11/03/2021    BILIRUBINUR NEGATIVE 07/12/2011    BLOODU Negative 11/03/2021    GLUCOSEU Negative 11/03/2021    GLUCOSEU NEGATIVE 07/12/2011       Lab Results   Component Value Date    HCO3 29.0 11/03/2021    BE 3.8 11/03/2021    O2SAT 96.8 11/03/2021    PH 7.405 11/03/2021    PH 7.453 09/06/2012    PCO2 47.4 11/03/2021    PO2 92.2 11/03/2021     Radiology:      Microbiology:  Blood cultures 10/21/2021: CoNS in 1 of 4 bottles. Right foot tissue culture 10/16/2021: MRSA, Citrobacter freundii, Pseudomonas aeruginosa    Assessment:  · Status post right TMA. Wound infected with Pseudomonas, Citrobacter and MRSA  · Gangrene left second toe. Status post amputation  · Possible HCAP. A bacterial pneumonia would be a rare with him being on Daptomycin and Meropenem. Rapid SARS-CoV-2 was negative but this does not rule out a viral infection  · Chronic kidney disease    Plan:    · Nares screen for MRSA  · Respiratory panel  · Resume Daptomycin and watch eosinophils  · Resume Meropenem  · Check cultures, baseline ESR, CRP  · Monitor labs  · Will follow with you    Thank you for having us see this patient in consultation. I will be discussing this case with the treating physicians.     Alyson Booker MD  9:11 AM  11/3/2021

## 2021-11-04 NOTE — PROGRESS NOTES
[address] [place] [address] [facility]  Progress Note    SUBJECTIVE:  Chief Complaint   Patient presents with    Altered Mental Status     From [facility] for confusion and decreased 02 sats. Patient is tolerating medications. No reported adverse drug reactions. No nausea, vomiting, diarrhea. Review of systems:  As stated above in the chief complaint, otherwise negative.     Medications:  Scheduled Meds:   daptomycin (CUBICIN) IVPB  700 mg IntraVENous Q48H    meropenem  1,000 mg IntraVENous Q12H    allopurinol  300 mg Oral Daily    aspirin EC  81 mg Oral Daily    DULoxetine  60 mg Oral Daily    ferrous sulfate  325 mg Oral BID WC    gabapentin  200 mg Oral 4x Daily    hydrALAZINE  50 mg Oral 3 times per day    isosorbide mononitrate  120 mg Oral Daily    lactulose  20 g Oral Once per day on Mon Wed Fri    levothyroxine  25 mcg Oral Daily    atorvastatin  10 mg Oral Daily    magnesium oxide  200 mg Oral Daily    metOLazone  2.5 mg Oral Once per day on Tue Thu Sat    metoprolol succinate  50 mg Oral Daily    mirtazapine  7.5 mg Oral Nightly    pantoprazole  40 mg Oral BID AC    potassium chloride  40 mEq Oral Daily    sucralfate  1 g Oral 4x Daily    sodium chloride flush  5-40 mL IntraVENous 2 times per day    budesonide  250 mcg Nebulization BID    ipratropium  0.5 mg Nebulization 4x daily    Arformoterol Tartrate  15 mcg Nebulization BID    bumetanide  1 mg IntraVENous BID    insulin lispro  0-6 Units SubCUTAneous TID     insulin lispro  0-3 Units SubCUTAneous Nightly    heparin (porcine)  5,000 Units SubCUTAneous Q12H     Continuous Infusions:   sodium chloride Stopped (11/03/21 1933)    sodium chloride      dextrose       PRN Meds:albuterol, sodium chloride flush, sodium chloride, polyethylene glycol, acetaminophen **OR** acetaminophen, glucose, dextrose, glucagon (rDNA), dextrose    OBJECTIVE:  BP (!) 131/98   Pulse 97   Temp 98.4 °F (36.9 °C) (Oral) Radiology:      Microbiology:   Respiratory panel: Negative  Rapid SARS-CoV-2: Negative  Blood cultures 11/4/2021: Negative so far  Streptococcus pneumoniae/Legionella urine Ag: negative  Nares screen MRSA: Pending    Recent Labs     11/03/21  1351   PROCAL 0.55*       ASSESSMENT:  · Status post right TMA.  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

## 2021-11-04 NOTE — PROGRESS NOTES
Podiatry Progress Note  11/4/2021   Edison Schneider Ifft       SUBJECTIVE: This is a [de-identified] y.o. male seen bedside for BLE wounds. 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 Patient is currently admitted due to AMS, hypoxia. Patient is resting in bed at the time of visit; planning on bronchoscopy today. He is more alert today than evaluation in the ED yesterday. No complaints at this time. No acute events reported overnight. OBJECTIVE:    Vitals:    11/04/21 0930   BP: (!) 131/98   Pulse: 97   Resp: 18   Temp: 98.4 °F (36.9 °C)   SpO2: 97%       EXAM:    Vascular Exam:  DP and PT pulses are non-palpable B/L. Skin temperature cool to cool from proximal to distal B/L. CFT to LLE digits delayed. Absent hair growth. Mild edema to lower extremities consistent with chronic venous stasis.      Neuro Exam:  Epicritic sensation diminished B/L. Gross sensation intact.      Dermatologic Exam:  Open TMA site to R foot with gross bone and muscle exposure. Wound is granular, no malodor, no purulence, minimal serosanguinous drainage with dressing change. Unstageable eschar to R heel wound; eschar is soft, no active drainage, no areas of deep probing/tracking/tunneling. Sutures intact to L 2nd digit amputation site without gapping or dehiscence. Overlying scabbing with eschars to incision. Addition eschar present to L 3rd digit as pictured below. No drainage from these wounds. Diffuse hyperpigmentation and brawny appearance to calves circumferentially consistent with chronic venous stasis with dry, scaling skin. Superficial ulceration to R distal anterior leg, approx 4x3cm with pale granular bed. Mild serous drainage to surface of wound.     MSK: Muscle strength testing deferred. Evidence of previous R TMA, L 2nd digit amputation. Limited passive ROM to BLE. Equinus present. Compartments are soft and compressible.  R heel eschar very tender to palpation.                   Current Facility-Administered Medications   Medication Dose Route Frequency Provider Last Rate Last Admin    DAPTOmycin (CUBICIN) 700 mg in sodium chloride 0.9 % 50 mL IVPB  700 mg IntraVENous Q48H Amador Abel MD   Stopped at 11/03/21 1748    meropenem (MERREM) 1,000 mg in sodium chloride 0.9 % 100 mL IVPB (mini-bag)  1,000 mg IntraVENous Q12H Amador Abel MD   Stopped at 11/04/21 0631    0.9 % sodium chloride infusion   IntraVENous Q12H Amador Abel MD   Stopped at 11/03/21 1933    albuterol (PROVENTIL) nebulizer solution 2.5 mg  2.5 mg Nebulization Q6H PRN MADDI Castro CNP        allopurinol (ZYLOPRIM) tablet 300 mg  300 mg Oral Daily MADDI Castro CNP        aspirin EC tablet 81 mg  81 mg Oral Daily MADDI Castro CNP        DULoxetine (CYMBALTA) extended release capsule 60 mg  60 mg Oral Daily MADDI Castro CNP        ferrous sulfate (IRON 325) tablet 325 mg  325 mg Oral BID  MADDI Castro CNP        gabapentin (NEURONTIN) capsule 200 mg  200 mg Oral 4x Daily MADDI Castro CNP   200 mg at 11/03/21 2328    hydrALAZINE (APRESOLINE) tablet 50 mg  50 mg Oral 3 times per day MADDI Castro CNP   50 mg at 11/03/21 2328    isosorbide mononitrate (IMDUR) extended release tablet 120 mg  120 mg Oral Daily MADDI Castro CNP        lactulose (CHRONULAC) 10 GM/15ML solution 20 g  20 g Oral Once per day on Mon Wed Fri MADDI Castro CNP        levothyroxine (SYNTHROID) tablet 25 mcg  25 mcg Oral Daily MADDI Castro CNP        atorvastatin (LIPITOR) tablet 10 mg  10 mg Oral Daily MADDI Castro CNP        magnesium oxide (MAG-OX) tablet 200 mg  200 mg Oral Daily MADDI Castro CNP        metOLazone (ZAROXOLYN) tablet 2.5 mg  2.5 mg Oral Once per day on Tue Thu Sat MADDI Castro CNP        metoprolol succinate (TOPROL XL) extended release tablet 50 mg 50 mg Oral Daily Wandra Sharona, APRN - CNP        mirtazapine (REMERON) tablet 7.5 mg  7.5 mg Oral Nightly Wandra Sharona, APRN - CNP   7.5 mg at 11/03/21 2329    pantoprazole (PROTONIX) tablet 40 mg  40 mg Oral BID AC Wandra Sharona, APRN - CNP        potassium chloride (KLOR-CON M) extended release tablet 40 mEq  40 mEq Oral Daily Wandra Sharona, APRN - CNP        sucralfate (CARAFATE) tablet 1 g  1 g Oral 4x Daily Wandra Sharona, APRN - CNP   1 g at 11/03/21 2328    sodium chloride flush 0.9 % injection 5-40 mL  5-40 mL IntraVENous 2 times per day Wandra Sharona, APRN - CNP   10 mL at 11/03/21 2334    sodium chloride flush 0.9 % injection 5-40 mL  5-40 mL IntraVENous PRN Wandra Sharona, APRN - CNP        0.9 % sodium chloride infusion  25 mL IntraVENous PRN Wandra Sharona, APRN - CNP        polyethylene glycol (GLYCOLAX) packet 17 g  17 g Oral Daily PRN Wandra Sharona, APRN - CNP        acetaminophen (TYLENOL) tablet 650 mg  650 mg Oral Q6H PRN Wandra Sharona, APRN - CNP        Or    acetaminophen (TYLENOL) suppository 650 mg  650 mg Rectal Q6H PRN Wandra Sharona, APRN - CNP        budesonide (PULMICORT) nebulizer suspension 250 mcg  250 mcg Nebulization BID Wandra Sharona, APRN - CNP   250 mcg at 11/03/21 1918    ipratropium (ATROVENT) 0.02 % nebulizer solution 0.5 mg  0.5 mg Nebulization 4x daily Wandra Sharona, APRN - CNP   0.5 mg at 11/03/21 1918    Arformoterol Tartrate (BROVANA) nebulizer solution 15 mcg  15 mcg Nebulization BID Wandra Sharona, APRN - CNP   15 mcg at 11/03/21 1917    bumetanide (BUMEX) injection 1 mg  1 mg IntraVENous BID Wandra Sharona, APRN - CNP   1 mg at 11/03/21 2333    glucose (GLUTOSE) 40 % oral gel 15 g  15 g Oral PRN Wandra Sharona, APRN - CNP        dextrose 50 % IV solution  12.5 g IntraVENous PRN Wandra Sharona, APRN - CNP        glucagon (rDNA) injection 1 mg  1 mg IntraMUSCular PRN Wandra Sharona, APRN - CNP        dextrose 5 % solution  100 mL/hr IntraVENous PRN MADDI Nicholas - CNP        insulin lispro (HUMALOG) injection vial 0-6 Units  0-6 Units SubCUTAneous TID WC Baldo Durand APRN - CNP        insulin lispro (HUMALOG) injection vial 0-3 Units  0-3 Units SubCUTAneous Nightly Baldo Durand APRN - CNP        heparin (porcine) injection 5,000 Units  5,000 Units SubCUTAneous Q12H MADDI Nicholas - CNP   5,000 Units at 11/04/21 0300        Lab Results   Component Value Date    WBC 11.8 (H) 11/04/2021    HCT 24.4 (L) 11/04/2021    HGB 7.7 (L) 11/04/2021     11/04/2021     11/03/2021    K 4.8 11/03/2021    CL 99 11/03/2021    CO2 29 11/03/2021    BUN 29 (H) 11/03/2021    CREATININE 1.7 (H) 11/03/2021    GLUCOSE 144 (H) 11/03/2021    CRP 16.0 (H) 11/03/2021         Radiographs: N/A    ASSESEMENT:  - Osteomyelitis, R foot, 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 Chart and labs reviewed. - Wounds do not appear to be acutely infected; no plans for urgent surgical intervention from podiatry standpoint at this time. Plans for flap and grafting of RLE at some point in the future pending medical stability, but will continue with wound care for now. - Wound vac applied at bedside today; 125mmHg low continuous pressure. MWF change schedule, next change 11/8.  - LLE and RLE above wound vac changed, will continue QoD. Next change 11/6.  - Offload heels at all times while in bed. Offloading boots ordered. - Abx per ID - Dapto and Merrem continued.   - Will continue to follow for local wound care to BLE    Patient was d/w Dr. Marj Santana, DPM   11/4/2021   11:40 AM

## 2021-11-04 NOTE — PROGRESS NOTES
Nat Washington M.D.,[facility]  Miryam Ayers D.O., F.A.C.O.I., Christy Mcdonough M.D. Jami Serrato M.D. Leigh Ann Jackson D.O. Daily Pulmonary Progress Note    Patient:  Virgil Zambrano [de-identified] y.o. male MRN: [id number]     Date of Service: 11/4/2021      Synopsis     We are following patient for need for bronchoscopy    \"CC\" shortness of breath    Code status: Full      Subjective      Patient was seen and examined. Lying in bed in no acute distress. Occasional cough no mucus production. Oxygen on at 4 L nasal cannula. N.p.o. for bronchoscopy. Review of Systems:  Constitutional: Denies fever, weight loss, night sweats, and fatigue  Skin: Denies pigmentation, dark lesions, and rashes   HEENT: Denies hearing loss, tinnitus, ear drainage, epistaxis, sore throat, and hoarseness. Cardiovascular: Denies palpitations, chest pain, and chest pressure.   Respiratory: Occasional cough dyspnea  Gastrointestinal: Denies nausea, vomiting, poor appetite, diarrhea, heartburn or reflux  Genitourinary: Denies dysuria, frequency, urgency or hematuria  Musculoskeletal: Denies myalgias, muscle weakness, and bone pain  Neurological: Denies dizziness, vertigo, headache, and focal weakness  Psychological: Denies anxiety and depression  Endocrine: Denies heat intolerance and cold intolerance  Hematopoietic/Lymphatic: Denies bleeding problems and blood transfusions    24-hour events:  None    Objective   Vitals: BP (!) 190/94   Pulse 92   Temp 98.4 °F (36.9 °C) (Oral)   Resp 16   Wt 240 lb 4.8 oz (109 kg)   SpO2 100%   BMI 36.54 kg/m²     I/O:    Intake/Output Summary (Last 24 hours) at 11/4/2021 1431  Last data filed at 11/4/2021 1403  Gross per 24 hour   Intake 300 ml   Output 2301 ml   Net -2001 ml       Vent Information  FiO2 : 2 %  SpO2: 100 %  SpO2/FiO2 ratio: 4950                CURRENT MEDS :  Scheduled Meds:   albuterol  2.5 mg Nebulization Q4H While awake    acetylcysteine  4 mL Inhalation Q6H daptomycin (CUBICIN) IVPB  700 mg IntraVENous Q48H    meropenem  1,000 mg IntraVENous Q12H    allopurinol  300 mg Oral Daily    aspirin EC  81 mg Oral Daily    DULoxetine  60 mg Oral Daily    ferrous sulfate  325 mg Oral BID WC    gabapentin  200 mg Oral 4x Daily    hydrALAZINE  50 mg Oral 3 times per day    isosorbide mononitrate  120 mg Oral Daily    lactulose  20 g Oral Once per day on Mon Wed Fri    levothyroxine  25 mcg Oral Daily    atorvastatin  10 mg Oral Daily    magnesium oxide  200 mg Oral Daily    metOLazone  2.5 mg Oral Once per day on Tue Thu Sat    metoprolol succinate  50 mg Oral Daily    mirtazapine  7.5 mg Oral Nightly    pantoprazole  40 mg Oral BID AC    potassium chloride  40 mEq Oral Daily    sucralfate  1 g Oral 4x Daily    sodium chloride flush  5-40 mL IntraVENous 2 times per day    budesonide  250 mcg Nebulization BID    Arformoterol Tartrate  15 mcg Nebulization BID    bumetanide  1 mg IntraVENous BID    insulin lispro  0-6 Units SubCUTAneous TID     insulin lispro  0-3 Units SubCUTAneous Nightly    heparin (porcine)  5,000 Units SubCUTAneous Q12H       Physical Exam:  General Appearance: appears comfortable in no acute distress. HEENT: Normocephalic atraumatic without obvious abnormality   Neck: Lips, mucosa, and tongue normal.  Supple, symmetrical, trachea midline, no adenopathy;thyroid:  no enlargement/tenderness/nodules or JVD. Lung: Breath sounds few rhonchi bilaterally diminished left lung base. Respirations   unlabored. Symmetrical expansion. Heart: RRR, normal S1, S2. No MRG  Abdomen: Soft, NT, ND. BS present x 4 quadrants. No bruit or organomegaly. Extremities: Pedal pulses 2+ symmetric b/l. Extremities normal, no cyanosis, lower extremities with bandages  Musculokeletal: No joint swelling, no muscle tenderness. ROM normal in all joints of extremities. Neurologic: Mental status: Alert and Oriented X3 .     Pertinent/ New Labs and Imaging Studies     Imaging Personally Reviewed:    Impression   Multifocal pneumonia as described. Small bilateral pleural effusions. Significant opacification of the left mainstem bronchus. Patient may benefit   from bronchoscopy. Left upper lobe volume loss with compensatory   hyperaeration of the superior segment of the left lower lobe, with the   superior left lower lobe segment occupying the left pulmonary apex. Atelectasis versus fibroatelectatic changes within the left lower lobe and   left mid lung, with consolidation and multiple calcified granulomas seen   within this consolidation. 3.5 x 3.0 cm soft tissue density mass within the superficial left upper chest   subcutaneous tissues, potentially retroareolar. This could represent   neoplasia or marcelle asymmetric gynecomastia. Clinical correlation is advised,   with mammographic correlation if appropriate. Increased attenuation within the subcutaneous tissues of the partially imaged   left flank, edema versus hemorrhage, with mild edema seen in the visualized   lower right flank. CT head      Impression   No acute intracranial abnormality. Stress test 9/27/21      Impression   1: This is an abnormal myocardial perfusion study due to the presence   of mild global hypokinesis. There is no evidence of perfusion deficits   to suggest ischemia or infarction, however. 2  The left ventricle is normal in size with an ejection fraction of   49%. 3: Prognostically, this is a low to intermediate risk study. 4: Compared to the prior study from 1/20/2018, The LVEF appears to   have improved slightly.       proBNP [id number]              Labs:  Lab Results   Component Value Date    WBC 11.8 11/04/2021    HGB 7.7 11/04/2021    HCT 24.4 11/04/2021    MCV 96.1 11/04/2021    MCH 30.3 11/04/2021    MCHC 31.6 11/04/2021    RDW 16.9 11/04/2021     11/04/2021    MPV 9.5 11/04/2021     Lab Results   Component Value Date     11/03/2021    K 4.8 11/03/2021 CL 99 11/03/2021    CO2 29 11/03/2021    BUN 29 11/03/2021    CREATININE 1.7 11/03/2021    LABALBU 1.9 11/03/2021    CALCIUM 8.6 11/03/2021    GFRAA 47 11/03/2021    LABGLOM 39 11/03/2021     Lab Results   Component Value Date    PROTIME 15.9 11/03/2021    PROTIME 11.3 06/21/2011    INR 1.4 11/03/2021     Recent Labs     11/03/21  0208   PROBNP 10,571*     Recent Labs     11/03/21  1351   PROCAL 0.55*     This SmartLink has not been configured with any valid records. Micro:  No results for input(s): CULTRESP in the last 72 hours. No results for input(s): LABGRAM in the last 72 hours. No results for input(s): LEGUR in the last 72 hours. Recent Labs     11/04/21  0105   STREPNEUMAGU Presumptive negative- suggests no current or recent  pneumococcal infection. Infection due to Strep pneumoniae cannot be  ruled out since the antigen present in the sample  may be below the detection limit of the test.  Normal Range:Presumptive Negative       Recent Labs     11/04/21  0105   LP1UAG Presumptive Negative -suggesting no recent or current infections  with Legionella pneumophila serogroup 1. Infection to Legionella cannot be ruled out since other serogroups  and species may cause infection, antigen may not be present in  early infection, or level of antigen may be below the  detection limit.   Normal Range: Presumptive Negative            Assessment:    Acute respiratory failure with hypoxia  Mucous plugging of bronchi  Multifocal pneumonia  Recent TMA right foot discharged on daptomycin and meropenem  Heart failure with preserved EF, compensated  Peripheral arterial disease  Obesity with BMI 36.5  COPD not in acute exacerbation  Anemia  Leukocytosis  Chronic kidney disease      Plan:   Oxygen therapy 4 L nasal cannula  Scheduled bronchodilators-albuterol every 4 hours while awake, Brovana and budesonide twice daily, Mucomyst 10% every 6 hours via MetaNebs  Limit and hold sedating medication  N.p.o. for bronchoscopy with BAL today and check eosinophils since patient is on daptomycin  ID for management of antibiotics-Merrem and daptomycin  Bumex 1 mg IV twice daily  DVT, GI prophylaxis  Wound care    This plan of care was reviewed in collaboration with Dr. April Cabot  Electronically signed by MADDI Garcia CNP on 11/4/2021      I personally saw, examined, and cared for the patient. Labs, medications, radiographs reviewed. I agree with history exam and plans detailed in NP note.    Jael Glover MD

## 2021-11-04 NOTE — PROGRESS NOTES
[facility] Progress Note    Admitting Date and Time: 11/3/2021  1:10 AM  Admit Dx: Acute delirium [R41.0]  Leg swelling [M79.89]  Elevated troponin [R77.8]  AMS (altered mental status) [R41.82]    Subjective:  Patient is being followed for Acute delirium [R41.0]  Leg swelling [M79.89]  Elevated troponin [R77.8]  AMS (altered mental status) [R41.82]     Patient was seen and examined this AM.  Angry and requesting to drink. Patient n.p.o. for bronchoscopy  -ID following, remains on daptomycin and meropenem.  -Pulmonology following, for bronchoscopy today    ROS: denies fever, chills, cp, sob, n/v, HA unless stated above.       daptomycin (CUBICIN) IVPB  700 mg IntraVENous Q48H    meropenem  1,000 mg IntraVENous Q12H    allopurinol  300 mg Oral Daily    aspirin EC  81 mg Oral Daily    DULoxetine  60 mg Oral Daily    ferrous sulfate  325 mg Oral BID WC    gabapentin  200 mg Oral 4x Daily    hydrALAZINE  50 mg Oral 3 times per day    isosorbide mononitrate  120 mg Oral Daily    lactulose  20 g Oral Once per day on Mon Wed Fri    levothyroxine  25 mcg Oral Daily    atorvastatin  10 mg Oral Daily    magnesium oxide  200 mg Oral Daily    metOLazone  2.5 mg Oral Once per day on Tue Thu Sat    metoprolol succinate  50 mg Oral Daily    mirtazapine  7.5 mg Oral Nightly    pantoprazole  40 mg Oral BID AC    potassium chloride  40 mEq Oral Daily    sucralfate  1 g Oral 4x Daily    sodium chloride flush  5-40 mL IntraVENous 2 times per day    budesonide  250 mcg Nebulization BID    ipratropium  0.5 mg Nebulization 4x daily    Arformoterol Tartrate  15 mcg Nebulization BID    bumetanide  1 mg IntraVENous BID    insulin lispro  0-6 Units SubCUTAneous TID WC    insulin lispro  0-3 Units SubCUTAneous Nightly    heparin (porcine)  5,000 Units SubCUTAneous Q12H     albuterol, 2.5 mg, Q6H PRN  sodium chloride flush, 5-40 mL, PRN  sodium chloride, 25 mL, PRN  polyethylene glycol, 17 g, Daily PRN  acetaminophen, 650 mg, Q6H PRN   Or  acetaminophen, 650 mg, Q6H PRN  glucose, 15 g, PRN  dextrose, 12.5 g, PRN  glucagon (rDNA), 1 mg, PRN  dextrose, 100 mL/hr, PRN         Objective:    BP (!) 159/78   Pulse 94   Temp 98 °F (36.7 °C) (Oral)   Resp 16   Wt 240 lb 4.8 oz (109 kg)   SpO2 99%   BMI 36.54 kg/m²     General: Lying in bed comfortably, no distress, breathing is not labored, appears tired and ill  HEENT: PERRL, EOMI, MM dry  Neck: supple, no adenopathy  CV: RRR without murmur  Lungs: few rhonchi and decreased BS L lung field   Abd: soft, NT, ND, bowel sounds normal  Ext: warm, trace edema, no clubbing, R foot is bandaged  Skin: no rashes  Neuro: PERRL, EOMI, drowsy, responds when called, moves all extremities        Recent Labs     11/03/21  0208      K 4.8   CL 99   CO2 29   BUN 29*   CREATININE 1.7*   GLUCOSE 144*   CALCIUM 8.6       Recent Labs     11/03/21  0208 11/04/21  0100   WBC 13.6* 11.8*   RBC 2.37* 2.54*   HGB 7.2* 7.7*   HCT 23.0* 24.4*   MCV 97.0 96.1   MCH 30.4 30.3   MCHC 31.3* 31.6*   RDW 17.0* 16.9*    210   MPV 9.4 9.5       Assessment:    Active Problems:    AMS (altered mental status)  Resolved Problems:    * No resolved hospital problems. *    Assessment:  -Acute on chronic hypoxic respiratory failure, 2/2 multifocal pneumonia mucous plugging  -Multifocal pneumonia  -Leukocytosis, 2/2 the above  -S/p right TMA, left second toe amputation  -Hx COPD-on 4 LNC  -Hx HFpEF, EF 49% per stress test on 9/21.  -Hx aortic stenosis, s/p TAVR  -Hx hypertension  -Hx CKD stage III  -Hx normocytic anemia-at baseline  -Hx PVD  -Hx diabetes mellitus    Plan:  -ID and pulmonology following. Input appreciated  -previous cultures growing MRSA, Citrobacter and Pseudomonas.  -Continuing on meropenem and daptomycin, for bronchoscopy today. Awaiting final cultures.  -Previously evaluated by EGS for anemia, recommended outpatient EGD and colonoscopy.   Continuing on PPI and Carafate  -Continue breathing treatments, monitor respiratory status  -Continue cardiac medications  -SSI with hypoglycemia panel in place. NOTE: This report was transcribed using voice recognition software. Every effort was made to ensure accuracy; however, inadvertent computerized transcription errors may be present.   Electronically signed by Gilda Rod MD on 11/4/2021 at 8:33 AM

## 2021-11-04 NOTE — FLOWSHEET NOTE
Initial Inpatient Wound Care    Admit Date: 11/3/2021  1:10 AM    Reason for consult: wound VAC  Significant history:  Encephalopathy, presumably metabolic  Chronic LE wounds setting of PVD and DM  HFrEF, acute w likely acute component  Acute vs acute on chronic hypoxia  Anemia, chronic  CKDIII  DM  HTN  PVD  CAD  Wound history:  POA  Findings: awake verbal. Swearing. Told me to just do my job and shut up. Left buttocks, scarring with small open area       11/04/21 1200   Wound 10/16/21 Buttocks Right   Date First Assessed: 10/16/21   Present on Hospital Admission: Yes  Primary Wound Type:  Other (comment)  Location: Buttocks  Wound Location Orientation: Right   Wound Image    Wound Etiology Pressure Unstageable   Dressing Change Due 11/04/21   Wound Length (cm) 4 cm   Wound Width (cm) 3 cm   Wound Depth (cm)   (obs)   Wound Surface Area (cm^2) 12 cm^2   Change in Wound Size % (l*w) 14.29   Wound Assessment   (yellow/fibrin)   Drainage Amount Moderate   Drainage Description Yellow   Odor None   Wendy-wound Assessment   (red,torn areas)   Wound 10/19/21 Foot Right   Date First Assessed/Time First Assessed: 10/19/21 1107   Present on Hospital Admission: No  Location: Foot  Wound Location Orientation: Right   Wound Image    Wound Etiology   (surgical trans met amputation)   Dressing Status New dressing applied   Wound Cleansed Cleansed with saline   Dressing/Treatment Negative pressure wound therapy   Dressing Change Due 11/08/21   Wound Length (cm) 12 cm   Wound Width (cm) 12 cm   Wound Depth (cm) 0.4 cm   Wound Surface Area (cm^2) 144 cm^2   Change in Wound Size % (l*w) 11.38   Wound Volume (cm^3) 57.6 cm^3   Wound Healing % -254   Wound Assessment   (pink)   Drainage Amount Scant   Drainage Description Serosanguinous   Odor None   Wendy-wound Assessment Intact   Wound Thickness Description not for Pressure Injury Full thickness   Wound 11/04/21 Heel Right   Date First Assessed/Time First Assessed: 11/04/21 0408 Location: Heel  Wound Location Orientation: Right   Wound Image    Wound Etiology Pressure Unstageable   Dressing Status New dressing applied   Wound Cleansed Cleansed with saline   Dressing/Treatment   (gauze, kerlex wrap)   Wound Length (cm) 4 cm   Wound Width (cm) 5 cm   Wound Depth (cm)   (obs)   Wound Surface Area (cm^2) 20 cm^2   Wound Assessment   (black)   Drainage Amount Small   Drainage Description Sanguinous   Odor None   Wendy-wound Assessment   (several small open areas bleeding)   Negative Pressure Wound Therapy Right;Distal   Placement Date/Time: 11/04/21 1300   Pre-existing: No  Inserted by: Cory Alvarez  Wound Location Orientation: Right;Distal   $ Standard NPWT >50 sq cm PER TX $ Yes   Dressing Type Black foam   Number of pieces used 1   Cycle Continuous   Target Pressure (mmHg) 125   Canister changed?   (new)   Dressing Status Changed   Dressing Change Due 11/08/21     Impression: unstageable pressure injury right buttocks/sacrum POA  unstageable pressure injury rt heel POA  Non healed surgical trans met amp    Interventions in place: bariatric lo air loss bed ordered  Podiatry is following.  Also amputation 2nd left toe    Plan: dressing buttocks sacral wounds  Continue SOS and heel protectors    MADDI Yadav 11/4/2021 4:38 PM

## 2021-11-04 NOTE — CARE COORDINATION
Social Work/Discharge Planning:  Met with patient and he was unable to indicate facility he admitted. Chart reviewed. Patient admitted from [facility] at [facility] LOC. COVID negative 11/3. Called patient brother Cari Ramos (ph: [phone]) and left a message in regards to discharge planning. Patient on four liters of oxygen. Called liaison Murali Athens with [facility] and confirmed patient is a bed hold and no pre-cert needed to return. Patient will need a negative covid result within 48 hours of discharge. Murali Athens states patient will need a therapy evaluation. Murali Athens confirmed patient had a would vac at facility. Electronic N-17 in Epic and Ambulance form in soft chart. Patient will need a therapy evaluation when appropriate. Will continue to follow and assist with discharge planning. Electronically signed by JOSE Calles on 11/4/2021 at 12:43 PM    Addendum:  Patient brother Cari Ramos returned call and confirmed plan for patient to return to [facility] at discharge. Will continue to follow.  Electronically signed by JOSE Calles on 11/4/2021 at 3:09 PM

## 2021-11-04 NOTE — PROCEDURES
Bronchoscopy Inpatient Procedure Note    Date of Procedure: 11/4/2021    Pre-op Diagnosis: Left mainstem mucous plugging    Post-op Diagnosis: Thick mucoid impaction of the left mainstem all the way down to the left lower lobe    Bronchoscopist: Saundra Mccormick MD      Anesthesia: Conscious Sedation. Propofol was used please see CRNA note. Procedure: Flexible fiberoptic bronchoscopy/therapeutic bronchoscopy    Estimated Blood Loss: None    Complications: None    Indications and History      (Please see today's H&P for the latest issues,  physical exam and lab data)    Consent to Procedure  The risks, benefits, complications, treatment options and expected outcomes were discussed with the patient and/or POA  The possibilities of reaction to medication, pulmonary aspiration, perforation of a viscus, bleeding, failure to diagnose a condition and creating a complication requiring transfusion or operation were discussed with the patient who freely signed the consent. Description of Procedure  Willa Closs was monitored by the CRNA and the standard  monitoring devices. Dickson Kim and the procedure were verified as Flexible Fiberoptic Bronchoscopy. A Time Out was held and the above information confirmed. The patient was placed in the appropriate position. The patient was sedated using propofol intravenously    The bronchoscope was then passed into the trachea via oropharynx. Lidocaine 2% solution 2 ml at a time was applied topically to the shreya. After careful inspection of the tracheal, the bronchoscope was sequentially passed into all segments of the left and right endobronchial trees to the second and/or third divisions. Endobronchial findings    Vocal Cords Normal  Trachea: No tracheal stenosis.     Normal mucosa  Shreya  Normal mucosa and Sharp    Right Main Stem Bronchus  Normal mucosa   Right Upper Lobe Bronchi Normal mucosa  Right Middle Lobe Bronchi edematous mucosa and with dynamic collapsible airways  Right Lower Lobe Bronchi (including the Superior segment)  Edematous mucosa with dynamic collapsible airways    Left Main Stem Bronchus Collapse with mucus plugging  Left Upper Lobe Bronchus, Upper Division Collapse with mucus plugging  Left Upper Lobe Bronchus, Lingula  Edematous mucosa and Collapse with mucus plugging  Left Lower Lobe Bronchus (including the Superior segment)  Edematous mucosa and Collapse with mucus plugging    Specimens Taken    Washings -  Amount -10 cc   location -left mainstem  BAL -  Amount - 120 cc Location -left lower lobe and also 60 cc from right lower lobe    Paz Brody MD

## 2021-11-04 NOTE — ANESTHESIA POSTPROCEDURE EVALUATION
Department of Anesthesiology  Postprocedure Note    Patient: Demetra Villareal  MRN: [id number]  YOB: 1940  Date of evaluation: 11/4/2021  Time:  3:10 PM     Procedure Summary     Date: 11/04/21 Room / Location: [place] [id number] / [facility]    Anesthesia Start: 1325 Anesthesia Stop: 9421    Procedure: BRONCHOSCOPY DIAGNOSTIC OR CELL [address] ONLY and bal (N/A ) Diagnosis: (Pneumonia)    Surgeons: Susan Dias MD Responsible Provider: Abhi Talley MD    Anesthesia Type: MAC ASA Status: 4          Anesthesia Type: MAC    Mayela Phase I:      Mayela Phase II: Mayela Score: 9    Last vitals: Reviewed and per EMR flowsheets.        Anesthesia Post Evaluation    Patient location during evaluation: PACU  Patient participation: complete - patient participated  Level of consciousness: awake and alert  Airway patency: patent  Nausea & Vomiting: no vomiting and no nausea  Complications: no  Cardiovascular status: hemodynamically stable  Respiratory status: acceptable  Hydration status: stable  Multimodal analgesia pain management approach

## 2021-11-04 NOTE — ANESTHESIA PRE PROCEDURE
Department of Anesthesiology  Preprocedure Note       Name:  Katherin Walsh   Age:  [de-identified] y.o.  :  1940                                          MRN:  [id number]         Date:  2021      Surgeon: Shawanda Richard):  Trina Hyatt MD    Procedure: BRONCHOSCOPY DIAGNOSTIC OR CELL [address] ONLY    Medications prior to admission:   Prior to Admission medications    Medication Sig Start Date End Date Taking? Authorizing Provider   albuterol (PROVENTIL) (2.5 MG/3ML) 0.083% nebulizer solution Take 2.5 mg by nebulization every 6 hours as needed for Wheezing    Historical Provider, MD   sucralfate (CARAFATE) 1 GM tablet Take 1 g by mouth 4 times daily    Historical Provider, MD   HYDROcodone-acetaminophen (NORCO)  MG per tablet Take 1 tablet by mouth every 6 hours as needed for Pain. Historical Provider, MD   morphine (THIERRY) 10 MG extended release capsule Take 10 mg by mouth 2 times daily.     Historical Provider, MD   pantoprazole (PROTONIX) 40 MG tablet Take 1 tablet by mouth 2 times daily (before meals) 10/26/21   Tarah Kelley MD   meropenem [facility]) infusion Infuse 1,000 mg intravenously every 12 hours For 30 days 10/26/21 11/25/21  Jennifer Blanchard MD   DAPTOmycin (CUBICIN) infusion Infuse 675.2 mg intravenously every 24 hours For 30 days 10/26/21 11/25/21  Jennifer Blanchard MD   mirtazapine (REMERON) 7.5 MG tablet Take 7.5 mg by mouth nightly    Historical Provider, MD   hydrALAZINE (APRESOLINE) 50 MG tablet Take 1 tablet by mouth every 8 hours 10/3/21   Tarah Kelley MD   magnesium 200 MG TABS tablet Take 200 mg by mouth daily    Historical Provider, MD   diphenhydrAMINE (BENADRYL) 25 MG capsule Take 25 mg by mouth daily as needed for Itching    Historical Provider, MD   insulin lispro, 1 Unit Dial, (HUMALOG KWIKPEN) 100 UNIT/ML SOPN Inject 0-6 Units into the skin 3 times daily (before meals) PER SLIDING SCALE: 0-139=0U, 140-199=1U, 200-249=2U, 250-299=3U, 300-349=4U, 350-399=5U, 400-450=6U    Historical Provider, MD   hydrOXYzine (ATARAX) 25 MG tablet Take 25 mg by mouth 4 times daily     Historical Provider, MD   lactulose 20 GM/30ML SOLN Take 20 g by mouth three times a week *MON-WED-FRI*    Historical Provider, MD   metOLazone (ZAROXOLYN) 2.5 MG tablet Take 2.5 mg by mouth three times a week *TUE-THUR-SAT*    Historical Provider, MD   bumetanide (BUMEX) 1 MG tablet Take 1 mg by mouth daily     Historical Provider, MD   potassium chloride (KLOR-CON M) 20 MEQ extended release tablet Take 40 mEq by mouth daily     Historical Provider, MD   ferrous sulfate (IRON 325) 325 (65 Fe) MG tablet Take 1 tablet by mouth 2 times daily (with meals) 1/25/21   Luisana Martino DO   DULoxetine (CYMBALTA) 60 MG extended release capsule Take 1 capsule by mouth daily 1/26/21   Luisana Martino DO   fluticasone-umeclidin-vilant (TRELEGY ELLIPTA) 100-62.5-25 MCG/INH AEPB Inhale 1 puff into the lungs Daily     Historical Provider, MD   aspirin EC 81 MG EC tablet Take 1 tablet by mouth daily 8/7/20   Bryce Smallwood MD   levothyroxine (SYNTHROID) 25 MCG tablet Take 25 mcg by mouth Daily  7/29/19   Historical Provider, MD   gabapentin (NEURONTIN) 400 MG capsule Take 400 mg by mouth 4 times daily. 5/19/19   Historical Provider, MD   metoprolol succinate (TOPROL XL) 50 MG extended release tablet Take 50 mg by mouth daily    Historical Provider, MD   isosorbide mononitrate (IMDUR) 120 MG extended release tablet Take 1 tablet by mouth daily 1/23/18   Anali Cobos MD   allopurinol (ZYLOPRIM) 300 MG tablet Take 300 mg by mouth daily  2/27/15   Historical Provider, MD   lovastatin (MEVACOR) 20 MG tablet Take 20 mg by mouth nightly. 11/11/13   Historical Provider, MD       Current medications:    No current facility-administered medications for this visit. No current outpatient medications on file.      Facility-Administered Medications Ordered in Other Visits   Medication Dose Route Frequency Provider Last Rate Last Admin    DAPTOmycin (CUBICIN) 700 mg in sodium chloride 0.9 % 50 mL IVPB  700 mg IntraVENous Q48H Vasquez Nuno MD   Stopped at 11/03/21 1748    meropenem (MERREM) 1,000 mg in sodium chloride 0.9 % 100 mL IVPB (mini-bag)  1,000 mg IntraVENous Q12H Vasquez Nuno MD   Stopped at 11/04/21 0631    0.9 % sodium chloride infusion   IntraVENous Q12H Vasquez Nuno MD   Stopped at 11/03/21 1933    albuterol (PROVENTIL) nebulizer solution 2.5 mg  2.5 mg Nebulization Q6H PRN Joey Reading, APRN - CNP        allopurinol (ZYLOPRIM) tablet 300 mg  300 mg Oral Daily Joey Reading, APRN - CNP        aspirin EC tablet 81 mg  81 mg Oral Daily Joey Reading, APRN - CNP        DULoxetine (CYMBALTA) extended release capsule 60 mg  60 mg Oral Daily Joey Reading, APRN - CNP        ferrous sulfate (IRON 325) tablet 325 mg  325 mg Oral BID WC Joey Reading, APRN - CNP        gabapentin (NEURONTIN) capsule 200 mg  200 mg Oral 4x Daily Joey Reading, APRN - CNP   200 mg at 11/03/21 2328    hydrALAZINE (APRESOLINE) tablet 50 mg  50 mg Oral 3 times per day Joey Reading, APRN - CNP   50 mg at 11/03/21 2328    isosorbide mononitrate (IMDUR) extended release tablet 120 mg  120 mg Oral Daily Joey Reading, APRN - CNP        lactulose (CHRONULAC) 10 GM/15ML solution 20 g  20 g Oral Once per day on Mon Wed Fri Joey Reading, APRN - CNP        levothyroxine (SYNTHROID) tablet 25 mcg  25 mcg Oral Daily Joey Reading, APRN - CNP        atorvastatin (LIPITOR) tablet 10 mg  10 mg Oral Daily Joey Reading, APRN - CNP        magnesium oxide (MAG-OX) tablet 200 mg  200 mg Oral Daily Joey Reading, APRN - CNP        metOLazone (ZAROXOLYN) tablet 2.5 mg  2.5 mg Oral Once per day on Tue Thu Sat Joey Reading, APRN - CNP        metoprolol succinate (TOPROL XL) extended release tablet 50 mg  50 mg Oral Daily Joey Reading, APRN - CNP        mirtazapine (REMERON) tablet 7.5 mg  7.5 mg Oral Nightly Joey Gayle, MADDI - JOSEPH   7.5 mg at 11/03/21 2329    pantoprazole (PROTONIX) tablet 40 mg  40 mg Oral BID AC Laura Booze, APRN - CNP        potassium chloride (KLOR-CON M) extended release tablet 40 mEq  40 mEq Oral Daily Laura Booze, APRN - CNP        sucralfate (CARAFATE) tablet 1 g  1 g Oral 4x Daily Laura Booze, APRN - CNP   1 g at 11/03/21 2328    sodium chloride flush 0.9 % injection 5-40 mL  5-40 mL IntraVENous 2 times per day Laura Booze, APRN - CNP   10 mL at 11/03/21 2334    sodium chloride flush 0.9 % injection 5-40 mL  5-40 mL IntraVENous PRN Laura Booze, APRN - CNP        0.9 % sodium chloride infusion  25 mL IntraVENous PRN Laura Booze, APRN - CNP        polyethylene glycol (GLYCOLAX) packet 17 g  17 g Oral Daily PRN Laura Booze, APRN - CNP        acetaminophen (TYLENOL) tablet 650 mg  650 mg Oral Q6H PRN Laura Booze, APRN - CNP        Or    acetaminophen (TYLENOL) suppository 650 mg  650 mg Rectal Q6H PRN Laura Booze, APRN - CNP        budesonide (PULMICORT) nebulizer suspension 250 mcg  250 mcg Nebulization BID Laura Booze, APRN - CNP   250 mcg at 11/03/21 1918    ipratropium (ATROVENT) 0.02 % nebulizer solution 0.5 mg  0.5 mg Nebulization 4x daily Laura Booze, APRN - CNP   0.5 mg at 11/03/21 1918    Arformoterol Tartrate (BROVANA) nebulizer solution 15 mcg  15 mcg Nebulization BID Laura Booze, APRN - CNP   15 mcg at 11/03/21 1917    bumetanide (BUMEX) injection 1 mg  1 mg IntraVENous BID Laura Booze, APRN - CNP   1 mg at 11/03/21 2333    glucose (GLUTOSE) 40 % oral gel 15 g  15 g Oral PRN Laura Booze, APRN - CNP        dextrose 50 % IV solution  12.5 g IntraVENous PRN Laura Booze, APRN - CNP        glucagon (rDNA) injection 1 mg  1 mg IntraMUSCular PRN Laura Booze, APRN - CNP        dextrose 5 % solution  100 mL/hr IntraVENous PRN Laura Booze, APRN - CNP        insulin lispro (HUMALOG) injection vial 0-6 Units  0-6 Units SubCUTAneous TID  MADDI Conley CNP  insulin lispro (HUMALOG) injection vial 0-3 Units  0-3 Units SubCUTAneous Nightly Abilio Mendez APRN - CNP        heparin (porcine) injection 5,000 Units  5,000 Units SubCUTAneous Q12H MADDI Winston - CNP   5,000 Units at 11/04/21 0300       Allergies:  No Known Allergies    Problem List:    Patient Active Problem List   Diagnosis Code    Essential hypertension I10    Peripheral vascular disease ([facility] I73.9    Pacemaker Z95.0    H/O aortic valve replacement Z95.2    CKD (chronic kidney disease) stage 3, GFR 30-59 ml/min N18.30    Type 2 diabetes mellitus with stage 3 chronic kidney disease, with long-term current use of insulin ([facility]) E11.22, N18.30, Z79.4    Pulmonary nodule R91.1    Diabetes mellitus type 2, uncontrolled ([facility] E11.65    Hyperlipidemia LDL goal <100 E78.5    Acquired hypothyroidism E03.9    CAD (coronary artery disease), native coronary artery I25.10    Status post coronary artery bypass graft Z95.1    Chronic pain G89.29    History of CVA (cerebrovascular accident) Z80.78    Obesity (BMI 30-39. 9) E66.9    Anemia D64.9    Elevated sed rate R70.0    Vision decreased H54.7    Bilateral carotid bruits R09.89    Bilateral carotid artery stenosis I65.23    Nonrheumatic mitral valve regurgitation I34.0    Pulmonary hypertension ([facility]) I27.20    Moderate tricuspid regurgitation by prior echocardiography I07.1    (HFpEF) heart failure with preserved ejection fraction ([facility]) I50.30    COPD (chronic obstructive pulmonary disease) ([facility]) J44.9    Diabetic neuropathy associated with type 2 diabetes mellitus ([facility]) E11.40    Congestive heart failure ([facility]) I50.9    Cellulitis L03.90    Peripheral vascular disease of lower extremity with ulceration ([facility]) I73.9, L97.909    Critical lower limb ischemia ([facility]) I70.229    Nonrheumatic mitral valve stenosis I34.2    Severe left ventricular hypertrophy I51.7    Cardiomyopathy ([facility]) I42.9    Ischemic foot ulcer due to atherosclerosis of native artery of limb (HCC) I70.25, L97.509    Wound infection T14. 8XXA, L08.9    AMS (altered mental status) R41.82    Acute delirium R41.0       Past Medical History:        Diagnosis Date    (HFpEF) heart failure with preserved ejection fraction ([address] 07/2020    Diagnosed by cardiology    Arthritis     Bilateral carotid artery stenosis 12/31/2020    Bilateral carotid bruits 12/31/2020    Blood circulation, collateral     CAD (coronary artery disease)     Carotid bruit 03/27/2013    CHF (congestive heart failure) ([facility]     Chronic kidney disease     CKD (chronic kidney disease) stage 3, GFR 30-59 ml/min ([address] 03/20/2016    COPD (chronic obstructive pulmonary disease) ([facility]     Follows with pulmonary    Diabetes mellitus ([address]     Diabetic neuropathy associated with type 2 diabetes mellitus ([facility])     Dyspnea on exertion     History of blood transfusion     Hyperlipidemia     Hypertension     Mild mitral regurgitation     Moderate tricuspid regurgitation by prior echocardiography 2018    Movement disorder     NSTEMI, initial episode of care ([facility] 01/2018    Obesity     Pulmonary hypertension ([facility] 2018    PVD (peripheral vascular disease) with claudication ([address] 03/27/2014    S/P carotid endarterectomy 03/27/2013    Sleep apnea     SOB (shortness of breath)     Thyroid disease     Unspecified cerebral artery occlusion with cerebral infarction     Vision decreased 12/31/2020       Past Surgical History:        Procedure Laterality Date    BIOPSY / LIGATION TEMPORAL ARTERY Bilateral 01/05/2021    BILATERAL TEMPORAL ARTERY BIOPSY performed by Sunita Banda MD at [address]      5 years ago   [address] GRAFT  2008    1 vessel and aortic valve repair    DIAGNOSTIC CARDIAC CATH LAB PROCEDURE  10/14/2008    DIAGNOSTIC CARDIAC CATH LAB PROCEDURE  10/21/2010    FEMORAL ENDARTERECTOMY Right 9/30/2021 RIGHT FEMORAL ENDARTERECTOMY performed by Mitch Mead MD at [facility] Right 10/2/2021    RIGHT FOOT TRANSMITTAL AMPUTATION performed by Brenton Willams DPM at [facility] Bilateral 10/16/2021    RIGHT FOOT WOUND DEBRIDEMENT WITH APPLICATION WOUND VAC; LEFT FOOT AMPUTATION 2ND TOE performed by Brenton Willams DPM at [address]      R knee replacement    KNEE SURGERY      OTHER SURGICAL HISTORY Right 2017    debridement,bone bx foot    PACEMAKER INSERTION  2014    D-PPM   ()    Dr. Edwige Joy      carotids       Social History:    Social History     Tobacco Use    Smoking status: Former Smoker     Packs/day: 2.00     Years: 25.00     Pack years: 50.00     Types: Cigarettes     Start date: 1953     Quit date: 1981     Years since quittin.3    Smokeless tobacco: Never Used   Substance Use Topics    Alcohol use: Never     Comment:                                  Counseling given: Not Answered      Vital Signs (Current): There were no vitals filed for this visit.                                            BP Readings from Last 3 Encounters:   21 (!) 131/98   10/26/21 (!) 193/90   10/16/21 (!) 96/53       NPO Status:  greater than 8 hours                                                                               BMI:   Wt Readings from Last 3 Encounters:   21 240 lb 4.8 oz (109 kg)   10/24/21 239 lb (108.4 kg)   10/03/21 249 lb 9 oz (113.2 kg)     There is no height or weight on file to calculate BMI.    CBC:   Lab Results   Component Value Date    WBC 11.8 2021    RBC 2.54 2021    HGB 7.7 2021    HCT 24.4 2021    MCV 96.1 2021    RDW 16.9 2021     2021       CMP:   Lab Results   Component Value Date     2021    K 4.8 2021    CL 99 2021    CO2 29 2021    BUN 29 2021    CREATININE 1.7 2021 GFRAA 47 11/03/2021    LABGLOM 39 11/03/2021    GLUCOSE 144 11/03/2021    GLUCOSE 111 07/16/2011    PROT 5.9 11/03/2021    CALCIUM 8.6 11/03/2021    BILITOT 0.6 11/03/2021    ALKPHOS 108 11/03/2021    AST 20 11/03/2021    ALT 14 11/03/2021       POC Tests:   No results for input(s): POCGLU, POCNA, POCK, POCCL, POCBUN, POCHEMO, POCHCT in the last 72 hours. Coags:   Lab Results   Component Value Date    PROTIME 15.9 11/03/2021    PROTIME 11.3 06/21/2011    INR 1.4 11/03/2021    APTT 25.5 01/05/2021       HCG (If Applicable): No results found for: PREGTESTUR, PREGSERUM, HCG, HCGQUANT     ABGs:   Lab Results   Component Value Date    PO2ART 157.4 09/30/2021    OVB3NTN 42.9 09/30/2021    LSF3HCY 21.8 09/30/2021    E1SSUSEM 96.7 09/06/2012        Type & Screen (If Applicable):  No results found for: LABABO, LABRH    Drug/Infectious Status (If Applicable):  No results found for: HIV, HEPCAB    COVID-19 Screening (If Applicable):   Lab Results   Component Value Date    COVID19 Not Detected 11/04/2021           Anesthesia Evaluation  Patient summary reviewed and Nursing notes reviewed  Airway: Mallampati: III  TM distance: >3 FB   Neck ROM: full  Mouth opening: > = 3 FB Dental:    (+) edentulous      Pulmonary: breath sounds clear to auscultation  (+) COPD:  shortness of breath: chronic,  sleep apnea: on noncompliant,      Smoker: last smoked in 1981. Patient did not smoke on day of surgery.                  Cardiovascular:  Exercise tolerance: poor (<4 METS),   (+) hypertension:, valvular problems/murmurs: MR, pacemaker (sinus node dysfunction s/p PPM, paroxysmal flutteron PPM interrogation): pacemaker, past MI (In 2018 NSTEMI):, CAD: obstructive, CABG/stent (2008 AVR):, dysrhythmias (paroxysmal Afib): atrial fibrillation, CHF (EF 03%): systolic, RAMOS: after ambulating 1 flight of stairs, pulmonary hypertension:, hyperlipidemia    (-)  angina    ECG reviewed  Rhythm: regular  Rate: normal  Echocardiogram reviewed                  Neuro/Psych:   (+) CVA (IN 2003): no interval change, psychiatric history:depression/anxiety              ROS comment: Diabetic neuropathy to L hand and tremor   GI/Hepatic/Renal:   (+) GERD:, renal disease: CRI, morbid obesity          Endo/Other:    (+) DiabetesType II DM, using insulin, hypothyroidism, blood dyscrasia: anemia, arthritis: OA., .                  ROS comment: Chronic opioids Abdominal:   (+) obese,           Vascular:   + PVD, aortic or cerebral, . ROS comment: Pt states R carotid bypass in 2013  . Other Findings:               Anesthesia Plan      MAC     ASA 4       Induction: intravenous. Anesthetic plan and risks discussed with patient. Plan discussed with CRNA. Chanel Barry MD   11/4/2021 7/31/20 ECHO   Left Ventricle   Severe left ventricle hypertrophy. Mildly reduced left ventricular systolic function. Visually estimated LVEF is 45-50 %. Paradoxical septal wall motion. Right Ventricle   Not well visualized. Grossly normal.      Left Atrium   Severely dilated left atrium. Right Atrium   Mildly dilated right atrium. Mitral Valve   Severe mitral annular calcification. Mild mitral stenosis. Mild mitral valve regurgitation. Tricuspid Valve   Normal tricuspid valve structure and function. Mild to moderate tricuspid valve regurgitation. Aortic Valve   Bioprosthetic aortic valve leaflets not well seen. No evidence of   obstruction with similar transvalvular pressure gradient to the prior echo   in 2018. No aortic regurgitation. Pericardial Effusion   No pericardial effusion. Pleural Effusion   No evidence of pleural effusion. Aorta   Normal aortic root and ascending aorta. Miscellaneous   The inferior vena cava diameter is normal with normal respiratory   variation. Estimated right atrial pressure is 8 mmHg. Conclusions      Summary   Severe left ventricle hypertrophy. Visually estimated LVEF is 45-50 %. Paradoxical septal wall motion. Severely dilated left atrium. Right ventricle not well visualized. Grossly normal.   Severe mitral annular calcification. Mild mitral stenosis. Mild mitral   valve regurgitation. Bioprosthetic aortic valve leaflets not well seen. No evidence of   obstruction with similar transvalvular pressure gradient to the prior echo   in 2018. No aortic regurgitation. Compared to prior echo in 1/21/2018, the paradoxical septal wall motion   abnormality likely due to interventricular conduction delay is new. NM MYOCARDIAL SPEC 9/28/21  mpression   1: This is an abnormal myocardial perfusion study due to the presence   of mild global hypokinesis. There is no evidence of perfusion deficits   to suggest ischemia or infarction, however. 2  The left ventricle is normal in size with an ejection fraction of   49%. 3: Prognostically, this is a low to intermediate risk study. 4: Compared to the prior study from 1/20/2018, The LVEF appears to   have improved slightly. CTA NECK W CONTRAST 9/28/21  Limited due to motion artifacts, particularly in the region of the bilateral   carotid bulbs.  Repeat study is recommended.       Severe stenosis in the proximal left internal carotid artery with moderate   soft and calcified plaque.       Severe stenosis in the distal right common carotid artery.       Fusiform dilatation at the origin of the right internal carotid artery   measuring up to 1.4 cm in diameter.       Question of mild to moderate stenosis in the mid right internal carotid   artery.       Moderate to severe stenosis in the intracranial left vertebral artery.       50% stenosis at the origin of the left vertebral artery. 8/19/21 CXR  Impression   1. Limited due to patient rotation.    2. Interstitial and hazy opacities bilaterally suggestive of pulmonary   vascular congestion or pneumonia with left basilar atelectasis or pleural effusion.         [facility] 9/23/21  Impression   Monophasic right lower extremity posterior tibial Doppler signal, likely   reflects underlying peripheral arterial disease.       Evaluation limited due to patient request to terminate the procedure. Pt seen, examined, chart reviewed, plan discussed. Saira Cantu MD  11/4/2021  10:21 AM   DOS STAFF ADDENDUM:    Pt seen and examined, chart reviewed (including anesthesia, drug and allergy history). Anesthetic plan, risks, benefits, alternatives, and personnel involved discussed with POA. POA verbalized an understanding and agrees to proceed. Plan discussed with care team members and agreed upon.     Saira Cantu MD  Staff Anesthesiologist  10:29 AM

## 2021-11-04 NOTE — DISCHARGE INSTR - COC
Continuity of Care Form    Patient Name: Bharti Berman   :  1940  MRN:  [id number]    Admit date:  11/3/2021  Discharge date:  2021    Code Status Order: Full Code   Advance Directives:      Admitting Physician:  Collin Hernandez MD  PCP: No primary care provider on file.     Discharging Nurse: [facility] Unit/Room#: 4792/7411-J  Discharging Unit Phone Number: [phone]    Emergency Contact:   Extended Emergency Contact Information  Primary Emergency Contact: Conor Preston  Home Phone: [phone]  Mobile Phone: [phone]  Relation: Brother/Sister    Past Surgical History:  Past Surgical History:   Procedure Laterality Date    BIOPSY / LIGATION TEMPORAL ARTERY Bilateral 2021    BILATERAL TEMPORAL ARTERY BIOPSY performed by Maryellen Bolanos MD at [address] [place] [address], [address] [address] N/A 2021    BRONCHOSCOPY DIAGNOSTIC OR CELL [address] ONLY and bal performed by Peyton Orozco MD at [address] [facility]      5 years ago    [address]  2008    1 vessel and aortic valve repair    DIAGNOSTIC CARDIAC CATH LAB PROCEDURE  10/14/2008    DIAGNOSTIC CARDIAC CATH LAB PROCEDURE  10/21/2010    FEMORAL ENDARTERECTOMY Right 2021    RIGHT FEMORAL ENDARTERECTOMY performed by Beulah Sy MD at [address] Right 10/2/2021    RIGHT FOOT TRANSMITTAL AMPUTATION performed by Blanca Leonardo DPM at [address] Bilateral 10/16/2021    RIGHT FOOT WOUND DEBRIDEMENT WITH APPLICATION WOUND VAC; LEFT FOOT AMPUTATION 2ND TOE performed by Blanca Leonardo DPM at [address] [facility] knee replacement    KNEE SURGERY      OTHER SURGICAL HISTORY Right 2017    debridement,bone bx foot    PACEMAKER INSERTION  2014    D-PPM   ()    Dr. Lewis johnson       Immunization History:   Immunization History   Administered Date(s) Administered    Influenza Virus Vaccine 12/13/2016    Pneumococcal Conjugate 13-valent ([id number]) 11/01/2016    Pneumococcal Conjugate Vaccine 12/13/2016       Active Problems:  Patient Active Problem List   Diagnosis Code    Essential hypertension I10    Peripheral vascular disease (HCC) I73.9    Pacemaker Z95.0    H/O aortic valve replacement Z95.2    CKD (chronic kidney disease) stage 3, GFR 30-59 ml/min N18.30    Type 2 diabetes mellitus with stage 3 chronic kidney disease, with long-term current use of insulin ([facility]) E11.22, N18.30, Z79.4    Pulmonary nodule R91.1    Diabetes mellitus type 2, uncontrolled ([address] E11.65    Hyperlipidemia LDL goal <100 E78.5    Acquired hypothyroidism E03.9    CAD (coronary artery disease), native coronary artery I25.10    Status post coronary artery bypass graft Z95.1    Chronic pain G89.29    History of CVA (cerebrovascular accident) Z86.73    Obesity (BMI 30-39. 9) E66.9    Anemia D64.9    Elevated sed rate R70.0    Vision decreased H54.7    Bilateral carotid bruits R09.89    Bilateral carotid artery stenosis I65.23    Nonrheumatic mitral valve regurgitation I34.0    Pulmonary hypertension ([facility]) I27.20    Moderate tricuspid regurgitation by prior echocardiography I07.1    (HFpEF) heart failure with preserved ejection fraction ([facility]) I50.30    COPD (chronic obstructive pulmonary disease) ([facility]) J44.9    Diabetic neuropathy associated with type 2 diabetes mellitus ([facility]) E11.40    Congestive heart failure ([facility]) I50.9    Cellulitis L03.90    Peripheral vascular disease of lower extremity with ulceration ([facility]) I73.9, L97.909    Critical lower limb ischemia ([facility]) I70.229    Nonrheumatic mitral valve stenosis I34.2    Severe left ventricular hypertrophy I51.7    Cardiomyopathy ([facility]) I42.9    Ischemic foot ulcer due to atherosclerosis of native artery of limb ([facility]) I70.25, L97.509    Wound infection T14. 8XXA, L08.9    AMS (altered mental status) R41.82    Acute delirium R41.0       Isolation/Infection:   Isolation Contact          Patient Infection Status       Infection Onset Added Last Indicated Last Indicated By Review Planned Expiration Resolved Resolved By    MRSA 10/16/21 10/18/21 10/16/21 Culture, Surgical        Wound-RIGHT FOOT WOUND TISSUE 10/16/21    Resolved    COVID-19 Rule Out 11/03/21 11/03/21 11/04/21 Respiratory Panel, Molecular, with COVID-19 (Restricted: peds pts or suitable admitted adults) (Ordered)   11/04/21 Rule-Out Test Resulted    COVID-19 Rule Out 11/03/21 11/03/21 11/03/21 COVID-19, Rapid (Ordered)   11/03/21 Rule-Out Test Resulted    COVID-19 Rule Out 10/03/21 10/03/21 10/03/21 COVID-19, Rapid (Ordered)   10/03/21 Rule-Out Test Resulted    COVID-19 Rule Out 01/19/21 01/19/21 01/19/21 COVID-19 (Ordered)   01/19/21 Rule-Out Test Resulted    COVID-19 Rule Out 12/29/20 12/29/20 12/29/20 Covid-19 Ambulatory (Ordered)   12/31/20 Rule-Out Test Resulted            Nurse Assessment:  Last Vital Signs: /66   Pulse 92   Temp 97.9 °F (36.6 °C) (Oral)   Resp 16   Wt 240 lb 4.8 oz (109 kg)   SpO2 92%   BMI 36.54 kg/m²     Last documented pain score (0-10 scale): Pain Level: 0  Last Weight:   Wt Readings from Last 1 Encounters:   11/04/21 240 lb 4.8 oz (109 kg)     Mental Status:  oriented, alert, and confused at times     IV Access:  - PICC - site  R Upper Arm, insertion date: ***    Nursing Mobility/ADLs:  Walking   Dependent  Transfer  Dependent  Bathing  Dependent  Dressing  Dependent  Toileting  Dependent  Feeding  Independent  Med Admin  Independent  Med Delivery   whole    Wound Care Documentation and Therapy:  Negative Pressure Wound Therapy Right;Distal (Active)   $ Standard NPWT >50 sq cm PER TX $ Yes 11/04/21 1200   Dressing Type Black foam 11/06/21 0900   Number of pieces used 1 11/04/21 1200   Cycle Continuous 11/06/21 0900   Target Pressure (mmHg) 125 11/06/21 0900   Dressing Status Clean; Dry; Intact 11/06/21 0900   Dressing Changed Other (Comment) 11/04/21 6940   Dressing Change Due 11/08/21 11/05/21 0830   Number of days: 1       Wound 08/21/21 Pretibial Right; Anterior (Active)   Number of days: 77       Wound 08/21/21 Pretibial Left (Active)   Dressing Status Clean; Dry; Intact 11/06/21 0900   Wound Cleansed Cleansed with saline 11/04/21 0403   Dressing/Treatment ABD; Xeroform 11/05/21 0830   Dressing Change Due 11/05/21 11/05/21 0830   Wound Length (cm) 3 cm 11/04/21 0403   Wound Width (cm) 2.5 cm 11/04/21 0403   Wound Depth (cm) 0.1 cm 11/04/21 0403   Wound Surface Area (cm^2) 7.5 cm^2 11/04/21 0403   Change in Wound Size % (l*w) 85 11/04/21 0403   Wound Volume (cm^3) 0.75 cm^3 11/04/21 0403   Wound Healing % 85 11/04/21 0403   Wound Assessment Other (Comment) 11/05/21 1843   Drainage Amount Scant 11/04/21 2115   Drainage Description Purulent 11/04/21 2115   Odor None 11/04/21 2115   Wendy-wound Assessment Blanchable erythema 11/04/21 2115   Number of days: 77       Wound 08/21/21 Toe (Comment  which one) Anterior; Left (Active)   Number of days: 77       Wound 10/16/21 Buttocks Right (Active)   Wound Image   11/04/21 1200   Wound Etiology Pressure Unstageable 11/04/21 1200   Dressing Status Clean; Dry; Intact 11/06/21 0900   Wound Cleansed Cleansed with saline 11/06/21 0630   Dressing/Treatment Foam 11/06/21 0900   Dressing Change Due 11/07/21 11/06/21 0900   Wound Length (cm) 4 cm 11/04/21 1200   Wound Width (cm) 3 cm 11/04/21 1200   Wound Depth (cm) 0.1 cm 11/04/21 0403   Wound Surface Area (cm^2) 12 cm^2 11/04/21 1200   Change in Wound Size % (l*w) 14.29 11/04/21 1200   Wound Volume (cm^3) 0.875 cm^3 11/04/21 0403   Wound Healing % 38 11/04/21 0403   Wound Assessment Pink/red 11/06/21 0630   Drainage Amount Small 11/06/21 0630   Drainage Description Yellow 11/06/21 0630   Odor None 11/06/21 0630   Wendy-wound Assessment Blanchable erythema 11/06/21 0630   Number of days: 21       Wound 10/19/21 Foot Right (Active)   Wound Image   11/04/21 1200   Dressing Status Clean; Dry; Intact 11/06/21 0900   Wound Cleansed Cleansed with saline 11/04/21 1200   Dressing/Treatment Negative pressure wound therapy 11/06/21 0900   Dressing Change Due 11/08/21 11/05/21 0830   Wound Length (cm) 12 cm 11/04/21 1200   Wound Width (cm) 12 cm 11/04/21 1200   Wound Depth (cm) 0.4 cm 11/04/21 1200   Wound Surface Area (cm^2) 144 cm^2 11/04/21 1200   Change in Wound Size % (l*w) 11.38 11/04/21 1200   Wound Volume (cm^3) 57.6 cm^3 11/04/21 1200   Wound Healing % -254 11/04/21 1200   Wound Assessment Other (Comment) 11/05/21 1843   Drainage Amount Scant 11/04/21 2115   Drainage Description Serosanguinous 11/04/21 2115   Odor None 11/04/21 2115   Wendy-wound Assessment Intact 11/04/21 2115   Wound Thickness Description not for Pressure Injury Full thickness 11/04/21 1200   Number of days: 18       Wound 11/04/21 Heel Right (Active)   Wound Image   11/04/21 1200   Wound Etiology Pressure Unstageable 11/04/21 1200   Dressing Status Clean; Dry; Intact 11/06/21 0900   Wound Cleansed Cleansed with saline 11/04/21 1200   Dressing/Treatment Roll gauze 11/05/21 0830   Wound Length (cm) 4 cm 11/04/21 1200   Wound Width (cm) 5 cm 11/04/21 1200   Wound Surface Area (cm^2) 20 cm^2 11/04/21 1200   Wound Assessment Other (Comment) 11/06/21 0900   Drainage Amount Small 11/04/21 1200   Drainage Description Sanguinous 11/04/21 1200   Odor None 11/04/21 2115   Wendy-wound Assessment Non-blanchable erythema 11/04/21 2115   Number of days: 2        Elimination:  Continence: Bowel: No  Bladder: No  Urinary Catheter: Insertion Date: 11/03/2021    Colostomy/Ileostomy/Ileal Conduit: No       Date of Last BM: 11/6/2021    Intake/Output Summary (Last 24 hours) at 11/6/2021 1110  Last data filed at 11/5/2021 2247  Gross per 24 hour   Intake 435 ml   Output 1700 ml   Net -1265 ml     I/O last 3 completed shifts: In: 435 [Blood:435]  Out: 1700 [Urine:1700]    Safety Concerns:      At Risk for Falls    Impairments/Disabilities:      None    Nutrition Therapy:  Current Nutrition Therapy:   - Oral Diet:  Carb Control 3 carbs/meal (1500kcals/day)    Routes of Feeding: Oral  Liquids: Thin Liquids  Daily Fluid Restriction: no  Last Modified Barium Swallow with Video (Video Swallowing Test): not done    Treatments at the Time of Hospital Discharge:   Respiratory Treatments: ***  Oxygen Therapy:  is on oxygen at 2 L/min per nasal cannula. Ventilator:    - No ventilator support    Rehab Therapies: Physical Therapy and Occupational Therapy  Weight Bearing Status/Restrictions: No weight bearing restirctions  Other Medical Equipment (for information only, NOT a DME order):  lo air loss mattress. Other Treatments: ***    Patient's personal belongings (please select all that are sent with patient):  {[facility] DME Belongings:[id number]}    RN SIGNATURE:  Electronically signed by Marie Garg RN on 11/6/21 at 3:51 PM EDT    CASE MANAGEMENT/SOCIAL WORK SECTION    Inpatient Status Date: 11/3/2021    Readmission Risk Assessment Score:  Readmission Risk              Risk of Unplanned Readmission:  39           Discharging to Facility/ Agency   Name: [facility]  Address: [place] [address], [facility]  Phone: [phone]  Fax: [phone]    Dialysis Facility (if applicable)   Name:  Address:  Dialysis Schedule:  Phone:  Fax:    / signature: Electronically signed by JOSE Suazo on 11/4/21 at 12:59 PM EDT    PHYSICIAN SECTION    Prognosis: {Prognosis:7626581580}    Condition at Discharge: [facility] Patient Condition:[id number]}    Rehab Potential (if transferring to Rehab): {Prognosis:1433759865}    Recommended Labs or Other Treatments After Discharge: ***    Physician Certification: I certify the above information and transfer of Angel Winslow  is necessary for the continuing treatment of the diagnosis listed and that he requires Intermediate Nursing Care for greater 30 days.      Update Admission H&P: {[facility] DME Changes in [place]:[id number]}    PHYSICIAN SIGNATURE:  Electronically signed by Chemo Lauren MD on 11/6/2021 at 4:13 PM

## 2021-11-05 NOTE — CARE COORDINATION
Social Work/Discharge Planning:  Chart reviewed. Received notification of possible discharge. Called liaison Brenda Klein with [employer] and confirmed patient can return today if ready. Brenda Klein states patient does not need a covid test, IF he discharges today. Brenda Klein states facility does not need a therapy evaluation. Notified RN. Will continue to follow.   Electronically signed by JOSE Mccoy on 11/5/2021 at 1:38 PM

## 2021-11-05 NOTE — PROGRESS NOTES
[address] [place] [address] [facility]  BRIDGETTIDA  Progress Note    SUBJECTIVE:  Chief Complaint   Patient presents with    Altered Mental Status     [place] [facility] [place] decreased 02 sats. The patient is doing well. He had a bronchoscopy yesterday. Denies dyspnea. Tolerating antibiotics. Review of systems:  As stated above in the chief complaint, otherwise negative.     Medications:  Scheduled Meds:   bumetanide  1 mg Oral BID    sodium chloride flush  5-40 mL IntraVENous 2 times per day    albuterol  2.5 mg Nebulization Q4H While awake    acetylcysteine  4 mL Inhalation Q6H    silver sulfADIAZINE   Topical Daily    daptomycin (CUBICIN) IVPB  700 mg IntraVENous Q48H    meropenem  1,000 mg IntraVENous Q12H    allopurinol  300 mg Oral Daily    aspirin EC  81 mg Oral Daily    DULoxetine  60 mg Oral Daily    ferrous sulfate  325 mg Oral BID WC    gabapentin  200 mg Oral 4x Daily    hydrALAZINE  50 mg Oral 3 times per day    isosorbide mononitrate  120 mg Oral Daily    lactulose  20 g Oral Once per day on Mon Wed Fri    levothyroxine  25 mcg Oral Daily    atorvastatin  10 mg Oral Daily    magnesium oxide  200 mg Oral Daily    metOLazone  2.5 mg Oral Once per day on Tue Thu Sat    metoprolol succinate  50 mg Oral Daily    mirtazapine  7.5 mg Oral Nightly    pantoprazole  40 mg Oral BID AC    potassium chloride  40 mEq Oral Daily    sucralfate  1 g Oral 4x Daily    budesonide  250 mcg Nebulization BID    Arformoterol Tartrate  15 mcg Nebulization BID    insulin lispro  0-6 Units SubCUTAneous TID WC    insulin lispro  0-3 Units SubCUTAneous Nightly    heparin (porcine)  5,000 Units SubCUTAneous Q12H     Continuous Infusions:   sodium chloride      sodium chloride      sodium chloride Stopped (11/03/21 1933)    dextrose       PRN Meds:sodium chloride, sodium chloride flush, sodium chloride, albuterol, polyethylene glycol, acetaminophen **OR** acetaminophen, glucose, dextrose, glucagon (rDNA), dextrose    OBJECTIVE:  /85   Pulse 99   Temp 97.6 °F (36.4 °C) (Temporal)   Resp 18   Wt 240 lb 4.8 oz (109 kg)   SpO2 95%   BMI 36.54 kg/m²   Temp  Av °F (36.7 °C)  Min: 97.6 °F (36.4 °C)  Max: 98.2 °F (36.8 °C)  Constitutional: The patient is lying in bed. He is awake and alert. No distress. Skin: Warm and dry. No rashes were noted. HEENT: Round and reactive pupils. Moist mucous membranes. No ulcerations or thrush. Neck: Supple to movements. Chest: No use of accessory muscles to breathe. Symmetrical expansion. No wheezing, crackles or rhonchi. Cardiovascular: Heart sounds rhythmic and regular. Abdomen: Positive bowel sounds to auscultation. Extremities: Right foot TMA with VAC. Left second toe amputated. No erythema. Lines: Right PICC 10/20/2021.       Laboratory and Tests Review:  Lab Results   Component Value Date    WBC 9.4 2021    WBC 11.8 (H) 2021    WBC 13.6 (H) 2021    HGB 6.8 (LL) 2021    HCT 21.3 (L) 2021    MCV 94.2 2021     2021     Lab Results   Component Value Date    NEUTROABS 7.86 (H) 2021    NEUTROABS 10.15 (H) 2021    NEUTROABS 11.54 (H) 2021     No results found for: [facility]  Lab Results   Component Value Date    ALT 14 2021    AST 20 2021    ALKPHOS 108 2021    BILITOT 0.6 2021     Lab Results   Component Value Date     2021    K 4.8 2021    CL 99 2021    CO2 29 2021    BUN 29 2021    CREATININE 1.7 2021    CREATININE 1.5 10/26/2021    CREATININE 1.9 10/25/2021    GFRAA 47 2021    LABGLOM 39 2021    GLUCOSE 144 2021    GLUCOSE 111 2011    PROT 5.9 2021    LABALBU 1.9 2021    CALCIUM 8.6 2021    BILITOT 0.6 2021    ALKPHOS 108 2021    AST 20 2021    ALT 14 2021     Lab Results   Component Value Date    CRP 16.0 (H) 2021    CRP 17.1 (H) 10/15/2021    CRP 21.4 (H) 09/23/2021     Lab Results   Component Value Date    SEDRATE 122 (H) 11/03/2021    SEDRATE >150 (H) 10/17/2021    SEDRATE 128 (H) 10/15/2021     Radiology:      Microbiology:   Respiratory panel: Negative  Rapid SARS-CoV-2: Negative  Blood cultures 11/4/2021: Negative so far  Streptococcus pneumoniae/Legionella urine Ag: negative  Nares screen MRSA: Pending    Recent Labs     11/03/21  1351   PROCAL 0.55*       ASSESSMENT:  · Status post right TMA. 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   Status post bronchoscopy  · CKD    PLAN:  · Continue Meropenem and Daptomycin until 11/14/2021  · The patient can be discharged from [address] [place] [address] with nursing    Aj Starr MD  11:23 AM  11/5/2021

## 2021-11-05 NOTE — PROGRESS NOTES
Spoke with pt's brother, Maricruz Lopez, updated on plan of care and received consent for blood. Witnessed with second RN, London Blackwood.

## 2021-11-05 NOTE — PROGRESS NOTES
[facility] Progress Note    Admitting Date and Time: 11/3/2021  1:10 AM  Admit Dx: Acute delirium [R41.0]  Leg swelling [M79.89]  Elevated troponin [R77.8]  AMS (altered mental status) [R41.82]    Subjective:  Patient is being followed for Acute delirium [R41.0]  Leg swelling [M79.89]  Elevated troponin [R77.8]  AMS (altered mental status) [R41.82]     Patient was seen and examined this AM.  Awake, alert and oriented. Feels much better today. No new complaints    -Back on baseline oxygen 4L, s/p Bronchoscopy yesterday. Awaiting final studies. -ID following, remains on daptomycin and meropenem. -HGB dropped to 6.8 this AM, 1 unit to transfuse and check FOBT. Baseline HGB around 7s. Was previously evaluated by GS and was outpatient EGD and colonoscopy. Continuing on PPI and Carafate    ROS: denies fever, chills, cp, sob, n/v, HA unless stated above.       sodium chloride flush  5-40 mL IntraVENous 2 times per day    albuterol  2.5 mg Nebulization Q4H While awake    acetylcysteine  4 mL Inhalation Q6H    silver sulfADIAZINE   Topical Daily    daptomycin (CUBICIN) IVPB  700 mg IntraVENous Q48H    meropenem  1,000 mg IntraVENous Q12H    allopurinol  300 mg Oral Daily    aspirin EC  81 mg Oral Daily    DULoxetine  60 mg Oral Daily    ferrous sulfate  325 mg Oral BID WC    gabapentin  200 mg Oral 4x Daily    hydrALAZINE  50 mg Oral 3 times per day    isosorbide mononitrate  120 mg Oral Daily    lactulose  20 g Oral Once per day on Mon Wed Fri    levothyroxine  25 mcg Oral Daily    atorvastatin  10 mg Oral Daily    magnesium oxide  200 mg Oral Daily    metOLazone  2.5 mg Oral Once per day on Tue Thu Sat    metoprolol succinate  50 mg Oral Daily    mirtazapine  7.5 mg Oral Nightly    pantoprazole  40 mg Oral BID AC    potassium chloride  40 mEq Oral Daily    sucralfate  1 g Oral 4x Daily    budesonide  250 mcg Nebulization BID    Arformoterol Tartrate  15 mcg Nebulization BID  bumetanide  1 mg IntraVENous BID    insulin lispro  0-6 Units SubCUTAneous TID WC    insulin lispro  0-3 Units SubCUTAneous Nightly    heparin (porcine)  5,000 Units SubCUTAneous Q12H     sodium chloride, , PRN  sodium chloride flush, 5-40 mL, PRN  sodium chloride, 25 mL, PRN  albuterol, 2.5 mg, Q6H PRN  polyethylene glycol, 17 g, Daily PRN  acetaminophen, 650 mg, Q6H PRN   Or  acetaminophen, 650 mg, Q6H PRN  glucose, 15 g, PRN  dextrose, 12.5 g, PRN  glucagon (rDNA), 1 mg, PRN  dextrose, 100 mL/hr, PRN         Objective:    /85   Pulse 99   Temp 97.6 °F (36.4 °C) (Temporal)   Resp 18   Wt 240 lb 4.8 oz (109 kg)   SpO2 97%   BMI 36.54 kg/m²     General: Lying in bed comfortably, no distress, breathing is not labored, appears tired and ill  HEENT: PERRL, EOMI, MM dry  Neck: supple, no adenopathy  CV: RRR without murmur  Lungs: few rhonchi and decreased BS L lung field   Abd: soft, NT, ND, bowel sounds normal  Ext: warm, trace edema, no clubbing, R foot is bandaged  Skin: no rashes  Neuro: PERRL, EOMI, drowsy, responds when called, moves all extremities        Recent Labs     11/03/21  0208      K 4.8   CL 99   CO2 29   BUN 29*   CREATININE 1.7*   GLUCOSE 144*   CALCIUM 8.6       Recent Labs     11/03/21  0208 11/04/21  0100 11/05/21  0635   WBC 13.6* 11.8* 9.4   RBC 2.37* 2.54* 2.26*   HGB 7.2* 7.7* 6.8*   HCT 23.0* 24.4* 21.3*   MCV 97.0 96.1 94.2   MCH 30.4 30.3 30.1   MCHC 31.3* 31.6* 31.9*   RDW 17.0* 16.9* 16.9*    210 214   MPV 9.4 9.5 9.7       Assessment:    Active Problems:    AMS (altered mental status)  Resolved Problems:    * No resolved hospital problems.  *    Assessment:  -Acute on chronic hypoxic respiratory failure, 2/2 multifocal pneumonia and mucous plugging  -Multifocal pneumonia, HCAP  -Leukocytosis, 2/2 the above  -S/p right TMA, left second toe amputation, previously infected with MRSA, Citrobacter and Pseudomonas  -Hx COPD-on 4 LNC  -Hx HFpEF, EF 49% per stress test on 9/21.  -Hx aortic stenosis, s/p TAVR  -Hx hypertension  -Hx CKD stage III  -Hx normocytic anemia, HGB 6.8 this AM   -Hx PVD  -Hx diabetes mellitus    Plan:  -ID and pulmonology following. Input appreciated  -previous wounds cultures growing MRSA, Citrobacter and Pseudomonas. On Merrem and Daptomycin   -Awaiting BAL results to adjust Abx if needed  -Transfuse 1 unit this AM, check FOBT. Previously evaluated by [facility] for anemia, recommended outpatient EGD and colonoscopy. Continuing on PPI and Carafate  -Continue breathing treatments, monitor respiratory status  -Continue cardiac medications, switch Bumex to PO  -SSI with hypoglycemia panel in place. NOTE: This report was transcribed using voice recognition software. Every effort was made to ensure accuracy; however, inadvertent computerized transcription errors may be present.   Electronically signed by Hamida Renteria MD on 11/5/2021 at 8:16 AM

## 2021-11-05 NOTE — PROGRESS NOTES
Podiatry Progress Note  11/5/2021   Elena Preston       SUBJECTIVE: This is a [de-identified] y.o. male seen bedside for BLE wounds. 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 Patient is currently admitted due to AMS, hypoxia. Patient is resting in bed at the time of visit; he states that he is feeling down and depressed because he has to stay in the hospital another day for treatment/monitoring. He denies any acute complaints regarding LEs, requesting another blanket. Wound vac intact to RLE, dressing intact to LLE. Heel offloaders in place. OBJECTIVE:    Vitals:    11/05/21 0923   BP:    Pulse:    Resp: 18   Temp:    SpO2: 95%       EXAM:    RLE dressings and wound vac left c/d/i. Wound vac at 125mmHg low continuous pressure without leaks detected. No drainage noted to collection canister or tubing. LLE dressings c/d/i without strikethrough drainage. Previous exam findings:  Vascular Exam:  DP and PT pulses are non-palpable B/L. Skin temperature cool to cool from proximal to distal B/L. CFT to LLE digits delayed. Absent hair growth. Mild edema to lower extremities consistent with chronic venous stasis.      Neuro Exam:  Epicritic sensation diminished B/L. Gross sensation intact.      Dermatologic Exam:  Open TMA site to R foot with gross bone and muscle exposure. Wound is granular, no malodor, no purulence, minimal serosanguinous drainage with dressing change. Unstageable eschar to R heel wound; eschar is soft, no active drainage, no areas of deep probing/tracking/tunneling. Sutures intact to L 2nd digit amputation site without gapping or dehiscence. Overlying scabbing with eschars to incision. Addition eschar present to L 3rd digit as pictured below. No drainage from these wounds. Diffuse hyperpigmentation and brawny appearance to calves circumferentially consistent with chronic venous stasis with dry, scaling skin.  Superficial ulceration to R Daily Gabby Ybarra, APRN - CNP   81 mg at 11/05/21 0818    DULoxetine (CYMBALTA) extended release capsule 60 mg  60 mg Oral Daily Gabby Ybarra, APRN - CNP   60 mg at 11/05/21 0818    ferrous sulfate (IRON 325) tablet 325 mg  325 mg Oral BID WC Gabby Ybarra, APRN - CNP   325 mg at 11/05/21 0818    gabapentin (NEURONTIN) capsule 200 mg  200 mg Oral 4x Daily Gabbyluis miguel Ybarra, APRN - CNP   200 mg at 11/05/21 2757    hydrALAZINE (APRESOLINE) tablet 50 mg  50 mg Oral 3 times per day Gabby Ybarra APRN - CNP   50 mg at 11/05/21 1740    isosorbide mononitrate (IMDUR) extended release tablet 120 mg  120 mg Oral Daily Gabby Ybarra, APRN - CNP   120 mg at 11/05/21 0818    lactulose (CHRONULAC) 10 GM/15ML solution 20 g  20 g Oral Once per day on Mon Wed Fri Gabbyluis miguel Ybarra APRN - CNP   20 g at 11/05/21 0640    levothyroxine (SYNTHROID) tablet 25 mcg  25 mcg Oral Daily Gabby Ybarra, APRN - CNP   25 mcg at 11/05/21 6621    atorvastatin (LIPITOR) tablet 10 mg  10 mg Oral Daily Gabby Ybarra, APRN - CNP   10 mg at 11/05/21 0817    magnesium oxide (MAG-OX) tablet 200 mg  200 mg Oral Daily Gabby Ybarra, APRN - CNP   200 mg at 11/05/21 0817    metOLazone (ZAROXOLYN) tablet 2.5 mg  2.5 mg Oral Once per day on Tue Thu Sat Gabbyluis miguel Fishmanfamilia APRN - CNP        metoprolol succinate (TOPROL XL) extended release tablet 50 mg  50 mg Oral Daily Gabbyluis miguel Ybarra, APRN - CNP   50 mg at 11/05/21 0818    mirtazapine (REMERON) tablet 7.5 mg  7.5 mg Oral Nightly Gabbyluis miguel Ybarra, APRN - CNP   7.5 mg at 11/04/21 2121    pantoprazole (PROTONIX) tablet 40 mg  40 mg Oral BID AC Gabby Ybarra APRN - CNP   40 mg at 11/05/21 6932    potassium chloride (KLOR-CON M) extended release tablet 40 mEq  40 mEq Oral Daily MADDI Mendez CNP   40 mEq at 11/05/21 0817    sucralfate (CARAFATE) tablet 1 g  1 g Oral 4x Daily MADDI Mendez - CNP   1 g at 11/05/21 0818    polyethylene glycol (GLYCOLAX) packet 17 g  17 g Oral Daily PRN MADDI Gonzalez CNP        acetaminophen (TYLENOL) tablet 650 mg  650 mg Oral Q6H PRN MADDI Gonzalez CNP        Or    acetaminophen (TYLENOL) suppository 650 mg  650 mg Rectal Q6H PRN MADDI Gonzalez CNP        budesonide (PULMICORT) nebulizer suspension 250 mcg  250 mcg Nebulization BID MADDI Gonzalez - CNP   250 mcg at 11/04/21 2145    Arformoterol Tartrate (BROVANA) nebulizer solution 15 mcg  15 mcg Nebulization BID MADDI Gonzalez - CNP   15 mcg at 11/04/21 2145    glucose (GLUTOSE) 40 % oral gel 15 g  15 g Oral PRN MADDI Gonzalez CNP        dextrose 50 % IV solution  12.5 g IntraVENous PRN MADDI Gonzalez CNP        glucagon (rDNA) injection 1 mg  1 mg IntraMUSCular PRN MADDI Gonzalez CNP        dextrose 5 % solution  100 mL/hr IntraVENous PRN MADDI Gonzalez CNP        insulin lispro (HUMALOG) injection vial 0-6 Units  0-6 Units SubCUTAneous TID WC MADDI Gonzalez CNP        insulin lispro (HUMALOG) injection vial 0-3 Units  0-3 Units SubCUTAneous Nightly MADDI Gonzalez CNP   1 Units at 11/04/21 2121    heparin (porcine) injection 5,000 Units  5,000 Units SubCUTAneous Q12H MADDI Gonzalez CNP   5,000 Units at 11/05/21 0320        Lab Results   Component Value Date    WBC 9.4 11/05/2021    HCT 21.3 (L) 11/05/2021    HGB 6.8 (LL) 11/05/2021     11/05/2021     11/03/2021    K 4.8 11/03/2021    CL 99 11/03/2021    CO2 29 11/03/2021    BUN 29 (H) 11/03/2021    CREATININE 1.7 (H) 11/03/2021    GLUCOSE 144 (H) 11/03/2021    CRP 16.0 (H) 11/03/2021         Radiographs: N/A    ASSESEMENT:  - Osteomyelitis, R foot, 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 Chart and labs reviewed.   - Wounds do not appear to be acutely infected; no plans for urgent surgical intervention from podiatry standpoint at this time. Plans for flap and grafting of RLE at some point in the future pending medical stability, but will continue with wound care for now. - Wound vac left intact; 125mmHg low continuous pressure. MWF change schedule, next change 11/8.  - LLE dressings left intact, will continue QoD dressing changes. Next change 11/6.  - Maintain heel offloading while in bed. - Abx per ID - Dapto and Merrem continued. - Will continue to follow for local wound care to [address] [place] [address] to return to [facility] per CM.    - Patient to f/u with Dr. Bob Gibbs within 1 week of DC    Patient was d/w Dr. Janice Adkins, [place]   11/5/2021   10:15 AM

## 2021-11-05 NOTE — PROGRESS NOTES
Diamante Arreola M.D.,[facility]  Melia Garcia D.O., F.A.C.O.I., Steven Andrade M.D. Irina Brothers M.D. Heather Wadsworth D.O. Daily Pulmonary Progress Note    Patient:  Bharti Berman [de-identified] y.o. male MRN: [id number]     Date of Service: 11/5/2021      Synopsis     We are following patient for need for bronchoscopy    \"CC\" shortness of breath    Code status: Full      Subjective      Patient was seen and examined. 11/4/21 Lying in bed in no acute distress. Occasional cough no mucus production. Oxygen on at 4 L nasal cannula. N.p.o. for bronchoscopy. 11/5/21 lying in bed on left side, asked to reposition, he states he is comfortable this position. He needs to stay off left side, due to  Prior left mainstem mucus plugging. Tolerated bronchoscopy yesterday. Await culture results. Oxygen on at 4 liters NC. Review of Systems:  Constitutional: Denies fever, weight loss, night sweats, and fatigue  Skin: Denies pigmentation, dark lesions, and rashes   HEENT: Denies hearing loss, tinnitus, ear drainage, epistaxis, sore throat, and hoarseness. Cardiovascular: Denies palpitations, chest pain, and chest pressure.   Respiratory: Occasional cough dyspnea  Gastrointestinal: Denies nausea, vomiting, poor appetite, diarrhea, heartburn or reflux  Genitourinary: Denies dysuria, frequency, urgency or hematuria  Musculoskeletal: Denies myalgias, muscle weakness, and bone pain  Neurological: Denies dizziness, vertigo, headache, and focal weakness  Psychological: Denies anxiety and depression  Endocrine: Denies heat intolerance and cold intolerance  Hematopoietic/Lymphatic: Denies bleeding problems and blood transfusions    24-hour events:  None    Objective   Vitals: /85   Pulse 99   Temp 97.6 °F (36.4 °C) (Temporal)   Resp 22   Wt 240 lb 4.8 oz (109 kg)   SpO2 92%   BMI 36.54 kg/m²     I/O:    Intake/Output Summary (Last 24 hours) at 11/5/2021 1406  Last data filed at 11/5/2021 7381  Gross per 24 hour   Intake --   Output 2800 ml   Net -2800 ml       Vent Information  FiO2 : 2 %  SpO2: 92 %  SpO2/FiO2 ratio: 4950                CURRENT MEDS :  Scheduled Meds:   bumetanide  1 mg Oral BID    sodium chloride flush  5-40 mL IntraVENous 2 times per day    albuterol  2.5 mg Nebulization Q4H While awake    acetylcysteine  4 mL Inhalation Q6H    silver sulfADIAZINE   Topical Daily    daptomycin (CUBICIN) IVPB  700 mg IntraVENous Q48H    meropenem  1,000 mg IntraVENous Q12H    allopurinol  300 mg Oral Daily    aspirin EC  81 mg Oral Daily    DULoxetine  60 mg Oral Daily    ferrous sulfate  325 mg Oral BID WC    gabapentin  200 mg Oral 4x Daily    hydrALAZINE  50 mg Oral 3 times per day    isosorbide mononitrate  120 mg Oral Daily    lactulose  20 g Oral Once per day on Mon Wed Fri    levothyroxine  25 mcg Oral Daily    atorvastatin  10 mg Oral Daily    magnesium oxide  200 mg Oral Daily    metOLazone  2.5 mg Oral Once per day on Tue Thu Sat    metoprolol succinate  50 mg Oral Daily    mirtazapine  7.5 mg Oral Nightly    pantoprazole  40 mg Oral BID AC    potassium chloride  40 mEq Oral Daily    sucralfate  1 g Oral 4x Daily    budesonide  250 mcg Nebulization BID    Arformoterol Tartrate  15 mcg Nebulization BID    insulin lispro  0-6 Units SubCUTAneous TID     insulin lispro  0-3 Units SubCUTAneous Nightly    heparin (porcine)  5,000 Units SubCUTAneous Q12H       Physical Exam:  General Appearance: appears comfortable in no acute distress. HEENT: Normocephalic atraumatic without obvious abnormality   Neck: Lips, mucosa, and tongue normal.  Supple, symmetrical, trachea midline, no adenopathy;thyroid:  no enlargement/tenderness/nodules or JVD. Lung: Breath sounds few rhonchi bilaterally diminished left lung base. Respirations   unlabored. Symmetrical expansion. Heart: RRR, normal S1, S2. No MRG  Abdomen: Soft, NT, ND. BS present x 4 quadrants. No bruit or organomegaly. Extremities: Pedal pulses 2+ symmetric b/l. Extremities normal, no cyanosis, lower extremities with bandages  Musculokeletal: No joint swelling, no muscle tenderness. ROM normal in all joints of extremities. Neurologic: Mental status: Alert and Oriented X3 . Pertinent/ New Labs and Imaging Studies     Imaging Personally Reviewed:    Impression   Multifocal pneumonia as described.       Small bilateral pleural effusions.       Significant opacification of the left mainstem bronchus.  Patient may benefit   from bronchoscopy.  Left upper lobe volume loss with compensatory   hyperaeration of the superior segment of the left lower lobe, with the   superior left lower lobe segment occupying the left pulmonary apex.       Atelectasis versus fibroatelectatic changes within the left lower lobe and   left mid lung, with consolidation and multiple calcified granulomas seen   within this consolidation.       3.5 x 3.0 cm soft tissue density mass within the superficial left upper chest   subcutaneous tissues, potentially retroareolar.  This could represent   neoplasia or marcelle asymmetric gynecomastia.  Clinical correlation is advised,   with mammographic correlation if appropriate.       Increased attenuation within the subcutaneous tissues of the partially imaged   left flank, edema versus hemorrhage, with mild edema seen in the visualized   lower right flank.      CT head      Impression   No acute intracranial abnormality.             Stress test 9/27/21      Impression   1: This is an abnormal myocardial perfusion study due to the presence   of mild global hypokinesis. There is no evidence of perfusion deficits   to suggest ischemia or infarction, however. 2  The left ventricle is normal in size with an ejection fraction of   49%. 3: Prognostically, this is a low to intermediate risk study.    4: Compared to the prior study from 1/20/2018, The LVEF appears to   have improved slightly.      proBNP 88712              Labs:  Lab Results   Component Value Date    WBC 9.4 11/05/2021    HGB 6.8 11/05/2021    HCT 21.3 11/05/2021    MCV 94.2 11/05/2021    MCH 30.1 11/05/2021    MCHC 31.9 11/05/2021    RDW 16.9 11/05/2021     11/05/2021    MPV 9.7 11/05/2021     Lab Results   Component Value Date     11/05/2021    K 4.3 11/05/2021    K 4.8 11/03/2021    CL 96 11/05/2021    CO2 29 11/05/2021    BUN 22 11/05/2021    CREATININE 1.3 11/05/2021    LABALBU 1.9 11/03/2021    CALCIUM 8.6 11/05/2021    GFRAA >60 11/05/2021    LABGLOM 53 11/05/2021     Lab Results   Component Value Date    PROTIME 15.9 11/03/2021    PROTIME 11.3 06/21/2011    INR 1.4 11/03/2021     Recent Labs     11/03/21  0208   PROBNP 10,571*     Recent Labs     11/03/21  1351   PROCAL 0.55*     This SmartLink has not been configured with any valid records. Micro:  Recent Labs     11/04/21  1358   CULTRESP Growth not present, incubation continues     No results for input(s): LABGRAM in the last 72 hours. No results for input(s): LEGUR in the last 72 hours. Recent Labs     11/04/21  0105   STREPNEUMAGU Presumptive negative- suggests no current or recent  pneumococcal infection. Infection due to Strep pneumoniae cannot be  ruled out since the antigen present in the sample  may be below the detection limit of the test.  Normal Range:Presumptive Negative       Recent Labs     11/04/21  0105   LP1UAG Presumptive Negative -suggesting no recent or current infections  with Legionella pneumophila serogroup 1. Infection to Legionella cannot be ruled out since other serogroups  and species may cause infection, antigen may not be present in  early infection, or level of antigen may be below the  detection limit. Normal Range: Presumptive Negative            Assessment:    1. Acute respiratory failure with hypoxia  2. Mucous plugging of bronchi s/p bronchoscopy 11/4/21  3. Multifocal pneumonia  4.  Recent TMA right foot discharged on daptomycin and meropenem  5. Heart failure with preserved EF, compensated  6. Peripheral arterial disease  7. Obesity with BMI 36.5  8. COPD not in acute exacerbation  9. Anemia  10. Leukocytosis  11. Chronic kidney disease      Plan:   1. Oxygen therapy 4 L nasal cannula  2. Scheduled bronchodilators-albuterol every 4 hours while awake, Brovana and budesonide twice daily, Mucomyst 10% every 6 hours via [employer]  3. Keep off left side  4. Limit and hold sedating medications  5. Await cultures from bronch 11/4/21  6. Hgb 6.8 to receive PRBCs today  7. ID for management of antibiotics-Merrem and daptomycin  8. Bumex 1 mg IV twice daily  9. DVT, GI prophylaxis  10. Wound care    This plan of care was reviewed in collaboration with Dr. Esperanza Curiel  Electronically signed by MADDI Vargas - CNP on 11/5/2021         I personally saw, examined and provided care for the patient. Radiographs, labs and medication list were reviewed by me independently. The case was discussed in detail and plans for care were established. Review of CNP documentation was conducted and revisions were made as appropriate. I agree with the above documented exam, problem list and plan of care.   Peyton Orozco MD

## 2021-11-06 NOTE — PROGRESS NOTES
Attempted to call nurse to nurse to [facility], states that they will return the call.

## 2021-11-06 NOTE — PROGRESS NOTES
Patient discharged to [facility] with wet to dry sterile dressing to right foot. Patient wound vac and charging cord from [employer] was sent with Nexampet to return to [facility] with patient.

## 2021-11-06 NOTE — CARE COORDINATION
Social Work discharge planning   Sw received call from Tereso Gaitan asking if pt can return to [facility] today. Sw spoke to Amari Gabino at [address] re: pt discharge today. They can accept today with picc, iv atb and wound vac. Mary spoke to Armando Bradshaw, discussed need for Covid test to be negative today to go back to [facility]. MARY spoke to Reinaldo SEGAL at [facility] [phone] and set up  at 6 to accommodate his iv atb schedule. Sw notified pt and he said he will tell his brother himself. Electronically signed by Rani Bravo on 11/6/2021 at 11:19 AM     Addendum-    Noted Covid test negative today for transfer back to [facility] snf.   Electronically signed by Rani Bravo on 11/6/2021 at 4:00 PM

## 2021-11-06 NOTE — PROGRESS NOTES
[address] [place] [address] [facility]  BRIDGETTIDA  Progress Note    SUBJECTIVE:  Chief Complaint   Patient presents with    Altered Mental Status     From [facility] for confusion and decreased 02 sats. The patient is doing well. Denies dyspnea. Tolerating antibiotics. Review of systems:  As stated above in the chief complaint, otherwise negative.     Medications:  Scheduled Meds:   bumetanide  1 mg Oral BID    sodium chloride flush  5-40 mL IntraVENous 2 times per day    albuterol  2.5 mg Nebulization Q4H While awake    acetylcysteine  4 mL Inhalation Q6H    silver sulfADIAZINE   Topical Daily    daptomycin (CUBICIN) IVPB  700 mg IntraVENous Q48H    meropenem  1,000 mg IntraVENous Q12H    allopurinol  300 mg Oral Daily    aspirin EC  81 mg Oral Daily    DULoxetine  60 mg Oral Daily    ferrous sulfate  325 mg Oral BID WC    gabapentin  200 mg Oral 4x Daily    hydrALAZINE  50 mg Oral 3 times per day    isosorbide mononitrate  120 mg Oral Daily    lactulose  20 g Oral Once per day on Mon Wed Fri    levothyroxine  25 mcg Oral Daily    atorvastatin  10 mg Oral Daily    magnesium oxide  200 mg Oral Daily    metOLazone  2.5 mg Oral Once per day on Tue Thu Sat    metoprolol succinate  50 mg Oral Daily    mirtazapine  7.5 mg Oral Nightly    pantoprazole  40 mg Oral BID AC    potassium chloride  40 mEq Oral Daily    sucralfate  1 g Oral 4x Daily    budesonide  250 mcg Nebulization BID    Arformoterol Tartrate  15 mcg Nebulization BID    insulin lispro  0-6 Units SubCUTAneous TID WC    insulin lispro  0-3 Units SubCUTAneous Nightly    heparin (porcine)  5,000 Units SubCUTAneous Q12H     Continuous Infusions:   sodium chloride      sodium chloride      sodium chloride 33.3 mL/hr at 11/06/21 0906    dextrose       PRN Meds:sodium chloride, sodium chloride flush, sodium chloride, albuterol, polyethylene glycol, acetaminophen **OR** acetaminophen, glucose, dextrose, glucagon (rDNA), dextrose    OBJECTIVE:  /66   Pulse 92   Temp 97.9 °F (36.6 °C) (Oral)   Resp 16   Wt 240 lb 4.8 oz (109 kg)   SpO2 92%   BMI 36.54 kg/m²   Temp  Av °F (36.7 °C)  Min: 97.7 °F (36.5 °C)  Max: 98.5 °F (36.9 °C)  Constitutional: The patient is lying in bed. He is awake and alert. No distress. Skin: Warm and dry. No rashes were noted. HEENT: Round and reactive pupils. Moist mucous membranes. No ulcerations or thrush. Neck: Supple to movements. Chest: No use of accessory muscles to breathe. Symmetrical expansion. No wheezing, crackles or rhonchi. Cardiovascular: Heart sounds rhythmic and regular. Abdomen: Positive bowel sounds to auscultation. Extremities: Right foot TMA with VAC. Left second toe amputated. No erythema. Lines: Right PICC 10/20/2021.       Laboratory and Tests Review:  Lab Results   Component Value Date    WBC 7.4 2021    WBC 9.4 2021    WBC 11.8 (H) 2021    HGB 7.9 (L) 2021    HCT 23.9 (L) 2021    MCV 93.7 2021     2021     Lab Results   Component Value Date    NEUTROABS 5.66 2021    NEUTROABS 7.86 (H) 2021    NEUTROABS 10.15 (H) 2021     No results found for: [facility]  Lab Results   Component Value Date    ALT 14 2021    AST 20 2021    ALKPHOS 108 2021    BILITOT 0.6 2021     Lab Results   Component Value Date     2021    K 4.0 2021    K 4.8 2021    CL 95 2021    CO2 31 2021    BUN 21 2021    CREATININE 1.4 2021    CREATININE 1.3 2021    CREATININE 1.7 2021    GFRAA 59 2021    LABGLOM 49 2021    GLUCOSE 121 2021    GLUCOSE 111 2011    PROT 5.9 2021    LABALBU 1.9 2021    CALCIUM 8.9 2021    BILITOT 0.6 2021    ALKPHOS 108 2021    AST 20 2021    ALT 14 2021     Lab Results   Component Value Date    CRP 16.0 (H) 2021    CRP 17.1 (H) 10/15/2021    CRP 21.4 (H) 09/23/2021     Lab Results   Component Value Date    SEDRATE 122 (H) 11/03/2021    SEDRATE >150 (H) 10/17/2021    SEDRATE 128 (H) 10/15/2021     Radiology:      Microbiology:   Respiratory panel: Negative  Rapid SARS-CoV-2: Negative  Blood cultures 11/4/2021: Negative so far  Streptococcus pneumoniae/Legionella urine Ag: negative  Nares screen MRSA: Pending    Recent Labs     11/03/21  1351   PROCAL 0.55*       ASSESSMENT:  · Status post right TMA. 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 Status post bronchoscopy  · CKD    PLAN:  · Continue Meropenem and Daptomycin until 11/14/2021.   Reconciled  · The patient can be discharged from ID standpoint to [facility] with Reddy Roldan MD  10:37 AM  11/6/2021

## 2021-11-06 NOTE — DISCHARGE SUMMARY
[facility] Hospitalist       Hospitalist Physician Discharge Summary       [facility] [id number] [facility]  [address]  [phone]    Schedule an appointment as soon as possible for a visit in 1 week  For wound re-check    Geovanny Cohn, [address] [place] [address]  [phone]    Schedule an appointment as soon as possible for a visit in 1 week  Please call for follow up post hospital stay appt. Aj Starr, [address] [address]  [facility]    In 4 weeks  Please call for follow up post hospital stay appt. Activity level: As tolerated    Diet: ADULT DIET; Regular; 3 carb choices (45 gm/meal); Low Fat/Low Chol/High Fiber/CARROL        Condition at discharge: Stable    Dispo:SNF    Continue supplemental oxygen via nasal canula @ 4 LPM round-the-clock. Continue CPAP / BiPAP during sleep as prior to admission. Patient ID:  Will Barcenas  [id number]  63 y.o.  1940    Admit date: 11/3/2021    Discharge date and time:  11/6/2021  2:20 PM    Admission Diagnoses: Active Problems:    AMS (altered mental status)  Resolved Problems:    * No resolved hospital problems. *      Discharge Diagnoses: Active Problems:    AMS (altered mental status)  Resolved Problems:    * No resolved hospital problems. *      Consults:  IP CONSULT TO INTERNAL MEDICINE  IP CONSULT TO INFECTIOUS DISEASES  IP CONSULT TO PODIATRY  IP CONSULT TO PULMONOLOGY  IP CONSULT TO PULMONOLOGY  IP CONSULT TO IV TEAM    Procedures:  11/4/21 Bronchoscopy-- [facility] AT [place] Course:   65/21 [de-identified]year old male PMH CKD III, DM, HTN, PVD, hypothyroid, chronic HFrEF with aortic and mitral valve disease, CAD, PVD with peripheral neuropathy and chronic foot wounds presented to [facility] ED with complaints of lethargy/confusion and hypoxia.  CT chest indicates left mainstem bronchus opacification with left upper lobe volume loss with compensatory hyperaeration of the superior segment of the left lower lobe, with the superior left lower lobe segment occupying the left pulmonary apex. Other findings of multifocal pneumonia. HgB 7.2 WBC 11.4. Pt was admitted for further evaluation and treatment. Pt seen by ID, Pulmonology, and Podiatry. 11/4/21 Bronchoscopy performed clearing Thick mucoid impaction of the left mainstem all the way down to the left lower lobe per Pulmonology. Wound became infected with Pseudomonas, Citrobacter and MRSA. 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 Mucous plugging noted with Bronch. ID started on Meropenem/daptomycin. Will continue through 11/14/21. Pt mentation improved. Podiatry, ID, and Pulmonology ok for DC. Pt will return to [address] today at 6pm. Pt will be instructed to finish Abx therapy, Daptomycin and Meropenem through 11/14/21. Pt will be instructed to follow up with PCP, ID, Podiatry, and Pulmonology upon DC. Discharge Exam:  Vitals:    11/05/21 1843 11/05/21 2245 11/06/21 0215 11/06/21 0900   BP: 124/60 (!) 145/95  133/66   Pulse: 83 100  92   Resp: 18 18  16   Temp: 97.8 °F (36.6 °C) 97.7 °F (36.5 °C)  97.9 °F (36.6 °C)   TempSrc: Oral Oral  Oral   SpO2: 94%  96% 92%   Weight:             I/O last 3 completed shifts:   In: 435 [Blood:435]  Out: 1700 [Urine:1700]  I/O this shift:  In: -   Out: 1450 [Urine:1450]      LABS:  Recent Labs     11/05/21  1200 11/06/21  0545    136   K 4.3 4.0   CL 96* 95*   CO2 29 31*   BUN 22 21   CREATININE 1.3* 1.4*   GLUCOSE 151* 121*   CALCIUM 8.6 8.9       Recent Labs     11/04/21  0100 11/05/21  0635 11/06/21  0545   WBC 11.8* 9.4 7.4   RBC 2.54* 2.26* 2.55*   HGB 7.7* 6.8* 7.9*   HCT 24.4* 21.3* 23.9*   MCV 96.1 94.2 93.7   MCH 30.3 30.1 31.0   MCHC 31.6* 31.9* 33.1   RDW 16.9* 16.9* 16.4*    214 194   MPV 9.5 9.7 10.1       No results for input(s): NORAU in the last 72 hours. Imaging:  CT HEAD WO CONTRAST    Result Date: 11/3/2021  EXAMINATION: CT OF THE HEAD WITHOUT CONTRAST  11/3/2021 3:30 am TECHNIQUE: CT of the head was performed without the administration of intravenous contrast. Dose modulation, iterative reconstruction, and/or weight based adjustment of the mA/kV was utilized to reduce the radiation dose to as low as reasonably achievable. COMPARISON: None. HISTORY: ORDERING SYSTEM PROVIDED HISTORY: weakness, concerns for CVA TECHNOLOGIST PROVIDED HISTORY: Has a \"code stroke\" or \"stroke alert\" been called? ->No Reason for exam:->weakness, concerns for CVA Decision Support Exception - unselect if not a suspected or confirmed emergency medical condition->Emergency Medical Condition (MA) FINDINGS: BRAIN/VENTRICLES: There is no acute intracranial hemorrhage, mass effect or midline shift. No abnormal extra-axial fluid collection. The gray-white differentiation is maintained without evidence of an acute infarct. There is prominence of the ventricles and sulci due to global parenchymal volume loss. There are nonspecific areas of hypoattenuation within the periventricular and subcortical white matter, which likely represent chronic microvascular ischemic change. ORBITS: The visualized portion of the orbits demonstrate no acute abnormality. SINUSES: The visualized paranasal sinuses and mastoid air cells demonstrate no acute abnormality. SOFT TISSUES/SKULL: No acute abnormality of the visualized skull or soft tissues. No acute intracranial abnormality. CT CHEST WO CONTRAST    Result Date: 11/3/2021  EXAMINATION: CT OF THE CHEST WITHOUT CONTRAST 11/3/2021 3:30 am TECHNIQUE: CT of the chest was performed without the administration of intravenous contrast. Multiplanar reformatted images are provided for review.  Dose modulation, iterative reconstruction, and/or weight based adjustment of the mA/kV was utilized to reduce the radiation dose to as low as reasonably achievable. COMPARISON: No prior CT. Correlation is made with prior chest radiographs from 10/23/2021 and 10/20/2021. HISTORY: ORDERING SYSTEM PROVIDED HISTORY: SOB TECHNOLOGIST PROVIDED HISTORY: Reason for exam:->SOB Decision Support Exception - unselect if not a suspected or confirmed emergency medical condition->Emergency Medical Condition (MA) FINDINGS: Mediastinum: Mild cardiomegaly. No pericardial effusion. Dense bilateral coronary arterial calcifications, particularly on the right. Dense mitral annular calcification, to lesser extent, aortic annular calcification. 2 lead left chest pacemaker with appropriately positioned leads. Right PICC with tip in the SVC near the cavoatrial junction. Unremarkable thyroid gland. Sternotomy wires and mediastinal clips. Small amount of fluid is seen within the nondistended esophagus. Lungs/pleura: No pneumothorax. Small bilateral pleural effusions. There are patchy areas of consolidation seen within the right upper lobe, including the apex, consistent with multifocal pneumonia. Consolidation within the dependent right lower lobe is likely largely atelectatic, however there are small infiltrative foci seen within the right middle and lower lobes as well. Calcified granulomas are seen within areas of the left lower lobe and left mid lung. There is an atelectatic or fibroatelectatic component to this appearance, however there is probably a superimposed component of pneumonia within the left lower lobe. Relative hypodensity of lung at the left apex appears to relate to compensatory expansion of the superior segment of the left lower lobe occupying the left apex, due to relative volume loss of the left upper lobe. Secretions are seen within the distal trachea at the level of the nato. The right mainstem bronchus is patent, with the left mainstem bronchus and central branch bronchi largely opacified.   There is opacification of some right lower lobe bronchi as well. Upper Abdomen: Visualized upper abdominal viscera demonstrate no acute abnormality. Soft Tissues/Bones: There is a 3.5 x 3.0 cm soft tissue density mass seen on image 66 of series 3, possibly retroareolar. Asymmetric edema versus flank hematoma is partially imaged on the left, with significant portion of left-sided soft tissues out of the field of view. No acute osseous abnormality. Multifocal pneumonia as described. Small bilateral pleural effusions. Significant opacification of the left mainstem bronchus. Patient may benefit from bronchoscopy. Left upper lobe volume loss with compensatory hyperaeration of the superior segment of the left lower lobe, with the superior left lower lobe segment occupying the left pulmonary apex. Atelectasis versus fibroatelectatic changes within the left lower lobe and left mid lung, with consolidation and multiple calcified granulomas seen within this consolidation. 3.5 x 3.0 cm soft tissue density mass within the superficial left upper chest subcutaneous tissues, potentially retroareolar. This could represent neoplasia or marcelle asymmetric gynecomastia. Clinical correlation is advised, with mammographic correlation if appropriate. Increased attenuation within the subcutaneous tissues of the partially imaged left flank, edema versus hemorrhage, with mild edema seen in the visualized lower right flank. US DUP LOWER EXTREMITIES BILATERAL VENOUS    Result Date: 11/3/2021  EXAMINATION: DUPLEX VENOUS ULTRASOUND OF THE BILATERAL LOWER [id number]/3/2021 6:42 am TECHNIQUE: Duplex ultrasound using B-mode/gray scaled imaging, Doppler spectral analysis and color flow Doppler was obtained of the deep venous structures of the lower bilateral extremities. COMPARISON: None.  HISTORY: ORDERING SYSTEM PROVIDED HISTORY: Leg swelling TECHNOLOGIST PROVIDED HISTORY: Reason for exam:->r/o dvt Reason for exam:->Leg swelling What reading provider will be dictating this exam?->CRC FINDINGS: The visualized veins of the bilateral lower extremities are patent and free of echogenic thrombus. The veins demonstrate good compressibility with normal color flow study and spectral analysis. No evidence of DVT in either lower extremity. Patient Instructions:   Current Discharge Medication List      START taking these medications    Details   Heparin Sodium, Porcine, (HEPARIN, PORCINE,) 62301 UNIT/ML injection Inject 0.5 mLs into the skin every 12 hours      atorvastatin (LIPITOR) 10 MG tablet Take 1 tablet by mouth daily  Qty: 30 tablet, Refills: 3      acetylcysteine (MUCOMYST) 10 % nebulizer solution Inhale 4 mLs into the lungs every 6 hours      silver sulfADIAZINE (SILVADENE) 1 % cream Apply topically daily. polyethylene glycol (GLYCOLAX) 17 g packet Take 17 g by mouth daily as needed for Constipation  Qty: 527 g, Refills: 1         CONTINUE these medications which have CHANGED    Details   DAPTOmycin (CUBICIN) infusion Infuse 675.2 mg intravenously every 24 hours for 15 days For 30 days  Qty: 21 g, Refills: 0      meropenem (MERREM) infusion Infuse 1,000 mg intravenously every 12 hours for 15 days For 30 days  Qty: 60 g, Refills: 0      gabapentin (NEURONTIN) 100 MG capsule Take 2 capsules by mouth 4 times daily for 30 days. Qty: 90 capsule, Refills: 0      bumetanide (BUMEX) 1 MG tablet Take 1 tablet by mouth 2 times daily  Qty: 30 tablet, Refills: 3      potassium chloride (KLOR-CON M) 20 MEQ extended release tablet Take 2 tablets by mouth daily  Qty: 60 tablet, Refills: 3         CONTINUE these medications which have NOT CHANGED    Details   albuterol (PROVENTIL) (2.5 MG/3ML) 0.083% nebulizer solution Take 2.5 mg by nebulization every 6 hours as needed for Wheezing      sucralfate (CARAFATE) 1 GM tablet Take 1 g by mouth 4 times daily      HYDROcodone-acetaminophen (NORCO)  MG per tablet Take 1 tablet by mouth every 6 hours as needed for Pain.       morphine (THIERRY) 10 MG extended release capsule Take 10 mg by mouth 2 times daily.       pantoprazole (PROTONIX) 40 MG tablet Take 1 tablet by mouth 2 times daily (before meals)  Qty: 30 tablet, Refills: 3      mirtazapine (REMERON) 7.5 MG tablet Take 7.5 mg by mouth nightly      hydrALAZINE (APRESOLINE) 50 MG tablet Take 1 tablet by mouth every 8 hours  Qty: 90 tablet, Refills: 3      magnesium 200 MG TABS tablet Take 200 mg by mouth daily      diphenhydrAMINE (BENADRYL) 25 MG capsule Take 25 mg by mouth daily as needed for Itching      insulin lispro, 1 Unit Dial, (HUMALOG KWIKPEN) 100 UNIT/ML SOPN Inject 0-6 Units into the skin 3 times daily (before meals) PER SLIDING SCALE: 0-139=0U, 140-199=1U, 200-249=2U, 250-299=3U, 300-349=4U, 350-399=5U, 400-450=6U      hydrOXYzine (ATARAX) 25 MG tablet Take 25 mg by mouth 4 times daily       lactulose 20 GM/30ML SOLN Take 20 g by mouth three times a week *MON-WED-FRI*      metOLazone (ZAROXOLYN) 2.5 MG tablet Take 2.5 mg by mouth three times a week *TUE-THUR-SAT*      ferrous sulfate (IRON 325) 325 (65 Fe) MG tablet Take 1 tablet by mouth 2 times daily (with meals)  Qty: 30 tablet, Refills: 3      DULoxetine (CYMBALTA) 60 MG extended release capsule Take 1 capsule by mouth daily  Qty: 30 capsule, Refills: 3      fluticasone-umeclidin-vilant (TRELEGY ELLIPTA) 100-62.5-25 MCG/INH AEPB Inhale 1 puff into the lungs Daily       aspirin EC 81 MG EC tablet Take 1 tablet by mouth daily  Qty: 90 tablet, Refills: 1    Associated Diagnoses: Coronary artery disease involving coronary bypass graft of native heart without angina pectoris      levothyroxine (SYNTHROID) 25 MCG tablet Take 25 mcg by mouth Daily   Refills: 1      metoprolol succinate (TOPROL XL) 50 MG extended release tablet Take 50 mg by mouth daily      isosorbide mononitrate (IMDUR) 120 MG extended release tablet Take 1 tablet by mouth daily  Qty: 30 tablet, Refills: 0      allopurinol (ZYLOPRIM) 300 MG tablet Take 300 mg by mouth daily          STOP taking these medications       lovastatin (MEVACOR) 20 MG tablet Comments:   Reason for Stopping:                 Note that more than 30 minutes was spent in preparing discharge papers, discussing discharge with patient, medication review, etc.    NOTE: This report was transcribed using voice recognition software. Every effort was made to ensure accuracy; however, inadvertent computerized transcription errors may be present. Signed:  Electronically signed by Carlos Acevedo CNP on 11/6/2021 at 2:20 PM

## 2021-11-06 NOTE — PROGRESS NOTES
Dr. Gurvinder Brooks in to see the patient, states okay for patient to be discharged to [facility] today.

## 2021-11-06 NOTE — PROGRESS NOTES
Nurse to nurse called to Chandu Figueroa at [facility]. Updated of patient sent with wound vac from their facility. Updated that patient has a wet to dry dressing on right foot.

## 2021-11-06 NOTE — PROGRESS NOTES
Department of Podiatry  Progress Note    SUBJECTIVE:  Mr. Jc Crespo was evaluated and seen at bedside this morning s/p RLE TMA and LLE second digit amputation (DOS: 10/16/21). No acute events overnight. Patient denies any N/V/D/F/C/SOB/CP and has no other pedal complaints at this time.      OBJECTIVE:    Scheduled Meds:   bumetanide  1 mg Oral BID    sodium chloride flush  5-40 mL IntraVENous 2 times per day    albuterol  2.5 mg Nebulization Q4H While awake    acetylcysteine  4 mL Inhalation Q6H    silver sulfADIAZINE   Topical Daily    daptomycin (CUBICIN) IVPB  700 mg IntraVENous Q48H    meropenem  1,000 mg IntraVENous Q12H    allopurinol  300 mg Oral Daily    aspirin EC  81 mg Oral Daily    DULoxetine  60 mg Oral Daily    ferrous sulfate  325 mg Oral BID WC    gabapentin  200 mg Oral 4x Daily    hydrALAZINE  50 mg Oral 3 times per day    isosorbide mononitrate  120 mg Oral Daily    lactulose  20 g Oral Once per day on Mon Wed Fri    levothyroxine  25 mcg Oral Daily    atorvastatin  10 mg Oral Daily    magnesium oxide  200 mg Oral Daily    metOLazone  2.5 mg Oral Once per day on Tue Thu Sat    metoprolol succinate  50 mg Oral Daily    mirtazapine  7.5 mg Oral Nightly    pantoprazole  40 mg Oral BID AC    potassium chloride  40 mEq Oral Daily    sucralfate  1 g Oral 4x Daily    budesonide  250 mcg Nebulization BID    Arformoterol Tartrate  15 mcg Nebulization BID    insulin lispro  0-6 Units SubCUTAneous TID WC    insulin lispro  0-3 Units SubCUTAneous Nightly    heparin (porcine)  5,000 Units SubCUTAneous Q12H     Continuous Infusions:   sodium chloride      sodium chloride      sodium chloride Stopped (11/03/21 1933)    dextrose       PRN Meds:.sodium chloride, sodium chloride flush, sodium chloride, albuterol, polyethylene glycol, acetaminophen **OR** acetaminophen, glucose, dextrose, glucagon (rDNA), dextrose    No Known Allergies    BP (!) 145/95   Pulse 100   Temp 97.7 °F (36.5 °C) (Oral)   Resp 18   Wt 240 lb 4.8 oz (109 kg)   SpO2 96%   BMI 36.54 kg/m²       EXAM:    VASCULAR:  DP and PT pulses are non-palpable. CFT delayed B/L. Warm to warm from the tibial tuberosity to the distal aspect of the foot dorsally b/l. No hair growth noted to the distal aspects dorsally. NEUROLOGIC:  Protective sensation is diminished b/l    DERM:    RLE:   #1TMA site was covered with wound vac dressings. Wound vac running at 125mmHg with good seal. No leaks noted. Minimal debris in canister. #2Mild skin irritation noted to the dorsal distal 1/3 of the right foot. Mild edema noted with taut skin. No noted drainage or debris. No noted mal odor. #3RLE posterior heel eschar noted measuring about 2.5x2.5cm. Eschar is stable and intact without noted drainage or discharge. Mild periwound erythema noted. NO noted active bleeding. No noted malodor. No noted maceration. Stable. LLE:   #1Second digit incision site is well coapted with sutures intact and in intended position. No noted dehiscence. No noted pus or drainage. Dried blood noted to the incision site. #2Skin irritation noted to the dorsal 1/5 of the left lower extremity. Scabbed wound noted just proximal to the ankle joint. Hyperpigmentation noted to the dorsal distal leg. No noted pus or drainage. No noted open lesions at the time of evaluation. Xerosis noted.     -All as pictured below:    MUSCULOSKELETAL:  RLE TMA and LLE Second digit amputation. Equinus b/l. No pain on palpation to posterior calf b/l.                      Scheduled Meds:   bumetanide  1 mg Oral BID    sodium chloride flush  5-40 mL IntraVENous 2 times per day    albuterol  2.5 mg Nebulization Q4H While awake    acetylcysteine  4 mL Inhalation Q6H    silver sulfADIAZINE   Topical Daily    daptomycin (CUBICIN) IVPB  700 mg IntraVENous Q48H    meropenem  1,000 mg IntraVENous Q12H    allopurinol  300 mg Oral Daily    aspirin EC  81 mg Oral Daily    DULoxetine  60 mg Oral Daily    ferrous sulfate  325 mg Oral BID WC    gabapentin  200 mg Oral 4x Daily    hydrALAZINE  50 mg Oral 3 times per day    isosorbide mononitrate  120 mg Oral Daily    lactulose  20 g Oral Once per day on Mon Wed Fri    levothyroxine  25 mcg Oral Daily    atorvastatin  10 mg Oral Daily    magnesium oxide  200 mg Oral Daily    metOLazone  2.5 mg Oral Once per day on Tue Thu Sat    metoprolol succinate  50 mg Oral Daily    mirtazapine  7.5 mg Oral Nightly    pantoprazole  40 mg Oral BID AC    potassium chloride  40 mEq Oral Daily    sucralfate  1 g Oral 4x Daily    budesonide  250 mcg Nebulization BID    Arformoterol Tartrate  15 mcg Nebulization BID    insulin lispro  0-6 Units SubCUTAneous TID WC    insulin lispro  0-3 Units SubCUTAneous Nightly    heparin (porcine)  5,000 Units SubCUTAneous Q12H     Continuous Infusions:   sodium chloride      sodium chloride      sodium chloride Stopped (11/03/21 1933)    dextrose       PRN Meds:.sodium chloride, sodium chloride flush, sodium chloride, albuterol, polyethylene glycol, acetaminophen **OR** acetaminophen, glucose, dextrose, glucagon (rDNA), dextrose    RADIOLOGY:  XR CHEST PORTABLE   Final Result   Large left pleural effusion appears larger with adjacent compressive   atelectasis and or pneumonia. These obscure the left heart border and left   diaphragm, once again. New interstitial infiltrates in right lung may be edema and/or pneumonitis         US DUP LOWER EXTREMITIES BILATERAL VENOUS   Final Result   No evidence of DVT in either lower extremity. CT HEAD WO CONTRAST   Final Result   No acute intracranial abnormality. CT CHEST WO CONTRAST   Final Result   Multifocal pneumonia as described. Small bilateral pleural effusions. Significant opacification of the left mainstem bronchus. Patient may benefit   from bronchoscopy.   Left upper lobe volume loss with compensatory   hyperaeration of the superior segment of the left lower lobe, with the   superior left lower lobe segment occupying the left pulmonary apex. Atelectasis versus fibroatelectatic changes within the left lower lobe and   left mid lung, with consolidation and multiple calcified granulomas seen   within this consolidation. 3.5 x 3.0 cm soft tissue density mass within the superficial left upper chest   subcutaneous tissues, potentially retroareolar. This could represent   neoplasia or marcelle asymmetric gynecomastia. Clinical correlation is advised,   with mammographic correlation if appropriate. Increased attenuation within the subcutaneous tissues of the partially imaged   left flank, edema versus hemorrhage, with mild edema seen in the visualized   lower right flank. BP (!) 145/95   Pulse 100   Temp 97.7 °F (36.5 °C) (Oral)   Resp 18   Wt 240 lb 4.8 oz (109 kg)   SpO2 96%   BMI 36.54 kg/m²     LABS:    Recent Labs     11/05/21  0635 11/06/21  0545   WBC 9.4 7.4   HGB 6.8* 7.9*   HCT 21.3* 23.9*    194        Recent Labs     11/06/21  0545      K 4.0   CL 95*   CO2 31*   BUN 21   CREATININE 1.4*      No results for input(s): PROT, INR, APTT in the last 72 hours. ASSESSMENT:  1.Gangrenous changes -POA   2. 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 OM   4. PAD  5. RLE posterior heel wound- POA, unstageable. 6.DM with neuropathic changes     AMS (altered mental status)      PLAN:  - Patient was examined and evaluated. Reviewed patient's recent lab results and pertinent diagnostic imaging. Reviewed ancillary service notes. - Dry dressings changed this morning: Xeroform, DSD b/l. Wound vac to RLE was intact with good seal running at 125mmHg. With minimal debris in cannister. Continue with MWF wound vac dressing changes and QoD dry dressing change b/l   - Pain Control: IV and PO  - Antibiotics as per ID:Sandeep, Dapto   - Venous US: 1. Negatve for DVT   - Cultures : MRSA, Citrobacter, Pseudomonas  - Prevalon boots ordered   - Discussed patient with Dr. Rubia Lawler   - Discharge: [facility]. - Will continue to follow patient while they are in-house. Patient is stable for discharge once medically cleared from all other teams on board.

## 2021-11-07 NOTE — DISCHARGE SUMMARY
is a [de-identified] y.o. male of No primary care provider on file. He was sent in from [place] [address] for necrotic toes of the right foot. These wounds have been chronic for the past many months and been followed by podiatry. He was found to have WBC 10.8 Hgb 11.9, Cr. 2.2 (baseline 1.3-2.5 this year) and a procal of 0.46 in the emergency department. He indicates they became black over the past 1 month or so. On evaluation, his right third and fourth toe black/gangrene/necrotic. Swelling is noted in bilateral lower extremities. Patient was transferred to [facility] to have an aortogram with carotid imaging. Cellulitis   -IV abx-discontinued secondary to noninfected gangrened necrotic toes  -ID following - appreciate input  -Monitor WBC/Fever - WBC 9.0 today     Necrotic Toes of R Foot  -Podiatry following-   -Vascular Surgery signed off   -Right femoral endarterectomy - .  9/30/2021  -TMA right foot  - 10/2/21 by pod , tolerated well   -Obtain cardiology clearance-completed-stress test with global hypokinesis but no perfusion defects or reversible ischemia  -Nephrology following for renal function optimization, need for IV contrast        DM Type 2  -Monitor labs - well controlled in hospital setting. -ISS glucose control, resume home medications  -Hypoglycemia protocol initiated  -Discuss diet modification      Severe agitation requiring restraints when he was at [facility]  Since transfer to [facility], he actually appears much more alert and responsive to questions  Try to keep off sedative agents, except long-acting pain control-on MS Contin twice daily    Patient was discharged back to the facility in stable condition with medications listed below. Patient is to follow-up with all consultants and PCP within 3 weeks of discharge.     Recent Labs     11/05/21  0635 11/06/21  0545   WBC 9.4 7.4   HGB 6.8* 7.9*   HCT 21.3* 23.9*    194       Recent Labs     11/05/21  1200 11/06/21  0545    136   K 4.3 4.0 CL 96* 95*   CO2 29 31*   BUN 22 21   CREATININE 1.3* 1.4*   CALCIUM 8.6 8.9       CT HEAD WO CONTRAST    Result Date: 11/3/2021  EXAMINATION: CT OF THE HEAD WITHOUT CONTRAST  11/3/2021 3:30 am TECHNIQUE: CT of the head was performed without the administration of intravenous contrast. Dose modulation, iterative reconstruction, and/or weight based adjustment of the mA/kV was utilized to reduce the radiation dose to as low as reasonably achievable. COMPARISON: None. HISTORY: ORDERING SYSTEM PROVIDED HISTORY: weakness, concerns for CVA TECHNOLOGIST PROVIDED HISTORY: Has a \"code stroke\" or \"stroke alert\" been called? ->No Reason for exam:->weakness, concerns for CVA Decision Support Exception - unselect if not a suspected or confirmed emergency medical condition->Emergency Medical Condition (MA) FINDINGS: BRAIN/VENTRICLES: There is no acute intracranial hemorrhage, mass effect or midline shift. No abnormal extra-axial fluid collection. The gray-white differentiation is maintained without evidence of an acute infarct. There is prominence of the ventricles and sulci due to global parenchymal volume loss. There are nonspecific areas of hypoattenuation within the periventricular and subcortical white matter, which likely represent chronic microvascular ischemic change. ORBITS: The visualized portion of the orbits demonstrate no acute abnormality. SINUSES: The visualized paranasal sinuses and mastoid air cells demonstrate no acute abnormality. SOFT TISSUES/SKULL: No acute abnormality of the visualized skull or soft tissues. No acute intracranial abnormality. CT CHEST WO CONTRAST    Result Date: 11/3/2021  EXAMINATION: CT OF THE CHEST WITHOUT CONTRAST 11/3/2021 3:30 am TECHNIQUE: CT of the chest was performed without the administration of intravenous contrast. Multiplanar reformatted images are provided for review.  Dose modulation, iterative reconstruction, and/or weight based adjustment of the mA/kV was utilized to reduce the radiation dose to as low as reasonably achievable. COMPARISON: No prior CT. Correlation is made with prior chest radiographs from 10/23/2021 and 10/20/2021. HISTORY: ORDERING SYSTEM PROVIDED HISTORY: SOB TECHNOLOGIST PROVIDED HISTORY: Reason for exam:->SOB Decision Support Exception - unselect if not a suspected or confirmed emergency medical condition->Emergency Medical Condition (MA) FINDINGS: Mediastinum: Mild cardiomegaly. No pericardial effusion. Dense bilateral coronary arterial calcifications, particularly on the right. Dense mitral annular calcification, to lesser extent, aortic annular calcification. 2 lead left chest pacemaker with appropriately positioned leads. Right PICC with tip in the SVC near the cavoatrial junction. Unremarkable thyroid gland. Sternotomy wires and mediastinal clips. Small amount of fluid is seen within the nondistended esophagus. Lungs/pleura: No pneumothorax. Small bilateral pleural effusions. There are patchy areas of consolidation seen within the right upper lobe, including the apex, consistent with multifocal pneumonia. Consolidation within the dependent right lower lobe is likely largely atelectatic, however there are small infiltrative foci seen within the right middle and lower lobes as well. Calcified granulomas are seen within areas of the left lower lobe and left mid lung. There is an atelectatic or fibroatelectatic component to this appearance, however there is probably a superimposed component of pneumonia within the left lower lobe. Relative hypodensity of lung at the left apex appears to relate to compensatory expansion of the superior segment of the left lower lobe occupying the left apex, due to relative volume loss of the left upper lobe. Secretions are seen within the distal trachea at the level of the nato. The right mainstem bronchus is patent, with the left mainstem bronchus and central branch bronchi largely opacified.   There is opacification of some right lower lobe bronchi as well. Upper Abdomen: Visualized upper abdominal viscera demonstrate no acute abnormality. Soft Tissues/Bones: There is a 3.5 x 3.0 cm soft tissue density mass seen on image 66 of series 3, possibly retroareolar. Asymmetric edema versus flank hematoma is partially imaged on the left, with significant portion of left-sided soft tissues out of the field of view. No acute osseous abnormality. Multifocal pneumonia as described. Small bilateral pleural effusions. Significant opacification of the left mainstem bronchus. Patient may benefit from bronchoscopy. Left upper lobe volume loss with compensatory hyperaeration of the superior segment of the left lower lobe, with the superior left lower lobe segment occupying the left pulmonary apex. Atelectasis versus fibroatelectatic changes within the left lower lobe and left mid lung, with consolidation and multiple calcified granulomas seen within this consolidation. 3.5 x 3.0 cm soft tissue density mass within the superficial left upper chest subcutaneous tissues, potentially retroareolar. This could represent neoplasia or marcelle asymmetric gynecomastia. Clinical correlation is advised, with mammographic correlation if appropriate. Increased attenuation within the subcutaneous tissues of the partially imaged left flank, edema versus hemorrhage, with mild edema seen in the visualized lower right flank. US DUP LOWER EXTREMITIES BILATERAL VENOUS    Result Date: 11/3/2021  EXAMINATION: DUPLEX VENOUS ULTRASOUND OF THE BILATERAL LOWER [id number]/3/2021 6:42 am TECHNIQUE: Duplex ultrasound using B-mode/gray scaled imaging, Doppler spectral analysis and color flow Doppler was obtained of the deep venous structures of the lower bilateral extremities. COMPARISON: None.  HISTORY: ORDERING SYSTEM PROVIDED HISTORY: Leg swelling TECHNOLOGIST PROVIDED HISTORY: Reason for exam:->r/o dvt Reason for exam:->Leg swelling What reading provider will be dictating this exam?->CRC FINDINGS: The visualized veins of the bilateral lower extremities are patent and free of echogenic thrombus. The veins demonstrate good compressibility with normal color flow study and spectral analysis. No evidence of DVT in either lower extremity. Discharge Exam:    HEENT: NCAT,  PERRLA, No JVD  Heart:  RRR, no murmurs, gallops, or rubs.   Lungs:  CTA bilaterally, no wheeze, rales or rhonchi  Abd: bowel sounds present, nontender, nondistended, no masses  Extrem:  No clubbing, cyanosis, or edema    Disposition: [facility]     Patient Condition at Discharge: Stable    Patient Instructions:      Medication List      START taking these medications    hydrALAZINE 50 MG tablet  Commonly known as: APRESOLINE  Take 1 tablet by mouth every 8 hours        CONTINUE taking these medications    allopurinol 300 MG tablet  Commonly known as: ZYLOPRIM     aspirin EC 81 MG EC tablet  Take 1 tablet by mouth daily     diphenhydrAMINE 25 MG capsule  Commonly known as: BENADRYL     DULoxetine 60 MG extended release capsule  Commonly known as: CYMBALTA  Take 1 capsule by mouth daily     ferrous sulfate 325 (65 Fe) MG tablet  Commonly known as: IRON 325  Take 1 tablet by mouth 2 times daily (with meals)     HumaLOG KwikPen 100 UNIT/ML Sopn  Generic drug: insulin lispro (1 Unit Dial)     hydrOXYzine 25 MG tablet  Commonly known as: ATARAX     isosorbide mononitrate 120 MG extended release tablet  Commonly known as: IMDUR  Take 1 tablet by mouth daily     lactulose 20 GM/30ML Soln     levothyroxine 25 MCG tablet  Commonly known as: SYNTHROID     magnesium 200 MG Tabs tablet     metOLazone 2.5 MG tablet  Commonly known as: ZAROXOLYN     metoprolol succinate 50 MG extended release tablet  Commonly known as: TOPROL XL     Trelegy Ellipta 100-62.5-25 MCG/INH Aepb  Generic drug: fluticasone-umeclidin-vilant        STOP taking these medications    bumetanide 1 MG tablet  Commonly known as: BUMEX gabapentin 400 MG capsule  Commonly known as: NEURONTIN     HYDROcodone-acetaminophen  MG per tablet  Commonly known as: NORCO     lovastatin 20 MG tablet  Commonly known as: MEVACOR     potassium chloride 20 MEQ extended release tablet  Commonly known as: KLOR-CON M        ASK your doctor about these medications    HYDROcodone-acetaminophen  MG per tablet  Commonly known as: NORCO  Take 1 tablet by mouth every 6 hours as needed for Pain for up to 3 days. Ask about: Should I take this medication? Where to Get Your Medications      These medications were sent to [address] [place] [address]. [address] [place] [facility], [address] [facility]  [address] [facility] [address], [address] [facility] [address]    Phone: [phone]   · hydrALAZINE 50 MG tablet     You can get these medications from any pharmacy    Bring a paper prescription for each of these medications  · HYDROcodone-acetaminophen  MG per tablet       Activity: activity as tolerated  Diet: diabetic diet    Pt has been advised to: Follow-up with No primary care provider on file. in 1 week.   Follow-up with consultants as recommended by them    Note that over 30 minutes was spent in preparing discharge papers, discussing discharge with patient, medication review, etc.    Signed:  Danielle Oden MD  11/7/2021  9:10 AM

## 2021-11-09 NOTE — TELEPHONE ENCOUNTER
Luis Alberto Bradshaw, unit manager from [facility] says patient is non ambulatory & would require a stretcher/radha but outpatient clinic requires patient ability to toilet themselves with minimal assist and ambulate to standing scale. Luis Alberto Bradshaw cancelled appointment and says they are able to give an  IV diuretic at facility if needed for increased signs/symptoms of heart failure. Discussed with Brea to please call once patient has had more pT/OT and is ambulatory so that we can get him scheduled for a visit.   Electronically signed by Husam Liu RN on 11/9/2021 at 2:40 PM

## 2021-11-17 NOTE — PROGRESS NOTES
Progress Note  10/23/2021 9:52 AM  Subjective:   Admit Date: 10/15/2021  PCP: Dory Stewart MD  Interval History: More brighter today , feels ok , remains lethargic     Diet: ADULT DIET; Regular  ADULT ORAL NUTRITION SUPPLEMENT; Lunch, Dinner; Low Calorie/High Protein Oral Supplement  ADULT ORAL NUTRITION SUPPLEMENT; Breakfast, Lunch;  Wound Healing Oral Supplement    Data:   Scheduled Meds:   daptomycin (CUBICIN) IVPB  8 mg/kg (Ideal) IntraVENous Q48H    pantoprazole  40 mg Oral BID AC    sucralfate  1 g Oral 4 times per day    meropenem  1,000 mg IntraVENous Q24H    white petrolatum   Topical BID    sodium bicarbonate  1,300 mg Oral BID    lidocaine PF  5 mL IntraDERmal Once    sodium chloride flush  5-40 mL IntraVENous 2 times per day    heparin flush  3 mL IntraVENous 2 times per day    allopurinol  300 mg Oral Daily    [Held by provider] bumetanide  1 mg Oral Daily    DULoxetine  60 mg Oral Daily    ferrous sulfate  325 mg Oral BID WC    gabapentin  400 mg Oral 4x Daily    hydrALAZINE  50 mg Oral 3 times per day    hydrOXYzine  25 mg Oral 4x Daily    isosorbide mononitrate  120 mg Oral Daily    lactulose  20 g Oral Once per day on Mon Wed Fri    levothyroxine  25 mcg Oral Daily    atorvastatin  10 mg Oral Daily    magnesium oxide  200 mg Oral Daily    [Held by provider] metOLazone  2.5 mg Oral Once per day on Mon Wed Fri    metoprolol succinate  50 mg Oral Daily    mirtazapine  7.5 mg Oral Nightly    morphine  10 mg Oral BID    insulin lispro  0-12 Units SubCUTAneous TID WC    insulin lispro  0-6 Units SubCUTAneous Nightly    budesonide  0.25 mg Nebulization BID    And    ipratropium  0.5 mg Nebulization 4x daily    And    Arformoterol Tartrate  15 mcg Nebulization BID     Continuous Infusions:   sodium chloride      sodium chloride      sodium chloride      sodium chloride      sodium chloride      sodium chloride 125 mL/hr at 10/22/21 1233    dextrose       PRN Meds:sodium Refill sent.      chloride, white petrolatum **AND** white petrolatum, sodium chloride, sodium chloride, sodium chloride, sodium chloride flush, sodium chloride, heparin flush, [Held by provider] HYDROmorphone, morphine, albuterol, diphenhydrAMINE, magnesium hydroxide, acetaminophen **OR** acetaminophen, HYDROcodone-acetaminophen, glucose, dextrose, glucagon (rDNA), dextrose  I/O last 3 completed shifts: In: 1797 [P.O.:120; I.V.:878; IV Piggyback:150]  Out: 950 [Urine:950]  I/O this shift:  In: 1059.2 [P.O.:80; I.V.:979.2]  Out: -     Intake/Output Summary (Last 24 hours) at 10/23/2021 0952  Last data filed at 10/23/2021 0718  Gross per 24 hour   Intake 2207.17 ml   Output 950 ml   Net 1257.17 ml     CBC:   Recent Labs     10/21/21  0610 10/21/21  1800 10/22/21  0515 10/22/21  1450 10/23/21  0641   WBC 11.9*  --  23.4*  --  11.2   HGB 7.1*   < > 7.8* 6.8* 6.8*     --  241  --  181    < > = values in this interval not displayed. BMP:    Recent Labs     10/21/21  0610 10/22/21  0515 10/23/21  0641    144 140   K 5.2* 4.8 3.9    105 106   CO2 25 21* 23   BUN 82* 81* 77*   CREATININE 3.2* 3.1* 2.5*   GLUCOSE 141* 116* 143*     Hepatic: No results for input(s): AST, ALT, ALB, BILITOT, ALKPHOS in the last 72 hours. Troponin: No results for input(s): TROPONINI in the last 72 hours. BNP: No results for input(s): BNP in the last 72 hours. Lipids: No results for input(s): CHOL, HDL in the last 72 hours. Invalid input(s): LDLCALCU  ABGs:   Lab Results   Component Value Date    PO2ART 157.4 09/30/2021    ZCX5CBP 42.9 09/30/2021     INR: No results for input(s): INR in the last 72 hours.     -----------------------------------------------------------------  RAD: XR FOOT LEFT (MIN 3 VIEWS)    Result Date: 10/15/2021  EXAMINATION: THREE XRAY VIEWS OF THE LEFT FOOT 10/15/2021 8:27 pm COMPARISON: 02/04/2017 HISTORY: ORDERING SYSTEM PROVIDED HISTORY: cellulitis of digits 2 and 3 TECHNOLOGIST PROVIDED HISTORY: Look for signs of osteomyelitis of left digits 2 and 3 Reason for exam:->cellulitis of digits 2 and 3 What reading provider will be dictating this exam?->CRC FINDINGS: Mild diffuse soft tissue edema. Scattered vascular calcifications. No radiopaque foreign body. Small accessory navicular. Mild diffuse osteopenia. Scattered degenerative changes. No bony destruction, dislocation or acute fracture identified. Mild soft tissue edema which could be reactive or due to cellulitis. Chronic osseous changes. MRI would be useful if symptoms persist.     XR FOOT RIGHT (MIN 3 VIEWS)    Result Date: 10/15/2021  EXAMINATION: THREE XRAY VIEWS OF THE RIGHT FOOT 10/15/2021 7:27 pm COMPARISON: October 2 HISTORY: ORDERING SYSTEM PROVIDED HISTORY: Recent surgery, ray amputation, infection. eval for subcutaneous air/overt osteomyelitis TECHNOLOGIST PROVIDED HISTORY: Reason for exam:->Recent surgery, ray amputation, infection. eval for subcutaneous air/overt osteomyelitis What reading provider will be dictating this exam?->CRC FINDINGS: Status post transmetatarsal amputation. Irregular appearance of the overlying soft tissues with multiple lucencies which may represent soft tissue gas. Large soft tissue defect superiorly at the amputation site Surgical clips laterally. Atherosclerotic calcifications. No gross lytic or erosive changes to suggest advanced osteomyelitis. Status post transmetatarsal amputation. Irregular appearance of the overlying soft tissues with multiple lucencies which may represent soft tissue gas. Large soft tissue defect superiorly at the amputation site No gross lytic or erosive changes to suggest advanced osteomyelitis.      XR CHEST PORTABLE    Result Date: 10/16/2021  EXAMINATION: ONE XRAY VIEW OF THE CHEST 10/16/2021 8:39 am COMPARISON: July 30, 2020-August 19, 2021 HISTORY: ORDERING SYSTEM PROVIDED HISTORY: preop TECHNOLOGIST PROVIDED HISTORY: Reason for exam:->preop What reading provider will be dictating this exam?->CRC FINDINGS: Patient had previous mid sternotomy. There is a permanent pacemaker with 2 wires placed left subclavian vein. The heart is enlarged at least in a moderate degree. There is some areas of atelectasis in both bases. 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 There is no right-sided pleural effusion. There is no perihilar vascular congestion. 1.  After mid sternotomy. Moderate cardiomegaly. Presence of left-sided pacemaker. 2.  Chronic findings in the left base as seen on previous examination which can represent atelectasis or a mild left-sided pleural effusion. 3.  No superimposed acute cardiopulmonary process. Objective:   Vitals: BP (!) 166/63   Pulse 69   Temp 97.8 °F (36.6 °C) (Temporal)   Resp 19   Ht 5' 8\" (1.727 m)   Wt 237 lb (107.5 kg)   SpO2 93%   BMI 36.04 kg/m²   General appearance: appears stated age   Skin:  No rashes or lesions  HEENT: Head: Normocephalic, no lesions, without obvious abnormality.   Neck: no adenopathy, no carotid bruit, no JVD, supple, symmetrical, trachea midline and thyroid not enlarged, symmetric, no tenderness/mass/nodules  Lungs: clear to auscultation bilaterally  Heart: regular rate and rhythm, S1, S2 normal, no murmur, click, rub or gallop  Abdomen: soft, non-tender; bowel sounds normal; no masses,  no organomegaly  Extremities: wound vac  Neurologic: Mental status: Alert, oriented, thought content appropriate    Assessment:   Patient Active Problem List:     Essential hypertension     Peripheral vascular disease ([address]     Pacemaker     H/O aortic valve replacement     CKD (chronic kidney disease) stage 3, GFR 30-59 ml/min     Type 2 diabetes mellitus with stage 3 chronic kidney disease, with long-term current use of insulin (HCC)     Pulmonary nodule     Diabetes mellitus type 2, uncontrolled (HCC)     Hyperlipidemia LDL goal <100     Acquired hypothyroidism     CAD (coronary artery disease), native coronary artery     Status post coronary artery bypass graft     Chronic pain     History of CVA (cerebrovascular accident)     Obesity (BMI 30-39. 9)     Anemia     Elevated sed rate     Vision decreased     Bilateral carotid bruits     Bilateral carotid artery stenosis     Nonrheumatic mitral valve regurgitation     Pulmonary hypertension (HCC)     Moderate tricuspid regurgitation by prior echocardiography     (HFpEF) heart failure with preserved ejection fraction (HCC)     COPD (chronic obstructive pulmonary disease) (HCC)     Diabetic neuropathy associated with type 2 diabetes mellitus (HCC)     Congestive heart failure (HCC)     Cellulitis     Peripheral vascular disease of lower extremity with ulceration (HCC)     Critical lower limb ischemia (HCC)     Nonrheumatic mitral valve stenosis     Severe left ventricular hypertrophy     Cardiomyopathy ([address]     Ischemic foot ulcer due to atherosclerosis of native artery of limb ([address]     Wound infection    Plan:   Assessment and Plan:  1.  HARVEY-  In the setting of vancomycin for TMA would infection, diuretics  and severe anemia  Holding diuretics and metolazone ,      Strict I&O as no output has been recorded  Baseline Cr 1.5-2.1 mg/dl  Cr peaked at 3.2, current 3.1 ; continue IV fluids       2. CKD  Presumed microvascular in the setting of DM and HTN as well as PVD   Baseline Cr 1.5-2.1 mg/dl     3. Metabolic Acidosis  In the setting of HARVEY  CO2-23  On po HCO3     4. Hyperkalemia   Due to potassium supplements in the presence of HARVEY; and potassium source of blood transfusion  Potassium 3.9  Treated medically with good response  Continue to monitor      5. Anemia HB  at 6.8 will need more transfusions         6.  TMA Wound Infection right foot  Infectious Disease following    Humble Metzger MD

## 2021-11-22 NOTE — PLAN OF CARE
Problem: Pain:  Goal: Pain level will decrease  Description: Pain level will decrease  Outcome: Ongoing  Goal: Control of acute pain  Description: Control of acute pain  Outcome: Ongoing  Goal: Control of chronic pain  Description: Control of chronic pain  Outcome: Ongoing     Problem: Wound:  Goal: Will show signs of wound healing; wound closure and no evidence of infection  Description: Will show signs of wound healing; wound closure and no evidence of infection  Outcome: Ongoing     Problem: Falls - Risk of:  Goal: Will remain free from falls  Description: Will remain free from falls  Outcome: Ongoing     Problem: Blood Glucose:  Goal: Ability to maintain appropriate glucose levels will improve  Description: Ability to maintain appropriate glucose levels will improve  Outcome: Ongoing

## 2021-11-22 NOTE — PROGRESS NOTES
[facility]  History and Physical/Consultation  Podiatry    Referring Physician : No primary care provider on file. [address] RECORD NUMBER:  [id number]  AGE: [de-identified] y.o. GENDER: male  : 1940  EPISODE DATE:  2021  Subjective:     Chief Complaint   Patient presents with    Wound Check     right foot         HISTORY of PRESENT ILLNESS HPI     Will Barcenas is a [de-identified] y.o. male who presents today for wound/ulcer evaluation. History of Wound Context:  The patient has had a wound of right foot wound which was first noted approximately >1 month. This has been treated  OR debridement. On their initial visit to the wound healing center, 21 ,  the patient has noted that the wound has been improving. The patient has had similar previous wounds in the past.      Pt is on abx at time of initial visit.       Wound/Ulcer Pain Timing/Severity: none  Quality of pain: sharp  Severity:  2 / 10   Modifying Factors: None  Associated Signs/Symptoms: edema    Ulcer Identification:  Ulcer Type: diabetic  Contributing Factors: poor glucose control    Diabetic/Pressure/Non Pressure Ulcers onl y:  Ulcer: Diabetic ulcer, fat layer exposed    If patient has diabetic lower extremity wounds  Grace Classification of diabetic lower extremity wounds:    Grade Description   []  0 No open wound   []  1 Superficial ulcer involving the full skin thickness   [x]  2 Deep ulcer involves ligament, tendon, joint capsule, or fascia  No bone involvement or abscess presence   []  3 Deep Ulcer with abcess formation and/or osteomyelitis   []  4 Localized gangrene   []  5 Extensive gangrene of the foot     Wound: N/A        PAST MEDICAL HISTORY      Diagnosis Date    (HFpEF) heart failure with preserved ejection fraction ([facility] 2020    Diagnosed by cardiology    Arthritis     Bilateral carotid artery stenosis 2020    Bilateral carotid bruits 2020    Blood circulation, collateral     CAD (coronary artery disease)     Carotid bruit 03/27/2013    CHF [facility]congestive heart failure) [facility]     Chronic kidney disease     CKD (chronic kidney disease) stage 3, GFR 30-59 ml/min ([facility]) 03/20/2016    COPD (chronic obstructive pulmonary disease) ([facility]     Follows with pulmonary    Diabetes mellitus ([facility]     Diabetic neuropathy associated with type 2 diabetes mellitus ([facility])     Dyspnea on exertion     History of blood transfusion     Hyperlipidemia     Hypertension     Mild mitral regurgitation     Moderate tricuspid regurgitation by prior echocardiography 2018    Movement disorder     [facility]STEMI[facility] 01/2018    Obesity     Pulmonary hypertension ([facility] 2018    PVD (peripheral vascular disease) with claudication ([facility] 03/27/2014    S/P carotid endarterectomy 03/27/2013    Sleep apnea     SOB (shortness of breath)     Thyroid disease     Unspecified cerebral artery occlusion with cerebral infarction     Vision decreased 12/31/2020     Past Surgical History:   Procedure Laterality Date    BIOPSY / LIGATION TEMPORAL ARTERY Bilateral 01/05/2021    BILATERAL TEMPORAL ARTERY BIOPSY performed by Bhargavi Mckeon MD at [address] [facility] N/A 11/4/2021    BRONCHOSCOPY DIAGNOSTIC OR CELL [address] ONLY and bal performed by Prosper Castaneda MD at [address]      5 years ago   [address] GRAFT  2008    1 vessel and aortic valve repair    DIAGNOSTIC CARDIAC CATH LAB PROCEDURE  10/14/2008    DIAGNOSTIC CARDIAC CATH LAB PROCEDURE  10/21/2010    FEMORAL ENDARTERECTOMY Right 9/30/2021    RIGHT FEMORAL ENDARTERECTOMY performed by Jared Sumner MD at [facility] Right 10/2/2021    RIGHT FOOT TRANSMITTAL AMPUTATION performed by Harmony Zhu DPM at [facility] Bilateral 10/16/2021    RIGHT FOOT WOUND DEBRIDEMENT WITH APPLICATION WOUND VAC; LEFT FOOT AMPUTATION 2ND TOE performed by Ramakrishna GILMAN LINDA Lewis at [facility] OR    JOINT REPLACEMENT      R knee replacement    KNEE SURGERY      OTHER SURGICAL HISTORY Right 2017    debridement,bone bx foot    PACEMAKER INSERTION  2014    D-PPM   ()    Dr. Edwige Joy      carotids     Family History   Problem Relation Age of Onset    Heart Disease Mother     Heart Failure Mother     Heart Surgery Father     Heart Disease Father     Heart Failure Father     High Blood Pressure Sister     Cancer Sister      Social History     Tobacco Use    Smoking status: Former Smoker     Packs/day: 2.00     Years: 25.00     Pack years: 50.00     Types: Cigarettes     Start date: 1953     Quit date: 1981     Years since quittin.4    Smokeless tobacco: Never Used   Vaping Use    Vaping Use: Never used    Passive vaping exposure: Yes   Substance Use Topics    Alcohol use: Never     Comment:      Drug use: Not Currently     Comment: no longer eating edibles. states he had a bad episode. No Known Allergies  Current Outpatient Medications on File Prior to Encounter   Medication Sig Dispense Refill    gabapentin (NEURONTIN) 100 MG capsule Take 2 capsules by mouth 4 times daily for 30 days. 90 capsule 0    atorvastatin (LIPITOR) 10 MG tablet Take 1 tablet by mouth daily 30 tablet 3    silver sulfADIAZINE (SILVADENE) 1 % cream Apply topically daily.  bumetanide (BUMEX) 1 MG tablet Take 1 tablet by mouth 2 times daily 30 tablet 3    potassium chloride (KLOR-CON M) 20 MEQ extended release tablet Take 2 tablets by mouth daily 60 tablet 3    sucralfate (CARAFATE) 1 GM tablet Take 1 g by mouth 4 times daily      HYDROcodone-acetaminophen (NORCO)  MG per tablet Take 1 tablet by mouth every 6 hours as needed for Pain.  morphine (THIERRY) 10 MG extended release capsule Take 10 mg by mouth 2 times daily.       pantoprazole (PROTONIX) 40 MG tablet Take 1 tablet by mouth 2 times daily (before meals) 30 tablet 3    mirtazapine (REMERON) 7.5 MG tablet Take 7.5 mg by mouth nightly      hydrALAZINE (APRESOLINE) 50 MG tablet Take 1 tablet by mouth every 8 hours 90 tablet 3    magnesium 200 MG TABS tablet Take 200 mg by mouth daily      insulin lispro, 1 Unit Dial, (HUMALOG KWIKPEN) 100 UNIT/ML SOPN Inject 0-6 Units into the skin 3 times daily (before meals) PER SLIDING SCALE: 0-139=0U, 140-199=1U, 200-249=2U, 250-299=3U, 300-349=4U, 350-399=5U, 400-450=6U      hydrOXYzine (ATARAX) 25 MG tablet Take 25 mg by mouth 4 times daily       lactulose 20 GM/30ML SOLN Take 20 g by mouth three times a week *MON-WED-FRI*      ferrous sulfate (IRON 325) 325 (65 Fe) MG tablet Take 1 tablet by mouth 2 times daily (with meals) 30 tablet 3    DULoxetine (CYMBALTA) 60 MG extended release capsule Take 1 capsule by mouth daily 30 capsule 3    fluticasone-umeclidin-vilant (TRELEGY ELLIPTA) 100-62.5-25 MCG/INH AEPB Inhale 1 puff into the lungs Daily       aspirin EC 81 MG EC tablet Take 1 tablet by mouth daily 90 tablet 1    levothyroxine (SYNTHROID) 25 MCG tablet Take 25 mcg by mouth Daily   1    metoprolol succinate (TOPROL XL) 50 MG extended release tablet Take 50 mg by mouth daily      isosorbide mononitrate (IMDUR) 120 MG extended release tablet Take 1 tablet by mouth daily 30 tablet 0    allopurinol (ZYLOPRIM) 300 MG tablet Take 300 mg by mouth daily       Heparin Sodium, Porcine, (HEPARIN, PORCINE,) 73412 UNIT/ML injection Inject 0.5 mLs into the skin every 12 hours      acetylcysteine (MUCOMYST) 10 % nebulizer solution Inhale 4 mLs into the lungs every 6 hours      polyethylene glycol (GLYCOLAX) 17 g packet Take 17 g by mouth daily as needed for Constipation 527 g 1    albuterol (PROVENTIL) (2.5 MG/3ML) 0.083% nebulizer solution Take 2.5 mg by nebulization every 6 hours as needed for Wheezing      diphenhydrAMINE (BENADRYL) 25 MG capsule Take 25 mg by mouth daily as needed for Itching      metOLazone (ZAROXOLYN) 2.5 MG tablet Take 2.5 mg by mouth three times a week *KRISTEN-SAT*       No current facility-administered medications on file prior to encounter.        REVIEW OF SYSTEMS   ROS : All others Negative if blank [], Positive if [x]  General Vascular   [] Fevers [] Claudication   [] Chills [] Rest Pain   Skin Neurologic   [x] Tissue Loss [x] Lower extremity neuropathy     Objective:    /70   Pulse 90   Temp 96.2 °F (35.7 °C) (Temporal)   Resp 18   Ht 5' 8\" (1.727 m)   Wt 240 lb (108.9 kg)   BMI 36.49 kg/m²   Wt Readings from Last 3 Encounters:   11/22/21 240 lb (108.9 kg)   11/04/21 240 lb 4.8 oz (109 kg)   10/24/21 239 lb (108.4 kg)       PHYSICAL EXAM   CONSTITUTIONAL:   Awake, alert, cooperative   PSYCHIATRIC :  Oriented to time, place and person No     limited insight to disease process  EXTREMITIES:   R LE Open wounds are noted   Skin color is abnormal with hyperpigmentation   Edema is  noted   Sensation intact to light touch   Palpation of the foot does not cause pain   4/5 strength DF/PF  L LE Open wounds are noted   Skin color is nearly normal without any significant findings   Edema is  noted   Sensation deficit noted - to foot   Palpation of the foot does not cause pain   4/5 strength DF/PF  R dorsalis pedis +1 L dorsalis pedis +1   R posterior tibial +1 L posterior tibial +1     Assessment:     Problem List Items Addressed This Visit     None          Pre Debridement Measurements:  Are located in the [place]  Documentation Flow Sheet  Post Debridement Measurements:  Wound/Ulcer Descriptions are Pre Debridement except measurements:     Negative Pressure Wound Therapy Right;Distal (Active)   $ Standard NPWT >50 sq cm PER TX $ Yes 11/04/21 1200   Dressing Type Black foam 11/06/21 0900   Number of pieces used 1 11/04/21 1200   Cycle Continuous 11/06/21 0900   Target Pressure (mmHg) 125 11/06/21 0900   Dressing Status Clean; Dry; Intact 11/06/21 0900   Dressing Changed Other (Comment) 11/04/21 2115 Dressing Change Due 11/08/21 11/05/21 0830   Number of days: 18       Wound 08/21/21 Toe (Comment  which one) Anterior; Left (Active)   Number of days: 93       Wound 10/16/21 Buttocks Right (Active)   Wound Image   11/04/21 1200   Wound Etiology Pressure Unstageable 11/04/21 1200   Dressing Status Clean; Dry; Intact 11/06/21 0900   Wound Cleansed Cleansed with saline 11/06/21 0630   Dressing/Treatment Foam 11/06/21 0900   Dressing Change Due 11/07/21 11/06/21 0900   Wound Length (cm) 4 cm 11/04/21 1200   Wound Width (cm) 3 cm 11/04/21 1200   Wound Depth (cm) 0.1 cm 11/04/21 0403   Wound Surface Area (cm^2) 12 cm^2 11/04/21 1200   Change in Wound Size % (l*w) 14.29 11/04/21 1200   Wound Volume (cm^3) 0.875 cm^3 11/04/21 0403   Wound Healing % 38 11/04/21 0403   Wound Assessment Pink/red 11/06/21 0630   Drainage Amount Small 11/06/21 0630   Drainage Description Yellow 11/06/21 0630   Odor None 11/06/21 0630   Wendy-wound Assessment Blanchable erythema 11/06/21 0630   Number of days: 37       Wound 10/19/21 Foot Right #1 Metatarsal amp site (Active)   Wound Image   11/22/21 1437   Dressing Status New dressing applied 11/22/21 1523   Wound Cleansed Cleansed with saline 11/22/21 1523   Dressing/Treatment Dry dressing 11/22/21 1523   Offloading for Diabetic Foot Ulcers No offloading required 11/22/21 1523   Dressing Change Due 11/08/21 11/05/21 0830   Wound Length (cm) 7.8 cm 11/22/21 1437   Wound Width (cm) 10 cm 11/22/21 1437   Wound Depth (cm) 0.2 cm 11/22/21 1437   Wound Surface Area (cm^2) 78 cm^2 11/22/21 1437   Change in Wound Size % (l*w) 52 11/22/21 1437   Wound Volume (cm^3) 15.6 cm^3 11/22/21 1437   Wound Healing % 4 11/22/21 1437   Post-Procedure Length (cm) 7.8 cm 11/22/21 1512   Post-Procedure Width (cm) 10 cm 11/22/21 1512   Post-Procedure Depth (cm) 0.2 cm 11/22/21 1512   Post-Procedure Surface Area (cm^2) 78 cm^2 11/22/21 1512   Post-Procedure Volume (cm^3) 15.6 cm^3 11/22/21 1512   Wound Assessment Exposed structure bone; Granulation tissue; Fibrin; Eschar moist 11/22/21 1437   Drainage Amount Moderate 11/22/21 1437   Drainage Description Serosanguinous 11/22/21 1437   Odor None 11/22/21 1437   Wendy-wound Assessment Maceration 11/22/21 1437   Wound Thickness Description not for Pressure Injury Full thickness 11/04/21 1200   Number of days: 34       Wound 11/04/21 Heel Right #3 (Active)   Wound Image   11/22/21 1437   Wound Etiology Pressure Unstageable 11/04/21 1200   Dressing Status New dressing applied 11/22/21 1523   Wound Cleansed Cleansed with saline 11/22/21 1523   Dressing/Treatment Dry dressing 11/22/21 1523   Offloading for Diabetic Foot Ulcers Other (comment) 11/22/21 1523   Wound Length (cm) 2.8 cm 11/22/21 1437   Wound Width (cm) 3 cm 11/22/21 1437   Wound Depth (cm) 0.1 cm 11/22/21 1437   Wound Surface Area (cm^2) 8.4 cm^2 11/22/21 1437   Change in Wound Size % (l*w) 58 11/22/21 1437   Wound Volume (cm^3) 0.84 cm^3 11/22/21 1437   Post-Procedure Length (cm) 2.8 cm 11/22/21 1512   Post-Procedure Width (cm) 3 cm 11/22/21 1512   Post-Procedure Depth (cm) 0.1 cm 11/22/21 1512   Post-Procedure Surface Area (cm^2) 8.4 cm^2 11/22/21 1512   Post-Procedure Volume (cm^3) 0.84 cm^3 11/22/21 1512   Wound Assessment Eschar dry 11/22/21 1437   Drainage Amount None 11/22/21 1437   Drainage Description Sanguinous 11/04/21 1200   Odor None 11/22/21 1437   Wendy-wound Assessment Dry/flaky 11/22/21 1437   Number of days: 18       Wound 11/22/21 Leg Right; Lower;  Anterior #4 (Active)   Wound Image   11/22/21 1437   Dressing Status New dressing applied 11/22/21 1523   Wound Cleansed Cleansed with saline 11/22/21 1523   Dressing/Treatment Dry dressing; Xeroform 11/22/21 1523   Offloading for Diabetic Foot Ulcers Other (comment) 11/22/21 1523   Wound Length (cm) 1.9 cm 11/22/21 1437   Wound Width (cm) 3.6 cm 11/22/21 1437   Wound Depth (cm) 0.1 cm 11/22/21 1437   Wound Surface Area (cm^2) 6.84 cm^2 11/22/21 1437   Wound Volume (cm^3) 0.684 cm^3 11/22/21 1437   Post-Procedure Length (cm) 1.9 cm 11/22/21 1512   Post-Procedure Width (cm) 3.6 cm 11/22/21 1512   Post-Procedure Depth (cm) 0.1 cm 11/22/21 1512   Post-Procedure Surface Area (cm^2) 6.84 cm^2 11/22/21 1512   Post-Procedure Volume (cm^3) 0.684 cm^3 11/22/21 1512   Wound Assessment Pink/red; Fibrin 11/22/21 1437   Drainage Amount Small 11/22/21 1437   Drainage Description Serous 11/22/21 1437   Odor None 11/22/21 1437   Wendy-wound Assessment Intact; Dry/flaky 11/22/21 1437   Number of days: 0       Wound 11/22/21 Toe (Comment  which one) Left #2 second toe amp site (Active)   Wound Image   11/22/21 1437   Dressing Status New dressing applied 11/22/21 1523   Wound Cleansed Cleansed with saline 11/22/21 1523   Dressing/Treatment Dry dressing; Xeroform 11/22/21 1523   Offloading for Diabetic Foot Ulcers No offloading required 11/22/21 1523   Wound Length (cm) 7 cm 11/22/21 1437   Wound Width (cm) 0.1 cm 11/22/21 1437   Wound Depth (cm) 0.1 cm 11/22/21 1437   Wound Surface Area (cm^2) 0.7 cm^2 11/22/21 1437   Wound Volume (cm^3) 0.07 cm^3 11/22/21 1437   Post-Procedure Length (cm) 7 cm 11/22/21 1512   Post-Procedure Width (cm) 0.1 cm 11/22/21 1512   Post-Procedure Depth (cm) 0.1 cm 11/22/21 1512   Post-Procedure Surface Area (cm^2) 0.7 cm^2 11/22/21 1512   Post-Procedure Volume (cm^3) 0.07 cm^3 11/22/21 1512   Wound Assessment Dry 11/22/21 1437   Drainage Amount None 11/22/21 1437   Odor None 11/22/21 1437   Wendy-wound Assessment Intact 11/22/21 1437   Number of days: 0          Procedure Note  Indications:  Based on my examination of this patient's wound(s)/ulcer(s) today, debridement is required to promote healing and evaluate the wound base.     Performed by: Cat Santiago DPM    Consent obtained:  Yes    Time out taken:  Yes    Pain Control: Anesthetic  Anesthetic: 4% Lidocaine Liquid Topical     Debridement:Excisional Debridement    Using curette the wound(s)/ulcer(s) was/were sharply debrided down through and including the removal of subcutaneous tissue. Devitalized Tissue Debrided:  slough and necrotic/eschar to stimulate bleeding to promote healing, post debridement good bleeding base and wound edges noted    Wound/Ulcer #: 1    Percent of Wound/Ulcer Debrided: 100%    Total Surface Area Debrided:  78 sq cm     Estimated Blood Loss:  Minimal  Hemostasis Achieved:  by pressure    Procedural Pain:  2  / 10   Post Procedural Pain:  0 / 10     Response to treatment:  Well tolerated by patient. A culture was not done. Plan:     Pt is not currently a smoker   - Discussed relationship of smoking and negative affects on wound healing   - Emphasized importance of tobacco avoidace/cessation   - Script for nicotine patch given to patient   - Information regarding support groups for smoking cessation given    In my professional opinion and based off the information that is available at this time this patient has appropriate indication for HBO Therapy: Maybe    Treatment Note please see attached Discharge Instructions    Written patient dismissal instructions given to patient and signed by patient or POA. Discharge Instructions       Visit Discharge/Physician Orders    Discharge condition: Stable    Assessment of pain at discharge:  none    Anesthetic used:  4% lidocaine    Discharge to: ECF    Left via:ambulance    Accompanied by: accompanied by self    ECF/HHA:   [facility] ecf    Dressing Orders:  ATTENTION. VAC WILL NEED TO BE RESTARTED ON RIGHT FOOT ULCER ON RETURN TO ECF. CONTINUE R[address] 125 MMHG CONTINUOUS SETTING AND CHANGE 3 TIMES WEEKLY   Clean all lower extremity wounds with saline. On right foot amp site, apply saline moist gauze and secure with ABD and kerlex. To left second toe amp site and all other lower leg ulcers, apply xeroform and dry dressing. Change with vac changes.     Treatment Orders:    [facility] followup visit ________two weeks with  Barbarahim_____________________  (Please note your next appointment above and if you are unable to keep, kindly give a 24 hour notice. Thank you.)    Physician signature:__________________________      If you experience any of the following, please call the [address] during business hours:    * Increase in Pain  * Temperature over 101  * Increase in drainage from your wound  * Drainage with a foul odor  * Bleeding  * Increase in swelling  * Need for compression bandage changes due to slippage, breakthrough drainage. If you need medical attention outside of the business hours of the [address] please contact your PCP or go to the nearest emergency room.         Electronically signed by Kush German DPM on 11/22/2021 at 3:52 PM

## 2021-11-29 PROBLEM — N39.0 URINARY TRACT INFECTION WITH HEMATURIA: Status: ACTIVE | Noted: 2021-01-01

## 2021-11-29 PROBLEM — R57.9 SHOCK (HCC): Status: ACTIVE | Noted: 2021-01-01

## 2021-11-29 PROBLEM — A41.9 SEPTIC SHOCK DUE TO UNDETERMINED ORGANISM (HCC): Status: ACTIVE | Noted: 2021-01-01

## 2021-11-29 PROBLEM — J69.0 ASPIRATION PNEUMONIA (HCC): Status: ACTIVE | Noted: 2021-01-01

## 2021-11-29 PROBLEM — E87.8 ELECTROLYTE ABNORMALITY: Status: ACTIVE | Noted: 2021-01-01

## 2021-11-29 PROBLEM — R79.89 ELEVATED BRAIN NATRIURETIC PEPTIDE (BNP) LEVEL: Status: ACTIVE | Noted: 2021-01-01

## 2021-11-29 PROBLEM — N17.9 ACUTE KIDNEY INJURY SUPERIMPOSED ON CKD (HCC): Status: ACTIVE | Noted: 2021-01-01

## 2021-11-29 PROBLEM — K92.1 MELENA: Status: ACTIVE | Noted: 2021-01-01

## 2021-11-29 PROBLEM — N18.9 ACUTE KIDNEY INJURY SUPERIMPOSED ON CKD (HCC): Status: ACTIVE | Noted: 2021-01-01

## 2021-11-29 PROBLEM — T81.49XA POSTOPERATIVE WOUND INFECTION: Status: ACTIVE | Noted: 2021-01-01

## 2021-11-29 PROBLEM — R65.21 SEPTIC SHOCK DUE TO UNDETERMINED ORGANISM (HCC): Status: ACTIVE | Noted: 2021-01-01

## 2021-11-29 PROBLEM — R31.9 URINARY TRACT INFECTION WITH HEMATURIA: Status: ACTIVE | Noted: 2021-01-01

## 2021-11-29 NOTE — LETTER
[address] Medicaid  CERTIFICATION OF NECESSITY  FOR TRANSPORTATION   BY WHEELCHAIR VAN     Individual Information   1. Name: Marly Martinez 2. [place] Medicaid Billing Number:    3. Address: [place] [address]      Transportation Provider Information   4. Provider Name: Cali Graylashay   5. [place] Medicaid Provider Number:  National Provider Identifier (NPI):      Certification  7. Criteria:  By signing this document, the practitioner certifies that two statements are true:  A. This individual must be accompanied by a mobility-related assistive device from the point of pick-up to the point of drop-off. B. Transport of this individual by standard passenger vehicle or common carrier is precluded or contraindicated. 8. Period Beginning Date: 2021   9. Length  [x] Not more than 10 day(s)  [] One Year     Additional Information Relevant to Certification   10. Comments or Explanations, If Necessary or Appropriate     Sepsis & Cardiac Arrest     Certifying Practitioner Information   11. Name of Practitioner: Cali Reardon   12. [place] Medicaid Provider Number, If Applicable:  Justine [address] Provider Identifier (NPI):     Signature Information   14. Date of Signature: 2021 15. Name of Person Signing: Electronically signed by Jesusita Nichols RN/ on 2021 at 3:14 PM     16. Signature and Professional Designation: per Dr. Jordan Alvarado Electronically signed by Jesusita Nichols RN/ & discharger planner on 2021 at 3:15 PM       [facility] [address]  Rev. 2015      Patient Information    Patient Name   Amalia Wilde ([id number]) Legal Sex   Male    1940   Room Bed   [address]       Patient Demographics    Address   [facility]   [address] [address] Phone   [phone] (Home) *Preferred*   [phone] [facility]) E-mail Address   [email or web address]     Social Security Number    N        Basic Information    Date Of Birth 1940 Gender Identity   Male Race   White (non-) Ethnic Group   Non- / Non  Preferred Language   English Preferred Written Language   English     Admission Information      Current Information    Attending Provider Admitting Provider Admission Type Admission Status   MD Veronica Arrington MD Emergency Confirmed Admission          Admission Date/Time Discharge Date Hospital Service Auth/Cert Status   04/16/15  01:49 PM  [address] Unit Room/Bed    [address] [facility] [id number]/[id number]                Admission    Complaint        PCP and Center    [address]     Payor Information             PRIMARY INSURANCE   Payor: MEDICARE Plan: MEDICARE PART A AND B   Group Number:   Insurance Type: INDEMNITY   Subscriber Name: Dossie Sensor Subscriber : 1940   Subscriber ID: [id number]       Pat. Rel. to Subscriber: Self       SECONDARY INSURANCE   [place]*   Group Number:   Insurance Type: INDEMNITY   Subscriber Name: Dossie Sensor Subscriber : 1940   Subscriber ID: [id number]       Pat.  Rel. to Subscriber: SELF              Documents on File     Status Date Received Description   Documents for the Patient   HIPAA Notice of Privacy Signed () 11    Insurance Card Received () 11 MEDICARE PRIME   Physician Office Consent for Treatment Received 13 [facility] Card Received () 16 2ND ANTHEM/ UGG   Financial Assistance Program Applications Not Received     MyChart Adult Proxy Access Not Received     MyChart Child Proxy Access Not Received     Progress Notes   11 office visit dr Guadalupe Vail Notes   12/8/10 office visit dr Radha Johnston   12/8/10 letter from dr Sage Alaniz to dr Nolberto Miguel 10/20/10 patient history and questionnaire   Facility Correspondence   11/3/08 chart note   Consult Letter (Incoming)   06 consult dr Silvio Dover History Form   10/20/10 pt questionnaire   Progress Notes   9/30/10 office visit [facility]   Operative Report   10/29/08 op report aortic valve replacement   Facility Correspondence    telephone messages   Medical History Form   10/20/10 pv questionnaire   Cardiac Cath Report   10/21/10 [facility]   Cardiac Cath Report   10/14/08 [facility]   HIPAA Notice of Privacy Signed () 10/09/12    Correspondence   10/19/12 Dr Osiel Liang letter   Correspondence   12 [facility]/Clearance   (OLD) [facility] ([facility]) Physician Consent and NPP Signed () 16    Consultation Reports/Note   3/17/14 Consult - Dr Caitlin Dempsey Admission  14    Condition Of Admission  14    ACO Consent for the Release of  Confidential Alcohol or Drug Treatment Information Not Received     ACO Declining to [address] [facility] Not Received     HIM LAWRENCE Authorization  14    Photo ID  () 14    Insurance Card  () 14    Insurance Card  () 14    Condition Of Admission  14    Physician Orders   14 Cardionet enrollment form   Correspondence   14 pt instructions-dual pacer implt   Photo ID  () 11/10/14 9-   Insurance Card  () 11/10/14    Insurance Card  () 11/10/14    Facility Correspondence   14 Pacer Procedure Checklist   Cardiology Report   14 remote [address] [place] [address]   Cardiology Report   14 Remote [place]   Physician Office Consent for Treatment Received 04/02/15    Physician Office Consent for Treatment Received 04/09/15    Insurance Card Received () 17 [facility] [employer] 2017 [address] Card Received () 17 [place] [employer] 2017 BC/BS   Cardiology Report   3-27-15 remote [employer] Card  () 08/07/15    Photo ID  () 08/07/15    HIM LAWRENCE Authorization  08/10/15    Insurance Card  () 08/16/15    Insurance Card  () 16    Insurance Card  () 16    Photo ID  () 16    Miscellaneous   VMSA-patient cancellation   Cardiology Report   2016 Remote [place]   Photo ID  () 16    Insurance Card  () 16    Insurance Card  () 16    Condition Of Admission  16    Condition Of Admission  16    (OLD) [facility] ([facility]) Physician Consent and NPP  ()     Condition Of Admission  16    Condition Of Admission  16    Condition Of Admission  16    Insurance Card  () 16    Insurance Card  () 16    Condition Of Admission  16    Condition Of Admission  16    Cardiology Report   10- remote [email or web address]   Condition Of Admission  17    Condition Of Admission  17    Condition Of Admission  17    Correspondence   17 Ltr from Dr. Preethi Perry ([facility])   Condition Of Admission  02/15/17    Text Reminder Consent Received () 17 [facility] [employer] 2017   Photo ID Received () 17 [facility]io    (OLD) [facility] ([facility]) Physician Consent and NPP Signed () 17 [place] [facility] 2017   HIM LAWRENCE Authorization Received 17 [place] [facility] 2017   Insurance Card  () 17    Insurance Card  () 17    Cardiology Report   2017 [facility] merlin   (OLD) [facility] ([facility]) Physician Consent and NPP Signed () 17 ECHO   Condition Of Admission  08/10/17    Condition Of Admission  08/15/17    [facility] ([facility]) Physician Consent and NPP Signed () 19    [address] Physician Communication Release NPP Signed 19    Condition Of Admission  10/11/17    Condition Of Admission  10/15/17    Cardiology Report Received 10/30/17 10/27/2017 [facility] [email or web address]   Condition Of Admission  18    Recurring Hospital Consent/HIPAA Scanned Received Hospital Consent/HIPAA Scanned Received () 21    [facility] Physician Consent and NPP Received 21 [address] Card Received 21 Amos Dobbins   HIPAA Notice of Privacy Signed 21 PRA    Physician Office Consent for Treatment Received 21 PRA    Patient Administration Received 21 Formerly named [facility] office notes to Amos Dobbins 9.16.21   COVID 19 Vaccination Card Received 21    ACP-Advance Directive Received 21    HIM LAWRENCE Authorization  11/17/21 10/15/2021-10/27/2021   Photo ID Received (Deleted) 11 //////   ACP-Advance Directive Not Received (Deleted)     ACP-Power of  Not Received (Deleted)     ACP-Advance Directive Received (Deleted) 12    ACP-Advance Directive Received (Deleted) 12    Physician Office Consent for Treatment Received (Deleted) 16 //////   Patient Photo   Photo of Patient   Insurance Card Received (Deleted) 18 PRA    Insurance Card Received (Deleted) 20    Photo ID Received (Deleted) 10/19/20    Insurance Card Unable to Obtain (Deleted) 21    Photo ID Unable to Obtain (Deleted) 21 PRA    Insurance Card Unable to Obtain (Deleted) 21 PRA    HIM Release of Information Output  21 Requested records   External Radiology and Imaging   10/20/21 renal/bladder u/s    External Cardiology Imaging   EXTERNAL CARDIOLOGY   External Cardiology Imaging   EXTERNAL CARDIOLOGY   External Cardiology Imaging   ECG COMPLETE W INTERPRETATION   External Cardiology Imaging   ECG COMPLETE W INTERPRETATION   Documents for the [facility] Consent Treat/HIPAA Received 21    IMM - Medicare Second Copy Given to Pt      Wound      Wound   Right LE   Wound   Right heel   Wound   Left   Medicare Important Message Received 21 scanned in medical records   EKG Received 21 EKG 12-LEAD   EKG Received 21 EKG [address] [facility] Problem List  Date Reviewed: 10/16/2021     ICD-10-CM Priority Class Noted POA   Septic shock due to undetermined organism ([facility] A41.9, R65.21   11/29/2021 Yes   Elevated troponin R77.8   1/19/2021 Yes   Elevated brain natriuretic peptide (BNP) level R79.89   11/29/2021 Yes   Electrolyte abnormality E87.8   11/29/2021 Yes   Acute kidney injury superimposed on CKD ([facility] N17.9, N18.9   11/29/2021 Yes   Urinary tract infection with hematuria N39.0, R31.9   11/29/2021 Yes   Melena K92.1   11/29/2021 Yes   Postoperative wound infection T81.49XA   11/29/2021 Yes   Aspiration pneumonia ([facility] J69.0   11/29/2021 Yes     Non-Hospital Problem List  Date Reviewed: 10/16/2021     ICD-10-CM Priority Class Noted   Essential hypertension (Chronic) I10 Medium  7/31/2012   Peripheral vascular disease ([facility] I73.9   3/27/2014   Pacemaker (Chronic) Z95.0   11/25/2014   Overview Signed 11/25/2014  8:18 AM by MADDI Cabral    A). [facility], model #: [id number], serial #: [id number]. DOI: 11/12/14  B). The right atrial lead is a SJM model #: H991609, serial #: [id number]. C). The RV pacemaker lead is a SJM model #: Z582847, serial #: [id number]. H/O aortic valve replacement Z95.2   3/19/2016   CKD (chronic kidney disease) stage 3, GFR 30-59 ml/min (Chronic) N18.30  Chronic 3/20/2016   Type 2 diabetes mellitus with stage 3 chronic kidney disease, with long-term current use of insulin (HCC) (Chronic) E11.22, N18.30, Z79.4   3/20/2016   Pulmonary nodule R91.1   10/12/2017   Diabetes mellitus type 2, uncontrolled ([facility] (Chronic) E11.65   1/22/2018   Hyperlipidemia LDL goal <100 (Chronic) E78.5   1/22/2018   Acquired hypothyroidism (Chronic) E03.9   1/22/2018   CAD (coronary artery disease), native coronary artery (Chronic) I25.10   1/22/2018   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 9) (Chronic) E66.9   7/31/2020   Anemia D64.9   10/19/2020   Elevated sed rate R70.0   12/31/2020   Vision decreased H54.7   12/31/2020   Bilateral carotid bruits R09.89   12/31/2020   Bilateral carotid artery stenosis I65.23   12/31/2020   Nonrheumatic mitral valve regurgitation I34.0   Unknown   Pulmonary hypertension (HCC) I27.20   2018   Moderate tricuspid regurgitation by prior echocardiography I07.1   2018   (HFpEF) heart failure with preserved ejection fraction (HCC) I50.30   Unknown   COPD (chronic obstructive pulmonary disease) ([address] J44.9   Unknown   Overview Signed 1/19/2021 11:11 AM by Lisa Olivo,     Follows with pulmonary      Diabetic neuropathy associated with type 2 diabetes mellitus ([address] E11.40   Unknown   Congestive heart failure ([address] I50.9   8/19/2021   Cellulitis L03.90   9/23/2021   Peripheral vascular disease of lower extremity with ulceration ([address] I73.9, L97.909   9/27/2021   Critical lower limb ischemia ([address] I70.229   9/27/2021   Nonrheumatic mitral valve stenosis I34.2   Unknown   Severe left ventricular hypertrophy I51.7   Unknown   Cardiomyopathy ([address] I42.9   Unknown   Ischemic foot ulcer due to atherosclerosis of native artery of limb ([address] I70.25, L97.509   10/1/2021   Wound infection T14. 8XXA, L08.9   10/15/2021   AMS (altered mental status) R41.82   11/3/2021   Acute delirium R41.0   Unknown   Acute on chronic respiratory failure with hypoxia (HCC) J96.21   Unknown   Mucus plugging of bronchi T17.500A   Unknown   Shock ([address] R57.9   11/29/2021

## 2021-11-29 NOTE — ED NOTES
Pt had a large dark tarry bowel movement. Hemoccult positive.  notified. Pt cleansed and new linen applied.       Huang Fischer RN  11/29/21 0136

## 2021-11-29 NOTE — ED PROVIDER NOTES
The history is provided by medical records. 25-year-old male presents emergency department complaint of wound infection to the right foot. Patient intercity distress was placed on oxygen. He has significant wheeze and rhonchi noted. He is unable to answer my questions in his current state at this time. History limited due to this. Chart review patient noted to have completed meropenem and daptomycin on 11/14. Patient has no other acute complaints at this time. The patient presents with foot wound infection that has been going on for 1 day. These symptoms are moderate in severity. Symptoms are made better by nothing nothing. Symptoms are made worse by nothing. Associated symptoms include none. Review of Systems   Unable to perform ROS: Acuity of condition        Physical Exam  Vitals and nursing note reviewed. Constitutional:       General: He is in acute distress. Appearance: Normal appearance. He is well-developed and normal weight. He is toxic-appearing. HENT:      Head: Normocephalic and atraumatic. Eyes:      Conjunctiva/sclera: Conjunctivae normal.   Cardiovascular:      Rate and Rhythm: Normal rate and regular rhythm. Heart sounds: Normal heart sounds. No murmur heard. Pulmonary:      Effort: Respiratory distress present. Breath sounds: Normal breath sounds. No wheezing or rales. Comments: Very stressed, requiring oxygen, Rales noted bilaterally as well as wheeze. Abdominal:      General: Bowel sounds are normal.      Palpations: Abdomen is soft. Tenderness: There is no abdominal tenderness. There is no guarding or rebound. Musculoskeletal:         General: No swelling, tenderness or deformity. Cervical back: Normal range of motion and neck supple. Comments: Right foot wound, necrotic, exposed bone to the amputated first digit. Skin:     General: Skin is warm and dry. Coloration: Skin is not jaundiced.       Comments: Mottling noted Neurological:      Mental Status: He is alert and oriented to person, place, and time. Cranial Nerves: No cranial nerve deficit. Coordination: Coordination normal.          Procedures   PROCEDURE  11/29/21       Time: 1600    INTUBATION  Risks, benefits and alternatives if able (for applicable procedures below) described. Performed By: Yanira Andrews MD.    Indication:  impending respiratory failure. Informed consent: Consent unable to be obtained due to patient's condition. .  Procedure: Following Preoxygenation the patient was pretreated with ketamine and  followed by rocuronium. Intubation was performed after single attempt(s) by direct laryngoscopy using a Glidescope and 8.0mm cuffed endotracheal tube was inserted . Initial post procedure placement:  confirmed by bilateral breath sounds, ETCO2 detection, and absence of sounds over stomach. Tube Secured @ 24cm at the Lip. Post procedure chest x-ray: Appropriate position. Procedural Complications: None. Anesthesia Consult:  No.       PROCEDURE  11/29/21       Time: 1650    CENTRAL LINE INSERTION  Risks, benefits and alternatives (for applicable procedures below) described. Performed By: Yanira Andrews MD.    Indication: hypovolemia and centrally administered medications. Informed consent: Consent unable to be obtained due to patient's condition. .  Procedure: After routine sterile preparation, local anesthesia not performed due to emergent nature of this procedure. A right 3-Lumen 7F Central Venous Catheter was placed by internal jugular vein approach and secured by standard fashion. Ultrasound Guidance:   used. Number of Attempts: 1, did attempt subclavian central line prior to this with 3 attempts. Post-procedure Findings: A post procedural chest x-ray  was ordered and showed good line position. Patient tolerated the procedure well. MDM   80-year-old male present emergency department complaint of right foot infection.   Patient does have exposed bone to the right foot. Patient was also noted to be hypoxic requiring oxygen and not very responsive. Was unable to elicit any or follow commands. Patient due to his condition noted to be severely septic he was intubated at this time. Thorough chart review revealed patient be a full code. Patient did become hypotensive following his intubation was started on pressors. Central line was attempted in his subclavian on the right side however was unsuccessful. Central line was then placed in the right IJ. Following central line placement patient noted going to cardiac arrest and V. fib. He was shocked as well as CPR performed for 3 rounds with 3 of epinephrine given, he is also given 3 of bicarb, 2 g calcium. He did come back and pulse was achieved. He was started on lidocaine drip as well as a bicarb infusion as well. Laboratory work-up noted for renal failure with uremia, hyperkalemia. Noted to have a urinary tract infection, severe lactic acidosis at 9.9. X-ray of the foot did not show concern of osteomyelitis. Patient due to his sepsis and septic shock will be admitted to the hospital at this time. He did consult with intensive care unit as well and he was accepted to the ICU. He is in very critical condition at this time. He was started on broad-spectrum antibiotics as well as a fentanyl drip for sedation. ED Course as of 11/30/21 0741   Mon Nov 29, 2021   1626 PROCEDURE  11/29/21       Time: 1600    CENTRAL LINE INSERTION ATTEMPT/FAILURE  Risks, benefits and alternatives (for applicable procedures below) described. Performed By: EM Attending Physician and EM Resident. Indication: centrally administered medications. Informed consent: Consent unable to be obtained due to patient's condition. .  Procedure: After routine sterile preparation, a right Central line was attempted by subclavian vein approach and was unsuccessful.   Ultrasound Guidance:   not used.  Post-procedure Findings: A post procedural chest x-ray was ordered and is still pending at this time. [ME]   3427 During central line insertion and patient went to ventricular fibrillation arrest, was given 3 of epinephrine, 100 of lidocaine, 1 bicarb, CPR was initiated, ROSC achieved [ME]   1708 Patient given additional 2 bicarb as well as 1 g of calcium due to widening of his QRS complexes. He was also given 300 of amiodarone. [CB]   1715 Spoke with Dr. Dianna Feliz (Medicine). Discussed case. They will admit this patient    Spoke with Dr. Magali Saleem (Critical care). Discussed case. They will provide consultation   [ME]      ED Course User Index  [CB] Leobardo Phillips MD  [ME] Rosalie Jacobson DO        ED Course as of 11/30/21 0741   Mon Nov 29, 2021   1626 PROCEDURE  11/29/21       Time: 1600    CENTRAL LINE INSERTION ATTEMPT/FAILURE  Risks, benefits and alternatives (for applicable procedures below) described. Performed By: EM Attending Physician and EM Resident. Indication: centrally administered medications. Informed consent: Consent unable to be obtained due to patient's condition. .  Procedure: After routine sterile preparation, a right Central line was attempted by subclavian vein approach and was unsuccessful. Ultrasound Guidance:   not used. Post-procedure Findings: A post procedural chest x-ray was ordered and is still pending at this time. [ME]   1910 During central line insertion and patient went to ventricular fibrillation arrest, was given 3 of epinephrine, 100 of lidocaine, 1 bicarb, CPR was initiated, ROSC achieved [ME]   1708 Patient given additional 2 bicarb as well as 1 g of calcium due to widening of his QRS complexes. He was also given 300 of amiodarone. [CB]   1715 Spoke with Dr. Dianna Feliz (Medicine). Discussed case. They will admit this patient    Spoke with Dr. Magali Saleem (Critical care). Discussed case.   They will provide consultation   [ME]      ED Course User Index  [CB] Elisa Jade MD  [ME] Benjamin Velazquez, DO       --------------------------------------------- PAST HISTORY ---------------------------------------------  Past Medical History:  has a past medical history of (HFpEF) heart failure with preserved ejection fraction ([address] Arthritis, Bilateral carotid artery stenosis, Bilateral carotid bruits, Blood circulation, collateral, CAD (coronary artery disease), Carotid bruit, CHF (congestive heart failure) ([address] Chronic kidney disease, CKD (chronic kidney disease) stage 3, GFR 30-59 ml/min ([facility] COPD (chronic obstructive pulmonary disease) ([facility] Diabetes mellitus ([facility] Diabetic neuropathy associated with type 2 diabetes mellitus ([address] Dyspnea on exertion, History of blood transfusion, Hyperlipidemia, Hypertension, Mild mitral regurgitation, Moderate tricuspid regurgitation by prior echocardiography, Movement disorder, NSTEMI, initial episode of care ([address] Obesity, Pulmonary hypertension ([facility] PVD (peripheral vascular disease) with claudication ([facility] S/P carotid endarterectomy, Sleep apnea, SOB (shortness of breath), Thyroid disease, Unspecified cerebral artery occlusion with cerebral infarction, and Vision decreased. Past Surgical History:  has a past surgical history that includes Cardiac surgery; vascular surgery; joint replacement; Diagnostic Cardiac Cath Lab Procedure (10/14/2008); Diagnostic Cardiac Cath Lab Procedure (10/21/2010); knee surgery; Cardiac valuve replacement; Coronary artery bypass graft (2008); Tonsillectomy; Pacemaker insertion (11/12/2014); other surgical history (Right, 02/14/2017); BIOPSY / LIGATION TEMPORAL ARTERY (Bilateral, 01/05/2021); Foot Amputation (Right, 10/2/2021); Femoral Endarterectomy (Right, 9/30/2021); Foot Amputation (Bilateral, 10/16/2021); and bronchoscopy (N/A, 11/4/2021). Social History:  reports that he quit smoking about 40 years ago. His smoking use included cigarettes.  He started smoking about 76 years ago. He has a 50.00 pack-year smoking history. He has never used smokeless tobacco. He reports previous drug use. He reports that he does not drink alcohol. Family History: family history includes Cancer in his sister; Heart Disease in his father and mother; Heart Failure in his father and mother; Heart Surgery in his father; High Blood Pressure in his sister. The patients home medications have been reviewed. Allergies: Patient has no known allergies.     -------------------------------------------------- RESULTS -------------------------------------------------    LABS:  Results for orders placed or performed during the hospital encounter of 11/29/21   COVID-19, Rapid    Specimen: Nasopharyngeal Swab   Result Value Ref Range    SARS-CoV-2, NAAT Not Detected Not Detected   Lactate, Sepsis   Result Value Ref Range    Lactic Acid, Sepsis 9.9 (HH) 0.5 - 1.9 mmol/L   CBC Auto Differential   Result Value Ref Range    WBC 18.5 (H) 4.5 - 11.5 E9/L    RBC 3.51 (L) 3.80 - 5.80 E12/L    Hemoglobin 10.3 (L) 12.5 - 16.5 g/dL    Hematocrit 33.1 (L) 37.0 - 54.0 %    MCV 94.3 80.0 - 99.9 fL    MCH 29.3 26.0 - 35.0 pg    MCHC 31.1 (L) 32.0 - 34.5 %    RDW 17.2 (H) 11.5 - 15.0 fL    Platelets 045 766 - 335 E9/L    MPV 10.3 7.0 - 12.0 fL    Neutrophils % 90.7 (H) 43.0 - 80.0 %    Immature Granulocytes % 0.5 0.0 - 5.0 %    Lymphocytes % 4.0 (L) 20.0 - 42.0 %    Monocytes % 4.6 2.0 - 12.0 %    Eosinophils % 0.0 0.0 - 6.0 %    Basophils % 0.2 0.0 - 2.0 %    Neutrophils Absolute 16.74 (H) 1.80 - 7.30 E9/L    Immature Granulocytes # 0.10 E9/L    Lymphocytes Absolute 0.74 (L) 1.50 - 4.00 E9/L    Monocytes Absolute 0.85 0.10 - 0.95 E9/L    Eosinophils Absolute 0.00 (L) 0.05 - 0.50 E9/L    Basophils Absolute 0.03 0.00 - 0.20 E9/L    Anisocytosis 1+     Polychromasia 1+     Poikilocytes 1+     Target Cells 1+    Basic Metabolic Panel w/ Reflex to MG   Result Value Ref Range    Sodium 143 132 - 146 mmol/L    Potassium reflex Magnesium 5.5 (H) 3.5 - 5.0 mmol/L    Chloride 98 98 - 107 mmol/L    CO2 22 22 - 29 mmol/L    Anion Gap 23 (H) 7 - 16 mmol/L    Glucose 173 (H) 74 - 99 mg/dL     (HH) 6 - 23 mg/dL    CREATININE 3.4 (H) 0.7 - 1.2 mg/dL    GFR Non-African American 18 >=60 mL/min/1.73    GFR African American 21     Calcium 11.5 (H) 8.6 - 10.2 mg/dL   Hepatic Function Panel   Result Value Ref Range    Total Protein 8.3 6.4 - 8.3 g/dL    Albumin 2.5 (L) 3.5 - 5.2 g/dL    Alkaline Phosphatase 140 (H) 40 - 129 U/L    ALT 9 0 - 40 U/L    AST 24 0 - 39 U/L    Total Bilirubin 0.5 0.0 - 1.2 mg/dL    Bilirubin, Direct 0.3 0.0 - 0.3 mg/dL    Bilirubin, Indirect 0.2 0.0 - 1.0 mg/dL   Troponin   Result Value Ref Range    Troponin, High Sensitivity 251 (H) 0 - 11 ng/L   Brain Natriuretic Peptide   Result Value Ref Range    Pro-BNP 13,634 (H) 0 - 450 pg/mL   Protime-INR   Result Value Ref Range    Protime 17.2 (H) 9.3 - 12.4 sec    INR 1.6    APTT   Result Value Ref Range    aPTT 40.1 (H) 24.5 - 35.1 sec   Urinalysis, reflex to microscopic   Result Value Ref Range    Color, UA Yellow Straw/Yellow    Clarity, UA SL CLOUDY Clear    Glucose, Ur Negative Negative mg/dL    Bilirubin Urine SMALL (A) Negative    Ketones, Urine TRACE (A) Negative mg/dL    Specific Gravity, UA >=1.030 1.005 - 1.030    Blood, Urine LARGE (A) Negative    pH, UA 5.0 5.0 - 9.0    Protein,  (A) Negative mg/dL    Urobilinogen, Urine 0.2 <2.0 E.U./dL    Nitrite, Urine Negative Negative    Leukocyte Esterase, Urine MODERATE (A) Negative   CORTISOL   Result Value Ref Range    Cortisol 52.69 (H) 2.68 - 18.40 mcg/dL   Blood Gas, Arterial   Result Value Ref Range    Date Analyzed [id number]     Time Analyzed 0096     Source: Blood Arterial     pH, Blood Gas 7.381 7.350 - 7.450    PCO2 31.9 (L) 35.0 - 45.0 mmHg    PO2 62.7 (L) 75.0 - 100.0 mmHg    HCO3 18.5 (L) 22.0 - 26.0 mmol/L    B.E. -5.7 (L) -3.0 - 3.0 mmol/L    O2 Sat 89.0 (L) 92.0 - 98.5 %    O2Hb 88.8 (L) 94.0 - 97.0 % COHb 0.0 0.0 - 1.5 %    MetHb 0.2 0.0 - 1.5 %    O2 Content 13.3 mL/dL    HHb 11.0 (H) 0.0 - 5.0 %    tHb (est) 10.6 (L) 11.5 - 16.5 g/dL    Mode NC- 6 L     Date Of Collection      Time Collected      Pt Temp 37.0 C     ID [id number]     Lab [id number]     Critical(s) Notified .  No Critical Values    Troponin   Result Value Ref Range    Troponin, High Sensitivity 221 (H) 0 - 11 ng/L   Magnesium   Result Value Ref Range    Magnesium 2.1 1.6 - 2.6 mg/dL   Microscopic Urinalysis   Result Value Ref Range    WBC, UA >20 (A) 0 - 5 /HPF    RBC, UA 10-20 (A) 0 - 2 /HPF    Bacteria, UA MANY (A) None Seen /HPF    Amorphous, UA MODERATE    Lactate, Sepsis   Result Value Ref Range    Lactic Acid, Sepsis 13.1 (HH) 0.5 - 1.9 mmol/L   Comprehensive Metabolic Panel w/ Reflex to MG   Result Value Ref Range    Sodium 148 (H) 132 - 146 mmol/L    Potassium reflex Magnesium 4.9 3.5 - 5.0 mmol/L    Chloride 105 98 - 107 mmol/L    CO2 18 (L) 22 - 29 mmol/L    Anion Gap 25 (H) 7 - 16 mmol/L    Glucose 210 (H) 74 - 99 mg/dL     (HH) 6 - 23 mg/dL    CREATININE 3.2 (H) 0.7 - 1.2 mg/dL    GFR Non-African American 19 >=60 mL/min/1.73    GFR African American 23     Calcium 11.4 (H) 8.6 - 10.2 mg/dL    Total Protein 6.0 (L) 6.4 - 8.3 g/dL    Albumin 1.8 (L) 3.5 - 5.2 g/dL    Total Bilirubin 0.5 0.0 - 1.2 mg/dL    Alkaline Phosphatase 113 40 - 129 U/L     (H) 0 - 40 U/L     (H) 0 - 39 U/L   Troponin   Result Value Ref Range    Troponin, High Sensitivity 274 (H) 0 - 11 ng/L   Troponin   Result Value Ref Range    Troponin, High Sensitivity 369 (H) 0 - 11 ng/L   Troponin   Result Value Ref Range    Troponin, High Sensitivity 414 (H) 0 - 11 ng/L   CK   Result Value Ref Range    Total CK 96 20 - 200 U/L   Procalcitonin   Result Value Ref Range    Procalcitonin 9.70 (H) 0.00 - 0.08 ng/mL   Basic Metabolic Panel   Result Value Ref Range    Sodium 145 132 - 146 mmol/L    Potassium 4.8 3.5 - 5.0 mmol/L    Chloride 101 98 - 107 mmol/L b/min    Vt Mechanical 450.0 mL    Peep/Cpap 8.0 cmH2O    Date Of Collection      Time Collected      Pt Temp 37.0 C     ID [id number]     Lab [id number]     Critical(s) Notified . No Critical Values    Hemoglobin and hematocrit, blood   Result Value Ref Range    Hemoglobin 8.7 (L) 12.5 - 16.5 g/dL    Hematocrit 28.4 (L) 37.0 - 54.0 %   Brain Natriuretic Peptide   Result Value Ref Range    Pro-BNP 16,109 (H) 0 - 450 pg/mL   Beta-Hydroxybutyrate   Result Value Ref Range    Beta-Hydroxybutyrate 0.46 (H) 0.02 - 0.27 mmol/L   Hemoglobin A1c   Result Value Ref Range    Hemoglobin A1C 5.5 4.0 - 5.6 %   Troponin   Result Value Ref Range    Troponin, High Sensitivity 499 (H) 0 - 11 ng/L   Lactic Acid, Plasma   Result Value Ref Range    Lactic Acid 12.4 (HH) 0.5 - 2.2 mmol/L   Blood Gas, Arterial   Result Value Ref Range    Date Analyzed [id number]     Time Analyzed 0458     Source: Blood Arterial     pH, Blood Gas 7.300 (L) 7.350 - 7.450    PCO2 38.9 35.0 - 45.0 mmHg    PO2 87.1 75.0 - 100.0 mmHg    HCO3 18.7 (L) 22.0 - 26.0 mmol/L    B.E. -7.1 (L) -3.0 - 3.0 mmol/L    O2 Sat 95.2 92.0 - 98.5 %    PO2/FIO2 1.45 mmHg/%    AaDO2 282.9 mmHg    RI(T) 3.25     O2Hb 94.1 94.0 - 97.0 %    COHb 0.8 0.0 - 1.5 %    MetHb 0.4 0.0 - 1.5 %    O2 Content 13.0 mL/dL    HHb 4.7 0.0 - 5.0 %    tHb (est) 9.7 (L) 11.5 - 16.5 g/dL    Mode AC     FIO2 60.0 %    Rr Mechanical 18.0 b/min    Vt Mechanical 450.0 mL    Peep/Cpap 8.0 cmH2O    Date Of Collection      Time Collected      Pt Temp 37.0 C     ID [id number]     Lab [id number]     Critical(s) Notified .  No Critical Values    Phosphorus   Result Value Ref Range    Phosphorus 3.5 2.5 - 4.5 mg/dL   Basic Metabolic Panel w/ Reflex to MG   Result Value Ref Range    Sodium 140 132 - 146 mmol/L    Potassium reflex Magnesium 3.7 3.5 - 5.0 mmol/L    Chloride 98 98 - 107 mmol/L    CO2 19 (L) 22 - 29 mmol/L    Anion Gap 23 (H) 7 - 16 mmol/L    Glucose 422 (H) 74 - 99 mg/dL     (HH) 6 - 23 mg/dL CREATININE 3.1 (H) 0.7 - 1.2 mg/dL    GFR Non-African American 19 >=60 mL/min/1.73    GFR African American 24     Calcium 9.8 8.6 - 10.2 mg/dL   POCT Glucose   Result Value Ref Range    Meter Glucose 377 (H) 74 - 99 mg/dL   POCT Glucose   Result Value Ref Range    Meter Glucose 425 (H) 74 - 99 mg/dL   POCT Glucose   Result Value Ref Range    Meter Glucose 409 (H) 74 - 99 mg/dL   POCT Glucose   Result Value Ref Range    Meter Glucose 380 (H) 74 - 99 mg/dL   EKG 12 Lead   Result Value Ref Range    Ventricular Rate 124 BPM    Atrial Rate 141 BPM    QRS Duration 114 ms    Q-T Interval 306 ms    QTc Calculation (Bazett) 439 ms    R Axis 117 degrees    T Axis 22 degrees   TYPE AND SCREEN   Result Value Ref Range    ABO/Rh O POS     Antibody Screen NEG        RADIOLOGY:  CT ABDOMEN PELVIS WO CONTRAST Additional Contrast? None   Final Result   The visualized lungs demonstrate aspiration versus pneumonia. Aspiration is   favored. No acute inflammatory abnormality is evident in the abdomen or pelvis. The infrarenal aorta measures 2.9 cm. See recommendation below. Sigmoid diverticulosis without evidence of diverticulitis. RECOMMENDATIONS:   2.9 cm infrarenal abdominal aortic aneurysm suspected. Recommend follow-up   every 5 years. Reference: J Am Zeinab Radiol 9972;71:022-473. XR CHEST PORTABLE   Final Result   ET tube tip within 1.0 cm of the nato. XR ABDOMEN FOR NG/OG/NE TUBE PLACEMENT   Final Result   Enteric tube tip just distal to the GE junction. XR CHEST PORTABLE   Final Result   1. Limited due to low lung volumes. 2. Bibasilar opacities notable on the left could indicate bibasilar pneumonia   or atelectasis. Opacities appear similar compared to prior from November 5th. XR FOOT RIGHT (2 VIEWS)   Final Result   1. Postsurgical changes including prior transmetatarsal amputation of the   right foot. 2. No radiographic evidence of osteomyelitis.   Improvement of soft tissue   swelling. XR CHEST PORTABLE    (Results Pending)   XR CHEST PORTABLE    (Results Pending)   XR ABDOMEN FOR NG/OG/NE TUBE PLACEMENT    (Results Pending)       EKG: This EKG is signed and interpreted by me. Rate: 124  Rhythm: a fib  Interpretation: No acute ST elevation atrial fibrillation with heart urinated normal axis, ST depression noted V4 V5 V6  Comparison: changes compared to previous EKG      ------------------------- NURSING NOTES AND VITALS REVIEWED ---------------------------  Date / Time Roomed:  11/29/2021  1:49 PM  ED Bed Assignment:  [id number]    The nursing notes within the ED encounter and vital signs as below have been reviewed.      Patient Vitals for the past 24 hrs:   BP Temp Temp src Pulse Resp SpO2 Weight   11/30/21 0700 -- 93.6 °F (34.2 °C) -- 87 18 97 % --   11/30/21 0600 -- 93.6 °F (34.2 °C) -- 86 18 96 % 205 lb (93 kg)   11/30/21 0500 -- 93.4 °F (34.1 °C) -- 90 21 96 % --   11/30/21 0400 (!) 129/51 93.2 °F (34 °C) Bladder 90 18 95 % --   11/30/21 0300 -- 92.7 °F (33.7 °C) -- 85 18 94 % --   11/30/21 0200 -- 92.3 °F (33.5 °C) -- 81 18 92 % --   11/30/21 0145 -- -- -- 80 18 92 % --   11/30/21 0100 -- (!) 91.6 °F (33.1 °C) -- 79 16 94 % --   11/30/21 0022 -- -- -- 81 18 93 % --   11/30/21 0000 (!) 145/82 92.5 °F (33.6 °C) Bladder 85 18 95 % --   11/29/21 2300 126/83 -- -- 94 19 -- --   11/29/21 2200 108/65 -- -- 100 18 -- --   11/29/21 2100 (!) 143/75 -- -- 94 18 100 % --   11/29/21 2028 -- -- -- 87 (!) 0 100 % --   11/29/21 2000 113/79 97.7 °F (36.5 °C) Bladder 97 18 100 % --   11/29/21 1900 105/60 -- -- 98 20 (!) 82 % --   11/29/21 1858 -- -- -- 89 18 (!) 89 % --   11/29/21 1830 -- -- -- 81 18 98 % --   11/29/21 1828 115/60 -- -- 82 18 98 % --   11/29/21 1757 (!) 107/49 -- -- 73 18 98 % --   11/29/21 1749 (!) 102/49 -- -- 70 18 96 % --   11/29/21 1745 (!) 111/50 -- -- 65 18 96 % --   11/29/21 1715 -- -- -- -- 18 -- --   11/29/21 1715 (!) 122/47 -- -- 69 -- -- -- 11/29/21 1705 (!) 161/85 -- -- 88 18 -- --   11/29/21 1638 101/60 -- -- -- 18 -- --   11/29/21 1636 82/66 -- -- -- 18 -- --   11/29/21 1605 -- -- -- 121 19 -- --   11/29/21 1603 93/62 -- -- 131 22 -- --   11/29/21 1530 92/63 97.7 °F (36.5 °C) Axillary 124 (!) 31 98 % --   11/29/21 1402 106/68 -- -- 131 (!) 36 96 % 240 lb (108.9 kg)       Oxygen Saturation Interpretation: Abnormal    ------------------------------------------ PROGRESS NOTES ------------------------------------------  Re-evaluation(s):  Time: 4940  Patients symptoms show no change  Repeat physical examination is not changed    Counseling:  I have spoken with the patient and discussed todays results, in addition to providing specific details for the plan of care and counseling regarding the diagnosis and prognosis. Their questions are answered at this time and they are agreeable with the plan of admission.    --------------------------------- ADDITIONAL PROVIDER NOTES ---------------------------------  Consultations:  Time: 9702. Spoke with Dr. Aida Guido. Discussed case. They will admit the patient. This patient's ED course included: a personal history and physicial examination, re-evaluation prior to disposition, multiple bedside re-evaluations, IV medications, cardiac monitoring, continuous pulse oximetry and complex medical decision making and emergency management    Accordingly this patient received 55 minutes of critical care time, excluding separately billable procedures. This patient has remained hemodynamically stable during their ED course. Diagnosis:  1. Shock ([address]    2. Acute on chronic respiratory failure with hypoxia (HCC)    3. Respiratory distress    4. Nursing home resident    5. Acute renal failure, unspecified acute renal failure type ([facility]    6. 2019-nCoV not detected    7. Cardiac arrest ([facility]    8. Ventricular fibrillation and flutter ([facility]    9. Lactic acid acidosis    10.  Occult blood positive stool Disposition:  Patient's disposition: Admit to CCU/ICU  Patient's condition is critical.         Odalis Rose MD  Resident  11/30/21 8066

## 2021-11-29 NOTE — H&P
Hospital Medicine History & Physical      PCP: No primary care provider on file. Date of Admission: 11/29/2021    Date of Service: Pt seen/examined on 11/29/21 and Admitted to Inpatient with expected LOS greater than two midnights due to medical therapy. Chief Complaint: Respiratory distress    History Of Present Illness:  [de-identified] y.o. male who was   brought to the ED by EMS from nursing home  complains of worsening lower extremity wound, shortness of breath. Patient in the ED had a large dark tarry stool FOBT was positive. Patient was in respiratory distress and he was intubated in the ED. Patient later went into [facility] on monitor and lost pulse. CPR Per ACLS protocol was followed. shocked 2 times, and ROSC obtained after 6 minutes . At time of my interview patient was intubated and history was obtained from staff and chart.   Past Medical History:          Diagnosis Date    (HFpEF) heart failure with preserved ejection fraction ([address] 07/2020    Diagnosed by cardiology    Arthritis     Bilateral carotid artery stenosis 12/31/2020    Bilateral carotid bruits 12/31/2020    Blood circulation, collateral     CAD (coronary artery disease)     Carotid bruit 03/27/2013    CH[facility]congestive heart failure) [facility])     Chronic kidney disease     CKD (chronic kidney disease) stage 3, GFR 30-59 ml/min ([facility]) 03/20/2016    COPD (chronic obstructive pulmonary disease) ([address]     Follows with pulmonary    Diabetes mellitus ([address]     Diabetic neuropathy associated with type 2 diabetes mellitus ([facility])     Dyspnea on exertion     History of blood transfusion     Hyperlipidemia     Hypertension     Mild mitral regurgitation     Moderate tricuspid regurgitation by prior echocardiography 2018    Movement disorder     NSTEMI, initial episode of care ([address] 01/2018    Obesity     Pulmonary hypertension ([address] 2018    PVD (peripheral vascular disease) with claudication ([address] 03/27/2014    S/P carotid endarterectomy 03/27/2013    Sleep apnea     SOB (shortness of breath)     Thyroid disease     Unspecified cerebral artery occlusion with cerebral infarction     Vision decreased 12/31/2020       Past Surgical History:          Procedure Laterality Date    BIOPSY / LIGATION TEMPORAL ARTERY Bilateral 01/05/2021    BILATERAL TEMPORAL ARTERY BIOPSY performed by Reyes Ag MD at [address] N/A 11/4/2021    BRONCHOSCOPY DIAGNOSTIC OR CELL [facility] ONLY and bal performed by Ilene Reyes MD at [place] [address]      5 years ago   [facility] [address]  [facility]    1 vessel and aortic valve repair    DIAGNOSTIC CARDIAC CATH LAB PROCEDURE  10/14/2008    DIAGNOSTIC CARDIAC CATH LAB PROCEDURE  10/21/2010    FEMORAL ENDARTERECTOMY Right 9/30/2021    RIGHT FEMORAL ENDARTERECTOMY performed by Wilian Valdez MD at [facility] Right 10/2/2021    RIGHT FOOT TRANSMITTAL AMPUTATION performed by Portia Gonzalez DPM at [facility] Bilateral 10/16/2021    RIGHT FOOT WOUND DEBRIDEMENT WITH APPLICATION WOUND VAC; LEFT FOOT AMPUTATION 2ND TOE performed by Portia Gonzalez DPM at [facility] knee replacement    KNEE SURGERY      OTHER SURGICAL HISTORY Right 02/14/2017    debridement,bone bx foot    PACEMAKER INSERTION  11/12/2014    D-PPM   ()    Dr. Hebert Huynh      carotids       Medications Prior to Admission:      Prior to Admission medications    Medication Sig Start Date End Date Taking? Authorizing Provider   Heparin Sodium, Porcine, (HEPARIN, PORCINE,) 15917 UNIT/ML injection Inject 0.5 mLs into the skin every 12 hours 11/6/21   MADDI Slaughter CNP   gabapentin (NEURONTIN) 100 MG capsule Take 2 capsules by mouth 4 times daily for 30 days.  11/6/21 12/6/21  MADDI Slaughter CNP   atorvastatin (LIPITOR) 10 MG tablet Take 1 tablet by mouth daily 11/7/21   MADDI De La Rosa CNP   acetylcysteine (MUCOMYST) 10 % nebulizer solution Inhale 4 mLs into the lungs every 6 hours 11/6/21   MADDI De La Rosa CNP   silver sulfADIAZINE (SILVADENE) 1 % cream Apply topically daily. 11/7/21   MADDI De La Rosa CNP   bumetanide (BUMEX) 1 MG tablet Take 1 tablet by mouth 2 times daily 11/6/21   MADDI De La Rosa CNP   polyethylene glycol (GLYCOLAX) 17 g packet Take 17 g by mouth daily as needed for Constipation 11/6/21 12/6/21  MADDI De La Rosa CNP   potassium chloride (KLOR-CON M) 20 MEQ extended release tablet Take 2 tablets by mouth daily 11/7/21   MADDI De La Rosa CNP   albuterol (PROVENTIL) (2.5 MG/3ML) 0.083% nebulizer solution Take 2.5 mg by nebulization every 6 hours as needed for Wheezing    Historical Provider, MD   sucralfate (CARAFATE) 1 GM tablet Take 1 g by mouth 4 times daily    Historical Provider, MD   HYDROcodone-acetaminophen (NORCO)  MG per tablet Take 1 tablet by mouth every 6 hours as needed for Pain. Historical Provider, MD   morphine (THIERRY) 10 MG extended release capsule Take 10 mg by mouth 2 times daily.     Historical Provider, MD   pantoprazole (PROTONIX) 40 MG tablet Take 1 tablet by mouth 2 times daily (before meals) 10/26/21   Alka Duncan MD   mirtazapine (REMERON) 7.5 MG tablet Take 7.5 mg by mouth nightly    Historical Provider, MD   hydrALAZINE (APRESOLINE) 50 MG tablet Take 1 tablet by mouth every 8 hours 10/3/21   Alka Duncan MD   magnesium 200 MG TABS tablet Take 200 mg by mouth daily    Historical Provider, MD   diphenhydrAMINE (BENADRYL) 25 MG capsule Take 25 mg by mouth daily as needed for Itching    Historical Provider, MD   insulin lispro, 1 Unit Dial, (HUMALOG KWIKPEN) 100 UNIT/ML SOPN Inject 0-6 Units into the skin 3 times daily (before meals) PER SLIDING SCALE: 0-139=0U, 140-199=1U, 200-249=2U, 250-299=3U, 300-349=4U, 350-399=5U, 400-450=6U    Historical Provider, MD hydrOXYzine (ATARAX) 25 MG tablet Take 25 mg by mouth 4 times daily     Historical Provider, MD   lactulose 20 GM/30ML SOLN Take 20 g by mouth three times a week *MON-WED-FRI*    Historical Provider, MD   metOLazone (ZAROXOLYN) 2.5 MG tablet Take 2.5 mg by mouth three times a week *TUE-THUR-SAT*    Historical Provider, MD   ferrous sulfate (IRON 325) 325 (65 Fe) MG tablet Take 1 tablet by mouth 2 times daily (with meals) 1/25/21   Anirudh Martino DO   DULoxetine (CYMBALTA) 60 MG extended release capsule Take 1 capsule by mouth daily 1/26/21   Anirudh Prior DO [place]   fluticasone-umeclidin-vilant (TRELEGY ELLIPTA) 100-62.5-25 MCG/INH AEPB Inhale 1 puff into the lungs Daily     Historical Provider, MD   aspirin EC 81 MG EC tablet Take 1 tablet by mouth daily 8/7/20   Deni Horton MD   levothyroxine (SYNTHROID) 25 MCG tablet Take 25 mcg by mouth Daily  7/29/19   Historical Provider, MD   metoprolol succinate (TOPROL XL) 50 MG extended release tablet Take 50 mg by mouth daily    Historical Provider, MD   isosorbide mononitrate (IMDUR) 120 MG extended release tablet Take 1 tablet by mouth daily 1/23/18   Derek Carmona MD   allopurinol (ZYLOPRIM) 300 MG tablet Take 300 mg by mouth daily  2/27/15   Historical Provider, MD       Allergies:  Patient has no known allergies. Social History:      The patient currently lives nursing facility    TOBACCO:   reports that he quit smoking about 40 years ago. His smoking use included cigarettes. He started smoking about 68 years ago. He has a 50.00 pack-year smoking history. He has never used smokeless tobacco.  ETOH:   reports no history of alcohol use. Family History:       Reviewed in detail and negative for DM, CAD, Cancer, CVA.  Positive as follows: Pretension        Problem Relation Age of Onset    Heart Disease Mother     Heart Failure Mother     Heart Surgery Father     Heart Disease Father     Heart Failure Father     High Blood Pressure Sister     Cancer Sister        REVIEW OF SYSTEMS:   Pertinent positives as noted in the HPI. All other systems reviewed and negative. PHYSICAL EXAM:    BP (!) 107/49   Pulse 73   Temp 97.7 °F (36.5 °C) (Axillary)   Resp 18   Wt 240 lb (108.9 kg)   SpO2 98%   BMI 36.49 kg/m²     General appearance: Intubated and sedated  HEENT:  Normal cephalic, atraumatic without obvious deformity. Pupils equal, round, and reactive to light. Extra ocular muscles intact. Conjunctivae/corneas clear. Neck: Supple, with full range of motion. No jugular venous distention. Trachea midline. Respiratory: Diminished bilaterally, mild wheezes bilaterally  Cardiovascular:  Regular rate and rhythm with normal S1/S2 without murmurs, rubs or gallops. Abdomen: Soft, non-tender, non-distended with normal bowel sounds. Musculoskeletal:  No clubbing, cyanosis or edema bilaterally. Full range of motion without deformity. Skin: Skin color, texture, turgor normal.  No rashes or lesions. Neurologic: Intubated and sedated  Psychiatric: Unable to assess    CXR:  I have reviewed the CXR with the following interpretation:   EKG:  I have reviewed the EKG with the following interpretation:     Labs:     Recent Labs     11/29/21  1412   WBC 18.5*   HGB 10.3*   HCT 33.1*        Recent Labs     11/29/21  1412      K 5.5*   CL 98   CO2 22   *   CREATININE 3.4*   CALCIUM 11.5*     Recent Labs     11/29/21  1412   AST 24   ALT 9   BILIDIR 0.3   BILITOT 0.5   ALKPHOS 140*     Recent Labs     11/29/21  1412   INR 1.6     No results for input(s): Janet Suazo in the last 72 hours.     Urinalysis:      Lab Results   Component Value Date    NITRU Negative 11/03/2021    WBCUA NONE 09/24/2021    WBCUA NONE 07/12/2011    BACTERIA NONE SEEN 09/24/2021    RBCUA 1-3 09/24/2021    RBCUA 1-3 03/17/2014    BLOODU Negative 11/03/2021    SPECGRAV 1.015 11/03/2021    GLUCOSEU Negative 11/03/2021    GLUCOSEU NEGATIVE 07/12/2011         ASSESSMENT:    Active Hospital Problems    Diagnosis Date Noted    [facility]) [R57.9] 11/29/2021   Septic shock  Acute hypoxic respiratory failure  Pneumonia  UTI  Nonsustained V. tach ,cardiac arrest  Elevated troponin  HARVEY  R chronic wound s/p TMA  DKA  GI bleed  PLAN:    Admit patient to ICU. Critical care team consult. Intubation mechanical ventilation. Broad-spectrum antibiotics, pressors, PPI, await cultures  Cardiology team has been consulted. shefali paris MD  Thank you No primary care provider on file. for the opportunity to be involved in this patient's care. If you have any questions or concerns please feel free to contact me through [facility].

## 2021-11-29 NOTE — ED NOTES
Respiratory notified of room assignment and need to go to CT.       Jenniffer Howell, RN  11/29/21 2608

## 2021-11-29 NOTE — CONSULTS
Vascular Surgery Consultation Note    Reason for Consult:  Wound infection Right foot    HPI :    This is a [de-identified] y.o. male who is admitted to the hospital for treatment of complaint of wound infection to the right foot. Also found to be in respiratory distress. Intubated in the ED . Vascular surgery is consulted for evaluation and treatment of RLE wound infection. He has non-healing wound on right TMA stump. In ED he was intubated for respiratory failure. ED reports he had V-fib cardiac arrest that was brief, may have been 2/2 irritation from wire during central line insertion vs sepsis. He is on a lidocaine drip and levophed @ 15mcg/min    Patient had Sept 2021 Right ileofemoral endarterectomy by Dr. Cathy Alba and R TMA amputation by podiatry sept 2021    Patient currently intubated and sedated, unable to give history. ROS : Negative if blank [], Positive if [x]  Unable to obtain as patient intubated.   General Vascular   [] Fevers [] Claudication (Blocks)   [] Chills [] Rest Pain   [] Weight Loss [] Tissue Loss   [] Chest Pain [] Clotting Disorder    [] SOB at rest [] Leg Swelling   [] SOB with exertion [] DVT/PE      [] Nausea    [] Vomitting [] Stroke/TIA   [] Abdominal Pain [] Focal weakness   [] Melena [] Slurred Speech   [] Hematochezia [] Vision Changes   [] Hematuria    [] Dysuria [] Hx of Central Catheters   [] Wears Glasses/Contacts  [] Dialysis and If so date initiated   [] Blindness     [] unknown Hand Dominant   [] Difficulty swallowing        Past Medical History:   Diagnosis Date    (HFpEF) heart failure with preserved ejection fraction ([address] 07/2020    Diagnosed by cardiology    Arthritis     Bilateral carotid artery stenosis 12/31/2020    Bilateral carotid bruits 12/31/2020    Blood circulation, collateral     CAD (coronary artery disease)     Carotid bruit 03/27/2013    CHF (congestive heart failure) ([address]     Chronic kidney disease     CKD (chronic kidney disease) stage 3, GFR 30-59 ml/min ([facility]) 03/20/2016    COPD ([facility]ronic obstructive pulmonary disease) [facility]     Follows with pulmonary    Diabetes mellitus ([facility]     Diabetic neuropathy associated with type 2 diabetes mellitus ([facility])     Dyspnea on exertion     History of blood transfusion     Hyperlipidemia     Hypertension     Mild mitral regurgitation     Moderate tricuspid regurgitation by prior echocardiography 2018    Movement disorder     NSTEMI, initial episode of care ([facility] 01/2018    Obesity     Pulmonary hypertension ([facility] 2018    PVD (peripheral vascular disease) with claudication ([facility] 03/27/2014    S/P carotid endarterectomy 03/27/2013    Sleep apnea     SOB (shortness of breath)     Thyroid disease     Unspecified cerebral artery occlusion with cerebral infarction     Vision decreased 12/31/2020        Past Surgical History:   Procedure Laterality Date    BIOPSY / LIGATION TEMPORAL ARTERY Bilateral 01/05/2021    BILATERAL TEMPORAL ARTERY BIOPSY performed by Inderjit Newton MD at [facility] [address] N/A 11/4/2021    BRONCHOSCOPY DIAGNOSTIC OR CELL [address] ONLY and bal performed by Susan Dias MD at [place] [address]      5 years ago   [address]RA[address]  2008    1 vessel and aortic valve repair    DIAGNOSTIC CARDIAC CATH LAB PROCEDURE  10/14/2008    DIAGNOSTIC CARDIAC CATH LAB PROCEDURE  10/21/2010    FEMORAL ENDARTERECTOMY Right 9/30/2021    RIGHT FEMORAL ENDARTERECTOMY performed by Colleen Lima MD at [facility] Right 10/2/2021    RIGHT FOOT TRANSMITTAL AMPUTATION performed by Linda Moreno DPM at [facility] Bilateral 10/16/2021    RIGHT FOOT WOUND DEBRIDEMENT WITH APPLICATION WOUND VAC; LEFT FOOT AMPUTATION 2ND TOE performed by Ramakrishna Leger DPM at [facility]      R knee replacement    KNEE SURGERY      OTHER SURGICAL HISTORY Right 02/14/2017    debridement,bone bx foot    PACEMAKER INSERTION  2014    D-PPM   ([facility])    Dr. Louisa Gorman      carotids     Current Medications:    fentaNYL 5 mcg/ml in 0.9%  ml infusion      norepinephrine 10 mcg/min (21 2427)    pantoprazole        midazolam, fentanNYL, sodium bicarbonate, calcium chloride    vancomycin  2,000 mg IntraVENous Once    pantoprazole (PROTONIX) bolus  80 mg IntraVENous Once    IV infusion builder   IntraVENous Once        Allergies:  Patient has no known allergies. Social History     Socioeconomic History    Marital status:      Spouse name: Not on file    Number of children: Not on file    Years of education: Not on file    Highest education level: Not on file   Occupational History    Occupation: retired- [employer]   Tobacco Use    Smoking status: Former Smoker     Packs/day: 2.00     Years: 25.00     Pack years: 50.00     Types: Cigarettes     Start date: 1953     Quit date: 1981     Years since quittin.4    Smokeless tobacco: Never Used   Vaping Use    Vaping Use: Never used    Passive vaping exposure: Yes   Substance and Sexual Activity    Alcohol use: Never     Comment:      Drug use: Not Currently     Comment: no longer eating edibles. states he had a bad episode.  Sexual activity: Not Currently   Other Topics Concern    Not on file   Social History Narrative    1 cup coffee daily     Social Determinants of Health     Financial Resource Strain:     Difficulty of Paying Living Expenses: Not on file   Food Insecurity:     Worried About Running Out of Food in the Last Year: Not on file    Alecia of Food in the Last Year: Not on file   Transportation Needs:     Lack of Transportation (Medical): Not on file    Lack of Transportation (Non-Medical):  Not on file   Physical Activity:     Days of Exercise per Week: Not on file    Minutes of Exercise per Session: Not on file   Stress:     Feeling of Stress : Not on file Social Connections:     Frequency of Communication with Friends and Family: Not on file    Frequency of Social Gatherings with Friends and Family: Not on file    Attends Mosque Services: Not on file    Active Member of Clubs or Organizations: Not on file    Attends Club or Organization Meetings: Not on file    Marital Status: Not on file   Intimate Partner Violence:     Fear of Current or Ex-Partner: Not on file    Emotionally Abused: Not on file    Physically Abused: Not on file    Sexually Abused: Not on file   Housing Stability:     Unable to Pay for Housing in the Last Year: Not on file    Number of [place] in the Last Year: Not on file    Unstable Housing in the Last Year: Not on file        Family History   Problem Relation Age of Onset    Heart Disease Mother     Heart Failure Mother     Heart Surgery Father     Heart Disease Father     Heart Failure Father     High Blood Pressure Sister     Cancer Sister        PHYSICAL EXAM:    BP (!) 161/85   Pulse 88   Temp 97.7 °F (36.5 °C) (Axillary)   Resp 18   Wt 240 lb (108.9 kg)   SpO2 98%   BMI 36.49 kg/m²   CONSTITUTIONAL:  Pale, intubated  ENT:  normocepalic, without obvious abnormality,  NECK:  supple, symmetrical, trachea midline,  LUNGS:  Intubated, mechanical ventilation,  CARDIOVASCULAR: on levophed. Normal rate   ABDOMEN:  soft, non-distended, non-tender, right abdominal flank has mottling of skin  SKIN:  Mottling of skin on right flank, bilateral lower extremities  EXTREMITIES:   R LE Edema absent. 2+ femoral pulse. Well healed R groin incision. Right foot s/p TMA with chronic wound with healthy appearing granulation tissue   photo from 11/22 appears similar to appearance of wound in ED    L LE Edema absent  Prior L 2nd toe amputation.  2+ femoral pulse       LABS:    Lab Results   Component Value Date    WBC 18.5 (H) 11/29/2021    HGB 10.3 (L) 11/29/2021    HCT 33.1 (L) 11/29/2021     11/29/2021    PROTIME 17.2 (H) 11/29/2021    INR 1.6 11/29/2021    K 5.5 (H) 11/29/2021     (HH) 11/29/2021    CREATININE 3.4 (H) 11/29/2021       RADIOLOGY:    Assesment/Plan  R chronic wound s/p TMA  PAD s/p right ileofemoral endarterectomy in September 2021    Currently with palpable femoral pulses, does have chronic wound of RLE - was treated for wound infection and completed outpatient Meropenem/Daptomycin. Recommend ID consult for antibiotics management. - appearance of his wound does not suggest it is the source of his shock and respiratory failure    If he recovers from acute shock and continues to have non-healing of wound may need consideration for more proximal amputation. Presently no acute indications for vascular intervention    D/w Dr. Prasad Garcia, DO    This patient was seen and examined. Right now he remains critically ill. His right groin incision is healed. He has an open transmetatarsal amputation with good granulation tissue. He has exposed bone. He has no gross signs of overt cellulitis or lower extremity infection. Assessment and plan. #1 chronic right lower extremity nonhealing wound. At this point he is a nonambulatory patient. Depending on how he does with regards to his overall health and recent admission and intubation vascular surgery would recommend an amputation above the knee. Right now there is no emergent signs of infection and will be on standby.     Thien Cordova

## 2021-11-30 NOTE — PROGRESS NOTES
Pharmacy Consultation Note  (Antibiotic Dosing and Monitoring)    Initial consult date: 11-  Consulting physician/provider: Sean LEDESMA CNP  Drug: Vancomycin  Indication: Sepsis    Age/  Gender Height Weight IBW  Allergy Information   81 y.o./male   240 lb (108.9 kg)     Ideal body weight: 68.4 kg (150 lb 12.7 oz)  Adjusted ideal body weight: 78.2 kg (172 lb 7.6 oz)   Patient has no known allergies. Renal Function:  Recent Labs     11/29/21  2301 11/30/21  0201 11/30/21  0610   * 119* 120*   CREATININE 3.2* 3.1* 3.1*       Intake/Output Summary (Last 24 hours) at 11/30/2021 1016  Last data filed at 11/30/2021 0600  Gross per 24 hour   Intake 3508 ml   Output 500 ml   Net 3008 ml       Vancomycin Monitoring:  Trough:  No results for input(s): VANCOTROUGH in the last 72 hours. Random:  No results for input(s): VANCORANDOM in the last 72 hours. Vancomycin Administration Times:  Recent vancomycin administrations                   vancomycin (VANCOCIN) intermittent dosing (placeholder) ()  Given 11/30/21 1206    vancomycin (VANCOCIN) 2,000 mg in dextrose 5 % 500 mL IVPB (mg) 2,000 mg New Bag 11/29/21 2305                Assessment:  · Patient is a [de-identified] y.o. male who has been initiated on vancomycin  Estimated Creatinine Clearance: 21 mL/min (A) (based on SCr of 3.1 mg/dL (H)).   · To dose vancomycin, pharmacy will be utilizing dosing based off of levels because of patient's renal impairment/insufficiency    Plan:  · Random level today  · Will re-dose as needed  · Will continue to monitor renal function   · Clinical pharmacy to follow      [place] PharmD, BCPS 11/30/2021 10:16 AM

## 2021-11-30 NOTE — PROGRESS NOTES
enlarged, symmetric, no tenderness/mass/nodules  · Lung: clear to auscultation bilaterally, no wheezing, rhonchi noted. · Heart: regular rate and rhythm, S1, S2 normal, no murmur, click, rub or gallop  · Abdomen: soft, non-tender; bowel sounds normal; no masses,  no organomegaly  Musculokeletal: No joint swelling, no muscle tenderness. SKIN;  Normal coloration, warm, dry. 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 NEURO:   Does not follow directions. No response to verbal or painful stimuli  PSYCH:   NO Sedation.     Medications     Continuous Infusions:   dextrose      dextrose 5% and 0.45% NaCl with KCl 20 mEq      insulin 18.45 Units/hr (11/30/21 0900)    IV infusion builder 150 mL/hr at 11/30/21 0842    fentaNYL 5 mcg/ml in 0.9%  ml infusion      norepinephrine 12 mcg/min (11/30/21 0840)    sodium chloride       Scheduled Meds:   ipratropium-albuterol  1 ampule Inhalation Q4H WA    budesonide  0.5 mg Nebulization BID    lactated ringers bolus  1,000 mL IntraVENous Once    sodium chloride flush  5-40 mL IntraVENous 2 times per day    cefepime  2,000 mg IntraVENous Q8H    pantoprazole  40 mg IntraVENous BID    And    sodium chloride (PF)  10 mL IntraVENous BID    chlorhexidine  15 mL Mouth/Throat BID     PRN Meds: ipratropium-albuterol, glucose, glucagon (rDNA), dextrose, dextrose, potassium chloride, magnesium sulfate, sodium phosphate IVPB **OR** sodium phosphate IVPB **OR** sodium phosphate IVPB, dextrose 5% and 0.45% NaCl with KCl 20 mEq, midazolam, fentanNYL, sodium chloride flush, sodium chloride, acetaminophen **OR** acetaminophen, ondansetron **OR** ondansetron, perflutren lipid microspheres, fentanNYL  Nutrition:   NPO    Labs and Imaging Studies     CBC:   Recent Labs     11/29/21  1412 11/30/21  0201   WBC 18.5*  --    RBC 3.51*  --    HGB 10.3* 8.7*   HCT 33.1* 28.4*   MCV 94.3  --    MCH 29.3  --    MCHC 31.1*  --    RDW 17.2*  --   --    MPV 10.3  --        BMP:    Recent Labs     11/29/21  1412 11/29/21  1412 11/29/21 2014 11/29/21 2014 11/29/21 2301 11/30/21  0201 11/30/21  0610      < > 148*   < > 145 137 140   K 5.5*   < > 4.9  --  4.8 4.3 3.7   CL 98   < > 105   < > 101 96* 98   CO2 22   < > 18*   < > 15* 15* 19*   *   < > 117*   < > 119* 119* 120*   CREATININE 3.4*   < > 3.2*   < > 3.2* 3.1* 3.1*   GLUCOSE 173*   < > 210*   < > 217* 438* 422*   CALCIUM 11.5*   < > 11.4*   < > 10.6* 10.3* 9.8   PROT 8.3  --  6.0*  --   --   --   --    LABALBU 2.5*  --  1.8*  --   --   --   --    BILITOT 0.5  --  0.5  --   --   --   --    ALKPHOS 140*  --  113  --   --   --   --    AST 24  --  555*  --   --   --   --    ALT 9  --  136*  --   --   --   --     < > = values in this interval not displayed.        LIVER PROFILE:   Recent Labs     11/29/21 1412 11/29/21 2014   AST 24 555*   ALT 9 136*   BILIDIR 0.3  --    BILITOT 0.5 0.5   ALKPHOS 140* 113       PT/INR:   Recent Labs     11/29/21 1412 11/29/21 2301   PROTIME 17.2* 23.0*   INR 1.6 2.1       APTT:   Recent Labs     11/29/21 1412 11/29/21 2301   APTT 40.1* 43.2*       Fasting Lipid Panel:    Lab Results   Component Value Date    CHOL 99 09/24/2021    TRIG 84 09/24/2021    HDL 33 09/24/2021       Cardiac Enzymes:    Lab Results   Component Value Date    CKTOTAL 96 11/29/2021    CKTOTAL 245 (H) 11/01/2019    CKTOTAL 157 01/20/2018    CKMB 8.2 (H) 01/20/2018    CKMB 9.0 (H) 01/19/2018    CKMB 2.6 09/02/2016    TROPONINI 0.05 (H) 01/19/2021    TROPONINI 0.06 (H) 01/19/2021    TROPONINI 0.09 (H) 01/19/2021       Notable Cultures:      Blood cultures   Blood Culture, Routine   Date Value Ref Range Status   11/29/2021 (A)  Preliminary    Gram stain performed from blood culture bottle media  Gram positive cocci in clusters       Respiratory cultures No results found for: RESPCULTURE   Gram Stain Result   Date Value Ref Range Status   02/14/2017 Refer to ordered Gram stain for results  Final     Urine   Urine Culture, Routine   Date Value Ref Range Status   01/19/2021   Final    10 to 100,000 CFU/mL  Mixed sindhu isolated. Further workup and sensitivity testing  is not routinely indicated and will not be performed. Mixed sindhu isolated includes:  Mixed gram positive organisms       Legionella No results found for: LABLEGI  C Diff PCR No results found for: CDIFPCR  Wound culture/abscess: No results for input(s): WNDABS in the last 72 hours. Tip culture:No results for input(s): CXCATHTIP in the last 72 hours. Antibiotic  Days  Day started   vanco        Cefepime                       Oxygen:     Vent Information  $Ventilation: $Subsequent Day  Skin Assessment: Clean, dry, & intact  Equipment ID: [id number]  Vent Type: [id number]  Vent Mode: AC/VC  Vt Ordered: 450 mL  Rate Set: 18 bmp  Peak Flow: 60 L/min  Pressure Support: 0 cmH20  FiO2 : 60 %  SpO2: 93 %  SpO2/FiO2 ratio: 158.33  Sensitivity: 3  PEEP/CPAP: 8  I Time/ I Time %: 0 s  Humidification Source: Heated wire  Humidification Temp: 37  Humidification Temp Measured: 36.7    Additional Respiratory  Assessments  Pulse: 87  Resp: 18  SpO2: 93 %  End Tidal CO2: 50 (%)  Position: Supine  Humidification Source: Heated wire  Humidification Temp: 37  Oral Care: Mouth suctioned, Mouth swabbed     Nasal cannula L/min     Face mask %     Reservoirs mask %       ABG     PH  7.30   PCO2  38   PO2  87   HCO3  18   Sat%  95   FIO2  60   DATES 11/30/21       Lines:  Site  Day  Date inserted     TLC   RIJ    11/29/21     PICC              Arterial line   Left radial    11/30/21     Peripheral line              HD cath            Urethral Catheter-Output (mL): 0 mL    Imaging Studies:    XR CHEST PORTABLE   Preliminary Result   Persistent hazy multifocal bilateral airspace disease not significantly   changed when compared with the prior study. XR ABDOMEN FOR NG/OG/NE TUBE PLACEMENT   Final Result   Satisfactory position of nasogastric tube. CT ABDOMEN PELVIS WO CONTRAST Additional Contrast? None   Final Result   The visualized lungs demonstrate aspiration versus pneumonia. Aspiration is   favored. No acute inflammatory abnormality is evident in the abdomen or pelvis. The infrarenal aorta measures 2.9 cm. See recommendation below. Sigmoid diverticulosis without evidence of diverticulitis. RECOMMENDATIONS:   2.9 cm infrarenal abdominal aortic aneurysm suspected. Recommend follow-up   every 5 years. Reference: J Am Zeinab Radiol 0053;61:[id number]. XR CHEST PORTABLE   Final Result   ET tube tip within 1.0 cm of the nato. XR ABDOMEN FOR NG/OG/NE TUBE PLACEMENT   Final Result   Enteric tube tip just distal to the GE junction. XR CHEST PORTABLE   Final Result   1. Limited due to low lung volumes. 2. Bibasilar opacities notable on the left could indicate bibasilar pneumonia   or atelectasis. Opacities appear similar compared to prior from November 5th. XR FOOT RIGHT (2 VIEWS)   Final Result   1. Postsurgical changes including prior transmetatarsal amputation of the   right foot. 2. No radiographic evidence of osteomyelitis. Improvement of soft tissue   swelling. XR CHEST PORTABLE    (Results Pending)          APRN- CNP Assessment and PLan   In summary, [de-identified] y.o male with PMHx of obesity, HFpEF/HFrEFF with EF of 40-45% per echo 2020;  Arthritis, bilateral Carotid artery stenosis s/p carotid endarterectomy ; CAD s/p valve replace and CABG (2008)s/p Pacemaker insertion , carotid bruit, CKD stage 3, COPD, DM type 2,diabetic neuropathy, HLD, hypertension, pulmonary HTN, PVD with claudication, sleep apnea, thyroid disease, chronic ongoing wound infection of of the right foot s/p Rt TMA/, with recent admission 11/4/21-11/6/21 respiratory failure 2/2 PNA and wound infection positive for MRSA and Pseudomonas, Citrobacter- d/c to NH on Daptomycin and meropenem (competeled on 11/14/21) now presented to [facility] on 11/29/21 sent from ID office with wheezing/rhonchi, SOB, and AMS. In ED, intubated and on vent- went into CPR with ROSC,now admitted to MICU. Initially was DNR-CCA- code status changed to Full code in ED after speaking with family. Assessment:  CPA with ROSC( underline rhythm V-tach- downtime 6 min)   Acute hypoxemic/hypercapnic respiratory failure on Vent  Severe anoxic encephalopathy likely hypoxia in setting of CPA   Multiorgan Failure Septic shock 2/2 PNA, Wound infection; and bacteremia (hx chronic wound infection positive for MRSA and Pseudomonas, Citrobacter  MRSA sepsis- bacteremia   Urosepsis ( GNR)  Multifactorial bacterial PNA   Decompensated HFpEF/HFrEF with EF of 40-45%  Pulmonary Hypertension (Who group 2,3)  DKA  HAGMA  Acute on CKD stage 2  Acute transaminates likely from hypoperfusion   Elevated troponin likely post cardiac arrest vs CKD  Nonsustained V tach s/p ACLS and defibrillation (shock x2)   Leukocytosis   Concern for GI bleed  Acute on chronic anemia   Hx of Chronic right foot wound infection positive for MRSA and Pseudomonas, Citrobacter- d/c to NH on Daptomycin and meropenem (competeled on 11/14/21)   Hx of CAD s/p valve/CABG in 2008/ Dual Chamber Pacemaker  Hx of CVA and Carotid stenosis s/p Endarterectomy  Hx of Hypertension  Hx of Hypothyroidism  Hx of COPD  Hx of Type 2 DM; diabetic neuropathy   Hx of Chronic anemia  Hx of Depression  Hx of TREVOR (noncompliant with CPAP)  Hx of Bilateral TKAs and nonambulatory    Plan:  - CPR with ROSC( underline V-tach) in setting of multifactorial -septic shock vs pulmonary vs cardiac related.  (downtime total 6 minutes)   - Currently on Vent (day #2); wean FIO2 to keep SPO2 goal 88-92%  - Bronchodilator elva and PRN  - on No sedation, prn versed  - on levophed; multiple IVF fluids bolus; LA trending up- will get NICOM  - Titrate levophed to keep MAP >65mmHg  - Acute anoxic encephalopathy- neurology consulted, input appreciated- may need EEG and CT head of brain without contrast  - Initiated hypothermia protocol, d/t concern for GI bleed- its contraindicated therefore was stopped, currently rewarming, once achieve target rewarm temperature- will obtain ABG- may need apnea test if no improvement in mentation  - elevate troponin, Echo pending, ProBNO 16K; cardiology following, input appreciated, Pacemaker interrogation   - COVID 19 negative; will check urine antigen, blood culture (2/2 GOC in culsture); Sputum pending, Urine (Positive GNR); Wound abscess (pending) ; Procalcitonin 9.7; LA 9.0==trending upwards  - Empirically on Vanco and cefepime, ID consulted, input appreciated    - trend liver profile, will check ammonia level   - GI bleed, H/H q6H, transfuse if less than 7  - PPI BID, consult to general surgery, input appreciated  - hold all anticoagulant for now   - DKA- on insulin gtt- along with bicarb gtt- bicarb d/c now on D5q/2NS with 20 KCL; since glucose dropped below 250  - hgb A1C 5.5; beta-hydroxybutyrate 0.46  - cortisol 52.69; no IV steroids for now   - Hold all antihypertensive drugs  - Vascular, podiatry and wound consulted for right lower leg, input appreciated  - Wound vac to Right leg  - Labs/cxr in AM  -F/E/N none/replace lytes/ NPO   - DVT/GI scds/ no anticogulant/ PPI BID  - code status : full code was DNR-CCA and family changed when ER called  - Palliative consulted, input appreciated for goals of care         MADDI Ladd-CNP   Critical Care     Discussed case with Attending Physician: Dr. Lezama Dire     Addendum:  I personally seen and examined patient and provided care. Labs and imaging reviewed independently. Status post cardiac arrest in the ER. Most likely patient with anoxic encephalopathy. Patient cooling protocol was stopped because of GI bleeding. This morning he did not have any reflexes no gag no pupils no withdrawal to pain no cough.   Once he was warmed up to normothermic temperature of 36 he has a minimal gag and now his pupils are reactive. Patient has MRSA bacteremia. Septic shock secondary to that. Titrate pressors for maps above 65. We will place an NICOM for fluid responsiveness evaluation. Continue antibiotics per infectious disease patient received Vanco and cefepime. Continue insulin drip per DKA protocol. We will continue to monitor his LFTs. Continue to trend lactic acid. Prognosis remains guarded. I personally saw, examined and provided care for the patient. Radiographs, labs and medication list were reviewed by me independently. I spoke with bedside nursing, therapists and consultants. Critical care services and times documented are independent of procedures and multidisciplinary rounds with CNP. Additionally comprehensive, multidisciplinary rounds were conducted with the MICU team. The case was discussed in detail and plans for care were established. Review of CNP documentation was conducted and revisions were made as appropriate. I agree with the above documented exam, problem list and plan of care.   Cindy Vasquez MD   CCT excluding procedures:45'

## 2021-11-30 NOTE — CONSULTS
Nephrology Consult  [facility]  Federico Kellogg MD    CC:   arf    HPI:   The pt is an [de-identified] yo male with a pmh of HFpEF, pvd sp r ileofem bypass 9/2021, r TMA 10/21, peripheral neuropathy, mod TR, charley, copd who presented with worsening r foot wound infection. He had been on merrem and dapto as an outpt until nov 14. [facility] He had resp distress in the er and was intubaed. He had a vfib arrest during line insertion in the er and was shocked and given epi x 3, hco3 x 3, and 2 g ca. je was started on lidocaine and hco3 drip. Labs showed a cr of 3.1, na 140, co2 19, lactate 9, wbc 10.5, hgb 10.3, plt 394. Cr on 11/6 was 1.4. he has had arf from [address] [facility] in October 2021.      PMH:    Past Medical History:   Diagnosis Date    (HFpEF) heart failure with preserved ejection fraction ([address] 07/2020    Diagnosed by cardiology    Arthritis     Bilateral carotid artery stenosis 12/31/2020    Bilateral carotid bruits 12/31/2020    Blood circulation, collateral     CAD (coronary artery disease)     Carotid bruit 03/27/2013    CHF (congestive heart failure) [facility])     Chronic kidney disease     CKD (chronic kidney disease) stage 3, GFR 30-59 ml/min ([facility]) 03/20/2016    COPD (chronic obstructive pulmonary disease) ([address]     Follows with pulmonary    Diabetes mellitus ([address]     Diabetic neuropathy associated with type 2 diabetes mellitus ([facility])     Dyspnea on exertion     History of blood transfusion     Hyperlipidemia     Hypertension     Mild mitral regurgitation     Moderate tricuspid regurgitation by prior echocardiography 2018    Movement disorder     NSTEMI, initial episode of care ([address] 01/2018    Obesity     Pulmonary hypertension ([address] 2018    PVD (peripheral vascular disease) with claudication ([address] 03/27/2014    S/P carotid endarterectomy 03/27/2013    Sleep apnea     SOB (shortness of breath)     Thyroid disease     Unspecified cerebral artery occlusion with cerebral infarction     Vision decreased 12/31/2020       Patient Active Problem List   Diagnosis    Essential hypertension    Peripheral vascular disease ([address]    Pacemaker    H/O aortic valve replacement    CKD (chronic kidney disease) stage 3, GFR 30-59 ml/min    Type 2 diabetes mellitus with stage 3 chronic kidney disease, with long-term current use of insulin ([facility])    Pulmonary nodule    Diabetes mellitus type 2, uncontrolled ([address]    Hyperlipidemia LDL goal <100    Acquired hypothyroidism    CAD (coronary artery disease), native coronary artery    Status post coronary artery bypass graft    Chronic pain    History of CVA (cerebrovascular accident)    Obesity (BMI 30-39. 9)    Anemia    Elevated sed rate    Vision decreased    Bilateral carotid bruits    Bilateral carotid artery stenosis    Elevated troponin    Nonrheumatic mitral valve regurgitation    Pulmonary hypertension (HCC)    Moderate tricuspid regurgitation by prior echocardiography    (HFpEF) heart failure with preserved ejection fraction ([facility])    COPD (chronic obstructive pulmonary disease) (HCC)    Diabetic neuropathy associated with type 2 diabetes mellitus (HCC)    Congestive heart failure ([facility])    Cellulitis    Peripheral vascular disease of lower extremity with ulceration ([address]    Critical lower limb ischemia ([facility])    Nonrheumatic mitral valve stenosis    Severe left ventricular hypertrophy    Cardiomyopathy ([address]    Ischemic foot ulcer due to atherosclerosis of native artery of limb ([address]    Wound infection    AMS (altered mental status)    Acute delirium    Acute on chronic respiratory failure with hypoxia ([facility])    Mucus plugging of bronchi    Shock ([address]    Septic shock due to undetermined organism ([address]    Elevated brain natriuretic peptide (BNP) level    Electrolyte abnormality    Acute kidney injury superimposed on CKD ([facility])    Urinary tract infection with hematuria    Melena    Postoperative wound infection    Aspiration pneumonia ([facility]       Meds:     ipratropium-albuterol  1 ampule Inhalation Q4H WA    budesonide  0.5 mg Nebulization BID    lactated ringers bolus  1,000 mL IntraVENous Once    vancomycin (VANCOCIN) intermittent dosing (placeholder)   Other RX Placeholder    sodium chloride flush  5-40 mL IntraVENous 2 times per day    cefepime  2,000 mg IntraVENous Q8H    pantoprazole  40 mg IntraVENous BID    And    sodium chloride (PF)  10 mL IntraVENous BID    chlorhexidine  15 mL Mouth/Throat BID        dextrose      dextrose 5% and 0.45% NaCl with KCl 20 mEq 150 mL/hr at 11/30/21 1032    insulin 5.7 Units/hr (11/30/21 1000)    norepinephrine 8 mcg/min (11/30/21 1036)    sodium chloride         Meds prn:     ipratropium-albuterol, glucose, glucagon (rDNA), dextrose, dextrose, potassium chloride, magnesium sulfate, sodium phosphate IVPB **OR** sodium phosphate IVPB **OR** sodium phosphate IVPB, dextrose 5% and 0.45% NaCl with KCl 20 mEq, midazolam, sodium chloride flush, sodium chloride, acetaminophen **OR** acetaminophen, ondansetron **OR** ondansetron, perflutren lipid microspheres, fentanNYL    Meds prior to admission:     No current facility-administered medications on file prior to encounter. Current Outpatient Medications on File Prior to Encounter   Medication Sig Dispense Refill    Heparin Sodium, Porcine, (HEPARIN, PORCINE,) 57265 UNIT/ML injection Inject 0.5 mLs into the skin every 12 hours      gabapentin (NEURONTIN) 100 MG capsule Take 2 capsules by mouth 4 times daily for 30 days. 90 capsule 0    atorvastatin (LIPITOR) 10 MG tablet Take 1 tablet by mouth daily 30 tablet 3    acetylcysteine (MUCOMYST) 10 % nebulizer solution Inhale 4 mLs into the lungs every 6 hours      silver sulfADIAZINE (SILVADENE) 1 % cream Apply topically daily.       bumetanide (BUMEX) 1 MG tablet Take 1 tablet by mouth 2 times daily 30 tablet 3    polyethylene glycol (GLYCOLAX) 17 g packet Take 17 g by mouth daily as needed for Constipation 527 g 1    potassium chloride (KLOR-CON M) 20 MEQ extended release tablet Take 2 tablets by mouth daily 60 tablet 3    albuterol (PROVENTIL) (2.5 MG/3ML) 0.083% nebulizer solution Take 2.5 mg by nebulization every 6 hours as needed for Wheezing      sucralfate (CARAFATE) 1 GM tablet Take 1 g by mouth 4 times daily      HYDROcodone-acetaminophen (NORCO)  MG per tablet Take 1 tablet by mouth every 6 hours as needed for Pain.  morphine (THIERRY) 10 MG extended release capsule Take 10 mg by mouth 2 times daily.       pantoprazole (PROTONIX) 40 MG tablet Take 1 tablet by mouth 2 times daily (before meals) 30 tablet 3    mirtazapine (REMERON) 7.5 MG tablet Take 7.5 mg by mouth nightly      hydrALAZINE (APRESOLINE) 50 MG tablet Take 1 tablet by mouth every 8 hours 90 tablet 3    magnesium 200 MG TABS tablet Take 200 mg by mouth daily      diphenhydrAMINE (BENADRYL) 25 MG capsule Take 25 mg by mouth daily as needed for Itching      insulin lispro, 1 Unit Dial, (HUMALOG KWIKPEN) 100 UNIT/ML SOPN Inject 0-6 Units into the skin 3 times daily (before meals) PER SLIDING SCALE: 0-139=0U, 140-199=1U, 200-249=2U, 250-299=3U, 300-349=4U, 350-399=5U, 400-450=6U      hydrOXYzine (ATARAX) 25 MG tablet Take 25 mg by mouth 4 times daily       lactulose 20 GM/30ML SOLN Take 20 g by mouth three times a week *MON-WED-FRI*      metOLazone (ZAROXOLYN) 2.5 MG tablet Take 2.5 mg by mouth three times a week *TUE-THUR-SAT*      ferrous sulfate (IRON 325) 325 (65 Fe) MG tablet Take 1 tablet by mouth 2 times daily (with meals) 30 tablet 3    DULoxetine (CYMBALTA) 60 MG extended release capsule Take 1 capsule by mouth daily 30 capsule 3    fluticasone-umeclidin-vilant (TRELEGY ELLIPTA) 100-62.5-25 MCG/INH AEPB Inhale 1 puff into the lungs Daily       aspirin EC 81 MG EC tablet Take 1 tablet by mouth daily 90 tablet 1    levothyroxine (SYNTHROID) 25 MCG tablet Take 25 mcg by mouth Daily   1    metoprolol nontender    Data:    Recent Labs     11/29/21  1412 11/30/21  0201   WBC 18.5*  --    HGB 10.3* 8.7*   HCT 33.1* 28.4*   MCV 94.3  --      --        Recent Labs     11/29/21 2014 11/29/21 2015 11/29/21  2301 11/30/21  0201 11/30/21  0610   NA   < >  --  145 137 140   K  --   --  4.8 4.3 3.7   CL   < >  --  101 96* 98   CO2   < >  --  15* 15* 19*   CREATININE   < >  --  3.2* 3.1* 3.1*   BUN   < >  --  119* 119* 120*   LABGLOM   < >  --  19 19 19   GLUCOSE   < >  --  217* 438* 422*   CALCIUM   < >  --  10.6* 10.3* 9.8   PHOS  --   --  6.4* 5.5* 3.5   MG  --  2.1  --  1.7  --     < > = values in this interval not displayed. Vit D, 25-Hydroxy   Date Value Ref Range Status   09/25/2021 27 (L) 30 - 100 ng/mL Final     Comment:     <20 ng/mL. ........... Bernadette Raddle Deficient  20-30 ng/mL. ......... Bernadette Raddle Insufficient   ng/mL. ........ Bernadette Raddle Sufficient  >100 ng/mL. .......... Bernad[address] Toxic         PTH   Date Value Ref Range Status   09/25/2021 30 15 - 65 pg/mL Final       Recent Labs     11/29/21  1412 11/29/21 2014   ALT 9 136*   AST 24 555*   ALKPHOS 140* 113   BILITOT 0.5 0.5   BILIDIR 0.3  --        Recent Labs     11/29/21  1412 11/29/21 2014   LABALBU 2.5* 1.8*       Ferritin   Date Value Ref Range Status   09/25/2021 534 ng/mL Final     Comment:     FERRITIN Reference Ranges:  Adult Males   20 - 60 years:    27 - 400 ng/mL  Adult females 16 - 61 years:    15 - 150 ng/mL  Adults greater than 60 years:   no established reference range  Pediatrics:                     no established reference range       Iron   Date Value Ref Range Status   09/25/2021 34 (L) 59 - 158 mcg/dL Final     TIBC   Date Value Ref Range Status   09/25/2021 157 (L) 250 - 450 mcg/dL Final       Vitamin B-12   Date Value Ref Range Status   10/20/2021 >2000 (H) 211 - 946 pg/mL Final       Folate   Date Value Ref Range Status   10/20/2021 16.1 4.8 - 24.2 ng/mL Final         Lab Results   Component Value Date    COLORU Yellow 11/29/2021    NITRU Negative 11/29/2021    GLUCOSEU Negative 11/29/2021    GLUCOSEU NEGATIVE 07/12/2011    KETUA TRACE 11/29/2021    UROBILINOGEN 0.2 11/29/2021    BILIRUBINUR SMALL 11/29/2021    BILIRUBINUR NEGATIVE 07/12/2011       No results found for: MIRYAM, CREURRAN, MACREATRATIO, OSMOU    No components found for: URIC    No results found for: LIPIDPAN      Assessment and Plan:    1.arf on ckd 3 a  Baseline  In setting of septic shock  Continue hemodynamic support  Follow io  No hyro on ct  Urine lytes    2. Septic shock  Likely foot source with mrsa in past  Urine growing gnr  bc pos for gpc  Asp pna as well  vasc, podiatry, and id  On cefepime and vanco  arf from vanco in October  On levo    3. Sp vf arrest  During line insertion  On lidocaine    4. HFpEF/Mod TR  Monitor vol status    5. Anemia   Start inga  check fe b12 fol  Heme pos as well    Theta Shaq.  Amy Mclaughlin MD

## 2021-11-30 NOTE — PROGRESS NOTES
0145- 500ml bloody output from pt's OG. NP Tiera notified.  Cooling therapy stopped, rewarming phase started

## 2021-11-30 NOTE — CONSULTS
identified  Bereavement and grief: to be determined  Referrals to: none today   SUBJECTIVE:       Details of Conversation: Chart reviewed and patient seen. Patient resting in bed, intubated, unresponsive, no family present at bedside. Spoke with patient's brother/POA, Ricardo Winn. Role of palliative medicine explained. Ricardo Winn identifies himself as healthcare power of . Discussion regarding patient's critical condition, goals of care, and CODE STATUS. Ricardo Winn explains that even though he is healthcare power of , he still likes to involve patient's son in decision making. Plan is for Ricardo Winn to speak to patient's son tonight to further discuss goals of care and CODE STATUS and for palliative medicine team to call Ricardo Winn tomorrow morning at 10 AM.  At this time, continue full code and current management. Support offered. Will follow. OBJECTIVE:   Prognosis: Poor and Guarded    Physical Exam:  BP (!) 129/51   Pulse 87   Temp 93.9 °F (34.4 °C) (Bladder)   Resp 18   Wt 205 lb (93 kg)   SpO2 97%   BMI 31.17 kg/m²   Constitutional:  Elderly, unresponsive   Eyes: no scleral icterus, normal lids, no discharge  Neck:  trachea midline, no JVD  Lungs:  Intubated  Heart: V paced  Abd:  Rounded, bowel sounds hypoactive  Ext:  + edema, pulses present  Skin:  Cool and dry, mottled   Neuro:  Unresponsive    Objective data reviewed: labs, images, records, medication use, vitals and chart    Discussed patient and the plan of care with the other IDT members: Palliative Medicine IDT Team    Time/Communication  Greater than 50% of time spent, total 50 minutes in counseling and coordination of care at the bedside regarding goals of care, diagnosis and prognosis and see above. Thank you for allowing Palliative Medicine to participate in the care of Will Barcenas.

## 2021-11-30 NOTE — CONSULTS
GENERAL SURGERY  CONSULT NOTE  11/30/2021    Physician Consulted: Dr. Salina Iverson  Reason for Consult: GIB  Referring Physician: Dr. Francis Late    HPI  Mao Sommers is a [de-identified] y.o. male with hx of HFpEF, vasculopathy, CKD, and NSTEMI initially presented 11/29 with SOB, and large tarry maroon stool. He was admitted to ICU for septic shock 2/2 PNA. In ICU OG tube had dark reddish output. We are consulted for GIB. Per chart review there is no history of colonoscopy or EGD. No anticoagulation. Unable to provide further history. Appears he was seen for GIB in the past in 2014 and 2021 and was meant to followup for outpatient scopes, which appears were never done. Afebrile, tachycardic to 113, on pressors (levo)  Soft, non-distended. OG cannister with dark reddish output.  No masses on rectal, stool dark FOBT +   Hb 7.9 from 8.7 and 10.3 yesterday       Past Medical History:   Diagnosis Date    (HFpEF) heart failure with preserved ejection fraction ([address] 07/2020    Diagnosed by cardiology    Arthritis     Bilateral carotid artery stenosis 12/31/2020    Bilateral carotid bruits 12/31/2020    Blood circulation, collateral     CAD (coronary artery disease)     Carotid bruit 03/27/2013    CHF (congestive heart failure) [facility])     Chronic kidney disease     CKD (chronic kidney disease) stage 3, GFR 30-59 ml/min ([facility]) 03/20/2016    COPD (chronic obstructive pulmonary disease) ([address]     Follows with pulmonary    Diabetes mellitus ([address]     Diabetic neuropathy associated with type 2 diabetes mellitus (HCC)     Dyspnea on exertion     History of blood transfusion     Hyperlipidemia     Hypertension     Mild mitral regurgitation     Moderate tricuspid regurgitation by prior echocardiography 2018    Movement disorder     NSTEMI, initial episode of care ([address] 01/2018    Obesity     Pulmonary hypertension ([address] 2018    PVD (peripheral vascular disease) with claudication ([address] 03/27/2014    S/P carotid endarterectomy MADDI Howell CNP   acetylcysteine (MUCOMYST) 10 % nebulizer solution Inhale 4 mLs into the lungs every 6 hours 11/6/21   MADDI Aldana CNP   silver sulfADIAZINE (SILVADENE) 1 % cream Apply topically daily. 11/7/21   MADDI Aldana CNP   bumetanide (BUMEX) 1 MG tablet Take 1 tablet by mouth 2 times daily 11/6/21   MADDI Aldana CNP   polyethylene glycol (GLYCOLAX) 17 g packet Take 17 g by mouth daily as needed for Constipation 11/6/21 12/6/21  MADDI Aldana CNP   potassium chloride (KLOR-CON M) 20 MEQ extended release tablet Take 2 tablets by mouth daily 11/7/21   MADDI Aldana CNP   albuterol (PROVENTIL) (2.5 MG/3ML) 0.083% nebulizer solution Take 2.5 mg by nebulization every 6 hours as needed for Wheezing    Historical Provider, MD   sucralfate (CARAFATE) 1 GM tablet Take 1 g by mouth 4 times daily    Historical Provider, MD   HYDROcodone-acetaminophen (NORCO)  MG per tablet Take 1 tablet by mouth every 6 hours as needed for Pain. Historical Provider, MD   morphine (THIERRY) 10 MG extended release capsule Take 10 mg by mouth 2 times daily.     Historical Provider, MD   pantoprazole (PROTONIX) 40 MG tablet Take 1 tablet by mouth 2 times daily (before meals) 10/26/21   Danielle Oden MD   mirtazapine (REMERON) 7.5 MG tablet Take 7.5 mg by mouth nightly    Historical Provider, MD   hydrALAZINE (APRESOLINE) 50 MG tablet Take 1 tablet by mouth every 8 hours 10/3/21   Danielle Oden MD   magnesium 200 MG TABS tablet Take 200 mg by mouth daily    Historical Provider, MD   diphenhydrAMINE (BENADRYL) 25 MG capsule Take 25 mg by mouth daily as needed for Itching    Historical Provider, MD   insulin lispro, 1 Unit Dial, (HUMALOG KWIKPEN) 100 UNIT/ML SOPN Inject 0-6 Units into the skin 3 times daily (before meals) PER SLIDING SCALE: 0-139=0U, 140-199=1U, 200-249=2U, 250-299=3U, 300-349=4U, 350-399=5U, 400-450=6U    Historical Provider, MD   hydrOXYzine (ATARAX) 25 MG tablet Take 25 mg by mouth 4 times daily     Historical Provider, MD   lactulose 20 GM/30ML SOLN Take 20 g by mouth three times a week *MON-WED-FRI*    Historical Provider, MD   metOLazone (ZAROXOLYN) 2.5 MG tablet Take 2.5 mg by mouth three times a week *TUE-THUR-SAT*    Historical Provider, MD   ferrous sulfate (IRON 325) 325 (65 Fe) MG tablet Take 1 tablet by mouth 2 times daily (with meals) 21   Rudy Martino DO   DULoxetine (CYMBALTA) 60 MG extended release capsule Take 1 capsule by mouth daily 21   Rudy Martino DO   fluticasone-umeclidin-vilant (TRELEGY ELLIPTA) 100-62.5-25 MCG/INH AEPB Inhale 1 puff into the lungs Daily     Historical Provider, MD   aspirin EC 81 MG EC tablet Take 1 tablet by mouth daily 20   Braden Dotson MD   levothyroxine (SYNTHROID) 25 MCG tablet Take 25 mcg by mouth Daily  19   Historical Provider, MD   metoprolol succinate (TOPROL XL) 50 MG extended release tablet Take 50 mg by mouth daily    Historical Provider, MD   isosorbide mononitrate (IMDUR) 120 MG extended release tablet Take 1 tablet by mouth daily 18   Blas Carrero MD   allopurinol (ZYLOPRIM) 300 MG tablet Take 300 mg by mouth daily  2/27/15   Historical Provider, MD       No Known Allergies    Family History   Problem Relation Age of Onset    Heart Disease Mother     Heart Failure Mother     Heart Surgery Father     Heart Disease Father     Heart Failure Father     High Blood Pressure Sister     Cancer Sister        Social History     Tobacco Use    Smoking status: Former Smoker     Packs/day: 2.00     Years: 25.00     Pack years: 50.00     Types: Cigarettes     Start date: 1953     Quit date: 1981     Years since quittin.4    Smokeless tobacco: Never Used   Vaping Use    Vaping Use: Never used    Passive vaping exposure: Yes   Substance Use Topics    Alcohol use: Never     Comment:      Drug use: Not Currently     Comment: no longer eating edibles.  states he had a bad episode. Review of Systems   Unable to assess      PHYSICAL EXAM:    Vitals:    11/30/21 0600   BP:    Pulse: 86   Resp: 18   Temp: 93.6 °F (34.2 °C)   SpO2: 96%       General Appearance:  Ventilated and minimally interactive   Skin:  Skin color, texture, turgor normal. No rashes or lesions. Head/face:  NCAT  Eyes:  No gross abnormalities. Lungs:  No increased work of breathing, on ventilator. ACVC 450/18/50/8  Heart:  Heart regular rate and rhythm. Ventricular paced. Abdomen:  Soft, non distended, no masses  Extremities: pulses present in all extremities  Male Rectal:  No masses on rectal, stool dark FOBT +     LABS:    CBC  Recent Labs     11/29/21  1412 11/29/21 1412 11/30/21  0201   WBC 18.5*  --   --    HGB 10.3*   < > 8.7*   HCT 33.1*   < > 28.4*     --   --     < > = values in this interval not displayed. BMP  Recent Labs     11/30/21  0201      K 4.3   CL 96*   CO2 15*   *   CREATININE 3.1*   CALCIUM 10.3*     Liver Function  Recent Labs     11/29/21  1412 11/29/21 1412 11/29/21 2014   BILITOT 0.5   < > 0.5   BILIDIR 0.3  --   --    AST 24   < > 555*   ALT 9   < > 136*   ALKPHOS 140*   < > 113   PROT 8.3   < > 6.0*   LABALBU 2.5*   < > 1.8*    < > = values in this interval not displayed. No results for input(s): LACTATE in the last 72 hours. Recent Labs     11/29/21  2301   INR 2.1       RADIOLOGY    CT ABDOMEN PELVIS WO CONTRAST Additional Contrast? None    Result Date: 11/29/2021  EXAMINATION: CT OF THE ABDOMEN AND PELVIS WITHOUT CONTRAST 11/29/2021 3:37 pm TECHNIQUE: CT of the abdomen and pelvis was performed without the administration of intravenous contrast. Multiplanar reformatted images are provided for review. Dose modulation, iterative reconstruction, and/or weight based adjustment of the mA/kV was utilized to reduce the radiation dose to as low as reasonably achievable. COMPARISON: None.  HISTORY: ORDERING SYSTEM PROVIDED HISTORY: shock TECHNOLOGIST PROVIDED HISTORY: Reason for exam:->shock Additional Contrast?->None Decision Support Exception - unselect if not a suspected or confirmed emergency medical condition->Emergency Medical Condition (MA) What reading provider will be dictating this exam?->CRC FINDINGS: The visualized chest demonstrates \"crazy paving\" in the right lung, multiple small nodular consolidations in the right lung, bronchial wall thickening, and dependent consolidation in the left greater than right lung bases with parenchymal calcifications. Partially visualized pacemaker wires. Partially visualized changes of CABG. Dense mitral annular calcifications. Apparent surgical changes of the aortic valve. Dense appearance of the gallbladder lumen. No biliary dilatation. Unremarkable noncontrast appearance of the spleen, pancreas, and adrenal glands. No hydronephrosis. Punctate parenchymal calcifications versus nonobstructing stones in the bilateral kidneys. No hydronephrosis. Bilateral perinephric reticulation. The bladder is decompressed by Rodriguez catheter. Air within the bladder is likely due to catheterization. The infrarenal abdominal aorta measures 2.9 cm x 2.6 cm. It contains calcification of a presumed intimal flap. No free air or significant free fluid. Sigmoid diverticulosis without evidence of diverticulitis. Nondilated appendix. No evidence of bowel obstruction. Enteric tube terminates at the GE junction, and should be advanced by the roughly 6 cm. Small fat containing umbilical hernia. Small fat containing inguinal hernias. Prior intervention at the right groin, possibly a vascular procedure. Degenerative changes of the spine. Degenerative changes of the hips. The visualized lungs demonstrate aspiration versus pneumonia. Aspiration is favored. No acute inflammatory abnormality is evident in the abdomen or pelvis. The infrarenal aorta measures 2.9 cm. See recommendation below.  Sigmoid diverticulosis without evidence of diverticulitis. RECOMMENDATIONS: 2.9 cm infrarenal abdominal aortic aneurysm suspected. Recommend follow-up every 5 years. Reference: J Am Zeinab Radiol 9456;[id number]. XR FOOT RIGHT (2 VIEWS)    Result Date: 11/29/2021  EXAMINATION: TWO XRAY VIEWS OF THE RIGHT FOOT 11/29/2021 3:43 pm COMPARISON: 10/15/2021 HISTORY: ORDERING SYSTEM PROVIDED HISTORY: infection, exposed bone TECHNOLOGIST PROVIDED HISTORY: Reason for exam:->infection, exposed bone What reading provider will be dictating this exam?->CRC FINDINGS: Postsurgical changes again noted from prior transmetatarsal amputation of the right foot. In the interval, the distal soft tissues have been removed and the distal portions of the residual metatarsals are at or very close to the skin surface. There is decreased soft tissue swelling compared to the prior study. No obvious osseous destruction is seen to suggest osteomyelitis. There are prominent vascular calcifications. No radiopaque foreign body seen. Degenerative changes again seen about the ankle. 1. Postsurgical changes including prior transmetatarsal amputation of the right foot. 2. No radiographic evidence of osteomyelitis. Improvement of soft tissue swelling. XR CHEST PORTABLE    Result Date: 11/29/2021  EXAMINATION: ONE XRAY VIEW OF THE CHEST 11/29/2021 5:26 pm COMPARISON: 30 2 p.m. HISTORY: ORDERING SYSTEM PROVIDED HISTORY: Post Tube TECHNOLOGIST PROVIDED HISTORY: Reason for exam:->Post Tube What reading provider will be dictating this exam?->CRC FINDINGS: Right lower lobe opacity. There is no effusion or pneumothorax. The cardiomediastinal silhouette is without acute process. The osseous structures are without acute process. ET tube tip within 1.0 cm of the nato. ET tube tip within 1.0 cm of the nato.      XR CHEST PORTABLE    Result Date: 11/29/2021  EXAMINATION: ONE XRAY VIEW OF THE CHEST 11/29/2021 3:43 pm COMPARISON: November 5, 2021 HISTORY: [address]

## 2021-11-30 NOTE — CONSULTS
Inpatient Cardiology Consultation      Reason for Consult: Cardiac arrest    Consulting Physician: Dr. Carter Pedersen    Requesting Physician: LAURY Garcia    Date of Consultation: 11/30/2021    HISTORY OF PRESENT ILLNESS:     The following information is taken from a review of electronic medical records. The patient is nonverbal and intubated. This 81-year-old male is known to [facility] cardiology and is followed by Cynthia Medellin. He has a history of coronary artery disease and had a CABG and a bioprosthetic aortic valve replacement, ischemic/next cardiomyopathy and paroxysmal atrial fibrillation ( no OAC due to recent GI bleed) with a dual chamber pacemaker. He is also followed by EP. He was last evaluated as an outpatient in our office by Jacob Childs, advanced practice provider on September 2, 2021 following a heart failure hospitalization. He was evaluated by Dr. Kindra Carson during hospital admission in September 2021. He underwent a stress test prior to a right foot transmetatarsal amputation. He was then evaluated as an outpatient by LAURY Broderick and he was compensated at his office visit. And a 2D echocardiogram was to be done. His pacemaker was interrogated on November 18, 2001 and was functioning well.he has not followed up with EP since 5/2020. At one point he was to be considered for a biventricular pacemaker but as noted, he does not follow-up with EP    He presented to the emergency room with dyspnea. He had been living in a facility. He had an ongoing right foot infection and mental status was altered. He was receiving antibiotics at the facility. Blood pressure on admission was 106/68 and he was tachypneic but afebrile with an O2 saturation of 96% on nasal cannula. However O2 saturation did then dropped to 82%.     EKG on admission showed atrial fibrillation with rapid ventricular response, ST depression with subendocardial injury in the left posterior fascicular block.    Potassium Was 5.5 with a BUN of 128 and a creatinine of 3.4, lactic acid was later 12.4 as compared to 9.9 on admission, proBNP  13,000 634 and is now 16,000 109, troponin [phone], cortisol 52.69, WBCs 18.5 and H&H 10.3 and 33.1. H&H has now dropped to 8 7 and 28.4, INR this morning is 2.1 and was 1.6 on admission, negative for COVID-19, large amount of blood in the urine, ABGs PO2 62 PCO2 31 pH 7.38 on nasal cannula. After intubation PO2 87 PCO2 38 and pH 7.3. Chest x-ray showed low lung volumes but opacities notable on the left could indicate bibasilar pneumonia but were similar in comparison to the x-ray from November 5, 2021    He had tarry stools in the emergency room and was occult positive. He was intubated for respiratory distress. He developed ventricular tachycardia (no documentation) and lost pulses. He received CPR plus ACLS protocol initiated and he was defibrillated. After about 6 minutes, ROSC was obtained. He was started on lidocaine drip , sodium bicarbonate drip and Levophed. Sedation was not needed as he was unresponsive. He was in HARVEY with a serum creatinine of 3.4 and a baseline of 1.4-1.9 and had electrolyte abnormalities. He was evaluated by vascular surgery for the right foot infection and a nonhealing wound on the right TMA stump. He may be a candidate for a more proximal amputation but no acute indication for vascular intervention due to sepsis. He underwent warming protocol but then it was stopped due to a GI bleed. A 2D echocardiogram is pending. PAST MEDICAL HISTORY:  Obesity. No known drug allergies. Former smoker. 40 pack year smoking history. Quit in 1981. T2DM insulin requiring with neuropathy, nephropathy   Hypertension. Hyperlipidemia. COPD, chronic oxygen 4 L  2003 syncope with R sdied weakness--> resolved  2004 R CEA  11/2008 CABG x 1. SVG to RCA and AVR with bioprosthetic [facility] [facility][id number] in 2008.   [facility]  Cardiac catheterization, 10/2010, mild diffuse disease in the left system, CT of the RCA. Patent SVG to RCA. EF 60%. Lexiscan MPS 09/05/2014, fixed lateral wall perfusion defect consistent with scarring. No reversible defects. EF 45%. Echocardiogram, 06/2016, EF 31-54%, stage 1 diastolic dysfunction, moderate left atrial enlargement, abnormal aortic valve function with mean gradient of 30 and a V max of 4.3 m/sec, moderate mitral stenosis (mean gradient 7.6 mmHg, RVSP 35). SCOOTER, 6/2016, bioprosthetic aortic valve, peak velocity 2.7 with a mean gradient of 16, mean transmitral gradient of 3.5 mmHg, aortic valve gradient thought secondary to hyperdynamic ventricle. SCOOTER, [facility], 3/2017, EF 60-65%, mild mitral stenosis, 1-2+ MR. Normal bioprosthetic AVR with preserved leaflet motion with mean transgastric gradient of 28 mmHg. Overall findings support normal AVR with no prosthetic aortic stenosis. Echocardiogram, 08/2017, EF 60%. Stage 1 diastolic dysfunction, severely dilated left atrium, S/P AVR (peak velocity 2.8, mean gradient 13.7). Sinus node dysfunction, S/P dual chamber pacemaker [facility] [employer]), 11/2014. Carotid stenosis, 60% HILARIO and 70% left ICA stenosis. Obstructive sleep apnea, noncompliant with CPAP therapy. 2/2017 diabetic foot ulcer  Gout  Bilteral TKA's  Spinal stenosis s/p injections  Hospitalized, 10/2017, with acute on chronic diastolic heart failure. Pro BNP 2840. Admitted, 1/19/2018, with acute on chronic diastolic heart failure. H&H 11.3/33.2, creatinine 1.3, troponin peaked at .14, pro BNP 1657. Diuresed and discharged. Lexiscan MPS, 1/21/2018, fixed lateral defect with no ischemia. EF 36%. Echocardiogram, 1/21/2018, Dr. Ned Ahn, EF 60%. Hypokinesis of the basal inferior wall, reduced RV function, moderate to severe left atrial enlargement, mild MR, RVSP 43 mmHg, bioprosthetic AVR with a mean gradient of 12 mmHg. CKD, stage 3.   Baseline creatinine between 1.3 and 1. 5./Hyperkalemia  Admission 05/12/2018 with chest pain and shortness of breath. EKG showed no acute changes. Troponin elevation not consistent with NSTEMI (0.05, 0.06, 0.08). Thought secondary to COPD exacerbation. No cardiac workup performed. Admission 11/1/2019-11/5/2019 for fall from wheelchair. Dehydrated with HARVEY. Demadex stopped  5/2020 PAFL noted on PPM interrogation placed on Xarelto 20 mg QD  TTE (7/31/2020) Summary: Severe left ventricle hypertrophy. Visually estimated LVEF is 45-50 %. Paradoxical septal wall motion. Severely dilated left atrium. Right ventricle not well visualized. Grossly normal. Severe mitral annular calcification. Mild mitral stenosis. Mild mitral valve regurgitation. Bioprosthetic aortic valve leaflets not well seen. No evidence of obstruction with similar transvalvular pressure gradient to the prior echo in 2018. No aortic regurgitation. Compared to prior echo in 1/21/2018, the paradoxical septal wall motion abnormality likely due to interventricular conduction delay is new. Gradient across the aortic valve mean 13.6 mmHg  Per Cardiology note (8/17/2020): Since 2018, his need for ventricular pacing has increased slowly from 25% to 95% on the last interrogation. Also, his echocardiogram showed worsening ejection fraction from 60% to 45% with dyssynchrony between the lateral wall and the septal wall on the echocardiogram.  According to the brother, patient has been worsening over the last 2 years with decreasing energy. We will refer to electrophysiology for possible upgrade to biventricular pacer. He has not been evaluated by EP since that time.   CHF admission August 19, 2021  Right femoral endarterectomy September 30, 2021  Hospital admission September 27 to October 3, 2021, sepsis and right foot transmetatarsal amputation, pneumonia  Anemia, required transfusion during the admission below October  15  September 2021, stress test for cardiac risk stratification prior to right foot transmetatarsal amputation    : This is an abnormal myocardial perfusion study due to the presence   of mild global hypokinesis. There is no evidence of perfusion deficits   to suggest ischemia or infarction, however. 2  The left ventricle is normal in size with an ejection fraction of   49%. 3: Prognostically, this is a low to intermediate risk study. 4: Compared to the prior study from 1/20/2018, The LVEF appears to   have improved slightly. Hospital admission October 15  October 26, 2021 for wound infection, incision and drainage right foot amputation left second toe  Hospital admission chronic hypoxic respiratory failure and pneumonia and mucous plugging with a bronchoscopy (thick mucoid impaction) on November 4, 2021 and discharged on 4 L nasal cannula  History of elevated troponin, November 5, 2021 it was 172  Noncompliant with follow-up visits with the EP      Medications Prior to admit:  Prior to Admission medications    Medication Sig Start Date End Date Taking? Authorizing Provider   Heparin Sodium, Porcine, (HEPARIN, PORCINE,) 36287 UNIT/ML injection Inject 0.5 mLs into the skin every 12 hours 11/6/21   Saint Schmid, APRN - CNP   gabapentin (NEURONTIN) 100 MG capsule Take 2 capsules by mouth 4 times daily for 30 days. 11/6/21 12/6/21  Saint Schmid, APRN - CNP   atorvastatin (LIPITOR) 10 MG tablet Take 1 tablet by mouth daily 11/7/21   Saint Schmid, APRN - CNP   acetylcysteine (MUCOMYST) 10 % nebulizer solution Inhale 4 mLs into the lungs every 6 hours 11/6/21   Saint Schmid, APRN - CNP   silver sulfADIAZINE (SILVADENE) 1 % cream Apply topically daily.  11/7/21   Saint Schmid, APRN - CNP   bumetanide (BUMEX) 1 MG tablet Take 1 tablet by mouth 2 times daily 11/6/21   Saint Schmid, APRN - CNP   polyethylene glycol (GLYCOLAX) 17 g packet Take 17 g by mouth daily as needed for Constipation 11/6/21 12/6/21  Saint Schmid, APRN - CNP   potassium chloride DO [place]   fluticasone-umeclidin-vilant (TRELEGY ELLIPTA) 100-62.5-25 MCG/INH AEPB Inhale 1 puff into the lungs Daily     Historical Provider, MD   aspirin EC 81 MG EC tablet Take 1 tablet by mouth daily 8/7/20   Andrew Soto MD   levothyroxine (SYNTHROID) 25 MCG tablet Take 25 mcg by mouth Daily  7/29/19   Historical Provider, MD   metoprolol succinate (TOPROL XL) 50 MG extended release tablet Take 50 mg by mouth daily    Historical Provider, MD   isosorbide mononitrate (IMDUR) 120 MG extended release tablet Take 1 tablet by mouth daily 1/23/18   Meghan Kumar MD   allopurinol (ZYLOPRIM) 300 MG tablet Take 300 mg by mouth daily  2/27/15   Historical Provider, MD       Current Medications:    Current Facility-Administered Medications: ipratropium-albuterol (DUONEB) nebulizer solution 1 ampule, 1 ampule, Inhalation, Q4H PRN  glucose (GLUTOSE) 40 % oral gel 15 g, 15 g, Oral, PRN  glucagon (rDNA) injection 1 mg, 1 mg, IntraMUSCular, PRN  dextrose 5 % solution, 100 mL/hr, IntraVENous, PRN  dextrose 50 % IV solution, 12.5 g, IntraVENous, PRN  potassium chloride 10 mEq/100 mL IVPB (Peripheral Line), 10 mEq, IntraVENous, PRN  magnesium sulfate 1000 mg in dextrose 5% 100 mL IVPB, 1,000 mg, IntraVENous, PRN  sodium phosphate 10 mmol in dextrose 5 % 250 mL IVPB, 10 mmol, IntraVENous, PRN **OR** sodium phosphate 15 mmol in dextrose 5 % 250 mL IVPB, 15 mmol, IntraVENous, PRN **OR** sodium phosphate 20 mmol in dextrose 5 % 500 mL IVPB, 20 mmol, IntraVENous, PRN  0.45 % sodium chloride infusion, , IntraVENous, Continuous  dextrose 5 % and 0.45 % NaCl with KCl 20 mEq infusion, , IntraVENous, Continuous PRN  insulin regular (HUMULIN R;NOVOLIN R) 100 Units in sodium chloride 0.9 % 100 mL infusion, 0.1 Units/kg/hr, IntraVENous, Continuous  midazolam PF (VERSED) injection 2 mg, 2 mg, IntraVENous, Q1H PRN  fentaNYL (SUBLIMAZE) injection 100 mcg, 100 mcg, IntraVENous, Q1H PRN  fentaNYL 5 mcg/ml in 0.9%  ml infusion, 12.5-200 mcg/hr, IntraVENous, Continuous  norepinephrine (LEVOPHED) 16 mg in dextrose 5% 250 mL infusion, 2-100 mcg/min, IntraVENous, Continuous  lidocaine (CARDIAC INFUSION) 2 g in dextrose 5% 500 mL infusion, 1 mg/min, IntraVENous, Continuous  lidocaine (CARDIAC INFUSION) 2 g in dextrose 5% 500 mL infusion, 1 mg/min, IntraVENous, Continuous  sodium chloride flush 0.9 % injection 5-40 mL, 5-40 mL, IntraVENous, 2 times per day  sodium chloride flush 0.9 % injection 5-40 mL, 5-40 mL, IntraVENous, PRN  0.9 % sodium chloride infusion, 25 mL, IntraVENous, PRN  acetaminophen (TYLENOL) tablet 650 mg, 650 mg, Oral, Q6H PRN **OR** acetaminophen (TYLENOL) suppository 650 mg, 650 mg, Rectal, Q6H PRN  cefepime (MAXIPIME) 2000 mg IVPB minibag, 2,000 mg, IntraVENous, Q8H  pantoprazole (PROTONIX) injection 40 mg, 40 mg, IntraVENous, BID **AND** sodium chloride (PF) 0.9 % injection 10 mL, 10 mL, IntraVENous, BID  ondansetron (ZOFRAN-ODT) disintegrating tablet 4 mg, 4 mg, Oral, Q8H PRN **OR** ondansetron (ZOFRAN) injection 4 mg, 4 mg, IntraVENous, Q6H PRN  chlorhexidine (PERIDEX) 0.12 % solution 15 mL, 15 mL, Mouth/Throat, BID  perflutren lipid microspheres (DEFINITY) injection 1.65 mg, 1.5 mL, IntraVENous, ONCE PRN  fentaNYL (SUBLIMAZE) injection 25 mcg, 25 mcg, IntraVENous, Q1H PRN  sodium bicarbonate 150 mEq in dextrose 5 % 1,000 mL infusion, , IntraVENous, Continuous    Allergies:  Patient has no known allergies.     Social History:  and former smoker with a 50-pack-year history and quit in 1981, no alcohol or illicit drugs      Family History:   Family History   Problem Relation Age of Onset    Heart Disease Mother     Heart Failure Mother     Heart Surgery Father     Heart Disease Father     Heart Failure Father     High Blood Pressure Sister     Cancer Sister        REVIEW OF SYSTEMS:   Unable    PHYSICAL EXAM:   BP (!) 129/51   Pulse 86   Temp 93.6 °F (34.2 °C)   Resp 18   Wt 205 lb (93 kg)   SpO2 96%   BMI 31.17 kg/m² functioning well by last interrogation, in November 18, 2021, and noncompliance with follow-ups with EP  Since 2018, his need for ventricular pacing has increased from 25% to 95% on the last interrogation. Also, his echocardiogram showed worsening ejection fraction from 60% to 45% with dyssynchrony between the lateral wall and the septal wall on the TTE echocardiogram. He was referred to EP however cancelled appointment   CKD, baseline 1.4-1.7, now with HARVEY  T2DM with neuropathy and chronic lower extremity wounds   Chronic anemia  now with acute GI bleed  COPD  Hypothyroidism  Hx of CVA and carotid stenosis and right carotid endarterectomy  Spinal stenosis on chronic narcotics, wheelchair bound   Morbid obesity   Depression  Obstructive sleep apnea history of noncompliance with CPAP  Bilateral TKAs and nonambulatory    Plans  2D echocardiogram to guide medication therapy  Interrogate pacemaker  If ejection fraction is low, consider dobutamine and amiodarone Depending on results of pacer interrogation  Keep magnesium greater than 2 and potassium greater than 4 but defer to nephrology for electrolyte replacement due to HARVEY     Electronically signed by MADDI Leach CNP on 11/30/2021 at 7:17 AM       Patient seen and examined and case discussed in detail with cardiology nurse practitioner and agree with assessment/plan and history/physical examination discussed and detailed and documented above.

## 2021-11-30 NOTE — CONSULTS
[address] [facility]  Department of Critical Care Medicine   MICU Consult Note    Patient:  Bharti Berman /Age/Sex: 1940  ([de-identified] y.o. male)   MRN & CSN:  [id number] & [id number] Admission Date/Time: 2021  1:49 PM   Allergy: Patient has no known allergies. Date of Service: 2021       Hospital Day: 1    Supervising/Attending Physician: Dr. Jackie Sin of Present Illness:story of Present Illness:istory of Present Illness:bjective:  Chief Complaint: Wound Infection and Shortness of Breath    History of Present Illness:   Principal Problem: Septic shock due to undetermined organism ([facility]  Bharti Berman is a [de-identified] y.o. male with past medical history of obesity, CHF with preserved EF, Arthritis, Bilateral carotid artery stenosis, Bilateral carotid bruits, Blood circulation, CAD, Carotid bruit, CKD stage 3, COPD, DM type 2, Diabetic neuropathy associated with type 2 diabetes mellitus, hyperlipidemia, hypertension, Dyspnea on exertion, hx/o blood transfusion, Mild mitral regurgitation, Moderate tricuspid regurgitation, Movement disorder, NSTEMI, Pulmonary hypertension, PVD with claudication, S/P carotid endarterectomy, Sleep apnea, Thyroid disease, unspecified cerebral artery occlusion with cerebral infarction, and vision decreased. Patient was brought to the ED by EMS from nursing home for complaints of wound infection of the right foot, altered mental status, and shortness of breath with significant wheezes. ED reports that patient's foot infection has been ongoing for over a day. Chart review reveals that patient has completed meropenem and daptomycin on 21. Patient was found to have large dark tarry stool in ED, and hemoccult positive. Patient found to be in respiratory distress with altered mental status and got intubated and central line was placed. After procedure, patient went to [facility] on monitor and loss pulses. CPR plus ACLS protocol was followed.  Patient was shocked 2 times, and ROSC reportedly obtained after 6 minutes resuscitation. Vascular surgery was consulted in ED. Patient's COVID test in ED is negative. Patient got transferred/admitted to MICU. Chart review reveals that patient was recently admitted here for acute respiratory failure secondary to multifocal pneumonia last 11/04/2021, and was discharged at nursing home last 11/06/2021 with Daptomycin and Merrem, which was completed last 11/14/2021. ROS: Unable to review ten point ROS due to current neurological condition, unless as noted in A/P.     Past Medical History:     Past Medical History:   Diagnosis Date    (HFpEF) heart failure with preserved ejection fraction ([address] 07/2020    Diagnosed by cardiology    Arthritis     Bilateral carotid artery stenosis 12/31/2020    Bilateral carotid bruits 12/31/2020    Blood circulation, collateral     CAD (coronary artery disease)     Carotid bruit 03/27/2013    CHF (congestive heart failure) (HCC)     Chronic kidney disease     CKD (chronic kidney disease) stage 3, GFR 30-59 ml/min ([address] 03/20/2016    COPD (chronic obstructive pulmonary disease) ([address]     Follows with pulmonary    Diabetes mellitus ([address]     Diabetic neuropathy associated with type 2 diabetes mellitus (HCC)     Dyspnea on exertion     History of blood transfusion     Hyperlipidemia     Hypertension     Mild mitral regurgitation     Moderate tricuspid regurgitation by prior echocardiography 2018    Movement disorder     NSTEMI, initial episode of care ([address] 01/2018    Obesity     Pulmonary hypertension ([address] 2018    PVD (peripheral vascular disease) with claudication ([address] 03/27/2014    S/P carotid endarterectomy 03/27/2013    Sleep apnea     SOB (shortness of breath)     Thyroid disease     Unspecified cerebral artery occlusion with cerebral infarction     Vision decreased 12/31/2020     Past Surgery History:  Patient  has a past surgical history that includes Cardiac surgery; vascular surgery; joint replacement; Diagnostic Cardiac Cath Lab Procedure (10/14/2008); Diagnostic Cardiac Cath Lab Procedure (10/21/2010); knee surgery; Cardiac valuve replacement; Coronary artery bypass graft (); Tonsillectomy; Pacemaker insertion (2014); other surgical history (Right, 2017); BIOPSY / LIGATION TEMPORAL ARTERY (Bilateral, 2021); Foot Amputation (Right, 10/2/2021); Femoral Endarterectomy (Right, 2021); Foot Amputation (Bilateral, 10/16/2021); and bronchoscopy (N/A, 2021). Social History:    FAM HX: Assessed: family history includes Cancer in his sister; Heart Disease in his father and mother; Heart Failure in his father and mother; Heart Surgery in his father; High Blood Pressure in his sister. Soc HX:   Social History     Socioeconomic History    Marital status:      Spouse name: Not on file    Number of children: Not on file    Years of education: Not on file    Highest education level: Not on file   Occupational History    Occupation: retired- [employer]   Tobacco Use    Smoking status: Former Smoker     Packs/day: 2.00     Years: 25.00     Pack years: 50.00     Types: Cigarettes     Start date: 1953     Quit date: 1981     Years since quittin.4    Smokeless tobacco: Never Used   Vaping Use    Vaping Use: Never used    Passive vaping exposure: Yes   Substance and Sexual Activity    Alcohol use: Never     Comment:      Drug use: Not Currently     Comment: no longer eating edibles. states he had a bad episode. Sexual activity: Not Currently   Other Topics Concern    Not on file   Social History Narrative    1 cup coffee daily     Social Determinants of Health     Financial Resource Strain:     Difficulty of Paying Living Expenses: Not on file   Food Insecurity:     Worried About [employer] in the Last Year: Not on file    Ran Out of Food in the Last Year: Not on file   Transportation Needs:     Lack of Transportation (Medical):  Not on file Lack of Transportation (Non-Medical): Not on file   Physical Activity:     Days of Exercise per Week: Not on file    Minutes of Exercise per Session: Not on file   Stress:     Feeling of Stress : Not on file   Social Connections:     Frequency of Communication with Friends and Family: Not on file    Frequency of Social Gatherings with Friends and Family: Not on file    Attends Muslim Services: Not on file    Active Member of Clubs or Organizations: Not on file    Attends Club or Organization Meetings: Not on file    Marital Status: Not on file   Intimate Partner Violence:     Fear of Current or Ex-Partner: Not on file    Emotionally Abused: Not on file    Physically Abused: Not on file    Sexually Abused: Not on file   Housing Stability:     Unable to Pay for Housing in the Last Year: Not on file    Number of [place] in the Last Year: Not on file    Unstable Housing in the Last Year: Not on file     TOBACCO:   reports that he quit smoking about 40 years ago. His smoking use included cigarettes. He started smoking about 68 years ago. He has a 50.00 pack-year smoking history. He has never used smokeless tobacco.  ETOH:   reports no history of alcohol use.  rugs:  reports previous drug use.y of Present Illness:ory of Present Illness:istory of Present Illness:sistory of Present Illness:tory of Present Illness:  Subjective:    Marquita Plascencia is a [de-identified] y.o. male with past medical history of obesity, CHF with preserved EF, Arthritis, Bilateral carotid artery stenosis, Bilateral carotid bruits, Blood circulation, CAD, Carotid bruit, CKD stage 3, COPD, DM type 2, Diabetic neuropathy associated with type 2 diabetes mellitus, hyperlipidemia, hypertension, Dyspnea on exertion, hx/o blood transfusion, Mild mitral regurgitation, Moderate tricuspid regurgitation, Movement disorder, NSTEMI, Pulmonary hypertension, PVD with claudication, S/P carotid endarterectomy, Sleep apnea, Thyroid disease, unspecified cerebral artery occlusion with cerebral infarction, and vision decreased. Patient was brought to the ED by EMS from nursing home for complaints of wound infection of the right foot, altered mental status, and shortness of breath with significant wheezes. Patient was found to have large dark tarry stool in ED, and hemoccult positive. Patient found to be in respiratory distress with altered mental status and got intubated and central line was placed. After procedure, patient went to [facility] on monitor and loss pulses. CPR plus ACLS protocol was followed. Patient was shocked 2 times, and ROSC reportedly obtained after 6 minutes resuscitation. Patient's COVID test in ED is negative. Objective:     No intake or output data in the 24 hours ending 11/29/21 2222    Vital Signs: Temp 97.7  Vitals:    11/29/21 1858 11/29/21 1900 11/29/21 2028 11/29/21 2200   BP:  105/60  108/65   Pulse: 89 98 87 100   Resp: 18 20 (!) 0 18   Temp:       TempSrc:       SpO2: (!) 89% (!) 82% 100%    Weight:          /60   Pulse 87   Temp 97.7 °F (36.5 °C) (Axillary)   Resp (!) 0   Wt 240 lb (108.9 kg)   SpO2 100%   BMI 36.49 kg/m²     I & O - 24hr: No intake or output data in the 24 hours ending 11/29/21 2222    Physical Exam: 11/29/21  GENERAL APPEARANCE:  sedated male, laying in bed in no apparent distress. EYES:   Pupils are equally round. No scleral erythema, discharge, or conjunctivitis. HENT:   Mucous membranes are moist. +ETT   NECK:   No apparent thyromegaly or masses. RESP:   Tachypnea, LS diminished air exchange bilateral with exp/insp wheezing and scattered rhonchi, no rales. Symmetric chest movement while on intubated with ventilation  CARDIO/VASC:   S1/S2 auscultated. Regular rate without appreciable murmurs, rubs, or gallops. Peripheral pulses equal bilaterally and palpable. NO pitting edema in extremities   GI:   Abdomen is soft without significant tenderness, masses, or guarding. Bowel sounds are normoactive.  Rectal exam Last Rate Last Admin    midazolam PF (VERSED) injection 2 mg  2 mg IntraVENous Q1H PRN José Kells, DO        fentaNYL (SUBLIMAZE) injection 100 mcg  100 mcg IntraVENous Q1H PRN José Kells, DO        fentaNYL 5 mcg/ml in 0.9%  ml infusion  12.5-200 mcg/hr IntraVENous Continuous José Kells, DO        norepinephrine (LEVOPHED) 16 mg in dextrose 5% 250 mL infusion  2-100 mcg/min IntraVENous Continuous José Kells, DO 14.1 mL/hr at 11/29/21 1739 15 mcg/min at 11/29/21 1739    lidocaine (CARDIAC INFUSION) 2 g in dextrose 5% 500 mL infusion  1 mg/min IntraVENous Continuous Michele Queen MD        lidocaine (CARDIAC INFUSION) 2 g in dextrose 5% 500 mL infusion  1 mg/min IntraVENous Continuous José Kells, DO 15 mL/hr at 11/29/21 1720 1 mg/min at 11/29/21 1720    sodium chloride flush 0.9 % injection 5-40 mL  5-40 mL IntraVENous 2 times per day MADDI Carvalho - CNP        sodium chloride flush 0.9 % injection 5-40 mL  5-40 mL IntraVENous PRN Sandrien Dong APRN - CNP        0.9 % sodium chloride infusion  25 mL IntraVENous PRN MADDI Carvalho - CNP        acetaminophen (TYLENOL) tablet 650 mg  650 mg Oral Q6H PRN Moises Montoya APRN - CNP        Or    acetaminophen (TYLENOL) suppository 650 mg  650 mg Rectal Q6H PRN Moises Montoya APRN - CNP        cefepime (MAXIPIME) 2000 mg IVPB minibag  2,000 mg IntraVENous Q8H Sandrine Dong APRN - CNP        pantoprazole (PROTONIX) injection 40 mg  40 mg IntraVENous BID Sandrinenely Dong, APRN - CNP   40 mg at 11/29/21 2307    And    sodium chloride (PF) 0.9 % injection 10 mL  10 mL IntraVENous BID Sandrine Dong APRN - CNP        vancomycin (VANCOCIN) 2,000 mg in dextrose 5 % 500 mL IVPB  2,000 mg IntraVENous Once MADDI Carvalho -  mL/hr at 11/29/21 2305 2,000 mg at 11/29/21 2305    ondansetron (ZOFRAN-ODT) disintegrating tablet 4 mg  4 mg Oral Q8H PRN MADDI Carvalho CNP        Or    ondansetron (ZOFRAN) injection 4 mg  4 mg IntraVENous Q6H PRN MADID Hawkins CNP        chlorhexidine (PERIDEX) 0.12 % solution 15 mL  15 mL Mouth/Throat BID MADDI Hawkins - CNP   15 mL at 11/29/21 2306    perflutren lipid microspheres (DEFINITY) injection 1.65 mg  1.5 mL IntraVENous ONCE PRN MADDI Dill CNP        fentaNYL (SUBLIMAZE) injection 25 mcg  25 mcg IntraVENous Q1H PRN MADDI Dill CNP        sodium bicarbonate 150 mEq in dextrose 5 % 1,000 mL infusion   IntraVENous Continuous MADDI Hawkins  mL/hr at 11/29/21 2306 New Bag at 11/29/21 2306    dextrose 5% bolus 500 mL  500 mL IntraVENous Once MADDI Dill -  mL/hr at 11/29/21 2339 500 mL at 11/29/21 2339      Prior to Admission Meds:  Prior to Admission medications    Medication Sig Start Date End Date Taking? Authorizing Provider   Heparin Sodium, Porcine, (HEPARIN, PORCINE,) 91226 UNIT/ML injection Inject 0.5 mLs into the skin every 12 hours 11/6/21   MADDI Gonsalez CNP   gabapentin (NEURONTIN) 100 MG capsule Take 2 capsules by mouth 4 times daily for 30 days. 11/6/21 12/6/21  MADDI Gonsalez CNP   atorvastatin (LIPITOR) 10 MG tablet Take 1 tablet by mouth daily 11/7/21   MADDI Gonsalez CNP   acetylcysteine (MUCOMYST) 10 % nebulizer solution Inhale 4 mLs into the lungs every 6 hours 11/6/21   MADDI Gonsalez CNP   silver sulfADIAZINE (SILVADENE) 1 % cream Apply topically daily.  11/7/21   MADDI Gonsalez CNP   bumetanide (BUMEX) 1 MG tablet Take 1 tablet by mouth 2 times daily 11/6/21   MADDI Gonsalez CNP   polyethylene glycol (GLYCOLAX) 17 g packet Take 17 g by mouth daily as needed for Constipation 11/6/21 12/6/21  MADDI Gonsalez CNP   potassium chloride (KLOR-CON M) 20 MEQ extended release tablet Take 2 tablets by mouth daily 11/7/21   MADDI Gonsalez - CNP   albuterol (PROVENTIL) (2.5 MG/3ML) 0.083% nebulizer solution Take 2.5 mg by nebulization every 6 hours as needed for Wheezing    Historical Provider, MD   sucralfate (CARAFATE) 1 GM tablet Take 1 g by mouth 4 times daily    Historical Provider, MD   HYDROcodone-acetaminophen (NORCO)  MG per tablet Take 1 tablet by mouth every 6 hours as needed for Pain. Historical Provider, MD   morphine (THIERRY) 10 MG extended release capsule Take 10 mg by mouth 2 times daily.     Historical Provider, MD   pantoprazole (PROTONIX) 40 MG tablet Take 1 tablet by mouth 2 times daily (before meals) 10/26/21   Tung Fall MD   mirtazapine (REMERON) 7.5 MG tablet Take 7.5 mg by mouth nightly    Historical Provider, MD   hydrALAZINE (APRESOLINE) 50 MG tablet Take 1 tablet by mouth every 8 hours 10/3/21   Tung Fall MD   magnesium 200 MG TABS tablet Take 200 mg by mouth daily    Historical Provider, MD   diphenhydrAMINE (BENADRYL) 25 MG capsule Take 25 mg by mouth daily as needed for Itching    Historical Provider, MD   insulin lispro, 1 Unit Dial, (HUMALOG KWIKPEN) 100 UNIT/ML SOPN Inject 0-6 Units into the skin 3 times daily (before meals) PER SLIDING SCALE: 0-139=0U, 140-199=1U, 200-249=2U, 250-299=3U, 300-349=4U, 350-399=5U, 400-450=6U    Historical Provider, MD   hydrOXYzine (ATARAX) 25 MG tablet Take 25 mg by mouth 4 times daily     Historical Provider, MD   lactulose 20 GM/30ML SOLN Take 20 g by mouth three times a week *MON-WED-FRI*    Historical Provider, MD   metOLazone (ZAROXOLYN) 2.5 MG tablet Take 2.5 mg by mouth three times a week *TUE-THUR-SAT*    Historical Provider, MD   ferrous sulfate (IRON 325) 325 (65 Fe) MG tablet Take 1 tablet by mouth 2 times daily (with meals) 1/25/21   Bronwyn Martino DO   DULoxetine (CYMBALTA) 60 MG extended release capsule Take 1 capsule by mouth daily 1/26/21   Ana Maria Martino, DO   fluticasone-umeclidin-vilant (TRELEGY ELLIPTA) 100-62.5-25 MCG/INH AEPB Inhale 1 puff into the lungs Daily     Historical Provider, MD   aspirin EC 81 MG EC tablet Take 1 residual metatarsals are at or very close to the skin surface. There is decreased soft tissue swelling compared to the prior study. No obvious osseous destruction is seen to suggest osteomyelitis. There are prominent vascular calcifications. No radiopaque foreign body seen. Degenerative changes again seen about the ankle. 1. Postsurgical changes including prior transmetatarsal amputation of the right foot. 2. No radiographic evidence of osteomyelitis. Improvement of soft tissue swelling. CT HEAD WO CONTRAST    Result Date: 11/3/2021  EXAMINATION: CT OF THE HEAD WITHOUT CONTRAST  11/3/2021 3:30 am TECHNIQUE: CT of the head was performed without the administration of intravenous contrast. Dose modulation, iterative reconstruction, and/or weight based adjustment of the mA/kV was utilized to reduce the radiation dose to as low as reasonably achievable. COMPARISON: None. HISTORY: ORDERING SYSTEM PROVIDED HISTORY: weakness, concerns for CVA TECHNOLOGIST PROVIDED HISTORY: Has a \"code stroke\" or \"stroke alert\" been called? ->No Reason for exam:->weakness, concerns for CVA Decision Support Exception - unselect if not a suspected or confirmed emergency medical condition->Emergency Medical Condition (MA) FINDINGS: BRAIN/VENTRICLES: There is no acute intracranial hemorrhage, mass effect or midline shift. No abnormal extra-axial fluid collection. The gray-white differentiation is maintained without evidence of an acute infarct. There is prominence of the ventricles and sulci due to global parenchymal volume loss. There are nonspecific areas of hypoattenuation within the periventricular and subcortical white matter, which likely represent chronic microvascular ischemic change. ORBITS: The visualized portion of the orbits demonstrate no acute abnormality. SINUSES: The visualized paranasal sinuses and mastoid air cells demonstrate no acute abnormality.  SOFT TISSUES/SKULL: No acute abnormality of the visualized skull or soft tissues. No acute intracranial abnormality. CT CHEST WO CONTRAST    Result Date: 11/3/2021  EXAMINATION: CT OF THE CHEST WITHOUT CONTRAST 11/3/2021 3:30 am TECHNIQUE: CT of the chest was performed without the administration of intravenous contrast. Multiplanar reformatted images are provided for review. Dose modulation, iterative reconstruction, and/or weight based adjustment of the mA/kV was utilized to reduce the radiation dose to as low as reasonably achievable. COMPARISON: No prior CT. Correlation is made with prior chest radiographs from 10/23/2021 and 10/20/2021. HISTORY: ORDERING SYSTEM PROVIDED HISTORY: SOB TECHNOLOGIST PROVIDED HISTORY: Reason for exam:->SOB Decision Support Exception - unselect if not a suspected or confirmed emergency medical condition->Emergency Medical Condition (MA) FINDINGS: Mediastinum: Mild cardiomegaly. No pericardial effusion. Dense bilateral coronary arterial calcifications, particularly on the right. Dense mitral annular calcification, to lesser extent, aortic annular calcification. 2 lead left chest pacemaker with appropriately positioned leads. Right PICC with tip in the SVC near the cavoatrial junction. Unremarkable thyroid gland. Sternotomy wires and mediastinal clips. Small amount of fluid is seen within the nondistended esophagus. Lungs/pleura: No pneumothorax. Small bilateral pleural effusions. There are patchy areas of consolidation seen within the right upper lobe, including the apex, consistent with multifocal pneumonia. Consolidation within the dependent right lower lobe is likely largely atelectatic, however there are small infiltrative foci seen within the right middle and lower lobes as well. Calcified granulomas are seen within areas of the left lower lobe and left mid lung. There is an atelectatic or fibroatelectatic component to this appearance, however there is probably a superimposed component of pneumonia within the left lower lobe. Relative hypodensity of lung at the left apex appears to relate to compensatory expansion of the superior segment of the left lower lobe occupying the left apex, due to relative volume loss of the left upper lobe. Secretions are seen within the distal trachea at the level of the nato. The right mainstem bronchus is patent, with the left mainstem bronchus and central branch bronchi largely opacified. There is opacification of some right lower lobe bronchi as well. Upper Abdomen: Visualized upper abdominal viscera demonstrate no acute abnormality. Soft Tissues/Bones: There is a 3.5 x 3.0 cm soft tissue density mass seen on image 66 of series 3, possibly retroareolar. Asymmetric edema versus flank hematoma is partially imaged on the left, with significant portion of left-sided soft tissues out of the field of view. No acute osseous abnormality. Multifocal pneumonia as described. Small bilateral pleural effusions. Significant opacification of the left mainstem bronchus. Patient may benefit from bronchoscopy. Left upper lobe volume loss with compensatory hyperaeration of the superior segment of the left lower lobe, with the superior left lower lobe segment occupying the left pulmonary apex. Atelectasis versus fibroatelectatic changes within the left lower lobe and left mid lung, with consolidation and multiple calcified granulomas seen within this consolidation. 3.5 x 3.0 cm soft tissue density mass within the superficial left upper chest subcutaneous tissues, potentially retroareolar. This could represent neoplasia or marcelle asymmetric gynecomastia. Clinical correlation is advised, with mammographic correlation if appropriate. Increased attenuation within the subcutaneous tissues of the partially imaged left flank, edema versus hemorrhage, with mild edema seen in the visualized lower right flank.      XR CHEST PORTABLE    Result Date: 11/29/2021  EXAMINATION: ONE XRAY VIEW OF THE CHEST 11/29/2021 5:26 pm COMPARISON: 30 2 p.m. HISTORY: ORDERING SYSTEM PROVIDED HISTORY: Post Tube TECHNOLOGIST PROVIDED HISTORY: Reason for exam:->Post Tube What reading provider will be dictating this exam?->CRC FINDINGS: Right lower lobe opacity. There is no effusion or pneumothorax. The cardiomediastinal silhouette is without acute process. The osseous structures are without acute process. ET tube tip within 1.0 cm of the nato. ET tube tip within 1.0 cm of the nato. XR CHEST PORTABLE    Result Date: 11/29/2021  EXAMINATION: ONE XRAY VIEW OF THE CHEST 11/29/2021 3:43 pm COMPARISON: November 5, 2021 HISTORY: ORDERING SYSTEM PROVIDED HISTORY: resp distress TECHNOLOGIST PROVIDED HISTORY: Reason for exam:->resp distress FINDINGS: Median sternotomy wires are present. Left pacemaker is present. Opacities are present in lung bases notable in retrocardiac left lower lobe. No pneumothorax. 1. Limited due to low lung volumes. 2. Bibasilar opacities notable on the left could indicate bibasilar pneumonia or atelectasis. Opacities appear similar compared to prior from November 5th. XR CHEST PORTABLE    Result Date: 11/5/2021  EXAMINATION: ONE XRAY VIEW OF THE CHEST 11/5/2021 3:34 pm COMPARISON: 10/23/2021 HISTORY: ORDERING SYSTEM PROVIDED HISTORY: pna mucus plugging post bronch TECHNOLOGIST PROVIDED HISTORY: Reason for exam:->pna mucus plugging post bronch FINDINGS: An electronic cardiac device is again in place and again obscures a portion of the left chest, limiting the examination. Cable entry is via the left subclavian vein. Median sternotomy wire sutures again noted. A right venous catheter is again in place with distal tip obscured by pacer wires. Large left pleural effusion appears larger. Adjacent left lung opacity may be atelectasis and or pneumonia. There is a new interstitial infiltrate in the right mid lung which may be edema or pneumonitis.   Similar more subtle new infiltrate is noted in the right lung base and right lung apex. No new right-sided pulmonary consolidation. No definite evidence of right pleural effusion. Cardiac size cannot be assessed since the left border is obscured by the large effusion. No evidence of pneumothorax. Large left pleural effusion appears larger with adjacent compressive atelectasis and or pneumonia. These obscure the left heart border and left diaphragm, once again. New interstitial infiltrates in right lung may be edema and/or pneumonitis     XR ABDOMEN FOR NG/OG/NE TUBE PLACEMENT    Result Date: 11/29/2021  EXAMINATION: ONE SUPINE XRAY VIEW(S) OF THE ABDOMEN 11/29/2021 5:26 pm COMPARISON: None. HISTORY: ORDERING SYSTEM PROVIDED HISTORY: Confirmation of course of NG/OG/NE tube and location of tip of tube TECHNOLOGIST PROVIDED HISTORY: Reason for exam:->Confirmation of course of NG/OG/NE tube and location of tip of tube Portable? ->Yes What reading provider will be dictating this exam?->CRC FINDINGS: Nonspecific bowel gas pattern without evidence of obstruction. No abnormal calcifications. No acute osseous abnormality. Enteric tube tip just distal to the GE junction. Enteric tube tip just distal to the GE junction. US DUP LOWER EXTREMITIES BILATERAL VENOUS    Result Date: 11/3/2021  EXAMINATION: DUPLEX VENOUS ULTRASOUND OF THE BILATERAL LOWER [id number]/3/2021 6:42 am TECHNIQUE: Duplex ultrasound using B-mode/gray scaled imaging, Doppler spectral analysis and color flow Doppler was obtained of the deep venous structures of the lower bilateral extremities. COMPARISON: None. HISTORY: ORDERING SYSTEM PROVIDED HISTORY: Leg swelling TECHNOLOGIST PROVIDED HISTORY: Reason for exam:->r/o dvt Reason for exam:->Leg swelling What reading provider will be dictating this exam?->CRC FINDINGS: The visualized veins of the bilateral lower extremities are patent and free of echogenic thrombus. The veins demonstrate good compressibility with normal color flow study and spectral analysis. No evidence of DVT in either lower extremity. CXR:          11/29/2021 11/05/2021      NP's Assessment and Plan:   Marquita Plascencia is a [de-identified] y.o. male was brought to the ED by EMS from nursing home for complaints of wound infection of the right foot, altered mental status, and shortness of breath with significant wheezes. Patient has past medical history of obesity, CHF with preserved EF, Arthritis, Bilateral carotid artery stenosis, Bilateral carotid bruits, CAD, Carotid bruit, CKD stage 3, COPD, DM type 2, Diabetic neuropathy 2/2 type 2 diabetes mellitus, hyperlipidemia, hypertension, hx/o blood transfusion, Mild mitral regurgitation, Moderate tricuspid regurgitation, NSTEMI, Pulmonary hypertension, PVD with claudication, S/P carotid endarterectomy, Sleep apnea, Thyroid disease, unspecified cerebral artery occlusion with cerebral infarction, and vision decreased. ED reports that patient's foot infection has been ongoing for over a day, and patient has completed antibiotics last 11/14/21. Patient was found to have large dark tarry stool in ED, and hemoccult positive. He also found to be in respiratory distress with altered mental status and got intubated and central line was placed. After procedure, patient went to [facility] on monitor and loss pulses. CPR plus ACLS protocol was followed. Patient was shocked 2 times, and ROSC reportedly obtained after 6 minutes resuscitation. Vascular surgery was consulted in ED. Patient's COVID test in ED is negative. Patient got transferred/admitted to MICU. Chart review reveals that patient was recently admitted here for acute respiratory failure secondary to multifocal pneumonia last 11/04/2021, and was discharged at nursing home last 11/06/2021 with Daptomycin and Merrem, which was completed last 11/14/2021.     Assessment and Plan:  Present on Admission:   Septic shock d/t undetermined organism: PNA vs wound infection vs UTI   S/Postop Right TMA/foot wound infection - foot surgery at 10/02/2021   Aspiration pneumonia - as per CT A/P   UTI with hematuria - as per urine test       -hypotensive, tachypnea, tachycardia, leukocytosis (18.5), lactate 9.9, Pro Calcitonin 9.70       -COVID test NEGATIVE       -consult ID to assist       -wound became infected with Pseudomonas, Citrobacter and MRSA        -pt completed Daptomycin and Merrem last 11/14/2021       -on Empiric ABX: Cefepime, Vanc (Pharm to dose)       -c/w on Levophed to support BP -try to wean Off       -prn Duoneb       -aspiration precautions       -series Lactate          -pending Blood, urine, wound, sputum cultures, legionella, strep antigen       -Vascular surgery consulted in ED for possible debridement       -consult Podiatry       -wound care consulted            Post-Cardiac arrest - went to [facility], pulseless, and unresponsive, ACLS protocol, received 2 shock in ED, ROSC reportedly obtained after 6 minutes resuscitation    Acute respiratory failure, could be 2/2 above condition vs COPD exacerbation   Elevated troponin -known h/o CAD, s/p CABG (2008)   Elevated BNP level - known h/o CHF with preserved EF, does not appear to be fluid overload, no pitting edema               -intubated, on mechanical ventilator       -Hypothermia protocol ordered       -initial trop 251, then 221 after cardiac arrest       -last Echo (7/2020) with EF 45-50%       -consult Cardiology       -daily weights/monitor I/O        -cont to monitor for clinical symptoms of hypervolemia       -on Lidocaine gtt       -Fentanyl gtt ordered for sedation but OFF d/t unresponsiveness       -consult Neurology, concerning for anoxic brain injury       -neuro checks       -monitor BNP       -series Troponin        -series of BMP, ABG, Ca, Mg, Phos       -pending ECHO       HARVEY superimposed on CKD stage 3 - serum Crea 3.4 (baseline 1.4-1.9)   Electrolyte abnormality:        -hypernatremia (148), hyperkalemia (5.5), hyperphosphatemia (6.4), and hypercalcemia (11.4)       -CO2 18, , A/G 23, Ca Ionized 1.54       -D5W 500 bolus, once Na and K improved       -series electrolytes       -cont NaBicarb 150 mEq       -consult Nephrology     Melena - had large dark tarry stool episode in ED, and hemoccult positive   Suspected, GI bleed - No active bleeding noted on examination       -CT A/P shows No acute inflammatory abnormality is evident in the abdomen or pelvis       -initial Hgb 10.3, platelet normal (547)       -Protonix IV, BID       -monitor H&H, CBC       -Hold AC and antiplatelet for now      DKA - known h/o uncontrolled DM   Diabetic neuropathy 2/2 type 2 DM       -Last A1C (9/24/2021) 7.2       -aggressive IVF hydration        -Insulin drip after bolus       -POCT q hr while on insulin drip       -serial BMP       -PRN KCl, MgSO4, Na3PO4 replacement       -monitor I/O, daily weights       -Neurovascular checks       -NPO for now,until gap closed       -hypoglycemia protocol    Chronic Illnesses:    -Obesity  -Arthritis  -Bilateral carotid artery stenosis, Bilateral carotid bruits, Carotid bruit  -COPD  -hyperlipidemia  -hypertension   -PVD with claudication  -S/P carotid endarterectomy  -Thyroid disease  -unspecified cerebral artery occlusion with cerebral infarction  -vision decreased      Patient's assessment and plan was discussed with supervising physician on call - Dr. Theo Mahoney    Current Treatment Team:  Treatment Team: Attending Provider: Igor Iniguez MD; Consulting Physician: [address] [place] [address]; Consulting Physician: Akash Rangel MD; Consulting Physician: Krunal Beck MD; Surgeon: Kuldeep Renee MD; Nurse Practitioner: MADDI Reaves CNP; Registered Nurse:  Caitlin Connell RN    DIET: Diet NPO   DVT prophylaxis: on SCD  GI prophylaxis: IV Protonix  CODE STATUS: Full Code     FABIO Carvalho   Critical Care  11/29/2021 10:22 PM      Electronically signed by MADDI Mendez CNP on 11/29/2021 at 10:22 PM    Discussed over the phone. I did not personally examine this patient.     Bogdan Fu MD

## 2021-11-30 NOTE — CONSULTS
NEUROLOGY CONSULT NOTE      Requesting Physician: MADDI La CNP    Reason for Consult:  Evaluate for anoxic brain injury. History of Present Illness:  Zachary Smith is a [de-identified] y.o. male  with h/o CAD, CVA with carotid stenosis status post endarterectomy, CHF, CKD, COPD, PVD, HTN, HLD, TREVOR, and DM who was admitted to [facility]  on 11/29/2021 with presentation of wound infection, septic shock, and respiratory distress. Patient was brought to the ED on 11/29/2021 from nursing home after experiencing altered mental status, shortness of breath with wheezing, and note of wound infection of the right foot at the time. ED evaluation revealed Hemoccult positive large dark dark tarry stool along with respiratory distress led to intubation and central line placement. Patient had later onset of episode of V. tach on monitoring and lost pulse requiring ACLS protocol that resulted in 2 shocks and resuscitation lasting 6 minutes. Patient was noted to be both hypotensive and hypothermic on ER presentation. EKG on admission was notable for atrial fibrillation with RVR. Labs were remarkable for K+ 5.5, creatinine of 3.4, WBC of 18.5, H&H of 10.33.1, and negative COVID-19 test.  Blood cultures were positive for MRSA (PCR). He has remained unresponsive on ventilator with no sedation required. Staff did indicate the presence of some reaction to stimuli earlier in the day with corneal response present briefly. No spontaneous activity currently. No report of any abnormal movements or posturing.       Past Medical History:        Diagnosis Date    (HFpEF) heart failure with preserved ejection fraction ([facility] [facility] 07/2020    Diagnosed by cardiology    Arthritis     Bilateral carotid artery stenosis 12/31/2020    Bilateral carotid bruits 12/31/2020    Blood circulation, collateral     CAD (coronary artery disease)     Carotid bruit 03/27/2013    CHF (congestive heart failure) (HCC)     Chronic kidney disease     CKD (chronic kidney disease) stage 3, GFR 30-59 ml/min ([facility]) 03/20/2016    COPD (chronic obstructive pulmonary disease) ([facility] [address]     Follows with pulmonary    Diabetes mellitus ([facility] [address]     Diabetic neuropathy associated with type 2 diabetes mellitus ([facility])     Dyspnea on exertion     History of blood transfusion     Hyperlipidemia     Hypertension     Mild mitral regurgitation     Moderate tricuspid regurgitation by prior echocardiography 2018    Movement disorder     NSTEMI, initial episode of care ([facility] [address] 01/2018    Obesity     Pulmonary hypertension ([facility] [address] 2018    PVD (peripheral vascular disease) with claudication ([facility] [address] 03/27/2014    S/P carotid endarterectomy 03/27/2013    Sleep apnea     SOB (shortness of breath)     Thyroid disease     Unspecified cerebral artery occlusion with cerebral infarction     Vision decreased 12/31/2020           Procedure Laterality Date    BIOPSY / LIGATION TEMPORAL ARTERY Bilateral 01/05/2021    BILATERAL TEMPORAL ARTERY BIOPSY performed by Arville Paget, MD at [address] N/A 11/4/2021    BRONCHOSCOPY DIAGNOSTIC OR CELL [address] ONLY and bal performed by Wilber Bennett MD at [place] [address]      5 years ago   [address] GRAFT  2008    1 vessel and aortic valve repair    DIAGNOSTIC CARDIAC CATH LAB PROCEDURE  10/14/2008    DIAGNOSTIC CARDIAC CATH LAB PROCEDURE  10/21/2010    FEMORAL ENDARTERECTOMY Right 9/30/2021    RIGHT FEMORAL ENDARTERECTOMY performed by Nicki Lindo MD at [facility] Right 10/2/2021    RIGHT FOOT TRANSMITTAL AMPUTATION performed by Colby Carrion DPM at [facility] Bilateral 10/16/2021    RIGHT FOOT WOUND DEBRIDEMENT WITH APPLICATION WOUND VAC; LEFT FOOT AMPUTATION 2ND TOE performed by Ramakrishna Park DPM at [address] [place] [address] Rd      R knee replacement    KNEE SURGERY      OTHER SURGICAL HISTORY Right 2017    debridement,bone bx foot    PACEMAKER INSERTION  2014    D-PPM   ([facility])    Dr. Dana Estevez      carotids       Social History:  Social History     Tobacco Use   Smoking Status Former Smoker    Packs/day: 2.00    Years: 25.00    Pack years: 50.00    Types: Cigarettes    Start date: 1953   [facility] Quit date: 1981    Years since quittin.4   Smokeless Tobacco Never Used     Social History     Substance and Sexual Activity   Alcohol Use Never    Comment:       Social History     Substance and Sexual Activity   Drug Use Not Currently    Comment: no longer eating edibles. states he had a bad episode.     Family History:       Problem Relation Age of Onset    Heart Disease Mother     Heart Failure Mother     Heart Surgery Father     Heart Disease Father     Heart Failure Father     High Blood Pressure Sister     Cancer Sister        Review of Systems:  Unavailable.     Allergies:    No Known Allergies     Current Medications:   ipratropium-albuterol (DUONEB) nebulizer solution 1 ampule, Q4H PRN  glucose (GLUTOSE) 40 % oral gel 15 g, PRN  glucagon (rDNA) injection 1 mg, PRN  dextrose 5 % solution, PRN  dextrose 50 % IV solution, PRN  potassium chloride 10 mEq/100 mL IVPB (Peripheral Line), PRN  magnesium sulfate 1000 mg in dextrose 5% 100 mL IVPB, PRN  sodium phosphate 10 mmol in dextrose 5 % 250 mL IVPB, PRN   Or  sodium phosphate 15 mmol in dextrose 5 % 250 mL IVPB, PRN   Or  sodium phosphate 20 mmol in dextrose 5 % 500 mL IVPB, PRN  dextrose 5 % and 0.45 % NaCl with KCl 20 mEq infusion, Continuous PRN  insulin regular (HUMULIN R;NOVOLIN R) 100 Units in sodium chloride 0.9 % 100 mL infusion, Continuous  ipratropium-albuterol (DUONEB) nebulizer solution 1 ampule, Q4H WA  budesonide (PULMICORT) nebulizer suspension 500 mcg, BID  vancomycin (VANCOCIN) intermittent dosing (placeholder), RX Placeholder  [START ON 2021] epoetin alessandra-epbx (RETACRIT) injection 2,790 Units, Once per day on Mon Wed Fri  cefepime (MAXIPIME) 1000 mg IVPB minibag, Q24H  midazolam PF (VERSED) injection 2 mg, Q1H PRN  norepinephrine (LEVOPHED) 16 mg in dextrose 5% 250 mL infusion, Continuous  sodium chloride flush 0.9 % injection 5-40 mL, 2 times per day  sodium chloride flush 0.9 % injection 5-40 mL, PRN  0.9 % sodium chloride infusion, PRN  acetaminophen (TYLENOL) tablet 650 mg, Q6H PRN   Or  acetaminophen (TYLENOL) suppository 650 mg, Q6H PRN  pantoprazole (PROTONIX) injection 40 mg, BID   And  sodium chloride (PF) 0.9 % injection 10 mL, BID  ondansetron (ZOFRAN-ODT) disintegrating tablet 4 mg, Q8H PRN   Or  ondansetron (ZOFRAN) injection 4 mg, Q6H PRN  chlorhexidine (PERIDEX) 0.12 % solution 15 mL, BID  perflutren lipid microspheres (DEFINITY) injection 1.65 mg, ONCE PRN  fentaNYL (SUBLIMAZE) injection 25 mcg, Q1H PRN         Physical Exam:  BP (!) 114/48   Pulse 101   Temp 97.7 °F (36.5 °C)   Resp 18   Wt 205 lb (93 kg)   SpO2 95%   BMI 31.17 kg/m²  I Body mass index is 31.17 kg/m². I   Wt Readings from Last 1 Encounters:   11/30/21 205 lb (93 kg)          HEENT: Normocephalic, atraumatic, no lesions or abnormalities noted. Neck:  No masses, nodes or bruits; no cervical tenderness on palpation. Lungs:   Coarse breath sounds without wheezing on ventilator. CV: RRR without gallops or murmurs     Extremities: no c/c/e          Neurologic Exam     Mental Status:   Unresponsive on ventilator without sedation. No response to stimuli. Cranial Nerves: Pupils were approximately 3 mm, round, and nonreactive to light. No corneal responses present with no vestibular ocular response present with doll's eye maneuver. Facial appearance was symmetric. Gag response was absent. Motor Exam:   Flaccid tone with no spontaneous movements. Sensory Exam:   No response to noxious stimuli, sternal rub, or nailbed pressure bilaterally.      Cerebella Exam:   No What reading provider will be dictating this exam?->CRC FINDINGS: The visualized chest demonstrates \"crazy paving\" in the right lung, multiple small nodular consolidations in the right lung, bronchial wall thickening, and dependent consolidation in the left greater than right lung bases with parenchymal calcifications. Partially visualized pacemaker wires. Partially visualized changes of CABG. Dense mitral annular calcifications. Apparent surgical changes of the aortic valve. Dense appearance of the gallbladder lumen. No biliary dilatation. Unremarkable noncontrast appearance of the spleen, pancreas, and adrenal glands. No hydronephrosis. Punctate parenchymal calcifications versus nonobstructing stones in the bilateral kidneys. No hydronephrosis. Bilateral perinephric reticulation. The bladder is decompressed by Rodriguez catheter. Air within the bladder is likely due to catheterization. The infrarenal abdominal aorta measures 2.9 cm x 2.6 cm. It contains calcification of a presumed intimal flap. No free air or significant free fluid. Sigmoid diverticulosis without evidence of diverticulitis. Nondilated appendix. No evidence of bowel obstruction. Enteric tube terminates at the GE junction, and should be advanced by the roughly 6 cm. Small fat containing umbilical hernia. Small fat containing inguinal hernias. Prior intervention at the right groin, possibly a vascular procedure. Degenerative changes of the spine. Degenerative changes of the hips. The visualized lungs demonstrate aspiration versus pneumonia. Aspiration is favored. No acute inflammatory abnormality is evident in the abdomen or pelvis. The infrarenal aorta measures 2.9 cm. See recommendation below. Sigmoid diverticulosis without evidence of diverticulitis. RECOMMENDATIONS: 2.9 cm infrarenal abdominal aortic aneurysm suspected. Recommend follow-up every 5 years. Reference: J Am Zeinab Radiol 6261;[id number].      XR FOOT RIGHT (2 VIEWS)    Result Date: 11/29/2021  EXAMINATION: TWO XRAY VIEWS OF THE RIGHT FOOT 11/29/2021 3:43 pm COMPARISON: 10/15/2021 HISTORY: ORDERING SYSTEM PROVIDED HISTORY: infection, exposed bone TECHNOLOGIST PROVIDED HISTORY: Reason for exam:->infection, exposed bone What reading provider will be dictating this exam?->CRC FINDINGS: Postsurgical changes again noted from prior transmetatarsal amputation of the right foot. In the interval, the distal soft tissues have been removed and the distal portions of the residual metatarsals are at or very close to the skin surface. There is decreased soft tissue swelling compared to the prior study. No obvious osseous destruction is seen to suggest osteomyelitis. There are prominent vascular calcifications. No radiopaque foreign body seen. Degenerative changes again seen about the ankle. 1. Postsurgical changes including prior transmetatarsal amputation of the right foot. 2. No radiographic evidence of osteomyelitis. Improvement of soft tissue swelling. CT HEAD WO CONTRAST    Result Date: 11/3/2021  EXAMINATION: CT OF THE HEAD WITHOUT CONTRAST  11/3/2021 3:30 am TECHNIQUE: CT of the head was performed without the administration of intravenous contrast. Dose modulation, iterative reconstruction, and/or weight based adjustment of the mA/kV was utilized to reduce the radiation dose to as low as reasonably achievable. COMPARISON: None. HISTORY: ORDERING SYSTEM PROVIDED HISTORY: weakness, concerns for CVA TECHNOLOGIST PROVIDED HISTORY: Has a \"code stroke\" or \"stroke alert\" been called? ->No Reason for exam:->weakness, concerns for CVA Decision Support Exception - unselect if not a suspected or confirmed emergency medical condition->Emergency Medical Condition (MA) FINDINGS: BRAIN/VENTRICLES: There is no acute intracranial hemorrhage, mass effect or midline shift. No abnormal extra-axial fluid collection.   The gray-white differentiation is maintained without evidence of an acute infarct. There is prominence of the ventricles and sulci due to global parenchymal volume loss. There are nonspecific areas of hypoattenuation within the periventricular and subcortical white matter, which likely represent chronic microvascular ischemic change. ORBITS: The visualized portion of the orbits demonstrate no acute abnormality. SINUSES: The visualized paranasal sinuses and mastoid air cells demonstrate no acute abnormality. SOFT TISSUES/SKULL: No acute abnormality of the visualized skull or soft tissues. No acute intracranial abnormality. CT CHEST WO CONTRAST    Result Date: 11/3/2021  EXAMINATION: CT OF THE CHEST WITHOUT CONTRAST 11/3/2021 3:30 am TECHNIQUE: CT of the chest was performed without the administration of intravenous contrast. Multiplanar reformatted images are provided for review. Dose modulation, iterative reconstruction, and/or weight based adjustment of the mA/kV was utilized to reduce the radiation dose to as low as reasonably achievable. COMPARISON: No prior CT. Correlation is made with prior chest radiographs from 10/23/2021 and 10/20/2021. HISTORY: ORDERING SYSTEM PROVIDED HISTORY: SOB TECHNOLOGIST PROVIDED HISTORY: Reason for exam:->SOB Decision Support Exception - unselect if not a suspected or confirmed emergency medical condition->Emergency Medical Condition (MA) FINDINGS: Mediastinum: Mild cardiomegaly. No pericardial effusion. Dense bilateral coronary arterial calcifications, particularly on the right. Dense mitral annular calcification, to lesser extent, aortic annular calcification. 2 lead left chest pacemaker with appropriately positioned leads. Right PICC with tip in the SVC near the cavoatrial junction. Unremarkable thyroid gland. Sternotomy wires and mediastinal clips. Small amount of fluid is seen within the nondistended esophagus. Lungs/pleura: No pneumothorax. Small bilateral pleural effusions.   There are patchy areas of consolidation seen within the right upper lobe, including the apex, consistent with multifocal pneumonia. Consolidation within the dependent right lower lobe is likely largely atelectatic, however there are small infiltrative foci seen within the right middle and lower lobes as well. Calcified granulomas are seen within areas of the left lower lobe and left mid lung. There is an atelectatic or fibroatelectatic component to this appearance, however there is probably a superimposed component of pneumonia within the left lower lobe. Relative hypodensity of lung at the left apex appears to relate to compensatory expansion of the superior segment of the left lower lobe occupying the left apex, due to relative volume loss of the left upper lobe. Secretions are seen within the distal trachea at the level of the nato. The right mainstem bronchus is patent, with the left mainstem bronchus and central branch bronchi largely opacified. There is opacification of some right lower lobe bronchi as well. Upper Abdomen: Visualized upper abdominal viscera demonstrate no acute abnormality. Soft Tissues/Bones: There is a 3.5 x 3.0 cm soft tissue density mass seen on image 66 of series 3, possibly retroareolar. Asymmetric edema versus flank hematoma is partially imaged on the left, with significant portion of left-sided soft tissues out of the field of view. No acute osseous abnormality. Multifocal pneumonia as described. Small bilateral pleural effusions. Significant opacification of the left mainstem bronchus. Patient may benefit from bronchoscopy. Left upper lobe volume loss with compensatory hyperaeration of the superior segment of the left lower lobe, with the superior left lower lobe segment occupying the left pulmonary apex. Atelectasis versus fibroatelectatic changes within the left lower lobe and left mid lung, with consolidation and multiple calcified granulomas seen within this consolidation.  3.5 x 3.0 cm soft tissue density mass exam:->resp distress FINDINGS: Median sternotomy wires are present. Left pacemaker is present. Opacities are present in lung bases notable in retrocardiac left lower lobe. No pneumothorax. 1. Limited due to low lung volumes. 2. Bibasilar opacities notable on the left could indicate bibasilar pneumonia or atelectasis. Opacities appear similar compared to prior from November 5th. XR CHEST PORTABLE    Result Date: 11/5/2021  EXAMINATION: ONE XRAY VIEW OF THE CHEST 11/5/2021 3:34 pm COMPARISON: 10/23/2021 HISTORY: ORDERING SYSTEM PROVIDED HISTORY: pna mucus plugging post bronch TECHNOLOGIST PROVIDED HISTORY: Reason for exam:->pna mucus plugging post bronch FINDINGS: An electronic cardiac device is again in place and again obscures a portion of the left chest, limiting the examination. Cable entry is via the left subclavian vein. Median sternotomy wire sutures again noted. A right venous catheter is again in place with distal tip obscured by pacer wires. Large left pleural effusion appears larger. Adjacent left lung opacity may be atelectasis and or pneumonia. There is a new interstitial infiltrate in the right mid lung which may be edema or pneumonitis. Similar more subtle new infiltrate is noted in the right lung base and right lung apex. No new right-sided pulmonary consolidation. No definite evidence of right pleural effusion. Cardiac size cannot be assessed since the left border is obscured by the large effusion. No evidence of pneumothorax. Large left pleural effusion appears larger with adjacent compressive atelectasis and or pneumonia. These obscure the left heart border and left diaphragm, once again.  New interstitial infiltrates in right lung may be edema and/or pneumonitis     XR ABDOMEN FOR NG/OG/NE TUBE PLACEMENT    Result Date: 11/30/2021  EXAMINATION: ONE SUPINE XRAY VIEW(S) OF THE ABDOMEN 11/30/2021 9:02 am COMPARISON: November 29, 2020 HISTORY: ORDERING SYSTEM PROVIDED HISTORY: The patient's records from referring provider and available information in the EHR was reviewed. Impression:  1. Acute encephalopathy with unresponsiveness concerning for severe brain injury status post respiratory distress. 2. Sepsis  3. Wound infection right foot  4. Respiratory distress requiring intubation    Presenting with altered mental status after developing sepsis from wound infection in the right foot. Patient with fairly profound vascular history and underlying medical conditions now with significant compromise of mental status. Exam showing absent corneal responses and negative pupillary responses as well. There has been no spontaneous movements observed not concerned about possible brain injury as a result of his clinical condition and    Principal Problem:    Septic shock due to undetermined organism [facility])  Active Problems:    Elevated troponin    Elevated brain natriuretic peptide (BNP) level    Electrolyte abnormality    Acute kidney injury superimposed on CKD ([facility] [address]    Urinary tract infection with hematuria    Melena    Postoperative wound infection    Aspiration pneumonia ([facility] [address]  Resolved Problems:    * No resolved hospital problems. *      Recommendations:                                            1. EEG for evaluation of brain activity  2. Continue supportive care with consideration of palliative care consultation at this point given lack of significant responsiveness. 3. Repeat CT scan of the head in a.m. to evaluate for possible anoxic brain injury  4. Further pending EEG results. It was my pleasure to evaluate Michele Valdes today. Please call with questions.       Electronically signed by Lucy Hall MD on 11/30/2021 at 4:07 PM

## 2021-11-30 NOTE — PROGRESS NOTES
Pharmacy Consultation Note  (Antibiotic Dosing and Monitoring)    Initial consult date: 11-  Consulting physician/provider: Gael Rutherford APRN- CNP  Drug: Vancomycin  Indication: Sepsis    Age/  Gender Height Weight IBW  Allergy Information   81 y.o./male   240 lb (108.9 kg)     Ideal body weight: 68.4 kg (150 lb 12.7 oz)  Adjusted ideal body weight: 84.6 kg (186 lb 7.6 oz)   Patient has no known allergies. Renal Function:  Recent Labs     11/29/21  1412   *   CREATININE 3.4*     No intake or output data in the 24 hours ending 11/29/21 2036    Vancomycin Monitoring:  Trough:  No results for input(s): VANCOTROUGH in the last 72 hours. Random:  No results for input(s): VANCORANDOM in the last 72 hours. Vancomycin Administration Times:  Recent vancomycin administrations      No vancomycin IV orders with administrations found. Orders not given:          vancomycin (VANCOCIN) 2,000 mg in dextrose 5 % 500 mL IVPB                Assessment:  · Patient is a [de-identified] y.o. male who has been initiated on vancomycin  · Estimated Creatinine Clearance: 21 mL/min (A) (based on SCr of 3.4 mg/dL (H)).   · To dose vancomycin, pharmacy will be utilizing dosing based off of levels because of patient's renal impairment/insufficiency    Plan:  · Will continue vancomycin 2000 mg IV once  · Will check vancomycin levels when appropriate  · Will continue to monitor renal function   · Clinical pharmacy to follow      Cinda Vaz [facility] 11/29/2021 8:36 PM

## 2021-11-30 NOTE — DISCHARGE INSTR - COC
Continuity of Care Form    Patient Name: Dickson Kim   :  1940  MRN:  [id number]    Admit date:  2021  Discharge date:  ***    Code Status Order: Full Code   Advance Directives:      Admitting Physician:  Shelly Barfield MD  PCP: No primary care provider on file.     Discharging Nurse: [facility] Unit/Room#: 2031/3443-S  Discharging Unit Phone Number: ***    Emergency Contact:   Extended Emergency Contact Information  Primary Emergency Contact: Conor Preston  Home Phone: [phone]  Mobile Phone: [phone]  Relation: Brother/Sister  Secondary Emergency Contact: Williams Amaya  Mobile Phone: [phone]  Relation: Child    Past Surgical History:  Past Surgical History:   Procedure Laterality Date    BIOPSY / LIGATION TEMPORAL ARTERY Bilateral 2021    BILATERAL TEMPORAL ARTERY BIOPSY performed by Harrison Marsh MD at [address] [place] [address], [address] N/A 2021    BRONCHOSCOPY DIAGNOSTIC OR CELL [address] ONLY and bal performed by Saundra Mccormick MD at [place] [address]      5 years ago    [address]      1 vessel and aortic valve repair    DIAGNOSTIC CARDIAC CATH LAB PROCEDURE  10/14/2008    DIAGNOSTIC CARDIAC CATH LAB PROCEDURE  10/21/2010    FEMORAL ENDARTERECTOMY Right 2021    RIGHT FEMORAL ENDARTERECTOMY performed by Sixto Frankel MD at [address] Right 10/2/2021    RIGHT FOOT TRANSMITTAL AMPUTATION performed by Chris Connolly DPM at [address] Bilateral 10/16/2021    RIGHT FOOT WOUND DEBRIDEMENT WITH APPLICATION WOUND VAC; LEFT FOOT AMPUTATION 2ND TOE performed by Chris Connolly DPM at [address] knee replacement    KNEE SURGERY      OTHER SURGICAL HISTORY Right 2017    debridement,bone bx foot    PACEMAKER INSERTION  2014    D-PPM   ()    Dr. Kwadwo Jonas      carotids       Immunization History: Immunization History   Administered Date(s) Administered    Influenza Virus Vaccine 12/13/2016    Pneumococcal Conjugate 13-valent ([id number]) 11/01/2016    Pneumococcal Conjugate Vaccine 12/13/2016       Active Problems:  Patient Active Problem List   Diagnosis Code    Essential hypertension I10    Peripheral vascular disease (HCC) I73.9    Pacemaker Z95.0    H/O aortic valve replacement Z95.2    CKD (chronic kidney disease) stage 3, GFR 30-59 ml/min N18.30    Type 2 diabetes mellitus with stage 3 chronic kidney disease, with long-term current use of insulin ([facility]) E11.22, N18.30, Z79.4    Pulmonary nodule R91.1    Diabetes mellitus type 2, uncontrolled ([facility] [address] E11.65    Hyperlipidemia LDL goal <100 E78.5    Acquired hypothyroidism E03.9    CAD (coronary artery disease), native coronary artery I25.10    Status post coronary artery bypass graft Z95.1    Chronic pain G89.29    History of CVA (cerebrovascular accident) Z86.73    Obesity (BMI 30-39. 9) E66.9    Anemia D64.9    Elevated sed rate R70.0    Vision decreased H54.7    Bilateral carotid bruits R09.89    Bilateral carotid artery stenosis I65.23    Elevated troponin R77.8    Nonrheumatic mitral valve regurgitation I34.0    Pulmonary hypertension ([facility]) I27.20    Moderate tricuspid regurgitation by prior echocardiography I07.1    (HFpEF) heart failure with preserved ejection fraction ([facility]) I50.30    COPD (chronic obstructive pulmonary disease) ([facility]) J44.9    Diabetic neuropathy associated with type 2 diabetes mellitus ([facility]) E11.40    Congestive heart failure ([facility]) I50.9    Cellulitis L03.90    Peripheral vascular disease of lower extremity with ulceration ([facility]) I73.9, L97.909    Critical lower limb ischemia ([facility]) I70.229    Nonrheumatic mitral valve stenosis I34.2    Severe left ventricular hypertrophy I51.7    Cardiomyopathy ([facility]) I42.9    Ischemic foot ulcer due to atherosclerosis of native artery of limb ([facility]) I70.25, L97.509    Wound infection T14. 8XXA, L08.9 AMS (altered mental status) R41.82    Acute delirium R41.0    Acute on chronic respiratory failure with hypoxia (HCC) J96.21    Mucus plugging of bronchi T17.500A    Shock ([facility] [address] R57.9    Septic shock due to undetermined organism ([facility] [address] A41.9, R65.21    Elevated brain natriuretic peptide (BNP) level R79.89    Electrolyte abnormality E87.8    Acute kidney injury superimposed on CKD ([facility] [address] N17.9, N18.9    Urinary tract infection with hematuria N39.0, R31.9    Melena K92.1    Postoperative wound infection T81.49XA    Aspiration pneumonia ([facility] [address] J69.0       Isolation/Infection:   Isolation            Contact          Patient Infection Status       Infection Onset Added Last Indicated Last Indicated By Review Planned Expiration Resolved Resolved By    MRSA 10/16/21 10/18/21 10/16/21 Culture, Surgical        Wound-RIGHT FOOT WOUND TISSUE 10/16/21    Resolved    COVID-19 (Rule Out) 11/29/21 11/29/21 11/29/21 COVID-19, Rapid (Ordered)   11/29/21 Rule-Out Test Resulted    COVID-19 (Rule Out) 11/03/21 11/03/21 11/04/21 Respiratory Panel, Molecular, with COVID-19 (Restricted: peds pts or suitable admitted adults) (Ordered)   11/04/21 Rule-Out Test Resulted    COVID-19 (Rule Out) 11/03/21 11/03/21 11/03/21 COVID-19, Rapid (Ordered)   11/03/21 Rule-Out Test Resulted    COVID-19 (Rule Out) 10/03/21 10/03/21 10/03/21 COVID-19, Rapid (Ordered)   10/03/21 Rule-Out Test Resulted    COVID-19 (Rule Out) 01/19/21 01/19/21 01/19/21 COVID-19 (Ordered)   01/19/21 Rule-Out Test Resulted    COVID-19 (Rule Out) 12/29/20 12/29/20 12/29/20 Covid-19 Ambulatory (Ordered)   12/31/20 Rule-Out Test Resulted            Nurse Assessment:  Last Vital Signs: BP (!) 114/48   Pulse 98   Temp 97 °F (36.1 °C) (Bladder)   Resp 18   Wt 205 lb (93 kg)   SpO2 (!) 89%   BMI 31.17 kg/m²     Last documented pain score (0-10 scale): Pain Level: 0  Last Weight:   Wt Readings from Last 1 Encounters:   11/30/21 205 lb (93 kg)     Mental Status:  {IP PT MENTAL STATUS:20030}    IV Access:  { PAUL IV ACCESS:363881309}    Nursing Mobility/ADLs:  Walking   {[facility] DME CYOB:699827423}  Transfer  {[facility] DME QTEY:075493547}  Bathing  {[facility] DME LQUP:924961864}  Dressing  {[facility] DME EWA:977545250}  Toileting  {[facility] DME RXMM:428801172}  Feeding  {[facility] DME MBBP:045922089}  Med Admin  {[facility] DME BOEZ:128578215}  Med Delivery   {[facility] MED Delivery:419977253}    Wound Care Documentation and Therapy:  Negative Pressure Wound Therapy Foot Anterior; Right (Active)   Number of days: 0       Wound 08/21/21 Toe (Comment  which one) Anterior; Left (Active)   Number of days: 101       Wound 10/16/21 Buttocks Right (Active)   Wound Image   11/04/21 1200   Wound Etiology Pressure Unstageable 11/04/21 1200   Dressing Status Dry; Clean;  Intact 11/30/21 0815   Wound Cleansed Cleansed with saline 11/29/21 2200   Dressing/Treatment ABD 11/29/21 2200   Dressing Change Due 11/07/21 11/06/21 0900   Wound Length (cm) 3 cm 11/29/21 2200   Wound Width (cm) 3 cm 11/29/21 2200   Wound Depth (cm) 0.01 cm 11/29/21 2200   Wound Surface Area (cm^2) 9 cm^2 11/29/21 2200   Change in Wound Size % (l*w) 35.71 11/29/21 2200   Wound Volume (cm^3) 0.09 cm^3 11/29/21 2200   Wound Healing % 94 11/29/21 2200   Wound Assessment Pink/red 11/06/21 0630   Drainage Amount Small 11/06/21 0630   Drainage Description Yellow 11/06/21 0630   Odor None 11/06/21 0630   Wendy-wound Assessment Blanchable erythema 11/06/21 0630   Number of days: 45       Wound 10/19/21 Foot Right #1 Metatarsal amp site (Active)   Wound Image   11/22/21 1437   Dressing Status Clean; Dry; Intact 11/30/21 0815   Wound Cleansed Cleansed with saline 11/29/21 2200   Dressing/Treatment Xeroform; Dry dressing 11/29/21 2200   Offloading for Diabetic Foot Ulcers No offloading required 11/22/21 1523   Dressing Change Due 11/08/21 11/05/21 0830   Wound Length (cm) 7.8 cm 11/22/21 1437   Wound Width (cm) 10 cm 11/22/21 1437   Wound Depth (cm) 0.2 cm 11/22/21 1437   Wound Surface Area (cm^2) 78 cm^2 11/22/21 1437   Change in Wound Size % (l*w) 52 11/22/21 1437   Wound Volume (cm^3) 15.6 cm^3 11/22/21 1437   Wound Healing % 4 11/22/21 1437   Post-Procedure Length (cm) 7.8 cm 11/22/21 1512   Post-Procedure Width (cm) 10 cm 11/22/21 1512   Post-Procedure Depth (cm) 0.2 cm 11/22/21 1512   Post-Procedure Surface Area (cm^2) 78 cm^2 11/22/21 1512   Post-Procedure Volume (cm^3) 15.6 cm^3 11/22/21 1512   Wound Assessment Exposed structure bone; Granulation tissue; Fibrin; Eschar moist 11/29/21 2200   Drainage Amount Small 11/29/21 2200   Drainage Description Serosanguinous 11/29/21 2200   Odor None 11/29/21 2200   Wendy-wound Assessment Maceration 11/29/21 2200   Wound Thickness Description not for Pressure Injury Full thickness 11/29/21 2200   Number of days: 42       Wound 11/04/21 Heel Right #3 (Active)   Wound Image   11/22/21 1437   Wound Etiology Pressure Unstageable 11/29/21 2200   Dressing Status Clean;  Intact; Dry 11/30/21 0815   Wound Cleansed Cleansed with saline 11/29/21 2200   Dressing/Treatment Dry dressing; Xeroform 11/29/21 2200   Offloading for Diabetic Foot Ulcers Other (comment) 11/22/21 1523   Wound Length (cm) 3.5 cm 11/29/21 2200   Wound Width (cm) 4 cm 11/29/21 2200   Wound Depth (cm) 0.01 cm 11/29/21 2200   Wound Surface Area (cm^2) 14 cm^2 11/29/21 2200   Change in Wound Size % (l*w) 30 11/29/21 2200   Wound Volume (cm^3) 0.14 cm^3 11/29/21 2200   Wound Healing % 83 11/29/21 2200   Post-Procedure Length (cm) 2.8 cm 11/22/21 1512   Post-Procedure Width (cm) 3 cm 11/22/21 1512   Post-Procedure Depth (cm) 0.1 cm 11/22/21 1512   Post-Procedure Surface Area (cm^2) 8.4 cm^2 11/22/21 1512   Post-Procedure Volume (cm^3) 0.84 cm^3 11/22/21 1512   Wound Assessment Eschar dry 11/29/21 2200   Drainage Amount None 11/29/21 2200   Drainage Description Sanguinous 11/04/21 1200   Odor None 11/29/21 2200   Wendy-wound Assessment Dry/flaky 11/29/21 2200   Number of days: 26       Wound 11/22/21 Leg Right; Lower;  Anterior #4 (Active)   Wound Image   11/22/21 1437   Dressing Status Clean; Dry; Intact 11/30/21 0815   Wound Cleansed Cleansed with saline 11/29/21 2200   Dressing/Treatment Dry dressing; Xeroform 11/29/21 2200   Offloading for Diabetic Foot Ulcers Other (comment) 11/22/21 1523   Wound Length (cm) 1 cm 11/29/21 2200   Wound Width (cm) 7 cm 11/29/21 2200   Wound Depth (cm) 0.01 cm 11/29/21 2200   Wound Surface Area (cm^2) 7 cm^2 11/29/21 2200   Change in Wound Size % (l*w) -2.34 11/29/21 2200   Wound Volume (cm^3) 0.07 cm^3 11/29/21 2200   Wound Healing % 90 11/29/21 2200   Post-Procedure Length (cm) 1.9 cm 11/22/21 1512   Post-Procedure Width (cm) 3.6 cm 11/22/21 1512   Post-Procedure Depth (cm) 0.1 cm 11/22/21 1512   Post-Procedure Surface Area (cm^2) 6.84 cm^2 11/22/21 1512   Post-Procedure Volume (cm^3) 0.684 cm^3 11/22/21 1512   Wound Assessment Pink/red 11/29/21 2200   Drainage Amount Scant 11/29/21 2200   Drainage Description Serous 11/29/21 2200   Odor None 11/29/21 2200   Wendy-wound Assessment Blanchable erythema 11/29/21 2200   Number of days: 8       Wound 11/22/21 Toe (Comment  which one) Left #2 second toe amp site (Active)   Wound Image   11/22/21 1437   Dressing Status Clean; Dry; Intact 11/30/21 0815   Wound Cleansed Cleansed with saline 11/22/21 1523   Dressing/Treatment Dry dressing; Xeroform 11/22/21 1523   Offloading for Diabetic Foot Ulcers No offloading required 11/22/21 1523   Wound Length (cm) 7 cm 11/22/21 1437   Wound Width (cm) 0.1 cm 11/22/21 1437   Wound Depth (cm) 0.1 cm 11/22/21 1437   Wound Surface Area (cm^2) 0.7 cm^2 11/22/21 1437   Wound Volume (cm^3) 0.07 cm^3 11/22/21 1437   Post-Procedure Length (cm) 7 cm 11/22/21 1512   Post-Procedure Width (cm) 0.1 cm 11/22/21 1512   Post-Procedure Depth (cm) 0.1 cm 11/22/21 1512   Post-Procedure Surface Area (cm^2) 0.7 cm^2 11/22/21 1512   Post-Procedure Volume (cm^3) 0.07 cm^3 11/22/21 1512   Wound Assessment Dry 21 1437   Drainage Amount None 21 1437   Odor None 21 1437   Wendy-wound Assessment Intact 21 1437   Number of days: 8        Elimination:  Continence: Bowel: {YES / HR:14995}  Bladder: {YES / XA:28946}  Urinary Catheter: {Urinary Catheter:538198830}   Colostomy/Ileostomy/Ileal Conduit: {YES / WS:38213}       Date of Last BM: ***    Intake/Output Summary (Last 24 hours) at 2021 1504  Last data filed at 2021 1400  Gross per 24 hour   Intake 6779 ml   Output 700 ml   Net 6079 ml     I/O last 3 completed shifts:   In: 6779 [I.V.:6779]  Out: 700 [Emesis/NG output:700]    Safety Concerns:     [address] Safety Concerns:154392474}    Impairments/Disabilities:      [address] Impairments/Disabilities:017521739}    Nutrition Therapy:  Current Nutrition Therapy:   [address] Diet List:511676641}    Routes of Feeding: {CHP DME Other Feedings:121860381}  Liquids: {Slp liquid thickness:26636}  Daily Fluid Restriction: {CHP DME Yes amt example:768286472}  Last Modified Barium Swallow with Video (Video Swallowing Test): {Done Not Done SBTW:373903443}    Treatments at the Time of Hospital Discharge:   Respiratory Treatments: ***  Oxygen Therapy:  {Therapy; copd oxygen:16791}  Ventilator:    { CC Vent AUWO:970995920}    Rehab Therapies: {THERAPEUTIC INTERVENTION:3655552626}  Weight Bearing Status/Restrictions: [address] Weight Bearin}  Other Medical Equipment (for information only, NOT a DME order):  {EQUIPMENT:417184712}  Other Treatments: ***    Patient's personal belongings (please select all that are sent with patient):  {CHP DME Belongings:993497425}    RN SIGNATURE:  {Esignature:623741995}    CASE MANAGEMENT/SOCIAL WORK SECTION    Inpatient Status Date: ***    Readmission Risk Assessment Score:  Readmission Risk              Risk of Unplanned Readmission:  52           Discharging to Facility/ Agency   Name: [facility]   Address:  Phone:  Fax:    Dialysis Facility (if applicable) Name:  Address:  Dialysis Schedule:  Phone:  Fax:    / signature: Electronically signed by Sonido Castillo RN on 11/30/2021 at 3:04 PM      PHYSICIAN SECTION    Prognosis: {Prognosis:2963867252}    Condition at Discharge: Mary Salazar Patient Condition:827172672}    Rehab Potential (if transferring to Rehab): {Prognosis:4215184359}    Recommended Labs or Other Treatments After Discharge: ***    Physician Certification: I certify the above information and transfer of Jayden Judd  is necessary for the continuing treatment of the diagnosis listed and that he requires {Admit to Appropriate Level of Care:70985} for {GREATER/LESS:084314907} 30 days.      Update Admission H&P: {CHP DME Changes in KALO:891563876}    PHYSICIAN SIGNATURE:  {Esignature:161353880}

## 2021-11-30 NOTE — PROGRESS NOTES
2155: artic sun pads applied. Cooling started at this time to a goal of 33 degrees.  Patient temp currently 36.4

## 2021-11-30 NOTE — CONSULTS
Department of Podiatry   Consult Note        Reason for Consult:  RLE foot wound    CHIEF COMPLAINT:  RLE foot wound    HISTORY OF PRESENT ILLNESS:                  Mr. Lindsay Jacobs is a [de-identified] y.o. male with significant past medical history of DM2 with peripheral neuropathy, PVD, CHF, CAD, CKD stage 3, NSTEM, cerebral infarction and vision decreased. Patient was brought to the ED from the nursing home on 11/29/21 for complaints of RLE foot wound, AMS and shortness of breath. Podiatry consulted for RLE foot, S/P right TMA (DOS 10/02/21). Patient is not verbal. He is not alert, awake, and responsive. Further information on this patient was done through chart review.      Past Medical History:        Diagnosis Date    (HFpEF) heart failure with preserved ejection fraction ([address] 07/2020    Diagnosed by cardiology    Arthritis     Bilateral carotid artery stenosis 12/31/2020    Bilateral carotid bruits 12/31/2020    Blood circulation, collateral     CAD (coronary artery disease)     Carotid bruit 03/27/2013    CH[facility]congestive [facility] failure) [facility])     Chronic kidney disease     CKD (chronic kidney disease) stage 3, GFR 30-59 ml/min ([facility]) 03/20/2016    COPD (chronic obstructive pulmonary disease) ([address]     Follows with pulmonary    Diabetes mellitus ([address]     Diabetic neuropathy associated with type 2 diabetes mellitus (HCC)     Dyspnea on exertion     History of blood transfusion     Hyperlipidemia     Hypertension     Mild mitral regurgitation     Moderate tricuspid regurgitation by prior echocardiography 2018    Movement disorder     NSTEMI, initial episode of care ([address] 01/2018    Obesity     Pulmonary hypertension ([address] 2018    PVD (peripheral vascular disease) with claudication ([address] 03/27/2014    S/P carotid endarterectomy 03/27/2013    Sleep apnea     SOB (shortness of breath)     Thyroid disease     Unspecified cerebral artery occlusion with cerebral infarction     Vision decreased 12/31/2020   ·     Past Surgical History:        Procedure Laterality Date    BIOPSY / LIGATION TEMPORAL ARTERY Bilateral 01/05/2021    BILATERAL TEMPORAL ARTERY BIOPSY performed by Haywood Gowers, MD at [address] [place] [address] 11/4/2021    BRONCHOSCOPY DIAGNOSTIC OR CELL [address] ONLY and bal performed by Erica Yan MD at [place] [address]      5 years ago   [address] GRAFT  2008    1 vessel and aortic valve repair    DIAGNOSTIC CARDIAC CATH LAB PROCEDURE  10/14/2008    DIAGNOSTIC CARDIAC CATH LAB PROCEDURE  10/21/2010    FEMORAL ENDARTERECTOMY Right 9/30/2021    RIGHT FEMORAL ENDARTERECTOMY performed by Roberth Walters MD at [facility] Right 10/2/2021    RIGHT FOOT TRANSMITTAL AMPUTATION performed by Toni Hagan DPM at [facility] Bilateral 10/16/2021    RIGHT FOOT WOUND DEBRIDEMENT WITH APPLICATION WOUND VAC; LEFT FOOT AMPUTATION 2ND TOE performed by Ramakrishna Ross DPM at [facility]      R knee replacement    KNEE SURGERY      OTHER SURGICAL HISTORY Right 02/14/2017    debridement,bone bx foot    PACEMAKER INSERTION  11/12/2014    D-PPM   ()    Dr. Alexis Peoples      carotids   ·     Medications Prior to Admission:    · Medications Prior to Admission: Heparin Sodium, Porcine, (HEPARIN, PORCINE,) 53280 UNIT/ML injection, Inject 0.5 mLs into the skin every 12 hours  · gabapentin (NEURONTIN) 100 MG capsule, Take 2 capsules by mouth 4 times daily for 30 days. · atorvastatin (LIPITOR) 10 MG tablet, Take 1 tablet by mouth daily  · acetylcysteine (MUCOMYST) 10 % nebulizer solution, Inhale 4 mLs into the lungs every 6 hours  · silver sulfADIAZINE (SILVADENE) 1 % cream, Apply topically daily.   · bumetanide (BUMEX) 1 MG tablet, Take 1 tablet by mouth 2 times daily  · polyethylene glycol (GLYCOLAX) 17 g packet, Take 17 g by mouth daily as needed for 120 MG extended release tablet, Take 1 tablet by mouth daily  · allopurinol (ZYLOPRIM) 300 MG tablet, Take 300 mg by mouth daily     Allergies:  Patient has no known allergies. Social History:   · TOBACCO:   reports that he quit smoking about 40 years ago. His smoking use included cigarettes. He started smoking about 68 years ago. He has a 50.00 pack-year smoking history. He has never used smokeless tobacco.  · ETOH:   reports no history of alcohol use. DRUGS:   Social History     Substance and Sexual Activity   Drug Use Not Currently    Comment: no longer eating edibles. states he had a bad episode. ·     Family History:       Problem Relation Age of Onset    Heart Disease Mother     Heart Failure Mother     Heart Surgery Father     Heart Disease Father     Heart Failure Father     High Blood Pressure Sister     Cancer Sister    ·       REVIEW OF SYSTEMS:    All pertinent positives and negatives as noted in HPI       LOWER EXTREMITY EXAMINATION     VASCULAR:  DP and PT pulses are monophasic on doppler, B/L.  CFT delayed left digits. Warm to warm from the tibial tuberosity to the distal aspect of the foot dorsally b/l. No hair growth noted to the distal aspects dorsally.     NEUROLOGIC:  Deferred due to patient's conditions.     DERM:  General skin appearance is thin, taut, dry plantarly, and erythematous to B/L LE  RLE:     Wound #1:   Evidence of right TMA. No wound vac noted. 10x9x0.2 cm wound presented to the site of TMA. Wound bed is 80% granular, 10% necrotic and 10% fibrotic in nature. Exposed metatarsals with mild serous drainage and  no active bleeding noted. No undermining, tunneling, streaking, malodor, or maceration. Periwound erythema noted. Wound #2:  3x3cm wound noted to the postero-medial right heel. Eschar is well-adhered without noted drainage, active bleeding, malodor or maceration. No bone exposed. No undermining, tunneling or streaking noted.   Mild periwound erythema noted.  Wound is stable    LLE:   - Second digit incision site is healed. No noted dehiscence. No noted pus or drainage. Small spot of dried blood at the incision site  -  Wound #3:  1cmx0.5cm scabbed wound noted to the dorsal aspect of the left 3rd digit. Wound does not probe deep. No noted pus or drainage.  No undermining, tunneling, streaking, malodor, or maceration. Mild periwound erythema noted. MUSCULOSKELETAL:  RLE TMA and LLE Second digit amputation.  Equinus b/l.                          CONSULTS:  IP CONSULT TO CRITICAL CARE  IP CONSULT TO INTERNAL MEDICINE  PHARMACY TO DOSE VANCOMYCIN  IP CONSULT TO INFECTIOUS DISEASES  IP CONSULT TO CARDIOLOGY  IP CONSULT TO GENERAL SURGERY  IP CONSULT TO NEPHROLOGY  IP CONSULT TO CASE MANAGEMENT  IP CONSULT TO NEUROLOGY  IP CONSULT TO GENERAL SURGERY  IP CONSULT TO PODIATRY    MEDICATION:  Scheduled Meds:   ipratropium-albuterol  1 ampule Inhalation Q4H WA    budesonide  0.5 mg Nebulization BID    sodium chloride flush  5-40 mL IntraVENous 2 times per day    cefepime  2,000 mg IntraVENous Q8H    pantoprazole  40 mg IntraVENous BID    And    sodium chloride (PF)  10 mL IntraVENous BID    chlorhexidine  15 mL Mouth/Throat BID     Continuous Infusions:   dextrose      dextrose 5% and 0.45% NaCl with KCl 20 mEq      insulin 36.9 Units/hr (11/30/21 0800)    IV infusion builder 150 mL/hr at 11/30/21 0842    fentaNYL 5 mcg/ml in 0.9%  ml infusion      norepinephrine 12 mcg/min (11/30/21 0840)    sodium chloride       PRN Meds:.ipratropium-albuterol, glucose, glucagon (rDNA), dextrose, dextrose, potassium chloride, magnesium sulfate, sodium phosphate IVPB **OR** sodium phosphate IVPB **OR** sodium phosphate IVPB, dextrose 5% and 0.45% NaCl with KCl 20 mEq, midazolam, fentanNYL, sodium chloride flush, sodium chloride, acetaminophen **OR** acetaminophen, ondansetron **OR** ondansetron, perflutren lipid microspheres, fentanNYL    RADIOLOGY:  CT ABDOMEN PELVIS WO CONTRAST Additional Contrast? None   Final Result   The visualized lungs demonstrate aspiration versus pneumonia. Aspiration is   favored. No acute inflammatory abnormality is evident in the abdomen or pelvis. The infrarenal aorta measures 2.9 cm. See recommendation below. Sigmoid diverticulosis without evidence of diverticulitis. RECOMMENDATIONS:   2.9 cm infrarenal abdominal aortic aneurysm suspected. Recommend follow-up   every 5 years. Reference: J Am Zeinab Radiol 5084;85:821-554. XR CHEST PORTABLE   Final Result   ET tube tip within 1.0 cm of the nato. XR ABDOMEN FOR NG/OG/NE TUBE PLACEMENT   Final Result   Enteric tube tip just distal to the GE junction. XR CHEST PORTABLE   Final Result   1. Limited due to low lung volumes. 2. Bibasilar opacities notable on the left could indicate bibasilar pneumonia   or atelectasis. Opacities appear similar compared to prior from November 5th. XR FOOT RIGHT (2 VIEWS)   Final Result   1. Postsurgical changes including prior transmetatarsal amputation of the   right foot. 2. No radiographic evidence of osteomyelitis. Improvement of soft tissue   swelling.          XR CHEST PORTABLE    (Results Pending)   XR CHEST PORTABLE    (Results Pending)   XR ABDOMEN FOR NG/OG/NE TUBE PLACEMENT    (Results Pending)       Vitals:    BP (!) 129/51   Pulse 87   Temp 93.9 °F (34.4 °C) (Bladder)   Resp 18   Wt 205 lb (93 kg)   SpO2 93%   BMI 31.17 kg/m²     LABS:   Recent Labs     11/29/21  1412 11/30/21  0201   WBC 18.5*  --    HGB 10.3* 8.7*   HCT 33.1* 28.4*     --      Recent Labs     11/30/21  0610      K 3.7   CL 98   CO2 19*   PHOS 3.5   *   CREATININE 3.1*     Recent Labs     11/29/21  1412 11/29/21 2014 11/29/21  2301   PROT 8.3 6.0*  --    INR 1.6  --  2.1   APTT 40.1*  --  43.2*       ASSESSMENTS:   - 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 - OM   - PAD  - DM with neuropathic changes   - AMS    Septic shock   UTI with hematuria   Aspiration pneumonia   Post-cardiac arrest   Elevated troponin an BNP level   Acute respiratory failure   HARVEY   Suspected GI bleed   DKA       PLAN:    -  Patient was examined and evaluated. Reviewed patient's recent lab results and pertinent diagnostic imaging. Reviewed ancillary service notes. -  WBC 18.5  -  Wound vac and dressings applied today. Wound vac is running at 125 mmHg low continuous setting with excellent seal.   T,TH,Sat wound vac and dressings to RLE.  -  Pain Control: IV and PO  -  Pt. Currently on vanco.  ID consulted  -  Vascular consulted:  No intervention at this time  -  Cardiology,  Nephrology, Neurology, and General surgery also consulted  -  Offloading with Prevalon boots while laying in bed. -  Will continue to follow patient while they are in-house. -  Discussed patient with Dr. Lucie Grey       Thank you for involving podiatry in this patients care. Please do not hesitate to call with any questions or concerns.   Vita Dawson, Podiatry PGY1  11/30/2021  9:43 AM

## 2021-11-30 NOTE — PLAN OF CARE
to appropriate level of care  Description: Discharged to appropriate level of care  11/30/2021 1652 by Caryl Anderson  Outcome: Not Met This Shift  11/30/2021 0829 by Caryl Anderson  Outcome: Not Met This Shift     Problem: Infection - Methicillin-Resistant Staphylococcus Aureus Infection:  Goal: Absence of methicillin-resistant Staphylococcus aureus infection  Description: Absence of methicillin-resistant Staphylococcus aureus infection  11/30/2021 1652 by Caryl Anderson  Outcome: Not Met This Shift  11/30/2021 0829 by Caryl Anderson  Outcome: Not Met This Shift     Problem: Fluid Volume - Imbalance:  Goal: Absence of imbalanced fluid volume signs and symptoms  Description: Absence of imbalanced fluid volume signs and symptoms  11/30/2021 1652 by Caryl Anderson  Outcome: Not Met This Shift  11/30/2021 0829 by Caryl Anderson  Outcome: Not Met This Shift  Goal: Will show no signs and symptoms of excessive bleeding  Description: Will show no signs and symptoms of excessive bleeding  11/30/2021 1652 by Caryl Anderson  Outcome: Not Met This Shift  11/30/2021 0829 by Caryl Anderson  Outcome: Not Met This Shift     Problem: Skin Integrity:  Goal: Will show no infection signs and symptoms  Description: Will show no infection signs and symptoms  Outcome: Met This Shift  Goal: Absence of new skin breakdown  Description: Absence of new skin breakdown  Outcome: Met This Shift     Problem: Falls - Risk of:  Goal: Will remain free from falls  Description: Will remain free from falls  Outcome: Met This Shift  Goal: Absence of physical injury  Description: Absence of physical injury  Outcome: Met This Shift

## 2021-11-30 NOTE — PROGRESS NOTES
Consults contacted via perfect serve:     Nephrology - Dr. Reyes Pian Dr Katharine Atlas on call  Ting Miller - Dr. Darlin Wilde on call  Infectious Disease - Dr. Susy Cazares - Dr. Elisa Pope on call  Neurology - Dr. Christiano Saldana - Dr. Lore Stewart on call

## 2021-11-30 NOTE — CONSULTS
Department of Internal Medicine  Infectious Diseases   Consult Note      Reason for Consult:  Septic shock, MRSA bacteremia, Resp failure s/p code blue     Requesting Physician:  Dr Lolis Carcamo:                Pt is known to ID . This is an [de-identified] yrs old male with hx  of DM, CAD, HTN, CHF , PAD who underwent right TMA on 10/2/2021-complicated by stump wound infection ,osteomyelitis - MRSA, Pseudomonas , Citrobacter . Pt completed 6 weeks of daptomycin and meropenem . Off abx since 11/14. Pt was in the ID office yesterday -he was wheezy , had leukocytosis of 22 K and bone was palpated on the stump wound ,. Pt was sent to the ER for further management .   Pt went into V tach , cardiac arrest - code status changed to full code.,resuscitated, intubated and admitted to the MICU   Pt was hypotensive, hypothermic   WBC was 18 K, lactic acid 13, procalcitonin 9.7  Cefepime and vancomycin given   Blood cx - MRSA ( PCR )   And Urine cx - GNR     Past Medical History:      Past Surgical History:   Procedure Laterality Date    BIOPSY / LIGATION TEMPORAL ARTERY Bilateral 01/05/2021    BILATERAL TEMPORAL ARTERY BIOPSY performed by Reyes Ag MD at [facility] [address] N/A 11/4/2021    BRONCHOSCOPY DIAGNOSTIC OR CELL [address] ONLY and bal performed by Ilene Reyes MD at [place] [address]      5 years ago   [address] GRAFT  2008    1 vessel and aortic valve repair    DIAGNOSTIC CARDIAC CATH LAB PROCEDURE  10/14/2008    DIAGNOSTIC CARDIAC CATH LAB PROCEDURE  10/21/2010    FEMORAL ENDARTERECTOMY Right 9/30/2021    RIGHT FEMORAL ENDARTERECTOMY performed by Wilian Valdez MD at [facility] Right 10/2/2021    RIGHT FOOT TRANSMITTAL AMPUTATION performed by Portia Gonzalez DPM at [facility] Bilateral 10/16/2021    RIGHT FOOT WOUND DEBRIDEMENT WITH APPLICATION WOUND VAC; LEFT FOOT AMPUTATION 2ND TOE performed by Ramakrishna Oliver DPM at [address] [facility]      R knee replacement    KNEE SURGERY      OTHER SURGICAL HISTORY Right 02/14/2017    debridement,bone bx foot    PACEMAKER INSERTION  11/12/2014    D-PPM   (TAMI)    Dr. Samina johnson         Past Surgical History:      Past Surgical History:   Procedure Laterality Date    BIOPSY / LIGATION TEMPORAL ARTERY Bilateral 01/05/2021    BILATERAL TEMPORAL ARTERY BIOPSY performed by Sunita Banda MD at [address] N/A 11/4/2021    BRONCHOSCOPY DIAGNOSTIC OR CELL [address] ONLY and bal performed by Sultana Camara MD at [facility] [address]      5 years ago   [address] GRA[address]  2008    1 vessel and aortic valve repair    DIAGNOSTIC CARDIAC CATH LAB PROCEDURE  10/14/2008    DIAGNOSTIC CARDIAC CATH LAB PROCEDURE  10/21/2010    FEMORAL ENDARTERECTOMY Right 9/30/2021    RIGHT FEMORAL ENDARTERECTOMY performed by Jung Woodruff MD at [facility] Right 10/2/2021    RIGHT FOOT TRANSMITTAL AMPUTATION performed by Lisy Soto DPM at [facility] Bilateral 10/16/2021    RIGHT FOOT WOUND DEBRIDEMENT WITH APPLICATION WOUND VAC; LEFT FOOT AMPUTATION 2ND TOE performed by Lisy Soto DPM at [address] [facility]      R knee replacement    KNEE SURGERY      OTHER SURGICAL HISTORY Right 02/14/2017    debridement,bone bx foot    PACEMAKER INSERTION  11/12/2014    D-PPM   (TAMI)    Dr. Samina johnson       Current Medications:      Current Facility-Administered Medications   Medication Dose Route Frequency Provider Last Rate Last Admin    ipratropium-albuterol (DUONEB) nebulizer solution 1 ampule  1 ampule Inhalation Q4H PRN MADDI Hawkins - CNP        glucose (GLUTOSE) 40 % oral gel 15 g  15 g Oral PRN MADDI Miramontes - CNP        glucagon (rDNA) injection 1 mg  1 mg IntraMUSCular PRN MADDI Hawkins CNP        dextrose 5 % solution  100 mL/hr IntraVENous PRN MADDI Jones CNP        dextrose 50 % IV solution  12.5 g IntraVENous PRN MADDI Jones CNP        potassium chloride 10 mEq/100 mL IVPB (Peripheral Line)  10 mEq IntraVENous PRN MADDI Jones  mL/hr at 11/30/21 0938 10 mEq at 11/30/21 0938    magnesium sulfate 1000 mg in dextrose 5% 100 mL IVPB  1,000 mg IntraVENous PRN MADDI Jones CNP        sodium phosphate 10 mmol in dextrose 5 % 250 mL IVPB  10 mmol IntraVENous PRN MADDI Jones CNP        Or    sodium phosphate 15 mmol in dextrose 5 % 250 mL IVPB  15 mmol IntraVENous PRN MADDI Jones CNP        Or    sodium phosphate 20 mmol in dextrose 5 % 500 mL IVPB  20 mmol IntraVENous PRN MADDI Jones CNP        dextrose 5 % and 0.45 % NaCl with KCl 20 mEq infusion   IntraVENous Continuous PRN MADDI Jones  mL/hr at 11/30/21 1032 New Bag at 11/30/21 1032    insulin regular (HUMULIN R;NOVOLIN R) 100 Units in sodium chloride 0.9 % 100 mL infusion  0.1 Units/kg/hr IntraVENous Continuous MADDI Hawkins CNP 4.1 mL/hr at 11/30/21 1200 4.12 Units/hr at 11/30/21 1200    ipratropium-albuterol (DUONEB) nebulizer solution 1 ampule  1 ampule Inhalation Q4H WA MADDI Connors CNP   1 ampule at 11/30/21 0950    budesonide (PULMICORT) nebulizer suspension 500 mcg  0.5 mg Nebulization BID MADDI Connors CNP   500 mcg at 11/30/21 6181    vancomycin (VANCOCIN) intermittent dosing (placeholder)   Other RX Placeholder MADDI Jones CNP   Given at 11/30/21 1206    [START ON 12/1/2021] epoetin alessandra-epbx (RETACRIT) injection 2,790 Units  30 Units/kg SubCUTAneous Once per day on Mon Wed Fri Sean Davis MD        midazolam PF (VERSED) injection 2 mg  2 mg IntraVENous Q1H PRN Bria Cordova,         norepinephrine (LEVOPHED) 16 mg in dextrose 5% 250 mL infusion  2-100 mcg/min IntraVENous Continuous Inetta Bernadine, DO 7.5 mL/hr at 11/30/21 1036 8 mcg/min at 11/30/21 1036    sodium chloride flush 0.9 % injection 5-40 mL  5-40 mL IntraVENous 2 times per day Fabi Sven, APRN - CNP   10 mL at 11/30/21 0956    sodium chloride flush 0.9 % injection 5-40 mL  5-40 mL IntraVENous PRN Sandrine Dong, APRN - CNP        0.9 % sodium chloride infusion  25 mL IntraVENous PRN Fabi Sven, APRN - CNP        acetaminophen (TYLENOL) tablet 650 mg  650 mg Oral Q6H PRN Fabi Sven, APRN - CNP        Or    acetaminophen (TYLENOL) suppository 650 mg  650 mg Rectal Q6H PRN Fabi Sven, APRN - CNP        cefepime (MAXIPIME) 2000 mg IVPB minibag  2,000 mg IntraVENous Q8H Sandrine Dong, APRN - CNP        pantoprazole (PROTONIX) injection 40 mg  40 mg IntraVENous BID Fabi Sven, APRN - CNP   40 mg at 11/30/21 2138    And    sodium chloride (PF) 0.9 % injection 10 mL  10 mL IntraVENous BID Fabi Sven, APRN - CNP   10 mL at 11/30/21 0955    ondansetron (ZOFRAN-ODT) disintegrating tablet 4 mg  4 mg Oral Q8H PRN Fabi Sven, APRN - CNP        Or    ondansetron (ZOFRAN) injection 4 mg  4 mg IntraVENous Q6H PRN Sandrine Dong, APRN - CNP        chlorhexidine (PERIDEX) 0.12 % solution 15 mL  15 mL Mouth/Throat BID Sandrine Dong, APRN - CNP   15 mL at 11/30/21 6425    perflutren lipid microspheres (DEFINITY) injection 1.65 mg  1.5 mL IntraVENous ONCE PRN Fabi Sven, APRN - CNP        fentaNYL (SUBLIMAZE) injection 25 mcg  25 mcg IntraVENous Q1H PRN Fabi Sven, APRN - CNP           Allergies:  Patient has no known allergies.     Social History:      Social History     Socioeconomic History    Marital status:      Spouse name: Not on file    Number of children: Not on file    Years of education: Not on file    Highest education level: Not on file   Occupational History    Occupation: retired- [employer]   Tobacco Use    Smoking status: Former Smoker     Packs/day: 2.00     Years: 25.00     Pack years: 50.00     Types: Cigarettes     Start date: 1953     Quit date: 1981     Years since quittin.4    Smokeless tobacco: Never Used   Vaping Use    Vaping Use: Never used    Passive vaping exposure: Yes   Substance and Sexual Activity    Alcohol use: Never     Comment:      Drug use: Not Currently     Comment: no longer eating edibles. states he had a bad episode.  Sexual activity: Not Currently   Other Topics Concern    Not on file   Social History Narrative    1 cup coffee daily     Social Determinants of Health     Financial Resource Strain:     Difficulty of Paying Living Expenses: Not on file   Food Insecurity:     Worried About Running Out of Food in the Last Year: Not on file    Alecia of Food in the Last Year: Not on file   Transportation Needs:     Lack of Transportation (Medical): Not on file    Lack of Transportation (Non-Medical):  Not on file   Physical Activity:     Days of Exercise per Week: Not on file    Minutes of Exercise per Session: Not on file   Stress:     Feeling of Stress : Not on file   Social Connections:     Frequency of Communication with Friends and Family: Not on file    Frequency of Social Gatherings with Friends and Family: Not on file    Attends Druze Services: Not on file    Active Member of [address] [place] [address] or Organizations: Not on file    Attends Club or Organization Meetings: Not on file    Marital Status: Not on file   Intimate Partner Violence:     Fear of Current or Ex-Partner: Not on file    Emotionally Abused: Not on file    Physically Abused: Not on file    Sexually Abused: Not on file   Housing Stability:     Unable to Pay for Housing in the Last Year: Not on file    Number of [place] in the Last Year: Not on file    Unstable Housing in the Last Year: Not on file       Family History:     Family History   Problem Relation Age of Onset    Heart Disease Mother     Heart Failure Mother     Heart Surgery Father     Heart Disease Father     Heart Failure Father     High Blood Pressure Sister     Cancer Sister        REVIEW OF SYSTEMS:could not be obtained         PHYSICAL EXAM:      Vitals:     BP (!) 121/49   Pulse 93   Temp 95.7 °F (35.4 °C) (Bladder)   Resp 18   Wt 205 lb (93 kg)   SpO2 97%   BMI 31.17 kg/m²     General Appearance:    Intubated, unresponsive. Head:    Normocephalic, atraumatic   Eyes:    +pallor, no icterus,   Ears:    No obvious deformity or drainage.    Nose:   No nasal drainage   Throat:   ET tube    Neck:   Supple, no lymphadenopathy   Lungs:     Clear     Heart:    Regular rate and rhythm, no murmur   Abdomen:     Soft,, bowel sounds present    Extremities:   No edema, no cyanosis    Skin:              CBC with Differential:      Lab Results   Component Value Date    WBC 18.5 11/29/2021    RBC 3.51 11/29/2021    HGB 8.7 11/30/2021    HCT 28.4 11/30/2021     11/29/2021    MCV 94.3 11/29/2021    MCH 29.3 11/29/2021    MCHC 31.1 11/29/2021    RDW 17.2 11/29/2021    NRBC 0.9 10/01/2021    SEGSPCT 76 03/16/2014    BANDSPCT 1 09/02/2016    METASPCT 0.9 10/01/2021    LYMPHOPCT 4.0 11/29/2021    MONOPCT 4.6 11/29/2021    BASOPCT 0.2 11/29/2021    MONOSABS 0.85 11/29/2021    LYMPHSABS 0.74 11/29/2021    EOSABS 0.00 11/29/2021    BASOSABS 0.03 11/29/2021       CMP     Lab Results   Component Value Date     11/30/2021    K 3.7 11/30/2021    K 4.3 11/30/2021    CL 98 11/30/2021    CO2 19 11/30/2021     11/30/2021    CREATININE 3.1 11/30/2021    GFRAA 24 11/30/2021    LABGLOM 19 11/30/2021    GLUCOSE 422 11/30/2021    GLUCOSE 111 07/16/2011    PROT 6.0 11/29/2021    LABALBU 1.8 11/29/2021    CALCIUM 9.8 11/30/2021    BILITOT 0.5 11/29/2021    ALKPHOS 113 11/29/2021     11/29/2021     11/29/2021         Hepatic Function Panel:    Lab Results   Component Value Date    ALKPHOS 113 11/29/2021     11/29/2021  11/29/2021    PROT 6.0 11/29/2021    BILITOT 0.5 11/29/2021    BILIDIR 0.3 11/29/2021    IBILI 0.2 11/29/2021    LABALBU 1.8 11/29/2021       PT/INR:    Lab Results   Component Value Date    PROTIME 23.0 11/29/2021    PROTIME 11.3 06/21/2011    INR 2.1 11/29/2021       TSH:    Lab Results   Component Value Date    TSH 4.270 08/19/2021       U/A:    Lab Results   Component Value Date    COLORU Yellow 11/29/2021    PHUR 5.0 11/29/2021    LABCAST RARE 10/11/2017    WBCUA >20 11/29/2021    WBCUA NONE 07/12/2011    RBCUA 10-20 11/29/2021    RBCUA 1-3 03/17/2014    BACTERIA MANY 11/29/2021    CLARITYU SL CLOUDY 11/29/2021    SPECGRAV >=1.030 11/29/2021    LEUKOCYTESUR MODERATE 11/29/2021    UROBILINOGEN 0.2 11/29/2021    BILIRUBINUR SMALL 11/29/2021    BILIRUBINUR NEGATIVE 07/12/2011    BLOODU LARGE 11/29/2021    GLUCOSEU Negative 11/29/2021    GLUCOSEU NEGATIVE 07/12/2011    AMORPHOUS MODERATE 11/29/2021       ABG:    Lab Results   Component Value Date    QGU5LTG 21.8 09/30/2021    G7DDJXQM 96.7 09/06/2012    OMT0ZVO 42.9 09/30/2021    PO2ART 157.4 09/30/2021       MICROBIOLOGY:    Blood culture -     Ref Range & Units 11/29/21 1412   Bottle Type  Aerobic    Source of Blood Culture  No site given    Order Number  [id number]    Enterobacteriaceae by PCR Not Detected Not Detected    Enterobacter cloacae complex by PCR Not Detected Not Detected    Escherichia coli by PCR Not Detected Not Detected    Haemophilus Influenzae by PCR Not Detected Not Detected    Klebsiella oxytoca by PCR Not Detected Not Detected    Klebsiella pneumoniae group by PCR Not Detected Not Detected    Listeria monocytogenes by PCR Not Detected Not Detected    Neisseria meningitidis by PCR Not Detected Not Detected    Proteus species by PCR Not Detected Not Detected    Pseudomonas aeruginosa by PCR Not Detected Not Detected    Streptococcus agalactiae by PCR Not Detected Not Detected    Staphylococcus aureus by PCR Not Detected DETECTED Panic     Staphylococcus species by PCR Not Detected DETECTED Panic     Serratia marcescens by PCR Not Detected Not Detected    Streptococcus pneumoniae by PCR Not Detected Not Detected    Streptococcus pyogenes  by PCR Not Detected Not Detected    Streptococcus species by PCR Not Detected Not Detected    Enterococcus by PCR Not Detected Not Detected    Candida albicans by PCR Not Detected Not Detected    Candida glabrata by PCR Not Detected Not Detected    Candida krusei by PCR Not Detected Not Detected    Candida parapsilosis by PCR Not Detected Not Detected    Candida tropicalis by PCR Not Detected Not Detected    Methicillin Resistance mecA/C  by PCR Not Detected DETECTED Panic     Resulting Agency  [address] [facility]        Specimen Collected: 11/29/21 14:12 Last Resulted: 11/30/21 08:41                  Urine Culture -     Specimen: Urine, clean catch Updated: 11/30/21 1109    Organism Gram negative gatito Abnormal     Urine Culture, Routine --    >100,000 CFU/ml   Identification and sensitivity to follow    Narrative:     Source: URINE       Site:              Culture, Wound [[id number]]            Radiology :    Chest X ray : Hazy multifocal bilateral airspace disease     CT abdomen and pelvis -  pression:        The visualized lungs demonstrate aspiration versus pneumonia.  Aspiration is   favored. No acute inflammatory abnormality is evident in the abdomen or pelvis. The infrarenal aorta measures 2.9 cm.  See recommendation below. Sigmoid diverticulosis without evidence of diverticulitis. IMPRESSION:     1. S/P code blue   2 Respiratory failure   3. MRSA sepsis , septic shock   4. S/P right foot TMA   5. Leukocytosis, lactic acidosis, elevated procalcitonin   6. Prognosis : Poor     RECOMMENDATIONS:      1. Vancomycin random level   2. Cefepime 1 gram IV q 24 hrs  3. Follow up blood cx   4.  Hold off CT surgery consult at present ( if family would change code status

## 2021-11-30 NOTE — PLAN OF CARE
Problem: Gas Exchange - Impaired:  Goal: Levels of oxygenation will improve  Description: Levels of oxygenation will improve  Outcome: Met This Shift     Problem: Infection - Ventilator-Associated Pneumonia:  Goal: Absence of pulmonary infection  Description: Absence of pulmonary infection  Outcome: Met This Shift     Problem: Serum Glucose Level - Abnormal:  Goal: Ability to maintain appropriate glucose levels will improve  Description: Ability to maintain appropriate glucose levels will improve  Outcome: Met This Shift     Problem: Venous Thromboembolism:  Goal: Will show no signs or symptoms of venous thromboembolism  Description: Will show no signs or symptoms of venous thromboembolism  Outcome: Met This Shift     Problem: Venous Thromboembolism:  Goal: Absence of signs or symptoms of impaired coagulation  Description: Absence of signs or symptoms of impaired coagulation  Outcome: Met This Shift     Problem: Infection, Septic Shock:  Goal: Will show no infection signs and symptoms  Description: Will show no infection signs and symptoms  Outcome: Not Met This Shift     Problem: Tissue Perfusion, Altered:  Goal: Circulatory function within specified parameters  Description: Circulatory function within specified parameters  Outcome: Not Met This Shift

## 2021-11-30 NOTE — PROGRESS NOTES
Stage  CI HR MAP TPRI SVI   Baseline 3.0 91 80 2102 33   Challenge 3.6 97 106 [id number] 38   Result (%Ä) 18.7% 5.6% 32.5% 11.6% 15.3%     NICOM 250 cc bolus challenge completed successfully. Patient is fluid responsive.

## 2021-11-30 NOTE — PROCEDURES
ARTERIAL LINE PROCEDURE NOTES  [address]  Department of Critical Care Medicine    Procedure: Left radial arterial line placement. Indications: Continuous monitoring of blood pressure in a patient with hypotension +/- shock, on Levophed. Anesthesia: None. Consent: The family member (Brother) were counseled regarding the procedure, its indications, risks, potential complications and alternatives, and any questions were answered. Consent was obtained to proceed. Technique: Time Out: Immediately prior to the procedure a \"timeout\" was called to verify the correct patient and procedure. Procedure was done using strict aseptic technique. Teddy's test was performed and was normal. Left radial site was cleaned with chloraprep and draped. Radial artery was identified. Radial arterial line was inserted, a good blood flow was obtained, after which guidewire was inserted all the way with no resistance. Then the canula was inserted and needle with guidewire was withdrawn. Pulsatile bright red blood flow was observed. The canula was connected to BP monitoring apparatus and a good quality waveform was noted. Then the canula was secured with 2 stay sutures of 3-0 silk, following which dressing was applied. The patient tolerated the procedure. Number of sticks: 1     Number of Kits used: 1     Complications: No immediate complication. Estimated blood loss: About 1 ml. Comment: Patient tolerated the procedure well.      JONNY Hamilton   Critical Care  11/30/2021 1:16 AM

## 2021-11-30 NOTE — PLAN OF CARE
Problem: Gas Exchange - Impaired:  Goal: Levels of oxygenation will improve  Description: Levels of oxygenation will improve  11/30/2021 0829 by Adrien Casanova  Outcome: Met This Shift     Problem: Infection, Septic Shock:  Goal: Will show no infection signs and symptoms  Description: Will show no infection signs and symptoms  11/30/2021 0829 by Adrien Casanova  Outcome: Not Met This Shift     Problem: Infection - Ventilator-Associated Pneumonia:  Goal: Absence of pulmonary infection  Description: Absence of pulmonary infection  11/30/2021 0829 by Adrien Casanova  Outcome: Met This Shift     Problem: Serum Glucose Level - Abnormal:  Goal: Ability to maintain appropriate glucose levels will improve  Description: Ability to maintain appropriate glucose levels will improve  11/30/2021 0829 by Adrien Casanova  Outcome: Not Met This Shift     Problem: Tissue Perfusion, Altered:  Goal: Circulatory function within specified parameters  Description: Circulatory function within specified parameters  11/30/2021 0829 by Adrien Casanova  Outcome: Not Met This Shift     Problem: Venous Thromboembolism:  Goal: Will show no signs or symptoms of venous thromboembolism  Description: Will show no signs or symptoms of venous thromboembolism  11/30/2021 0829 by Adrien Casanova  Outcome: Met This Shift     Problem: Venous Thromboembolism:  Goal: Absence of signs or symptoms of impaired coagulation  Description: Absence of signs or symptoms of impaired coagulation  11/30/2021 0829 by Adrien Casanova  Outcome: Not Met This Shift     Problem: Discharge Planning:  Goal: Discharged to appropriate level of care  Description: Discharged to appropriate level of care  Outcome: Not Met This Shift     Problem: Infection - Methicillin-Resistant Staphylococcus Aureus Infection:  Goal: Absence of methicillin-resistant Staphylococcus aureus infection  Description: Absence of methicillin-resistant Staphylococcus aureus infection  Outcome: Not Met This Shift     Problem: Fluid Volume - Imbalance:  Goal: Absence of imbalanced fluid volume signs and symptoms  Description: Absence of imbalanced fluid volume signs and symptoms  Outcome: Not Met This Shift     Problem: Fluid Volume - Imbalance:  Goal: Will show no signs and symptoms of excessive bleeding  Description: Will show no signs and symptoms of excessive bleeding  Outcome: Not Met This Shift

## 2021-11-30 NOTE — PROGRESS NOTES
Hospitalist Progress Note      Synopsis: Patient admitted on 11/29/2021   brought to the [facility] from nursing home  complains of worsening lower extremity wound, shortness of breath. Patient in the ED had a large dark tarry stool FOBT was positive. Patient was in respiratory distress and he was intubated in the ED. Patient later went into [facility] on monitor and lost pulse. CPR Per ACLS protocol was followed. shocked 2 times, and ROSC obtained after 6 minutes .   Subjective  Patient seen in the ICU unit he remains intubated and sedated. Guarded prognosis    Exam:  BP (!) 114/48   Pulse 99   Temp 97 °F (36.1 °C) (Bladder)   Resp 18   Wt 205 lb (93 kg)   SpO2 (!) 89%   BMI 31.17 kg/m²   General appearance: Intubated and sedated  HEENT:  Normal cephalic, atraumatic without obvious deformity. Pupils equal, round, and reactive to light. Extra ocular muscles intact. Conjunctivae/corneas clear. Neck: Supple, with full range of motion. No jugular venous distention. Trachea midline. Respiratory: Diminished bilaterally, mild wheezes bilaterally  Cardiovascular:  Regular rate and rhythm with normal S1/S2 without murmurs, rubs or gallops. Abdomen: Soft, non-tender, non-distended with normal bowel sounds. Musculoskeletal:  No clubbing, cyanosis or edema bilaterally. Full range of motion without deformity. Skin: Skin color, texture, turgor normal.  No rashes or lesions.   Neurologic: Intubated and sedated  Psychiatric: Unable to assess   mands     Medications:  Reviewed    Infusion Medications    dextrose      dextrose 5% and 0.45% NaCl with KCl 20 mEq 150 mL/hr at 11/30/21 1032    insulin 4.24 Units/hr (11/30/21 1500)    norepinephrine 8 mcg/min (11/30/21 1036)    sodium chloride       Scheduled Medications    ipratropium-albuterol  1 ampule Inhalation Q4H WA    budesonide  0.5 mg Nebulization BID    vancomycin (VANCOCIN) intermittent dosing (placeholder)   Other RX Placeholder    [START ON 12/1/2021] epoetin alessandra-epbx  30 Units/kg SubCUTAneous Once per day on Mon Wed Fri    cefepime  1,000 mg IntraVENous Q24H    sodium chloride flush  5-40 mL IntraVENous 2 times per day    pantoprazole  40 mg IntraVENous BID    And    sodium chloride (PF)  10 mL IntraVENous BID    chlorhexidine  15 mL Mouth/Throat BID     PRN Meds: ipratropium-albuterol, glucose, glucagon (rDNA), dextrose, dextrose, potassium chloride, magnesium sulfate, sodium phosphate IVPB **OR** sodium phosphate IVPB **OR** sodium phosphate IVPB, dextrose 5% and 0.45% NaCl with KCl 20 mEq, midazolam, sodium chloride flush, sodium chloride, acetaminophen **OR** acetaminophen, ondansetron **OR** ondansetron, perflutren lipid microspheres, fentanNYL    I/O    Intake/Output Summary (Last 24 hours) at 11/30/2021 1526  Last data filed at 11/30/2021 1400  Gross per 24 hour   Intake 6779 ml   Output 700 ml   Net 6079 ml       Labs:   Recent Labs     11/29/21  1412 11/30/21  0201 11/30/21  1418   WBC 18.5*  --   --    HGB 10.3* 8.7* 7.9*   HCT 33.1* 28.4* 24.0*     --   --        Recent Labs     11/30/21  0201 11/30/21  0610 11/30/21  1204    140 136   K 4.3 3.7 3.8   CL 96* 98 97*   CO2 15* 19* 24   * 120* 106*   CREATININE 3.1* 3.1* 3.0*   CALCIUM 10.3* 9.8 9.5   PHOS 5.5* 3.5 1.8*       Recent Labs     11/29/21  1412 11/29/21 2014   PROT 8.3 6.0*   ALKPHOS 140* 113   ALT 9 136*   AST 24 555*   BILITOT 0.5 0.5       Recent Labs     11/29/21  1412 11/29/21  2301   INR 1.6 2.1       Recent Labs     11/29/21 2014   CKTOTAL 96       Chronic labs:  Lab Results   Component Value Date    CHOL 99 09/24/2021    TRIG 84 09/24/2021    HDL 33 09/24/2021    LDLCALC 49 09/24/2021    TSH 4.270 (H) 08/19/2021    INR 2.1 11/29/2021    LABA1C 5.5 11/30/2021       Radiology:  Imaging studies reviewed today.     ASSESSMENT:    Principal Problem:    Septic shock due to undetermined organism [facility])  Active Problems:    Elevated troponin    Elevated brain natriuretic peptide (BNP) level    Electrolyte abnormality    Acute kidney injury superimposed on CKD (HCC)    Urinary tract infection with hematuria    Melena    Postoperative wound infection    Aspiration pneumonia (HCC)  Resolved Problems:    * No resolved hospital problems. *       PLAN:    Septic shock  Acute hypoxic respiratory failure  Pneumonia  UTI  Nonsustained V. tach ,cardiac arrest  Elevated troponin  HARVEY  R chronic wound s/p TMA  DKA  GI bleed  Bacteremia  A. fib  PLAN:  Remains in ICU  intubation mechanical ventilation. Broad-spectrum antibiotics, pressors, PPI, await cultures  Cardiology team has been consulted.     Diet: Diet NPO  Code Status: Full Code  PT/OT Eval Status:     DVT Prophylaxis:     Recommended disposition at discharge:      +++++++++++++++++++++++++++++++++++++++++++++++++  shefali paris MD  [facility].  +++++++++++++++++++++++++++++++++++++++++++++++++  NOTE: This report was transcribed using voice recognition software. Every effort was made to ensure accuracy; however, inadvertent computerized transcription errors may be present.

## 2021-12-01 PROBLEM — A41.9 SEPTIC SHOCK (HCC): Status: ACTIVE | Noted: 2021-01-01

## 2021-12-01 PROBLEM — R65.21 SEPTIC SHOCK (HCC): Status: ACTIVE | Noted: 2021-01-01

## 2021-12-01 NOTE — FLOWSHEET NOTE
Inpatient Wound Care(initial evaluation)  [id number]    Admit Date: 11/29/2021  1:49 PM    Reason for consult:  Legs, buttocks    Significant history:  Admitted with respiratory distress  Refer to H & P, complex history    Findings:  Intubated with vent support  restraints     12/01/21 1320   Skin Integrity   Skin Integrity Bruising   Location BUE   Skin Integrity Site 2   Skin Integrity Location 2   (vascular discoloration, dry flaky)   Location 2 left lower leg/left foot/left heel   Skin Integrity Site 3   Skin Integrity Location 3 Bruising  (eroison)    Location 3 scrotum, penis   Wound 11/22/21 Leg Right; Lower; Anterior #4   Date First Assessed/Time First Assessed: 11/22/21 1450   Wound Approximate Age at First Assessment (Weeks): 2 weeks  Location: Leg  Wound Location Orientation: Right; Lower; Anterior  Wound Description (Comments): #4   Dressing Status Intact   Wound 11/04/21 Heel Right #3   Date First Assessed/Time First Assessed: 11/04/21 0408   Location: Heel  Wound Location Orientation: Right  Wound Description (Comments): #3   Dressing Status Intact   Wound 10/16/21 Buttocks Right; Inner   Date First Assessed: 10/16/21   Present on Hospital Admission: Yes  Location: Buttocks  Wound Location Orientation: Right;  Inner   Wound Image   (see left)   Wound Etiology Pressure Stage  2   Dressing/Treatment Pharmaceutical agent (see MAR)   Wound Length (cm) 3.8 cm   Wound Width (cm) 2 cm   Wound Depth (cm) 0.1 cm   Wound Surface Area (cm^2) 7.6 cm^2   Change in Wound Size % (l*w) 45.71   Wound Volume (cm^3) 0.76 cm^3   Wound Healing % 46   Drainage Amount Small   Drainage Description Serosanguinous   Odor None   Wendy-wound Assessment   (red)   Wound 10/19/21 Foot Right #1 Metatarsal amp site   Date First Assessed/Time First Assessed: 10/19/21 1107   Present on Hospital Admission: No  Location: Foot  Wound Location Orientation: Right  Wound Description (Comments): #1 Metatarsal amp site   Dressing Status Intact Dressing/Treatment Negative pressure wound therapy   Wound 12/01/21 Buttocks Left; Proximal   Date First Assessed/Time First Assessed: 12/01/21 1320   Present on Hospital Admission: No  Location: Buttocks  Wound Location Orientation: Left; Proximal   Wound Image    Wound Etiology Deep tissue/Injury   Dressing/Treatment Pharmaceutical agent (see MAR)   Wound Length (cm) 3 cm   Wound Width (cm) 3 cm   Wound Depth (cm)   (unable to determine)   Wound Surface Area (cm^2) 9 cm^2   Wound Assessment Purple/maroon   Drainage Amount None   Wendy-wound Assessment   (red)   Negative Pressure Wound Therapy Foot Anterior; Right   Placement Date/Time: 11/30/21 1200   Pre-existing: No  Inserted by: podiatry  Location: Foot  Wound Location Orientation: Anterior; Right   Cycle Continuous   Target Pressure (mmHg) 125   Dressing Status Intact   left foot second toe- previous amputation  Erosion to buttocks  **Informed Consent**    photos taken of wounds and inserted into their chart as part of their permanent medical record for purposes of documentation, treatment management and/or medical review. All Images taken on 12/1/21 of patient name: Ramon Chapin were transmitted and stored on [id number] located within [facility] by a registered [employer] Mobile Application Device.      Impression:  Left buttocks DTI  Right buttocks stage 2    Plan:  Aquaphor to buttocks, dry skin,wendy area  Heelmedix boots  Podiatry for wound vac management  Dietary consult  Will need continued preventative care    Anjelica Villegas RN 12/1/2021 2:55 PM

## 2021-12-01 NOTE — PROGRESS NOTES
Wound care:  Wound vac right foot per podiatry  Orders reviewed  Care plan and education updated  Dietary consult  Podiatry following wound vac  Day Pena RN

## 2021-12-01 NOTE — PROCEDURES
EEG Report  Didi Moya is a [de-identified] y.o. male      Appointment Date 12/1/2021   Appointment Time 10:00am   [place] [facility] [place] [id number]   Type of Study Routine portable Floor 4407     Technical Specifications  Technician [facility] of consciousness unresponsive   Sleep deprived? no   Hyperventilation tested? no   Photic stim tested? no   EEG recording Standard 10-20 electrode placement    Duration of recording 28 mins   EEG complete? Yes       Clinical History  Didi Moya is a [de-identified] y.o. male  with h/o CAD, CVA with carotid stenosis status post endarterectomy, CHF, CKD, COPD, PVD, HTN, HLD, TREVOR, and DM who was admitted to [facility]  on 11/29/2021 with presentation of wound infection, septic shock, and respiratory distress. Patient was brought to the ED on 11/29/2021 from nursing home after experiencing altered mental status, shortness of breath with wheezing, and note of wound infection of the right foot at the time. ED evaluation revealed Hemoccult positive large dark dark tarry stool along with respiratory distress led to intubation and central line placement. Patient had later onset of episode of V. tach on monitoring and lost pulse requiring ACLS protocol that resulted in 2 shocks and resuscitation lasting 6 minutes. Patient was noted to be both hypotensive and hypothermic on ER presentation. EKG on admission was notable for atrial fibrillation with RVR. Labs were remarkable for K+ 5.5, creatinine of 3.4, WBC of 18.5, H&H of 10.33.1, and negative COVID-19 test.  Blood cultures were positive for MRSA (PCR). He has remained unresponsive on ventilator with no sedation required. Staff did indicate the presence of some reaction to stimuli earlier in the day with corneal response present briefly. No spontaneous activity currently. No report of any abnormal movements or posturing.     Medications    Current Facility-Administered Medications:     dextrose 5 % and 0.45 % sodium chloride infusion, , IntraVENous, Continuous PRN, Johnson Irons, APRN - CNP, Last Rate: 150 mL/hr at 12/01/21 0552, New Bag at 12/01/21 0552    fentaNYL 5 mcg/ml in 0.9%  ml infusion, 12.5-200 mcg/hr, IntraVENous, Continuous, Sandrine Dong, APRN - CNP, Last Rate: 10 mL/hr at 12/01/21 0950, 50 mcg/hr at 12/01/21 0950    ipratropium-albuterol (DUONEB) nebulizer solution 1 ampule, 1 ampule, Inhalation, Q4H PRN, Sandrine Dong, APRN - CNP    glucose (GLUTOSE) 40 % oral gel 15 g, 15 g, Oral, PRN, Johnson Irons, APRN - CNP    glucagon (rDNA) injection 1 mg, 1 mg, IntraMUSCular, PRN, Sandrine Dong, APRN - CNP    dextrose 5 % solution, 100 mL/hr, IntraVENous, PRN, Sandrine Dong, APRN - CNP    dextrose 50 % IV solution, 12.5 g, IntraVENous, PRN, Johnson Irons, APRN - CNP    potassium chloride 10 mEq/100 mL IVPB (Peripheral Line), 10 mEq, IntraVENous, PRN, Johnson Irons, APRN - CNP, Last Rate: 100 mL/hr at 11/30/21 0938, 10 mEq at 11/30/21 0938    magnesium sulfate 1000 mg in dextrose 5% 100 mL IVPB, 1,000 mg, IntraVENous, PRN, Johnson Irons, APRN - CNP    sodium phosphate 10 mmol in dextrose 5 % 250 mL IVPB, 10 mmol, IntraVENous, PRN **OR** sodium phosphate 15 mmol in dextrose 5 % 250 mL IVPB, 15 mmol, IntraVENous, PRN, Stopped at 11/30/21 2126 **OR** sodium phosphate 20 mmol in dextrose 5 % 500 mL IVPB, 20 mmol, IntraVENous, PRN, Sandrine Dong, APRN - CNP    insulin regular (HUMULIN R;NOVOLIN R) 100 Units in sodium chloride 0.9 % 100 mL infusion, 0.1 Units/kg/hr, IntraVENous, Continuous, Sandrine Dong, APRN - CNP, Last Rate: 0.7 mL/hr at 12/01/21 1000, 0.72 Units/hr at 12/01/21 1000    ipratropium-albuterol (DUONEB) nebulizer solution 1 ampule, 1 ampule, Inhalation, Q4H WA, MADDI Singer CNP, 1 ampule at 12/01/21 0925    budesonide (PULMICORT) nebulizer suspension 500 mcg, 0.5 mg, Nebulization, BID, MADDI Singer CNP, 500 mcg at 12/01/21 0924    vancomycin (VANCOCIN) intermittent dosing (placeholder), , Other, RX Placeholder, MADDI Delaney CNP, Given at 11/30/21 1206    cefepime (MAXIPIME) 1000 mg IVPB minibag, 1,000 mg, IntraVENous, Q24H, Kendrick Medina MD, Stopped at 11/30/21 1800    epoetin alessandra-epbx (RETACRIT) injection 3,000 Units, 3,000 Units, SubCUTAneous, Once per day on Mon Wed Fri, Tayla Frank MD, 3,000 Units at 12/01/21 0920    midazolam PF (VERSED) injection 2 mg, 2 mg, IntraVENous, Q1H PRN, Gregorio Heady, DO    norepinephrine (LEVOPHED) 16 mg in dextrose 5% 250 mL infusion, 2-100 mcg/min, IntraVENous, Continuous, Gregorio Heady, DO, Last Rate: 9.4 mL/hr at 12/01/21 0720, 10 mcg/min at 12/01/21 0720    sodium chloride flush 0.9 % injection 5-40 mL, 5-40 mL, IntraVENous, 2 times per day, MADDI Delaney - CNP, 10 mL at 12/01/21 0921    sodium chloride flush 0.9 % injection 5-40 mL, 5-40 mL, IntraVENous, PRN, MADDI Hawkins - CNP    0.9 % sodium chloride infusion, 25 mL, IntraVENous, PRN, MADDI Delaney - CNP    acetaminophen (TYLENOL) tablet 650 mg, 650 mg, Oral, Q6H PRN **OR** acetaminophen (TYLENOL) suppository 650 mg, 650 mg, Rectal, Q6H PRN, MADDI Delaney - CNP, 650 mg at 12/01/21 0534    pantoprazole (PROTONIX) injection 40 mg, 40 mg, IntraVENous, BID, 40 mg at 12/01/21 0920 **AND** sodium chloride (PF) 0.9 % injection 10 mL, 10 mL, IntraVENous, BID, MADDI Hawkins - CNP, 10 mL at 12/01/21 0920    ondansetron (ZOFRAN-ODT) disintegrating tablet 4 mg, 4 mg, Oral, Q8H PRN **OR** ondansetron (ZOFRAN) injection 4 mg, 4 mg, IntraVENous, Q6H PRN, MADDI Hawkins - CNP    chlorhexidine (PERIDEX) 0.12 % solution 15 mL, 15 mL, Mouth/Throat, BID, MADDI Hawkins - CNP, 15 mL at 12/01/21 0920    perflutren lipid microspheres (DEFINITY) injection 1.65 mg, 1.5 mL, IntraVENous, ONCE PRN, MADDI Hawkins CNP    fentaNYL (SUBLIMAZE) injection 25 mcg, 25 mcg, IntraVENous, Q1H PRN, MADDI Delaney CNP        Physician Interpretation    General EEG Report  EEG study was performed using the 10-20 electrode placement system in patient who was unresponsive at time of study. No abnormal behavior or movements noted during the study. No activation procedures were performed. Type of EEG: routine inpatient EEG done at bedside with video recording. Background activity  EEG showing burst suppression pattern with suppression of 1 to 2 seconds followed by sharp activity normal amplitudes with triphasic waves superimposed. No phasic sharps or spike and wave activity seen. Generalized polymorphic delta slowing with episodic superimposed beta activity was nonspecific. Reactive background appreciated. General Impression  Abnormal EEG with generalized slowing and burst suppression activity consistent with severe encephalopathy and/or sedation effect. No epileptiform activity observed. Clinical correlation is indicated.     MD Dodie Menard

## 2021-12-01 NOTE — PROGRESS NOTES
[address] [facility]  Department of Critical Care Medicine   MICU Night Call Note        Call initiated by: Nursing staff:  Ashlie Gleason RN  Call addressed around: 12/1/2021 7:02 AM      Reason for call: patient flipping left arm up but still no response or withdrawal to painful physical stimuli and no gaga reflex    Patient seen and examined. Patient intermittently moving left arm but not following commands, not on any sedation, no response to any physical stimuli, pupil non-reactive to light, and no gag reflex.     Orders placed:  Fentanyl gtt, CT head and EEG already ordered for this morning        FABIO Núñez   Critical Care  12/1/2021 7:02 AM    MADDI Núñez CNP

## 2021-12-01 NOTE — PROGRESS NOTES
Stage  CI HR MAP TPRI SVI   Baseline 4.1 125   33   Challenge 6.0 125   48   Result (%Ä) 44% -0.2%   45.3%     250 cc bolus completed successfully.   Patient is fluid responsive

## 2021-12-01 NOTE — PROGRESS NOTES
Pharmacy Consultation Note  (Antibiotic Dosing and Monitoring)    Initial consult date: 11-  Consulting physician/provider: Beatriz LEDESMA CNP  Drug: Vancomycin  Indication: Sepsis    Age/  Gender Height Weight IBW  Allergy Information   81 y.o./male   240 lb (108.9 kg)     Ideal body weight: 68.4 kg (150 lb 12.7 oz)  Adjusted ideal body weight: 82.2 kg (181 lb 1.9 oz)   Patient has no known allergies. Renal Function:  Recent Labs     11/30/21  2358 12/01/21  0420 12/01/21  0728   * 118* 115*   CREATININE 3.3* 3.4* 3.5*       Intake/Output Summary (Last 24 hours) at 12/1/2021 1106  Last data filed at 12/1/2021 1000  Gross per 24 hour   Intake 7742.36 ml   Output 265 ml   Net 7477.36 ml       Vancomycin Monitoring:  Trough:  No results for input(s): VANCOTROUGH in the last 72 hours. Random:    Recent Labs     11/30/21  1418   VANCORANDOM 23.6       Vancomycin Administration Times:  Recent vancomycin administrations                   vancomycin (VANCOCIN) intermittent dosing (placeholder) ()  Given 11/30/21 1206    vancomycin (VANCOCIN) 2,000 mg in dextrose 5 % 500 mL IVPB (mg) 2,000 mg New Bag 11/29/21 2305                Assessment:  · Patient is a [de-identified] y.o. male who has been initiated on vancomycin  Estimated Creatinine Clearance: 20 mL/min (A) (based on SCr of 3.5 mg/dL (H)).   · To dose vancomycin, pharmacy will be utilizing dosing based off of levels because of patient's renal impairment/insufficiency    Plan:  · Vancomycin 1500mg x 1   · Random level in AM  · Will continue to monitor renal function   · Clinical pharmacy to follow      Marina Harden PharmD, BCPS 12/1/2021 11:06 AM

## 2021-12-01 NOTE — PROGRESS NOTES
Palliative Care Department  [phone]  Palliative Care Progress Note  Provider MADDI Watson CNP     Caryn Montalvo 1996  [id number]  Hospital Day: 3  Date of Initial Consult: 11/30/2021  Referring Provider: MADDI Cheek CNP  Palliative Medicine was consulted for assistance with: Goals of Care    HPI:   Caryn Montalvo 1996 is a [de-identified] y.o. with a medical history of HFpEF, CKD, COPD, DM, CAD, hypertension, hyperlipidemia, NSTEMI, aortic stenosis status post TAVR, mild mitral regurgitation, moderate tricuspid regurgitation, pulmonary hypertension, PVD, s/p carotid endarterectomy, unspecified cerebral artery occlusion with cerebral infarction who was admitted on 11/29/2021 from nursing home with a CHIEF COMPLAINT of shortness of breath, altered mental status, and wound infection. Patient went into respiratory distress in the ED and was intubated. Patient then went into V. tach and lost pulses. ACLS protocol initiated and ROSC was achieved after 6 minutes of resuscitation. Patient transferred to medical intensive care for further management. Palliative medicine consulted for goals of care. ASSESSMENT/PLAN:     Pertinent Hospital Problems      S/p cardiac arrest   Acute respiratory failure   Septic shock   Chronic right foot infection      Palliative Care Encounter / Counseling Regarding Goals of Care  Please see detailed goals of care discussion as below   At this time, Caryn Khalil, Does Not have capacity for medical decision-making.   Capacity is time limited and situation/question specific   During encounter, Patt Suggs, brother/POA was surrogate medical decision-maker   Outcome of goals of care meeting: Continue current management, change code status to [facility]   Code status DNR-CCA   Advanced Directives: POA in soft chart   Surrogate/Legal NOKLyla Learn, brother/POA, [phone], [phone]        Spiritual assessment: no spiritual distress identified  Bereavement and grief: to be determined  Referrals to: none today   SUBJECTIVE:       Details of Conversation: Chart reviewed and patient seen. Patient resting in bed, intubated, unresponsive, no family present at bedside. Spoke with patient's brother/POA, Hortencia Duke. POA paperwork present in soft chart. Hortencia Duke wishes to change CODE STATUS to DNR CCA. Continue current management. Support offered. Will continue to follow. OBJECTIVE:   Prognosis: Poor and Guarded    Physical Exam:  BP (!) 90/42   Pulse 118   Temp 100 °F (37.8 °C) (Esophageal)   Resp 18   Wt 226 lb 9.6 oz (102.8 kg)   SpO2 97%   BMI 34.45 kg/m²   Constitutional:  Elderly, ill-appearing  Eyes: no scleral icterus, normal lids, no discharge  Neck:  trachea midline, no JVD  Lungs:  Intubated  Heart: V paced  Abd:  Rounded, bowel sounds hypoactive  Ext:  + edema, pulses present  Skin:  Cool and dry, mottled   Neuro:  Unresponsive    Objective data reviewed: labs, images, records, medication use, vitals and chart    Discussed patient and the plan of care with the other IDT members: Palliative Medicine IDT Team    Time/Communication  Greater than 50% of time spent, total 15 minutes in counseling and coordination of care at the bedside regarding goals of care, diagnosis and prognosis and see above. Thank you for allowing Palliative Medicine to participate in the care of Jagdish Dahl

## 2021-12-01 NOTE — PROGRESS NOTES
Hospitalist Progress Note      Synopsis: Patient admitted on 11/29/2021   brought to the [facility] from nursing home  complains of worsening lower extremity wound, shortness of breath. Patient in the ED had a large dark tarry stool FOBT was positive. Patient was in respiratory distress and he was intubated in the ED. Patient later went into [facility] on monitor and lost pulse. CPR Per ACLS protocol was followed. shocked 2 times, and ROSC obtained after 6 minutes .   Subjective  Patient seen in the ICU unit he remains intubated and sedated. On pressors. Discussed with RN bedside. Hb dropping, 7.2  Plan for EEG and CT head today . Exam:  BP (!) 100/45   Pulse 130   Temp 100.8 °F (38.2 °C) (Esophageal)   Resp 26   Wt 226 lb 9.6 oz (102.8 kg)   SpO2 98%   BMI 34.45 kg/m²   General appearance: Intubated and sedated  HEENT:  Normal cephalic, atraumatic without obvious deformity. Pupils equal, round, and reactive to light. Extra ocular muscles intact. Conjunctivae/corneas clear. Neck: Supple, with full range of motion. No jugular venous distention. Trachea midline. Respiratory: Diminished bilaterally, mild wheezes bilaterally  Cardiovascular:  Regular rate and rhythm with normal S1/S2 without murmurs, rubs or gallops. Abdomen: Soft, non-tender, non-distended with normal bowel sounds. Musculoskeletal:  No clubbing, cyanosis or edema bilaterally. Full range of motion without deformity. Skin: Skin color, texture, turgor normal.  No rashes or lesions.   Neurologic: Intubated and sedated  Psychiatric: Unable to assess   mands     Medications:  Reviewed    Infusion Medications    dextrose 5 % and 0.45 % NaCl Stopped (12/01/21 1215)    fentaNYL 5 mcg/ml in 0.9%  ml infusion Stopped (12/01/21 1015)    sodium chloride 100 mL/hr at 12/01/21 1216    dextrose      norepinephrine 18 mcg/min (12/01/21 1307)    sodium chloride       Scheduled Medications    vancomycin  1,500 mg IntraVENous Once    midodrine  10 mg Oral TID WC    insulin glargine  10 Units SubCUTAneous Daily    insulin lispro  0-12 Units SubCUTAneous Q4H    ipratropium-albuterol  1 ampule Inhalation Q4H WA    budesonide  0.5 mg Nebulization BID    vancomycin (VANCOCIN) intermittent dosing (placeholder)   Other RX Placeholder    cefepime  1,000 mg IntraVENous Q24H    epoetin alessandra-epbx  3,000 Units SubCUTAneous Once per day on Mon Wed Fri    sodium chloride flush  5-40 mL IntraVENous 2 times per day    pantoprazole  40 mg IntraVENous BID    And    sodium chloride (PF)  10 mL IntraVENous BID    chlorhexidine  15 mL Mouth/Throat BID     PRN Meds: dextrose 5 % and 0.45 % NaCl, ipratropium-albuterol, glucose, glucagon (rDNA), dextrose, dextrose, potassium chloride, magnesium sulfate, sodium phosphate IVPB **OR** sodium phosphate IVPB **OR** sodium phosphate IVPB, midazolam, sodium chloride flush, sodium chloride, acetaminophen **OR** acetaminophen, ondansetron **OR** ondansetron, perflutren lipid microspheres, fentanNYL    I/O    Intake/Output Summary (Last 24 hours) at 12/1/2021 1337  Last data filed at 12/1/2021 1300  Gross per 24 hour   Intake 7772.36 ml   Output 285 ml   Net 7487.36 ml       Labs:   Recent Labs     11/29/21  1412 11/30/21  0201 12/01/21  0200 12/01/21  0404 12/01/21  0755   WBC 18.5*  --   --  29.0*  --    HGB 10.3*   < > 8.2* 8.0* 7.2*   HCT 33.1*   < > 25.0* 24.4* 22.0*     --   --  216  --     < > = values in this interval not displayed.        Recent Labs     11/30/21  2358 12/01/21  0420 12/01/21  0728    133 132   K 4.9 5.4* 5.4*   CL 96* 96* 97*   CO2 23 24 23   * 118* 115*   CREATININE 3.3* 3.4* 3.5*   CALCIUM 9.0 8.8 8.7   PHOS 3.2 2.8 2.4*       Recent Labs     11/29/21 2014 11/30/21  0610 11/30/21  1755   PROT 6.0* 5.4* 5.6*   ALKPHOS 113 105 118   * 206* 192*   * 655* 548*   BILITOT 0.5 0.6 0.6       Recent Labs     11/29/21  1412 11/29/21  2301   INR 1.6 2.1       Recent

## 2021-12-01 NOTE — PROGRESS NOTES
Department of Internal Medicine  Infectious Diseases  Progress  Note      C/C :   Septic shock, MRSA bacteremia, Resp failure s/p code blue     Pt is intubated, unresponsive   Febrile     Current Facility-Administered Medications   Medication Dose Route Frequency Provider Last Rate Last Admin    dextrose 5 % and 0.45 % sodium chloride infusion   IntraVENous Continuous PRN MADDI Mendez CNP   Stopped at 12/01/21 1215    fentaNYL 5 mcg/ml in 0.9%  ml infusion  12.5-200 mcg/hr IntraVENous Continuous MADDI Mendez - CNP   Stopped at 12/01/21 1015    vancomycin 1500 mg in dextrose 5% 300 mL IVPB  1,500 mg IntraVENous Once MADDI Mendez  mL/hr at 12/01/21 1201 1,500 mg at 12/01/21 1201    midodrine (PROAMATINE) tablet 10 mg  10 mg Oral TID WC MADDI Lakhani CNP        insulin glargine (LANTUS) injection vial 10 Units  10 Units SubCUTAneous Daily MADDI Lakhani - CNP   10 Units at 12/01/21 1142    insulin lispro (HUMALOG) injection vial 0-12 Units  0-12 Units SubCUTAneous Q4H MADDI Lakhani CNP        0.9 % sodium chloride infusion   IntraVENous Continuous Jenny Carrasco  mL/hr at 12/01/21 1216 New Bag at 12/01/21 1216    ipratropium-albuterol (DUONEB) nebulizer solution 1 ampule  1 ampule Inhalation Q4H PRN MADDI Hawkins CNP        glucose (GLUTOSE) 40 % oral gel 15 g  15 g Oral PRN MADDI Hawkins CNP        glucagon (rDNA) injection 1 mg  1 mg IntraMUSCular PRN MADDI Hawkins - CNP        dextrose 5 % solution  100 mL/hr IntraVENous PRN MADDI Hawkins - CNP        dextrose 50 % IV solution  12.5 g IntraVENous PRN MADDI Mendez CNP        potassium chloride 10 mEq/100 mL IVPB (Peripheral Line)  10 mEq IntraVENous PRN MADDI Mendez  mL/hr at 11/30/21 0938 10 mEq at 11/30/21 0938    magnesium sulfate 1000 mg in dextrose 5% 100 mL IVPB  1,000 mg IntraVENous PRN MADDI Mendez - CNP       [facility] sodium phosphate 10 mmol in dextrose 5 % 250 mL IVPB  10 mmol IntraVENous PRN Fabi Sven, APRN - CNP        Or    sodium phosphate 15 mmol in dextrose 5 % 250 mL IVPB  15 mmol IntraVENous PRN Fabi Sven, APRN - CNP   Stopped at 11/30/21 2126    Or    sodium phosphate 20 mmol in dextrose 5 % 500 mL IVPB  20 mmol IntraVENous PRN Fabi Sven, APRN - CNP        ipratropium-albuterol (DUONEB) nebulizer solution 1 ampule  1 ampule Inhalation Q4H WA Uljd Sergio, APRN - CNP   1 ampule at 12/01/21 1215    budesonide (PULMICORT) nebulizer suspension 500 mcg  0.5 mg Nebulization BID Ulysees Sergio, APRN - CNP   500 mcg at 12/01/21 7361    vancomycin (VANCOCIN) intermittent dosing (placeholder)   Other RX Placeholder Fabi Sven, APRN - CNP   Given at 11/30/21 1206    cefepime (MAXIPIME) 1000 mg IVPB minibag  1,000 mg IntraVENous Q24H Sofia Medina MD   Stopped at 11/30/21 1800    epoetin alessandra-epbx (RETACRIT) injection 3,000 Units  3,000 Units SubCUTAneous Once per day on Mon Wed Fri Dragan Hardin MD   3,000 Units at 12/01/21 0920    midazolam PF (VERSED) injection 2 mg  2 mg IntraVENous Q1H PRN Inetta Bernadine, DO        norepinephrine (LEVOPHED) 16 mg in dextrose 5% 250 mL infusion  2-100 mcg/min IntraVENous Continuous Inetta Empire, DO 18.8 mL/hr at 12/01/21 1234 20 mcg/min at 12/01/21 1234    sodium chloride flush 0.9 % injection 5-40 mL  5-40 mL IntraVENous 2 times per day Fabi Machuca, APRN - CNP   10 mL at 12/01/21 0921    sodium chloride flush 0.9 % injection 5-40 mL  5-40 mL IntraVENous PRN Sandrine Dong, APRN - CNP        0.9 % sodium chloride infusion  25 mL IntraVENous PRN Fabi Sven, APRN - CNP        acetaminophen (TYLENOL) tablet 650 mg  650 mg Oral Q6H PRN Fabi Sven, APRN - CNP   650 mg at 12/01/21 1141    Or    acetaminophen (TYLENOL) suppository 650 mg  650 mg Rectal Q6H PRN MADDI Goyal CNP   650 mg at 12/01/21 0534    pantoprazole (PROTONIX) injection 40 mg  40 mg IntraVENous BID Carlotta Mcfadden APRN - CNP   40 mg at 12/01/21 0920    And    sodium chloride (PF) 0.9 % injection 10 mL  10 mL IntraVENous BID Sandrinenely Dong, APRN - CNP   10 mL at 12/01/21 0920    ondansetron (ZOFRAN-ODT) disintegrating tablet 4 mg  4 mg Oral Q8H PRN MADDI Núñez - CNP        Or    ondansetron (ZOFRAN) injection 4 mg  4 mg IntraVENous Q6H PRN Sandrine Dong, APRN - CNP        chlorhexidine (PERIDEX) 0.12 % solution 15 mL  15 mL Mouth/Throat BID Sandrine Dong, APRN - CNP   15 mL at 12/01/21 0920    perflutren lipid microspheres (DEFINITY) injection 1.65 mg  1.5 mL IntraVENous ONCE PRN Carlotta Mcfadden APRN - CNP        fentaNYL (SUBLIMAZE) injection 25 mcg  25 mcg IntraVENous Q1H PRN Sandrine Dong, APRN - CNP               REVIEW OF SYSTEMS:could not be obtained         PHYSICAL EXAM:      Vitals:     BP (!) 90/42   Pulse 118   Temp 100 °F (37.8 °C) (Esophageal)   Resp 18   Wt 226 lb 9.6 oz (102.8 kg)   SpO2 97%   BMI 34.45 kg/m²     General Appearance:    Intubated, unresponsive. Head:    Normocephalic, atraumatic   Eyes:    +pallor, no icterus,   Ears:    No obvious deformity or drainage.    Nose:   No nasal drainage   Throat:   ET tube    Neck:   Supple, no lymphadenopathy   Lungs:     Clear     Heart:    Regular rate and rhythm, no murmur   Abdomen:     Soft,, bowel sounds present    Extremities:   No edema, no cyanosis    Skin:              CBC with Differential:      Lab Results   Component Value Date    WBC 29.0 12/01/2021    RBC 2.64 12/01/2021    HGB 7.2 12/01/2021    HCT 22.0 12/01/2021     12/01/2021    MCV 92.4 12/01/2021    MCH 30.3 12/01/2021    MCHC 32.8 12/01/2021    RDW 16.8 12/01/2021    NRBC 0.9 12/01/2021    SEGSPCT 76 03/16/2014    BANDSPCT 1 09/02/2016    METASPCT 0.9 10/01/2021    LYMPHOPCT 2.6 12/01/2021    MONOPCT 0.9 12/01/2021    BASOPCT 0.3 12/01/2021    MONOSABS 0.29 12/01/2021    LYMPHSABS 0.87 12/01/2021    EOSABS 0.00 12/01/2021    BASOSABS 0.00 12/01/2021       CMP     Lab Results   Component Value Date     12/01/2021    K 5.4 12/01/2021    K 3.7 11/30/2021    CL 97 12/01/2021    CO2 23 12/01/2021     12/01/2021    CREATININE 3.5 12/01/2021    GFRAA 20 12/01/2021    LABGLOM 17 12/01/2021    GLUCOSE 136 12/01/2021    GLUCOSE 111 07/16/2011    PROT 5.6 11/30/2021    LABALBU 1.6 11/30/2021    CALCIUM 8.7 12/01/2021    BILITOT 0.6 11/30/2021    ALKPHOS 118 11/30/2021     11/30/2021     11/30/2021         Hepatic Function Panel:    Lab Results   Component Value Date    ALKPHOS 118 11/30/2021     11/30/2021     11/30/2021    PROT 5.6 11/30/2021    BILITOT 0.6 11/30/2021    BILIDIR 0.4 11/30/2021    IBILI 0.2 11/30/2021    LABALBU 1.6 11/30/2021       PT/INR:    Lab Results   Component Value Date    PROTIME 23.0 11/29/2021    PROTIME 11.3 06/21/2011    INR 2.1 11/29/2021       TSH:    Lab Results   Component Value Date    TSH 4.270 08/19/2021       U/A:    Lab Results   Component Value Date    COLORU Yellow 11/29/2021    PHUR 5.0 11/29/2021    LABCAST RARE 10/11/2017    WBCUA >20 11/29/2021    WBCUA NONE 07/12/2011    RBCUA 10-20 11/29/2021    RBCUA 1-3 03/17/2014    BACTERIA MANY 11/29/2021    CLARITYU SL CLOUDY 11/29/2021    SPECGRAV >=1.030 11/29/2021    LEUKOCYTESUR MODERATE 11/29/2021    UROBILINOGEN 0.2 11/29/2021    BILIRUBINUR SMALL 11/29/2021    BILIRUBINUR NEGATIVE 07/12/2011    BLOODU LARGE 11/29/2021    GLUCOSEU Negative 11/29/2021    GLUCOSEU NEGATIVE 07/12/2011    AMORPHOUS MODERATE 11/29/2021       ABG:    Lab Results   Component Value Date    HCO7PQG 21.8 09/30/2021    G3QFQHAU 96.7 09/06/2012    SPX9DSB 42.9 09/30/2021    PO2ART 157.4 09/30/2021       MICROBIOLOGY:    Blood culture -     Ref Range & Units 11/29/21 1412   Bottle Type  Aerobic    Source of Blood Culture  No site given    Order Number  [id number]    Enterobacteriaceae by PCR Not Detected Not Detected Enterobacter cloacae complex by PCR Not Detected Not Detected    Escherichia coli by PCR Not Detected Not Detected    Haemophilus Influenzae by PCR Not Detected Not Detected    Klebsiella oxytoca by PCR Not Detected Not Detected    Klebsiella pneumoniae group by PCR Not Detected Not Detected    Listeria monocytogenes by PCR Not Detected Not Detected    Neisseria meningitidis by PCR Not Detected Not Detected    Proteus species by PCR Not Detected Not Detected    Pseudomonas aeruginosa by PCR Not Detected Not Detected    Streptococcus agalactiae by PCR Not Detected Not Detected    Staphylococcus aureus by PCR Not Detected DETECTED Panic     Staphylococcus species by PCR Not Detected DETECTED Panic     Serratia marcescens by PCR Not Detected Not Detected    Streptococcus pneumoniae by PCR Not Detected Not Detected    Streptococcus pyogenes  by PCR Not Detected Not Detected    Streptococcus species by PCR Not Detected Not Detected    Enterococcus by PCR Not Detected Not Detected    Candida albicans by PCR Not Detected Not Detected    Candida glabrata by PCR Not Detected Not Detected    Candida krusei by PCR Not Detected Not Detected    Candida parapsilosis by PCR Not Detected Not Detected    Candida tropicalis by PCR Not Detected Not Detected    Methicillin Resistance mecA/C  by PCR Not Detected DETECTED Panic     Resulting Agency  [address] [facility]        Specimen Collected: 11/29/21 14:12 Last Resulted: 11/30/21 08:41                  Urine Culture -     Specimen: Urine, clean catch Updated: 11/30/21 1109    Organism Gram negative gatito Abnormal     Urine Culture, Routine --    >100,000 CFU/ml   Identification and sensitivity to follow    Narrative:     Source: URINE       Site:              Culture, Wound [[id number]]            Radiology :    Chest X ray : Hazy multifocal bilateral airspace disease     CT abdomen and pelvis -  pression:        The visualized lungs demonstrate aspiration versus pneumonia.  Aspiration is   favored. No acute inflammatory abnormality is evident in the abdomen or pelvis. The infrarenal aorta measures 2.9 cm.  See recommendation below. Sigmoid diverticulosis without evidence of diverticulitis. vanco random 23.6      IMPRESSION:     1. S/P code blue   2 Respiratory failure   3. MRSA sepsis , septic shock   4. S/P right foot TMA   5. Leukocytosis, lactic acidosis, elevated procalcitonin   6. Prognosis : Poor     RECOMMENDATIONS:      1. Vancomycin random level   2. Cefepime 1 gram IV q 24 hrs  3. Follow up blood cx   4.  EEG / Await family decision

## 2021-12-01 NOTE — PLAN OF CARE
Problem: Gas Exchange - Impaired:  Goal: Levels of oxygenation will improve  Description: Levels of oxygenation will improve  Outcome: Met This Shift     Problem: Infection, Septic Shock:  Goal: Will show no infection signs and symptoms  Description: Will show no infection signs and symptoms  Outcome: Not Met This Shift     Problem: Infection - Ventilator-Associated Pneumonia:  Goal: Absence of pulmonary infection  Description: Absence of pulmonary infection  Outcome: Not Met This Shift     Problem: Serum Glucose Level - Abnormal:  Goal: Ability to maintain appropriate glucose levels will improve  Description: Ability to maintain appropriate glucose levels will improve  Outcome: Not Met This Shift     Problem: Tissue Perfusion, Altered:  Goal: Circulatory function within specified parameters  Description: Circulatory function within specified parameters  Outcome: Not Met This Shift     Problem: Infection - Methicillin-Resistant Staphylococcus Aureus Infection:  Goal: Absence of methicillin-resistant Staphylococcus aureus infection  Description: Absence of methicillin-resistant Staphylococcus aureus infection  Outcome: Not Met This Shift     Problem: Skin Integrity:  Goal: Will show no infection signs and symptoms  Description: Will show no infection signs and symptoms  Outcome: Not Met This Shift  Goal: Absence of new skin breakdown  Description: Absence of new skin breakdown  Outcome: Met This Shift     Problem: Falls - Risk of:  Goal: Will remain free from falls  Description: Will remain free from falls  Outcome: Met This Shift  Goal: Absence of physical injury  Description: Absence of physical injury  Outcome: Met This Shift     Problem: Non-Violent Restraints  Goal: Removal from restraints as soon as assessed to be safe  12/1/2021 1646 by Isaias Diaz  Outcome: Not Met This Shift  12/1/2021 1331 by Isaias Diaz  Outcome: Not Met This Shift  Goal: No harm/injury to patient while restraints in use  12/1/2021 1646 by Meigs Salvage  Outcome: Met This Shift  12/1/2021 1331 by Meigs Salvage  Outcome: Met This Shift  Goal: Patient's dignity will be maintained  12/1/2021 1646 by Gurvinder Salvage  Outcome: Met This Shift  12/1/2021 1331 by Gurvinder Salvage  Outcome: Met This Shift     Problem: Inadequate oral food/beverage intake (NI-2.1)  Goal: Food and/or Nutrient Delivery  Description: Individualized approach for food/nutrient provision.   12/1/2021 1429 by Esperanza Lewis, MS, RD, LD  Outcome: Ongoing

## 2021-12-01 NOTE — PROGRESS NOTES
[address] [facility]   Department of Internal Medicine   MICU Progress Note    Patient:  Christie Tylerocent [de-identified] y.o. male   MRN: [id number]       Date of Service: 2021    Allergy: Patient has no known allergies. CC rhonchi/wheezing, sob and tachycardia   Subjective   Intubated/no sedation, pupil constricted- no reactive to light, no gag,corneal, or pain stimuli  Concern for GI bleed- hypothermia protocol contraindicated with GI bleed therefore started to re warm- once obtain temperature- will get abgs   On levophed  LA trending up- will do NICOM see if fluid responsive  On insulin and bicarb gtt  Palliative consulted - goals of care   Objective     TEMPERATURE:  Current - Temp: 101.5 °F (38.6 °C); Max - Temp  Av.9 °F (37.7 °C)  Min: 98.4 °F (36.9 °C)  Max: 101.5 °F (38.6 °C)    RESPIRATIONS RANGE: Resp  Av.4  Min: 14  Max: 31    PULSE RANGE: Pulse  Av.1  Min: 0  Max: 130    BLOOD PRESSURE RANGE:  Systolic (23TXT), QTE:608 , Min:90 , AKX:866   ; Diastolic (31YPK), AW, Min:42, Max:57      PULSE OXIMETRY RANGE: SpO2  Av %  Min: 93 %  Max: 98 %    I & O - 24hr:    Intake/Output Summary (Last 24 hours) at 2021 1837  Last data filed at 2021 1800  Gross per 24 hour   Intake 9577.36 ml   Output 125 ml   Net 9452.36 ml     I/O last 3 completed shifts: In: 9577.4 [I.V.:8488; NG/GT:56; IV Piggyback:1033.3]  Out: 105 [Urine:85; Emesis/NG output:20] I/O this shift:  In: -   Out: 20 [Urine:20]   Weight change: -13 lb 6.4 oz (-6.078 kg)    Physical Exam:  General Appearance: intubated/no sedation, does not open eyes or follow commands, unresponsiveness, no gag or corneal reflexes, no pain stimuli  HEENT:  Head: Normocephalic, no lesions, without obvious abnormality. Eye: Normal external eye, pupil 2mm not reactive to light   Nose: Normal external nose, mucus membranes and septum. Pharynx: Dental Hygiene adequate. Normal buccal mucosa. Normal pharynx.   Neck: no adenopathy, no carotid bruit, no JVD, supple, symmetrical, trachea midline and thyroid not enlarged, symmetric, no tenderness/mass/nodules  Lung: clear to auscultation bilaterally, no wheezing, rhonchi noted. Heart: regular rate and rhythm, S1, S2 normal, no murmur, click, rub or gallop  Abdomen: soft, non-tender; bowel sounds normal; no masses,  no organomegaly  Musculokeletal: No joint swelling, no muscle tenderness. SKIN;  Normal coloration, warm, dry. 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 NEURO:   Does not follow directions. No response to verbal or painful stimuli  PSYCH:   NO Sedation.     Medications     Continuous Infusions:   dextrose 5 % and 0.45 % NaCl Stopped (12/01/21 1215)    fentaNYL 5 mcg/ml in 0.9%  ml infusion 150 mcg/hr (12/01/21 1600)    sodium chloride 100 mL/hr at 12/01/21 1216    dextrose      norepinephrine 35 mcg/min (12/01/21 1828)    sodium chloride       Scheduled Meds:   midodrine  10 mg Oral TID WC    insulin glargine  10 Units SubCUTAneous Daily    insulin lispro  0-12 Units SubCUTAneous Q4H    white petrolatum   Topical BID    ipratropium-albuterol  1 ampule Inhalation Q4H WA    budesonide  0.5 mg Nebulization BID    vancomycin (VANCOCIN) intermittent dosing (placeholder)   Other RX Placeholder    cefepime  1,000 mg IntraVENous Q24H    epoetin alessandra-epbx  3,000 Units SubCUTAneous Once per day on Mon Wed Fri    sodium chloride flush  5-40 mL IntraVENous 2 times per day    pantoprazole  40 mg IntraVENous BID    And    sodium chloride (PF)  10 mL IntraVENous BID    chlorhexidine  15 mL Mouth/Throat BID     PRN Meds: dextrose 5 % and 0.45 % NaCl, white petrolatum **AND** white petrolatum, ipratropium-albuterol, glucose, glucagon (rDNA), dextrose, dextrose, potassium chloride, magnesium sulfate, sodium phosphate IVPB **OR** sodium phosphate IVPB **OR** sodium phosphate IVPB, midazolam, sodium chloride flush, sodium chloride, acetaminophen **OR** acetaminophen, ondansetron **OR** ondansetron, perflutren lipid microspheres, fentanNYL  Nutrition:   NPO    Labs and Imaging Studies     CBC:   Recent Labs     11/29/21  1412 11/30/21  0201 12/01/21  0404 12/01/21  0755 12/01/21  1445   WBC 18.5*  --  29.0*  --   --    RBC 3.51*  --  2.64*  --   --    HGB 10.3*   < > 8.0* 7.2* 7.0*   HCT 33.1*   < > 24.4* 22.0* 21.5*   MCV 94.3  --  92.4  --   --    MCH 29.3  --  30.3  --   --    MCHC 31.1*  --  32.8  --   --    RDW 17.2*  --  16.8*  --   --      --  216  --   --    MPV 10.3  --  10.4  --   --     < > = values in this interval not displayed. BMP:    Recent Labs     11/29/21 2014 11/29/21  2301 11/30/21  0610 11/30/21  1204 11/30/21  1755 11/30/21  2018 12/01/21  0728 12/01/21  1240 12/01/21  1445   *   < > 140   < >  --    < > 132 135 132   K 4.9   < > 3.7   < >  --    < > 5.4* 5.5* 5.7*      < > 98   < >  --    < > 97* 96* 95*   CO2 18*   < > 19*   < >  --    < > 23 19* 20*   *   < > 120*   < >  --    < > 115* 119* 113*   CREATININE 3.2*   < > 3.1*   < >  --    < > 3.5* 3.6* 3.6*   GLUCOSE 210*   < > 422*   < >  --    < > 136* 234* 182*   CALCIUM 11.4*   < > 9.8   < >  --    < > 8.7 8.3* 8.7   PROT 6.0*  --  5.4*  --  5.6*  --   --   --   --    LABALBU 1.8*  --  1.6*  --  1.6*  --   --   --   --    BILITOT 0.5  --  0.6  --  0.6  --   --   --   --    ALKPHOS 113  --  105  --  118  --   --   --   --    *  --  655*  --  548*  --   --   --   --    *  --  206*  --  192*  --   --   --   --     < > = values in this interval not displayed. LIVER PROFILE:   Recent Labs     11/29/21  1412 11/29/21  1412 11/29/21 2014 11/30/21  0610 11/30/21  1755   AST 24   < > 555* 655* 548*   ALT 9   < > 136* 206* 192*   BILIDIR 0.3  --   --  0.4* 0.4*   BILITOT 0.5   < > 0.5 0.6 0.6   ALKPHOS 140*   < > 113 105 118    < > = values in this interval not displayed.        PT/INR:   Recent Labs     11/29/21  1412 11/29/21  2301   PROTIME 17.2* 23.0*   INR 1.6 2.1       APTT:   Recent Labs     11/29/21  1412 11/29/21  2301   APTT 40.1* 43.2*       Fasting Lipid Panel:    Lab Results   Component Value Date    CHOL 99 09/24/2021    TRIG 84 09/24/2021    HDL 33 09/24/2021       Cardiac Enzymes:    Lab Results   Component Value Date    CKTOTAL 96 11/29/2021    CKTOTAL 245 (H) 11/01/2019    CKTOTAL 157 01/20/2018    CKMB 8.2 (H) 01/20/2018    CKMB 9.0 (H) 01/19/2018    CKMB 2.6 09/02/2016    TROPONINI 0.05 (H) 01/19/2021    TROPONINI 0.06 (H) 01/19/2021    TROPONINI 0.09 (H) 01/19/2021       Notable Cultures:      Blood cultures   Blood Culture, Routine   Date Value Ref Range Status   11/29/2021 (A)  Preliminary    Gram stain performed from blood culture bottle media  Gram positive cocci in clusters     11/29/2021 Sensitivity to follow  Preliminary     Respiratory cultures No results found for: RESPCULTURE   Gram Stain Result   Date Value Ref Range Status   02/14/2017 Refer to ordered Gram stain for results  Final     Urine   Urine Culture, Routine   Date Value Ref Range Status   11/29/2021 >100,000 CFU/ml  Final     Legionella No results found for: LABLEGI  C Diff PCR No results found for: CDIFPCR  Wound culture/abscess:   Recent Labs     11/29/21  1412   WNDABS Growth present, evaluating for:  Mixed Gram negative rods  Gram positive organisms  Mixed sindhu isolated. Further workup and sensitivity testing  is not routinely indicated and will not be performed. Mixed sindhu isolated includes:  Gram negative rods  Corynebacteria  Oxidase positive Gram negative rods  *  Moderate growth  Sensitivity to follow       Tip culture:No results for input(s): CXCATHTIP in the last 72 hours.      Antibiotic  Days  Day started   vanco        Cefepime                       Oxygen:     Vent Information  $Ventilation: $Subsequent Day  Skin Assessment: Clean, dry, & intact  Equipment ID: [id number]  Vent Type: 980  Vent Mode: AC/VC  Vt Ordered: 450 mL  Rate Set: 18 bmp  Peak ET tube tip within 1.0 cm of the nato. XR ABDOMEN FOR NG/OG/NE TUBE PLACEMENT   Final Result   Enteric tube tip just distal to the GE junction. XR CHEST PORTABLE   Final Result   1. Limited due to low lung volumes. 2. Bibasilar opacities notable on the left could indicate bibasilar pneumonia   or atelectasis. Opacities appear similar compared to prior from November 5th. XR FOOT RIGHT (2 VIEWS)   Final Result   1. Postsurgical changes including prior transmetatarsal amputation of the   right foot. 2. No radiographic evidence of osteomyelitis. Improvement of soft tissue   swelling. 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 In ED, intubated and on vent- went into CPR with ROSC,now admitted to MICU. Initially was DNR-CCA- code status changed to Full code in ED after speaking with family.        Assessment:  CPA with ROSC( underline rhythm V-tach- downtime 6 min)   Acute hypoxemic/hypercapnic respiratory failure on Vent  Severe anoxic encephalopathy likely hypoxia in setting of CPA   Multiorgan Failure Septic shock 2/2 PNA, Wound infection; and bacteremia (hx chronic wound infection positive for MRSA and Pseudomonas, Citrobacter  MRSA sepsis- bacteremia   Urosepsis ( GNR)  Multifactorial bacterial PNA   Decompensated HFpEF/HFrEF with EF of 40-45%  Pulmonary Hypertension (Who group 2,3)  DKA  HAGMA  Acute on CKD stage 2  Acute transaminates likely from hypoperfusion   Elevated troponin likely post cardiac arrest vs CKD  Nonsustained V tach s/p ACLS and defibrillation (shock x2)   Leukocytosis   Concern for GI bleed  Acute on chronic anemia   Hx of Chronic right foot wound infection positive for MRSA and Pseudomonas, Citrobacter- d/c to NH on Daptomycin and meropenem (competeled on 11/14/21)   Hx of CAD s/p valve/CABG in 2008/ Dual Chamber Pacemaker  Hx of CVA and Carotid stenosis s/p Endarterectomy  Hx of Hypertension  Hx of Hypothyroidism  Hx of COPD  Hx of Type 2 DM; diabetic neuropathy   Hx of Chronic anemia  Hx of Depression  Hx of TREVOR (noncompliant with CPAP)  Hx of Bilateral TKAs and nonambulatory    Plan:  - CPR with ROSC( underline V-tach) in setting of multifactorial -septic shock vs pulmonary vs cardiac related. (downtime total 6 minutes)   - Currently on Vent (day #3); wean FIO2 to keep SPO2 goal 88-92%  - Bronchodilator elva and PRN  - on No sedation, prn versed  - on levophed; multiple IVF fluids bolus; LA trending up- will get NICOM  - Titrate levophed to keep MAP >65mmHg  - Acute anoxic encephalopathy- neurology consulted, input appreciated- may need EEG and CT head of brain without contrast EEG pending  Acute anemia Hemoglobin 7 will transfuse 1 unit has increase vasopressor support.  Levophed at 100 mcg/min  Vasopressin gtt started   - Initiated hypothermia protocol, d/t concern for GI bleed- its contraindicated therefore was stopped, currently rewarming, once achieve target rewarm temperature- will obtain ABG- may need apnea test if no improvement in mentation  - elevate troponin, Echo pending, ProBNO 16K; cardiology following, input appreciated, Pacemaker interrogation - COVID 19 negative; will check urine antigen, blood culture (2/2 GOC in culsture); Sputum pending, Urine (Positive GNR); Wound abscess (pending) ; Procalcitonin 9.7; LA 9.0==trending upwards  - Empirically on Vanco and cefepime, ID consulted, input appreciated    - trend liver profile, will check ammonia level   - GI bleed, H/H q6H, transfuse if less than 7  - PPI BID, consult to general surgery, input appreciated  - hold all anticoagulant for now   - DKA- on insulin gtt- along with bicarb gtt- bicarb d/c now on D5q/2NS with 20 KCL; since glucose dropped below 250  - hgb A1C 5.5; beta-hydroxybutyrate 0.46  - cortisol 52.69; no IV steroids for now   - Hold all antihypertensive drugs  - Vascular, podiatry and wound consulted for right lower leg, input appreciated  - Wound vac to Right leg  - Labs/cxr in AM  -F/E/N none/replace lytes/ NPO   - DVT/GI scds/ no anticogulant/ PPI BID  - code status : full code was DNR-CCA and family changed when ER called  - Palliative consulted, input appreciated for goals of care     Family called concerning deterioration and increase pressure requirements. Plan of care discussed in multidisciplinary rounds    Dr. Nicolasa Avila is the collaborating physician. I personally saw, examined and provided care for the patient. Radiographs, labs and medication list were reviewed by me independently. I spoke with bedside nursing, therapists and consultants. Critical care services and times documented are independent of procedures and multidisciplinary rounds with CNP. Additionally comprehensive, multidisciplinary rounds were conducted with the MICU team. The case was discussed in detail and plans for care were established. Review of CNP documentation was conducted and revisions were made as appropriate. I agree with the above documented exam, problem list and plan of care.    Zachariah Fall MD   CCT excluding procedures :45'

## 2021-12-01 NOTE — PROGRESS NOTES
Henry Payne is a [de-identified] y.o. male     Neurology is following for an AMS    Patient presented with wound infection of right foot, septic shock and respiratory distress. Went into cardiac arrest and ROSC achieved after 6 minutes. EEG being completed currently. CT head pending. On exam he has + sluggish pupils, corneal, spontaneous movement of LUE and grimaces to pain in the BUE for me. No family at bedside. ROS unable due to mental status and intubation     He has an extensive comorbid medical conditions     No Known Allergies    Objective:       BP (!) 90/42   Pulse 118   Temp 100 °F (37.8 °C) (Esophageal)   Resp 18   Wt 226 lb 9.6 oz (102.8 kg)   SpO2 97%   BMI 34.45 kg/m²     General: Intubated. In no acute distress   HEENT: Normocephalic, atraumatic, no lesions or abnormalities noted.      Lungs:  ventilated   CV: Sinus tach on monitor    Extremities: L second toe amputation, R TMA       Mental Status: Intubated. Does not follow commands.      Cranial Nerves:  I: smell    II: visual acuity     II: visual fields No response to threat    II: pupils Sluggish, but reactive    III,VII: ptosis    III,IV,VI: extraocular muscles  Roving eye movements    V: mastication    V: facial light touch sensation     V,VII: corneal reflex  Present   VII: facial muscle function - upper     VII: facial muscle function - lower Appears symmetric around ETT   VIII: hearing    IX: soft palate elevation     IX,X: gag reflex    XI: trapezius strength  Shoulders appear symmetric    XI: sternocleidomastoid strength    XI: neck extension strength     XII: tongue strength       Motor:  Spontaneously moves LUE against gravity-- not purposeful or to command   Normal tone and bulk   No abnormal movements     Sensory:  Grimaces to noxious stimuli in the BUE-- does not withdrawal. No response to pain in the lower extremities     DTR:     No Farris's    No pathological reflexes    Laboratory/Radiology:     CBC with Differential: Lab Results   Component Value Date    WBC 29.0 12/01/2021    RBC 2.64 12/01/2021    HGB 7.2 12/01/2021    HCT 22.0 12/01/2021     12/01/2021    MCV 92.4 12/01/2021    MCH 30.3 12/01/2021    MCHC 32.8 12/01/2021    RDW 16.8 12/01/2021    NRBC 0.9 12/01/2021    SEGSPCT 76 03/16/2014    BANDSPCT 1 09/02/2016    METASPCT 0.9 10/01/2021    LYMPHOPCT 2.6 12/01/2021    MONOPCT 0.9 12/01/2021    BASOPCT 0.3 12/01/2021    MONOSABS 0.29 12/01/2021    LYMPHSABS 0.87 12/01/2021    EOSABS 0.00 12/01/2021    BASOSABS 0.00 12/01/2021     CMP:    Lab Results   Component Value Date     12/01/2021    K 5.4 12/01/2021    K 3.7 11/30/2021    CL 97 12/01/2021    CO2 23 12/01/2021     12/01/2021    CREATININE 3.5 12/01/2021    GFRAA 20 12/01/2021    LABGLOM 17 12/01/2021    GLUCOSE 136 12/01/2021    GLUCOSE 111 07/16/2011    PROT 5.6 11/30/2021    LABALBU 1.6 11/30/2021    CALCIUM 8.7 12/01/2021    BILITOT 0.6 11/30/2021    ALKPHOS 118 11/30/2021     11/30/2021     11/30/2021     I personally reviewed all labs and images today   Assessment:     AMS in the setting of septic shock, s/p cardiac arrest with ROSC achieved after 6 minutes      On exam he has + corneal, sluggish pupils, grimaces to noxious stimuli in the BUE but does not withdrawal, moving LUE spontaneously-- but does not appear purposeful.  Does not follow commands, attend examiner or track  Plan:     Follow up CT head and EEG results     Consider need for MRI brain pending results of above and clinical course     Supportive care     Will follow     Inez Mclain PA-C  12:20 PM  12/1/2021

## 2021-12-01 NOTE — PROGRESS NOTES
INPATIENT CARDIOLOGY Follow UP    Name: Virgil Zambrano    Age: [de-identified] y.o. Date of Admission: 11/29/2021  1:49 PM    Date of Service: 12/1/2021      Referring Physician: Maribell Miller MD      Subjective: Spoke with patient nurse. Blood pressure and heart rate improving. Palliative service on board.             Past Medical History:  Past Medical History:   Diagnosis Date    (HFpEF) heart failure with preserved ejection fraction ([address] 07/2020    Diagnosed by cardiology    Arthritis     Bilateral carotid artery stenosis 12/31/2020    Bilateral carotid bruits 12/31/2020    Blood circulation, collateral     CAD (coronary artery disease)     Carotid bruit 03/27/2013    CHF (congestive heart failure) (HCC)     Chronic kidney disease     CKD (chronic kidney disease) stage 3, GFR 30-59 ml/min ([address] 03/20/2016    COPD (chronic obstructive pulmonary disease) ([address]     Follows with pulmonary    Diabetes mellitus ([address]     Diabetic neuropathy associated with type 2 diabetes mellitus (HCC)     Dyspnea on exertion     History of blood transfusion     Hyperlipidemia     Hypertension     Mild mitral regurgitation     Moderate tricuspid regurgitation by prior echocardiography 2018    Movement disorder     NSTEMI, initial episode of care ([address] 01/2018    Obesity     Pulmonary hypertension ([address] 2018    PVD (peripheral vascular disease) with claudication ([address] 03/27/2014    S/P carotid endarterectomy 03/27/2013    Sleep apnea     SOB (shortness of breath)     Thyroid disease     Unspecified cerebral artery occlusion with cerebral infarction     Vision decreased 12/31/2020       Past Surgical History:  Past Surgical History:   Procedure Laterality Date    BIOPSY / LIGATION TEMPORAL ARTERY Bilateral 01/05/2021    BILATERAL TEMPORAL ARTERY BIOPSY performed by Sunita Banda MD at [facility] [address] N/A 11/4/2021    BRONCHOSCOPY DIAGNOSTIC OR CELL [address] ONLY and bal performed by Doe Chris bad episode.  Sexual activity: Not Currently   Other Topics Concern    Not on file   Social History Narrative    1 cup coffee daily     Social Determinants of Health     Financial Resource Strain:     Difficulty of Paying Living Expenses: Not on file   Food Insecurity:     Worried About Running Out of Food in the Last Year: Not on file    Alecia of Food in the Last Year: Not on file   Transportation Needs:     Lack of Transportation (Medical): Not on file    Lack of Transportation (Non-Medical): Not on file   Physical Activity:     Days of Exercise per Week: Not on file    Minutes of Exercise per Session: Not on file   Stress:     Feeling of Stress : Not on file   Social Connections:     Frequency of Communication with Friends and Family: Not on file    Frequency of Social Gatherings with Friends and Family: Not on file    Attends [employer] Services: Not on file    Active Member of [address] [place] [address] or Organizations: Not on file    Attends Club or Organization Meetings: Not on file    Marital Status: Not on file   Intimate Partner Violence:     Fear of Current or Ex-Partner: Not on file    Emotionally Abused: Not on file    Physically Abused: Not on file    Sexually Abused: Not on file   Housing Stability:     Unable to Pay for Housing in the Last Year: Not on file    Number of [place] in the Last Year: Not on file    Unstable Housing in the Last Year: Not on file       Allergies:  No Known Allergies    Home Medications:  Prior to Admission medications    Medication Sig Start Date End Date Taking? Authorizing Provider   Heparin Sodium, Porcine, (HEPARIN, PORCINE,) 07361 UNIT/ML injection Inject 0.5 mLs into the skin every 12 hours 11/6/21   MADDI Mendoza CNP   gabapentin (NEURONTIN) 100 MG capsule Take 2 capsules by mouth 4 times daily for 30 days.  11/6/21 12/6/21  MADDI Mendoza CNP   atorvastatin (LIPITOR) 10 MG tablet Take 1 tablet by mouth daily 11/7/21   Jin Kwon tablet Take 25 mg by mouth 4 times daily     Historical Provider, MD   lactulose 20 GM/30ML SOLN Take 20 g by mouth three times a week *MON-WED-FRI*    Historical Provider, MD   metOLazone (ZAROXOLYN) 2.5 MG tablet Take 2.5 mg by mouth three times a week *TUE-THUR-SAT*    Historical Provider, MD   ferrous sulfate (IRON 325) 325 (65 Fe) MG tablet Take 1 tablet by mouth 2 times daily (with meals) 1/25/21   Gilbert Martino DO   DULoxetine (CYMBALTA) 60 MG extended release capsule Take 1 capsule by mouth daily 1/26/21   Gilbert Martino DO   fluticasone-umeclidin-vilant (TRELEGY ELLIPTA) 100-62.5-25 MCG/INH AEPB Inhale 1 puff into the lungs Daily     Historical Provider, MD   aspirin EC 81 MG EC tablet Take 1 tablet by mouth daily 8/7/20   Nadege Wilson MD   levothyroxine (SYNTHROID) 25 MCG tablet Take 25 mcg by mouth Daily  7/29/19   Historical Provider, MD   metoprolol succinate (TOPROL XL) 50 MG extended release tablet Take 50 mg by mouth daily    Historical Provider, MD   isosorbide mononitrate (IMDUR) 120 MG extended release tablet Take 1 tablet by mouth daily 1/23/18   Juannorberto [facility], MD   allopurinol (ZYLOPRIM) 300 MG tablet Take 300 mg by mouth daily  2/27/15   Historical Provider, MD       Current Medications:  Current Facility-Administered Medications   Medication Dose Route Frequency Provider Last Rate Last Admin    dextrose 5 % and 0.45 % sodium chloride infusion   IntraVENous Continuous PRN Beatriz Valerio APRN - CNP   Stopped at 12/01/21 1215    fentaNYL 5 mcg/ml in 0.9%  ml infusion  12.5-200 mcg/hr IntraVENous Continuous MADDI Hawkins - CNP 10 mL/hr at 12/01/21 1200 50 mcg/hr at 12/01/21 1200    midodrine (PROAMATINE) tablet 10 mg  10 mg Oral TID WC Pedmar Matamoros, APRN - CNP   10 mg at 12/01/21 1200    insulin glargine (LANTUS) injection vial 10 Units  10 Units SubCUTAneous Daily Peder Shiloh, APRN - CNP   10 Units at 12/01/21 1142    insulin lispro (HUMALOG) injection vial 0-12 Units Given at 11/30/21 1206    cefepime (MAXIPIME) 1000 mg IVPB minibag  1,000 mg IntraVENous Q24H Portia Longoria MD   Stopped at 11/30/21 1800    epoetin alessandra-epbx (RETACRIT) injection 3,000 Units  3,000 Units SubCUTAneous Once per day on Mon Wed Fri Tayla Frank MD   3,000 Units at 12/01/21 0920    midazolam PF (VERSED) injection 2 mg  2 mg IntraVENous Q1H PRN Gregorio Heady, DO        norepinephrine (LEVOPHED) 16 mg in dextrose 5% 250 mL infusion  2-100 mcg/min IntraVENous Continuous Gregorio Heady, DO 16.9 mL/hr at 12/01/21 1307 18 mcg/min at 12/01/21 1307    sodium chloride flush 0.9 % injection 5-40 mL  5-40 mL IntraVENous 2 times per day Domitila Piano, APRN - CNP   10 mL at 12/01/21 4105    sodium chloride flush 0.9 % injection 5-40 mL  5-40 mL IntraVENous PRN Domitila Piano, APRN - CNP        0.9 % sodium chloride infusion  25 mL IntraVENous PRN Domitila Piano, APRN - CNP        acetaminophen (TYLENOL) tablet 650 mg  650 mg Oral Q6H PRN Domitila Piano, APRN - CNP   650 mg at 12/01/21 1141    Or    acetaminophen (TYLENOL) suppository 650 mg  650 mg Rectal Q6H PRN Domitila Piano, APRN - CNP   650 mg at 12/01/21 0534    pantoprazole (PROTONIX) injection 40 mg  40 mg IntraVENous BID Domitila Piano, APRN - CNP   40 mg at 12/01/21 0920    And    sodium chloride (PF) 0.9 % injection 10 mL  10 mL IntraVENous BID Sandrine Dong, APRN - CNP   10 mL at 12/01/21 0920    ondansetron (ZOFRAN-ODT) disintegrating tablet 4 mg  4 mg Oral Q8H PRN Domitila Piano, APRN - CNP        Or    ondansetron (ZOFRAN) injection 4 mg  4 mg IntraVENous Q6H PRN Sandrine Dong, APRN - CNP        chlorhexidine (PERIDEX) 0.12 % solution 15 mL  15 mL Mouth/Throat BID Sandrine Dong, APRN - CNP   15 mL at 12/01/21 0920    perflutren lipid microspheres (DEFINITY) injection 1.65 mg  1.5 mL IntraVENous ONCE PRN Domitila Shraddha APRN - CNP        fentaNYL (SUBLIMAZE) injection 25 mcg  25 mcg IntraVENous Q1H PRN Domitila Llanes, APRN - CNP          dextrose 5 % and 0.45 % NaCl Stopped (12/01/21 1215)    fentaNYL 5 mcg/ml in 0.9%  ml infusion 50 mcg/hr (12/01/21 1200)    sodium chloride 100 mL/hr at 12/01/21 1216    dextrose      norepinephrine 18 mcg/min (12/01/21 1307)    sodium chloride         Physical Exam:  BP (!) 103/50   Pulse 128   Temp 100.8 °F (38.2 °C) (Esophageal)   Resp 24   Ht 5' 8\" (1.727 m)   Wt 226 lb 9.6 oz (102.8 kg)   SpO2 94%   BMI 34.45 kg/m²   Wt Readings from Last 3 Encounters:   12/01/21 226 lb 9.6 oz (102.8 kg)   11/22/21 240 lb (108.9 kg)   11/04/21 240 lb 4.8 oz (109 kg)       Appearance: Alert and oriented x3 not in acute distress. Skin: Dry skin  Head: Atraumatic  Eyes: Intact extraocular muscles   ENMT: Mucous membranes are moist  Neck: Supple  Lungs: Clear to auscultation  Cardiac: Normal S1 and S2  Abdomen: Protuberant  Extremities: Intact range of motion  Neurologic: No focal neurological deficits  Peripheral Pulses: 2+ peripheral pulses    Intake/Output:    Intake/Output Summary (Last 24 hours) at 12/1/2021 1438  Last data filed at 12/1/2021 1400  Gross per 24 hour   Intake 4501.36 ml   Output 105 ml   Net 4396.36 ml     I/O this shift:  In: 30 [NG/GT:30]  Out: 40 [Urine:20; Emesis/NG output:20]    Laboratory Tests:  Recent Labs     11/30/21  1552 11/30/21  1552 11/30/21 2018 11/30/21  2018 11/30/21  2358 12/01/21  0420 12/01/21  0728      < > 136   < > 133 133 132   K 4.3   < > 4.7   < > 4.9 5.4* 5.4*   CL 99   < > 98   < > 96* 96* 97*   CO2 27   < > 26   < > 23 24 23   *   < > 113*   < > 114* 118* 115*   CREATININE 3.3*  --  3.3*  --  3.3* 3.4* 3.5*   GLUCOSE 169*   < > 196*   < > 194* 168* 136*   CALCIUM 9.4   < > 9.2   < > 9.0 8.8 8.7    < > = values in this interval not displayed.      Lab Results   Component Value Date    MG 2.1 12/01/2021    MG 2.2 12/01/2021    MG 1.9 11/30/2021     Recent Labs     11/29/21  1412 11/29/21  1412 11/29/21 2014 11/30/21  0610 11/30/21  1755   ALKPHOS 140*   < > 113 105 118   ALT 9   < > 136* 206* 192*   AST 24   < > 555* 655* 548*   PROT 8.3   < > 6.0* 5.4* 5.6*   BILITOT 0.5   < > 0.5 0.6 0.6   BILIDIR 0.3  --   --  0.4* 0.4*   LABALBU 2.5*   < > 1.8* 1.6* 1.6*    < > = values in this interval not displayed. Recent Labs     11/29/21  1412 11/30/21  0201 12/01/21  0200 12/01/21  0404 12/01/21  0755   WBC 18.5*  --   --  29.0*  --    RBC 3.51*  --   --  2.64*  --    HGB 10.3*   < > 8.2* 8.0* 7.2*   HCT 33.1*   < > 25.0* 24.4* 22.0*   MCV 94.3  --   --  92.4  --    MCH 29.3  --   --  30.3  --    MCHC 31.1*  --   --  32.8  --    RDW 17.2*  --   --  16.8*  --      --   --  216  --    MPV 10.3  --   --  10.4  --     < > = values in this interval not displayed.      Lab Results   Component Value Date    CKTOTAL 96 11/29/2021    CKMB 8.2 (H) 01/20/2018    TROPONINI 0.05 (H) 01/19/2021    TROPONINI 0.06 (H) 01/19/2021    TROPONINI 0.09 (H) 01/19/2021     Recent Labs     11/29/21  1744 11/29/21  2014 11/29/21  2301 11/30/21  0201 11/30/21  0435   CKTOTAL  --  96  --   --   --    TROPHS 221* 274* 369* 414* 499*     Lab Results   Component Value Date    INR 2.1 11/29/2021    INR 1.6 11/29/2021    INR 1.4 11/03/2021    PROTIME 23.0 (H) 11/29/2021    PROTIME 17.2 (H) 11/29/2021    PROTIME 15.9 (H) 11/03/2021     Lab Results   Component Value Date    TSH 4.270 (H) 08/19/2021    TSH 1.340 01/21/2021    TSH 1.430 07/31/2020     Lab Results   Component Value Date    LABA1C 5.5 11/30/2021    LABA1C 7.2 (H) 09/24/2021    LABA1C 7.1 (H) 09/23/2021     No results found for: EAG  Lab Results   Component Value Date    CHOL 99 09/24/2021    CHOL 112 08/20/2021    CHOL 100 01/21/2021     Lab Results   Component Value Date    TRIG 84 09/24/2021    TRIG 87 08/20/2021    TRIG 89 01/21/2021     Lab Results   Component Value Date    HDL 33 09/24/2021    HDL 38 08/20/2021    HDL 30 01/21/2021     Lab Results   Component Value Date    LDLCALC 49 09/24/2021 LDLCALC 57 08/20/2021    LDLCALC 52 01/21/2021     Lab Results   Component Value Date    LABVLDL 17 09/24/2021    LABVLDL 17 08/20/2021    LABVLDL 18 01/21/2021     No results found for: [facility]     11/29/21  1412 11/30/21  0428   PROBNP 13,634* 16,109*       Cardiac Tests:      Echocardiogram reviewed: July 2020    Summary   Severe left ventricle hypertrophy. Visually estimated LVEF is 45-50 %. Paradoxical septal wall motion. Severely dilated left atrium. Right ventricle not well visualized. Grossly normal.   Severe mitral annular calcification. Mild mitral stenosis. Mild mitral   valve regurgitation. Bioprosthetic aortic valve leaflets not well seen. No evidence of   obstruction with similar transvalvular pressure gradient to the prior echo   in 2018. No aortic regurgitation. Compared to prior echo in 1/21/2018, the paradoxical septal wall motion   abnormality likely due to interventricular conduction delay is new. Stress test reviewed: September 2021  1: This is an abnormal myocardial perfusion study due to the presence   of mild global hypokinesis. There is no evidence of perfusion deficits   to suggest ischemia or infarction, however. 2  The left ventricle is normal in size with an ejection fraction of   49%. 3: Prognostically, this is a low to intermediate risk study. 4: Compared to the prior study from 1/20/2018, The LVEF appears to   have improved slightly.                 Cardiac catheterization reviewed:    CXR reviewed: The ASCVD Risk score (Denae Maradiaga et al., 2013) failed to calculate for the following reasons: The 2013 ASCVD risk score is only valid for ages 36 to 78    ASSESSMENT / PLAN:    1. Chronic HFrEF (EF 45%)  Currently in septic shock  Follow palliative service recommendation. 2. Hypoxic respiratory failure  Management per pulmonary and critical care service and primary service  3.  Sepsis/bacteremia/lactic acidosis  Management per ID primary service and MICU team    4. History of hypertension and currently hypotensive requiring pressors  5. Elevated troponin in the setting of status post CPR and defibrillation  6. Nonsustained ventricular tachycardia reportedly status post ACLS protocol and defibrillation  7. Atrial fibrillation with rapid ventricular response on admission with a history of oral anticoagulation stopped due to epistaxis and recent GI bleeding  Atrial fibrillation rate becomes uncontrolled, please initiate digoxin loading 500 mcg IV followed by 250 mcg IV every 6x2 doses without maintenance  8. CAD s/p CABG (SVG-RCA) and by cardiac catheterization in 2010 the saphenous vein graft to the RCA was patent. 9. S/p bioprosthetic AVR (2008) by 2D echocardiogram in July 2020 the bioprosthetic aortic valve leaflets were not well visualized and mean gradient across the aortic valve was 13.6 mmHg  10. Severe left ventricular hypertrophy by 2D echocardiogram in July 2020  11. Dual-chamber PPM in situ functioning well by last interrogation, in November 18, 2021, and noncompliance with follow-ups with EP  ? Since 2018, his need for ventricular pacing has increased from 25% to 95% on the last interrogation.  Also, his echocardiogram showed worsening ejection fraction from 60% to 45% with dyssynchrony between the lateral wall and the septal wall on the TTE echocardiogram. He was referred to EP however cancelled appointment   12. CKD, baseline 1.4-1.7, now with HARVEY  13. T2DM with neuropathy and chronic lower extremity wounds   14. Chronic anemia  now with acute GI bleed  15. COPD  12. Hypothyroidism  17. Hx of CVA and carotid stenosis and right carotid endarterectomy  18. Spinal stenosis on chronic narcotics, wheelchair bound   19. Morbid obesity   20. Depression  21. Obstructive sleep apnea history of noncompliance with CPAP  22. Bilateral TKAs and nonambulatory                       Thank you for allowing me to participate in your patient's care. Please feel free to contact me if you have any questions or concerns.     Cali Serrano MD  [facility] ([facility]) Cardiology

## 2021-12-01 NOTE — PROGRESS NOTES
[facility]  Nephrology Attending Progress Note  Chaya Pavon. Frederic Wilkins MD        SUBJECTIVE:     11/30: The pt is an [de-identified] yo male with a pmh of HFpEF, pvd sp r ileofem bypass 9/2021, r TMA 10/21, peripheral neuropathy, mod TR, charley, copd who presented with worsening r foot wound infection. He had been on merrem and dapto as an outpt until nov 14. Jaqui Campos He had resp distress in the er and was intubaed. He had a vfib arrest during line insertion in the er and was shocked and given epi x 3, hco3 x 3, and 2 g ca. je was started on lidocaine and hco3 drip. Labs showed a cr of 3.1, na 140, co2 19, lactate 9, wbc 10.5, hgb 10.3, plt 394. Cr on 11/6 was 1.4. he has had arf from [address] in October 2021.      12/1/21: pt seen in icu, intubated, on levo      PROBLEM LIST:    Patient Active Problem List   Diagnosis    Essential hypertension    Peripheral vascular disease ([address]    Pacemaker    H/O aortic valve replacement    CKD (chronic kidney disease) stage 3, GFR 30-59 ml/min    Type 2 diabetes mellitus with stage 3 chronic kidney disease, with long-term current use of insulin (HCC)    Pulmonary nodule    Diabetes mellitus type 2, uncontrolled ([address]    Hyperlipidemia LDL goal <100    Acquired hypothyroidism    CAD (coronary artery disease), native coronary artery    Status post coronary artery bypass graft    Chronic pain    History of CVA (cerebrovascular accident)    Obesity (BMI 30-39. 9)    Anemia    Elevated sed rate    Vision decreased    Bilateral carotid bruits    Bilateral carotid artery stenosis    Elevated troponin    Nonrheumatic mitral valve regurgitation    Pulmonary hypertension (HCC)    Moderate tricuspid regurgitation by prior echocardiography    (HFpEF) heart failure with preserved ejection fraction (HCC)    COPD (chronic obstructive pulmonary disease) (HCC)    Diabetic neuropathy associated with type 2 diabetes mellitus (HCC)    Congestive heart failure (HCC)    Cellulitis    Peripheral vascular disease of lower extremity with ulceration ([address]    Critical lower limb ischemia (HCC)    Nonrheumatic mitral valve stenosis    Severe left ventricular hypertrophy    Cardiomyopathy ([address]    Ischemic foot ulcer due to atherosclerosis of native artery of limb ([address]    Wound infection    AMS (altered mental status)    Acute delirium    Acute on chronic respiratory failure with hypoxia ([facility])    Mucus plugging of bronchi    Shock ([address]    Septic shock due to undetermined organism ([address]    Elevated brain natriuretic peptide (BNP) level    Electrolyte abnormality    Acute kidney injury superimposed on CKD ([address]    Urinary tract infection with hematuria    Melena    Postoperative wound infection    Aspiration pneumonia ([facility])        PAST MEDICAL HISTORY:    Past Medical History:   Diagnosis Date    (HFpEF) heart failure with preserved ejection fraction ([address] 07/2020    Diagnosed by cardiology    Arthritis     Bilateral carotid artery stenosis 12/31/2020    Bilateral carotid bruits 12/31/2020    Blood circulation, collateral     CAD (coronary artery disease)     Carotid bruit 03/27/2013    CHF (congestive heart failure) ([facility])     Chronic kidney disease     CKD (chronic kidney disease) stage 3, GFR 30-59 ml/min ([facility]) 03/20/2016    COPD (chronic obstructive pulmonary disease) ([address]     Follows with pulmonary    Diabetes mellitus ([address]     Diabetic neuropathy associated with type 2 diabetes mellitus ([facility])     Dyspnea on exertion     History of blood transfusion     Hyperlipidemia     Hypertension     Mild mitral regurgitation     Moderate tricuspid regurgitation by prior echocardiography 2018    Movement disorder     NSTEMI, initial episode of care ([address] 01/2018    Obesity     Pulmonary hypertension ([address] 2018    PVD (peripheral vascular disease) with claudication ([address] 03/27/2014    S/P carotid endarterectomy 03/27/2013    Sleep apnea     SOB (shortness of breath)     Thyroid disease     Unspecified cerebral artery occlusion with cerebral infarction     Vision decreased 12/31/2020       DIET:    Diet NPO     PHYSICAL EXAM:     Patient Vitals for the past 24 hrs:   BP Temp Temp src Pulse Resp SpO2 Weight   12/01/21 1100 -- -- -- 121 19 96 % --   12/01/21 1000 (!) 90/42 -- -- 117 18 97 % --   12/01/21 0932 -- -- -- 116 19 96 % --   12/01/21 0924 -- -- -- -- 19 96 % --   12/01/21 0923 -- -- -- -- 16 97 % --   12/01/21 0900 (!) 131/53 -- -- 118 25 96 % --   12/01/21 0800 (!) 99/43 100 °F (37.8 °C) Esophageal 112 20 98 % --   12/01/21 0700 -- -- -- 121 25 94 % --   12/01/21 0600 -- -- -- 120 26 94 % 226 lb 9.6 oz (102.8 kg)   12/01/21 0500 -- -- -- 119 25 95 % --   12/01/21 0400 -- 99.7 °F (37.6 °C) Esophageal 120 23 93 % --   12/01/21 0300 -- -- -- 117 21 94 % --   12/01/21 0200 -- -- -- 121 26 94 % --   12/01/21 0100 -- -- -- 122 25 94 % --   12/01/21 0000 -- 98.8 °F (37.1 °C) Esophageal 119 25 95 % --   11/30/21 2300 -- -- -- 122 (!) 31 96 % --   11/30/21 2200 -- -- -- 117 18 97 % --   11/30/21 2100 -- -- -- 115 18 97 % --   11/30/21 2000 -- 98.4 °F (36.9 °C) Esophageal 112 14 97 % --   11/30/21 1900 -- 98.6 °F (37 °C) -- (!) 0 21 -- --   11/30/21 1800 -- -- -- 109 13 97 % --   11/30/21 1700 -- 98.6 °F (37 °C) Bladder 112 18 93 % --   11/30/21 1645 -- -- -- 113 18 97 % --   11/30/21 1630 -- -- -- 112 18 -- --   11/30/21 1616 -- -- -- 97 18 97 % --   11/30/21 1600 (!) 126/52 97.7 °F (36.5 °C) Bladder 102 18 96 % --   11/30/21 1545 -- 97.7 °F (36.5 °C) -- 101 18 95 % --   11/30/21 1530 -- -- -- 101 18 95 % --   11/30/21 1515 -- -- -- 100 18 (!) 87 % --   11/30/21 1500 -- -- -- 99 18 (!) 89 % --   11/30/21 1445 -- -- -- 99 18 (!) 89 % --   11/30/21 1430 -- -- -- 98 18 92 % --   11/30/21 1415 -- -- -- 98 18 (!) 89 % --   11/30/21 1400 (!) 114/48 97 °F (36.1 °C) Bladder 99 18 91 % --   11/30/21 1345 -- -- -- 99 18 93 % --   11/30/21 1330 -- -- -- 99 24 93 % --   11/30/21 1315 -- -- -- 98 18 95 % --   11/30/21 1300 -- 96.4 °F (35.8 °C) Bladder 95 18 94 % --   11/30/21 1248 -- -- -- -- 18 98 % --   11/30/21 1247 -- -- -- 92 18 97 % --   11/30/21 1215 -- -- -- 93 18 97 % --   11/30/21 1200 (!) 121/49 95.7 °F (35.4 °C) Bladder 91 18 97 % --   @      Intake/Output Summary (Last 24 hours) at 12/1/2021 1115  Last data filed at 12/1/2021 1000  Gross per 24 hour   Intake 7742.36 ml   Output 265 ml   Net 7477.36 ml         Wt Readings from Last 3 Encounters:   12/01/21 226 lb 9.6 oz (102.8 kg)   11/22/21 240 lb (108.9 kg)   11/04/21 240 lb 4.8 oz (109 kg)       Constitutional:  Pt is intubated  Head: normocephalic, atraumatic  Neck: no JVD  Cardiovascular: regular rate and rhythm, no murmurs, gallops, or rubs  Respiratory:  No rales, rhochi, or wheezes  Gastrointestinal:  Soft, nontender, nondistended, bowel sounds x 4  Ext: trace edema  Skin: dry, no rash    MEDS (scheduled):    vancomycin  1,500 mg IntraVENous Once    ipratropium-albuterol  1 ampule Inhalation Q4H WA    budesonide  0.5 mg Nebulization BID    vancomycin (VANCOCIN) intermittent dosing (placeholder)   Other RX Placeholder    cefepime  1,000 mg IntraVENous Q24H    epoetin alessandra-epbx  3,000 Units SubCUTAneous Once per day on Mon Wed Fri    sodium chloride flush  5-40 mL IntraVENous 2 times per day    pantoprazole  40 mg IntraVENous BID    And    sodium chloride (PF)  10 mL IntraVENous BID    chlorhexidine  15 mL Mouth/Throat BID       MEDS (infusions):   dextrose 5 % and 0.45 % NaCl 150 mL/hr at 12/01/21 0552    fentaNYL 5 mcg/ml in 0.9%  ml infusion Stopped (12/01/21 1015)    dextrose      insulin 1.02 Units/hr (12/01/21 1100)    norepinephrine 12 mcg/min (12/01/21 1000)    sodium chloride         MEDS (prn):  dextrose 5 % and 0.45 % NaCl, ipratropium-albuterol, glucose, glucagon (rDNA), dextrose, dextrose, potassium chloride, magnesium sulfate, sodium phosphate IVPB **OR** sodium phosphate IVPB **OR** sodium phosphate IVPB, midazolam, sodium chloride flush, sodium chloride, acetaminophen **OR** acetaminophen, ondansetron **OR** ondansetron, perflutren lipid microspheres, fentanNYL    DATA:    Recent Labs     11/29/21  1412 11/30/21  0201 12/01/21  0200 12/01/21  0404 12/01/21  0755   WBC 18.5*  --   --  29.0*  --    HGB 10.3*   < > 8.2* 8.0* 7.2*   HCT 33.1*   < > 25.0* 24.4* 22.0*   MCV 94.3  --   --  92.4  --      --   --  216  --     < > = values in this interval not displayed. Recent Labs     11/29/21  1412 11/29/21  1412 11/29/21 2014 11/29/21 2015 11/30/21  0610 11/30/21  1204 11/30/21  1755 11/30/21 2018 11/30/21  2358 12/01/21  0420 12/01/21  0728      < > 148*   < > 140   < >  --    < > 133 133 132   K 5.5*   < > 4.9   < > 3.7   < >  --    < > 4.9 5.4* 5.4*   CL 98   < > 105   < > 98   < >  --    < > 96* 96* 97*   CO2 22   < > 18*   < > 19*   < >  --    < > 23 24 23   *   < > 117*   < > 120*   < >  --    < > 114* 118* 115*   CREATININE 3.4*   < > 3.2*   < > 3.1*   < >  --    < > 3.3* 3.4* 3.5*   LABGLOM 18   < > 19   < > 19   < >  --    < > 18 18 17   GLUCOSE 173*   < > 210*   < > 422*   < >  --    < > 194* 168* 136*   CALCIUM 11.5*   < > 11.4*   < > 9.8   < >  --    < > 9.0 8.8 8.7   ALT 9   < > 136*  --  206*  --  192*  --   --   --   --    AST 24   < > 555*  --  655*  --  548*  --   --   --   --    BILIDIR 0.3  --   --   --  0.4*  --  0.4*  --   --   --   --    BILITOT 0.5   < > 0.5  --  0.6  --  0.6  --   --   --   --    ALKPHOS 140*   < > 113  --  105  --  118  --   --   --   --    MG  --   --   --    < >  --    < >  --    < > 1.9 2.2 2.1   PHOS  --   --   --    < > 3.5   < >  --    < > 3.2 2.8 2.4*    < > = values in this interval not displayed.        Lab Results   Component Value Date    LABALBU 1.6 (L) 11/30/2021    LABALBU 1.6 (L) 11/30/2021    LABALBU 1.8 (L) 11/29/2021     Lab Results   Component Value Date    TSH 4.270 (H) 08/19/2021       Iron Studies  Lab Results   Component Value Date    IRON 31 (L) 12/01/2021    TIBC 85 (L) 12/01/2021    FERRITIN 4,697 12/01/2021     Vitamin B-12   Date Value Ref Range Status   12/01/2021 >2000 (H) 211 - 946 pg/mL Final     Folate   Date Value Ref Range Status   12/01/2021 4.6 (L) 4.8 - 24.2 ng/mL Final       Vit D, 25-Hydroxy   Date Value Ref Range Status   09/25/2021 27 (L) 30 - 100 ng/mL Final     Comment:     <20 ng/mL. ........... Lawernce Roughen Deficient  20-30 ng/mL. ......... Lawernce Roughen Insufficient   ng/mL. ........ Lawernce Roughen Sufficient  >100 ng/mL. .......... Lawernce Roughen Toxic       PTH   Date Value Ref Range Status   09/25/2021 30 15 - 65 pg/mL Final       No components found for: URIC    Lab Results   Component Value Date    COLORU Yellow 11/29/2021    NITRU Negative 11/29/2021    GLUCOSEU Negative 11/29/2021    GLUCOSEU NEGATIVE 07/12/2011    KETUA TRACE 11/29/2021    UROBILINOGEN 0.2 11/29/2021    BILIRUBINUR SMALL 11/29/2021    BILIRUBINUR NEGATIVE 07/12/2011       No results found for: Daniel Arreguin      IMPRESSION/RECOMMENDATIONS:    1.arf on ckd 3 a  Baseline 1.4  In setting of septic shock  Continue hemodynamic support  Follow io  No hyro on ct  Urine lytes  Cr at 3.4  Minimal uo  Trial of ivf  Given neuro status not an ideal dialysis candidate     2. Septic shock  Likely foot source with mrsa in past  Urine growing gnr  bc pos for gpc  Asp pna as well  vasc, podiatry, and id  On cefepime and vanco  arf from vanco in October  On levo     3. Sp vf arrest  During line insertion  On lidocaine     4. HFpEF/Mod TR  Monitor vol status     5. Anemia   Start inga  check fe b12 fol  Heme pos as well    6. Hyperkalemia  Med mgmt today    Harris Webster.  Antonella Batista MD

## 2021-12-01 NOTE — PROGRESS NOTES
Department of Podiatry  Progress Note    SUBJECTIVE:    Patient seen bedside for management of wound vac therapy to JUANCARLOS PLATA. No acute events overnight. Patient is awake, but not alert or responsive to stimuli. No new pedal complaints.     OBJECTIVE:    Scheduled Meds:   vancomycin  1,500 mg IntraVENous Once    midodrine  10 mg Oral TID WC    insulin glargine  10 Units SubCUTAneous Daily    insulin lispro  0-12 Units SubCUTAneous Q4H    calcium gluconate IVPB  1,000 mg IntraVENous Once    dextrose  50 mL IntraVENous Once    insulin regular  10 Units IntraVENous Once    sodium bicarbonate  50 mEq IntraVENous Once    ipratropium-albuterol  1 ampule Inhalation Q4H WA    budesonide  0.5 mg Nebulization BID    vancomycin (VANCOCIN) intermittent dosing (placeholder)   Other RX Placeholder    cefepime  1,000 mg IntraVENous Q24H    epoetin alessandra-epbx  3,000 Units SubCUTAneous Once per day on Mon Wed Fri    sodium chloride flush  5-40 mL IntraVENous 2 times per day    pantoprazole  40 mg IntraVENous BID    And    sodium chloride (PF)  10 mL IntraVENous BID    chlorhexidine  15 mL Mouth/Throat BID     Continuous Infusions:   dextrose 5 % and 0.45 % NaCl 150 mL/hr at 12/01/21 0552    fentaNYL 5 mcg/ml in 0.9%  ml infusion Stopped (12/01/21 1015)    sodium chloride      dextrose      norepinephrine 12 mcg/min (12/01/21 1000)    sodium chloride       PRN Meds:.dextrose 5 % and 0.45 % NaCl, ipratropium-albuterol, glucose, glucagon (rDNA), dextrose, dextrose, potassium chloride, magnesium sulfate, sodium phosphate IVPB **OR** sodium phosphate IVPB **OR** sodium phosphate IVPB, midazolam, sodium chloride flush, sodium chloride, acetaminophen **OR** acetaminophen, ondansetron **OR** ondansetron, perflutren lipid microspheres, fentanNYL    No Known Allergies    BP (!) 90/42   Pulse 121   Temp 100 °F (37.8 °C) (Esophageal)   Resp 19   Wt 226 lb 9.6 oz (102.8 kg)   SpO2 96%   BMI 34.45 kg/m² EXAM:    Patient is awake but not alert nor responsive. Non-purposeful movement noted. Offloading with Prevalon boots noted B/L  Wound vac and dressings to RLE are dry, clean and intact. Wound vac is running at 125mmHg with excellent seal.  No leakage detected. Left digits are warm to touch. CFTs delay to all left digits. Previous exam (11/30/21)  VASCULAR:  DP and PT pulses are monophasic on doppler, B/L.  CFT delayed left digits.  Warm to warm from the tibial tuberosity to the distal aspect of the foot dorsally b/l. No hair growth noted to the distal aspects dorsally.     NEUROLOGIC:  Deferred due to patient's conditions.     DERM:  General skin appearance is thin, taut, dry plantarly, and erythematous to B/L LE  RLE:      Wound #1:   Evidence of right TMA. No wound vac noted. 10x9x0.2 cm wound presented to the site of TMA. Wound bed is 80% granular, 10% necrotic and 10% fibrotic in nature. Exposed metatarsals with mild serous drainage and  no active bleeding noted. No undermining, tunneling, streaking, malodor, or maceration. Periwound erythema noted.      Wound #2:  3x3cm wound noted to the postero-medial right heel. Eschar is well-adhered without noted drainage, active bleeding, malodor or maceration. No bone exposed. No undermining, tunneling or streaking noted. Mild periwound erythema noted. Wound is stable     LLE:   - Second digit incision site is healed. No noted dehiscence. No noted pus or drainage. Small spot of dried blood at the incision site  -  Wound #3:  1cmx0.5cm scabbed wound noted to the dorsal aspect of the left 3rd digit. Wound does not probe deep. No noted pus or drainage.  No undermining, tunneling, streaking, malodor, or maceration. Mild periwound erythema noted. 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  Equinus b/l.             Scheduled Meds:   vancomycin  1,500 mg IntraVENous Once    midodrine  10 mg Oral TID WC    insulin glargine  10 Units SubCUTAneous Daily    insulin lispro  0-12 Units SubCUTAneous Q4H    calcium gluconate IVPB  1,000 mg IntraVENous Once    dextrose  50 mL IntraVENous Once    insulin regular  10 Units IntraVENous Once    sodium bicarbonate  50 mEq IntraVENous Once    ipratropium-albuterol  1 ampule Inhalation Q4H WA    budesonide  0.5 mg Nebulization BID    vancomycin (VANCOCIN) intermittent dosing (placeholder)   Other RX Placeholder    cefepime  1,000 mg IntraVENous Q24H    epoetin alessandra-epbx  3,000 Units SubCUTAneous Once per day on Mon Wed Fri    sodium chloride flush  5-40 mL IntraVENous 2 times per day    pantoprazole  40 mg IntraVENous BID    And    sodium chloride (PF)  10 mL IntraVENous BID    chlorhexidine  15 mL Mouth/Throat BID     Continuous Infusions:   dextrose 5 % and 0.45 % NaCl 150 mL/hr at 12/01/21 0552    fentaNYL 5 mcg/ml in 0.9%  ml infusion Stopped (12/01/21 1015)    sodium chloride      dextrose      norepinephrine 12 mcg/min (12/01/21 1000)    sodium chloride       PRN Meds:.dextrose 5 % and 0.45 % NaCl, ipratropium-albuterol, glucose, glucagon (rDNA), dextrose, dextrose, potassium chloride, magnesium sulfate, sodium phosphate IVPB **OR** sodium phosphate IVPB **OR** sodium phosphate IVPB, midazolam, sodium chloride flush, sodium chloride, acetaminophen **OR** acetaminophen, ondansetron **OR** ondansetron, perflutren lipid microspheres, fentanNYL    RADIOLOGY:  XR CHEST PORTABLE   Final Result   Infiltrate and or atelectasis which is not clearly changed in light of the   increasing degree of chest rotation. XR CHEST PORTABLE   Final Result   Persistent hazy multifocal bilateral airspace disease not significantly   changed when compared with the prior study. XR ABDOMEN FOR NG/OG/NE TUBE PLACEMENT   Final Result   Satisfactory position of nasogastric tube.          CT ABDOMEN PELVIS WO CONTRAST Additional Contrast? None   Final Result   The visualized lungs demonstrate aspiration versus pneumonia. Aspiration is   favored. No acute inflammatory abnormality is evident in the abdomen or pelvis. The infrarenal aorta measures 2.9 cm. See recommendation below. Sigmoid diverticulosis without evidence of diverticulitis. RECOMMENDATIONS:   2.9 cm infrarenal abdominal aortic aneurysm suspected. Recommend follow-up   every 5 years. Reference: J Am Zeinab Radiol 8052;[id number]. XR CHEST PORTABLE   Final Result   ET tube tip within 1.0 cm of the nato. XR ABDOMEN FOR NG/OG/NE TUBE PLACEMENT   Final Result   Enteric tube tip just distal to the GE junction. XR CHEST PORTABLE   Final Result   1. Limited due to low lung volumes. 2. Bibasilar opacities notable on the left could indicate bibasilar pneumonia   or atelectasis. Opacities appear similar compared to prior from November 5th. XR FOOT RIGHT (2 VIEWS)   Final Result   1. Postsurgical changes including prior transmetatarsal amputation of the   right foot. 2. No radiographic evidence of osteomyelitis. Improvement of soft tissue   swelling. XR CHEST PORTABLE    (Results Pending)   CT HEAD WO CONTRAST    (Results Pending)     BP (!) 90/42   Pulse 121   Temp 100 °F (37.8 °C) (Esophageal)   Resp 19   Wt 226 lb 9.6 oz (102.8 kg)   SpO2 96%   BMI 34.45 kg/m²     LABS:    Recent Labs     11/29/21  1412 11/30/21  0201 12/01/21  0404 12/01/21  0755   WBC 18.5*  --  29.0*  --    HGB 10.3*   < > 8.0* 7.2*   HCT 33.1*   < > 24.4* 22.0*     --  216  --     < > = values in this interval not displayed. Recent Labs     12/01/21  0728      K 5.4*   CL 97*   CO2 23   PHOS 2.4*   *   CREATININE 3.5*        Recent Labs     11/29/21  1412 11/29/21  2014 11/29/21  2301 11/30/21  0610 11/30/21  1755   PROT 8.3   < >  --  5.4* 5.6*   INR 1.6  --  2.1  --   --    APTT 40.1*  --  43.2*  --   --     < > = values in this interval not displayed.

## 2021-12-01 NOTE — PROGRESS NOTES
Comprehensive Nutrition Assessment    Type and Reason for Visit:  Initial, Positive Nutrition Screen    Nutrition Recommendations/Plan: Modify trickle feed  Noted trickle w/ diabetic formula, pt currently hypotensive w/ ongoing pressor use, recommend non-fiber formula until pt more hemodynamically stable  Recommend trickle feeding w/ standard w/o fiber @ 20 ml/hr to provide 480 ml tv, 576 kcal, 27 gm pro, 394 ml free water    Nutrition Assessment:  Pt adm s/p cardiac arrest in ED for wound eval, noted septic shock. Pt now unresponsive on vent, WV to nonhealing TMA site. Hx CKD, COPD, DM, CHF. Pt w/ multiple chronic wounds.  Will monitor for nutrition progression    Malnutrition Assessment:  Malnutrition Status:  Insufficient data    Context:  Chronic Illness     Findings of the 6 clinical characteristics of malnutrition:  Energy Intake:  7 - 75% or less estimated energy requirements for 1 month or longer  Weight Loss:  Unable to assess (d/t possible fluid losses)     Body Fat Loss:  Unable to assess (d/t current contact iso)     Muscle Mass Loss:  Unable to assess    Fluid Accumulation:  No significant fluid accumulation     Strength:  Not Performed    Estimated Daily Nutrient Needs:  Energy (kcal):  ; ; Weight Used for Energy Requirements:  Admission     Protein (g):  105-125; Weight Used for Protein Requirements:  Ideal (1.5-1.8)        Fluid (ml/day):  per critical care management; Method Used for Fluid Requirements:  Other (Comment)      Nutrition Related Findings:  unresponsive on vent, soft abd, hypoactive BS, OGT w/ trickle feeding, MAP 60 on pressor, +1-2 edema, +I/Os 10L      Wounds:  Multiple, Pressure Injury, Unstageable, Deep Tissue Injury, Open Wounds, Wound Vac       Current Nutrition Therapies:    Current Tube Feeding (TF) Orders:  · Feeding Route: Orogastric  · Formula: Diabetic  · Schedule: Continuous (10 ml/hr, 240 ml tv)  · Water Flushes: 30 ml q6 hr = 120 ml H2O  · Current TF & Flush Orders Provides: 360 kcal, 20 gm pro, 182 ml free water, 302 ml total water    Anthropometric Measures:  · Height: 5' 8\" (172.7 cm)  · Current Body Weight: 205 lb (93 kg) (adm wt, CBW elevated 2/2 fluids)   · Admission Body Weight: 205 lb (93 kg) (bed scale 11/30)    · Usual Body Weight: 257 lb (116.6 kg) (actual per EMR 04/2021)     · Ideal Body Weight: 154 lbs; % Ideal Body Weight 133.1 %   · BMI: 31.2  · BMI Categories: Obese Class 1 (BMI 30.0-34. 9)       Nutrition Diagnosis:   · Inadequate oral intake related to impaired respiratory function as evidenced by NPO or clear liquid status due to medical condition, intubation, nutrition support - enteral nutrition      Nutrition Interventions:   Food and/or Nutrient Delivery:  Continue NPO, Modify Tube Feeding (If trickle feeding continued, recommend Standard w/o Fiber @ 20 ml/hr)  Nutrition Education/Counseling:  Education not appropriate   Coordination of Nutrition Care:  Continue to monitor while inpatient    Goals: Tolerance to trickle feeding       Nutrition Monitoring and Evaluation:   Behavioral-Environmental Outcomes:  None Identified   Food/Nutrient Intake Outcomes:  Enteral Nutrition Intake/Tolerance  Physical Signs/Symptoms Outcomes:  Biochemical Data, GI Status, Fluid Status or Edema, Hemodynamic Status, Nutrition Focused Physical Findings, Skin, Weight     Discharge Planning:     Too soon to determine     Electronically signed by Christiana Goss MS, RD, LD on 12/1/21 at 2:29 PM EST    Contact: [phone]

## 2021-12-02 ENCOUNTER — APPOINTMENT (OUTPATIENT)
Dept: GENERAL RADIOLOGY | Age: 81
DRG: 871 | End: 2021-12-02
Payer: MEDICARE

## 2021-12-02 VITALS
WEIGHT: 226.6 LBS | BODY MASS INDEX: 34.34 KG/M2 | TEMPERATURE: 100.4 F | DIASTOLIC BLOOD PRESSURE: 57 MMHG | OXYGEN SATURATION: 79 % | SYSTOLIC BLOOD PRESSURE: 139 MMHG | HEIGHT: 68 IN

## 2021-12-02 LAB
BLOOD CULTURE, ROUTINE: ABNORMAL
CULTURE, BLOOD 2: ABNORMAL
ORGANISM: ABNORMAL
ORGANISM: ABNORMAL

## 2021-12-02 NOTE — PLAN OF CARE
Problem: Non-Violent Restraints  Goal: Removal from restraints as soon as assessed to be safe  12/1/2021 2253 by Yanira Calix RN  Outcome: Completed  12/1/2021 1646 by Margaret Adam  Outcome: Not Met This Shift  12/1/2021 1331 by Margaret Adam  Outcome: Not Met This Shift  Goal: No harm/injury to patient while restraints in use  12/1/2021 2253 by Yanira Calix RN  Outcome: Completed  12/1/2021 1646 by Margaret Adam  Outcome: Met This Shift  12/1/2021 1331 by Margaret Adam  Outcome: Met This Shift  Goal: Patient's dignity will be maintained  12/1/2021 2253 by Yanira Calix RN  Outcome: Completed  12/1/2021 1646 by Margaret Adam  Outcome: Met This Shift  12/1/2021 1331 by Margaret Adam  Outcome: Met This Shift

## 2021-12-02 NOTE — DISCHARGE SUMMARY
Hospital Medicine Discharge Summary    Patient ID: Felicitas Quinn      Patient's PCP: No primary care provider on file. Admitting Physician: Henry Lindsey MD  Discharge Physician: Henry Lindsey MD    Admit Date: 2021     Disposition:     Discharge Diagnoses:   cause of death. septic shock . Principal Problem:    Septic shock due to undetermined organism [facility])  Active Problems:    Elevated troponin    Elevated brain natriuretic peptide (BNP) level    Electrolyte abnormality    Acute kidney injury superimposed on CKD ([address]    Urinary tract infection with hematuria    Melena    Postoperative wound infection    Aspiration pneumonia (HCC)    Septic shock (HCC)  Resolved Problems:    * No resolved hospital problems. *      Date of Death:21  Time of Death:21:33    Immediate Cause of Death:sepctic shock  Underlying Conditions:  --MRSA sepsis   --Septic shock  --Acute hypoxic respiratory failure  --Pneumonia  --UTI  --bacteremia   --Nonsustained V. tach ,cardiac arrest  --Elevated troponin  --HARVEY  --R chronic wound s/p TMA  --DKA  --GI bleed  --Bacteremia  --A. Fib  --AMS      Hospital Course:   brought to the [facility] EMS from nursing home  complains of worsening lower extremity wound, shortness of breath. Patient in the ED had a large dark tarry stool FOBT was positive. Patient was in respiratory distress and he was intubated in the ED.    Patient later went into [facility] on monitor and lost pulse. CPR Per ACLS protocol was followed.  shocked 2 times, and ROSC obtained after 6 minutes .     intuabted and started on pressors, ICU team consulted  --Septic shock  --Acute hypoxic respiratory failure  --Pneumonia  --UTI  --bacteremia   --Nonsustained V. tach ,cardiac arrest  --Elevated troponin  --HARVEY  --R chronic wound s/p TMA  --DKA  --GI bleed  --Bacteremia  --A. Fib  --AMS    Treated with IV abx. ID and vascular suyrgery consulted.  General surgery and cardiology   [facility]. Family changed code status to DNR  Terminally extued on 12/1/21    Time of death 21:33    Consults:  IP CONSULT TO CRITICAL CARE  IP CONSULT TO INTERNAL MEDICINE  PHARMACY TO DOSE VANCOMYCIN  IP CONSULT TO INFECTIOUS DISEASES  IP CONSULT TO CARDIOLOGY  IP CONSULT TO GENERAL SURGERY  IP CONSULT TO NEPHROLOGY  IP CONSULT TO CASE MANAGEMENT  IP CONSULT TO NEUROLOGY  IP CONSULT TO GENERAL SURGERY  IP CONSULT TO PODIATRY  IP CONSULT TO PALLIATIVE CARE  IP CONSULT TO DIETITIAN    Signed:  shefali paris  12/1/21  Thank you No primary care provider on file. for the opportunity to be involved in this patient's care. If you have any questions or concerns please feel free to contact me at [phone].

## 2021-12-02 NOTE — PROGRESS NOTES
[facility] notified of death at this time. Pt not a candidate for donation, reference number [id number].

## 2021-12-02 NOTE — PROGRESS NOTES
Pt leaving the floor with representatives from [facility] at this time. No patient belongings to be taken with patient.

## 2021-12-02 NOTE — PROGRESS NOTES
Dr. Nino Odom at bedside to pronounce patient death at this time. Cardiac death (asystole @ 2133) witnessed by this RN via cardiac monitor, strip printed and placed in chart. Assessment completed by this RN at 2133 showing no apical pulse upon ascultation, no breathing, pupils fixed and dilated, no reflexes.

## 2021-12-03 LAB
ORGANISM: ABNORMAL
WOUND/ABSCESS: ABNORMAL
WOUND/ABSCESS: ABNORMAL

## 2021-12-06 LAB
FUNGUS (MYCOLOGY) CULTURE: ABNORMAL
FUNGUS (MYCOLOGY) CULTURE: NORMAL
FUNGUS (MYCOLOGY) CULTURE: NORMAL
FUNGUS STAIN: ABNORMAL
FUNGUS STAIN: NORMAL
FUNGUS STAIN: NORMAL
ORGANISM: ABNORMAL

## 2021-12-07 LAB
AFB CULTURE (MYCOBACTERIA): NORMAL
AFB CULTURE (MYCOBACTERIA): NORMAL
AFB SMEAR: NORMAL
AFB SMEAR: NORMAL

## 2021-12-21 LAB
AFB CULTURE (MYCOBACTERIA): NORMAL
AFB SMEAR: NORMAL

## 2022-04-03 NOTE — PROGRESS NOTES
Spoke with May Yarbrough at [facility] main cath lab, pt  sched for Monday 27th 1230, needs to be there by 1030, spoke with physicians ambulance they will be here at 0930 pickup, (spoke with Marbella Johnson) Filipino

## 2023-08-09 NOTE — PROGRESS NOTES
See [facility] report. Remote monitoring reviewed over a 90 day period.   End of 90 day monitoring period date of service 11/02/18
no concerns

## 2024-02-21 NOTE — PROGRESS NOTES
Department of Podiatry  Progress Note    SUBJECTIVE:  Mr. Nieves Irwin was evaluated at bedside this morning for B/L LE wounds and RLE gangrenous changes. No acute events overnight. Patient denies any N/V/D/F/C/SOB/CP and has no other pedal complaints at this time. OBJECTIVE:    Scheduled Meds:   allopurinol  300 mg Oral Daily    aspirin EC  81 mg Oral Daily    DULoxetine  60 mg Oral Daily    ferrous sulfate  325 mg Oral BID WC    gabapentin  200 mg Oral TID    isosorbide mononitrate  120 mg Oral Daily    lactulose  20 g Oral TID    levothyroxine  25 mcg Oral Daily    metoprolol succinate  50 mg Oral Daily    sodium chloride flush  5-40 mL IntraVENous 2 times per day    enoxaparin  40 mg SubCUTAneous Daily    insulin lispro  0-6 Units SubCUTAneous TID WC    insulin lispro  0-3 Units SubCUTAneous Nightly    budesonide  250 mcg Nebulization BID    ipratropium  0.5 mg Nebulization 4x daily    Arformoterol Tartrate  15 mcg Nebulization BID     Continuous Infusions:   dextrose      sodium chloride       PRN Meds:.albuterol, HYDROcodone-acetaminophen, glucose, dextrose, glucagon (rDNA), dextrose, sodium chloride flush, sodium chloride, ondansetron **OR** ondansetron, polyethylene glycol, acetaminophen **OR** acetaminophen, labetalol, perflutren lipid microspheres, hydrALAZINE    No Known Allergies    BP (!) 169/92   Pulse 98   Temp 98.1 °F (36.7 °C) (Oral)   Resp 18   Ht 5' 7\" (1.702 m)   Wt 244 lb (110.7 kg)   SpO2 96%   BMI 38.22 kg/m²       EXAM:    VASCULAR:  DP and PT pulses are non- palpable. CFT delayed B/L. Warm to cool from the tibial tuberosity to the distal aspect of the digits dorsally on RLE and warm to warm on LLE. No hair growth noted to the distal aspects dorsally. NEUROLOGIC:  Protective sensation is diminished b/l    DERM:  Diffuse skin changes noted to the LEs. #1.RLE: Ulcer noted to the distal half of the leg measuring about 10.0x12.0x0.2cm.  Wound bed is about 50% granular and 50 % fibrotic/necrotic in nature. Serous drainage noted. Periwound erythema noted. Mild edema noted. Chronic skin changes indicating chronic venous stasis noted, including hyperpigmentation, discoloration and skin thickening. Right foot digits 2-5 are mummified, dried, shrunken and discolored indicative of gangrenous changes    #2. LLE: Wound noted to the distal half of the leg measuring about 12.0x11.0x0.2cm. Wound bed is fibro granular in nature. Serous drainage noted. Periwound erythema and edema noted. Hyperpigmentation noted. Small dried, and resolved abrasion noted across the 2nd and 3rd digit MPJS. Skin changes indicative of venous stasis noted    Both as pictrued below:    MUSCULOSKELETAL:  MMT deferred. No posterior calf pain on palpation b/l. Scheduled Meds:   allopurinol  300 mg Oral Daily    aspirin EC  81 mg Oral Daily    DULoxetine  60 mg Oral Daily    ferrous sulfate  325 mg Oral BID WC    gabapentin  200 mg Oral TID    isosorbide mononitrate  120 mg Oral Daily    lactulose  20 g Oral TID    levothyroxine  25 mcg Oral Daily    metoprolol succinate  50 mg Oral Daily    sodium chloride flush  5-40 mL IntraVENous 2 times per day    enoxaparin  40 mg SubCUTAneous Daily    insulin lispro  0-6 Units SubCUTAneous TID WC    insulin lispro  0-3 Units SubCUTAneous Nightly    budesonide  250 mcg Nebulization BID    ipratropium  0.5 mg Nebulization 4x daily    Arformoterol Tartrate  15 mcg Nebulization BID     Continuous Infusions:   dextrose      sodium chloride       PRN Meds:.albuterol, HYDROcodone-acetaminophen, glucose, dextrose, glucagon (rDNA), dextrose, sodium chloride flush, sodium chloride, ondansetron **OR** ondansetron, polyethylene glycol, acetaminophen **OR** acetaminophen, labetalol, perflutren lipid microspheres, hydrALAZINE    RADIOLOGY:  US RETROPERITONEAL BIPIN   Final Result   No acute renal abnormality.       Nondiagnostic evaluation of the renal arteries as detailed above. VL SÁNCHEZ BILATERAL LIMITED 1-2 LEVELS   Final Result   Monophasic right lower extremity posterior tibial Doppler signal, likely   reflects underlying peripheral arterial disease. Evaluation limited due to patient request to terminate the procedure. XR FOOT RIGHT (MIN 3 VIEWS)   Final Result   No acute osseous abnormality. BP (!) 169/92   Pulse 98   Temp 98.1 °F (36.7 °C) (Oral)   Resp 18   Ht 5' 7\" (1.702 m)   Wt 244 lb (110.7 kg)   SpO2 96%   BMI 38.22 kg/m²     LABS:    Recent Labs     09/24/21  0630 09/25/21  0355   WBC 8.8 10.9   HGB 10.2* 11.0*   HCT 30.6* 32.7*    277        Recent Labs     09/25/21  0355      K 4.1   CL 99   CO2 23   PHOS 2.4*   BUN 30*   CREATININE 1.4*        Recent Labs     09/23/21  0226 09/24/21  0630   PROT 7.6 6.6         ASSESSMENT:  1.B/L LE ulcers- POA  2. Gangrenous RLE  3. B/L LE cellulitis  4. Chronic venous stasis   5. PVD/PAD  6. DM with neuropathic changes     Cellulitis        PLAN:  - Patient was examined and evaluated. Reviewed patient's recent lab results and pertinent diagnostic imaging. Reviewed ancillary service notes. - Dressings changed this morning: Xeroform DSD QOD dressing changes   - Vascular: Planned angiogram next week. Pending vascular intervention, the level of Podiatric surgical intervention will be determined. - Vascular studies:   1. PAD  - Pain Control: IV and PO  - Antibiotics as per ID: Cefazolin    - Discussed patient with    - Will continue to follow patient while they are in-house. right hand/ROM intact/no joint swelling/no joint warmth/no calf tenderness/decreased ROM due to pain/normal gait/strength 5/5 bilateral lower extremities/decreased strength details…

## 2024-05-26 NOTE — CARE COORDINATION
The pt was admitted from [facility]. He is able to return. Will need covid test. Envelope, ambulette completed and placed in soft chart.  Carl Fleming -[phone] Initial (On Arrival)

## 2024-10-05 NOTE — PROGRESS NOTES
Problem: Rehabilitation ([facility]) Plan of Care  Goal: Plan of Care Review  Outcome: Progressing  Flowsheets (Taken 10/5/2024 0222)  Progress: no change  Plan of Care Reviewed With: patient  Goal: Patient-Specific Goal (Individualized)  Outcome: Progressing  Goal: Absence of New-Onset Illness or Injury  Outcome: Progressing  Intervention: Prevent Fall and Fall Injury  Description: Perform fall risk screening on admission and with change in status.  Communicate fall injury risk to the healthcare team.  Promote environmental and care measures specific to identified risk factors.  Develop strategies to prevent or minimize injury during falls and to get up safely after falling.  Perform regular intentional rounding to assess need for position change, pain assessment, personal needs.  Recent Flowsheet Documentation  Taken 10/5/2024 0200 by Roz Orosco RN  Safety Promotion/Fall Prevention:   nonskid shoes/slippers when out of bed   safety round/check completed  Taken 10/5/2024 0025 by Roz Orosco RN  Safety Promotion/Fall Prevention:   activity supervised   fall prevention program maintained   lighting adjusted   clutter free environment maintained   safety round/check completed   nonskid shoes/slippers when out of bed  Intervention: Prevent VTE (Venous Thromboembolism)  Description: Provide ongoing assessment for signs and symptoms of VTE (venous thromboembolism).  Encourage/assist with ongoing mobilization.  Initiate and maintain compression therapy when indicated.  Monitor and provide prophylactic anticoagulation when prescribed.  Recent Flowsheet Documentation  Taken 10/5/2024 0025 by Roz Orosco RN  VTE Prevention/Management:   sequential compression devices on   bilateral  Taken 10/5/2024 0014 by Roz Orosco RN  VTE Prevention/Management:   sequential compression devices on   bilateral  Goal: Optimal Comfort and Wellbeing  Outcome: Progressing  Goal: Home and Community Transition Plan  Voice message left notifying Dr Naresh Ribeiro that another order for blood transfusion needed because Diana Laguna lost IV site and one was no obtained till [phone] by iv team. Prior unit had to be wasted. Wait for new order. Established  Outcome: Progressing  Intervention: Support Transition to Safe and Accessible Home Environment  Description: Assess home or discharge environment; identify and address any safety concerns or need for environmental modifications.  Ensure understanding of fall risk, prevention and management.  Assist with equipment, supply and medication acquisition and provide or arrange for instruction and training.  Train caregiver in assistance and guarding techniques for patient care, along with proper body mechanics and injury prevention.  Identify emergency response plan, such as methods for home evacuation and obtaining urgent assistance.  Recent Flowsheet Documentation  Taken 10/5/2024 0211 by Roz Orosco, RN  Transportation Anticipated: family or friend will provide  Patient/Family Anticipated Services at Transition: none  Patient/Family Anticipates Transition to: home with family  Taken 10/5/2024 0205 by Roz Orosco, RN  Equipment Currently Used at Home: none   Goal Outcome Evaluation:  Plan of Care Reviewed With: patient        Progress: no change

## 2025-04-29 NOTE — PROGRESS NOTES
The following individual(s) verbally consented to be recorded using ambient AI listening technology and understand that they can each withdraw their consent to this listening technology at any point by asking the clinician to turn off or pause the recording:    Patient name: Joaquina Monique  Additional names:     Call placed to 's service to see if a resident could come see patients wound vac.

## (undated) DEVICE — Z DISCONTINUED GLOVE SURG SZ 7 L12IN FNGR THK13MIL WHT ISOLEX POLYISOPRENE

## (undated) DEVICE — SET ORTHO STD STORTSTD1

## (undated) DEVICE — ENT PACK: Brand: MEDLINE INDUSTRIES, INC.

## (undated) DEVICE — GLOVE ORANGE PI 7 1/2   MSG9075

## (undated) DEVICE — AIRWAY PHARYNGEAL AD 10 CM INTUB

## (undated) DEVICE — CANISTER NEG PRSS 1000ML W/ GEL INFOVAC

## (undated) DEVICE — INTENDED FOR TISSUE SEPARATION, AND OTHER PROCEDURES THAT REQUIRE A SHARP SURGICAL BLADE TO PUNCTURE OR CUT.: Brand: BARD-PARKER ® STAINLESS STEEL BLADES

## (undated) DEVICE — 1 ML TUBERCULIN SYRINGE LUER-LOCK TIP: Brand: MONOJECT

## (undated) DEVICE — GAUZE,SPONGE,AVANT,4"X4",4PLY,STRL,10/TR: Brand: MEDLINE

## (undated) DEVICE — BANDAGE,GAUZE,4.5"X4.1YD,STERILE,LF: Brand: MEDLINE

## (undated) DEVICE — THIN OFFSET (13.3 X 0.38 X 42.0MM)

## (undated) DEVICE — PATIENT RETURN ELECTRODE, SINGLE-USE, CONTACT QUALITY MONITORING, ADULT, WITH 9FT CORD, FOR PATIENTS WEIGING OVER 33LBS. (15KG): Brand: MEGADYNE

## (undated) DEVICE — Z DUP USE 2257490 ADHESIVE SKIN CLSRE 036ML TPCL 2CTL CNCRLTE HIGH VSCSTY DRMB

## (undated) DEVICE — ELECTRODE PT RET AD L9FT HI MOIST COND ADH HYDRGEL CORDED

## (undated) DEVICE — Device

## (undated) DEVICE — GRADUATE

## (undated) DEVICE — GEL US 20GM NONIRRITATING OVERWRAPPED FILE PCH TRNSMIT

## (undated) DEVICE — CLOTH SURG PREP PREOPERATIVE CHLORHEXIDINE GLUC 2% READYPREP

## (undated) DEVICE — SOLUTION IRRIG 1000ML STRL H2O USP PLAS POUR BTL

## (undated) DEVICE — Z DISCONTINUED PER MEDLINE USE 2425483 TAPE UMB L30IN DIA1/8IN WHT COT NONABSORBABLE W/O NDL FOR

## (undated) DEVICE — Device: Brand: MEDEX

## (undated) DEVICE — MAGNETIC INSTR DRAPE 20X16: Brand: MEDLINE INDUSTRIES, INC.

## (undated) DEVICE — GAUZE,SPONGE,4"X4",16PLY,XRAY,STRL,LF: Brand: MEDLINE

## (undated) DEVICE — SOLUTION IV IRRIG 500ML 0.9% SODIUM CHL 2F7123

## (undated) DEVICE — BLANKET WRM W35.4XL86.6IN FULL UNDERBODY + FORC AIR

## (undated) DEVICE — BLADE CLIPPER GEN PURP NS

## (undated) DEVICE — SURGICAL PROCEDURE PACK BASIC

## (undated) DEVICE — KIT NEG PRSS M W12.5XH3.2XL18CM 2 SHT STD DRP 1 SENSATRAC

## (undated) DEVICE — GLOVE ORANGE PI 8   MSG9080

## (undated) DEVICE — GAUZE,SPONGE,4"X4",8PLY,STRL,LF,10/TRAY: Brand: MEDLINE

## (undated) DEVICE — SOLUTION IV IRRIG POUR BRL 0.9% SODIUM CHL 2F7124

## (undated) DEVICE — LABEL MED 4 IN SURG PANEL W/ PEN STRL

## (undated) DEVICE — TOWEL,OR,DSP,ST,BLUE,STD,6/PK,12PK/CS: Brand: MEDLINE

## (undated) DEVICE — TOWEL,OR,DSP,ST,WHITE,DLX,4/PK,20PK/CS: Brand: MEDLINE

## (undated) DEVICE — SYRINGE MED 10ML TRNSLUC BRL PLUNG BLK MRK POLYPR CTRL

## (undated) DEVICE — E-Z CLEAN, NON-STICK, PTFE COATED, ELECTROSURGICAL NEEDLE ELECTRODE, MODIFIED EXTENDED INSULATION, 2.75 INCH (7 CM): Brand: MEGADYNE

## (undated) DEVICE — SOLUTION IRRIG 3000ML 0.9% SOD CHL USP UROMATIC PLAS CONT

## (undated) DEVICE — CLIP LIG M BLU TI HRT SHP WIRE HORZ 600 PER BX

## (undated) DEVICE — DRILL SYSTEM 7

## (undated) DEVICE — SET ORTHO STD STORTSTD2

## (undated) DEVICE — 3M™ IOBAN™ 2 ANTIMICROBIAL INCISE DRAPE 6640EZ: Brand: IOBAN™ 2

## (undated) DEVICE — APPLICATOR MEDICATED 26 CC SOLUTION HI LT ORNG CHLORAPREP

## (undated) DEVICE — DOUBLE BASIN SET: Brand: MEDLINE INDUSTRIES, INC.

## (undated) DEVICE — GLOVE SURG SZ 75 L12IN FNGR THK94MIL TRNSLUC YEL LTX

## (undated) DEVICE — DRESSING,GAUZE,XEROFORM,CURAD,5"X9",ST: Brand: CURAD

## (undated) DEVICE — LOWER EXT KNEE DRAPE: Brand: MEDLINE INDUSTRIES, INC.

## (undated) DEVICE — SET SURG INSTR ART III

## (undated) DEVICE — HANDPIECE SET WITH BONE CLEANING TIP AND SUCTION TUBE: Brand: INTERPULSE

## (undated) DEVICE — GOWN,AURORA,NONREINF,RAGLAN,L,STERILE: Brand: MEDLINE

## (undated) DEVICE — MASK RESP UNIV N95 4 PANEL HD STRP INDIVIDUALLY WRP LF

## (undated) DEVICE — STANDARD HYPODERMIC NEEDLE,ALUMINUM HUB: Brand: MONOJECT

## (undated) DEVICE — TOTAL KNEE PK

## (undated) DEVICE — TRAP,MUCUS SPECIMEN,40CC: Brand: MEDLINE

## (undated) DEVICE — AGENT HEMSTAT W4XL4IN OXIDIZED REGENERATED CELOS STRUCTURED

## (undated) DEVICE — SET SURG INSTR MINI VASC

## (undated) DEVICE — GLOVE SURG SZ 7.5 L11.73IN FNGR THK9.8MIL STRW LTX POLYMER

## (undated) DEVICE — NEEDLE HYPO 25GA L1.5IN BLU POLYPR HUB S STL REG BVL STR

## (undated) DEVICE — SOLUTION IV 500ML 0.9% SOD CHL PH 5 INJ USP VIAFLX PLAS

## (undated) DEVICE — DILATOR ART

## (undated) DEVICE — FOGARTY ARTERIAL EMBOLECTOMY CATHETER 4F 40CM: Brand: FOGARTY

## (undated) DEVICE — PACK,UNIVERSAL,NO GOWNS: Brand: MEDLINE

## (undated) DEVICE — LOOP VES W25MM THK1MM MAXI RED SIL FLD REPELLENT 100 PER

## (undated) DEVICE — SURGICAL PROCEDURE PACK VASC MAJ CUST

## (undated) DEVICE — Z DISCONTINUED APPLICATOR SURG PREP 0.35OZ 2% CHG 70% ISO ALC W/ HI LT

## (undated) DEVICE — SOLUTION IRRIG 1000ML 0.9% SOD CHL USP POUR PLAS BTL

## (undated) DEVICE — DRESSING FOAM W22XL25CM FILVE LAYR FOAM DP DEF SAFETAC

## (undated) DEVICE — BANDAGE,GAUZE,CONFORMING,4"X75",STRL,LF: Brand: MEDLINE

## (undated) DEVICE — SYRINGE MED 50ML LUERLOCK TIP

## (undated) DEVICE — SYSTEM CATH 20GA L1IN OD1.0668-1.143MM ID0.7874-0.8636MM

## (undated) DEVICE — SURGICAL PROCEDURE PACK BRONCH

## (undated) DEVICE — BANDAGE COMPR W4INXL10YD WHITE/BEIGE E MTRX HK LOOP CLSR

## (undated) DEVICE — SET INSTR ART 1

## (undated) DEVICE — SYRINGE MED 10ML POLYPR LUERSLIP TIP FLAT TOP W/O SFTY DISP

## (undated) DEVICE — TOTAL TRAY, 16FR 10ML SIL FOLEY, URN: Brand: MEDLINE

## (undated) DEVICE — LARGE BORE STOPCOCK WITH ROTATING MALE LUER LOCK